# Patient Record
Sex: MALE | Race: WHITE | Employment: OTHER | URBAN - METROPOLITAN AREA
[De-identification: names, ages, dates, MRNs, and addresses within clinical notes are randomized per-mention and may not be internally consistent; named-entity substitution may affect disease eponyms.]

---

## 2017-01-02 ENCOUNTER — HOSPITAL ENCOUNTER (OUTPATIENT)
Dept: PHYSICAL THERAPY | Facility: CLINIC | Age: 62
Setting detail: THERAPIES SERIES
End: 2017-01-02
Attending: INTERNAL MEDICINE
Payer: COMMERCIAL

## 2017-01-02 ENCOUNTER — ANTICOAGULATION THERAPY VISIT (OUTPATIENT)
Dept: NURSING | Facility: CLINIC | Age: 62
End: 2017-01-02
Payer: COMMERCIAL

## 2017-01-02 DIAGNOSIS — Z79.01 LONG-TERM (CURRENT) USE OF ANTICOAGULANTS: ICD-10-CM

## 2017-01-02 DIAGNOSIS — I48.20 CHRONIC ATRIAL FIBRILLATION (H): Primary | ICD-10-CM

## 2017-01-02 LAB — INR POINT OF CARE: 2.6 (ref 0.86–1.14)

## 2017-01-02 PROCEDURE — 36416 COLLJ CAPILLARY BLOOD SPEC: CPT

## 2017-01-02 PROCEDURE — 85610 PROTHROMBIN TIME: CPT | Mod: QW

## 2017-01-02 PROCEDURE — 40000719 ZZHC STATISTIC PT DEPARTMENT NEURO VISIT: Performed by: PHYSICAL THERAPIST

## 2017-01-02 PROCEDURE — 99207 ZZC NO CHARGE NURSE ONLY: CPT

## 2017-01-02 PROCEDURE — 97110 THERAPEUTIC EXERCISES: CPT | Mod: GP | Performed by: PHYSICAL THERAPIST

## 2017-01-02 PROCEDURE — 97112 NEUROMUSCULAR REEDUCATION: CPT | Mod: GP | Performed by: PHYSICAL THERAPIST

## 2017-01-02 PROCEDURE — 97116 GAIT TRAINING THERAPY: CPT | Mod: GP | Performed by: PHYSICAL THERAPIST

## 2017-01-02 NOTE — PROGRESS NOTES
ANTICOAGULATION FOLLOW-UP CLINIC VISIT    Patient Name:  Marcus Hodges  Date:  1/2/2017  Contact Type:  Face to Face  Patient is accompanied in the office by his wife.    SUBJECTIVE:     Patient Findings     Positives Antibiotic use or infection    Comments Just finished 7 days of Keflex for ingrown toenail.           OBJECTIVE    INR PROTIME   Date Value Ref Range Status   01/02/2017 2.6* 0.86 - 1.14 Final       ASSESSMENT / PLAN  INR assessment THER    Recheck INR In: 6 WEEKS    INR Location Clinic      Anticoagulation Summary as of 1/2/2017     INR goal 2.0-3.0   Selected INR 2.6 (1/2/2017)   Maintenance plan 7.5 mg (5 mg x 1.5) on Mon, Wed, Fri; 5 mg (5 mg x 1) all other days   Full instructions 7.5 mg on Mon, Wed, Fri; 5 mg all other days   Weekly total 42.5 mg   No change documented Svetlana Vazquez RN   Plan last modified Svetlana Vazquez RN (10/21/2016)   Next INR check 2/10/2017   Target end date Indefinite    Indications   Long-term (current) use of anticoagulants [Z79.01]  Chronic atrial fibrillation (H) [I48.2]         Anticoagulation Episode Summary     INR check location [facility]    Preferred lab     Send INR reminders to [facility]    Comments 5mg tabs // WIFE USES OWN CALENDAR // CVA - left hemiplegia - uses a cane      Anticoagulation Care Providers     Provider Role Specialty Phone number    Don Aviles MD Referring Internal Medicine [phone]            See the Encounter Report to view Anticoagulation Flowsheet and Dosing Calendar (Go to Encounters tab in chart review, and find the Anticoagulation Therapy Visit)        Svetlana Vazquez RN

## 2017-01-05 NOTE — ADDENDUM NOTE
Encounter addended by: Ada Scott, PT on: 1/5/2017 12:52 PM<BR>     Documentation filed: Inpatient Document Flowsheet, Clinical Notes

## 2017-01-09 ENCOUNTER — OFFICE VISIT (OUTPATIENT)
Dept: PODIATRY | Facility: CLINIC | Age: 62
End: 2017-01-09
Payer: COMMERCIAL

## 2017-01-09 VITALS — WEIGHT: 179 LBS | RESPIRATION RATE: 16 BRPM | BODY MASS INDEX: 22.97 KG/M2 | HEIGHT: 74 IN

## 2017-01-09 DIAGNOSIS — L60.0 INGROWING NAIL: Primary | ICD-10-CM

## 2017-01-09 PROCEDURE — 11750 EXCISION NAIL&NAIL MATRIX: CPT | Mod: TA | Performed by: PODIATRIST

## 2017-01-09 NOTE — MR AVS SNAPSHOT
After Visit Summary   1/9/2017    Marcus Hodges    MRN: [id number]           Patient Information     Date Of Birth          1955        Visit Information        Provider Department      1/9/2017 10:30 AM Saulo Mariee DPM [facility] Instructions    Follow Up - 2 weeks or as needed    Dr. Mariee's Clinic Locations:         Monday Tuesday   [facility]   [address] [address], [address]   [place] [address] [place] [address]   [phone]       Wednesday:  Surgery Day    Surgery Scheduling Line - [phone]       Thursday Morning Thursday Afternoon   [facility]   [address], [address]   [place] [address] [place], [place] [address] [phone]       Friday Morning To Schedule an Appointment    [facility] Call: [phone] [address] [place] [address]  [phone] PLEASE FAX ALL FORMS TO: [phone]     Ingrown toenail Post-procedural instructions    - After the procedure, go home and elevate the involved foot for the remainder of the day/evening as able.  This is to minimize swelling, control pain, and limit post-procedural complications.  The pre-procedural injection may cause your toe to be numb anywhere from1-2 hours.    - You can take Tylenol, Ibuprofen, Advil, etc as needed for pain if tolerated.  Follow label instructions      - If you have been given a prescription for antibiotics, take them as instructed and complete the prescription.    - Keep dressing intact until the following morning.  At that point, you may remove the bandage (you may need to soak it in warm soapy water as the bandage will likely adhere to your skin).  Soaking in warm soapy water for 5-10 minutes will also facilitate healing.  Wash the toe thoroughly, dry the toe thoroughly.  Apply  antibiotic wound ointment to base of wound and cover with gauze and Coban dressing (not too tightly) until it stops draining.  This may take a few days to weeks, but at that point, you may continue with antibiotic ointment and a band-aid, or you may stop applying a dressing all together.  Dressing changes should be done twice daily if you had the permanent/chemical procedure done.    - You may do activities to tolerance the following day.  Find a shoe that is comfortable and minimizes the amount of rubbing on your toe, as this may increase pain, swelling, etc.    - Monitor for signs of infection.  With this procedure, it is common to have mild surrounding redness and drainage.  If the redness involves the entire great toe or if you notice red streaks on top of your foot, or if you experience any nausea, vomiting, chills, fevers > 101 degrees, call clinic for a quick appointment.          Follow-ups after your visit        Your next 10 appointments already scheduled     Jan 17, 2017  9:30 AM   Treatment 60 with Ada Scott PT   [facility] ([facility])    [address] [address]   [phone]            Jan 20, 2017 11:00 AM   Treatment 60 with Ada Scott PT   [facility] ([facility])    [address] [address]   [phone]            Jan 23, 2017  1:00 PM   Treatment 60 with Ada Scott PT   [facility] ([facility])    [address] [address]   [phone]            Jan 30, 2017  1:00 PM   Treatment 60 with Ada Scott PT   [facility] ([facility])    [address] [address]   [phone]            Feb 06, 2017  1:00 PM   Treatment 60 with Ada Scott PT   [facility] ([facility])    [address]  [facility] [address]   [phone]            Feb 10, 2017 10:00 AM   Anticoagulation Visit with [facility]   [facility] [place] ([facility])    [address]  [address]  [place] [address]   [phone]            Feb 13, 2017  1:00 PM   Treatment 60 with Ada Scott, PT   [facility] ([facility])    [address] [facility] [address]   [phone]            Feb 20, 2017  1:00 PM   Treatment 60 with Ada Scott, PT   [facility] ([facility])    [address] [facility] [address]   [phone]            Mar 03, 2017 10:15 AM   Treatment 60 with [place] Scott, PT   [facility] ([facility])    [address] [facility] [address] [phone]              Who to contact     If you have questions or need follow up information about today's clinic visit or your schedule please contact [facility] directly at [phone].  Normal or non-critical lab and imaging results will be communicated to you by MyChart, letter or phone within 4 business days after the clinic has received the results. If you do not hear from us within 7 days, please contact the clinic through [employer] or phone. If you have a critical or abnormal lab result, we will notify you by phone as soon as possible.  Submit refill requests through [employer] or call your pharmacy and they will forward the refill request to us. Please allow 3 business days for your refill to be completed.          Additional Information About Your Visit        [employer] Information     [employer] gives you secure access to your electronic health record. If you see a primary care provider, you can also send messages to your care team and make appointments. If you have questions, please call your primary care clinic.  If you do not have a primary care  provider, please call [phone] and they will assist you.        Care EveryWhere ID     This is your Care EveryWhere ID. This could be used by other organizations to access your [facility] medical records  [id number]         Blood Pressure from Last 3 Encounters:   12/23/16 114/70   12/06/16 100/74   12/04/16 102/60    Weight from Last 3 Encounters:   12/23/16 178 lb (80.74 kg)   12/06/16 179 lb (81.194 kg)   12/04/16 178 lb (80.74 kg)              Today, you had the following     No orders found for display       Primary Care Provider Office Phone # Fax #    Don Aviles [phone]       [facility] [address] [place] [address]        Thank you!     Thank you for choosing [facility]  for your care. Our goal is always to provide you with excellent care. Hearing back from our patients is one way we can continue to improve our services. Please take a few minutes to complete the written survey that you may receive in the mail after your visit with us. Thank you!             Your Updated Medication List - Protect others around you: Learn how to safely use, store and throw away your medicines at www.disposemymeds.org.          This list is accurate as of: 1/9/17 10:44 AM.  Always use your most recent med list.                   Brand Name Dispense Instructions for use    acetaminophen 325 MG tablet    TYLENOL    100 tablet    Take  by mouth every 4 hours as needed for pain.       amoxicillin 500 MG capsule    AMOXIL         atorvastatin 20 MG tablet    LIPITOR    90 tablet    Take 1 tablet (20 mg) by mouth daily       baclofen 20 MG tablet    LIORESAL    270 tablet    Take 1 tablet by mouth 4 times daily.       cholecalciferol 1000 UNIT tablet    vitamin D    90 tablet    Take 2 tablets (2,000 Units) by mouth daily       citalopram 20 MG tablet    celeXA    135 tablet    Take 1.5 tablets (30 mg) by mouth daily       erythromycin ophthalmic ointment    ROMYCIN    3.5 g     Place 0.5 inches Into the left eye 3 times daily       levETIRAcetam 1000 MG Tabs    KEPPRA    180 tablet    Take 1 tablet by mouth 2 times daily       metoprolol 25 MG 24 hr tablet    TOPROL-XL    90 tablet    Take 1 tablet (25 mg) by mouth daily       mirabegron 50 MG 24 hr tablet    MYRBETRIQ    90 tablet    Take 1 tablet (50 mg) by mouth daily       Multi-vitamin Tabs tablet   Generic drug:  multivitamin, therapeutic with minerals     100 tablet    Take 1 tablet by mouth daily.       NONFORMULARY      Protandim - herbal supplement       * order for DME     1 each    Equipment being ordered: Dispense a WHFO Leg Brace       * order for DME     1 each    Equipment being ordered: Quad Cane-39 inch       * order for DME     4 each    Equipment being ordered: daytime urinary leg bag   800cc       * order for DME     12 each    Equipment being ordered: Daytime Urinary leg bag-800cc  ([facility] )       * order for DME     12 each    Equipment being ordered:    Nightime urinary leg bag 1600cc  ([facility]S Code )       * order for DME     90 each    Equipment being ordered:   Condom catheter  ([facility]S Code )       * order for DME     12 each    Equipment being ordered: Incontinence supplies - Posies  (HCPCS Code )       * order for DME     150 each    Equipment being ordered: Adhesive remover, wipes  (HCPCS Code )       * order for DME     150 each    Equipment being ordered: Skin prep (no code - requested by patient)       warfarin 5 MG tablet    COUMADIN    135 tablet    Take 5mg TTSa, 7.5mg MWFSu OR as directed by INR Clinic.       * Notice:  This list has 9 medication(s) that are the same as other medications prescribed for you. Read the directions carefully, and ask your doctor or other care provider to review them with you.

## 2017-01-09 NOTE — PATIENT INSTRUCTIONS
Follow Up - 2 weeks or as needed    Dr. Mariee's Clinic Locations:         Monday Tuesday   [facility]   [address]   [place] [address] [place] [address]   [phone]       Wednesday:  Surgery Day    Surgery Scheduling Line - [phone]       Thursday Morning Thursday Afternoon   [facility]   [address]   [place] [address] [place], [place] [address] [phone]       Friday Morning To Schedule an Appointment    [facility] Call: [phone] [address] [place] [address]  [phone] PLEASE FAX ALL FORMS TO: [phone]     Ingrown toenail Post-procedural instructions    - After the procedure, go home and elevate the involved foot for the remainder of the day/evening as able.  This is to minimize swelling, control pain, and limit post-procedural complications.  The pre-procedural injection may cause your toe to be numb anywhere from1-2 hours.    - You can take Tylenol, Ibuprofen, Advil, etc as needed for pain if tolerated.  Follow label instructions      - If you have been given a prescription for antibiotics, take them as instructed and complete the prescription.    - Keep dressing intact until the following morning.  At that point, you may remove the bandage (you may need to soak it in warm soapy water as the bandage will likely adhere to your skin).  Soaking in warm soapy water for 5-10 minutes will also facilitate healing.  Wash the toe thoroughly, dry the toe thoroughly.  Apply antibiotic wound ointment to base of wound and cover with gauze and Coban dressing (not too tightly) until it stops draining.  This may take a few days to weeks, but at that point, you may continue with antibiotic ointment and a band-aid, or you may stop applying a dressing all together.  Dressing changes  should be done twice daily if you had the permanent/chemical procedure done.    - You may do activities to tolerance the following day.  Find a shoe that is comfortable and minimizes the amount of rubbing on your toe, as this may increase pain, swelling, etc.    - Monitor for signs of infection.  With this procedure, it is common to have mild surrounding redness and drainage.  If the redness involves the entire great toe or if you notice red streaks on top of your foot, or if you experience any nausea, vomiting, chills, fevers > 101 degrees, call clinic for a quick appointment.

## 2017-01-09 NOTE — NURSING NOTE
"Chief Complaint   Patient presents with     Ingrown Toenail     Left hallux, phenol application       Initial Resp 16  Ht 6' 2\" (1.88 m)  Wt 179 lb (81.194 kg)  BMI 22.97 kg/m2 Estimated body mass index is 22.97 kg/(m^2) as calculated from the following:    Height as of this encounter: 6' 2\" (1.88 m).    Weight as of this encounter: 179 lb (81.194 kg).    Christine Batres CMA ([facility])  Podiatry/Foot & Ankle Surgery      "

## 2017-01-09 NOTE — PROGRESS NOTES
"Foot & Ankle Surgery   January 9, 2017    S:  Pt is seen today for evaluation of medial L hallux.  Plan for staged P&A, doing well after avulsion last visit and PO abx.  Had some discomfort after avulsion but toe is nearly healed today.  They do have the silvadene cream.    Filed Vitals:    01/09/17 1045   Resp: 16   Height: 6' 2\" (1.88 m)   Weight: 179 lb (81.194 kg)   '      ROS - Pos for CC.  Patient denies current nausea, vomiting, chills, fevers, belly pain, calf pain, chest pain or SOB.  Complete remainder of ROS it otherwise neg.      PE:  Gen:   No apparent distress  Neuro:   A&Ox3, no deficits  Psych:    Answering questions appropriately for age and situation with normal affect  Head:    NCAT  Eye:    Visual scanning without deficit  Ear:    Response to auditory stimuli wnl  Lung:    Non-labored breathing on RA noted  Abd:    NTND per patient report  Lymph:    Neg for pitting/non-pitting edema BLE  Vasc:    Pulses palpable, CFT minimally delayed  Neuro:    Light touch sensation diminished left lower extremity   Derm:  Medial L hallux procedure site - dry, stable, no drainage, no infection/inflammation  MSK:    ROM, strength wnl without limitation, no pain on palpation noted.  Calf:    Neg for redness, swelling or tenderness      Assessment:  61 year old male with recurrent ingrown medial L hallux, 2 weeks sp partial temp avulsion.  In today for phenol application for staged P&A      Plan:  Discussed etiologies/options  1.  Medial L hallux  -After discussing the procedure, as well as risks, complications and post-procedure instructions, informed consent was obtained.    Anesthesia:  3 cc's of  1% lidocaine plain    Procedure:  After adequate prep, and with anesthesia achieved, a tourni-cot was applied to the involved toe(and removed after bandage application) and  attention was directed to the medial border of the L hallux.  The base of the wound was explored and showed no necrotic tissue, purulence or " debris.  The base was debrided/curetted.  Phenol was then applied to the base of the wound for 30s x 3, and sufficient isopropyl alcohol was used to irrigate the wound.  A clean dressing was applied loosely to prevent vascular insult.  The patient tolerated the procedure well without complications.    Post-procedural instructions were dispensed and discussed with the patient.  All questions were answered.    Follow up:  2 weeks      Body mass index is 22.97 kg/(m^2).           Saulo Mariee DPM   Podiatric Foot & Ankle Surgeon  [facility]  [phone]

## 2017-01-16 ENCOUNTER — TRANSFERRED RECORDS (OUTPATIENT)
Dept: HEALTH INFORMATION MANAGEMENT | Facility: CLINIC | Age: 62
End: 2017-01-16

## 2017-01-17 ENCOUNTER — HOSPITAL ENCOUNTER (OUTPATIENT)
Dept: PHYSICAL THERAPY | Facility: CLINIC | Age: 62
Setting detail: THERAPIES SERIES
End: 2017-01-17
Attending: INTERNAL MEDICINE
Payer: COMMERCIAL

## 2017-01-17 PROCEDURE — 40000719 ZZHC STATISTIC PT DEPARTMENT NEURO VISIT: Performed by: PHYSICAL THERAPIST

## 2017-01-17 PROCEDURE — 97110 THERAPEUTIC EXERCISES: CPT | Mod: GP | Performed by: PHYSICAL THERAPIST

## 2017-01-17 PROCEDURE — 97116 GAIT TRAINING THERAPY: CPT | Mod: GP | Performed by: PHYSICAL THERAPIST

## 2017-01-17 PROCEDURE — 97112 NEUROMUSCULAR REEDUCATION: CPT | Mod: GP | Performed by: PHYSICAL THERAPIST

## 2017-01-20 ENCOUNTER — HOSPITAL ENCOUNTER (OUTPATIENT)
Dept: PHYSICAL THERAPY | Facility: CLINIC | Age: 62
Setting detail: THERAPIES SERIES
End: 2017-01-20
Attending: INTERNAL MEDICINE
Payer: COMMERCIAL

## 2017-01-20 PROCEDURE — 97112 NEUROMUSCULAR REEDUCATION: CPT | Mod: GP | Performed by: PHYSICAL THERAPIST

## 2017-01-20 PROCEDURE — 40000719 ZZHC STATISTIC PT DEPARTMENT NEURO VISIT: Performed by: PHYSICAL THERAPIST

## 2017-01-20 PROCEDURE — 97116 GAIT TRAINING THERAPY: CPT | Mod: GP | Performed by: PHYSICAL THERAPIST

## 2017-01-20 PROCEDURE — 97110 THERAPEUTIC EXERCISES: CPT | Mod: GP | Performed by: PHYSICAL THERAPIST

## 2017-01-23 ENCOUNTER — OFFICE VISIT (OUTPATIENT)
Dept: PODIATRY | Facility: CLINIC | Age: 62
End: 2017-01-23
Payer: COMMERCIAL

## 2017-01-23 ENCOUNTER — HOSPITAL ENCOUNTER (OUTPATIENT)
Dept: PHYSICAL THERAPY | Facility: CLINIC | Age: 62
Setting detail: THERAPIES SERIES
End: 2017-01-23
Attending: INTERNAL MEDICINE
Payer: COMMERCIAL

## 2017-01-23 VITALS — BODY MASS INDEX: 22.97 KG/M2 | HEIGHT: 74 IN | RESPIRATION RATE: 16 BRPM | WEIGHT: 179 LBS

## 2017-01-23 DIAGNOSIS — L60.0 INGROWING NAIL: Primary | ICD-10-CM

## 2017-01-23 PROCEDURE — 97116 GAIT TRAINING THERAPY: CPT | Mod: GP | Performed by: PHYSICAL THERAPIST

## 2017-01-23 PROCEDURE — 97112 NEUROMUSCULAR REEDUCATION: CPT | Mod: GP | Performed by: PHYSICAL THERAPIST

## 2017-01-23 PROCEDURE — 99212 OFFICE O/P EST SF 10 MIN: CPT | Performed by: PODIATRIST

## 2017-01-23 PROCEDURE — 40000719 ZZHC STATISTIC PT DEPARTMENT NEURO VISIT: Performed by: PHYSICAL THERAPIST

## 2017-01-23 NOTE — NURSING NOTE
"Chief Complaint   Patient presents with     RECHECK     L hallux medial edge 2 wk f/u after phenol application       Initial Resp 16  Ht 6' 2\" (1.88 m)  Wt 179 lb (81.194 kg)  BMI 22.97 kg/m2 Estimated body mass index is 22.97 kg/(m^2) as calculated from the following:    Height as of this encounter: 6' 2\" (1.88 m).    Weight as of this encounter: 179 lb (81.194 kg).    Christine Batres CMA ([facility])  Podiatry/Foot & Ankle Surgery  [facility]      "

## 2017-01-23 NOTE — PROGRESS NOTES
"Foot & Ankle Surgery   January 23, 2017    S:  Pt is seen today for evaluation of medial L hallux 4 weeks after avulsion and 2 weeks after phenol application.  Some continued GI discomfort from last course of PO abx.  Using a bandaid on the toe, minimal drainage at this point.    Filed Vitals:    01/23/17 1050   Resp: 16   Height: 6' 2\" (1.88 m)   Weight: 179 lb (81.194 kg)   '      ROS - Pos for CC.  Patient denies current nausea, vomiting, chills, fevers, belly pain, calf pain, chest pain or SOB.  Complete remainder of ROS it otherwise neg.      PE:  Gen:   No apparent distress  Neuro:   A&Ox3, no deficits  Psych:    Answering questions appropriately for age and situation with normal affect  Head:    NCAT  Eye:    Visual scanning without deficit  Ear:    Response to auditory stimuli wnl  Lung:    Non-labored breathing on RA noted  Abd:    NTND per patient report  Lymph:    Neg for pitting/non-pitting edema BLE  Vasc:    Pulses palpable, CFT minimally delayed  Neuro:    Diminished sensation left lower extremity   Derm:  Minimal inflammation, no drainage, no SOI medial L hallux procedure site  MSK:    Left lower extremity baseline weakness 2/2 to previous stroke  Calf:    Neg for redness, swelling or tenderness    Assessment:  61 year old male 2 weeks sp P&A medial L hallux; GI discomfort from previous PO abx course      Plan:  Discussed etiologies/options  1.  Medial L hallux  -continue P&A post-procedure instructions until fully healed; reviewed    2.  GI discomfort  -discussed yogurt with live cultures and probiotics    Follow up:  Prn       Body mass index is 22.97 kg/(m^2).           Saulo Mariee DPM   Podiatric Foot & Ankle Surgeon  [facility]  [phone]    "

## 2017-02-06 ENCOUNTER — HOSPITAL ENCOUNTER (OUTPATIENT)
Dept: PHYSICAL THERAPY | Facility: CLINIC | Age: 62
Setting detail: THERAPIES SERIES
End: 2017-02-06
Attending: INTERNAL MEDICINE
Payer: COMMERCIAL

## 2017-02-06 PROCEDURE — 97116 GAIT TRAINING THERAPY: CPT | Mod: GP | Performed by: PHYSICAL THERAPIST

## 2017-02-06 PROCEDURE — 97112 NEUROMUSCULAR REEDUCATION: CPT | Mod: GP | Performed by: PHYSICAL THERAPIST

## 2017-02-06 PROCEDURE — 97110 THERAPEUTIC EXERCISES: CPT | Mod: GP | Performed by: PHYSICAL THERAPIST

## 2017-02-06 PROCEDURE — 40000719 ZZHC STATISTIC PT DEPARTMENT NEURO VISIT: Performed by: PHYSICAL THERAPIST

## 2017-02-07 NOTE — PROGRESS NOTES
Outpatient Physical Therapy Progress Note     Patient: Marcus Hodges  : 1955    Beginning/End Dates of Reporting Period:  10/25/16  to 2017    Referring Provider: Dr. Don Aviles    Therapy Diagnosis: R knee pain, impaired functional mobility and gait with impaired posture and coordination of movement with history of CVA           Client Self Report: Missed last week due to a side affect from his medications.  GI issues.   States he was in [employer] last week and he was without pain with standing.  Has had some c/o R knee pain after walking , per wife, Nidia.    States last Friday he walked 5 laps (11 equals a mile)   Wife has video taped Marcus ambulating at the gym and video tape from begining of  to beg  demonstrates improved gait pattern.    Objective Measurements:  Objective Measure: 25 foot walk   Details: 24.84 seconds  29 steps  ,    25.22 seconds 28 steps.   Walking pole.   17    26 seconds , 26 steps walking stick/pole    Objective Measure: Pain  Details: reports no pain with standing in [employer] last week,  has had some R knee pain with walking at gym.     Objective Measure: 4 square  Details: 32.75 seconds with walking pole and 53 seconds without AD.  CGA to close SBA with both.  Small steps with each especially going backward and also to the left      Body in space/body control - much improved ability to wt shift and maintain wt shift to the left in sitting, standing and this is apparent with functional activities such as gait, transfers, stairs. Less apprehensive and resistant to going to the left.      Gait - ambulating with trekking pole or cane (similar to hurricane).  Improved alignment of pelvis in transverse plane, improved disassociation pelvis and femoral acetabular joint.  Improved loading onto the L LE , improved knee release end stance phase.  Continues to have overactivation/ impaired grading of muscle activation/dampening L UE, LE and trunk.  Tolerating longer time on  TM allowing improved cardio vascular function as well as improved gait.           Goals:  Goal Identifier  1- Knee pain    Goal Description  Pt will tolerate prolonged standing during [employer] service with 0/10 pain.   Target Date  3/31/17   Date Met   met x 1 occurence   Progress:  Met at most recent time at [employer].  Will keep this goal active for consistency.      Goal Identifier  2 HEP   Goal Description  Marcus will be independent with continued LE strengthening, stretching and /or balance training HEP for longterm  Management of knee pain and activity limitations.    Target Date  3/31/17   Date Met   in progress   Progress: HEP continues to be advanced and modified as pt progresses.      Goal Identifier 3 - 25 foot walk   Goal Description Marcus will complete the 25 foot walk with /without an AD in 19 seconds or less to demonstrate improved gait efficiency and quality of gait.    Target Date 03/31/16   Date Met   in progress   Progress: times improved as above.      Goal Identifier 4 - Gait/personal goal   Goal Description Marcus to ambulate without an  feet or greater including uneven terrain with step through gait pattern and not LOB to progress to his goal of not needing an AD with gait.    Target Date 03/31/16   Date Met   in progress   Progress:Marcus has been ambulating on track at [facility].  He is increasing the amount he is able to do.  He currently is ambulating 5 laps  (11 equals a mile).  He used the railing the majority of the time.      Goal Identifier 5- curbs   Goal Description Marcus to ascend and descend curb with min assist or less and AD , without LOB or push to the right to allow greater ease and safety with curbs and less caregiver burden/risk of injury.    Target Date 03/31/16   Date Met   in progress   Progress: Have not completed this recently outside due to the weather.  Working on stepping up onto 4 inch stair in clinic.  Pt using railing.  With stairs improved ability  to flex/release knee extension on the left and step up onto the stair either with L leg trailing or leading with less hip circumduction.             Progress Toward Goals:   Progress this reporting period: Marcus continues to demonstrate steady progress with skilled PT intervention and faithfulness with his HEP.  He has been limited this PN session due to surgery on ingrown toe nail on the L.     He continues to be appropriate for skilled PT intervention, wishes to continue with skilled PT and is without questions.    Goals remain appropriate and will continue to address all goals.    New goal to be made for 4 square as below.     Plan:  Continue therapy per current plan of care -  1 x a week for neuro re-ed, stretches, manual, strengthening, gait, balance, functional activities and development of HEP.     NEW GOAL     4 square    Marcus to complete 4 square in 25 seconds with and AD and 40 seconds or less without an AD to demonstrate improved interlimb coordination, balance and body control to assist in meeting all goals and overall function.     To be met date 4/30/17        Discharge:  No

## 2017-02-08 ENCOUNTER — TELEPHONE (OUTPATIENT)
Dept: PEDIATRICS | Facility: CLINIC | Age: 62
End: 2017-02-08

## 2017-02-08 NOTE — TELEPHONE ENCOUNTER
Patient calling for recommendations for chronic constipation.  Has bowel movement qod.  However, gets the urge to have a bowel movement and then is unable to.   States he notes abdominal discomfort.  Also feels uncomfortable leaving the home as he is unsure if he will have a bowel movement.  When he has a bowel movement, stools are hard.  Takes Metamucil qod.  Eats prunes nightly.  Patient had a stroke five years ago.  Asking for other recommendations.  Discussed use of stool softener, and could try taking Metamucil daily.  Any other recommendations?  Call patient back at home number.  OK to MARILU Donohue RN

## 2017-02-08 NOTE — TELEPHONE ENCOUNTER
Please call pt  rec   1)Senna 1-2 tabs each evening  2) d/c metamucil.  Start daily dose of Benefiber  (both are over the counter)

## 2017-02-10 ENCOUNTER — ANTICOAGULATION THERAPY VISIT (OUTPATIENT)
Dept: NURSING | Facility: CLINIC | Age: 62
End: 2017-02-10
Payer: COMMERCIAL

## 2017-02-10 DIAGNOSIS — I48.20 CHRONIC ATRIAL FIBRILLATION (H): Primary | ICD-10-CM

## 2017-02-10 DIAGNOSIS — Z79.01 LONG-TERM (CURRENT) USE OF ANTICOAGULANTS: ICD-10-CM

## 2017-02-10 LAB — INR POINT OF CARE: 2 (ref 0.86–1.14)

## 2017-02-10 PROCEDURE — 85610 PROTHROMBIN TIME: CPT | Mod: QW

## 2017-02-10 PROCEDURE — 36416 COLLJ CAPILLARY BLOOD SPEC: CPT

## 2017-02-10 PROCEDURE — 99207 ZZC NO CHARGE NURSE ONLY: CPT

## 2017-02-10 NOTE — PROGRESS NOTES
ANTICOAGULATION FOLLOW-UP CLINIC VISIT    Patient Name:  Marcus Hodges  Date:  2/10/2017  Contact Type:  Face to Face  Patient is accompanied in the office by his wife.    SUBJECTIVE:     Patient Findings     Positives No Problem Findings           OBJECTIVE    INR PROTIME   Date Value Ref Range Status   02/10/2017 2.0* 0.86 - 1.14 Final       ASSESSMENT / PLAN  INR assessment THER    Recheck INR In: 6 WEEKS    INR Location Clinic      Anticoagulation Summary as of 2/10/2017     INR goal 2.0-3.0   Selected INR 2.0 (2/10/2017)   Maintenance plan 7.5 mg (5 mg x 1.5) on Mon, Wed, Fri; 5 mg (5 mg x 1) all other days   Full instructions 7.5 mg on Mon, Wed, Fri; 5 mg all other days   Weekly total 42.5 mg   No change documented Svetlana Vazquez RN   Plan last modified Svetlana Vazquez RN (10/21/2016)   Next INR check 3/24/2017   Target end date Indefinite    Indications   Long-term (current) use of anticoagulants [Z79.01]  Chronic atrial fibrillation (H) [I48.2]         Anticoagulation Episode Summary     [facility]    Preferred lab     Send [facility]R reminders to [facility]    Comments 5mg tabs // WIFE USES OWN CALENDAR // CVA - left hemiplegia - uses a cane      Anticoagulation Care Providers     Provider Role Specialty Phone number    Don Aviles MD Referring Internal Medicine [phone]            See the Encounter Report to view Anticoagulation Flowsheet and Dosing Calendar (Go to Encounters tab in chart review, and find the Anticoagulation Therapy Visit)        Svetlana Vazquez RN

## 2017-02-13 ENCOUNTER — HOSPITAL ENCOUNTER (OUTPATIENT)
Dept: PHYSICAL THERAPY | Facility: CLINIC | Age: 62
Setting detail: THERAPIES SERIES
End: 2017-02-13
Attending: INTERNAL MEDICINE
Payer: COMMERCIAL

## 2017-02-13 PROCEDURE — 97116 GAIT TRAINING THERAPY: CPT | Mod: GP | Performed by: PHYSICAL THERAPIST

## 2017-02-13 PROCEDURE — 40000719 ZZHC STATISTIC PT DEPARTMENT NEURO VISIT: Performed by: PHYSICAL THERAPIST

## 2017-02-13 PROCEDURE — 97110 THERAPEUTIC EXERCISES: CPT | Mod: GP | Performed by: PHYSICAL THERAPIST

## 2017-02-20 ENCOUNTER — HOSPITAL ENCOUNTER (OUTPATIENT)
Dept: PHYSICAL THERAPY | Facility: CLINIC | Age: 62
Setting detail: THERAPIES SERIES
End: 2017-02-20
Attending: INTERNAL MEDICINE
Payer: COMMERCIAL

## 2017-02-20 PROCEDURE — 40000719 ZZHC STATISTIC PT DEPARTMENT NEURO VISIT: Performed by: PHYSICAL THERAPIST

## 2017-02-20 PROCEDURE — 97110 THERAPEUTIC EXERCISES: CPT | Mod: GP | Performed by: PHYSICAL THERAPIST

## 2017-02-20 PROCEDURE — 97116 GAIT TRAINING THERAPY: CPT | Mod: GP | Performed by: PHYSICAL THERAPIST

## 2017-03-03 ENCOUNTER — HOSPITAL ENCOUNTER (OUTPATIENT)
Dept: PHYSICAL THERAPY | Facility: CLINIC | Age: 62
Setting detail: THERAPIES SERIES
End: 2017-03-03
Attending: INTERNAL MEDICINE
Payer: COMMERCIAL

## 2017-03-03 PROCEDURE — 97112 NEUROMUSCULAR REEDUCATION: CPT | Mod: GP | Performed by: PHYSICAL THERAPIST

## 2017-03-03 PROCEDURE — 40000719 ZZHC STATISTIC PT DEPARTMENT NEURO VISIT: Performed by: PHYSICAL THERAPIST

## 2017-03-03 PROCEDURE — 97110 THERAPEUTIC EXERCISES: CPT | Mod: GP | Performed by: PHYSICAL THERAPIST

## 2017-03-03 PROCEDURE — 97116 GAIT TRAINING THERAPY: CPT | Mod: GP | Performed by: PHYSICAL THERAPIST

## 2017-03-06 ENCOUNTER — OFFICE VISIT (OUTPATIENT)
Dept: PEDIATRICS | Facility: CLINIC | Age: 62
End: 2017-03-06
Payer: COMMERCIAL

## 2017-03-06 ENCOUNTER — HOSPITAL ENCOUNTER (OUTPATIENT)
Dept: PHYSICAL THERAPY | Facility: CLINIC | Age: 62
Setting detail: THERAPIES SERIES
End: 2017-03-06
Attending: INTERNAL MEDICINE
Payer: COMMERCIAL

## 2017-03-06 VITALS
DIASTOLIC BLOOD PRESSURE: 70 MMHG | SYSTOLIC BLOOD PRESSURE: 108 MMHG | WEIGHT: 175 LBS | BODY MASS INDEX: 22.46 KG/M2 | HEIGHT: 74 IN | OXYGEN SATURATION: 98 % | TEMPERATURE: 97.5 F | HEART RATE: 76 BPM

## 2017-03-06 DIAGNOSIS — N31.9 NEUROGENIC BLADDER: ICD-10-CM

## 2017-03-06 DIAGNOSIS — F33.1 MAJOR DEPRESSIVE DISORDER, RECURRENT EPISODE, MODERATE (H): ICD-10-CM

## 2017-03-06 DIAGNOSIS — Z00.01 ENCOUNTER FOR ROUTINE ADULT HEALTH EXAMINATION WITH ABNORMAL FINDINGS: Primary | ICD-10-CM

## 2017-03-06 DIAGNOSIS — B07.8 COMMON WART: ICD-10-CM

## 2017-03-06 DIAGNOSIS — S06.9X9S TRAUMATIC BRAIN INJURY, WITH LOSS OF CONSCIOUSNESS OF UNSPECIFIED DURATION, SEQUELA: ICD-10-CM

## 2017-03-06 DIAGNOSIS — E78.5 HYPERLIPIDEMIA LDL GOAL <100: ICD-10-CM

## 2017-03-06 DIAGNOSIS — I48.20 CHRONIC ATRIAL FIBRILLATION (H): ICD-10-CM

## 2017-03-06 DIAGNOSIS — G81.94 LEFT HEMIPLEGIA (H): ICD-10-CM

## 2017-03-06 DIAGNOSIS — R32 URINARY INCONTINENCE, UNSPECIFIED TYPE: ICD-10-CM

## 2017-03-06 DIAGNOSIS — Z29.89 SEIZURE PROPHYLAXIS: ICD-10-CM

## 2017-03-06 LAB
ALBUMIN SERPL-MCNC: 4.3 G/DL (ref 3.4–5)
ALP SERPL-CCNC: 80 U/L (ref 40–150)
ALT SERPL W P-5'-P-CCNC: 39 U/L (ref 0–70)
ANION GAP SERPL CALCULATED.3IONS-SCNC: 6 MMOL/L (ref 3–14)
AST SERPL W P-5'-P-CCNC: 21 U/L (ref 0–45)
BILIRUB SERPL-MCNC: 1.1 MG/DL (ref 0.2–1.3)
BUN SERPL-MCNC: 12 MG/DL (ref 7–30)
CALCIUM SERPL-MCNC: 8.9 MG/DL (ref 8.5–10.1)
CHLORIDE SERPL-SCNC: 105 MMOL/L (ref 94–109)
CHOLEST SERPL-MCNC: 120 MG/DL
CO2 SERPL-SCNC: 29 MMOL/L (ref 20–32)
CREAT SERPL-MCNC: 0.68 MG/DL (ref 0.66–1.25)
GFR SERPL CREATININE-BSD FRML MDRD: NORMAL ML/MIN/1.7M2
GLUCOSE SERPL-MCNC: 78 MG/DL (ref 70–99)
HDLC SERPL-MCNC: 59 MG/DL
LDLC SERPL CALC-MCNC: 51 MG/DL
NONHDLC SERPL-MCNC: 61 MG/DL
POTASSIUM SERPL-SCNC: 4.2 MMOL/L (ref 3.4–5.3)
PROT SERPL-MCNC: 7.5 G/DL (ref 6.8–8.8)
SODIUM SERPL-SCNC: 140 MMOL/L (ref 133–144)
TRIGL SERPL-MCNC: 49 MG/DL

## 2017-03-06 PROCEDURE — 40000719 ZZHC STATISTIC PT DEPARTMENT NEURO VISIT: Performed by: PHYSICAL THERAPIST

## 2017-03-06 PROCEDURE — 97116 GAIT TRAINING THERAPY: CPT | Mod: GP | Performed by: PHYSICAL THERAPIST

## 2017-03-06 PROCEDURE — 99396 PREV VISIT EST AGE 40-64: CPT | Mod: 25 | Performed by: INTERNAL MEDICINE

## 2017-03-06 PROCEDURE — 97112 NEUROMUSCULAR REEDUCATION: CPT | Mod: GP | Performed by: PHYSICAL THERAPIST

## 2017-03-06 PROCEDURE — 80061 LIPID PANEL: CPT | Performed by: INTERNAL MEDICINE

## 2017-03-06 PROCEDURE — 17110 DESTRUCTION B9 LES UP TO 14: CPT | Performed by: INTERNAL MEDICINE

## 2017-03-06 PROCEDURE — 36415 COLL VENOUS BLD VENIPUNCTURE: CPT | Performed by: INTERNAL MEDICINE

## 2017-03-06 PROCEDURE — 80053 COMPREHEN METABOLIC PANEL: CPT | Performed by: INTERNAL MEDICINE

## 2017-03-06 PROCEDURE — 97110 THERAPEUTIC EXERCISES: CPT | Mod: GP | Performed by: PHYSICAL THERAPIST

## 2017-03-06 RX ORDER — METOPROLOL SUCCINATE 25 MG/1
25 TABLET, EXTENDED RELEASE ORAL DAILY
Qty: 90 TABLET | Refills: 3 | Status: SHIPPED | OUTPATIENT
Start: 2017-03-06 | End: 2018-02-15

## 2017-03-06 RX ORDER — ATORVASTATIN CALCIUM 20 MG/1
20 TABLET, FILM COATED ORAL DAILY
Qty: 90 TABLET | Refills: 3 | Status: SHIPPED | OUTPATIENT
Start: 2017-03-06 | End: 2018-02-15

## 2017-03-06 RX ORDER — BACLOFEN 20 MG/1
20 TABLET ORAL 4 TIMES DAILY
Qty: 360 TABLET | Refills: 3 | Status: SHIPPED | OUTPATIENT
Start: 2017-03-06 | End: 2018-02-19

## 2017-03-06 RX ORDER — LEVETIRACETAM 1000 MG/1
1 TABLET ORAL
Qty: 180 TABLET | Refills: 3 | Status: SHIPPED | OUTPATIENT
Start: 2017-03-06 | End: 2018-02-15

## 2017-03-06 RX ORDER — CITALOPRAM HYDROBROMIDE 20 MG/1
30 TABLET ORAL DAILY
Qty: 135 TABLET | Refills: 3 | Status: SHIPPED | OUTPATIENT
Start: 2017-03-06 | End: 2018-02-15

## 2017-03-06 NOTE — PROGRESS NOTES
SUBJECTIVE:     CC: Marcus Hodges is an 61 year old male who presents for preventative health visit.     Healthy Habits:    Do you get at least three servings of calcium containing foods daily (dairy, green leafy vegetables, etc.)? yes    Amount of exercise or daily activities, outside of work: PT once a week, walk at the gym 3 times weekly    Problems taking medications regularly No    Medication side effects: No    Have you had an eye exam in the past two years? yes    Do you see a dentist twice per year? yes  Do you have sleep apnea, excessive snoring or daytime drowsiness?no    Today's PHQ-2 Score:   PHQ-2 ( 1999 [employer]) 2/29/2016 7/7/2015   Q1: Little interest or pleasure in doing things 0 0   Q2: Feeling down, depressed or hopeless 0 0   PHQ-2 Score 0 0       Abuse: Current or Past(Physical, Sexual or Emotional)- no  Do you feel safe in your environment - Yes    Social History   Substance Use Topics     Smoking status: Never Smoker     Smokeless tobacco: Never Used     Alcohol use No     The patient does not drink >3 drinks per day nor >7 drinks per week.    Last PSA:   PSA   Date Value Ref Range Status   01/09/2012 1.40 0 - 4 ug/L Final       Recent Labs   Lab Test  02/29/16   0919  02/10/15   0749  01/31/14   0912   CHOL  116  105  105   HDL  53  52  47   LDL  52  43  44   TRIG  54  49  67   CHOLHDLRATIO   --   2.0  2.2   NHDL  63   --    --        Reviewed orders with patient. Reviewed health maintenance and updated orders accordingly - Yes    Reviewed and updated as needed this visit by clinical staff  Tobacco  Allergies  Meds         Reviewed and updated as needed this visit by Provider        Past Medical History   Diagnosis Date     * * * SBE PROPHYLAXIS * * *      Atrial enlargement, bilateral      Atrial flutter (H) 2004     radiofrequency ablation 2004, resolved     ATRIAL SEPTAL DEFECT      repaired 1977     CVA (cerebral vascular accident) (H) 4/2012     R MCA     Dysphagia 04/22/12       [facility], following CVA     Mitral regurgitation      mitral valve damage related to ASD repair     Respiratory failure (H) 04/22/12     [facility], following CVA, prolonged requiring tracheostomy, [facility]      Tricuspid regurgitation      Unspecified glaucoma      right     Urinary incontinence        Past Surgical History   Procedure Laterality Date     Repair atrial septal defect  1978     ASD Repair     C nonspecific procedure       tonsillectomy     C nonspecific procedure       Ing. Hernia Repair     Surgical history of -   2009     right eye enucleation     Tracheostomy  April 2012     [facility], following CVA     Gastrostomy tube  April 2012     [facility], following CVA     Right hemicraniectomy  April 2012     [facility], following CVA, mgmt malignant cerebral edema     Craniotomy reconstruction  2012     [facility], repair of hemicraniectomy defect     Catheter, ablation  2004       Family History   Problem Relation Age of Onset     Hypertension Mother      CANCER Paternal Grandmother      DIABETES Maternal Grandmother      Congenital Anomalies Brother      several brothers and sisters born with congential heart defects, but all have been repaired     Congenital Anomalies Sister      CEREBROVASCULAR DISEASE Father        Social History   Substance Use Topics     Smoking status: Never Smoker     Smokeless tobacco: Never Used     Alcohol use No       ALLERGIES     Allergies   Allergen Reactions     No Known Drug Allergies        ROS:  C: NEGATIVE for fever, chills  I: recurrent wart left temple, requests rpt treatment  E: NEGATIVE for vision changes   ENT: NEGATIVE for ear, mouth and throat problems  R: NEGATIVE for significant cough or SOB  CV: NEGATIVE for chest pain, palpitations or peripheral edema  GI: constipation, no other changes   male: neurogenic bladder and urinary incontinence since CVA.  Continues condom catheter  M: NEGATIVE for significant arthralgias or myalgia  N:  hemiparesis, TBI following CVA, continues PT and now [facility]  P: NEGATIVE for changes in mood or affect    Current Outpatient Prescriptions   Medication Sig Dispense Refill     metoprolol (TOPROL-XL) 25 MG 24 hr tablet Take 1 tablet (25 mg) by mouth daily 90 tablet 3     atorvastatin (LIPITOR) 20 MG tablet Take 1 tablet (20 mg) by mouth daily 90 tablet 3     citalopram (CELEXA) 20 MG tablet Take 1.5 tablets (30 mg) by mouth daily 135 tablet 3     levETIRAcetam (KEPPRA) 1000 MG TABS Take 1 tablet by mouth 2 times daily 180 tablet 3     order for DME Equipment being ordered: Daytime Urinary leg bag-800cc  (HCPCS Code ) 12 each 3     order for DME Equipment being ordered:    Nightime urinary leg bag 1600cc  (HCPCS Code ) 12 each 3     order for DME Equipment being ordered:   Condom catheter  (HCPCS Code ) 90 each 3     order for DME Equipment being ordered: Incontinence supplies - Posies  (HCPCS Code ) 12 each 3     order for DME Equipment being ordered: Adhesive remover, wipes  (HCPCS Code ) 150 each 3     order for DME Equipment being ordered: Skin prep (no code - requested by patient) 150 each 3     baclofen (LIORESAL) 20 MG tablet Take 1 tablet (20 mg) by mouth 4 times daily 360 tablet 3     mirabegron (MYRBETRIQ) 50 MG 24 hr tablet Take 1 tablet (50 mg) by mouth daily 90 tablet 3     warfarin (COUMADIN) 5 MG tablet Take 5mg TTSa, 7.5mg MWFSu OR as directed by INR Clinic. 135 tablet 1     NONFORMULARY Protandim - herbal supplement       cholecalciferol (VITAMIN D) 1000 UNIT tablet Take 2 tablets (2,000 Units) by mouth daily 90 tablet 3     ORDER FOR DME Equipment being ordered: daytime urinary leg bag   800cc 4 each 11     ORDER FOR DME Equipment being ordered: Dispense a WHFO Leg Brace 1 each 0     ORDER FOR DME Equipment being ordered: Quad Cane-39 inch 1 each 0     Multiple Vitamin (MULTI-VITAMIN) per tablet Take 1 tablet by mouth daily. 100 tablet 3      "acetaminophen (TYLENOL) 325 MG tablet Take  by mouth every 4 hours as needed for pain. 100 tablet 0     amoxicillin (AMOXIL) 500 MG capsule        OBJECTIVE:     /70 (Cuff Size: Adult Regular)  Pulse 76  Temp 97.5  F (36.4  C) (Oral)  Ht 6' 2\" (1.88 m)  Wt 175 lb (79.4 kg)  SpO2 98%  BMI 22.47 kg/m2  EXAM:  GENERAL: alert and no distress  EYES: conjunctivae and sclerae normal  HENT: ear canals and TM's normal, nose and mouth without ulcers or lesions  NECK: no adenopathy, thyroid normal to palpation  RESP: lungs clear to auscultation - no rales, rhonchi or wheezes  CV: regular rate and rhythm, normal S1 S2, no S3 or S4  ABDOMEN: soft, nontender, no hepatosplenomegaly, no masses and bowel sounds normal  MS: no edema.  LLE AFO  SKIN: 3mm wart left temple  NEURO: left hemiparesis, exam at baseline  PSYCH: mentation appears normal, affect normal/bright    ASSESSMENT/PLAN:         ICD-10-CM    1. Encounter for routine adult health examination with abnormal findings Z00.01        2. Traumatic brain injury, with loss of consciousness of unspecified duration, sequela (H) S06.9X9S Continues PT, regular walking has helped with mobility       3. Major depressive disorder, recurrent episode, moderate (H) F33.1 citalopram (CELEXA) 20 MG tablet     Stable on current rx     4. Chronic atrial fibrillation (H) I48.2 metoprolol (TOPROL-XL) 25 MG 24 hr tablet     Rate controlled, anticoagulated     5. Neurogenic bladder N31.9 order for DME     order for DME     order for DME     order for DME     order for DME     order for DME   6. Urinary incontinence, unspecified type R32 Continue myrbetriq     7. Seizure prophylaxis Z41.8 levETIRAcetam (KEPPRA) 1000 MG TABS      Continue current rx     8. Hyperlipidemia LDL goal <100 E78.5 Lipid Profile with reflex to direct LDL     Comprehensive metabolic panel (BMP + Alb, Alk Phos, ALT, AST, Total. Bili, TP)     atorvastatin (LIPITOR) 20 MG tablet     9. Left hemiplegia (H) G81.94 baclofen " "(LIORESAL) 20 MG tablet     PROCEDURE:  Wart treatment  Treatment discussed with patient in detail.  Agreeable to treatment with liquid nitrogen.    Liquid nitrogen is then applied in 2 freeze/thaw cycles.  Patient tolerated this procedure well.   Wound care instructions discussed.  Follow-up in 2-3 weeks if persistent      COUNSELING:  Reviewed preventive health counseling, as reflected in patient instructions       Regular exercise       Healthy diet/nutrition       Colon cancer screening       Prostate cancer screening         reports that he has never smoked. He has never used smokeless tobacco.    Estimated body mass index is 22.47 kg/(m^2) as calculated from the following:    Height as of this encounter: 6' 2\" (1.88 m).    Weight as of this encounter: 175 lb (79.4 kg).       Counseling Resources:  ATP IV Guidelines  Pooled Cohorts Equation Calculator  FRAX Risk Assessment  ICSI Preventive Guidelines  Dietary Guidelines for Americans, 2010  USDA's MyPlate  ASA Prophylaxis  Lung CA Screening    Don Aviles MD, MD  [facility]  "

## 2017-03-06 NOTE — NURSING NOTE
"Chief Complaint   Patient presents with     Physical       Initial /70 (Cuff Size: Adult Regular)  Pulse 76  Temp 97.5  F (36.4  C) (Oral)  Ht 6' 2\" (1.88 m)  Wt 175 lb (79.4 kg)  SpO2 98%  BMI 22.47 kg/m2 Estimated body mass index is 22.47 kg/(m^2) as calculated from the following:    Height as of this encounter: 6' 2\" (1.88 m).    Weight as of this encounter: 175 lb (79.4 kg).  Medication Reconciliation: complete  "

## 2017-03-14 ENCOUNTER — HOSPITAL ENCOUNTER (OUTPATIENT)
Dept: PHYSICAL THERAPY | Facility: CLINIC | Age: 62
Setting detail: THERAPIES SERIES
End: 2017-03-14
Attending: INTERNAL MEDICINE
Payer: COMMERCIAL

## 2017-03-14 PROCEDURE — 97112 NEUROMUSCULAR REEDUCATION: CPT | Mod: GP | Performed by: PHYSICAL THERAPIST

## 2017-03-14 PROCEDURE — 97110 THERAPEUTIC EXERCISES: CPT | Mod: GP | Performed by: PHYSICAL THERAPIST

## 2017-03-14 PROCEDURE — 40000719 ZZHC STATISTIC PT DEPARTMENT NEURO VISIT: Performed by: PHYSICAL THERAPIST

## 2017-03-14 PROCEDURE — 97116 GAIT TRAINING THERAPY: CPT | Mod: GP | Performed by: PHYSICAL THERAPIST

## 2017-03-20 ENCOUNTER — HOSPITAL ENCOUNTER (OUTPATIENT)
Dept: PHYSICAL THERAPY | Facility: CLINIC | Age: 62
Setting detail: THERAPIES SERIES
End: 2017-03-20
Attending: INTERNAL MEDICINE
Payer: COMMERCIAL

## 2017-03-20 PROCEDURE — 40000719 ZZHC STATISTIC PT DEPARTMENT NEURO VISIT: Performed by: PHYSICAL THERAPIST

## 2017-03-20 PROCEDURE — 97116 GAIT TRAINING THERAPY: CPT | Mod: GP | Performed by: PHYSICAL THERAPIST

## 2017-03-20 PROCEDURE — 97110 THERAPEUTIC EXERCISES: CPT | Mod: GP | Performed by: PHYSICAL THERAPIST

## 2017-03-20 PROCEDURE — 97112 NEUROMUSCULAR REEDUCATION: CPT | Mod: GP | Performed by: PHYSICAL THERAPIST

## 2017-03-24 ENCOUNTER — ANTICOAGULATION THERAPY VISIT (OUTPATIENT)
Dept: NURSING | Facility: CLINIC | Age: 62
End: 2017-03-24
Payer: COMMERCIAL

## 2017-03-24 DIAGNOSIS — I63.9 CVA (CEREBRAL VASCULAR ACCIDENT) (H): ICD-10-CM

## 2017-03-24 DIAGNOSIS — Z79.01 LONG-TERM (CURRENT) USE OF ANTICOAGULANTS: ICD-10-CM

## 2017-03-24 DIAGNOSIS — Z79.01 LONG TERM (CURRENT) USE OF ANTICOAGULANTS: ICD-10-CM

## 2017-03-24 DIAGNOSIS — I48.20 CHRONIC ATRIAL FIBRILLATION (H): ICD-10-CM

## 2017-03-24 LAB — INR POINT OF CARE: 2.3 (ref 0.86–1.14)

## 2017-03-24 PROCEDURE — 36416 COLLJ CAPILLARY BLOOD SPEC: CPT

## 2017-03-24 PROCEDURE — 85610 PROTHROMBIN TIME: CPT | Mod: QW

## 2017-03-24 RX ORDER — WARFARIN SODIUM 5 MG/1
TABLET ORAL
Qty: 135 TABLET | Refills: 1 | Status: SHIPPED | OUTPATIENT
Start: 2017-03-24 | End: 2017-09-11

## 2017-03-24 NOTE — PROGRESS NOTES
ANTICOAGULATION FOLLOW-UP CLINIC VISIT    Patient Name:  Marcus Hodges  Date:  3/24/2017  Contact Type:  Face to Face  Patient is accompanied in the office by his wife.    SUBJECTIVE:     Patient Findings     Positives No Problem Findings           OBJECTIVE    INR Protime   Date Value Ref Range Status   03/24/2017 2.3 (A) 0.86 - 1.14 Final       ASSESSMENT / PLAN  INR assessment THER    Recheck INR In: 6 WEEKS    INR Location Clinic      Anticoagulation Summary as of 3/24/2017     INR goal 2.0-3.0   Today's INR 2.3   Maintenance plan 7.5 mg (5 mg x 1.5) on Mon, Wed, Fri; 5 mg (5 mg x 1) all other days   Full instructions 7.5 mg on Mon, Wed, Fri; 5 mg all other days   Weekly total 42.5 mg   No change documented Svetlana Vazquez RN   Plan last modified Svetlana Vazquez RN (10/21/2016)   Next INR check 5/5/2017   Target end date Indefinite    Indications   Long-term (current) use of anticoagulants [Z79.01]  Chronic atrial fibrillation (H) [I48.2]         Anticoagulation Episode Summary     [facility]    Preferred lab     Send INR reminders to [facility]    Comments 5mg tabs // WIFE USES OWN CALENDAR // CVA - left hemiplegia - uses a cane      Anticoagulation Care Providers     Provider Role Specialty Phone number    Don Aviles MD Referring Internal Medicine [phone]            See the Encounter Report to view Anticoagulation Flowsheet and Dosing Calendar (Go to Encounters tab in chart review, and find the Anticoagulation Therapy Visit)        Svetlana Vazquez RN

## 2017-03-24 NOTE — MR AVS SNAPSHOT
Marcus Hodges   3/24/2017 10:15 AM   Anticoagulation Therapy Visit    Description:  61 year old male   Provider:  [facility]   Department:   Nurse           INR as of 3/24/2017     Today's INR 2.3      Anticoagulation Summary as of 3/24/2017     INR goal 2.0-3.0   Today's INR 2.3   Full instructions 7.5 mg on Mon, Wed, Fri; 5 mg all other days   Next INR check 5/5/2017    Indications   Long-term (current) use of anticoagulants [Z79.01]  Chronic atrial fibrillation (H) [I48.2]         Your next Anticoagulation Clinic appointment(s)     May 05, 2017 10:15 AM CDT   Anticoagulation Visit with  ANTICOAGULATION CLINIC   [facility]    [address] [place] [address]  [address]  [facility] [address] [phone]              Contact Numbers     [facility]  Please call  [phone] to cancel and/or reschedule your appointment   Please call  [phone] with any problems or questions regarding your therapy.        March 2017 Details    Sun Mon Tue Wed Thu Fri Sat        1               2               3               4                 5               6               7               8               9               10               11                 12               13               14               15               16               17               18                 19               20               21               22               23               24      7.5 mg   See details      25      5 mg           26      5 mg         27      7.5 mg         28      5 mg         29      7.5 mg         30      5 mg         31      7.5 mg           Date Details   03/24 This INR check               How to take your warfarin dose     To take:  5 mg Take 1 of the 5 mg tablets.    To take:  7.5 mg Take 1.5 of the 5 mg tablets.           April 2017 Details    Sun Mon Tue Wed Thu Fri Sat           1      5 mg           2      5 mg         3      7.5 mg         4      5 mg          5      7.5 mg         6      5 mg         7      7.5 mg         8      5 mg           9      5 mg         10      7.5 mg         11      5 mg         12      7.5 mg         13      5 mg         14      7.5 mg         15      5 mg           16      5 mg         17      7.5 mg         18      5 mg         19      7.5 mg         20      5 mg         21      7.5 mg         22      5 mg           23      5 mg         24      7.5 mg         25      5 mg         26      7.5 mg         27      5 mg         28      7.5 mg         29      5 mg           30      5 mg                Date Details   No additional details            How to take your warfarin dose     To take:  5 mg Take 1 of the 5 mg tablets.    To take:  7.5 mg Take 1.5 of the 5 mg tablets.           May 2017 Details    Sun Mon Tue Wed Thu Fri Sat      1      7.5 mg         2      5 mg         3      7.5 mg         4      5 mg         5            6                 7               8               9               10               11               12               13                 14               15               16               17               18               19               20                 21               22               23               24               25               26               27                 28               29               30               31                   Date Details   No additional details    Date of next INR:  5/5/2017         How to take your warfarin dose     To take:  5 mg Take 1 of the 5 mg tablets.    To take:  7.5 mg Take 1.5 of the 5 mg tablets.

## 2017-04-03 ENCOUNTER — HOSPITAL ENCOUNTER (OUTPATIENT)
Dept: PHYSICAL THERAPY | Facility: CLINIC | Age: 62
Setting detail: THERAPIES SERIES
End: 2017-04-03
Attending: INTERNAL MEDICINE
Payer: COMMERCIAL

## 2017-04-03 PROCEDURE — 40000719 ZZHC STATISTIC PT DEPARTMENT NEURO VISIT: Performed by: PHYSICAL THERAPIST

## 2017-04-03 PROCEDURE — 97110 THERAPEUTIC EXERCISES: CPT | Mod: GP | Performed by: PHYSICAL THERAPIST

## 2017-04-03 PROCEDURE — 97116 GAIT TRAINING THERAPY: CPT | Mod: GP | Performed by: PHYSICAL THERAPIST

## 2017-04-03 PROCEDURE — 97112 NEUROMUSCULAR REEDUCATION: CPT | Mod: GP | Performed by: PHYSICAL THERAPIST

## 2017-04-10 ENCOUNTER — HOSPITAL ENCOUNTER (OUTPATIENT)
Dept: PHYSICAL THERAPY | Facility: CLINIC | Age: 62
Setting detail: THERAPIES SERIES
End: 2017-04-10
Attending: INTERNAL MEDICINE
Payer: COMMERCIAL

## 2017-04-10 PROCEDURE — 97116 GAIT TRAINING THERAPY: CPT | Mod: GP | Performed by: PHYSICAL THERAPIST

## 2017-04-10 PROCEDURE — 40000719 ZZHC STATISTIC PT DEPARTMENT NEURO VISIT: Performed by: PHYSICAL THERAPIST

## 2017-04-10 PROCEDURE — 97112 NEUROMUSCULAR REEDUCATION: CPT | Mod: GP | Performed by: PHYSICAL THERAPIST

## 2017-04-17 ENCOUNTER — TRANSFERRED RECORDS (OUTPATIENT)
Dept: HEALTH INFORMATION MANAGEMENT | Facility: CLINIC | Age: 62
End: 2017-04-17

## 2017-04-18 ENCOUNTER — HOSPITAL ENCOUNTER (OUTPATIENT)
Dept: PHYSICAL THERAPY | Facility: CLINIC | Age: 62
Setting detail: THERAPIES SERIES
End: 2017-04-18
Attending: INTERNAL MEDICINE
Payer: COMMERCIAL

## 2017-04-18 PROCEDURE — 97116 GAIT TRAINING THERAPY: CPT | Mod: GP | Performed by: PHYSICAL THERAPIST

## 2017-04-18 PROCEDURE — 97110 THERAPEUTIC EXERCISES: CPT | Mod: GP | Performed by: PHYSICAL THERAPIST

## 2017-04-18 PROCEDURE — 40000719 ZZHC STATISTIC PT DEPARTMENT NEURO VISIT: Performed by: PHYSICAL THERAPIST

## 2017-04-18 PROCEDURE — 97112 NEUROMUSCULAR REEDUCATION: CPT | Mod: GP | Performed by: PHYSICAL THERAPIST

## 2017-04-25 ENCOUNTER — HOSPITAL ENCOUNTER (OUTPATIENT)
Dept: PHYSICAL THERAPY | Facility: CLINIC | Age: 62
Setting detail: THERAPIES SERIES
End: 2017-04-25
Attending: INTERNAL MEDICINE
Payer: COMMERCIAL

## 2017-04-25 PROCEDURE — 97112 NEUROMUSCULAR REEDUCATION: CPT | Mod: GP | Performed by: PHYSICAL THERAPIST

## 2017-04-25 PROCEDURE — 40000719 ZZHC STATISTIC PT DEPARTMENT NEURO VISIT: Performed by: PHYSICAL THERAPIST

## 2017-04-25 PROCEDURE — 97116 GAIT TRAINING THERAPY: CPT | Mod: GP | Performed by: PHYSICAL THERAPIST

## 2017-05-01 ENCOUNTER — HOSPITAL ENCOUNTER (OUTPATIENT)
Dept: PHYSICAL THERAPY | Facility: CLINIC | Age: 62
Setting detail: THERAPIES SERIES
End: 2017-05-01
Attending: INTERNAL MEDICINE
Payer: COMMERCIAL

## 2017-05-01 PROCEDURE — 97116 GAIT TRAINING THERAPY: CPT | Mod: GP | Performed by: PHYSICAL THERAPIST

## 2017-05-01 PROCEDURE — 97112 NEUROMUSCULAR REEDUCATION: CPT | Mod: GP | Performed by: PHYSICAL THERAPIST

## 2017-05-01 PROCEDURE — 40000719 ZZHC STATISTIC PT DEPARTMENT NEURO VISIT: Performed by: PHYSICAL THERAPIST

## 2017-05-01 PROCEDURE — 97110 THERAPEUTIC EXERCISES: CPT | Mod: GP | Performed by: PHYSICAL THERAPIST

## 2017-05-02 NOTE — PROGRESS NOTES
Outpatient Physical Therapy Progress Note     Patient: Marcus Hodges  : 1955    Beginning/End Dates of Reporting Period:  17 to 2017    Referring Provider: Dr. Don Aviles    Therapy Diagnosis: R knee pain, impaired functional mobility and gait with impaired posture and coordination of movement with history of CVA      Client Self Report: Soreness L upper trap area and also feels some tightness in L hand.  Feels the walking is better, quicker, greater ability to ambulate.      Objective Measurements:  Objective Measure: 25 foot walk   Details: 17    26.8  29 steps, 24.47 sec  30 steps  With trekking pole                17   24.84 seconds  29 steps  ,    25.22 seconds 28 steps.   Walking pole.                    17   26 seconds , 26 steps walking stick/pole        Objective Measure: 4 square  Details: 40.91 seconds trekking pole did not cross top area with 4 square with weighted bars placed on the floor.   Performed with tape on  floor and pt completed in 25.85 seconds completing full 4 square close SBA to CGA .      17  32.75 seconds with walking pole and 53 seconds without AD. CGA to close SBA with both. Performed with tape on the floor as not able to clear the weighted bars safely.           Goals:      Goal Identifier 3 - 25 foot walk   Goal Description Marcus will complete the 25 foot walk with /without an AD in 19 seconds or less to demonstrate improved gait efficiency and quality of gait.    Target Date 16   Date Met   in progress   Progress:  Time roughly the same but improved gait pattern.       Goal Identifier 4 - Gait/personal goal   Goal Description Marcus to ambulate without an  feet or greater including uneven terrain with step through gait pattern and not LOB to progress to his goal of not needing an AD with gait.    Target Date 16   Date Met   in progress   Progress:continue to work towards, he is gradually improving his trunk alignment, trunk neuro  motor function, spinal motion and disasociation b/w joints L LE and improved abilty to bring COM forward and to the left which is all assisting with improved gait.      Goal Identifier 5- curbs   Goal Description Marcus to ascend and descend curb with min assist or less and AD , without LOB or push to the right to allow greater ease and safety with curbs and less caregiver burden/risk of injury.    Target Date 05/05/16   Date Met   in progress   Progress: improved ability with stair performance but continues to need greater assist when not using a railing.      Goal Identifier 6 --  4 square   Goal Description Marcus to complete 4 square in 25 seconds with and AD and 40 seconds or less without an AD to demonstrate improved interlimb coordination, balance and body control to assist in meeting all goals and overall function.    Target Date 04/30/17   Date Met   in progress   Progress: improved time as above.  Not only improved time but also improved ability to step over the weighted bars with the L LE compared to when initially tested.       Goal Identifier  NEW GOAL   Goal Description  Marcus to perform transfer to and from floor with outside UE support and min assist of 1 to allow him to recover from a fall with assist from his wife or caregiver.    Target Date  8/31/17   Date Met      Progress:       Progress Toward Goals:   Progress this reporting period: Marcus continues to demonstrate continual progress with skilled PT Intervention and follow through with HEP with assist from wife and caregiver.  He has demonstrated improved trunk mobility and trunk extension allowing improved COM over TAYLOR.  He has improved with his gait quality with less rotation of pelvis in transverse plane, longer stance on the L and slightly longer step length B.  He has started to have a knee release on the L and improved disassociation pelvis, hip and knee with gait.  He has a small amount of knee extension end phase gait.  Marcus has done  ambulating without AFO on, with ankle airsport splint as well as with AFO on.  He continues to ambulate with the trekking pole or quad cane. Today we removed the posterior blocks to the hinged AFO to allow him to have greater mobility at the ankle.  He has continued to challenge his ability and comfort level with bringing his COM to the left and forward.  He is able to performed stance stability at stairs with L foot on the floor without heavy lean to the R.  He is staying more midline in sagital plane with sit to and from stand.  Improved timing and sequencing and disassociation at joints L LE with stairs, transfers and gait.  He has demonstrated the ability to transfer supine to R or L without assist,  Supine to prone into up on forearms with mod assist to max assist to clear L UE/GH joint position.   He has initiated ambulating around walking track at his fitness center. Completing 6 laps without rest.  He did have a fall a couple weeks ago while walking at the gym which has decreased his confidence .   He ambulates with R UE support on the rail.  His wife has video taped this , demonstrating improved gait quality and speed.    Barriers to progress include visual impairments,  No vision R eye, decreased proprioception and awareness of body in space, tone/muscle overactivation L side.   Marcus remains appropriate for skilled PT intervention, he has good family and PCA support allowing him to continue with functional HEP on a daily basis.  Pt is very motivated. Goals remain appropriate.   Plan:  Continue therapy per current plan of care. 1 x a week.     Discharge:  No

## 2017-05-05 ENCOUNTER — ANTICOAGULATION THERAPY VISIT (OUTPATIENT)
Dept: NURSING | Facility: CLINIC | Age: 62
End: 2017-05-05
Payer: COMMERCIAL

## 2017-05-05 DIAGNOSIS — Z79.01 LONG-TERM (CURRENT) USE OF ANTICOAGULANTS: ICD-10-CM

## 2017-05-05 DIAGNOSIS — I48.20 CHRONIC ATRIAL FIBRILLATION (H): ICD-10-CM

## 2017-05-05 LAB — INR POINT OF CARE: 2.5 (ref 0.86–1.14)

## 2017-05-05 PROCEDURE — 99207 ZZC NO CHARGE NURSE ONLY: CPT

## 2017-05-05 PROCEDURE — 36416 COLLJ CAPILLARY BLOOD SPEC: CPT

## 2017-05-05 PROCEDURE — 85610 PROTHROMBIN TIME: CPT | Mod: QW

## 2017-05-05 NOTE — MR AVS SNAPSHOT
Marcus Hodges   5/5/2017 10:15 AM   Anticoagulation Therapy Visit    Description:  61 year old male   Provider:  [facility]   Department:   Nurse           INR as of 5/5/2017     Today's INR 2.5      Anticoagulation Summary as of 5/5/2017     INR goal 2.0-3.0   Today's INR 2.5   Full instructions 7.5 mg on Mon, Wed, Fri; 5 mg all other days   Next INR check 6/16/2017    Indications   Long-term (current) use of anticoagulants [Z79.01]  Chronic atrial fibrillation (H) [I48.2]         Your next Anticoagulation Clinic appointment(s)     Jun 16, 2017 10:15 AM CDT   Anticoagulation Visit with  ANTICOAGULATION CLINIC   [facility]    [address]  [address]  [facility] [address] [phone]              Contact Numbers     [facility]  Please call  [phone] to cancel and/or reschedule your appointment   Please call  [phone] with any problems or questions regarding your therapy.        May 2017 Details    Sun Mon Tue Wed Thu Fri Sat      1               2               3               4               5      7.5 mg   See details      6      5 mg           7      5 mg         8      7.5 mg         9      5 mg         10      7.5 mg         11      5 mg         12      7.5 mg         13      5 mg           14      5 mg         15      7.5 mg         16      5 mg         17      7.5 mg         18      5 mg         19      7.5 mg         20      5 mg           21      5 mg         22      7.5 mg         23      5 mg         24      7.5 mg         25      5 mg         26      7.5 mg         27      5 mg           28      5 mg         29      7.5 mg         30      5 mg         31      7.5 mg             Date Details   05/05 This INR check               How to take your warfarin dose     To take:  5 mg Take 1 of the 5 mg tablets.    To take:  7.5 mg Take 1.5 of the 5 mg tablets.           June 2017 Details    Sun Mon Tue Wed Thu Fri Sat          1      5 mg         2      7.5 mg         3      5 mg           4      5 mg         5      7.5 mg         6      5 mg         7      7.5 mg         8      5 mg         9      7.5 mg         10      5 mg           11      5 mg         12      7.5 mg         13      5 mg         14      7.5 mg         15      5 mg         16            17                 18               19               20               21               22               23               24                 25               26               27               28               29               30                 Date Details   No additional details    Date of next INR:  6/16/2017         How to take your warfarin dose     To take:  5 mg Take 1 of the 5 mg tablets.    To take:  7.5 mg Take 1.5 of the 5 mg tablets.

## 2017-05-16 ENCOUNTER — HOSPITAL ENCOUNTER (OUTPATIENT)
Dept: PHYSICAL THERAPY | Facility: CLINIC | Age: 62
Setting detail: THERAPIES SERIES
End: 2017-05-16
Attending: INTERNAL MEDICINE
Payer: COMMERCIAL

## 2017-05-16 PROCEDURE — 97112 NEUROMUSCULAR REEDUCATION: CPT | Mod: GP | Performed by: PHYSICAL THERAPIST

## 2017-05-16 PROCEDURE — 40000719 ZZHC STATISTIC PT DEPARTMENT NEURO VISIT: Performed by: PHYSICAL THERAPIST

## 2017-05-16 PROCEDURE — 97110 THERAPEUTIC EXERCISES: CPT | Mod: GP | Performed by: PHYSICAL THERAPIST

## 2017-05-16 PROCEDURE — 97116 GAIT TRAINING THERAPY: CPT | Mod: GP | Performed by: PHYSICAL THERAPIST

## 2017-05-22 ENCOUNTER — HOSPITAL ENCOUNTER (OUTPATIENT)
Dept: PHYSICAL THERAPY | Facility: CLINIC | Age: 62
Setting detail: THERAPIES SERIES
End: 2017-05-22
Attending: INTERNAL MEDICINE
Payer: COMMERCIAL

## 2017-05-22 PROCEDURE — 40000719 ZZHC STATISTIC PT DEPARTMENT NEURO VISIT: Performed by: PHYSICAL THERAPIST

## 2017-05-22 PROCEDURE — 97116 GAIT TRAINING THERAPY: CPT | Mod: GP | Performed by: PHYSICAL THERAPIST

## 2017-05-22 PROCEDURE — 97112 NEUROMUSCULAR REEDUCATION: CPT | Mod: GP | Performed by: PHYSICAL THERAPIST

## 2017-05-22 PROCEDURE — 97110 THERAPEUTIC EXERCISES: CPT | Mod: GP | Performed by: PHYSICAL THERAPIST

## 2017-05-22 PROCEDURE — 97140 MANUAL THERAPY 1/> REGIONS: CPT | Mod: GP | Performed by: PHYSICAL THERAPIST

## 2017-06-07 ENCOUNTER — HOSPITAL ENCOUNTER (OUTPATIENT)
Dept: PHYSICAL THERAPY | Facility: CLINIC | Age: 62
Setting detail: THERAPIES SERIES
End: 2017-06-07
Attending: INTERNAL MEDICINE
Payer: COMMERCIAL

## 2017-06-07 PROCEDURE — 97112 NEUROMUSCULAR REEDUCATION: CPT | Mod: GP | Performed by: PHYSICAL THERAPIST

## 2017-06-07 PROCEDURE — 97110 THERAPEUTIC EXERCISES: CPT | Mod: GP | Performed by: PHYSICAL THERAPIST

## 2017-06-07 PROCEDURE — 97116 GAIT TRAINING THERAPY: CPT | Mod: GP | Performed by: PHYSICAL THERAPIST

## 2017-06-07 PROCEDURE — 97140 MANUAL THERAPY 1/> REGIONS: CPT | Mod: GP | Performed by: PHYSICAL THERAPIST

## 2017-06-07 PROCEDURE — 40000719 ZZHC STATISTIC PT DEPARTMENT NEURO VISIT: Performed by: PHYSICAL THERAPIST

## 2017-06-16 ENCOUNTER — ANTICOAGULATION THERAPY VISIT (OUTPATIENT)
Dept: NURSING | Facility: CLINIC | Age: 62
End: 2017-06-16
Payer: COMMERCIAL

## 2017-06-16 DIAGNOSIS — I48.20 CHRONIC ATRIAL FIBRILLATION (H): ICD-10-CM

## 2017-06-16 DIAGNOSIS — Z79.01 LONG-TERM (CURRENT) USE OF ANTICOAGULANTS: ICD-10-CM

## 2017-06-16 LAB — INR POINT OF CARE: 2.1 (ref 0.86–1.14)

## 2017-06-16 PROCEDURE — 99207 ZZC NO CHARGE NURSE ONLY: CPT

## 2017-06-16 PROCEDURE — 85610 PROTHROMBIN TIME: CPT | Mod: QW

## 2017-06-16 PROCEDURE — 36416 COLLJ CAPILLARY BLOOD SPEC: CPT

## 2017-06-16 NOTE — MR AVS SNAPSHOT
Marcus TORRES Tracie   6/16/2017 10:15 AM   Anticoagulation Therapy Visit    Description:  61 year old male   Provider:  [facility]   Department:   Nurse           INR as of 6/16/2017     Today's INR 2.1      Anticoagulation Summary as of 6/16/2017     INR goal 2.0-3.0   Today's INR 2.1   Full instructions 7.5 mg on Mon, Wed, Fri; 5 mg all other days   Next INR check 7/31/2017    Indications   Long-term (current) use of anticoagulants [Z79.01]  Chronic atrial fibrillation (H) [I48.2]         Your next Anticoagulation Clinic appointment(s)     Jul 31, 2017 10:15 AM CDT   Anticoagulation Visit with  ANTICOAGULATION CLINIC   [facility]    [address] [place] [address]  [address]  [facility] [address] [phone]              Contact Numbers     [facility]  Please call  [phone] to cancel and/or reschedule your appointment   Please call  [phone] with any problems or questions regarding your therapy.        June 2017 Details    Sun Mon Tue Wed Thu Fri Sat         1               2               3                 4               5               6               7               8               9               10                 11               12               13               14               15               16      7.5 mg   See details      17      5 mg           18      5 mg         19      7.5 mg         20      5 mg         21      7.5 mg         22      5 mg         23      7.5 mg         24      5 mg           25      5 mg         26      7.5 mg         27      5 mg         28      7.5 mg         29      5 mg         30      7.5 mg           Date Details   06/16 This INR check               How to take your warfarin dose     To take:  5 mg Take 1 of the 5 mg tablets.    To take:  7.5 mg Take 1.5 of the 5 mg tablets.           July 2017 Details    Sun Mon Tue Wed Thu Fri Sat           1      5 mg           2      5 mg         3      7.5 mg          4      5 mg         5      7.5 mg         6      5 mg         7      7.5 mg         8      5 mg           9      5 mg         10      7.5 mg         11      5 mg         12      7.5 mg         13      5 mg         14      7.5 mg         15      5 mg           16      5 mg         17      7.5 mg         18      5 mg         19      7.5 mg         20      5 mg         21      7.5 mg         22      5 mg           23      5 mg         24      7.5 mg         25      5 mg         26      7.5 mg         27      5 mg         28      7.5 mg         29      5 mg           30      5 mg         31                  Date Details   No additional details    Date of next INR:  7/31/2017         How to take your warfarin dose     To take:  5 mg Take 1 of the 5 mg tablets.    To take:  7.5 mg Take 1.5 of the 5 mg tablets.

## 2017-06-16 NOTE — PROGRESS NOTES
ANTICOAGULATION FOLLOW-UP CLINIC VISIT    Patient Name:  Marcus Hodges  Date:  6/16/2017  Contact Type:  Face to Face  Patient is accompanied in the office by his wife.    SUBJECTIVE:     Patient Findings     Positives No Problem Findings           OBJECTIVE    INR Protime   Date Value Ref Range Status   06/16/2017 2.1 (A) 0.86 - 1.14 Final       ASSESSMENT / PLAN  INR assessment THER    Recheck INR In: 6 WEEKS    INR Location Clinic      Anticoagulation Summary as of 6/16/2017     INR goal 2.0-3.0   Today's INR 2.1   Maintenance plan 7.5 mg (5 mg x 1.5) on Mon, Wed, Fri; 5 mg (5 mg x 1) all other days   Full instructions 7.5 mg on Mon, Wed, Fri; 5 mg all other days   Weekly total 42.5 mg   No change documented Svetlana Vazquez RN   Plan last modified Svetlana Vazquez RN (10/21/2016)   Next INR check 7/31/2017   Target end date Indefinite    Indications   Long-term (current) use of anticoagulants [Z79.01]  Chronic atrial fibrillation (H) [I48.2]         Anticoagulation Episode Summary     [facility]    Preferred lab     Send INR reminders to [facility]    Comments 5mg tabs // WIFE USES OWN CALENDAR // CVA - left hemiplegia - uses a cane      Anticoagulation Care Providers     Provider Role Specialty Phone number    Don Aviles MD Referring Internal Medicine [phone]            See the Encounter Report to view Anticoagulation Flowsheet and Dosing Calendar (Go to Encounters tab in chart review, and find the Anticoagulation Therapy Visit)        Svetlana Vazquez RN

## 2017-06-20 ENCOUNTER — HOSPITAL ENCOUNTER (OUTPATIENT)
Dept: PHYSICAL THERAPY | Facility: CLINIC | Age: 62
Setting detail: THERAPIES SERIES
End: 2017-06-20
Attending: INTERNAL MEDICINE
Payer: COMMERCIAL

## 2017-06-20 PROCEDURE — 40000719 ZZHC STATISTIC PT DEPARTMENT NEURO VISIT: Performed by: PHYSICAL THERAPIST

## 2017-06-20 PROCEDURE — 97116 GAIT TRAINING THERAPY: CPT | Mod: GP | Performed by: PHYSICAL THERAPIST

## 2017-06-20 PROCEDURE — 97110 THERAPEUTIC EXERCISES: CPT | Mod: GP | Performed by: PHYSICAL THERAPIST

## 2017-06-20 PROCEDURE — 97112 NEUROMUSCULAR REEDUCATION: CPT | Mod: GP | Performed by: PHYSICAL THERAPIST

## 2017-06-26 ENCOUNTER — HOSPITAL ENCOUNTER (OUTPATIENT)
Dept: PHYSICAL THERAPY | Facility: CLINIC | Age: 62
Setting detail: THERAPIES SERIES
End: 2017-06-26
Attending: INTERNAL MEDICINE
Payer: COMMERCIAL

## 2017-06-26 PROCEDURE — 97112 NEUROMUSCULAR REEDUCATION: CPT | Mod: GP | Performed by: PHYSICAL THERAPIST

## 2017-06-26 PROCEDURE — 40000719 ZZHC STATISTIC PT DEPARTMENT NEURO VISIT: Performed by: PHYSICAL THERAPIST

## 2017-06-26 PROCEDURE — 97116 GAIT TRAINING THERAPY: CPT | Mod: GP | Performed by: PHYSICAL THERAPIST

## 2017-07-17 ENCOUNTER — TRANSFERRED RECORDS (OUTPATIENT)
Dept: HEALTH INFORMATION MANAGEMENT | Facility: CLINIC | Age: 62
End: 2017-07-17

## 2017-07-18 ENCOUNTER — HOSPITAL ENCOUNTER (OUTPATIENT)
Dept: PHYSICAL THERAPY | Facility: CLINIC | Age: 62
Setting detail: THERAPIES SERIES
End: 2017-07-18
Attending: INTERNAL MEDICINE
Payer: COMMERCIAL

## 2017-07-18 PROCEDURE — 97112 NEUROMUSCULAR REEDUCATION: CPT | Mod: GP | Performed by: PHYSICAL THERAPIST

## 2017-07-18 PROCEDURE — 97116 GAIT TRAINING THERAPY: CPT | Mod: GP | Performed by: PHYSICAL THERAPIST

## 2017-07-18 PROCEDURE — 40000719 ZZHC STATISTIC PT DEPARTMENT NEURO VISIT: Performed by: PHYSICAL THERAPIST

## 2017-07-24 ENCOUNTER — HOSPITAL ENCOUNTER (OUTPATIENT)
Dept: PHYSICAL THERAPY | Facility: CLINIC | Age: 62
Setting detail: THERAPIES SERIES
End: 2017-07-24
Attending: INTERNAL MEDICINE
Payer: COMMERCIAL

## 2017-07-24 PROCEDURE — 97116 GAIT TRAINING THERAPY: CPT | Mod: GP | Performed by: PHYSICAL THERAPIST

## 2017-07-24 PROCEDURE — 97112 NEUROMUSCULAR REEDUCATION: CPT | Mod: GP | Performed by: PHYSICAL THERAPIST

## 2017-07-24 PROCEDURE — 40000719 ZZHC STATISTIC PT DEPARTMENT NEURO VISIT: Performed by: PHYSICAL THERAPIST

## 2017-07-31 ENCOUNTER — ANTICOAGULATION THERAPY VISIT (OUTPATIENT)
Dept: NURSING | Facility: CLINIC | Age: 62
End: 2017-07-31
Payer: COMMERCIAL

## 2017-07-31 DIAGNOSIS — I48.20 CHRONIC ATRIAL FIBRILLATION (H): ICD-10-CM

## 2017-07-31 DIAGNOSIS — Z79.01 LONG-TERM (CURRENT) USE OF ANTICOAGULANTS: ICD-10-CM

## 2017-07-31 LAB — INR POINT OF CARE: 2.1 (ref 0.86–1.14)

## 2017-07-31 PROCEDURE — 99207 ZZC NO CHARGE NURSE ONLY: CPT

## 2017-07-31 PROCEDURE — 36416 COLLJ CAPILLARY BLOOD SPEC: CPT

## 2017-07-31 PROCEDURE — 85610 PROTHROMBIN TIME: CPT | Mod: QW

## 2017-07-31 NOTE — MR AVS SNAPSHOT
Marcus TORRES Tracie   7/31/2017 10:15 AM   Anticoagulation Therapy Visit    Description:  61 year old male   Provider:  [facility]   Department:   Nurse           INR as of 7/31/2017     Today's INR 2.1      Anticoagulation Summary as of 7/31/2017     INR goal 2.0-3.0   Today's INR 2.1   Full instructions 7.5 mg on Mon, Wed, Fri; 5 mg all other days   Next INR check 9/11/2017    Indications   Long-term (current) use of anticoagulants [Z79.01]  Chronic atrial fibrillation (H) [I48.2]         Your next Anticoagulation Clinic appointment(s)     Sep 11, 2017 10:15 AM CDT   Anticoagulation Visit with  [facility]ULATION CLINIC   [facility]    [address] [place] [address]  [address]  [facility] [address] [phone]              Contact Numbers     [facility]  Please call  [phone] to cancel and/or reschedule your appointment   Please call  [phone] with any problems or questions regarding your therapy.        July 2017 Details    Sun Mon Tue Wed Thu Fri Sat           1                 2               3               4               5               6               7               8                 9               10               11               12               13               14               15                 16               17               18               19               20               21               22                 23               24               25               26               27               28               29                 30               31      7.5 mg   See details            Date Details   07/31 This INR check               How to take your warfarin dose     To take:  7.5 mg Take 1.5 of the 5 mg tablets.           August 2017 Details    Sun Mon Tue Wed Thu Fri Sat       1      5 mg         2      7.5 mg         3      5 mg         4      7.5 mg         5      5 mg           6      5 mg         7      7.5 mg         8       5 mg         9      7.5 mg         10      5 mg         11      7.5 mg         12      5 mg           13      5 mg         14      7.5 mg         15      5 mg         16      7.5 mg         17      5 mg         18      7.5 mg         19      5 mg           20      5 mg         21      7.5 mg         22      5 mg         23      7.5 mg         24      5 mg         25      7.5 mg         26      5 mg           27      5 mg         28      7.5 mg         29      5 mg         30      7.5 mg         31      5 mg            Date Details   No additional details            How to take your warfarin dose     To take:  5 mg Take 1 of the 5 mg tablets.    To take:  7.5 mg Take 1.5 of the 5 mg tablets.           September 2017 Details    Sun Mon Tue Wed Thu Fri Sat          1      7.5 mg         2      5 mg           3      5 mg         4      7.5 mg         5      5 mg         6      7.5 mg         7      5 mg         8      7.5 mg         9      5 mg           10      5 mg         11            12               13               14               15               16                 17               18               19               20               21               22               23                 24               25               26               27               28               29               30                Date Details   No additional details    Date of next INR:  9/11/2017         How to take your warfarin dose     To take:  5 mg Take 1 of the 5 mg tablets.    To take:  7.5 mg Take 1.5 of the 5 mg tablets.

## 2017-07-31 NOTE — PROGRESS NOTES
ANTICOAGULATION FOLLOW-UP CLINIC VISIT    Patient Name:  Marcus Hodges  Date:  7/31/2017  Contact Type:  Face to Face  Patient is accompanied in the office by his wife.    SUBJECTIVE:     Patient Findings     Positives No Problem Findings           OBJECTIVE    INR Protime   Date Value Ref Range Status   07/31/2017 2.1 (A) 0.86 - 1.14 Final       ASSESSMENT / PLAN  INR assessment THER    Recheck INR In: 6 WEEKS    INR Location Clinic      Anticoagulation Summary as of 7/31/2017     INR goal 2.0-3.0   Today's INR 2.1   Maintenance plan 7.5 mg (5 mg x 1.5) on Mon, Wed, Fri; 5 mg (5 mg x 1) all other days   Full instructions 7.5 mg on Mon, Wed, Fri; 5 mg all other days   Weekly total 42.5 mg   No change documented Svetlana Vazquez RN   Plan last modified Svetlana Vazquez RN (10/21/2016)   Next INR check 9/11/2017   Target end date Indefinite    Indications   Long-term (current) use of anticoagulants [Z79.01]  Chronic atrial fibrillation (H) [I48.2]         Anticoagulation Episode Summary     [facility]    Preferred lab     Send INR reminders to [facility]    Comments 5mg tabs // WIFE USES OWN CALENDAR // CVA - left hemiplegia - uses a cane      Anticoagulation Care Providers     Provider Role Specialty Phone number    Don Aviles MD Referring Internal Medicine [phone]            See the Encounter Report to view Anticoagulation Flowsheet and Dosing Calendar (Go to Encounters tab in chart review, and find the Anticoagulation Therapy Visit)        Svetlana Vazquez RN

## 2017-08-09 ENCOUNTER — HOSPITAL ENCOUNTER (OUTPATIENT)
Dept: PHYSICAL THERAPY | Facility: CLINIC | Age: 62
Setting detail: THERAPIES SERIES
End: 2017-08-09
Attending: INTERNAL MEDICINE
Payer: COMMERCIAL

## 2017-08-09 PROCEDURE — 97116 GAIT TRAINING THERAPY: CPT | Mod: GP | Performed by: PHYSICAL THERAPIST

## 2017-08-09 PROCEDURE — 97112 NEUROMUSCULAR REEDUCATION: CPT | Mod: GP | Performed by: PHYSICAL THERAPIST

## 2017-08-09 PROCEDURE — 97110 THERAPEUTIC EXERCISES: CPT | Mod: GP | Performed by: PHYSICAL THERAPIST

## 2017-08-09 PROCEDURE — 40000719 ZZHC STATISTIC PT DEPARTMENT NEURO VISIT: Performed by: PHYSICAL THERAPIST

## 2017-08-10 NOTE — PROGRESS NOTES
Outpatient Physical Therapy Progress Note     Patient: Marcus Hodges  : 1955    Beginning/End Dates of Reporting Period:  17 to 2017    Referring Provider: Dr. Don Aviles    Therapy Diagnosis: R knee pain, impaired functional mobility and gait with impaired posture and coordination of movement with history of CVA      Client Self Report: Marcus states he was at a family reunion and his cousin whom is a PT thought that Marcus was improved, standing longer, walking better.  No falls .  States his pain in the R knee continues to vary.  He states he has had soreness B LE, primarily in the calves.      Objective Measurements:  Objective Measure: 25 foot walk  Details: 17  21.89 sec  26 steps, trekking pole.   17   22.93 seconds with trekking pole, 28 steps. second time 24.62 seconds , 28 seconds. trekking pole at the start of the session. At the end 22 seconds.   17    26.8  29 steps, 24.47 sec  30 steps  With trekking pole  17   24.84 seconds  29 steps  ,    25.22 seconds 28 steps.   Walking pole.      17   26 seconds , 26 steps walking stick/pole      Objective Measure: 4 square  Details:17   4 square over tape markings 19.56 first and second 24 seconds with supervision, stepping over raised object for 4 square with trekking pole 1 min 13 seconds CGA, blocking weighted bar from rolling full 4 square (in the past did not complete top section).  improved stepping ability over object in all directions.       17  40.91 seconds trekking pole did not cross top area with 4 square with weighted bars placed on the floor.   Performed with tape on  floor and pt completed in 25.85 seconds completing full 4 square close SBA to CGA .       17  32.75 seconds with walking pole and 53 seconds without AD. CGA to close SBA with both. Performed with tape on the floor as not able to clear the weighted bars safely.          Pain - intermittent R knee pain. Recently discomfort and  soreness  B LE , calves primarily as noted above - most likely secondary to improved functional joint motion .              Goals:      Goal Identifier 3 - 25 foot walk   Goal Description Marcus will complete the 25 foot walk with /without an AD in 19 seconds or less to demonstrate improved gait efficiency and quality of gait.    Target Date 08/01/16   Date Met   in progress   Progress: times as above. Improving with step through gait pattern.  Continues to use 3 point gait pattern with trekking pole.      Goal Identifier 4 - Gait/personal goal   Goal Description Marcus to ambulate without an  feet or greater including uneven terrain with step through gait pattern and not LOB to progress to his goal of not needing an AD with gait.    Target Date 08/01/16   Date Met   in progress   Progress:continues to ambulate around track at the gym.  He continues to progress with this .  His wife continues to video his walking and these show improved alignment and gait pattern. He is progressing with step through gait pattern and Improved alignment pelvis in transverse plane.      Goal Identifier 5- curbs   Goal Description Marcus to ascend and descend curb with min assist or less and AD , without LOB or push to the right to allow greater ease and safety with curbs and less caregiver burden/risk of injury.    Target Date 08/01/16   Date Met   in progress   Progress: improved ability to step up with the L LE onto curb with greater interlimb coordination/movement hip and knee joint on the L.      Goal Identifier 6 --  4 square   Goal Description Marcus to complete 4 square in 25 seconds with and AD and 40 seconds or less without an AD to demonstrate improved interlimb coordination, balance and body control to assist in meeting all goals and overall function.    Target Date 08/01/17   Date Met   in progress    Progress: Has gone from not being able to step over object with performing this test and therefore only tested with  stepping over tape , to ability to step over the object (weighted bar) with CGA to SBA  To being able to step over with close SBA, supervision.   Marcus demonstrates improved stance stability and open chain stepping to allow stepping over an object.  Times as above.      Goal Identifier  NEW GOAL   Goal Description  Marcus to perform transfer to and from floor with outside UE support and min assist of 1 to allow him to recover from a fall with assist from his wife or caregiver.    Target Date  8/31/17   Date Met    in progress   Progress:  Not completed to date.  Working on standing B UE support and performing lunge with R or L knee posterior and increasing distance towards kneel on the floor.           Progress Toward Goals:   Progress this reporting period: Marcus has demonstrated improvements in his interlimb coordination and function, decreased over activation of the left side with all activities, improved ability to bring COM forward past TAYLOR as well as to the left with sitting and standing.  These changes are assisting with functional improvements in his gait pattern, ability to perform stairs and curb.  He is demonstrating improved bed mobility as well.  Has progressed to being able to transition supine to prone and improved trunk motion and shoulder motion L to allow him to achieve prone on elbows with assist for placement of the L LE.       He continues to progress with skilled PT intervention, works hard outside of therapy with assist from wife and PCA's,  He demonstrates good learning potential but needs repeated input and education due to some memory impairments and motor processing impairments.  Barriers include the severity of his initial impairments, visual impairments, decreased proprioception R side.       I anticipate Marcus to continue to improve with skilled PT intervention, however, progress will be slow.  Plan to continue 1 x a week .        Plan:  Continue therapy per current plan of  care.    Discharge:  No

## 2017-08-14 NOTE — ADDENDUM NOTE
Encounter addended by: Ada Scott, PT on: 8/14/2017  3:46 PM<BR>     Actions taken: Sign clinical note

## 2017-08-18 ENCOUNTER — TRANSFERRED RECORDS (OUTPATIENT)
Dept: HEALTH INFORMATION MANAGEMENT | Facility: CLINIC | Age: 62
End: 2017-08-18

## 2017-08-21 ENCOUNTER — HOSPITAL ENCOUNTER (OUTPATIENT)
Dept: PHYSICAL THERAPY | Facility: CLINIC | Age: 62
Setting detail: THERAPIES SERIES
End: 2017-08-21
Attending: INTERNAL MEDICINE
Payer: COMMERCIAL

## 2017-08-21 PROCEDURE — 40000719 ZZHC STATISTIC PT DEPARTMENT NEURO VISIT: Performed by: PHYSICAL THERAPIST

## 2017-08-21 PROCEDURE — 97116 GAIT TRAINING THERAPY: CPT | Mod: GP | Performed by: PHYSICAL THERAPIST

## 2017-08-21 PROCEDURE — 97140 MANUAL THERAPY 1/> REGIONS: CPT | Mod: GP | Performed by: PHYSICAL THERAPIST

## 2017-08-21 PROCEDURE — 97112 NEUROMUSCULAR REEDUCATION: CPT | Mod: GP | Performed by: PHYSICAL THERAPIST

## 2017-08-21 PROCEDURE — 97110 THERAPEUTIC EXERCISES: CPT | Mod: GP | Performed by: PHYSICAL THERAPIST

## 2017-08-28 ENCOUNTER — HOSPITAL ENCOUNTER (OUTPATIENT)
Dept: PHYSICAL THERAPY | Facility: CLINIC | Age: 62
Setting detail: THERAPIES SERIES
End: 2017-08-28
Attending: INTERNAL MEDICINE
Payer: COMMERCIAL

## 2017-08-28 PROCEDURE — 97112 NEUROMUSCULAR REEDUCATION: CPT | Mod: GP | Performed by: PHYSICAL THERAPIST

## 2017-08-28 PROCEDURE — 97116 GAIT TRAINING THERAPY: CPT | Mod: GP | Performed by: PHYSICAL THERAPIST

## 2017-08-28 PROCEDURE — 40000719 ZZHC STATISTIC PT DEPARTMENT NEURO VISIT: Performed by: PHYSICAL THERAPIST

## 2017-08-28 PROCEDURE — 97110 THERAPEUTIC EXERCISES: CPT | Mod: GP | Performed by: PHYSICAL THERAPIST

## 2017-09-06 ENCOUNTER — HOSPITAL ENCOUNTER (OUTPATIENT)
Dept: PHYSICAL THERAPY | Facility: CLINIC | Age: 62
Setting detail: THERAPIES SERIES
End: 2017-09-06
Attending: INTERNAL MEDICINE
Payer: COMMERCIAL

## 2017-09-06 PROCEDURE — 40000719 ZZHC STATISTIC PT DEPARTMENT NEURO VISIT: Performed by: PHYSICAL THERAPIST

## 2017-09-06 PROCEDURE — 97110 THERAPEUTIC EXERCISES: CPT | Mod: GP | Performed by: PHYSICAL THERAPIST

## 2017-09-06 PROCEDURE — 97112 NEUROMUSCULAR REEDUCATION: CPT | Mod: GP | Performed by: PHYSICAL THERAPIST

## 2017-09-06 PROCEDURE — 97116 GAIT TRAINING THERAPY: CPT | Mod: GP | Performed by: PHYSICAL THERAPIST

## 2017-09-11 ENCOUNTER — HOSPITAL ENCOUNTER (OUTPATIENT)
Dept: PHYSICAL THERAPY | Facility: CLINIC | Age: 62
Setting detail: THERAPIES SERIES
End: 2017-09-11
Attending: INTERNAL MEDICINE
Payer: COMMERCIAL

## 2017-09-11 ENCOUNTER — ANTICOAGULATION THERAPY VISIT (OUTPATIENT)
Dept: NURSING | Facility: CLINIC | Age: 62
End: 2017-09-11
Payer: COMMERCIAL

## 2017-09-11 DIAGNOSIS — Z79.01 LONG TERM (CURRENT) USE OF ANTICOAGULANTS: ICD-10-CM

## 2017-09-11 DIAGNOSIS — I63.9 CVA (CEREBRAL VASCULAR ACCIDENT) (H): ICD-10-CM

## 2017-09-11 DIAGNOSIS — I48.20 CHRONIC ATRIAL FIBRILLATION (H): ICD-10-CM

## 2017-09-11 DIAGNOSIS — Z79.01 LONG-TERM (CURRENT) USE OF ANTICOAGULANTS: ICD-10-CM

## 2017-09-11 LAB — INR POINT OF CARE: 2.3 (ref 0.86–1.14)

## 2017-09-11 PROCEDURE — 97112 NEUROMUSCULAR REEDUCATION: CPT | Mod: GP | Performed by: PHYSICAL THERAPIST

## 2017-09-11 PROCEDURE — 85610 PROTHROMBIN TIME: CPT | Mod: QW

## 2017-09-11 PROCEDURE — 40000719 ZZHC STATISTIC PT DEPARTMENT NEURO VISIT: Performed by: PHYSICAL THERAPIST

## 2017-09-11 PROCEDURE — 97110 THERAPEUTIC EXERCISES: CPT | Mod: GP | Performed by: PHYSICAL THERAPIST

## 2017-09-11 PROCEDURE — 36416 COLLJ CAPILLARY BLOOD SPEC: CPT

## 2017-09-11 PROCEDURE — 97116 GAIT TRAINING THERAPY: CPT | Mod: GP | Performed by: PHYSICAL THERAPIST

## 2017-09-11 PROCEDURE — 99207 ZZC NO CHARGE NURSE ONLY: CPT

## 2017-09-11 RX ORDER — WARFARIN SODIUM 5 MG/1
TABLET ORAL
Qty: 135 TABLET | Refills: 1 | Status: SHIPPED | OUTPATIENT
Start: 2017-09-11 | End: 2018-03-19

## 2017-09-11 NOTE — MR AVS SNAPSHOT
Marcus Hodges   9/11/2017 10:15 AM   Anticoagulation Therapy Visit    Description:  61 year old male   Provider:  [facility]   Department:   Nurse           INR as of 9/11/2017     Today's INR 2.3      Anticoagulation Summary as of 9/11/2017     INR goal 2.0-3.0   Today's INR 2.3   Full instructions 7.5 mg on Mon, Wed, Fri; 5 mg all other days   Next INR check 10/23/2017    Indications   Long-term (current) use of anticoagulants [Z79.01]  Chronic atrial fibrillation (H) [I48.2]         Your next Anticoagulation Clinic appointment(s)     Oct 23, 2017 10:15 AM CDT   Anticoagulation Visit with  [facility] CLINIC   [facility]    [address] [place] [address]  [address]  [facility] [address] [phone]              Contact Numbers     [facility]  Please call  [phone] to cancel and/or reschedule your appointment   Please call  [phone] with any problems or questions regarding your therapy.        September 2017 Details    Sun Mon Tue Wed Thu Fri Sat          1               2                 3               4               5               6               7               8               9                 10               11      7.5 mg   See details      12      5 mg         13      7.5 mg         14      5 mg         15      7.5 mg         16      5 mg           17      5 mg         18      7.5 mg         19      5 mg         20      7.5 mg         21      5 mg         22      7.5 mg         23      5 mg           24      5 mg         25      7.5 mg         26      5 mg         27      7.5 mg         28      5 mg         29      7.5 mg         30      5 mg          Date Details   09/11 This INR check               How to take your warfarin dose     To take:  5 mg Take 1 of the 5 mg tablets.    To take:  7.5 mg Take 1.5 of the 5 mg tablets.           October 2017 Details    Sun Mon Tue Wed Thu Fri Sat     1      5 mg         2      7.5 mg          3      5 mg         4      7.5 mg         5      5 mg         6      7.5 mg         7      5 mg           8      5 mg         9      7.5 mg         10      5 mg         11      7.5 mg         12      5 mg         13      7.5 mg         14      5 mg           15      5 mg         16      7.5 mg         17      5 mg         18      7.5 mg         19      5 mg         20      7.5 mg         21      5 mg           22      5 mg         23            24               25               26               27               28                 29               30               31                    Date Details   No additional details    Date of next INR:  10/23/2017         How to take your warfarin dose     To take:  5 mg Take 1 of the 5 mg tablets.    To take:  7.5 mg Take 1.5 of the 5 mg tablets.

## 2017-09-11 NOTE — PROGRESS NOTES
ANTICOAGULATION FOLLOW-UP CLINIC VISIT    Patient Name:  Marcus Hodges  Date:  9/11/2017  Contact Type:  Face to Face    SUBJECTIVE:     Patient Findings     Positives No Problem Findings    Comments Didn't miss any dose, denies any concerns of bleeding/bruising. Continue same dose, recheck in 6 weeks.             OBJECTIVE    INR Protime   Date Value Ref Range Status   09/11/2017 2.3 (A) 0.86 - 1.14 Final       ASSESSMENT / PLAN  INR assessment THER    Recheck INR In: 6 WEEKS    INR Location Clinic      Anticoagulation Summary as of 9/11/2017     INR goal 2.0-3.0   Today's INR 2.3   Maintenance plan 7.5 mg (5 mg x 1.5) on Mon, Wed, Fri; 5 mg (5 mg x 1) all other days   Full instructions 7.5 mg on Mon, Wed, Fri; 5 mg all other days   Weekly total 42.5 mg   No change documented Anjana Bean RN   Plan last modified Svetlana Vaqzuez RN (10/21/2016)   Next INR check 10/23/2017   Target end date Indefinite    Indications   Long-term (current) use of anticoagulants [Z79.01]  Chronic atrial fibrillation (H) [I48.2]         Anticoagulation Episode Summary     [facility]    Preferred lab     Send INR reminders to [facility]    Comments 5mg tabs // WIFE USES OWN CALENDAR // CVA - left hemiplegia - uses a cane      Anticoagulation Care Providers     Provider Role Specialty Phone number    Don Aviles MD Referring Internal Medicine [phone]            See the Encounter Report to view Anticoagulation Flowsheet and Dosing Calendar (Go to Encounters tab in chart review, and find the Anticoagulation Therapy Visit)    Anjana Bean RN

## 2017-09-21 ENCOUNTER — HOSPITAL ENCOUNTER (OUTPATIENT)
Dept: PHYSICAL THERAPY | Facility: CLINIC | Age: 62
Setting detail: THERAPIES SERIES
End: 2017-09-21
Attending: INTERNAL MEDICINE
Payer: COMMERCIAL

## 2017-09-21 PROCEDURE — 97116 GAIT TRAINING THERAPY: CPT | Mod: GP | Performed by: PHYSICAL THERAPIST

## 2017-09-21 PROCEDURE — 97110 THERAPEUTIC EXERCISES: CPT | Mod: GP | Performed by: PHYSICAL THERAPIST

## 2017-09-21 PROCEDURE — 40000719 ZZHC STATISTIC PT DEPARTMENT NEURO VISIT: Performed by: PHYSICAL THERAPIST

## 2017-09-21 PROCEDURE — 97112 NEUROMUSCULAR REEDUCATION: CPT | Mod: GP | Performed by: PHYSICAL THERAPIST

## 2017-09-27 ENCOUNTER — HOSPITAL ENCOUNTER (OUTPATIENT)
Dept: PHYSICAL THERAPY | Facility: CLINIC | Age: 62
Setting detail: THERAPIES SERIES
End: 2017-09-27
Attending: INTERNAL MEDICINE
Payer: COMMERCIAL

## 2017-09-27 PROCEDURE — 40000719 ZZHC STATISTIC PT DEPARTMENT NEURO VISIT: Performed by: PHYSICAL THERAPIST

## 2017-09-27 PROCEDURE — 97112 NEUROMUSCULAR REEDUCATION: CPT | Mod: GP | Performed by: PHYSICAL THERAPIST

## 2017-09-27 PROCEDURE — 97116 GAIT TRAINING THERAPY: CPT | Mod: GP | Performed by: PHYSICAL THERAPIST

## 2017-10-12 ENCOUNTER — HOSPITAL ENCOUNTER (OUTPATIENT)
Dept: PHYSICAL THERAPY | Facility: CLINIC | Age: 62
Setting detail: THERAPIES SERIES
End: 2017-10-12
Attending: INTERNAL MEDICINE
Payer: COMMERCIAL

## 2017-10-12 PROCEDURE — 97112 NEUROMUSCULAR REEDUCATION: CPT | Mod: GP | Performed by: PHYSICAL THERAPIST

## 2017-10-12 PROCEDURE — 40000719 ZZHC STATISTIC PT DEPARTMENT NEURO VISIT: Performed by: PHYSICAL THERAPIST

## 2017-10-12 PROCEDURE — 97116 GAIT TRAINING THERAPY: CPT | Mod: GP | Performed by: PHYSICAL THERAPIST

## 2017-10-23 ENCOUNTER — TRANSFERRED RECORDS (OUTPATIENT)
Dept: HEALTH INFORMATION MANAGEMENT | Facility: CLINIC | Age: 62
End: 2017-10-23

## 2017-10-23 ENCOUNTER — ANTICOAGULATION THERAPY VISIT (OUTPATIENT)
Dept: NURSING | Facility: CLINIC | Age: 62
End: 2017-10-23
Payer: COMMERCIAL

## 2017-10-23 DIAGNOSIS — I48.20 CHRONIC ATRIAL FIBRILLATION (H): ICD-10-CM

## 2017-10-23 DIAGNOSIS — Z79.01 LONG-TERM (CURRENT) USE OF ANTICOAGULANTS: ICD-10-CM

## 2017-10-23 LAB — INR POINT OF CARE: 2.5 (ref 0.86–1.14)

## 2017-10-23 PROCEDURE — 99207 ZZC NO CHARGE NURSE ONLY: CPT

## 2017-10-23 PROCEDURE — 85610 PROTHROMBIN TIME: CPT | Mod: QW

## 2017-10-23 PROCEDURE — 36416 COLLJ CAPILLARY BLOOD SPEC: CPT

## 2017-10-23 NOTE — MR AVS SNAPSHOT
Marcus BRIAN Tracie   10/23/2017 10:15 AM   Anticoagulation Therapy Visit    Description:  62 year old male   Provider:   [facility]   Department:   Nurse           INR as of 10/23/2017     Today's INR 2.5      Anticoagulation Summary as of 10/23/2017     INR goal 2.0-3.0   Today's INR 2.5   Full instructions 7.5 mg on Mon, Wed, Fri; 5 mg all other days   Next INR check 12/4/2017    Indications   Long-term (current) use of anticoagulants [Z79.01]  Chronic atrial fibrillation (H) [I48.2]         Your next Anticoagulation Clinic appointment(s)     Dec 04, 2017 10:15 AM CST   Anticoagulation Visit with  ANTICOAGULATION CLINIC   [facility] ([facility]    [address]  [address]  [facility] [address] [phone]              Contact Numbers     [facility]  Please call  [phone] to cancel and/or reschedule your appointment   Please call  [phone] with any problems or questions regarding your therapy.        October 2017 Details    Sun Mon Tue Wed Thu Fri Sat     1               2               3               4               5               6               7                 8               9               10               11               12               13               14                 15               16               17               18               19               20               21                 22               23      7.5 mg   See details      24      5 mg         25      7.5 mg         26      5 mg         27      7.5 mg         28      5 mg           29      5 mg         30      7.5 mg         31      5 mg              Date Details   10/23 This INR check               How to take your warfarin dose     To take:  5 mg Take 1 of the 5 mg tablets.    To take:  7.5 mg Take 1.5 of the 5 mg tablets.           November 2017 Details    Sun Mon Tue Wed Thu Fri Sat        1      7.5 mg         2      5 mg         3      7.5 mg         4       5 mg           5      5 mg         6      7.5 mg         7      5 mg         8      7.5 mg         9      5 mg         10      7.5 mg         11      5 mg           12      5 mg         13      7.5 mg         14      5 mg         15      7.5 mg         16      5 mg         17      7.5 mg         18      5 mg           19      5 mg         20      7.5 mg         21      5 mg         22      7.5 mg         23      5 mg         24      7.5 mg         25      5 mg           26      5 mg         27      7.5 mg         28      5 mg         29      7.5 mg         30      5 mg            Date Details   No additional details            How to take your warfarin dose     To take:  5 mg Take 1 of the 5 mg tablets.    To take:  7.5 mg Take 1.5 of the 5 mg tablets.           December 2017 Details    Sun Mon Tue Wed Thu Fri Sat          1      7.5 mg         2      5 mg           3      5 mg         4            5               6               7               8               9                 10               11               12               13               14               15               16                 17               18               19               20               21               22               23                 24               25               26               27               28               29               30                 31                      Date Details   No additional details    Date of next INR:  12/4/2017         How to take your warfarin dose     To take:  5 mg Take 1 of the 5 mg tablets.    To take:  7.5 mg Take 1.5 of the 5 mg tablets.

## 2017-10-23 NOTE — PROGRESS NOTES
ANTICOAGULATION FOLLOW-UP CLINIC VISIT    Patient Name:  Marcus Hodges  Date:  10/23/2017  Contact Type:  Face to Face  Patient is accompanied in the office by his wife.    SUBJECTIVE:     Patient Findings     Positives No Problem Findings           OBJECTIVE    INR Protime   Date Value Ref Range Status   10/23/2017 2.5 (A) 0.86 - 1.14 Final       ASSESSMENT / PLAN  INR assessment THER    Recheck INR In: 6 WEEKS    INR Location Clinic      Anticoagulation Summary as of 10/23/2017     INR goal 2.0-3.0   Today's INR 2.5   Maintenance plan 7.5 mg (5 mg x 1.5) on Mon, Wed, Fri; 5 mg (5 mg x 1) all other days   Full instructions 7.5 mg on Mon, Wed, Fri; 5 mg all other days   Weekly total 42.5 mg   Plan last modified Svetlana Vazquez RN (10/21/2016)   Next INR check 12/4/2017   Target end date Indefinite    Indications   Long-term (current) use of anticoagulants [Z79.01]  Chronic atrial fibrillation (H) [I48.2]         Anticoagulation Episode Summary     [facility]    Preferred lab     Send INR reminders to [facility]    Comments 5mg tabs // WIFE USES OWN CALENDAR // CVA - left hemiplegia - uses a cane      Anticoagulation Care Providers     Provider Role Specialty Phone number    Don Aviles MD Referring Internal Medicine [phone]            See the Encounter Report to view Anticoagulation Flowsheet and Dosing Calendar (Go to Encounters tab in chart review, and find the Anticoagulation Therapy Visit)        Svetlana Vazquez RN

## 2017-10-24 ENCOUNTER — HOSPITAL ENCOUNTER (OUTPATIENT)
Dept: PHYSICAL THERAPY | Facility: CLINIC | Age: 62
Setting detail: THERAPIES SERIES
End: 2017-10-24
Attending: INTERNAL MEDICINE
Payer: COMMERCIAL

## 2017-10-24 DIAGNOSIS — G81.14 LEFT SPASTIC HEMIPLEGIA (H): Primary | ICD-10-CM

## 2017-10-24 PROCEDURE — 97110 THERAPEUTIC EXERCISES: CPT | Mod: GP | Performed by: PHYSICAL THERAPIST

## 2017-10-24 PROCEDURE — 97112 NEUROMUSCULAR REEDUCATION: CPT | Mod: GP | Performed by: PHYSICAL THERAPIST

## 2017-10-24 PROCEDURE — 40000719 ZZHC STATISTIC PT DEPARTMENT NEURO VISIT: Performed by: PHYSICAL THERAPIST

## 2017-11-02 ENCOUNTER — HOSPITAL ENCOUNTER (OUTPATIENT)
Dept: PHYSICAL THERAPY | Facility: CLINIC | Age: 62
Setting detail: THERAPIES SERIES
End: 2017-11-02
Attending: INTERNAL MEDICINE
Payer: COMMERCIAL

## 2017-11-02 PROCEDURE — 97116 GAIT TRAINING THERAPY: CPT | Mod: GP | Performed by: PHYSICAL THERAPIST

## 2017-11-02 PROCEDURE — 40000719 ZZHC STATISTIC PT DEPARTMENT NEURO VISIT: Performed by: PHYSICAL THERAPIST

## 2017-11-02 PROCEDURE — 97110 THERAPEUTIC EXERCISES: CPT | Mod: GP | Performed by: PHYSICAL THERAPIST

## 2017-11-02 PROCEDURE — 97112 NEUROMUSCULAR REEDUCATION: CPT | Mod: GP | Performed by: PHYSICAL THERAPIST

## 2017-11-03 NOTE — PROGRESS NOTES
Outpatient Physical Therapy Progress Note     Patient: Marcus Hodges  : 1955    Beginning/End Dates of Reporting Period:  8/10/17 to 11/3/2017    Referring Provider: Dr. Don Aviles    Therapy Diagnosis:  R knee pain, impaired functional mobility and gait with impaired posture and coordination of movement with history of CVA      Client Self Report: States he has been having some tiredness and soreness L leg at night.        States he also has accupuncture lower leg on the left and this causes stimulation of the L llower leg/movement for 20-30 minutes.   Has limited his times around the track to 3-4 because he gets tired in the evening.    DIscussing goals Marcus would like to be able to walk without a device to free up his R hand to allow him to use.   could assist with doing more of the laundry, pushing laundry basket in front of him, kitchen tasks, meal tasks, carrying plate/cup.    Wife also wondering if a goal with pt doing stairs Independently would be a good one.  Getting shirt on and off on his own.     Objective Measurements:  Objective Measure: 25 ft walk  Details:  17 23.68  28 steps and 23.18  23 steps.    17  21.89 sec  26 steps, trekking pole.   17   22.93 seconds with trekking pole, 28 steps. second time 24.62 seconds , 28 seconds. trekking pole at the start of the session. At the end 22 seconds.   17    26.8  29 steps, 24.47 sec  30 steps  With trekking pole  17   24.84 seconds  29 steps  ,    25.22 seconds 28 steps.   Walking pole.      17   26 seconds , 26 steps walking stick/pole         4 square not retested since 17                 Goals:      Goal Identifier 3 - 25 foot walk   Goal Description Marcus will complete the 25 foot walk with /without an AD in 19 seconds or less to demonstrate improved gait efficiency and quality of gait.    Target Date 17   Date Met   in progress   Progress:     Goal Identifier 4 - Gait/personal goal   Goal Description  Marcus to ambulate without an  feet or greater including uneven terrain with step through gait pattern and not LOB to progress to his goal of not needing an AD with gait.    Target Date 12/31/17   Date Met   in progress   Progress:continues to ambulate at [facility] around the track.  Wife takes videos - demonstrating improved step length, quality of gait.      Goal Identifier 5- curbs   Goal Description Marcus to ascend and descend curb with min assist or less and AD , without LOB or push to the right to allow greater ease and safety with curbs and less caregiver burden/risk of injury.    Target Date 12/31/17   Date Met   in process   Progress:becoming more comfortable but till hesitant.      Goal Identifier 6 --  4 square   Goal Description Marcus to complete 4 square in 25 seconds with and AD and 40 seconds or less without an AD to demonstrate improved interlimb coordination, balance and body control to assist in meeting all goals and overall function.    Target Date 12/31/17   Date Met   in progress   Progress:     Goal Identifier  2   Goal Description  Pt will be independent with continued LE strengthening, stretching, and/or balance training HEP for longterm management of knee pain and activity limitations   Target Date  12/30/17   Date Met      Progress:in progress, HEP continues to be developed      Goal Identifier  NEW GOAL   Goal Description  Marcus to perform transfer to and from floor with outside UE support and min assist of 1 to allow him to recover from a fall with assist from his wife or caregiver.    Target Date  12/30/17   Date Met    in progress   Progress:  Not completed to date.  Working on standing B UE support and performing lunge with R or L knee posterior and increasing distance towards kneel on the floor.           Progress Toward Goals:   Progress this reporting period: Marcus continues to demonstrate improvements.  Greatest improvements noted with less co contraction LE allowing  greater knee motion /flexion toe off on the L, improved alignment pelvis in transverse plane with gait.  Improved movement ROM and improved muscle tension throughout L trunk allowing pt to reach more easily to the floor, and also assisting with  improved gait, and balance.  Stairs with alternating stepping and min assist, continues to lean to the right.    Marcus has demonstrated improved ability to challenge self to the left side more comfortably   Marcus continues to have intermittent R knee pain.  He has had pain L knee and calf at times which I feel is secondary to pt using the lower leg /more ROM and muscle use.    With discussion of new goals he would like to work on Marcus reports donning and doffing shirt and continued improvement with gait.   WIth home activities with PCA pt has progressed to being able to tolerate prone on elbows, he used to perform prone but with L UE adducted to his side.  He continues to be limited by visual deficits, impaired proprioception and body awareness, which lead to impaired neuro motor function and control leading to overactivation/tone L side.  When he feel secure he is able to load through the L UE in sitting and have improved disassociation proximal joints UE and LE L.   I continue to anticipate further improvements with skilled PT Intervention to further address Marcus's impairments to improve his activity tolerance, function and decrease secondary impairments.     Plan:  Continue therapy per current plan of care. Currently seeing pt 1 x a week.  May trial treatment model of seeing consecutively 3-4 appointments every 3-4 weeks to assess neuroplastic change with consistent skilled input.          New goals     Marcus to perform reaching to the floor to the left to pick object off of the floor while maintaining L hand contact/closed chain on the mat to demonstrate improved neuromotor planning, ability to bring COM to the left while keeping proper muscle activaiton on the L side  for greater safety with reaching activities to the floor, but also for carryover of improved upright body control for transfers and gait. To be met by 2/27/18    Marcus to don/doff shirt independently to very min assist to allow him to reach personal goal of improved independence with dressing as well as demonstration of improved neuro motor function and body control to assist in meeting all other goals.   To be met by 2/27/18      Discharge:  No

## 2017-11-06 ENCOUNTER — THERAPY VISIT (OUTPATIENT)
Dept: OCCUPATIONAL THERAPY | Facility: CLINIC | Age: 62
End: 2017-11-06
Payer: COMMERCIAL

## 2017-11-06 DIAGNOSIS — G81.14 SPASTIC HEMIPLEGIA AFFECTING LEFT NONDOMINANT SIDE (H): ICD-10-CM

## 2017-11-06 DIAGNOSIS — M79.642 PAIN OF LEFT HAND: Primary | ICD-10-CM

## 2017-11-06 PROCEDURE — 29125 APPL SHORT ARM SPLINT STATIC: CPT | Mod: GO | Performed by: OCCUPATIONAL THERAPIST

## 2017-11-06 PROCEDURE — 97165 OT EVAL LOW COMPLEX 30 MIN: CPT | Mod: GO | Performed by: OCCUPATIONAL THERAPIST

## 2017-11-06 NOTE — MR AVS SNAPSHOT
After Visit Summary   11/6/2017    Marcus Hodges    MRN: [id number]           Patient Information     Date Of Birth          1955        Visit Information        Provider Department      11/6/2017 9:30 AM Delphine Aj OT [facility] HAND        Today's Diagnoses     Pain of left hand    -  1    Spastic hemiplegia affecting left nondominant side (H)           Follow-ups after your visit        Your next 10 appointments already scheduled     Nov 09, 2017 10:15 AM CST   Treatment 60 with Ada Scott, PT   [facility] ([facility])    [address] [id number] [phone]            Nov 15, 2017  9:00 AM CST   Treatment 60 with Ada Scott PT   [facility] ([facility])    [address] [id number] [phone]            Nov 17, 2017  9:10 AM CST   Return Visit with Rashard Arevalo MD   [facility] [place] ([facility])    [address]  [address]  [facility] [address] [phone]            Nov 21, 2017 10:45 AM CST   Treatment 60 with Ada Scott PT   [facility] ([facility])    [address] [address] [phone]            Nov 27, 2017  1:00 PM CST   Treatment 60 with Ada Scott PT   [facility] ([facility])    [address] [address] [phone]            Dec 04, 2017 10:15 AM CST   Anticoagulation Visit with EA ANTICOAGULATION CLINIC   [facility] ([facility])    [address]  [address]  [facility] [id number] [phone]            Dec 04, 2017  1:00 PM CST   Treatment 60 with Ada Scott PT   [facility] ([facility])    [address] [address]    [phone]            Dec 08, 2017 10:30 AM CST   Ech Complete with SHCVECHR4   [facility] CV Echocardiography ([facility])    [address]  [address]  [place] [address] [phone]           1. Please bring or wear a comfortable two-piece outfit. 2. You may eat, drink and take your normal medicines. 3. For any questions that cannot be answered, please contact the ordering physician            Dec 12, 2017  9:45 AM CST   Return Visit with Hayes Yip MD   [facility] ([facility])    [address]  [place] [address] [phone]            Dec 14, 2017 11:00 AM CST   Treatment 60 with Ada Scott PT   [facility] ([facility])    [address] [address] [phone]              Who to contact     If you have questions or need follow up information about today's clinic visit or your schedule please contact [facility] directly at [phone].  Normal or non-critical lab and imaging results will be communicated to you by MyChart, letter or phone within 4 business days after the clinic has received the results. If you do not hear from us within 7 days, please contact the clinic through [email or web address] or phone. If you have a critical or abnormal lab result, we will notify you by phone as soon as possible.  Submit refill requests through [employer] or call your pharmacy and they will forward the refill request to us. Please allow 3 business days for your refill to be completed.          Additional Information About Your Visit        AVA.[email or web address] Information     [employer] gives you secure access to your electronic health record. If you see a primary care provider, you can also send messages to your care team and make appointments. If you have questions, please call your primary care clinic.  If you do not have a primary  care provider, please call [phone] and they will assist you.        Care EveryWhere ID     This is your Care EveryWhere ID. This could be used by other organizations to access your [place] medical records  [id number]         Blood Pressure from Last 3 Encounters:   03/06/17 108/70   12/23/16 114/70   12/06/16 100/74    Weight from Last 3 Encounters:   03/06/17 79.4 kg (175 lb)   01/23/17 81.2 kg (179 lb)   01/09/17 81.2 kg (179 lb)              We Performed the Following     APPLY SHORT ARM SPLINT STATIC     HC OT EVAL, LOW COMPLEXITY        Primary Care Provider Office Phone # Fax #    Don Aviles [phone] [address] [place] [address]        Equal Access to Services     [facility]: Hadii aad ku hadasho Soomaali, waaxda luqadaha, qaybta kaalmada adeegyada, waxay lesviain hayaan brenda fried . So [facility] [phone].    ATENCIÓN: Si habla español, tiene a pitts disposición servicios gratuitos de asistencia lingüística. [facility] [phone].    We comply with applicable federal civil rights laws and [place] laws. We do not discriminate on the basis of race, color, national origin, age, disability, sex, sexual orientation, or gender identity.            Thank you!     Thank you for choosing [facility]  for your care. Our goal is always to provide you with excellent care. Hearing back from our patients is one way we can continue to improve our services. Please take a few minutes to complete the written survey that you may receive in the mail after your visit with us. Thank you!             Your Updated Medication List - Protect others around you: Learn how to safely use, store and throw away your medicines at www.disposemymeds.org.          This list is accurate as of: 11/6/17 10:56 AM.  Always use your most recent med list.                   Brand Name Dispense Instructions for use Diagnosis    acetaminophen 325 MG tablet    TYLENOL    100  tablet    Take  by mouth every 4 hours as needed for pain.        atorvastatin 20 MG tablet    LIPITOR    90 tablet    Take 1 tablet (20 mg) by mouth daily    Hyperlipidemia LDL goal <100       baclofen 20 MG tablet    LIORESAL    360 tablet    Take 1 tablet (20 mg) by mouth 4 times daily    Left hemiplegia (H)       cholecalciferol 1000 UNIT tablet    vitamin D3    90 tablet    Take 2 tablets (2,000 Units) by mouth daily    CVA (cerebral vascular accident) (H)       citalopram 20 MG tablet    celeXA    135 tablet    Take 1.5 tablets (30 mg) by mouth daily    Major depressive disorder, recurrent episode, moderate (H)       levETIRAcetam 1000 MG Tabs    KEPPRA    180 tablet    Take 1 tablet by mouth 2 times daily    Seizure prophylaxis       metoprolol 25 MG 24 hr tablet    TOPROL-XL    90 tablet    Take 1 tablet (25 mg) by mouth daily    Chronic atrial fibrillation (H)       mirabegron 50 MG 24 hr tablet    MYRBETRIQ    90 tablet    Take 1 tablet (50 mg) by mouth daily    Neurogenic bladder       Multi-vitamin Tabs tablet   Generic drug:  multivitamin, therapeutic with minerals     100 tablet    Take 1 tablet by mouth daily.        NONFORMULARY      Protandim - herbal supplement        * order for DME     1 each    Equipment being ordered: Dispense a WHFO Leg Brace    Left hemiplegia (H), CVA (cerebral vascular accident) (H)       * order for DME     1 each    Equipment being ordered: Quad Cane-39 inch    Left hemiplegia (H), CVA (cerebral vascular accident) (H)       * order for DME     4 each    Equipment being ordered: daytime urinary leg bag   800cc    Urine incontinence       * order for DME     12 each    Equipment being ordered: Daytime Urinary leg bag-800cc  ([facility] Code )    Neurogenic bladder       * order for DME     12 each    Equipment being ordered:    Nightime urinary leg bag 1600cc  [facility]    Neurogenic bladder       * order for DME     90 each    Equipment being ordered:   Condom  catheter  [facility]    Neurogenic bladder       * order for DME     12 each    Equipment being ordered: Incontinence supplies - Posies  ([facility]S Code )    Neurogenic bladder       * order for DME     150 each    Equipment being ordered: Adhesive remover, wipes  ([facility] )    Neurogenic bladder       * order for DME     150 each    Equipment being ordered: Skin prep (no code - requested by patient)    Neurogenic bladder       warfarin 5 MG tablet    COUMADIN    135 tablet    Take 5mg TTSaSu, 7.5mg MWF OR as directed by INR Clinic.    Long term (current) use of anticoagulants, CVA (cerebral vascular accident) (H)       * Notice:  This list has 9 medication(s) that are the same as other medications prescribed for you. Read the directions carefully, and ask your doctor or other care provider to review them with you.

## 2017-11-06 NOTE — PROGRESS NOTES
Hand Therapy Initial Evaluation  Current Date:  11/6/2017    Subjective:  Marcus Hodges is a 62 year old right hand dominant male.    Diagnosis:Left spastic hemiplegia  DOI:  10/24/17 MD order date      Patient reports symptoms of pain, stiffness/loss of motion, weakness/loss of strength and tingling  of the left hand, wrist, elbow, shoulder which occurred due to a CVA in 2012 . Since onset symptoms are Unchanged Special tests:  None.  Previous treatment: occupational therapy for upper extremity and pre-elma resting hand splint for night time use. General health as reported by patient is good.  Pertinent medical history includes: seizures, heart problems, stroke, depression .  Medical allergies: none.  Surgical history: heart: ASD in 1977 and 1978, other: hernia.  Medication history: anti-seizure, heparin/coumadin, muscle relaxants, anti-depressants.    Occupational Profile Information:  Current occupation is Disabled  Prior functional level:  supervised setting  Barriers include:requires assistance with dressing, personal hygiene, transfers, mobility  Mobility: Ambulates with aid of standard edge cane and gate belt for safety  Transportation: Does not drive. Wife does transportation    Upper Extremity Functional Index Score:  SCORE:   Column Totals: /80: 4   (A lower score indicates greater disability.)        Objective:  Pain Level Report  VAS(0-10) 11/6/2017   At Rest: 1   With Use: 1     Report of Pain:  Location:  elbow, wrist and hand  Pain Quality:  Tingling  Frequency: intermittent    Pain is worst:  nighttime  Exacerbated by:  Certain positions  Relieved by:  rest and stretch  Progression:  Unchanged      ROM:  No AROM of fingers, wrist, FA, and elbow. PROM limited in all areas due to spasticity and contractures.     Strength:  Unable due to left spastic hemiplegia    Assessment:  Patient presents with symptoms consistent with diagnosis of left spastic,  with conservative intervention.     Patient's  limitations or Problem List includes:  Pain, Decreased ROM/motion and Weakness of the left elbow, wrist and hand which interferes with the patient's ability to perform Self Care Tasks (dressing, eating, bathing, hygiene/toileting), Work Tasks, Sleep Patterns, Recreational Activities, Household Chores and Driving  as compared to previous level of function.    Rehab Potential:  Good - Return to full activity, some limitations    Patient will benefit from skilled Occupational Therapy to increase flexibility and decrease pain to return to previous activity level and resume normal daily tasks and to reach their rehab potential.    Barriers to Learning:  No barrier    Communication Issues:  Patient appears to be able to clearly communicate and understand verbal and written communication and follow directions correctly.    Chart Review: Chart Review and Brief history including review of medical and/or therapy records relating to the presenting problem    Identified Performance Deficits: bathing/showering, toileting, dressing, feeding, functional mobility, driving and community mobility, health management and maintenance, home establishment and management, meal preparation and cleanup, sleep, school, work, volunteer activities, leisure activities and social participation    Assessment of Occupational Performance:  5 or more Performance Deficits    Clinical Decision Making (Complexity): Low complexity    Treatment Explanation:  The following has been discussed with the patient:  RX ordered/plan of care  Anticipated outcomes  Possible risks and side effects    Plan:  Frequency:  1 X visit, returning as needed for splint adjustment.     Treatment Plan:   Orthotic Fabrication:  Static orthosis and Forearm based orthosis  Discharge Plan:  Achieve all LTG.  Independent in home treatment program.  Reach maximal therapeutic benefit.    Home Exercise Program:  Resting splint for at night use.

## 2017-11-08 ENCOUNTER — TELEPHONE (OUTPATIENT)
Dept: PEDIATRICS | Facility: CLINIC | Age: 62
End: 2017-11-08

## 2017-11-08 DIAGNOSIS — N31.9 NEUROGENIC BLADDER: ICD-10-CM

## 2017-11-08 NOTE — TELEPHONE ENCOUNTER
[facility] is calling for orders for incontinence supplies.  Orders pended.  Please fax to [phone] when complete.  Discussed with Dr. Aviles-orders reviewed.  Printed and faxed.  SOLOMON Donohue RN

## 2017-11-09 ENCOUNTER — HOSPITAL ENCOUNTER (OUTPATIENT)
Dept: PHYSICAL THERAPY | Facility: CLINIC | Age: 62
Setting detail: THERAPIES SERIES
End: 2017-11-09
Attending: INTERNAL MEDICINE
Payer: COMMERCIAL

## 2017-11-09 PROCEDURE — 97110 THERAPEUTIC EXERCISES: CPT | Mod: GP | Performed by: PHYSICAL THERAPIST

## 2017-11-09 PROCEDURE — 97116 GAIT TRAINING THERAPY: CPT | Mod: GP | Performed by: PHYSICAL THERAPIST

## 2017-11-09 PROCEDURE — 97112 NEUROMUSCULAR REEDUCATION: CPT | Mod: GP | Performed by: PHYSICAL THERAPIST

## 2017-11-09 PROCEDURE — 40000719 ZZHC STATISTIC PT DEPARTMENT NEURO VISIT: Performed by: PHYSICAL THERAPIST

## 2017-11-15 ENCOUNTER — HOSPITAL ENCOUNTER (OUTPATIENT)
Dept: PHYSICAL THERAPY | Facility: CLINIC | Age: 62
Setting detail: THERAPIES SERIES
End: 2017-11-15
Attending: INTERNAL MEDICINE
Payer: COMMERCIAL

## 2017-11-15 PROCEDURE — 97116 GAIT TRAINING THERAPY: CPT | Mod: GP | Performed by: PHYSICAL THERAPIST

## 2017-11-15 PROCEDURE — 40000719 ZZHC STATISTIC PT DEPARTMENT NEURO VISIT: Performed by: PHYSICAL THERAPIST

## 2017-11-15 PROCEDURE — 97110 THERAPEUTIC EXERCISES: CPT | Mod: GP | Performed by: PHYSICAL THERAPIST

## 2017-11-15 PROCEDURE — 97112 NEUROMUSCULAR REEDUCATION: CPT | Mod: GP | Performed by: PHYSICAL THERAPIST

## 2017-11-17 ENCOUNTER — OFFICE VISIT (OUTPATIENT)
Dept: UROLOGY | Facility: CLINIC | Age: 62
End: 2017-11-17
Payer: COMMERCIAL

## 2017-11-17 VITALS — HEART RATE: 76 BPM | HEIGHT: 74 IN | BODY MASS INDEX: 23.36 KG/M2 | OXYGEN SATURATION: 98 % | WEIGHT: 182 LBS

## 2017-11-17 DIAGNOSIS — N31.9 NEUROGENIC BLADDER: Primary | ICD-10-CM

## 2017-11-17 PROCEDURE — 99212 OFFICE O/P EST SF 10 MIN: CPT | Performed by: UROLOGY

## 2017-11-17 ASSESSMENT — PAIN SCALES - GENERAL: PAINLEVEL: NO PAIN (0)

## 2017-11-17 NOTE — MR AVS SNAPSHOT
After Visit Summary   11/17/2017    Marcus Hodges    MRN: [id number]           Patient Information     Date Of Birth          1955        Visit Information        Provider Department      11/17/2017 9:10 AM Rashard Arevalo MD [facility]'s Diagnoses     Neurogenic bladder    -  1       Follow-ups after your visit        Follow-up notes from your care team     Return in about 1 year (around 11/17/2018) for Physical Exam.      Your next 10 appointments already scheduled     Nov 21, 2017 10:45 AM CST   Treatment 60 with Ada Scott, PT   [facility] ([facility])    [address] [address]   [phone]            Nov 27, 2017  1:00 PM CST   Treatment 60 with Ada Scott PT   [facility] ([facility])    [address] [address]   [phone]            Dec 04, 2017 10:15 AM CST   Anticoagulation Visit with EA ANTICOAGULATION CLINIC   [facility] ([facility])    [address] [place] [address]  [address]  [facility] [address]   [phone]            Dec 04, 2017  1:00 PM CST   Treatment 60 with Ada Scott, PT   [facility] ([facility])    [address] [address]   [phone]            Dec 08, 2017 10:30 AM CST   Ech Complete with [id number]   [facility] CV Echocardiography ([facility])    [address] [place] [address] [phone]           1. Please bring or wear a comfortable two-piece outfit. 2. You may eat, drink and take your normal medicines. 3. For any questions that cannot be answered, please contact the ordering physician            Dec 12, 2017  9:45 AM CST   Return Visit with Hayes Yip MD   [facility]    [place] ([facility])    [address]  [place] [address]   [phone]            Dec 14, 2017 11:00 AM CST   Treatment 60 with Ada Scott, PT   [facility] ([facility])    [address] [facility] [address] [facility] [address] [facility] [address] [phone]            Dec 18, 2017  1:00 PM CST   Treatment 60 with Ada Scott, PT   [facility] ([facility])    [address] [facility] [address] [phone]            Dec 29, 2017  8:30 AM CST   Treatment 60 with Ada Scott, PT   [facility] ([facility])    [address] [id number] [phone]              Who to contact     If you have questions or need follow up information about today's clinic visit or your schedule please contact [facility] [place] directly at [phone].  Normal or non-critical lab and imaging results will be communicated to you by Connectivityhart, letter or phone within 4 business days after the clinic has received the results. If you do not hear from us within 7 days, please contact the clinic through [employer] or phone. If you have a critical or abnormal lab result, we will notify you by phone as soon as possible.  Submit refill requests through [employer] or call your pharmacy and they will forward the refill request to us. Please allow 3 business days for your refill to be completed.          Additional Information About Your Visit        [employer] Information     [employer] gives you secure access to your electronic health record. If you see a primary care provider, you can also send messages to your care team and make appointments. If you have questions, please call your primary care clinic.  If you do not have a primary care provider, please call [phone] and they will assist you.        Care EveryWhere ID     This is your Care EveryWhere ID.  "This could be used by other organizations to access your [place] medical records  [id number]        Your Vitals Were     Pulse Height Pulse Oximetry BMI (Body Mass Index)          76 1.88 m (6' 2\") 98% 23.37 kg/m2         Blood Pressure from Last 3 Encounters:   03/06/17 108/70   12/23/16 114/70   12/06/16 100/74    Weight from Last 3 Encounters:   11/17/17 82.6 kg (182 lb)   03/06/17 79.4 kg (175 lb)   01/23/17 81.2 kg (179 lb)              Today, you had the following     No orders found for display       Primary Care Provider Office Phone # Fax #    Don Aviles [phone] [address] [place] [address]        Equal Access to Services     [facility]: Hadii aad louie hadasho Soomaali, waaxda luqadaha, qaybta kaalmada adeegyada, waxay jasper haybunny fried . So [facility] [phone].    ATENCIÓN: Si habla español, tiene a pitts disposición servicios gratuitos de asistencia lingüística. Veena al [phone].    We comply with applicable federal civil rights laws and [place] laws. We do not discriminate on the basis of race, color, national origin, age, disability, sex, sexual orientation, or gender identity.            Thank you!     Thank you for choosing [facility] [place]  for your care. Our goal is always to provide you with excellent care. Hearing back from our patients is one way we can continue to improve our services. Please take a few minutes to complete the written survey that you may receive in the mail after your visit with us. Thank you!             Your Updated Medication List - Protect others around you: Learn how to safely use, store and throw away your medicines at www.disposemymeds.org.          This list is accurate as of: 11/17/17  9:23 AM.  Always use your most recent med list.                   Brand Name Dispense Instructions for use Diagnosis    acetaminophen 325 MG tablet    TYLENOL    100 tablet "    Take  by mouth every 4 hours as needed for pain.        atorvastatin 20 MG tablet    LIPITOR    90 tablet    Take 1 tablet (20 mg) by mouth daily    Hyperlipidemia LDL goal <100       baclofen 20 MG tablet    LIORESAL    360 tablet    Take 1 tablet (20 mg) by mouth 4 times daily    Left hemiplegia (H)       cholecalciferol 1000 UNIT tablet    vitamin D3    90 tablet    Take 2 tablets (2,000 Units) by mouth daily    CVA (cerebral vascular accident) (H)       citalopram 20 MG tablet    celeXA    135 tablet    Take 1.5 tablets (30 mg) by mouth daily    Major depressive disorder, recurrent episode, moderate (H)       levETIRAcetam 1000 MG Tabs    KEPPRA    180 tablet    Take 1 tablet by mouth 2 times daily    Seizure prophylaxis       metoprolol 25 MG 24 hr tablet    TOPROL-XL    90 tablet    Take 1 tablet (25 mg) by mouth daily    Chronic atrial fibrillation (H)       mirabegron 50 MG 24 hr tablet    MYRBETRIQ    90 tablet    Take 1 tablet (50 mg) by mouth daily    Neurogenic bladder       Multi-vitamin Tabs tablet   Generic drug:  multivitamin, therapeutic with minerals     100 tablet    Take 1 tablet by mouth daily.        NONFORMULARY      Protandim - herbal supplement        * order for DME     1 each    Equipment being ordered: Dispense a WHFO Leg Brace    Left hemiplegia (H), CVA (cerebral vascular accident) (H)       * order for DME     1 each    Equipment being ordered: Quad Cane-39 inch    Left hemiplegia (H), CVA (cerebral vascular accident) (H)       * order for DME     4 each    Equipment being ordered: daytime urinary leg bag   800cc    Urine incontinence       * order for DME     100 each    Equipment being ordered: Adhesive remover, wipes  ([facility] Code )    Neurogenic bladder       * order for DME     90 each    Equipment being ordered:   Freedom Clear Condom catheter  [facility] )    Neurogenic bladder       * order for DME     12 each    Equipment being ordered:    Nightime urinary leg bag  1600cc  ([facility]S Code )    Neurogenic bladder       * order for DME     12 each    Equipment being ordered: Daytime Urinary leg bag-800cc  (HCPCS Code )    Neurogenic bladder       * order for DME     12 each    Equipment being ordered: Incontinence supplies - Posies  (HCPCS Code )    Neurogenic bladder       * order for DME     100 each    Equipment being ordered: Skin prep (no code - requested by patient)    Neurogenic bladder       warfarin 5 MG tablet    COUMADIN    135 tablet    Take 5mg TTSaSu, 7.5mg MWF OR as directed by INR Clinic.    Long term (current) use of anticoagulants, CVA (cerebral vascular accident) (H)       * Notice:  This list has 9 medication(s) that are the same as other medications prescribed for you. Read the directions carefully, and ask your doctor or other care provider to review them with you.

## 2017-11-17 NOTE — PROGRESS NOTES
Marcus Hodges is a pleasant 62-year-old male with traumatic brain injury and a neurogenic bladder. He is improved with myrbetriq and wears a condom catheter often.  There is no family history of prostate cancer  Urine looks clear  Other past medical history: Atrial enlargement, atrial flutter, atrial septal defect, CVA, dysphagia, hernia, thrombophlebitis, mitral regurgitation, respiratory failure, tricuspid regurgitation, glaucoma, neurogenic bladder with incontinence, nonsmoker  Family history: Cerebrovascular disease, hypertension, heart defects  Medications: Tylenol, Lipitor, baclofen, vitamin D, Celexa, Keppra, Toprol-XL, Myrbetriq, multivitamin, Coumadin  Allergies: None  Exam: Left-sided paralysis, alert and oriented, normal respirations, normal vital signs. Normal sphincter tone, no rectal mass or impaction, benign feeling prostate and seminal vesicles  Assessment: Neurogenic bladder-discussed medications, tibial nerve stimulation, Botox, InterStim. He seems happy using myrbetriq.   Plan: See me yearly for urinalysis, digital rectal exam, postvoid residual. Given InterStim pamphlet

## 2017-11-17 NOTE — NURSING NOTE
Pt states no trouble.  Same as last year.  Pt is using the condom cath at nt and when he goes out.  Pt states no trouble with that.  Pt may need a refill on myrabetriq.  No ua or pvr today....  Pt denies dysuria.  Pt sleeps well with the condom cath.   SOLOMON Elkins CMA

## 2017-11-17 NOTE — LETTER
11/17/2017       RE: Marcus Hodges  [address] [place] [address]     Dear Colleague,    Thank you for referring your patient, Marcus Hodges, to the [facility]OLOG[facility] [place] at [facility]. Please see a copy of my visit note below.    Marcus Hodges is a pleasant 62-year-old male with traumatic brain injury and a neurogenic bladder. He is improved with myrbetriq and wears a condom catheter often.  There is no family history of prostate cancer  Urine looks clear  Other past medical history: Atrial enlargement, atrial flutter, atrial septal defect, CVA, dysphagia, hernia, thrombophlebitis, mitral regurgitation, respiratory failure, tricuspid regurgitation, glaucoma, neurogenic bladder with incontinence, nonsmoker  Family history: Cerebrovascular disease, hypertension, heart defects  Medications: Tylenol, Lipitor, baclofen, vitamin D, Celexa, Keppra, Toprol-XL, Myrbetriq, multivitamin, Coumadin  Allergies: None  Exam: Left-sided paralysis, alert and oriented, normal respirations, normal vital signs. Normal sphincter tone, no rectal mass or impaction, benign feeling prostate and seminal vesicles  Assessment: Neurogenic bladder-discussed medications, tibial nerve stimulation, Botox, InterStim. He seems happy using myrbetriq.   Plan: See me yearly for urinalysis, digital rectal exam, postvoid residual. Given InterStim pamphlet      Again, thank you for allowing me to participate in the care of your patient.      Sincerely,    Rashard Arevalo MD

## 2017-11-21 ENCOUNTER — HOSPITAL ENCOUNTER (OUTPATIENT)
Dept: PHYSICAL THERAPY | Facility: CLINIC | Age: 62
Setting detail: THERAPIES SERIES
End: 2017-11-21
Attending: INTERNAL MEDICINE
Payer: COMMERCIAL

## 2017-11-21 PROCEDURE — 40000719 ZZHC STATISTIC PT DEPARTMENT NEURO VISIT: Performed by: PHYSICAL THERAPIST

## 2017-11-21 PROCEDURE — 97110 THERAPEUTIC EXERCISES: CPT | Mod: GP | Performed by: PHYSICAL THERAPIST

## 2017-11-21 PROCEDURE — 97116 GAIT TRAINING THERAPY: CPT | Mod: GP | Performed by: PHYSICAL THERAPIST

## 2017-11-21 PROCEDURE — 97112 NEUROMUSCULAR REEDUCATION: CPT | Mod: GP | Performed by: PHYSICAL THERAPIST

## 2017-11-24 PROBLEM — M79.642 PAIN OF LEFT HAND: Status: RESOLVED | Noted: 2017-11-06 | Resolved: 2017-11-24

## 2017-11-24 PROBLEM — G81.14 SPASTIC HEMIPLEGIA AFFECTING LEFT NONDOMINANT SIDE (H): Status: RESOLVED | Noted: 2017-11-06 | Resolved: 2017-11-24

## 2017-11-28 DIAGNOSIS — N31.9 NEUROGENIC BLADDER: ICD-10-CM

## 2017-11-28 RX ORDER — MIRABEGRON 50 MG/1
50 TABLET, EXTENDED RELEASE ORAL DAILY
Qty: 90 TABLET | Refills: 3 | Status: SHIPPED | OUTPATIENT
Start: 2017-11-28 | End: 2018-03-19

## 2017-11-30 NOTE — ADDENDUM NOTE
Encounter addended by: Ada Scott, PT on: 11/30/2017  5:33 PM<BR>     Actions taken: Sign clinical note

## 2017-12-04 ENCOUNTER — HOSPITAL ENCOUNTER (OUTPATIENT)
Dept: PHYSICAL THERAPY | Facility: CLINIC | Age: 62
Setting detail: THERAPIES SERIES
End: 2017-12-04
Attending: INTERNAL MEDICINE
Payer: COMMERCIAL

## 2017-12-04 ENCOUNTER — ANTICOAGULATION THERAPY VISIT (OUTPATIENT)
Dept: NURSING | Facility: CLINIC | Age: 62
End: 2017-12-04
Payer: COMMERCIAL

## 2017-12-04 DIAGNOSIS — Z79.01 LONG-TERM (CURRENT) USE OF ANTICOAGULANTS: ICD-10-CM

## 2017-12-04 DIAGNOSIS — I48.20 CHRONIC ATRIAL FIBRILLATION (H): ICD-10-CM

## 2017-12-04 LAB — INR POINT OF CARE: 2.4 (ref 0.86–1.14)

## 2017-12-04 PROCEDURE — 99207 ZZC NO CHARGE NURSE ONLY: CPT

## 2017-12-04 PROCEDURE — 40000719 ZZHC STATISTIC PT DEPARTMENT NEURO VISIT: Performed by: PHYSICAL THERAPIST

## 2017-12-04 PROCEDURE — 36416 COLLJ CAPILLARY BLOOD SPEC: CPT

## 2017-12-04 PROCEDURE — 97116 GAIT TRAINING THERAPY: CPT | Mod: GP | Performed by: PHYSICAL THERAPIST

## 2017-12-04 PROCEDURE — 97112 NEUROMUSCULAR REEDUCATION: CPT | Mod: GP | Performed by: PHYSICAL THERAPIST

## 2017-12-04 PROCEDURE — 85610 PROTHROMBIN TIME: CPT | Mod: QW

## 2017-12-04 NOTE — MR AVS SNAPSHOT
Marcus Hodges   12/4/2017 10:15 AM   Anticoagulation Therapy Visit    Description:  62 year old male   Provider:   ANTICOAGULATION CLINIC   Department:   Nurse           INR as of 12/4/2017     Today's INR 2.4      Anticoagulation Summary as of 12/4/2017     INR goal 2.0-3.0   Today's INR 2.4   Full instructions 7.5 mg on Mon, Wed, Fri; 5 mg all other days   Next INR check 1/15/2018    Indications   Long-term (current) use of anticoagulants [Z79.01]  Chronic atrial fibrillation (H) [I48.2]         Your next Anticoagulation Clinic appointment(s)     Allan 15, 2018 10:15 AM CST   Anticoagulation Visit with  ANTICOAGULATION CLINIC   [facility]    [address]  [address]  [facility] [place] [address] [phone]              Contact Numbers     [facility]  Please call  [phone] to cancel and/or reschedule your appointment   Please call  [phone] with any problems or questions regarding your therapy.        December 2017 Details    Sun Mon Tue Wed Thu Fri Sat          1               2                 3               4      7.5 mg   See details      5      5 mg         6      7.5 mg         7      5 mg         8      7.5 mg         9      5 mg           10      5 mg         11      7.5 mg         12      5 mg         13      7.5 mg         14      5 mg         15      7.5 mg         16      5 mg           17      5 mg         18      7.5 mg         19      5 mg         20      7.5 mg         21      5 mg         22      7.5 mg         23      5 mg           24      5 mg         25      7.5 mg         26      5 mg         27      7.5 mg         28      5 mg         29      7.5 mg         30      5 mg           31      5 mg                Date Details   12/04 This INR check               How to take your warfarin dose     To take:  5 mg Take 1 of the 5 mg tablets.    To take:  7.5 mg Take 1.5 of the 5 mg tablets.           January 2018 Details    [place]  Mon Tue Wed Thu Fri Sat      1      7.5 mg         2      5 mg         3      7.5 mg         4      5 mg         5      7.5 mg         6      5 mg           7      5 mg         8      7.5 mg         9      5 mg         10      7.5 mg         11      5 mg         12      7.5 mg         13      5 mg           14      5 mg         15            16               17               18               19               20                 21               22               23               24               25               26               27                 28               29               30               31                   Date Details   No additional details    Date of next INR:  1/15/2018         How to take your warfarin dose     To take:  5 mg Take 1 of the 5 mg tablets.    To take:  7.5 mg Take 1.5 of the 5 mg tablets.

## 2017-12-04 NOTE — PROGRESS NOTES
ANTICOAGULATION FOLLOW-UP CLINIC VISIT    Patient Name:  Marcus Hodges  Date:  12/4/2017  Contact Type:  Face to Face  Patient is accompanied in the office by his wife.    SUBJECTIVE:     Patient Findings     Positives No Problem Findings           OBJECTIVE    INR Protime   Date Value Ref Range Status   12/04/2017 2.4 (A) 0.86 - 1.14 Final       ASSESSMENT / PLAN  INR assessment THER    Recheck INR In: 6 WEEKS    INR Location Clinic      Anticoagulation Summary as of 12/4/2017     INR goal 2.0-3.0   Today's INR 2.4   Maintenance plan 7.5 mg (5 mg x 1.5) on Mon, Wed, Fri; 5 mg (5 mg x 1) all other days   Full instructions 7.5 mg on Mon, Wed, Fri; 5 mg all other days   Weekly total 42.5 mg   No change documented Svetlana Vazquez RN   Plan last modified Svetlana Vazquez RN (10/21/2016)   Next INR check 1/15/2018   Target end date Indefinite    Indications   Long-term (current) use of anticoagulants [Z79.01]  Chronic atrial fibrillation (H) [I48.2]         Anticoagulation Episode Summary     INR check location [facility]    Preferred lab     Send INR reminders to [facility]    Comments 5mg tabs // WIFE USES OWN CALENDAR // CVA - left hemiplegia - uses a cane      Anticoagulation Care Providers     Provider Role Specialty Phone number    Don Aviles MD Referring Internal Medicine [phone]            See the Encounter Report to view Anticoagulation Flowsheet and Dosing Calendar (Go to Encounters tab in chart review, and find the Anticoagulation Therapy Visit)        Svetlana Vazquez RN

## 2017-12-08 ENCOUNTER — HOSPITAL ENCOUNTER (OUTPATIENT)
Dept: CARDIOLOGY | Facility: CLINIC | Age: 62
Discharge: HOME | End: 2017-12-08
Attending: INTERNAL MEDICINE | Admitting: INTERNAL MEDICINE
Payer: COMMERCIAL

## 2017-12-08 DIAGNOSIS — I05.9 MITRAL VALVE DISEASE: ICD-10-CM

## 2017-12-08 PROCEDURE — 25500064 ZZH RX 255 OP 636: Performed by: INTERNAL MEDICINE

## 2017-12-08 PROCEDURE — 40000264 ECHO COMPLETE WITH LUMASON

## 2017-12-08 PROCEDURE — 93306 TTE W/DOPPLER COMPLETE: CPT | Mod: 26 | Performed by: INTERNAL MEDICINE

## 2017-12-08 RX ADMIN — SULFUR HEXAFLUORIDE 5 ML: KIT at 11:21

## 2017-12-12 ENCOUNTER — OFFICE VISIT (OUTPATIENT)
Dept: CARDIOLOGY | Facility: CLINIC | Age: 62
End: 2017-12-12
Attending: INTERNAL MEDICINE
Payer: COMMERCIAL

## 2017-12-12 VITALS
SYSTOLIC BLOOD PRESSURE: 120 MMHG | HEART RATE: 64 BPM | BODY MASS INDEX: 22.82 KG/M2 | WEIGHT: 177.8 LBS | DIASTOLIC BLOOD PRESSURE: 75 MMHG | HEIGHT: 74 IN

## 2017-12-12 DIAGNOSIS — I51.7 ATRIAL ENLARGEMENT, BILATERAL: ICD-10-CM

## 2017-12-12 DIAGNOSIS — I48.20 CHRONIC ATRIAL FIBRILLATION (H): Primary | ICD-10-CM

## 2017-12-12 DIAGNOSIS — E78.5 HYPERLIPIDEMIA LDL GOAL <100: ICD-10-CM

## 2017-12-12 PROCEDURE — 99213 OFFICE O/P EST LOW 20 MIN: CPT | Performed by: INTERNAL MEDICINE

## 2017-12-12 NOTE — LETTER
12/12/2017    Don Aviles MD  [address] [place] [address]    RE: Marcus Hodges       Dear Colleague,    I had the pleasure of seeing Marcus Hodges in the [facility].    HISTORY OF PRESENT ILLNESS:  I had the opportunity to see Mr. Marcus Hodges in Cardiology Clinic today at [facility] in [place] for reevaluation of chronic atrial fibrillation and history of atrial septal defect repair and mitral valve repair.  Mr. Hodges had an atrial septal defect originally repaired back in 1978 and went back for a second surgery 9 months later for mitral valve repair and additional repair of his atrial septal defect.  In 2004, he had atrial flutter and that was treated with ablation.  He went off of anticoagulation and had no problems until 04/2012 when he suffered a large right-sided stroke due to atrial fibrillation with thromboembolus.  He continues to have fairly severe left-sided hemiparesis.      He has made a lot of progress in the last couple of years with improvement in his activity level.  He is able to walk with a cane and does not develop any significant shortness of breath.  He exercises at the fitness club 3 times a week and continues to function quite well.      We have had some concerns about his mitral and tricuspid valves, but echocardiograms have shown that the valvular regurgitation seems to be quite minimal.  He had an echocardiogram again this year showing just a trace of mitral regurgitation and a mild degree of mitral regurgitation with normal right-sided pressures.  His left ventricular function remains normal.  His cholesterol numbers are excellent.  Basic metabolic panel is normal, and he has been managing his INR quite well.      PHYSICAL EXAMINATION:  On examination his blood pressure is 120/75, heart rate 64 and weight 177 pounds.  His lungs are clear.  His heart rhythm is irregularly irregular without significant cardiac  murmur.  He has no carotid bruits.     Outpatient Encounter Prescriptions as of 12/12/2017   Medication Sig Dispense Refill     apixaban ANTICOAGULANT (ELIQUIS) 5 MG tablet Take 1 tablet (5 mg) by mouth 2 times daily 180 tablet 3     mirabegron (MYRBETRIQ) 50 MG 24 hr tablet Take 1 tablet (50 mg) by mouth daily 90 tablet 3     order for DME Equipment being ordered: Adhesive remover, wipes  [facility] 100 each 99     order for DME Equipment being ordered:   Freedom Clear Condom catheter  ([facility] ) 90 each 99     order for DME Equipment being ordered:    Nightime urinary leg bag 1600cc  ([facility] ) 12 each 99     order for DME Equipment being ordered: Daytime Urinary leg bag-800cc  ([facility] ) 12 each 99     order for DME Equipment being ordered: Skin prep (no code - requested by patient) 100 each 99     warfarin (COUMADIN) 5 MG tablet Take 5mg TTSaSu, 7.5mg MWF OR as directed by INR Clinic. 135 tablet 1     metoprolol (TOPROL-XL) 25 MG 24 hr tablet Take 1 tablet (25 mg) by mouth daily 90 tablet 3     atorvastatin (LIPITOR) 20 MG tablet Take 1 tablet (20 mg) by mouth daily 90 tablet 3     citalopram (CELEXA) 20 MG tablet Take 1.5 tablets (30 mg) by mouth daily 135 tablet 3     levETIRAcetam (KEPPRA) 1000 MG TABS Take 1 tablet by mouth 2 times daily 180 tablet 3     baclofen (LIORESAL) 20 MG tablet Take 1 tablet (20 mg) by mouth 4 times daily 360 tablet 3     NONFORMULARY Protandim - herbal supplement       cholecalciferol (VITAMIN D) 1000 UNIT tablet Take 2 tablets (2,000 Units) by mouth daily 90 tablet 3     ORDER FOR DME Equipment being ordered: daytime urinary leg bag   800cc 4 each 11     ORDER FOR DME Equipment being ordered: Dispense a WHFO Leg Brace 1 each 0     ORDER FOR DME Equipment being ordered: Quad Cane-39 inch 1 each 0     Multiple Vitamin (MULTI-VITAMIN) per tablet Take 0.5 tablets by mouth daily  100 tablet 3     acetaminophen (TYLENOL) 325 MG tablet Take  by mouth every  4 hours as needed for pain. 100 tablet 0     order for DME Equipment being ordered: Incontinence supplies - Posies  ([facility] Code ) 12 each 99     No facility-administered encounter medications on file as of 12/12/2017.       IMPRESSIONS:  Mr. Marcus Hodges is a 62-year-old gentleman with chronic atrial fibrillation and history of atrial septal defect repair and mitral valve repair.  His cardiac structure and function looks quite good at this time.  He has had a previous embolic stroke due to atrial fibrillation before he was started on anticoagulation.      I recommended that he consider switching from warfarin to Eliquis, based on data showing improvement in stroke risk and major bleeding.  I have sent in a prescription for him.  He would like to switch if his insurance company would make it financially possible for him.  He will look at the cost and make that decision.      I will plan to see him back again in 1 year and have him do some laboratory testing.  I do not think we need to repeat another echocardiogram at that time.      Again, thank you for allowing me to participate in the care of your patient.      Sincerely,    AZEB VILLARREAL MD     [facility]

## 2017-12-12 NOTE — PROGRESS NOTES
HPI and Plan:   See dictation    Orders Placed This Encounter   Procedures     Basic metabolic panel     Lipid Profile     ALT     Follow-Up with Cardiologist       Orders Placed This Encounter   Medications     apixaban ANTICOAGULANT (ELIQUIS) 5 MG tablet     Sig: Take 1 tablet (5 mg) by mouth 2 times daily     Dispense:  180 tablet     Refill:  3       There are no discontinued medications.      Encounter Diagnoses   Name Primary?     Chronic atrial fibrillation (H) Yes     Hyperlipidemia LDL goal <100      Atrial enlargement, bilateral        CURRENT MEDICATIONS:  Current Outpatient Prescriptions   Medication Sig Dispense Refill     apixaban ANTICOAGULANT (ELIQUIS) 5 MG tablet Take 1 tablet (5 mg) by mouth 2 times daily 180 tablet 3     mirabegron (MYRBETRIQ) 50 MG 24 hr tablet Take 1 tablet (50 mg) by mouth daily 90 tablet 3     order for DME Equipment being ordered: Adhesive remover, wipes  [facility] ) 100 each 99     order for DME Equipment being ordered:   Freedom Clear Condom catheter  ([facility] ) 90 each 99     order for DME Equipment being ordered:    Nightime urinary leg bag 1600cc  ([facility] ) 12 each 99     order for DME Equipment being ordered: Daytime Urinary leg bag-800cc  ([facility] ) 12 each 99     order for DME Equipment being ordered: Skin prep (no code - requested by patient) 100 each 99     warfarin (COUMADIN) 5 MG tablet Take 5mg TTSaSu, 7.5mg MWF OR as directed by INR Clinic. 135 tablet 1     metoprolol (TOPROL-XL) 25 MG 24 hr tablet Take 1 tablet (25 mg) by mouth daily 90 tablet 3     atorvastatin (LIPITOR) 20 MG tablet Take 1 tablet (20 mg) by mouth daily 90 tablet 3     citalopram (CELEXA) 20 MG tablet Take 1.5 tablets (30 mg) by mouth daily 135 tablet 3     levETIRAcetam (KEPPRA) 1000 MG TABS Take 1 tablet by mouth 2 times daily 180 tablet 3     baclofen (LIORESAL) 20 MG tablet Take 1 tablet (20 mg) by mouth 4 times daily 360 tablet 3     NONFORMULARY Protandim  - herbal supplement       cholecalciferol (VITAMIN D) 1000 UNIT tablet Take 2 tablets (2,000 Units) by mouth daily 90 tablet 3     ORDER FOR DME Equipment being ordered: daytime urinary leg bag   800cc 4 each 11     ORDER FOR DME Equipment being ordered: Dispense a WHFO Leg Brace 1 each 0     ORDER FOR DME Equipment being ordered: Quad Cane-39 inch 1 each 0     Multiple Vitamin (MULTI-VITAMIN) per tablet Take 0.5 tablets by mouth daily  100 tablet 3     acetaminophen (TYLENOL) 325 MG tablet Take  by mouth every 4 hours as needed for pain. 100 tablet 0     order for DME Equipment being ordered: Incontinence supplies - Posies  ([facility] Code ) 12 each 99       ALLERGIES     Allergies   Allergen Reactions     No Known Drug Allergies        PAST MEDICAL HISTORY:  Past Medical History:   Diagnosis Date     * * * SBE PROPHYLAXIS * * *      Atrial enlargement, bilateral      Atrial flutter (H) 2004    radiofrequency ablation 2004, resolved     ATRIAL SEPTAL DEFECT     repaired 1977     CVA (cerebral vascular accident) (H) 4/2012    R MCA     Dysphagia 04/22/12    [facility], following CVA     Hernia, abdominal      History of thrombophlebitis      Mitral regurgitation     mitral valve damage related to ASD repair     Mumps      Palpitations      Respiratory failure (H) 04/22/12    [facility], following CVA, prolonged requiring tracheostomy, [place] [facility]      Tricuspid regurgitation      Unspecified glaucoma(365.9)     right     Urinary incontinence        PAST SURGICAL HISTORY:  Past Surgical History:   Procedure Laterality Date     C NONSPECIFIC PROCEDURE      tonsillectomy     C NONSPECIFIC PROCEDURE      Ing. Hernia Repair     CATHETER, ABLATION  2004     Craniotomy reconstruction  2012    [facility], repair of hemicraniectomy defect     CYSTOSCOPY       GASTROSTOMY TUBE  April 2012    [facility], following CVA     HERNIA REPAIR       REPAIR ATRIAL SEPTAL DEFECT  1978    ASD Repair     Right hemicraniectomy   "April 2012    [facility], following CVA, mgmt malignant cerebral edema     SURGICAL HISTORY OF -   2009    right eye enucleation     TRACHEOSTOMY  April 2012    [facility], following CVA       FAMILY HISTORY:  Family History   Problem Relation Age of Onset     Hypertension Mother      CEREBROVASCULAR DISEASE Father      CANCER Paternal Grandmother      DIABETES Maternal Grandmother      Congenital Anomalies Brother      several brothers and sisters born with congential heart defects, but all have been repaired     Congenital Anomalies Sister        SOCIAL HISTORY:  Social History     Social History     Marital status:      Spouse name: haim     Number of children: 0     Years of education: N/A     Occupational History      [employer]     Social History Main Topics     Smoking status: Never Smoker     Smokeless tobacco: Never Used     Alcohol use No     Drug use: No     Sexual activity: Yes     Partners: Female     Other Topics Concern     Caffeine Concern Yes     couple cups of tea a day     Special Diet Yes     vegan; occ eggs/fish      Exercise Yes     2 x weekly/gym , pysical therapy     Seat Belt Yes     Social History Narrative       Review of Systems:  Skin:  Negative       Eyes:  Positive for glasses    ENT:  Negative      Respiratory:  Negative       Cardiovascular:  Negative;palpitations;chest pain;lightheadedness;dizziness;syncope or near-syncope;cyanosis;edema Positive for energy level consistent  Gastroenterology: Positive for constipation    Genitourinary:  Positive for   has catheter  Musculoskeletal:  Positive for muscular weakness    Neurologic:  Positive for seizures    Psychiatric:  Negative      Heme/Lymph/Imm:  Positive for weight loss    Endocrine:  Negative        Physical Exam:  Vitals: /75 (BP Location: Right arm, Cuff Size: Adult Large)  Pulse 64  Ht 1.88 m (6' 2\")  Wt 80.6 kg (177 lb 12.8 oz)  BMI 22.83 kg/m2    Constitutional:    thin      Skin:  warm and " dry to the touch          Head:  normocephalic        Eyes:  sclera white        Lymph:No Cervical lymphadenopathy present     ENT:  no pallor or cyanosis        Neck:  carotid pulses are full and equal bilaterally;JVP normal;no carotid bruit        Respiratory:  normal breath sounds, clear to auscultation, normal A-P diameter, normal symmetry, normal respiratory excursion, no use of accessory muscles         Cardiac: no S3 or S4 irregularly irregular rhythm              pulses full and equal                                        GI:  abdomen soft;BS normoactive        Extremities and Muscular Skeletal:  no deformities, clubbing, cyanosis, erythema observed;no edema              Neurological:  affect appropriate limb paralysis      Psych:  affect appropriate, oriented to time, person and place        CC  Hayes Yip MD  [address]  [place] [address]

## 2017-12-12 NOTE — PROGRESS NOTES
HISTORY OF PRESENT ILLNESS:  I had the opportunity to see Mr. Marcus Hodges in Cardiology Clinic today at [facility] in [place] for reevaluation of chronic atrial fibrillation and history of atrial septal defect repair and mitral valve repair.  Mr. Hodges had an atrial septal defect originally repaired back in 1978 and went back for a second surgery 9 months later for mitral valve repair and additional repair of his atrial septal defect.  In 2004, he had atrial flutter and that was treated with ablation.  He went off of anticoagulation and had no problems until 04/2012 when he suffered a large right-sided stroke due to atrial fibrillation with thromboembolus.  He continues to have fairly severe left-sided hemiparesis.      He has made a lot of progress in the last couple of years with improvement in his activity level.  He is able to walk with a cane and does not develop any significant shortness of breath.  He exercises at the fitness club 3 times a week and continues to function quite well.      We have had some concerns about his mitral and tricuspid valves, but echocardiograms have shown that the valvular regurgitation seems to be quite minimal.  He had an echocardiogram again this year showing just a trace of mitral regurgitation and a mild degree of mitral regurgitation with normal right-sided pressures.  His left ventricular function remains normal.  His cholesterol numbers are excellent.  Basic metabolic panel is normal, and he has been managing his INR quite well.      PHYSICAL EXAMINATION:  On examination his blood pressure is 120/75, heart rate 64 and weight 177 pounds.  His lungs are clear.  His heart rhythm is irregularly irregular without significant cardiac murmur.  He has no carotid bruits.      IMPRESSIONS:  Mr. Marcus Hodges is a 62-year-old gentleman with chronic atrial fibrillation and history of atrial septal defect repair and mitral valve repair.  His cardiac structure  and function looks quite good at this time.  He has had a previous embolic stroke due to atrial fibrillation before he was started on anticoagulation.      I recommended that he consider switching from warfarin to Eliquis, based on data showing improvement in stroke risk and major bleeding.  I have sent in a prescription for him.  He would like to switch if his insurance company would make it financially possible for him.  He will look at the cost and make that decision.      I will plan to see him back again in 1 year and have him do some laboratory testing.  I do not think we need to repeat another echocardiogram at that time.      cc:   [place] [address]         AZEB VILLARREAL MD, [facility]             D: 2017 10:48   T: 2017 11:28   MT: CHRISTOPHER      Name:     STEPHANIE HARDEN   MRN:      [id number]        Account:      [id number]   :      1955           Service Date: 2017      Document: [id number]

## 2017-12-12 NOTE — MR AVS SNAPSHOT
After Visit Summary   12/12/2017    Marcus Hodges    MRN: [id number]           Patient Information     Date Of Birth          1955        Visit Information        Provider Department      12/12/2017 9:45 AM Hayes Yip MD [facility]        Today's Diagnoses     Chronic atrial fibrillation (H)    -  1    Hyperlipidemia LDL goal <100        Atrial enlargement, bilateral           Follow-ups after your visit        Additional Services     Follow-Up with Cardiologist                 Your next 10 appointments already scheduled     Dec 14, 2017 11:00 AM CST   Treatment 62 with Ada Scott, PT   [facility] ([facility])    [address] [address]   [phone]            Dec 18, 2017  1:00 PM CST   Treatment 62 with Ada Scott PT   [facility] ([facility])    [address] [address]   [phone]            Dec 29, 2017  8:30 AM CST   Treatment 62 with Ada Scott PT   [facility] ([facility])    [address] [address]   [phone]            Allan 15, 2018 10:15 AM CST   Anticoagulation Visit with [facility]   [facility] ([facility])    [address] [place] [address] [address]  [facility] [id number] [phone]            Jan 22, 2018  9:15 AM CST   Treatment 62 with Ada Scott PT   [facility] ([facility])    [address] [address]   [phone]            Jan 23, 2018  8:45 AM CST   Treatment 62 with Ada Scott PT   [facility] ([facility])    [address] [id number] [phone]            Jan 24, 2018  9:15 AM CST   Treatment 62 with Ada Scott PT   [facility] ([facility])    [address] [facility] [address]   [phone]            Jan 25, 2018  8:30 AM CST   Treatment 62 with Ada Scott, PT   [facility] ([facility])    [address] [facility] [address]   [phone]            Feb 19, 2018  1:00 PM CST   Treatment 62 with Ada Scott, PT   [facility] ([facility])    [address] [address]   [phone]            Feb 20, 2018  9:30 AM CST   Treatment 62 with Ada Scott, PT   [facility] ([facility])    [address] [address]   [phone]              Future tests that were ordered for you today     Open Future Orders        Priority Expected Expires Ordered    Basic metabolic panel Routine 12/12/2018 12/13/2018 12/12/2017    Lipid Profile Routine 12/12/2018 12/12/2018 12/12/2017    ALT Routine 12/12/2018 12/12/2018 12/12/2017    Follow-Up with Cardiologist Routine 12/12/2018 12/13/2018 12/12/2017            Who to contact     If you have questions or need follow up information about today's clinic visit or your schedule please contact [facility] directly at [phone].  Normal or non-critical lab and imaging results will be communicated to you by MyChart, letter or phone within 4 business days after the clinic has received the results. If you do not hear from us within 7 days, please contact the clinic through [employer] or phone. If you have a critical or abnormal lab result, we will notify you by phone as soon as possible.  Submit refill requests through [employer] or call your pharmacy and they will forward the refill request to us. Please allow 3 business days for your refill to be completed.          Additional Information About Your Visit        [employer] Information     [employer] gives you secure access to  "your electronic health record. If you see a primary care provider, you can also send messages to your care team and make appointments. If you have questions, please call your primary care clinic.  If you do not have a primary care provider, please call [phone] and they will assist you.        Care EveryWhere ID     This is your Care EveryWhere ID. This could be used by other organizations to access your [place] medical records  [id number]        Your Vitals Were     Pulse Height BMI (Body Mass Index)             64 1.88 m (6' 2\") 22.83 kg/m2          Blood Pressure from Last 3 Encounters:   12/12/17 120/75   03/06/17 108/70   12/23/16 114/70    Weight from Last 3 Encounters:   12/12/17 80.6 kg (177 lb 12.8 oz)   11/17/17 82.6 kg (182 lb)   03/06/17 79.4 kg (175 lb)              We Performed the Following     Follow-Up with Cardiologist          Today's Medication Changes          These changes are accurate as of: 12/12/17 10:34 AM.  If you have any questions, ask your nurse or doctor.               Start taking these medicines.        Dose/Directions    apixaban ANTICOAGULANT 5 MG tablet   Commonly known as:  ELIQUIS   Used for:  Chronic atrial fibrillation (H)   Started by:  Hayes Yip MD        Dose:  5 mg   Take 1 tablet (5 mg) by mouth 2 times daily   Quantity:  180 tablet   Refills:  3            Where to get your medicines      These medications were sent to [facility] - [place], [place] - [address] [facility] [address] [facility] [address]  [address] [facility] [address] [facility] [address], [place] [address]     Phone:  [phone]     apixaban ANTICOAGULANT 5 MG tablet                Primary Care Provider Office Phone # Fax #    Don Aviles [phone] [address] [place] [address]        Equal Access to Services     [facility] JERED AH: Codi ingramo Sobhakti, waaxda luqadaha, qaybta kaalmada adeegyada, waxay jasper bowser. So wa " [phone].    ATENCIÓN: Si cassius meléndez, tiene a iptts disposición servicios gratuitos de asistencia lingüística. Veena mena [phone].    We comply with applicable federal civil rights laws and [place] laws. We do not discriminate on the basis of race, color, national origin, age, disability, sex, sexual orientation, or gender identity.            Thank you!     Thank you for choosing [facility]  for your care. Our goal is always to provide you with excellent care. Hearing back from our patients is one way we can continue to improve our services. Please take a few minutes to complete the written survey that you may receive in the mail after your visit with us. Thank you!             Your Updated Medication List - Protect others around you: Learn how to safely use, store and throw away your medicines at www.disposemymeds.org.          This list is accurate as of: 12/12/17 10:34 AM.  Always use your most recent med list.                   Brand Name Dispense Instructions for use Diagnosis    acetaminophen 325 MG tablet    TYLENOL    100 tablet    Take  by mouth every 4 hours as needed for pain.        apixaban ANTICOAGULANT 5 MG tablet    ELIQUIS    180 tablet    Take 1 tablet (5 mg) by mouth 2 times daily    Chronic atrial fibrillation (H)       atorvastatin 20 MG tablet    LIPITOR    90 tablet    Take 1 tablet (20 mg) by mouth daily    Hyperlipidemia LDL goal <100       baclofen 20 MG tablet    LIORESAL    360 tablet    Take 1 tablet (20 mg) by mouth 4 times daily    Left hemiplegia (H)       cholecalciferol 1000 UNIT tablet    vitamin D3    90 tablet    Take 2 tablets (2,000 Units) by mouth daily    CVA (cerebral vascular accident) (H)       citalopram 20 MG tablet    celeXA    135 tablet    Take 1.5 tablets (30 mg) by mouth daily    Major depressive disorder, recurrent episode, moderate (H)       levETIRAcetam 1000 MG Tabs    KEPPRA    180 tablet    Take 1 tablet by  mouth 2 times daily    Seizure prophylaxis       metoprolol 25 MG 24 hr tablet    TOPROL-XL    90 tablet    Take 1 tablet (25 mg) by mouth daily    Chronic atrial fibrillation (H)       mirabegron 50 MG 24 hr tablet    MYRBETRIQ    90 tablet    Take 1 tablet (50 mg) by mouth daily    Neurogenic bladder       Multi-vitamin Tabs tablet   Generic drug:  multivitamin, therapeutic with minerals     100 tablet    Take 0.5 tablets by mouth daily        NONFORMULARY      Protandim - herbal supplement        * order for DME     1 each    Equipment being ordered: Dispense a WHFO Leg Brace    Left hemiplegia (H), CVA (cerebral vascular accident) (H)       * order for DME     1 each    Equipment being ordered: Quad Cane-39 inch    Left hemiplegia (H), CVA (cerebral vascular accident) (H)       * order for DME     4 each    Equipment being ordered: daytime urinary leg bag   800cc    Urine incontinence       * order for DME     100 each    Equipment being ordered: Adhesive remover, wipes  (HCPCS Code )    Neurogenic bladder       * order for DME     90 each    Equipment being ordered:   Freedom Clear Condom catheter  (HCPCS Code )    Neurogenic bladder       * order for DME     12 each    Equipment being ordered:    Nightime urinary leg bag 1600cc  (HCPCS Code )    Neurogenic bladder       * order for DME     12 each    Equipment being ordered: Daytime Urinary leg bag-800cc  (HCPCS Code )    Neurogenic bladder       * order for DME     12 each    Equipment being ordered: Incontinence supplies - Posies  (HCPCS Code )    Neurogenic bladder       * order for DME     100 each    Equipment being ordered: Skin prep (no code - requested by patient)    Neurogenic bladder       warfarin 5 MG tablet    COUMADIN    135 tablet    Take 5mg TTSaSu, 7.5mg MWF OR as directed by INR Clinic.    Long term (current) use of anticoagulants, CVA (cerebral vascular accident) (H)       * Notice:  This list has 9 medication(s)  that are the same as other medications prescribed for you. Read the directions carefully, and ask your doctor or other care provider to review them with you.

## 2017-12-14 ENCOUNTER — HOSPITAL ENCOUNTER (OUTPATIENT)
Dept: PHYSICAL THERAPY | Facility: CLINIC | Age: 62
Setting detail: THERAPIES SERIES
End: 2017-12-14
Attending: INTERNAL MEDICINE
Payer: COMMERCIAL

## 2017-12-14 PROCEDURE — 97116 GAIT TRAINING THERAPY: CPT | Mod: GP | Performed by: PHYSICAL THERAPIST

## 2017-12-14 PROCEDURE — 97112 NEUROMUSCULAR REEDUCATION: CPT | Mod: GP | Performed by: PHYSICAL THERAPIST

## 2017-12-14 PROCEDURE — 97110 THERAPEUTIC EXERCISES: CPT | Mod: GP | Performed by: PHYSICAL THERAPIST

## 2017-12-14 PROCEDURE — 40000719 ZZHC STATISTIC PT DEPARTMENT NEURO VISIT: Performed by: PHYSICAL THERAPIST

## 2017-12-15 ENCOUNTER — CARE COORDINATION (OUTPATIENT)
Dept: CARDIOLOGY | Facility: CLINIC | Age: 62
End: 2017-12-15

## 2017-12-15 NOTE — PROGRESS NOTES
"Received message from triage nurse stating that pt plans to switch from warfain to eliquis.     Pt seen by Dr. Yip 12/12 who said, \"I recommended that he consider switching from warfarin to Eliquis, based on data showing improvement in stroke risk and major bleeding.  I have sent in a prescription for him.  He would like to switch if his insurance company would make it financially possible for him.  He will look at the cost and make that decision.\"    Called pt to review. LVM requesting call back.    Called pt's INR clinic ([facility]). LVM requesting call back to coordinate change in anticoag plan.     Uyen [facility] RN 1:22 PM 12/15/17  [phone]          "

## 2017-12-18 ENCOUNTER — TELEPHONE (OUTPATIENT)
Dept: PEDIATRICS | Facility: CLINIC | Age: 62
End: 2017-12-18

## 2017-12-18 ENCOUNTER — ANTICOAGULATION THERAPY VISIT (OUTPATIENT)
Dept: PEDIATRICS | Facility: CLINIC | Age: 62
End: 2017-12-18
Payer: COMMERCIAL

## 2017-12-18 ENCOUNTER — HOSPITAL ENCOUNTER (OUTPATIENT)
Dept: PHYSICAL THERAPY | Facility: CLINIC | Age: 62
Setting detail: THERAPIES SERIES
End: 2017-12-18
Attending: INTERNAL MEDICINE
Payer: COMMERCIAL

## 2017-12-18 DIAGNOSIS — I48.20 CHRONIC ATRIAL FIBRILLATION (H): ICD-10-CM

## 2017-12-18 DIAGNOSIS — Z79.01 LONG-TERM (CURRENT) USE OF ANTICOAGULANTS: ICD-10-CM

## 2017-12-18 PROCEDURE — 97112 NEUROMUSCULAR REEDUCATION: CPT | Mod: GP | Performed by: PHYSICAL THERAPIST

## 2017-12-18 PROCEDURE — 99207 ZZC NO CHARGE NURSE ONLY: CPT | Performed by: INTERNAL MEDICINE

## 2017-12-18 PROCEDURE — 97116 GAIT TRAINING THERAPY: CPT | Mod: GP | Performed by: PHYSICAL THERAPIST

## 2017-12-18 PROCEDURE — 40000719 ZZHC STATISTIC PT DEPARTMENT NEURO VISIT: Performed by: PHYSICAL THERAPIST

## 2017-12-18 NOTE — MR AVS SNAPSHOT
Marcus Hodges   12/18/2017   Anticoagulation Therapy Visit    Description:  62 year old male   Provider:  Don Aviles MD   Department:  [facility]           INR as of 12/18/2017     Today's INR       Anticoagulation Summary as of 12/18/2017     INR goal 2.0-3.0   Today's INR    Full instructions 7.5 mg on Mon, Wed, Fri; 5 mg all other days   Next INR check     Indications   Long-term (current) use of anticoagulants [Z79.01]  Chronic atrial fibrillation (H) [I48.2]         Anticoagulation Episode Summary     Resolved date 12/18/2017    Resolved reason Changed Anticoagulant       Description     He has changed from warfarin to Eliquis per cardiology.

## 2017-12-18 NOTE — PROGRESS NOTES
Received return call from pt and pt's wife, Tatum.  Reviewed that Dr. Yip recommends he hold warfarin for two day and then start Eliquis 5mg BID.  Pt reports he is still waiting for Eliquis from mail order pharmacy, but verbalized understanding of instructions and stated back correctly.  Tatum inquired if pt needs to adjust his diet the two days he is holding warfarin before starting Eliquis.  Reviewed with Tatum and pt that change to diet is not necessary.  Advised that they call  [facility] to cancel appt on 1/15/18 if he does switch from warfarin to Eliquis before then.  He verbalized understanding and agrees with this plan.  Warfarin not removed from med list yet, since pt is still taking currently.    CHARISSE Abad 4:36 PM 12/18/2017

## 2017-12-18 NOTE — TELEPHONE ENCOUNTER
Miranda from [facility] LM to ask if there is a protocol to switch pt from warfarin to eliquis?  I did call back to say we know of no protocol for doing this.  ([phone])  Cardiology to handle this with pt.  Pt should call us if needing sooner INR appointment.  Petty Garcia RN

## 2017-12-18 NOTE — PROGRESS NOTES
Received call from Petty, nurse at [facility] INR clinic, who reports they do not have a specific protocol for pt's switching from warfarin to Eliquis.  She confirms pt is scheduled for next INR check on 1/15/18.  Petty states pt should call their scheduling dept to request sooner INR if needed (ph. [phone]).  Discussed with Petty that will call pt with this information.     Reviewed with Dr. Yip when pt should plan to stop warfarin and start Eliquis.  Since pt's most recent INR on 12/4/17 was 2.4, he recommends pt hold warfarin for 2 days then start Eliquis.      Called pt on home and cell, no answer, LVMs requesting return call to review Dr. Yip's recommendations for switching from warfarin to Eliquis.  Awaiting return call. CHARISSE Abad 11:01 AM 12/18/2017

## 2017-12-18 NOTE — PROGRESS NOTES
Received call from pt stating he had a message from Uyen to call back. Reviewed notes, discussed with pt looks like Uyen was trying to coordinate with INR clinic at [facility]. Told pt we would await call back from his INR clinic and then call him and let him know plan for switching over to Eliquis. Pt stated understanding, he states he ordered Eliquis through pharmacy and home delivery, so does not have Eliquis yet. Told pt I would call him back once I hear from INR clinic. CHARISSE Estevez 8:40 AM 12/18/17

## 2017-12-29 ENCOUNTER — HOSPITAL ENCOUNTER (OUTPATIENT)
Dept: PHYSICAL THERAPY | Facility: CLINIC | Age: 62
Setting detail: THERAPIES SERIES
End: 2017-12-29
Attending: INTERNAL MEDICINE
Payer: COMMERCIAL

## 2017-12-29 PROCEDURE — 97112 NEUROMUSCULAR REEDUCATION: CPT | Mod: GP | Performed by: PHYSICAL THERAPIST

## 2017-12-29 PROCEDURE — 97110 THERAPEUTIC EXERCISES: CPT | Mod: GP | Performed by: PHYSICAL THERAPIST

## 2017-12-29 PROCEDURE — 40000719 ZZHC STATISTIC PT DEPARTMENT NEURO VISIT: Performed by: PHYSICAL THERAPIST

## 2017-12-29 PROCEDURE — 97116 GAIT TRAINING THERAPY: CPT | Mod: GP | Performed by: PHYSICAL THERAPIST

## 2017-12-29 NOTE — PROGRESS NOTES
Outpatient Physical Therapy Discharge Note     Patient: Marcus Hodges  : 1955    Beginning/End Dates of Reporting Period:  16 to 2017    Referring Provider: Dr. Don Aviles    Therapy Diagnosis: R knee pain, impaired functional mobility and gait with impaired posture and coordination of movement with history of CVA          Client Self Report:  No questions regarding d/c from skilled PT intervention    Objective Measurements:  Objective Measure: 25 ft walk  Details:  17   Trekking pole, SBA/CGA with faster speed times of 21.41 seconds, 22 seocnds, 20.5 seconds and 19.22 seconds and 24 steps.     16  NBQC 32.81 seconds and 39 steps    Objective Measure: 4 square  Details:   17   stepping over objects (wieghted bars) therapist blocking the bar   pt using trekking pole 1 min 12 seconds cues on return back around square,   stepping over tape trekking pole 29 seconds cues to return opposite direction. But no physical assist.      17  With CGA, SBA and cues stepping over tape as not able to step over object and clear the L foot.  Using  Trekking pole 32.75 seconds and without a device 53 seconds.     PAIN  Intermittent R knee pain,  L sided soreness/pain especially after a lot of walking or at the end of the day.   Felt to be due to greater use of the L LE as well as impaired sensory system L side    Please refer to prior PN       Goals:   Goal 1 ortho note below.                     Goal Identifier 2 - HEP   Goal Description Marcus to be independent with continued LE strengthening, stretching and /or balance training HEP for longterm management of knee pain and activity limitations.    Target Date 17   Date Met   17   Progress: Independent in HEP     Goal Identifier 3 - 25 foot walk   Goal Description Marcus will complete the 25 foot walk with /without an AD in 19 seconds or less to demonstrate improved gait efficiency and quality of gait.    Target Date  12/31/17   Date Met   12/29/17   Progress:  As above     Goal Identifier 4 - Gait/personal goal   Goal Description Marcus to ambulate without an  feet or greater including uneven terrain with step through gait pattern and not LOB to progress to his goal of not needing an AD with gait.    Target Date 12/31/17   Date Met   not met fully   Progress:improved gait pattern and greater freedom of movement all joints L LE, greater upright posture.  Distance met and improved gait pattern but not step through gait.      Goal Identifier 5- curbs   Goal Description Marcus to ascend and descend curb with min assist or less and AD , without LOB or push to the right to allow greater ease and safety with curbs and less caregiver burden/risk of injury.    Target Date 12/31/17   Date Met      Progress:in progress but not fully met consistently .     Goal Identifier 6 --  4 square   Goal Description Marcus to complete 4 square in 25 seconds with and AD and 40 seconds or less without an AD to demonstrate improved interlimb coordination, balance and body control to assist in meeting all goals and overall function.    Target Date 12/31/17   Date Met   not met   Progress:  Close to meeting at 29 seconds stepping over tape on floor. Progress from initial testing in that he was not able to step over and object and clear it with his L foot initial testing and also needed assist/CGA close SBA with stepping over tape and more smaller steps b/w.      Goal Identifier 7- Floor transfers   Goal Description Marcus to perform transfer to and from floor with outside UE support and min assist of 1 to allow him to recover from a fall with assist from wife or caregiver.    Target Date 12/31/17   Date Met   not met   Progress:not completed.      Goal Identifier 8 functional reach/neuro motor control   Goal Description Marcus to perform reaching to the floor to the left to pick object up off the floor while maintaining L hand contact/closed chain on the  mat to demonstrate impropved neuromotor planning, ability to bring COM to the left while keeping proper muscle activation/deactivation L and R side of body for greater safety with reaching activiites to the floor, unstrapping shoe, AFO.  Also for carryover of proper motor function for improved gait and transfers.    Target Date 02/27/18   Date Met   not met   Progress: inprogress, improved ability to go to the left     Progress Toward Goals:     Goal Identifier  1   Goal Description   Pt will tolerate prolonged standing during [employer] service with 0/10 pain.    Target Date  met   Date Met      Progress: as above     Goal Identifier 2   Goal Description Pt will be independent with continued LE strengthening, stretching, and/or balance training HEP for longterm management of knee pain and activity limitations.   Target Date 12/31/17   Date Met   12/29/17   Progress:         Progress Toward Goals:   As above progress made with all function especially neuro motor coordination with carried over into all goals .  See rest of progress notes in EPIC.       Pt wanting to take a break from therapy.  He is continuing to make progress with his neuromotor planning and movement, posture and ability to bring COM past TAYLOR to the left.     Barriers - visual impairments, overactivation /tone, neuro motor impairments.     Plan:  Discharge from therapy.    Discharge: YES    Reason for Discharge: Pt able to continue with assist from wife and PCA's.  Pt wanting to take a break from therapy.  He is continuing to make progress with his neuromotor planning and movement, posture and ability to bring COM past TAYLOR to the left.     Equipment Issued: HEP    Discharge Plan: Patient to continue home program.

## 2018-01-08 ENCOUNTER — TELEPHONE (OUTPATIENT)
Dept: PEDIATRICS | Facility: CLINIC | Age: 63
End: 2018-01-08

## 2018-01-08 NOTE — TELEPHONE ENCOUNTER
Reason for Call:  Form, our goal is to have forms completed with 72 hours, however, some forms may require a visit or additional information.    Type of letter, form or note:  [employer]    Who is the form from?: Insurance comp    Where did the form come from: Patient or family brought in       What clinic location was the form placed at?: [facility]    Where the form was placed: 's Box    What number is listed as a contact on the form?: [phone]       Additional comments: fax # on form    Call taken on 1/8/2018 at 10:44 AM by Sophie Barajas

## 2018-01-16 NOTE — PROGRESS NOTES
ANTICOAGULATION FOLLOW-UP     Patient Name:  Marcus Hodges  Date:  1/16/2018    SUBJECTIVE:     Patient Findings     Comments He has changed from warfarin to Eliquis per cardiology.          Svetlana Vazquez RN

## 2018-02-15 DIAGNOSIS — G81.94 LEFT HEMIPLEGIA (H): Primary | ICD-10-CM

## 2018-02-15 DIAGNOSIS — I48.20 CHRONIC ATRIAL FIBRILLATION (H): ICD-10-CM

## 2018-02-15 DIAGNOSIS — E78.5 HYPERLIPIDEMIA LDL GOAL <100: ICD-10-CM

## 2018-02-15 DIAGNOSIS — Z29.89 SEIZURE PROPHYLAXIS: ICD-10-CM

## 2018-02-15 DIAGNOSIS — F33.1 MAJOR DEPRESSIVE DISORDER, RECURRENT EPISODE, MODERATE (H): ICD-10-CM

## 2018-02-17 NOTE — TELEPHONE ENCOUNTER
"DOSE STRENGTH DISCONTINUED 1/17/13.  baclofen (LIORESAL) 10 MG tablet (Discontinued)      Last Written Prescription Date:  10/9/12  Last Fill Quantity: 405 TABLET,   # refills: 3  Last Office Visit: 12/4/16  Future Office visit:    Next 5 appointments (look out 90 days)     Mar 19, 2018  8:20 AM CDT   PHYSICAL with Don Aviles MD   [facility] ([facility])    [address] [place] [address]  [address]  [facility] [address]   [phone]                   Routing refill request to provider for review/approval because:  Drug not on the [facility], [facility] or [facility] refill protocol or controlled substance  Drug not active on patient's medication list      Requested Prescriptions   Pending Prescriptions Disp Refills                       atorvastatin (LIPITOR) 20 MG tablet [Pharmacy Med Name: ATORVASTATIN 20MG TABS]  Last Written Prescription Date:  3/6/17  Last Fill Quantity: 90 TABLET,  # refills: 3   Last office visit: 3/6/2017 with prescribing provider:  12/4/16   Future Office Visit:   Next 5 appointments (look out 90 days)     Mar 19, 2018  8:20 AM CDT   PHYSICAL with Don Aviles MD   [facility] ([facility])    [address] [place] [address]  [address]  [facility] [address]   [phone]                  90 tablet 3     Sig: TAKE ONE TABLET BY MOUTH EVERY DAY    Statins Protocol Passed    2/15/2018 11:28 AM       Passed - LDL on file in past 12 months    Recent Labs   Lab Test  03/06/17   0852   LDL  51            Passed - No abnormal creatine kinase in past 12 months    No lab results found.         Passed - Recent or future visit with authorizing provider    Patient had office visit in the last year or has a visit in the next 30 days with authorizing provider.  See \"Patient Info\" tab in inbasket, or \"Choose Columns\" in Meds & Orders section of the refill encounter.            Passed - Patient is age 18 or older        citalopram (CELEXA) 20 MG tablet " "[Pharmacy Med Name: CITALOPRAM HYDROBROMIDE 20MG TABS]  Last Written Prescription Date:  3/6/17  Last Fill Quantity: 135 TABLET,  # refills: 3   Last office visit: 3/6/2017 with prescribing provider:  12/4/16   Future Office Visit:   Next 5 appointments (look out 90 days)     Mar 19, 2018  8:20 AM CDT   PHYSICAL with Don Aviles MD   [facility]    [address] [place] [address]  [address]  [place] [id number] [phone]                  135 tablet 3     Sig: TAKE ONE AND ONE-HALF TABLETS BY MOUTH DAILY    SSRIs Protocol Failed    2/15/2018 11:28 AM       Failed - PHQ-9 score less than 5 in past 6 months    The PHQ-9 criteria is meant to fail. It requires a PHQ-9 score review  PHQ-9 SCORE 1/31/2011 1/17/2013   Total Score 8 4     No flowsheet data found.               Failed - Recent (6 mo) or future visit with authorizing provider's specialty    Patient had office visit in the last 6 months or has a visit in the next 30 days with authorizing provider.  See \"Patient Info\" tab in inbasket, or \"Choose Columns\" in Meds & Orders section of the refill encounter.           Passed - Patient is age 18 or older        metoprolol succinate (TOPROL-XL) 25 MG 24 hr tablet [Pharmacy Med Name: METOPROLOL SUCCINATE ER 25MG TB24]  Last Written Prescription Date:  3/6/17  Last Fill Quantity: 90 TABLET,  # refills: 3   Last office visit: 3/6/2017 with prescribing provider:  12/4/16   Future Office Visit:   Next 5 appointments (look out 90 days)     Mar 19, 2018  8:20 AM CDT   PHYSICAL with Don Aviles MD   [facility] [place] ([facility])    [address] [facility] [facility] [address] [facility] [address]  [address]  [place] [address] [phone]                  90 tablet 3     Sig: TAKE ONE TABLET BY MOUTH EVERY DAY    Beta-Blockers Protocol Passed    2/15/2018 11:28 AM       Passed - Blood pressure under 140/90 in past 12 months    BP Readings from Last 3 Encounters:   12/12/17 120/75 " "  03/06/17 108/70   12/23/16 114/70                Passed - Patient is age 6 or older       Passed - Recent or future visit with authorizing provider's specialty    Patient had office visit in the last year or has a visit in the next 30 days with authorizing provider.  See \"Patient Info\" tab in inbasket, or \"Choose Columns\" in Meds & Orders section of the refill encounter.             levETIRAcetam 1000 MG TABS [Pharmacy Med Name: LEVETIRACETAM 1000MG TABS]  Last Written Prescription Date:  3/6/17  Last Fill Quantity: 180 TABLET,  # refills: 3   Last office visit: 3/6/2017 with prescribing provider:  12/4/16   Future Office Visit:   Next 5 appointments (look out 90 days)     Mar 19, 2018  8:20 AM CDT   PHYSICAL with Don Aviles MD   [facility] ([facility])    [address] [place] [address]   [phone]                  180 tablet 3     Sig: TAKE ONE TABLET BY MOUTH TWICE A DAY    Anticonvulsants Protocol  Failed    2/15/2018 11:28 AM       Failed - Review Authorizing provider's last note.     Refer to last progress notes: confirm request is for original authorizing provider (cannot be through other providers).         Failed - Recent or future visit with authorizing provider    Patient had office visit in the last 6 months or has a visit in the next 30 days with authorizing provider.  See \"Patient Info\" tab in inbasket, or \"Choose Columns\" in Meds & Orders section of the refill encounter.              "

## 2018-02-19 RX ORDER — BACLOFEN 10 MG/1
TABLET ORAL
Qty: 720 TABLET | Refills: 0 | OUTPATIENT
Start: 2018-02-19

## 2018-02-19 RX ORDER — METOPROLOL SUCCINATE 25 MG/1
TABLET, EXTENDED RELEASE ORAL
Qty: 90 TABLET | Refills: 0 | Status: SHIPPED | OUTPATIENT
Start: 2018-02-19 | End: 2018-03-19

## 2018-02-19 RX ORDER — CITALOPRAM HYDROBROMIDE 20 MG/1
TABLET ORAL
Qty: 135 TABLET | Refills: 0 | Status: SHIPPED | OUTPATIENT
Start: 2018-02-19 | End: 2018-03-19

## 2018-02-19 RX ORDER — ATORVASTATIN CALCIUM 20 MG/1
TABLET, FILM COATED ORAL
Qty: 90 TABLET | Refills: 0 | Status: SHIPPED | OUTPATIENT
Start: 2018-02-19 | End: 2018-03-19

## 2018-02-19 RX ORDER — BACLOFEN 20 MG/1
20 TABLET ORAL 4 TIMES DAILY
Qty: 360 TABLET | Refills: 0 | Status: SHIPPED | OUTPATIENT
Start: 2018-02-19 | End: 2018-03-19

## 2018-02-19 RX ORDER — LEVETIRACETAM 1000 MG/1
TABLET ORAL
Qty: 180 TABLET | Refills: 0 | Status: SHIPPED | OUTPATIENT
Start: 2018-02-19 | End: 2018-03-19

## 2018-03-19 ENCOUNTER — OFFICE VISIT (OUTPATIENT)
Dept: PEDIATRICS | Facility: CLINIC | Age: 63
End: 2018-03-19
Payer: COMMERCIAL

## 2018-03-19 VITALS
TEMPERATURE: 98.3 F | HEIGHT: 74 IN | HEART RATE: 64 BPM | OXYGEN SATURATION: 99 % | BODY MASS INDEX: 22.97 KG/M2 | DIASTOLIC BLOOD PRESSURE: 60 MMHG | SYSTOLIC BLOOD PRESSURE: 108 MMHG | WEIGHT: 179 LBS

## 2018-03-19 DIAGNOSIS — S06.9X9S TRAUMATIC BRAIN INJURY WITH LOSS OF CONSCIOUSNESS, SEQUELA (H): ICD-10-CM

## 2018-03-19 DIAGNOSIS — F33.1 MAJOR DEPRESSIVE DISORDER, RECURRENT EPISODE, MODERATE (H): ICD-10-CM

## 2018-03-19 DIAGNOSIS — N31.9 NEUROGENIC BLADDER: ICD-10-CM

## 2018-03-19 DIAGNOSIS — E78.5 HYPERLIPIDEMIA LDL GOAL <100: ICD-10-CM

## 2018-03-19 DIAGNOSIS — I48.20 CHRONIC ATRIAL FIBRILLATION (H): ICD-10-CM

## 2018-03-19 DIAGNOSIS — Z00.01 ENCOUNTER FOR ROUTINE ADULT HEALTH EXAMINATION WITH ABNORMAL FINDINGS: Primary | ICD-10-CM

## 2018-03-19 DIAGNOSIS — Z23 NEED FOR PNEUMOCOCCAL VACCINATION: ICD-10-CM

## 2018-03-19 DIAGNOSIS — G81.94 LEFT HEMIPLEGIA (H): ICD-10-CM

## 2018-03-19 DIAGNOSIS — Z29.89 SEIZURE PROPHYLAXIS: ICD-10-CM

## 2018-03-19 LAB
ALBUMIN SERPL-MCNC: 4.3 G/DL (ref 3.4–5)
ALP SERPL-CCNC: 91 U/L (ref 40–150)
ALT SERPL W P-5'-P-CCNC: 28 U/L (ref 0–70)
ANION GAP SERPL CALCULATED.3IONS-SCNC: 2 MMOL/L (ref 3–14)
AST SERPL W P-5'-P-CCNC: 24 U/L (ref 0–45)
BILIRUB SERPL-MCNC: 1.2 MG/DL (ref 0.2–1.3)
BUN SERPL-MCNC: 11 MG/DL (ref 7–30)
CALCIUM SERPL-MCNC: 8.9 MG/DL (ref 8.5–10.1)
CHLORIDE SERPL-SCNC: 107 MMOL/L (ref 94–109)
CHOLEST SERPL-MCNC: 109 MG/DL
CO2 SERPL-SCNC: 33 MMOL/L (ref 20–32)
CREAT SERPL-MCNC: 0.78 MG/DL (ref 0.66–1.25)
GFR SERPL CREATININE-BSD FRML MDRD: >90 ML/MIN/1.7M2
GLUCOSE SERPL-MCNC: 85 MG/DL (ref 70–99)
HDLC SERPL-MCNC: 51 MG/DL
LDLC SERPL CALC-MCNC: 49 MG/DL
NONHDLC SERPL-MCNC: 58 MG/DL
POTASSIUM SERPL-SCNC: 4.4 MMOL/L (ref 3.4–5.3)
PROT SERPL-MCNC: 7.4 G/DL (ref 6.8–8.8)
SODIUM SERPL-SCNC: 142 MMOL/L (ref 133–144)
TRIGL SERPL-MCNC: 44 MG/DL

## 2018-03-19 PROCEDURE — 90471 IMMUNIZATION ADMIN: CPT | Performed by: INTERNAL MEDICINE

## 2018-03-19 PROCEDURE — 36415 COLL VENOUS BLD VENIPUNCTURE: CPT | Performed by: INTERNAL MEDICINE

## 2018-03-19 PROCEDURE — 80053 COMPREHEN METABOLIC PANEL: CPT | Performed by: INTERNAL MEDICINE

## 2018-03-19 PROCEDURE — 90732 PPSV23 VACC 2 YRS+ SUBQ/IM: CPT | Performed by: INTERNAL MEDICINE

## 2018-03-19 PROCEDURE — 99396 PREV VISIT EST AGE 40-64: CPT | Mod: 25 | Performed by: INTERNAL MEDICINE

## 2018-03-19 PROCEDURE — 80061 LIPID PANEL: CPT | Performed by: INTERNAL MEDICINE

## 2018-03-19 RX ORDER — CITALOPRAM HYDROBROMIDE 20 MG/1
30 TABLET ORAL DAILY
Qty: 135 TABLET | Refills: 3 | Status: SHIPPED | OUTPATIENT
Start: 2018-03-19 | End: 2019-04-01

## 2018-03-19 RX ORDER — BACLOFEN 20 MG/1
20 TABLET ORAL 4 TIMES DAILY
Qty: 360 TABLET | Refills: 3 | Status: SHIPPED | OUTPATIENT
Start: 2018-03-19 | End: 2019-04-01

## 2018-03-19 RX ORDER — MIRABEGRON 50 MG/1
50 TABLET, EXTENDED RELEASE ORAL DAILY
Qty: 90 TABLET | Refills: 3 | Status: SHIPPED | OUTPATIENT
Start: 2018-03-19 | End: 2019-04-01

## 2018-03-19 RX ORDER — ATORVASTATIN CALCIUM 20 MG/1
20 TABLET, FILM COATED ORAL DAILY
Qty: 90 TABLET | Refills: 3 | Status: SHIPPED | OUTPATIENT
Start: 2018-03-19 | End: 2019-04-01

## 2018-03-19 RX ORDER — METOPROLOL SUCCINATE 25 MG/1
25 TABLET, EXTENDED RELEASE ORAL DAILY
Qty: 90 TABLET | Refills: 3 | Status: SHIPPED | OUTPATIENT
Start: 2018-03-19 | End: 2019-04-01

## 2018-03-19 RX ORDER — LEVETIRACETAM 1000 MG/1
1 TABLET ORAL 2 TIMES DAILY
Qty: 180 TABLET | Refills: 3 | Status: SHIPPED | OUTPATIENT
Start: 2018-03-19 | End: 2019-04-01

## 2018-03-19 ASSESSMENT — ENCOUNTER SYMPTOMS
DIARRHEA: 0
DIZZINESS: 0
HEMATOCHEZIA: 0
CONSTIPATION: 1
ALLERGIC/IMMUNOLOGIC NEGATIVE: 1
HEMATURIA: 0
NERVOUS/ANXIOUS: 0
FEVER: 0
CHILLS: 0
COUGH: 0
EYE PAIN: 0
FREQUENCY: 0
ABDOMINAL PAIN: 0
HEMATOLOGIC/LYMPHATIC NEGATIVE: 1

## 2018-03-19 NOTE — MR AVS SNAPSHOT
After Visit Summary   3/19/2018    Marcus Hodges    MRN: [id number]           Patient Information     Date Of Birth          1955        Visit Information        Provider Department      3/19/2018 8:20 AM Don Aviles MD [facility]        Today's Diagnoses     Encounter for routine adult health examination with abnormal findings    -  1    Left hemiplegia (H)        Traumatic brain injury with loss of consciousness, sequela (H)        Hyperlipidemia LDL goal <100        Major depressive disorder, recurrent episode, moderate (H)        Chronic atrial fibrillation (H)        Seizure prophylaxis        Neurogenic bladder          Care Instructions    INSTRUCTIONS FOR TODAY:     options for colon screening: Cologuard or repeat colonoscopy     Dr Aviles    Preventive Health Recommendations  Male Ages 50 - 64    Yearly exam:             See your health care provider every year in order to  o   Review health changes.   o   Discuss preventive care.    o   Review your medicines if your doctor has prescribed any.     Have a cholesterol test every 5 years, or more frequently if you are at risk for high cholesterol/heart disease.     Have a diabetes test (fasting glucose) every three years. If you are at risk for diabetes, you should have this test more often.     Have a colonoscopy at age 50, or have a yearly FIT test (stool test). These exams will check for colon cancer.      Talk with your health care provider about whether or not a prostate cancer screening test (PSA) is right for you.    You should be tested each year for STDs (sexually transmitted diseases), if you re at risk.     Shots: Get a flu shot each year. Get a tetanus shot every 10 years.     Nutrition:    Eat at least 5 servings of fruits and vegetables daily.     Eat whole-grain bread, whole-wheat pasta and brown rice instead of white grains and rice.     Talk to your provider about Calcium and Vitamin D.  "    Lifestyle    Exercise for at least 150 minutes a week (30 minutes a day, 5 days a week). This will help you control your weight and prevent disease.     Limit alcohol to one drink per day.     No smoking.     Wear sunscreen to prevent skin cancer.     See your dentist every six months for an exam and cleaning.     See your eye doctor every 1 to 2 years.            Follow-ups after your visit        Additional Services     PHYSICAL THERAPY REFERRAL       *This therapy referral will be filtered to a centralized scheduling office at [facility] and the patient will receive a call to schedule an appointment at a [facility] location most convenient for them. *     [facility] provides Physical Therapy evaluation and treatment and many specialty services across the [facility] system.  If requesting a specialty program, please choose from the list below.    If you have not heard from the scheduling office within 2 business days, please call [phone] for all locations, with the exception of [place], please call [phone] and [facility], please call [phone]  Treatment: Evaluation & Treatment  Special Instructions/Modalities:   Special Programs: stroke related PT:   Ada Scott    Please be aware that coverage of these services is subject to the terms and limitations of your health insurance plan.  Call member services at your health plan with any benefit or coverage questions.      **Note to Provider:  If you are referring outside of [facility] for the therapy appointment, please list the name of the location in the \"special instructions\" above, print the referral and give to the patient to schedule the appointment.                  Who to contact     If you have questions or need follow up information about today's clinic visit or your schedule please contact [facility] [place] directly at [phone].  Normal or non-critical lab and imaging results will be " "communicated to you by [employer], letter or phone within 4 business days after the clinic has received the results. If you do not hear from us within 7 days, please contact the clinic through [employer] or phone. If you have a critical or abnormal lab result, we will notify you by phone as soon as possible.  Submit refill requests through [employer] or call your pharmacy and they will forward the refill request to us. Please allow 3 business days for your refill to be completed.          Additional Information About Your Visit        [employer] Information     [employer] gives you secure access to your electronic health record. If you see a primary care provider, you can also send messages to your care team and make appointments. If you have questions, please call your primary care clinic.  If you do not have a primary care provider, please call [phone] and they will assist you.        Care EveryWhere ID     This is your Care EveryWhere ID. This could be used by other organizations to access your [place] medical records  [id number]        Your Vitals Were     Pulse Temperature Height Pulse Oximetry BMI (Body Mass Index)       64 98.3  F (36.8  C) (Oral) 6' 2\" (1.88 m) 99% 22.98 kg/m2        Blood Pressure from Last 3 Encounters:   03/19/18 108/60   12/12/17 120/75   03/06/17 108/70    Weight from Last 3 Encounters:   03/19/18 179 lb (81.2 kg)   12/12/17 177 lb 12.8 oz (80.6 kg)   11/17/17 182 lb (82.6 kg)              We Performed the Following     Comprehensive metabolic panel (BMP + Alb, Alk Phos, ALT, AST, Total. Bili, TP)     Lipid panel reflex to direct LDL Fasting     PHYSICAL THERAPY REFERRAL          Today's Medication Changes          These changes are accurate as of 3/19/18  9:07 AM.  If you have any questions, ask your nurse or doctor.               These medicines have changed or have updated prescriptions.        Dose/Directions    atorvastatin 20 MG tablet   Commonly known as:  LIPITOR   This may have " changed:  See the new instructions.   Used for:  Hyperlipidemia LDL goal <100   Changed by:  Don Aviles MD        Dose:  20 mg   Take 1 tablet (20 mg) by mouth daily   Quantity:  90 tablet   Refills:  3       citalopram 20 MG tablet   Commonly known as:  celeXA   This may have changed:  See the new instructions.   Used for:  Major depressive disorder, recurrent episode, moderate (H)   Changed by:  Don Aviles MD        Dose:  30 mg   Take 1.5 tablets (30 mg) by mouth daily   Quantity:  135 tablet   Refills:  3       levETIRAcetam 1000 MG Tabs   This may have changed:  See the new instructions.   Used for:  Seizure prophylaxis   Changed by:  Don Aviles MD        Dose:  1 tablet   Take 1 tablet by mouth 2 times daily   Quantity:  180 tablet   Refills:  3       metoprolol succinate 25 MG 24 hr tablet   Commonly known as:  TOPROL-XL   This may have changed:  See the new instructions.   Used for:  Chronic atrial fibrillation (H)   Changed by:  Don Aviles MD        Dose:  25 mg   Take 1 tablet (25 mg) by mouth daily   Quantity:  90 tablet   Refills:  3         Stop taking these medicines if you haven't already. Please contact your care team if you have questions.     warfarin 5 MG tablet   Commonly known as:  COUMADIN   Stopped by:  Dno Aviles MD                Where to get your medicines      These medications were sent to [facility] - [place], [place] - [address] [place] [address]  [address] [facility] [address], [place] [address]     Phone:  [phone]     atorvastatin 20 MG tablet    baclofen 20 MG tablet    citalopram 20 MG tablet    levETIRAcetam 1000 MG Tabs    metoprolol succinate 25 MG 24 hr tablet    mirabegron 50 MG 24 hr tablet                Primary Care Provider Office Phone # Fax #    Don Aviles [phone] [address] [place] [address]        Equal Access to Services     CHONG LAWTON AH: Codi garcia  louie Crespo, wazionda luqadaha, qaybta kaalexa montenegro, maxwell lesviain hayaacristy hoytverena noékitty lajncristy mague. So [facility] [phone].    ATENCIÓN: Si marila rika, tiene a pitts disposición servicios gratuitos de asistencia lingüística. Veena al [phone].    We comply with applicable federal civil rights laws and [place] laws. We do not discriminate on the basis of race, color, national origin, age, disability, sex, sexual orientation, or gender identity.            Thank you!     Thank you for choosing [facility] [place]  for your care. Our goal is always to provide you with excellent care. Hearing back from our patients is one way we can continue to improve our services. Please take a few minutes to complete the written survey that you may receive in the mail after your visit with us. Thank you!             Your Updated Medication List - Protect others around you: Learn how to safely use, store and throw away your medicines at www.disposemymeds.org.          This list is accurate as of 3/19/18  9:07 AM.  Always use your most recent med list.                   Brand Name Dispense Instructions for use Diagnosis    acetaminophen 325 MG tablet    TYLENOL    100 tablet    Take  by mouth every 4 hours as needed for pain.        apixaban ANTICOAGULANT 5 MG tablet    ELIQUIS    180 tablet    Take 1 tablet (5 mg) by mouth 2 times daily    Chronic atrial fibrillation (H)       atorvastatin 20 MG tablet    LIPITOR    90 tablet    Take 1 tablet (20 mg) by mouth daily    Hyperlipidemia LDL goal <100       baclofen 20 MG tablet    LIORESAL    360 tablet    Take 1 tablet (20 mg) by mouth 4 times daily    Left hemiplegia (H)       cholecalciferol 1000 UNIT tablet    vitamin D3    90 tablet    Take 2 tablets (2,000 Units) by mouth daily    CVA (cerebral vascular accident) (H)       citalopram 20 MG tablet    celeXA    135 tablet    Take 1.5 tablets (30 mg) by mouth daily    Major depressive disorder, recurrent episode,  moderate (H)       levETIRAcetam 1000 MG Tabs     180 tablet    Take 1 tablet by mouth 2 times daily    Seizure prophylaxis       metoprolol succinate 25 MG 24 hr tablet    TOPROL-XL    90 tablet    Take 1 tablet (25 mg) by mouth daily    Chronic atrial fibrillation (H)       mirabegron 50 MG 24 hr tablet    MYRBETRIQ    90 tablet    Take 1 tablet (50 mg) by mouth daily    Neurogenic bladder       Multi-vitamin Tabs tablet   Generic drug:  multivitamin, therapeutic with minerals     100 tablet    Take 0.5 tablets by mouth daily        NONFORMULARY      Protandim - herbal supplement        * order for DME     1 each    Equipment being ordered: Dispense a WHFO Leg Brace    Left hemiplegia (H), CVA (cerebral vascular accident) (H)       * order for DME     1 each    Equipment being ordered: Quad Cane-39 inch    Left hemiplegia (H), CVA (cerebral vascular accident) (H)       * order for DME     4 each    Equipment being ordered: daytime urinary leg bag   800cc    Urine incontinence       * order for DME     100 each    Equipment being ordered: Adhesive remover, wipes  (HCPCS Code )    Neurogenic bladder       * order for DME     90 each    Equipment being ordered:   Freedom Clear Condom catheter  (HCPCS Code )    Neurogenic bladder       * order for DME     12 each    Equipment being ordered:    Nightime urinary leg bag 1600cc  (HCPCS Code )    Neurogenic bladder       * order for DME     12 each    Equipment being ordered: Daytime Urinary leg bag-800cc  (HCPCS Code )    Neurogenic bladder       * order for DME     12 each    Equipment being ordered: Incontinence supplies - Posies  (HCPCS Code )    Neurogenic bladder       * order for DME     100 each    Equipment being ordered: Skin prep (no code - requested by patient)    Neurogenic bladder       * Notice:  This list has 9 medication(s) that are the same as other medications prescribed for you. Read the directions carefully, and ask your  doctor or other care provider to review them with you.

## 2018-03-19 NOTE — PATIENT INSTRUCTIONS
INSTRUCTIONS FOR TODAY:     options for colon screening: Cologuard or repeat colonoscopy     Dr Aviles    Preventive Health Recommendations  Male Ages 50 - 64    Yearly exam:             See your health care provider every year in order to  o   Review health changes.   o   Discuss preventive care.    o   Review your medicines if your doctor has prescribed any.     Have a cholesterol test every 5 years, or more frequently if you are at risk for high cholesterol/heart disease.     Have a diabetes test (fasting glucose) every three years. If you are at risk for diabetes, you should have this test more often.     Have a colonoscopy at age 50, or have a yearly FIT test (stool test). These exams will check for colon cancer.      Talk with your health care provider about whether or not a prostate cancer screening test (PSA) is right for you.    You should be tested each year for STDs (sexually transmitted diseases), if you re at risk.     Shots: Get a flu shot each year. Get a tetanus shot every 10 years.     Nutrition:    Eat at least 5 servings of fruits and vegetables daily.     Eat whole-grain bread, whole-wheat pasta and brown rice instead of white grains and rice.     Talk to your provider about Calcium and Vitamin D.     Lifestyle    Exercise for at least 150 minutes a week (30 minutes a day, 5 days a week). This will help you control your weight and prevent disease.     Limit alcohol to one drink per day.     No smoking.     Wear sunscreen to prevent skin cancer.     See your dentist every six months for an exam and cleaning.     See your eye doctor every 1 to 2 years.

## 2018-03-19 NOTE — PROGRESS NOTES
SUBJECTIVE:   CC: Marcus Hodges is an 62 year old male who presents for preventative health visit.     Physical   Annual:     Getting at least 3 servings of Calcium per day::  Yes    Bi-annual eye exam::  Yes    Dental care twice a year::  Yes    Sleep apnea or symptoms of sleep apnea::  None    Diet::  Vegetarian/vegan    Taking medications regularly::  Yes    Additional concerns today::  No                    Today's PHQ-2 Score:   PHQ-2 ( 1999 [employer]) 3/19/2018   Q1: Little interest or pleasure in doing things 0   Q2: Feeling down, depressed or hopeless 0   PHQ-2 Score 0   Q1: Little interest or pleasure in doing things Not at all   Q2: Feeling down, depressed or hopeless Not at all   PHQ-2 Score 0       Abuse: Current or Past(Physical, Sexual or Emotional)- No  Do you feel safe in your environment - Yes    Social History   Substance Use Topics     Smoking status: Never Smoker     Smokeless tobacco: Never Used     Alcohol use No     No flowsheet data found.    Last PSA:   PSA   Date Value Ref Range Status   01/09/2012 1.40 0 - 4 ug/L Final       Reviewed orders with patient. Reviewed health maintenance and updated orders accordingly - Yes    Reviewed and updated as needed this visit by clinical staff  Tobacco  Allergies  Meds         Reviewed and updated as needed this visit by Provider        Patient Active Problem List   Diagnosis     * * * SBE PROPHYLAXIS * * *     Health Care Home     Vitamin D deficiency     Hyperlipidemia LDL goal <100     Long-term (current) use of anticoagulants     Left hemiplegia (H)     Seizure prophylaxis     Advanced directives, counseling/discussion     Urinary incontinence     Neurogenic bladder     Chronic atrial fibrillation (H)     Tricuspid regurgitation     Mitral regurgitation     Atrial enlargement, bilateral     Major depressive disorder, recurrent episode, moderate (H)     Traumatic brain injury with loss of consciousness, sequela (H)     Past Medical History:    Diagnosis Date     * * * SBE PROPHYLAXIS * * *      Atrial enlargement, bilateral      Atrial flutter (H) 2004    radiofrequency ablation 2004, resolved     ATRIAL SEPTAL DEFECT     repaired 1977     CVA (cerebral vascular accident) (H) 4/2012    R MCA     Dysphagia 04/22/12    [facility], following CVA     Hernia, abdominal      History of thrombophlebitis      Mitral regurgitation     mitral valve damage related to ASD repair     Mumps      Palpitations      Respiratory failure (H) 04/22/12    [facility], following CVA, prolonged requiring tracheostomy, [place] [facility]      Tricuspid regurgitation      Unspecified glaucoma(365.9)     right     Urinary incontinence        Past Surgical History:   Procedure Laterality Date     C NONSPECIFIC PROCEDURE      tonsillectomy     C NONSPECIFIC PROCEDURE      Ing. Hernia Repair     CATHETER, ABLATION  2004     Craniotomy reconstruction  2012    [facility], repair of hemicraniectomy defect     CYSTOSCOPY       GASTROSTOMY TUBE  April 2012    [facility], following CVA     HERNIA REPAIR       REPAIR ATRIAL SEPTAL DEFECT  1978    ASD Repair     Right hemicraniectomy  April 2012    [facility], following CVA, mgmt malignant cerebral edema     SURGICAL HISTORY OF -   2009    right eye enucleation     TRACHEOSTOMY  April 2012    [facility], following CVA       Family History   Problem Relation Age of Onset     Hypertension Mother      CEREBROVASCULAR DISEASE Father      CANCER Paternal Grandmother      DIABETES Maternal Grandmother      Congenital Anomalies Brother      several brothers and sisters born with congential heart defects, but all have been repaired     Congenital Anomalies Sister        ALLERGIES     Allergies   Allergen Reactions     No Known Drug Allergies          Review of Systems   Constitutional: Negative for chills and fever.   HENT: Negative for congestion and ear pain.    Eyes: Negative for pain.   Respiratory: Negative for cough.    Cardiovascular:  "Negative for chest pain.   Gastrointestinal: Positive for constipation. Negative for abdominal pain, diarrhea and hematochezia.   Genitourinary: Negative for frequency, genital sores and hematuria.   Allergic/Immunologic: Negative.    Neurological: Negative for dizziness.   Hematological: Negative.    Psychiatric/Behavioral: The patient is not nervous/anxious.          OBJECTIVE:   /60 (Cuff Size: Adult Regular)  Pulse 64  Temp 98.3  F (36.8  C) (Oral)  Ht 6' 2\" (1.88 m)  Wt 179 lb (81.2 kg)  SpO2 99%  BMI 22.98 kg/m2    Physical Exam  GENERAL: alert and no distress  EYES: right eye prosthetic  HENT: ear canals and TM's normal, nose and mouth without ulcers or lesions  NECK: no adenopathy, no asymmetry, masses, or scars and thyroid normal to palpation  RESP: lungs clear to auscultation - no rales, rhonchi or wheezes  CV: regular rate and rhythm, normal S1 S2, no S3 or S4, no murmur, click or rub, no peripheral edema and peripheral pulses strong  ABDOMEN: soft, nontender, no hepatosplenomegaly, no masses and bowel sounds normal  MS: no gross musculoskeletal defects noted, no edema  SKIN: no suspicious lesions or rashes  NEURO: left hemiplegia with LLE AFO, exam at baseline  PSYCH: mentation appears normal, affect normal/bright    ASSESSMENT/PLAN:       ICD-10-CM    1. Encounter for routine adult health examination with abnormal findings Z00.01 Due for 10yr follow-up colonoscopy.  Pt and wife have questions about other options/discussed Cologuard vs colonoscopy--they will discuss and call regarding their preference     2. Left hemiplegia (H) G81.94 baclofen (LIORESAL) 20 MG tablet     PHYSICAL THERAPY REFERRAL      Chronic hemiplegia post CVA.  Has benefited from post-stroke rehab.   rec repeat course of PT   3. Traumatic brain injury with loss of consciousness, sequela (H) S06.9X9S        4. Chronic atrial fibrillation (H) I48.2 metoprolol succinate (TOPROL-XL) 25 MG 24 hr tablet       Rate controlled, " "continue eliquis     5. Hyperlipidemia LDL goal <100 E78.5 atorvastatin (LIPITOR) 20 MG tablet     Lipid panel reflex to direct LDL Fasting     Comprehensive metabolic panel (BMP + Alb, Alk Phos, ALT, AST, Total. Bili, TP)          6. Major depressive disorder, recurrent episode, moderate (H) F33.1 citalopram (CELEXA) 20 MG tablet      Stable on current rx     7. Seizure prophylaxis Z29.8 levETIRAcetam 1000 MG TABS        Continue current rx     8. Neurogenic bladder N31.9 mirabegron (MYRBETRIQ) 50 MG 24 hr tablet        Improved, continues condom cath use intermittently-better overall on Myrbetriq     9. Need for pneumococcal vaccination Z23 Pneumococcal vaccine 23 valent PPSV23  (Pneumovax) [77767]     ADMIN: Vaccine, Initial (40177)       COUNSELING:   Reviewed preventive health counseling, as reflected in patient instructions       Healthy diet/nutrition       Colon cancer screening         reports that he has never smoked. He has never used smokeless tobacco.    Estimated body mass index is 22.98 kg/(m^2) as calculated from the following:    Height as of this encounter: 6' 2\" (1.88 m).    Weight as of this encounter: 179 lb (81.2 kg).       Counseling Resources:  ATP IV Guidelines  Pooled Cohorts Equation Calculator  FRAX Risk Assessment  ICSI Preventive Guidelines  Dietary Guidelines for Americans, 2010  USDA's MyPlate  ASA Prophylaxis  Lung CA Screening    Don Aviles MD, MD  [facility]  "

## 2018-03-19 NOTE — NURSING NOTE
"Chief Complaint   Patient presents with     Physical       Initial /60 (Cuff Size: Adult Regular)  Pulse 64  Temp 98.3  F (36.8  C) (Oral)  Ht 6' 2\" (1.88 m)  Wt 179 lb (81.2 kg)  SpO2 99%  BMI 22.98 kg/m2 Estimated body mass index is 22.98 kg/(m^2) as calculated from the following:    Height as of this encounter: 6' 2\" (1.88 m).    Weight as of this encounter: 179 lb (81.2 kg).  Medication Reconciliation: complete     Screening Questionnaire for Adult Immunization    Are you sick today?   No   Do you have allergies to medications, food, a vaccine component or latex?   No   Have you ever had a serious reaction after receiving a vaccination?   No   Do you have a long-term health problem with heart disease, lung disease, asthma, kidney disease, metabolic disease (e.g. diabetes), anemia, or other blood disorder?   No   Do you have cancer, leukemia, HIV/AIDS, or any other immune system problem?   No   In the past 3 months, have you taken medications that affect  your immune system, such as prednisone, other steroids, or anticancer drugs; drugs for the treatment of rheumatoid arthritis, Crohn s disease, or psoriasis; or have you had radiation treatments?   No   Have you had a seizure, or a brain or other nervous system problem?   No   During the past year, have you received a transfusion of blood or blood     products, or been given immune (gamma) globulin or antiviral drug?   No   For women: Are you pregnant or is there a chance you could become        pregnant during the next month?   No   Have you received any vaccinations in the past 4 weeks?   No     Immunization questionnaire answers were all negative.           Screening performed by Miranda Cortes on 3/19/2018 at 9:31 AM.  "

## 2018-03-21 ENCOUNTER — MYC MEDICAL ADVICE (OUTPATIENT)
Dept: PEDIATRICS | Facility: CLINIC | Age: 63
End: 2018-03-21

## 2018-03-21 NOTE — TELEPHONE ENCOUNTER
Josefina order form begun and placed at Dr. Aviles's desk for signature.  Will need to get insurance information from patient, and [facility].  SOLOMON Donohue RN

## 2018-04-01 ENCOUNTER — TELEPHONE (OUTPATIENT)
Dept: PEDIATRICS | Facility: CLINIC | Age: 63
End: 2018-04-01

## 2018-04-01 DIAGNOSIS — Z12.11 SCREENING FOR COLON CANCER: Primary | ICD-10-CM

## 2018-04-01 NOTE — TELEPHONE ENCOUNTER
Pt calls.  Requests Cologuard colon cancer screening.  Please print order form from [employer] website --I will complete it this week

## 2018-04-22 ENCOUNTER — OFFICE VISIT (OUTPATIENT)
Dept: URGENT CARE | Facility: URGENT CARE | Age: 63
End: 2018-04-22
Payer: COMMERCIAL

## 2018-04-22 ENCOUNTER — NURSE TRIAGE (OUTPATIENT)
Dept: NURSING | Facility: CLINIC | Age: 63
End: 2018-04-22

## 2018-04-22 VITALS
TEMPERATURE: 97.3 F | SYSTOLIC BLOOD PRESSURE: 110 MMHG | OXYGEN SATURATION: 98 % | RESPIRATION RATE: 16 BRPM | DIASTOLIC BLOOD PRESSURE: 62 MMHG | HEART RATE: 61 BPM

## 2018-04-22 DIAGNOSIS — R31.9 HEMATURIA, UNSPECIFIED TYPE: Primary | ICD-10-CM

## 2018-04-22 LAB
ALBUMIN UR-MCNC: 100 MG/DL
APPEARANCE UR: ABNORMAL
BACTERIA #/AREA URNS HPF: ABNORMAL /HPF
BILIRUB UR QL STRIP: NEGATIVE
COLOR UR AUTO: ABNORMAL
ERYTHROCYTE [DISTWIDTH] IN BLOOD BY AUTOMATED COUNT: 13.1 % (ref 10–15)
GLUCOSE UR STRIP-MCNC: NEGATIVE MG/DL
HCT VFR BLD AUTO: 41 % (ref 40–53)
HGB BLD-MCNC: 13.5 G/DL (ref 13.3–17.7)
HGB UR QL STRIP: ABNORMAL
KETONES UR STRIP-MCNC: NEGATIVE MG/DL
LEUKOCYTE ESTERASE UR QL STRIP: ABNORMAL
MCH RBC QN AUTO: 30.8 PG (ref 26.5–33)
MCHC RBC AUTO-ENTMCNC: 32.9 G/DL (ref 31.5–36.5)
MCV RBC AUTO: 93 FL (ref 78–100)
NITRATE UR QL: NEGATIVE
NON-SQ EPI CELLS #/AREA URNS LPF: ABNORMAL /LPF
PH UR STRIP: 6.5 PH (ref 5–7)
PLATELET # BLD AUTO: 152 10E9/L (ref 150–450)
RBC # BLD AUTO: 4.39 10E12/L (ref 4.4–5.9)
RBC #/AREA URNS AUTO: ABNORMAL /HPF
SOURCE: ABNORMAL
SP GR UR STRIP: 1.01 (ref 1–1.03)
UROBILINOGEN UR STRIP-ACNC: 0.2 EU/DL (ref 0.2–1)
WBC # BLD AUTO: 5.4 10E9/L (ref 4–11)
WBC #/AREA URNS AUTO: ABNORMAL /HPF

## 2018-04-22 PROCEDURE — 87186 SC STD MICRODIL/AGAR DIL: CPT | Performed by: FAMILY MEDICINE

## 2018-04-22 PROCEDURE — 99214 OFFICE O/P EST MOD 30 MIN: CPT | Performed by: FAMILY MEDICINE

## 2018-04-22 PROCEDURE — 87086 URINE CULTURE/COLONY COUNT: CPT | Performed by: FAMILY MEDICINE

## 2018-04-22 PROCEDURE — 87088 URINE BACTERIA CULTURE: CPT | Performed by: FAMILY MEDICINE

## 2018-04-22 PROCEDURE — 36415 COLL VENOUS BLD VENIPUNCTURE: CPT | Performed by: FAMILY MEDICINE

## 2018-04-22 PROCEDURE — 85027 COMPLETE CBC AUTOMATED: CPT | Performed by: FAMILY MEDICINE

## 2018-04-22 PROCEDURE — 81001 URINALYSIS AUTO W/SCOPE: CPT | Performed by: PHYSICIAN ASSISTANT

## 2018-04-22 NOTE — PATIENT INSTRUCTIONS
Increase fluids and monitor urine.  We will contact you if the urine culture is positive.  Follow up with primary in 1-2 days.      Hematuria: Possible Causes     Many things can lead to blood in the urine (hematuria). The blood may be seen with the eye (macroscopic or gross hematuria). Or it may only be seen when the urine is looked at under a microscope (microscopic hematuria). Some of the most common causes of blood in the urine are listed below. Often, no cause for the blood can be found. This is called idiopathic hematuria.    Kidney or bladder stones are collections of crystals. They form in the urine. Stones may be found anywhere in the urinary tract. But they form most often in the kidneys or bladder. In addition to blood in the urine, they can cause severe pain.    BPH stands for benign prostatic hyperplasia. It is enlargement of the prostate gland. It happens as men age. BPH often causes problems with urination. It sometimes causes blood in the urine.    A urinary tract infection (UTI) is due to bacteria growing in the urinary tract. It can cause blood in the urine. Other symptoms include burning or pain with urination. You may need to urinate often or urgently. You may also have a fever.    Damage to the urinary tract may cause blood in the urine. This damage may be due to a blow or accident. It may also result from the use of a urinary catheter. Very hard exercise may sometimes irritate the urinary tract and cause bleeding.    Cancer may occur anywhere in the urinary tract. A tumor may sometimes cause no symptoms other than bleeding.     Other possible causes of bleeding include:    Prostatitis (infection of the prostate gland)    Taking anticoagulants    Blockage in the urinary tract    Disease or inflammation of the kidney    Cystic diseases of the kidneys    Sickle cell anemia    Vigorous exercise    Endometriosis  Date Last Reviewed: 12/1/2016 2000-2017 [employer] [address], [place] [address]. All rights reserved. This information is not intended as a substitute for professional medical care. Always follow your healthcare professional's instructions.

## 2018-04-22 NOTE — TELEPHONE ENCOUNTER
"Having intermittent \"dark\" urine for the past \"few days.\"   Wears a condom catheter with leg bag.  Notes a \"reddish hue\" to the urine, thinks there is blood in it, no clots.   Denies any dysuria, odor to the urine, urgency.  Denies fever, back or abdominal pain.   Feels like he is hydrated.   Is on Eliquis.   Denies dizziness.     Protocol and care advice reviewed.   Caller states understanding of the recommended disposition. Advised to be seen in ER with his history. Caller states he doesn't think this is an emergency, asking for clinic appointment tomorrow. Advised to be seen within 4 hours. Is agreeable to go to the [facility] now.   Advised to call back if further questions or concerns.     Reason for Disposition    Taking Coumadin (warfarin) or other strong blood thinner, or known bleeding disorder (e.g., thrombocytopenia)    Additional Information    Negative: Shock suspected (e.g., cold/pale/clammy skin, too weak to stand, low BP, rapid pulse)    Negative: Sounds like a life-threatening emergency to the triager    Negative: Recent back or abdominal injury    Negative: Recent genital injury    Negative: [1] Unable to urinate (or only a few drops) > 4 hours AND [2] bladder feels very full (e.g., palpable bladder or strong urge to urinate)    Negative: Passing pure blood or large blood clots (i.e., size > a dime) (Exception: darnell or small strands)    Negative: Fever > 100.5 F (38.1 C)    Negative: Patient sounds very sick or weak to the triager    Protocols used: URINE - BLOOD IN-ADULT-    "

## 2018-04-22 NOTE — PROGRESS NOTES
SUBJECTIVE:   Marcus Hodges is a 62 year old male who  presents today for a possible UTI.     Patient here with his wife, concerned of blood in urine.  Symptoms was intermittent for the past 1 week but over the past day had been persistent.  Patient with CVA about 6 years ago, has had trouble with urination since that time but has improved with miragegron.  Patient is currently on Eliquis now, was previously on coumadin but was switched as cardiology felt that Eliquis was better for patient.  Patient happy that did not have to have INR check anymore, denies any problems with Eliquis causing bleeding before.  Patient denies any fever.  Denies any recent URI symptoms.  Denies any trauma, no back pain.  Denies history of kidney stones.    Patient uses condom catheter for overnight and also when he goes out but states that otherwise is able to urinate on his own.  Patient had episode of UTI last year but noted that had incontinence with the infection.  Patient was seen by Urology, had tests done and told that everything was okay.     Past Medical History:   Diagnosis Date     * * * SBE PROPHYLAXIS * * *      Atrial enlargement, bilateral      Atrial flutter (H) 2004    radiofrequency ablation 2004, resolved     ATRIAL SEPTAL DEFECT     repaired 1977     CVA (cerebral vascular accident) (H) 4/2012    R MCA     Dysphagia 04/22/12    [facility], following CVA     Hernia, abdominal      History of thrombophlebitis      Mitral regurgitation     mitral valve damage related to ASD repair     Mumps      Palpitations      Respiratory failure (H) 04/22/12    [facility], following CVA, prolonged requiring tracheostomy, [place] [facility]      Tricuspid regurgitation      Unspecified glaucoma(365.9)     right     Urinary incontinence      Current Outpatient Prescriptions   Medication Sig Dispense Refill     acetaminophen (TYLENOL) 325 MG tablet Take  by mouth every 4 hours as needed for pain. 100 tablet 0     apixaban ANTICOAGULANT  (ELIQUIS) 5 MG tablet Take 1 tablet (5 mg) by mouth 2 times daily 180 tablet 3     atorvastatin (LIPITOR) 20 MG tablet Take 1 tablet (20 mg) by mouth daily 90 tablet 3     baclofen (LIORESAL) 20 MG tablet Take 1 tablet (20 mg) by mouth 4 times daily 360 tablet 3     cholecalciferol (VITAMIN D) 1000 UNIT tablet Take 2 tablets (2,000 Units) by mouth daily 90 tablet 3     citalopram (CELEXA) 20 MG tablet Take 1.5 tablets (30 mg) by mouth daily 135 tablet 3     levETIRAcetam 1000 MG TABS Take 1 tablet by mouth 2 times daily 180 tablet 3     metoprolol succinate (TOPROL-XL) 25 MG 24 hr tablet Take 1 tablet (25 mg) by mouth daily 90 tablet 3     mirabegron (MYRBETRIQ) 50 MG 24 hr tablet Take 1 tablet (50 mg) by mouth daily 90 tablet 3     Multiple Vitamin (MULTI-VITAMIN) per tablet Take 0.5 tablets by mouth daily  100 tablet 3     NONFORMULARY Protandim - herbal supplement       ORDER FOR DME Equipment being ordered: Dispense a WHFO Leg Brace 1 each 0     ORDER FOR DME Equipment being ordered: Quad Cane-39 inch 1 each 0     ORDER FOR DME Equipment being ordered: daytime urinary leg bag   800cc 4 each 11     order for DME Equipment being ordered: Adhesive remover, wipes  [facility] 100 each 99     order for DME Equipment being ordered:   [place] Clear Condom catheter  ([facility]S Code ) 90 each 99     order for DME Equipment being ordered:    Nightime urinary leg bag 1600cc  (HCPCS Code ) 12 each 99     order for DME Equipment being ordered: Daytime Urinary leg bag-800cc  (HCPCS Code ) 12 each 99     order for DME Equipment being ordered: Incontinence supplies - Posies  (HCPCS Code ) 12 each 99     order for DME Equipment being ordered: Skin prep (no code - requested by patient) 100 each 99     Social History   Substance Use Topics     Smoking status: Never Smoker     Smokeless tobacco: Never Used     Alcohol use No       ROS:   Review of systems negative except as stated above.    OBJECTIVE:  BP  110/62 (BP Location: Right arm, Patient Position: Chair, Cuff Size: Adult Regular)  Pulse 61  Temp 97.3  F (36.3  C) (Tympanic)  Resp 16  SpO2 98%  GENERAL APPEARANCE: healthy, alert and no distress  PSYCH: mentation appears normal and affect normal/bright    Results for orders placed or performed in visit on 04/22/18   *UA reflex to Microscopic and Culture ([place] and [facility] (except [place] and [place])   Result Value Ref Range    Color Urine Red     Appearance Urine Turbid     Glucose Urine Negative NEG^Negative mg/dL    Bilirubin Urine Negative NEG^Negative    Ketones Urine Negative NEG^Negative mg/dL    Specific Gravity Urine 1.010 1.003 - 1.035    Blood Urine Large (A) NEG^Negative    pH Urine 6.5 5.0 - 7.0 pH    Protein Albumin Urine 100 (A) NEG^Negative mg/dL    Urobilinogen Urine 0.2 0.2 - 1.0 EU/dL    Nitrite Urine Negative NEG^Negative    Leukocyte Esterase Urine Trace (A) NEG^Negative    Source Catheterized Urine    Urine Microscopic   Result Value Ref Range    WBC Urine 0 - 5 OTO5^0 - 5 /HPF    RBC Urine 10-25 (A) OTO2^O - 2 /HPF    Squamous Epithelial /LPF Urine Few FEW^Few /LPF    Bacteria Urine Few (A) NEG^Negative /HPF   CBC with platelets   Result Value Ref Range    WBC 5.4 4.0 - 11.0 10e9/L    RBC Count 4.39 (L) 4.4 - 5.9 10e12/L    Hemoglobin 13.5 13.3 - 17.7 g/dL    Hematocrit 41.0 40.0 - 53.0 %    MCV 93 78 - 100 fl    MCH 30.8 26.5 - 33.0 pg    MCHC 32.9 31.5 - 36.5 g/dL    RDW 13.1 10.0 - 15.0 %    Platelet Count 152 150 - 450 10e9/L       ASSESSMENT/PLAN:   (R31.9) Hematuria, unspecified type  (primary encounter diagnosis)  Plan: *UA reflex to Microscopic and Culture ([place]         and [facility] (except [place] and         [place]), Urine Microscopic, Urine Culture         Aerobic Bacterial, CBC with platelets            Reviewed that with Eliquis that require reversal will be thru ER evaluation and not UC.  Discussed that most likely is the cause for blood in  urine and not a true UTI.  Patient is also hesitant about taking antibiotic unnecessarily, will follow up on urine culture and treat if true positive.  Encourage to dink plenty of fluids.    Follow up with primary in 1-2 days.    Rocky Groves MD  April 22, 2018 6:08 PM

## 2018-04-22 NOTE — MR AVS SNAPSHOT
After Visit Summary   4/22/2018    Marcus Hodges    MRN: [id number]           Patient Information     Date Of Birth          1955        Visit Information        Provider Department      4/22/2018 11:25 AM Rocky Groves MD [facility]        Today's Diagnoses     Hematuria, unspecified type    -  1      Care Instructions    Increase fluids and monitor urine.  We will contact you if the urine culture is positive.  Follow up with primary in 1-2 days.      Hematuria: Possible Causes     Many things can lead to blood in the urine (hematuria). The blood may be seen with the eye (macroscopic or gross hematuria). Or it may only be seen when the urine is looked at under a microscope (microscopic hematuria). Some of the most common causes of blood in the urine are listed below. Often, no cause for the blood can be found. This is called idiopathic hematuria.    Kidney or bladder stones are collections of crystals. They form in the urine. Stones may be found anywhere in the urinary tract. But they form most often in the kidneys or bladder. In addition to blood in the urine, they can cause severe pain.    BPH stands for benign prostatic hyperplasia. It is enlargement of the prostate gland. It happens as men age. BPH often causes problems with urination. It sometimes causes blood in the urine.    A urinary tract infection (UTI) is due to bacteria growing in the urinary tract. It can cause blood in the urine. Other symptoms include burning or pain with urination. You may need to urinate often or urgently. You may also have a fever.    Damage to the urinary tract may cause blood in the urine. This damage may be due to a blow or accident. It may also result from the use of a urinary catheter. Very hard exercise may sometimes irritate the urinary tract and cause bleeding.    Cancer may occur anywhere in the urinary tract. A tumor may sometimes cause no symptoms other than bleeding.     Other  possible causes of bleeding include:    Prostatitis (infection of the prostate gland)    Taking anticoagulants    Blockage in the urinary tract    Disease or inflammation of the kidney    Cystic diseases of the kidneys    Sickle cell anemia    Vigorous exercise    Endometriosis  Date Last Reviewed: 12/1/2016 2000-2017 [employer]. [address] [place] [address]. All rights reserved. This information is not intended as a substitute for professional medical care. Always follow your healthcare professional's instructions.                Follow-ups after your visit        Your next 10 appointments already scheduled     Apr 26, 2018 10:15 AM CDT   Neuro Eval with Ada Scott, PT   [facility] ([facility])    [address] [address] [phone]              Who to contact     If you have questions or need follow up information about today's clinic visit or your schedule please contact [facility] directly at [phone].  Normal or non-critical lab and imaging results will be communicated to you by [employer], letter or phone within 4 business days after the clinic has received the results. If you do not hear from us within 7 days, please contact the clinic through [employer] or phone. If you have a critical or abnormal lab result, we will notify you by phone as soon as possible.  Submit refill requests through [employer] or call your pharmacy and they will forward the refill request to us. Please allow 3 business days for your refill to be completed.          Additional Information About Your Visit        [employer] Information     [employer] gives you secure access to your electronic health record. If you see a primary care provider, you can also send messages to your care team and make appointments. If you have questions, please call your primary care clinic.  If you do not have a primary care provider, please call  [phone] and they will assist you.        Care EveryWhere ID     This is your Care EveryWhere ID. This could be used by other organizations to access your [facility] medical records  [id number]        Your Vitals Were     Pulse Temperature Respirations Pulse Oximetry          61 97.3  F (36.3  C) (Tympanic) 16 98%         Blood Pressure from Last 3 Encounters:   04/22/18 110/62   03/19/18 108/60   12/12/17 120/75    Weight from Last 3 Encounters:   03/19/18 179 lb (81.2 kg)   12/12/17 177 lb 12.8 oz (80.6 kg)   11/17/17 182 lb (82.6 kg)              We Performed the Following     *UA reflex to Microscopic and Culture ([place] [facility] (except [place] and [place])     CBC with platelets     Urine Culture Aerobic Bacterial     Urine Microscopic        Primary Care Provider Office Phone # Fax #    Don Aviles [phone] [address] [place] [address]        Equal Access to Services     [facility] AH: Hadii aad ku hadasho Soomaali, waaxda luqadaha, qaybta kaalmada adeegyada, waxay idiin haymaiten brenda fried . So [facility] [phone].    ATENCIÓN: Si habla español, tiene a pitts disposición servicios gratuitos de asistencia lingüística. [place] [phone].    We comply with applicable federal civil rights laws and [place] laws. We do not discriminate on the basis of race, color, national origin, age, disability, sex, sexual orientation, or gender identity.            Thank you!     Thank you for choosing [facility]  for your care. Our goal is always to provide you with excellent care. Hearing back from our patients is one way we can continue to improve our services. Please take a few minutes to complete the written survey that you may receive in the mail after your visit with us. Thank you!             Your Updated Medication List - Protect others around you: Learn how to safely use, store and throw away your medicines at  www.disposemymeds.org.          This list is accurate as of 4/22/18  1:07 PM.  Always use your most recent med list.                   Brand Name Dispense Instructions for use Diagnosis    acetaminophen 325 MG tablet    TYLENOL    100 tablet    Take  by mouth every 4 hours as needed for pain.        apixaban ANTICOAGULANT 5 MG tablet    ELIQUIS    180 tablet    Take 1 tablet (5 mg) by mouth 2 times daily    Chronic atrial fibrillation (H)       atorvastatin 20 MG tablet    LIPITOR    90 tablet    Take 1 tablet (20 mg) by mouth daily    Hyperlipidemia LDL goal <100       baclofen 20 MG tablet    LIORESAL    360 tablet    Take 1 tablet (20 mg) by mouth 4 times daily    Left hemiplegia (H)       cholecalciferol 1000 UNIT tablet    vitamin D3    90 tablet    Take 2 tablets (2,000 Units) by mouth daily    CVA (cerebral vascular accident) (H)       citalopram 20 MG tablet    celeXA    135 tablet    Take 1.5 tablets (30 mg) by mouth daily    Major depressive disorder, recurrent episode, moderate (H)       levETIRAcetam 1000 MG Tabs     180 tablet    Take 1 tablet by mouth 2 times daily    Seizure prophylaxis       metoprolol succinate 25 MG 24 hr tablet    TOPROL-XL    90 tablet    Take 1 tablet (25 mg) by mouth daily    Chronic atrial fibrillation (H)       mirabegron 50 MG 24 hr tablet    MYRBETRIQ    90 tablet    Take 1 tablet (50 mg) by mouth daily    Neurogenic bladder       Multi-vitamin Tabs tablet   Generic drug:  multivitamin, therapeutic with minerals     100 tablet    Take 0.5 tablets by mouth daily        NONFORMULARY      Protandim - herbal supplement        * order for DME     1 each    Equipment being ordered: Dispense a WHFO Leg Brace    Left hemiplegia (H), CVA (cerebral vascular accident) (H)       * order for DME     1 each    Equipment being ordered: Quad Cane-39 inch    Left hemiplegia (H), CVA (cerebral vascular accident) (H)       * order for DME     4 each    Equipment being ordered: daytime urinary  leg bag   800cc    Urine incontinence       * order for DME     100 each    Equipment being ordered: Adhesive remover, wipes  [facility] )    Neurogenic bladder       * order for DME     90 each    Equipment being ordered:   Freedom Clear Condom catheter  (HCPCS Code )    Neurogenic bladder       * order for DME     12 each    Equipment being ordered:    Nightime urinary leg bag 1600cc  (HCPCS Code )    Neurogenic bladder       * order for DME     12 each    Equipment being ordered: Daytime Urinary leg bag-800cc  (HCPCS Code )    Neurogenic bladder       * order for DME     12 each    Equipment being ordered: Incontinence supplies - Posies  ([facility] )    Neurogenic bladder       * order for DME     100 each    Equipment being ordered: Skin prep (no code - requested by patient)    Neurogenic bladder       * Notice:  This list has 9 medication(s) that are the same as other medications prescribed for you. Read the directions carefully, and ask your doctor or other care provider to review them with you.

## 2018-04-23 ENCOUNTER — TELEPHONE (OUTPATIENT)
Dept: PEDIATRICS | Facility: CLINIC | Age: 63
End: 2018-04-23

## 2018-04-23 ENCOUNTER — MYC MEDICAL ADVICE (OUTPATIENT)
Dept: PEDIATRICS | Facility: CLINIC | Age: 63
End: 2018-04-23

## 2018-04-23 NOTE — TELEPHONE ENCOUNTER
Pt was seen in UC yesterday for blood in urine, its still the same(not better/worse). Had UA & CBC done as well. He would like to see  today for a f/u. Pt has cath in place & is taking eliquis. No appointment available today, so scheduled an appointment for tomorrow. Offered appointment with other providers today, pt would like to see only .    Please advise whether he can wait till tomorrow to be seen or double book him today. Need to notify pt. Thanks. Pt can be reached at [phone](OK to LM).    Notes from 4/22:  Reviewed that with Eliquis that require reversal will be thru ER evaluation and not UC.  Discussed that most likely is the cause for blood in urine and not a true UTI.  Patient is also hesitant about taking antibiotic unnecessarily, will follow up on urine culture and treat if true positive.  Encourage to dink plenty of fluids.     Follow up with primary in 1-2 days.    Niall RN  Triage Nurse

## 2018-04-23 NOTE — TELEPHONE ENCOUNTER
Dr. Aviles does patient need to be seen today?  Patient is scheduled tomorrow with you.  Dr. Groves recommended follow-up in 1-2 days.  Was seen yesterday.  Patient concerned as there is hematuria  SOLOMON Donohue RN

## 2018-04-24 ENCOUNTER — TELEPHONE (OUTPATIENT)
Dept: PEDIATRICS | Facility: CLINIC | Age: 63
End: 2018-04-24

## 2018-04-24 ENCOUNTER — TELEPHONE (OUTPATIENT)
Dept: URGENT CARE | Facility: URGENT CARE | Age: 63
End: 2018-04-24

## 2018-04-24 ENCOUNTER — OFFICE VISIT (OUTPATIENT)
Dept: UROLOGY | Facility: CLINIC | Age: 63
End: 2018-04-24
Payer: COMMERCIAL

## 2018-04-24 VITALS — HEART RATE: 60 BPM | OXYGEN SATURATION: 97 % | WEIGHT: 179 LBS | HEIGHT: 74 IN | BODY MASS INDEX: 22.97 KG/M2

## 2018-04-24 DIAGNOSIS — N39.0 RECURRENT UTI: Primary | ICD-10-CM

## 2018-04-24 DIAGNOSIS — R31.9 HEMATURIA, UNSPECIFIED TYPE: ICD-10-CM

## 2018-04-24 DIAGNOSIS — N31.9 NEUROGENIC BLADDER: Primary | ICD-10-CM

## 2018-04-24 DIAGNOSIS — N30.01 ACUTE CYSTITIS WITH HEMATURIA: Primary | ICD-10-CM

## 2018-04-24 LAB
BACTERIA SPEC CULT: ABNORMAL
SPECIMEN SOURCE: ABNORMAL

## 2018-04-24 PROCEDURE — 99213 OFFICE O/P EST LOW 20 MIN: CPT | Performed by: UROLOGY

## 2018-04-24 RX ORDER — CIPROFLOXACIN 500 MG/1
500 TABLET, FILM COATED ORAL 2 TIMES DAILY
Qty: 14 TABLET | Refills: 0
Start: 2018-04-24 | End: 2018-05-01

## 2018-04-24 ASSESSMENT — PAIN SCALES - GENERAL: PAINLEVEL: NO PAIN (0)

## 2018-04-24 NOTE — TELEPHONE ENCOUNTER
Please let patient know that his urine culture shows that there is a true urinary tract infection present; the culture is showing an organism called Pseudomonas.  This may be contributing to the blood he's seen in his urine.    I recommend starting ciprofloxacin 500 mg twice daily for the next 7 days.  Please call this Rx to the pharmacy of the patient's choice this morning.    Thanks!

## 2018-04-24 NOTE — MR AVS SNAPSHOT
After Visit Summary   4/24/2018    Marcus Hodges    MRN: [id number]           Patient Information     Date Of Birth          1955        Visit Information        Provider Department      4/24/2018 1:00 PM Rashard Arevalo MD [facility] [place]        Today's Diagnoses     Recurrent UTI    -  1       Follow-ups after your visit        Your next 10 appointments already scheduled     Apr 26, 2018 10:15 AM CDT   Neuro Eval with Ada Scott, PT   [facility] ([facility])    [address]  [facility] [address] [phone]            May 03, 2018  9:30 AM CDT   US RENAL COMPLETE with [email or web address]   [facility] ([facility])    [address]  [facility] [address] [phone]           Please bring a list of your medicines (including vitamins, minerals and over-the-counter drugs). Also, tell your doctor about any allergies you may have. Wear comfortable clothes and leave your valuables at home.  You do not need to do anything special to prepare for your exam.  Please call the Imaging Department at your exam site with any questions.            May 03, 2018 11:20 AM CDT   Cystoscopy with Rashard Arevalo MD, UB CYF   [facility] [place] ([facility])    [address]  [address]  [facility] [address] [phone]              Future tests that were ordered for you today     Open Future Orders        Priority Expected Expires Ordered    US Renal Complete [[id number]] Routine  4/24/2019 4/24/2018            Who to contact     If you have questions or need follow up information about today's clinic visit or your schedule please contact [facility] directly at [phone].  Normal or non-critical lab and imaging results will be communicated to  "you by MyChart, letter or phone within 4 business days after the clinic has received the results. If you do not hear from us within 7 days, please contact the clinic through [employer] or phone. If you have a critical or abnormal lab result, we will notify you by phone as soon as possible.  Submit refill requests through [employer] or call your pharmacy and they will forward the refill request to us. Please allow 3 business days for your refill to be completed.          Additional Information About Your Visit        [employer] Information     [employer] gives you secure access to your electronic health record. If you see a primary care provider, you can also send messages to your care team and make appointments. If you have questions, please call your primary care clinic.  If you do not have a primary care provider, please call [phone] and they will assist you.        Care EveryWhere ID     This is your Care EveryWhere ID. This could be used by other organizations to access your [facility] medical records  [id number]        Your Vitals Were     Pulse Height Pulse Oximetry BMI (Body Mass Index)          60 1.88 m (6' 2\") 97% 22.98 kg/m2         Blood Pressure from Last 3 Encounters:   04/22/18 110/62   03/19/18 108/60   12/12/17 120/75    Weight from Last 3 Encounters:   04/24/18 81.2 kg (179 lb)   03/19/18 81.2 kg (179 lb)   12/12/17 80.6 kg (177 lb 12.8 oz)                 Today's Medication Changes          These changes are accurate as of 4/24/18  1:17 PM.  If you have any questions, ask your nurse or doctor.               Start taking these medicines.        Dose/Directions    ciprofloxacin 500 MG tablet   Commonly known as:  CIPRO   Used for:  Acute cystitis with hematuria   Started by:  Paulette Lilly MD        Dose:  500 mg   Take 1 tablet (500 mg) by mouth 2 times daily for 7 days   Quantity:  14 tablet   Refills:  0            Where to get your medicines      Some of these will need a paper prescription and " others can be bought over the counter.  Ask your nurse if you have questions.     You don't need a prescription for these medications     ciprofloxacin 500 MG tablet                Primary Care Provider Office Phone # Fax #    Don Aviles [phone] [address] [place] [address]        Equal Access to Services     [facility]: Hadii aad ku hadasho Soomaali, waaxda luqadaha, qaybta kaalmada adeegyada, waxay idiin hayaan adeeg khautumnsh la'aan . So [facility] [phone].    ATENCIÓN: Si habla español, tiene a pitts disposición servicios gratuitos de asistencia lingüística. Llame al [phone].    We comply with applicable federal civil rights laws and [place] laws. We do not discriminate on the basis of race, color, national origin, age, disability, sex, sexual orientation, or gender identity.            Thank you!     Thank you for choosing [facility] [place]  for your care. Our goal is always to provide you with excellent care. Hearing back from our patients is one way we can continue to improve our services. Please take a few minutes to complete the written survey that you may receive in the mail after your visit with us. Thank you!             Your Updated Medication List - Protect others around you: Learn how to safely use, store and throw away your medicines at www.disposemymeds.org.          This list is accurate as of 4/24/18  1:17 PM.  Always use your most recent med list.                   Brand Name Dispense Instructions for use Diagnosis    acetaminophen 325 MG tablet    TYLENOL    100 tablet    Take  by mouth every 4 hours as needed for pain.        apixaban ANTICOAGULANT 5 MG tablet    ELIQUIS    180 tablet    Take 1 tablet (5 mg) by mouth 2 times daily    Chronic atrial fibrillation (H)       atorvastatin 20 MG tablet    LIPITOR    90 tablet    Take 1 tablet (20 mg) by mouth daily    Hyperlipidemia LDL goal <100        baclofen 20 MG tablet    LIORESAL    360 tablet    Take 1 tablet (20 mg) by mouth 4 times daily    Left hemiplegia (H)       cholecalciferol 1000 UNIT tablet    vitamin D3    90 tablet    Take 2 tablets (2,000 Units) by mouth daily    CVA (cerebral vascular accident) (H)       ciprofloxacin 500 MG tablet    CIPRO    14 tablet    Take 1 tablet (500 mg) by mouth 2 times daily for 7 days    Acute cystitis with hematuria       citalopram 20 MG tablet    celeXA    135 tablet    Take 1.5 tablets (30 mg) by mouth daily    Major depressive disorder, recurrent episode, moderate (H)       levETIRAcetam 1000 MG Tabs     180 tablet    Take 1 tablet by mouth 2 times daily    Seizure prophylaxis       metoprolol succinate 25 MG 24 hr tablet    TOPROL-XL    90 tablet    Take 1 tablet (25 mg) by mouth daily    Chronic atrial fibrillation (H)       mirabegron 50 MG 24 hr tablet    MYRBETRIQ    90 tablet    Take 1 tablet (50 mg) by mouth daily    Neurogenic bladder       Multi-vitamin Tabs tablet   Generic drug:  multivitamin, therapeutic with minerals     100 tablet    Take 0.5 tablets by mouth daily        NONFORMULARY      Protandim - herbal supplement        * order for DME     1 each    Equipment being ordered: Dispense a WHFO Leg Brace    Left hemiplegia (H), CVA (cerebral vascular accident) (H)       * order for DME     1 each    Equipment being ordered: Quad Cane-39 inch    Left hemiplegia (H), CVA (cerebral vascular accident) (H)       * order for DME     4 each    Equipment being ordered: daytime urinary leg bag   800cc    Urine incontinence       * order for DME     100 each    Equipment being ordered: Adhesive remover, wipes  [facility] )    Neurogenic bladder       * order for DME     90 each    Equipment being ordered:   Freedom Clear Condom catheter  (HCPCS Code )    Neurogenic bladder       * order for DME     12 each    Equipment being ordered:    Nightime urinary leg bag 1600cc  (HCPCS Code )     Neurogenic bladder       * order for DME     12 each    Equipment being ordered: Daytime Urinary leg bag-800cc  ([facility]ACS Code )    Neurogenic bladder       * order for DME     12 each    Equipment being ordered: Incontinence supplies - Posies  ([facility] UCLACS Code )    Neurogenic bladder       * order for DME     100 each    Equipment being ordered: Skin prep (no code - requested by patient)    Neurogenic bladder       * Notice:  This list has 9 medication(s) that are the same as other medications prescribed for you. Read the directions carefully, and ask your doctor or other care provider to review them with you.

## 2018-04-24 NOTE — TELEPHONE ENCOUNTER
Pt planned for follow-up visit today regarding hematuria.  Unfortunately I will be out of office today  Pt needs to see Urology regarding hematuria, may need cystoscopy.    Please call pt with referral info:    [facility]: [facility] [place] [phone]   [email or web address]  [place] [phone]   [email or web address]

## 2018-04-24 NOTE — PROGRESS NOTES
Marcus Hodges is a 62-year-old male with heart valve issues, status post CVA who has a neurogenic bladder and recent gross hematuria. He was in urgent care 2 days ago and given Cipro for a presumed urinary tract infection. His cultures return today growing Pseudomonas. In February 2016 he was treated for an Escherichia coli cystitis.  His gross hematuria has resolved  Other past medical history: Atrial enlargement, atrial flutter, atrial septal defect, CVA, dysphagia, abdominal hernia, history of DVT, mitral regurgitation, tricuspid regurgitation, glaucoma, incontinence, respiratory failure, palpitations, mumps, nonsmoker. Atrial septal defect repair, tonsillectomy, inguinal herniorrhaphy, right high enucleation, hemicraniectomy, craniotomy reconstruction, cystoscopy  Medications: Tylenol, eliquis, baclofen, Lipitor, vitamin D, Cipro, Celexa, levetiracetam, metoprolol, myrbetriq, multivitamin  Allergies: None  Exam: Ryan-parapsoas, alert and oriented, normal vital signs. With spouse. Normocephalic, normal respirations  Assessment: Gross hematuria secondary to UTI and anticoagulants, recurrent bacterial cystitis, neurogenic bladder  Plan: Renal ultrasound, office cystoscopy after completing Cipro, KIMI

## 2018-04-24 NOTE — TELEPHONE ENCOUNTER
Patient notified of information below.  He has seen Dr. Arevalo and will call his office for an appointment.  Advised patient to call me if he has difficulty scheduling an appointment.  Patient will call Urology office now.  SOLOMON Donohue RN    Patient is scheduled today at 1300 with Dr. Arevalo.  No further questions.  Will call back if any other questions or concerns.  SOLOMON Donohue RN

## 2018-04-24 NOTE — LETTER
4/24/2018       RE: Marcus Hodges  [address] [place] [address]     Dear Colleague,    Thank you for referring your patient, Marcus Hodges, to the [facility] [place] at [facility]. Please see a copy of my visit note below.    Marcus Hodges is a 62-year-old male with heart valve issues, status post CVA who has a neurogenic bladder and recent gross hematuria. He was in urgent care 2 days ago and given Cipro for a presumed urinary tract infection. His cultures return today growing Pseudomonas. In February 2016 he was treated for an Escherichia coli cystitis.  His gross hematuria has resolved  Other past medical history: Atrial enlargement, atrial flutter, atrial septal defect, CVA, dysphagia, abdominal hernia, history of DVT, mitral regurgitation, tricuspid regurgitation, glaucoma, incontinence, respiratory failure, palpitations, mumps, nonsmoker. Atrial septal defect repair, tonsillectomy, inguinal herniorrhaphy, right high enucleation, hemicraniectomy, craniotomy reconstruction, cystoscopy  Medications: Tylenol, eliquis, baclofen, Lipitor, vitamin D, Cipro, Celexa, levetiracetam, metoprolol, myrbetriq, multivitamin  Allergies: None  Exam: Ryan-parapsoas, alert and oriented, normal vital signs. With spouse. Normocephalic, normal respirations  Assessment: Gross hematuria secondary to UTI and anticoagulants, recurrent bacterial cystitis, neurogenic bladder  Plan: Renal ultrasound, office cystoscopy after completing Cipro, KIMI    Again, thank you for allowing me to participate in the care of your patient.      Sincerely,    Rashard Arevalo MD

## 2018-04-24 NOTE — TELEPHONE ENCOUNTER
Notified patient of urine culture results and called in rx to [facility] pharmacy on [address].   Dagmar Byrd CMA (AAMA) 4/24/2018 9:29 AM

## 2018-04-24 NOTE — NURSING NOTE
Pt had gross hem and went to urgent care. (notes in epic)  They did a UC and that is in epic too.  Pt has a condom cath.  Pt bag is clear now.  Urgent care sent abx to pharmacy but pt did not start them yet..... Wants to talk to you first.  Pt denies back pain.  No ua at this time.  SOLOMON Elkins, CMA

## 2018-04-25 NOTE — TELEPHONE ENCOUNTER
Please call patient to let him know that there's another bacterial strain growing in his urine that requires an additional antibiotic since the ciprofloxacin may not cover it.    I'd recommend starting nitrofurantoin 100 mg BID x 7 days in addition to the ciprofloxacin.  Please call this to the pharmacy of the patient's choice with no refills.

## 2018-04-25 NOTE — TELEPHONE ENCOUNTER
Called patient, left message to call back. Please advise Dr. Lilly message.    Venancio Gutierrez, Medical Assistant

## 2018-04-26 ENCOUNTER — HOSPITAL ENCOUNTER (OUTPATIENT)
Dept: PHYSICAL THERAPY | Facility: CLINIC | Age: 63
Setting detail: THERAPIES SERIES
End: 2018-04-26
Attending: INTERNAL MEDICINE
Payer: COMMERCIAL

## 2018-04-26 PROCEDURE — 40000719 ZZHC STATISTIC PT DEPARTMENT NEURO VISIT: Performed by: PHYSICAL THERAPIST

## 2018-04-26 PROCEDURE — 97112 NEUROMUSCULAR REEDUCATION: CPT | Mod: GP | Performed by: PHYSICAL THERAPIST

## 2018-04-26 PROCEDURE — 97162 PT EVAL MOD COMPLEX 30 MIN: CPT | Mod: GP | Performed by: PHYSICAL THERAPIST

## 2018-04-26 NOTE — TELEPHONE ENCOUNTER
Patient called back and notified of message below.  RX sent to the pharmacy.  No further questions.  Will call back if any other questions or concerns.  SOLOMON Donohue RN

## 2018-04-27 ENCOUNTER — TELEPHONE (OUTPATIENT)
Dept: UROLOGY | Facility: CLINIC | Age: 63
End: 2018-04-27

## 2018-04-27 NOTE — TELEPHONE ENCOUNTER
Marcus has appointment next Thursday for cystoscopy. He was placed on Macrobid and Cipro for positive culture yesterday by another provider. He is wondering id he should come in for appointment next week since he will still be on the antibiotic. Informed him yesto keep the appointment. He states urine was collected from bag as he wears a condom catheter. Elizabeth Zelaya LPN

## 2018-04-27 NOTE — PROGRESS NOTES
04/26/18 1000   Quick Adds   Type of Visit Initial OP PT Evaluation   General Information   Start of Care Date 04/26/18   Referring Physician Dr. Don Aviles   Orders Evaluate and Treat as Indicated   Order Date 03/19/18   Medical Diagnosis Left hemiplegia (G81.94),  Tramatic brain injury with loss of consciousness, sequela (S06.9X9S)   Onset of illness/injury or Date of Surgery 04/01/12   Precautions/Limitations fall precautions  (visual deficits)   Surgical/Medical history reviewed Yes   Pertinent history of current problem (include personal factors and/or comorbidities that impact the POC) Marcus is familiar with this writer from previous PT.  He presents with orders as above and goals for decreasing pain in the right knee and he would like to get back to walking without a cane if possible.  Marcus has a history of  an atrial septal defect originally repaired back in 1978 and went back for a second surgery 9 months later for mitral valve repair and additional repair of his atrial septal defect.  In 2004, he had atrial flutter and that was treated with ablation.  He went off of anticoagulation and had no problems until 04/2012 when he suffered a large right-sided stroke due to atrial fibrillation with thromboembolus.  He continues to have fairly severe left-sided hemiparesis.   He has had R knee pain presumably from greater loading and use on this side.  He states he is also starting to have some R shoulder Pain. 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 He has a  history of recurrent UTI's and currently is on an antibiotic for UTI,  He also has a history of depression.   Pertinent Visual History  No vision R eye, L eye field cut approximately 45 degrees peripheral    Prior level of function comment prior to  CVA he was independent and active, enjoyed hiking, outdoor activities.  He has gradually increased his functional level.  He showed good progress with  skilled PT intervention in the past with improvments in his motor planning , ability to bringn COM to the L side, improved gait speed.  He walks with NBQC or walking stick/trekking pole.     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     Current Assistive Devices Quad Cane  (trekking pole)   ADL Devices Commode;Shower/Tub Grab Bar;Shower/Tub Chair  (bedrail)   Assistive Devices Comments hinged AFO on the left.    Patient/Family Goals Statement 2-3 days a week, 8 hours a day has PCA. They Help with exercises, showering, estim. application.  He    General Information Comments trying to make it to the gym twice a week. Wife Nidia is present with patient at [facility] pt assists with getting dressed he is able to get the AFO on, R shoe, pants on sitting , gets his socks on lying down.  Hobbies include playing table tennis, reading.       Fall Risk Screen   Fall screen completed by PT   Have you fallen 2 or more times in the past year? No   Have you fallen and had an injury in the past year? No   Timed Up and Go score (seconds) 33.38  (trekking pole)   Is patient a fall risk? Yes;Department fall risk interventions implemented   Fall screen comments 1 fall within the last year when walking at the gym    Pain   Patient currently in pain Yes   Pain comments Pain in the right knee - more when I use it more, walk,   Sometimes I don't know if I should walk through the pain or what I should do something else.  By the end of the day both legs feel tired and L leg feels kind of painful , tingly and tired.   He thinks he would walk more laps in the gym if it didn't hurt.    Cognitive Status Examination   Orientation orientation to person, place and time   Level of  Consciousness alert   Follows Commands and Answers Questions 75% of the time;able to follow multistep instructions   Personal Safety and Judgment intact   Cognitive Comment some mild memory impairments noted.    Integumentary   Integumentary Comments no issues reported   Posture   Posture Comments rounded thoracic spine and lateral trunk shift to the L, flexed R lateral trunk,  L UE held flexed position, flexion at hips, Rotation pelvis posterior to the L in transverse plane, R knee in flexion, L hyperextension   Range of Motion (ROM)   ROM Comment lacks 25 degrees sitting knee extension R, dorsiflexion lacks 3 degrees to neutral on the R.   Sitting lacks 48 knee extension L,  did not assess ankle motion as has AFO - in the past decreased forefoot and toe mobility, ankle into inversion mildly and dorsiflexion lacking approximately 5 degrees to neutral.  Limited by tone/muscle overactivity.  Trunk ROM limited due to tone and muscle shortening/fascial shortening.  Thoracic spine especially in all planes.  R UE lacks 75 degrees approximately into extension.    Strength   Strength Comments 0 to trace ankle dorsiflexion on the left, able to initiate hip flexion in sitting, standing trace hamstring activaiton for knee fleixon L.  Impaired by impaired neuro motor function/timing and sequencing of muscle activation.  R LE WNL   Bed Mobility   Bed Mobility Comments independent with rail at home   Transfer Skills   Transfer Comments sit to stand with use of R UE for assist, posterior COM with initial stand, decreased loading onto the L LE.    Gait   Gait Comments ambulation with trekking pole, decreased step length B and does not achieve mid to end stance B, rotation of pelvis posterior to the L, decreased disassociation all joints L side, mild knee motion L with end stance to swing through. Step length L heel to MTP of the Right foot with L step,  R step just past the L .    Gait Special Tests 25 Foot Timed Walk   Seconds  23.09   Steps 27 Steps   Comments trekking pole   Balance   Balance Comments resists going to the L side, mild pushing present.   Sitting on 21 inch height mat he is able to reach down to the floor on outside of the L foot. (good improvement achieved during last PT episode of care as pt used to be very resistant to this and sympathetic system would increase in activation)  FOrward reach standing 4 inches  Standing on foam cushion close SBA EO 8 seconds, EC 3 seconds.    Balance Special Tests Timed Up and Go   Seconds 33.38 Seconds   Comments with trekking pole   Balance Special Tests Romberg   Comments not able to place feet together and keep balance.  Able to achieve feet 12 inches apart.    Sensory Examination   Sensory Perception Comments decreased sensation and awareness L side.    Coordination   Coordination Comments impaired timing and sequencing of movment, muscle control on the L side.  Overactive muscle reaction , compensatory movements  4 square 28.36 seconds with trekking pole and stepping over tape on floor.     Muscle Tone   Muscle Tone Comments Increased tone/muscle activity on the L side,  Increases more with challenges to balance and movements forward and to the L side.    Modality Interventions   Planned Modality Interventions (NMES)   Planned Modality Interventions Comments per therapist discretion   Planned Therapy Interventions   Planned Therapy Interventions balance training;bed mobility training;gait training;joint mobilization;motor coordination training;neuromuscular re-education;strengthening;stretching;transfer training;prosthetic fitting/training;orthotic fitting/training;manual therapy   Clinical Impression   Criteria for Skilled Therapeutic Interventions Met yes, treatment indicated   PT Diagnosis Decreased independence and functional activity tolerance due to impairments from CVA and secondary impairments following CVA.(R knee pain, shoulderpain)   Influenced by the following  impairments R knee pain, R shoulder pain, impaired muscle /fascial relationships , impaired motor coordinaiton , impaired balance, decreased ROM , postural impairments.    Functional limitations due to impairments gait with AD, impaired gait pattern, difficulty with curbs and stairs, assist needed with cares and dressing.    Clinical Presentation Stable/Uncomplicated   Clinical Presentation Rationale complicated but stable   Clinical Decision Making (Complexity) Low complexity  (due to being stable)   Therapy Frequency 2 times/Week   Predicted Duration of Therapy Intervention (days/wks) 2 x a week x 4 weeks and then trial doing 3 days in a row 2-3 weeks apart x 3    Risk & Benefits of therapy have been explained Yes   Patient, Family & other staff in agreement with plan of care Yes   Education Assessment   Preferred Learning Style Listening;Reading;Demonstration;Pictures/video   Barriers to Learning Visual;Cognitive   Goal 1   Goal Identifier 1 - stance width   Goal Description Marcus to demonstrate the ability to stand with a more narrow TAYLOR going from 12 inches feet apart at eval to 6 inches apart or less to demonstrate improved balance , loading on the L side to assist with improvement in gait pattern therefore efficiency and decreased secondary impairments such as his R knee pain.    Target Date 07/26/18   Goal 2   Goal Identifier 2- Forward reach   Goal Description Marcus to increase his forward reach from 4 inches at evaulation to 8 inches or greater to demonstrate improved balance, improved ability to bring COM forward over TAYLOR to assist in meeting all goals, improved sit to stand and improved gait.    Target Date 07/26/18   Goal 3   Goal Identifier 3-  Curb/stairs   Goal Description Marcus to have increased disassociation of movement/improved timing and sequencing of muscle activation for improved hip and knee flexion in standing demonstrated by the ability to step over upright shabana (4-6 inch object) with the  L LE to allow him to step up onto a curb without catching foot.   Target Date 07/26/18   Goal 4   Goal Identifier 4- Gait   Goal Description Marcus to increase gait speed to 19 seconds or less with AD and 24 steps or less to demonstrate improved gait speed and efficiency for community gait and also decreased overuse of the R side.    Target Date 07/26/18   Goal 5   Goal Identifier 5- Floor transfers.    Goal Description Marcus to perform transfer to and from floor with outside UE support and min assist of 1 to allow him to recover from a fall with assist from wife or caregiver.    Target Date 07/26/18   Goal 6   Goal Identifier 6 --  4 square   Goal Description Marcus to complete 4 square in 25 seconds with and AD and 40 seconds or less without an AD to demonstrate improved interlimb coordination, balance and body control to assist in meeting all goals and overall function.    Target Date 07/26/18   Goal 7   Goal Identifier HEP   Goal Description Marcus to be independent in appropriate HEP to continue to address his impairments following d/c from skilled PT intervention.    Target Date 07/26/18   Goal 8   Goal Identifier 8 functional reach/neuro motor control   Goal Description Marcus to perform reaching to the floor to the left to pick object up off the floor while maintaining L hand contact/closed chain on the mat to demonstrate impropved neuromotor planning, ability to bring COM to the left while keeping proper muscle activation/deactivation L and R side of body for greater safety with reaching activiites to the floor, unstrapping shoe, AFO.  Also for carryover of proper motor function for improved gait and transfers.    Target Date 02/27/18   Total Evaluation Time   Total Evaluation Time (Minutes) 45

## 2018-05-01 ENCOUNTER — TRANSFERRED RECORDS (OUTPATIENT)
Dept: HEALTH INFORMATION MANAGEMENT | Facility: CLINIC | Age: 63
End: 2018-05-01

## 2018-05-01 LAB — COLOGUARD-ABSTRACT: NEGATIVE

## 2018-05-03 ENCOUNTER — OFFICE VISIT (OUTPATIENT)
Dept: UROLOGY | Facility: CLINIC | Age: 63
End: 2018-05-03
Payer: COMMERCIAL

## 2018-05-03 ENCOUNTER — HOSPITAL ENCOUNTER (OUTPATIENT)
Dept: ULTRASOUND IMAGING | Facility: CLINIC | Age: 63
Discharge: HOME | End: 2018-05-03
Attending: UROLOGY | Admitting: UROLOGY
Payer: COMMERCIAL

## 2018-05-03 VITALS
HEART RATE: 56 BPM | OXYGEN SATURATION: 97 % | SYSTOLIC BLOOD PRESSURE: 106 MMHG | WEIGHT: 179 LBS | DIASTOLIC BLOOD PRESSURE: 70 MMHG | HEIGHT: 74 IN | BODY MASS INDEX: 22.97 KG/M2

## 2018-05-03 DIAGNOSIS — Z87.440 PERSONAL HISTORY OF URINARY TRACT INFECTION: Primary | ICD-10-CM

## 2018-05-03 DIAGNOSIS — N39.0 RECURRENT UTI: ICD-10-CM

## 2018-05-03 PROCEDURE — 52000 CYSTOURETHROSCOPY: CPT | Performed by: UROLOGY

## 2018-05-03 PROCEDURE — 76770 US EXAM ABDO BACK WALL COMP: CPT

## 2018-05-03 PROCEDURE — 99212 OFFICE O/P EST SF 10 MIN: CPT | Mod: 25 | Performed by: UROLOGY

## 2018-05-03 RX ORDER — CIPROFLOXACIN 500 MG/1
500 TABLET, FILM COATED ORAL ONCE
Qty: 1 TABLET | Refills: 0 | Status: SHIPPED | OUTPATIENT
Start: 2018-05-03 | End: 2018-05-03

## 2018-05-03 ASSESSMENT — PAIN SCALES - GENERAL: PAINLEVEL: NO PAIN (0)

## 2018-05-03 NOTE — NURSING NOTE
Prior to the start of the procedure and with procedural staff participation, I verbally confirmed the patient s identity using two indicators, relevant allergies, that the procedure was appropriate and matched the consent or emergent situation, and that the correct equipment/implants were available. Immediately prior to starting the procedure I conducted the Time Out with the procedural staff and re-confirmed the patient s name, procedure, and site/side. (The Joint Commission universal protocol was followed.)  Yes    Sedation (Moderate or Deep): None  Pt has signed the consent form stating that we will be doing a CYSTOSCOPY (with or without stent removal) today, and that it is the correct procedure. I verbally confirmed the patient s identity using two indicators, relevant allergies, and that the correct equipment was available. Post-op information given to the pt as needed at check-out. I have sent an appropriate antibiotic to the pharmacy in our building as recommended by the MD. SOLOMON Elkins CMA

## 2018-05-03 NOTE — MR AVS SNAPSHOT
"              After Visit Summary   5/3/2018    Marcus Hodges    MRN: [id number]           Patient Information     Date Of Birth          1955        Visit Information        Provider Department      5/3/2018 11:20 AM Rashard Arevalo MD; UB CYF [facility] [place]        Today's Diagnoses     Personal history of urinary tract infection    -  1      Care Instructions         AFTER YOUR CYSTOSCOPY         You have just completed a cystoscopy, or \"cysto\", which allowed your physician to learn more about your bladder (or to remove a stent placed after surgery). We suggest that you continue to avoid caffeine, fruit juice, and alcohol for the next 24 hours, however, you are encouraged to return to your normal activities.       A few things that are considered normal after your cystoscopy:    * small amount of bleeding (or spotting) that clears within the next 24 hours    * slight burning sensation with urination    * sensation to of needing to avoid more frequently    * the feeling of \"air\" in your urine    * mild discomfort that is relieved with Tylonol        Please contact our office promptly if you:    * develop a fever above 101 degrees    * are unable to urinate    * develop bright red blood that does not stop    * severe pain or swelling        And of course, please contact our office with any concerns or questions [phone]                Follow-ups after your visit        Your next 10 appointments already scheduled     May 10, 2018  2:45 PM CDT   CT ABDOMEN PELVIS W/O CONTRAST with [id number] [facility] [place] ([facility])    [address] [address] [address] [phone]           Please bring any scans or X-rays taken at other hospitals, if similar tests were done. Also bring a list of your medicines, including vitamins, minerals and over-the-counter drugs. It is safest to leave personal items at " home.  Be sure to tell your doctor:   If you have any allergies.   If there s any chance you are pregnant.   If you are breastfeeding.  How to prepare:   Do not eat or drink for 2 hours before your exam. If you need to take medicine, you may take it with small sips of water. (We may ask you to take liquid medicine as well.)   Please wear loose clothing, such as a sweat suit or jogging clothes. Avoid snaps, zippers and other metal. We may ask you to undress and put on a hospital gown.  Please arrive 30 minutes early for your CT. Once in the department you might be asked to drink water 15-20 minutes prior to your exam.  If indicated you may be asked to drink an oral contrast in advance of your CT.  If this is the case, the imaging team will let you know or be in contact with you prior to your appointment  Patients over 70 or patients with diabetes or kidney problems:   If you haven t had a blood test (creatinine test) within the last 30 days, the Cardiologist/Radiologist may require you to get this test prior to your exam.  If you have diabetes:   Continue to take your metformin medication on the day of your exam  If you have any questions, please call the Imaging Department where you will have your exam.            May 10, 2018  3:30 PM CDT   XR KUB with [id number]   [facility] [place] ([facility])    [address] [address]  [facility] [address] [phone]           Please bring a list of your current medicines to your exam. (Include vitamins, minerals and over-thecounter medicines.) Leave your valuables at home.  Tell your doctor if there is a chance you may be pregnant.  You do not need to do anything special for this exam.            Jun 11, 2018  1:00 PM CDT   Neuro Treatment with Ada Scott, PT   [facility] ([facility])    [address]  [place] [address] [phone]            Jun 14, 2018  1:00 PM CDT    Neuro Treatment with Ada Scott, PT   [facility] ([facility])    [address] [facility] [address]   [phone]            Jun 18, 2018  1:00 PM CDT   Neuro Treatment with Ada Scott, PT   [facility] ([facility])    [address] [facility] [address]   [phone]            Jun 21, 2018  9:00 AM CDT   Neuro Treatment with Ada Scott, PT   [facility] ([facility])    [address] [facility] [address]   [phone]            Jul 03, 2018  8:00 AM CDT   Neuro Treatment with Ada Scott, PT   [facility] ([facility])    [address] [facility] [address]   [phone]            Jul 06, 2018 10:30 AM CDT   Neuro Treatment with Ada Scott PT   [facility] ([facility])    [address] [facility] [address]   [phone]            Jul 10, 2018  9:30 AM CDT   Neuro Treatment with Ada Scott, PT   [facility] ([facility])    [address] [facility] [address]   [phone]            Jul 12, 2018  9:00 AM CDT   Neuro Treatment with Ada Scott, PT   [facility] ([facility])    [address] [facility] [address]   [phone]              Future tests that were ordered for you today     Open Future Orders        Priority Expected Expires Ordered    XR KUB [[id number]] Routine  5/3/2019 5/3/2018    CT Abdomen Pelvis w/o Contrast [[id number]] Routine  5/3/2019 5/3/2018            Who to contact     If you have questions or need follow up information about today's clinic visit or your schedule please contact [facility] [place] directly at [phone].  Normal or non-critical lab and imaging results will be  "communicated to you by MyChart, letter or phone within 4 business days after the clinic has received the results. If you do not hear from us within 7 days, please contact the clinic through [employer] [facility] or phone. If you have a critical or abnormal lab result, we will notify you by phone as soon as possible.  Submit refill requests through [employer] or call your pharmacy and they will forward the refill request to us. Please allow 3 business days for your refill to be completed.          Additional Information About Your Visit        [employer] [facility] Information     [employer] [facility] gives you secure access to your electronic health record. If you see a primary care provider, you can also send messages to your care team and make appointments. If you have questions, please call your primary care clinic.  If you do not have a primary care provider, please call [phone] and they will assist you.        Care EveryWhere ID     This is your Care EveryWhere ID. This could be used by other organizations to access your [place] medical records  [id number]        Your Vitals Were     Pulse Height Pulse Oximetry BMI (Body Mass Index)          56 1.88 m (6' 2\") 97% 22.98 kg/m2         Blood Pressure from Last 3 Encounters:   05/03/18 106/70   04/22/18 110/62   03/19/18 108/60    Weight from Last 3 Encounters:   05/03/18 81.2 kg (179 lb)   04/24/18 81.2 kg (179 lb)   03/19/18 81.2 kg (179 lb)              We Performed the Following     CYSTOURETHROSCOPY ([id number])        Primary Care Provider Office Phone # Fax #    Don Aviles [phone] [address] [place] [address]        Equal Access to Services     [facility] AH: Hadii radha Crespo, waaxda luqadaha, qaybta kaalmaxwell russell. So [facility] [phone].    ATENCIÓN: Si habla español, tiene a pitts disposición servicios gratuitos de asistencia lingüística. Llame al [phone].    We comply with applicable " federal civil rights laws and [place] laws. We do not discriminate on the basis of race, color, national origin, age, disability, sex, sexual orientation, or gender identity.            Thank you!     Thank you for choosing [facility] [place]  for your care. Our goal is always to provide you with excellent care. Hearing back from our patients is one way we can continue to improve our services. Please take a few minutes to complete the written survey that you may receive in the mail after your visit with us. Thank you!             Your Updated Medication List - Protect others around you: Learn how to safely use, store and throw away your medicines at www.disposemymeds.org.          This list is accurate as of 5/3/18 12:10 PM.  Always use your most recent med list.                   Brand Name Dispense Instructions for use Diagnosis    acetaminophen 325 MG tablet    TYLENOL    100 tablet    Take  by mouth every 4 hours as needed for pain.        apixaban ANTICOAGULANT 5 MG tablet    ELIQUIS    180 tablet    Take 1 tablet (5 mg) by mouth 2 times daily    Chronic atrial fibrillation (H)       atorvastatin 20 MG tablet    LIPITOR    90 tablet    Take 1 tablet (20 mg) by mouth daily    Hyperlipidemia LDL goal <100       baclofen 20 MG tablet    LIORESAL    360 tablet    Take 1 tablet (20 mg) by mouth 4 times daily    Left hemiplegia (H)       cholecalciferol 1000 UNIT tablet    vitamin D3    90 tablet    Take 2 tablets (2,000 Units) by mouth daily    CVA (cerebral vascular accident) (H)       citalopram 20 MG tablet    celeXA    135 tablet    Take 1.5 tablets (30 mg) by mouth daily    Major depressive disorder, recurrent episode, moderate (H)       levETIRAcetam 1000 MG Tabs     180 tablet    Take 1 tablet by mouth 2 times daily    Seizure prophylaxis       metoprolol succinate 25 MG 24 hr tablet    TOPROL-XL    90 tablet    Take 1 tablet (25 mg) by mouth daily    Chronic atrial  fibrillation (H)       mirabegron 50 MG 24 hr tablet    MYRBETRIQ    90 tablet    Take 1 tablet (50 mg) by mouth daily    Neurogenic bladder       Multi-vitamin Tabs tablet   Generic drug:  multivitamin, therapeutic with minerals     100 tablet    Take 0.5 tablets by mouth daily        nitroFURantoin (macrocrystal-monohydrate) 100 MG capsule    MACROBID    14 capsule    Take 1 capsule (100 mg) by mouth 2 times daily    Urinary tract infection       NONFORMULARY      Protandim - herbal supplement        * order for DME     1 each    Equipment being ordered: Dispense a WHFO Leg Brace    Left hemiplegia (H), CVA (cerebral vascular accident) (H)       * order for DME     1 each    Equipment being ordered: Quad Cane-39 inch    Left hemiplegia (H), CVA (cerebral vascular accident) (H)       * order for DME     4 each    Equipment being ordered: daytime urinary leg bag   800cc    Urine incontinence       * order for DME     100 each    Equipment being ordered: Adhesive remover, wipes  (HCPCS Code )    Neurogenic bladder       * order for DME     90 each    Equipment being ordered:   Freedom Clear Condom catheter  (HCPCS Code )    Neurogenic bladder       * order for DME     12 each    Equipment being ordered:    Nightime urinary leg bag 1600cc  (HCPCS Code )    Neurogenic bladder       * order for DME     12 each    Equipment being ordered: Daytime Urinary leg bag-800cc  (HCPCS Code )    Neurogenic bladder       * order for DME     12 each    Equipment being ordered: Incontinence supplies - Posies  (HCPCS Code )    Neurogenic bladder       * order for DME     100 each    Equipment being ordered: Skin prep (no code - requested by patient)    Neurogenic bladder       * Notice:  This list has 9 medication(s) that are the same as other medications prescribed for you. Read the directions carefully, and ask your doctor or other care provider to review them with you.

## 2018-05-03 NOTE — LETTER
5/3/2018       RE: Marcus Hodges  [address] [place] [address]     Dear Colleague,    Thank you for referring your patient, Marcus Hodges, to the [facility] [place] at [facility]. Please see a copy of my visit note below.    Marcus Hodges is a pleasant 62-year-old male with recurrent bacterial cystitis and left hemiplegia and neurogenic bladder from a stroke. He has had Escherichia coli cystitis and Pseudomonas cystitis recently.  Renal ultrasound shows a normal right kidney and poor visualization of the left kidney  He returns for cystoscopy and has completed his last dose of Cipro this morning  Other past medical history previously reviewed  Medications: No change  Allergies: None  Flexible cystoscopy-normal urethra, obstructing lateral lobes of the prostate, no middle lobe of prostate. Short prostatic length. 3+ trabeculation of bladder wall, normal ureteral orifices. Small stone fragments base of bladder and at left ureteral orifice. Calcifications are very light brown in color, not yellow  Normal sphincter tone, no rectal mass or impaction, benign feeling prostate, normal seminal vesicles  Assessment: Recurrent bacterial cystitis, BPH, neurogenic bladder, stones and bladder and at left ureteral orifice  Plan: CT scan abdomen and pelvis without contrast, KUB  Rule out ureteral calculi and left renal calculi    Again, thank you for allowing me to participate in the care of your patient.      Sincerely,    Rashard Arevalo MD

## 2018-05-03 NOTE — PROGRESS NOTES
Marcus Hodges is a pleasant 62-year-old male with recurrent bacterial cystitis and left hemiplegia and neurogenic bladder from a stroke. He has had Escherichia coli cystitis and Pseudomonas cystitis recently.  Renal ultrasound shows a normal right kidney and poor visualization of the left kidney  He returns for cystoscopy and has completed his last dose of Cipro this morning  Other past medical history previously reviewed  Medications: No change  Allergies: None  Flexible cystoscopy-normal urethra, obstructing lateral lobes of the prostate, no middle lobe of prostate. Short prostatic length. 3+ trabeculation of bladder wall, normal ureteral orifices. Small stone fragments base of bladder and at left ureteral orifice. Calcifications are very light brown in color, not yellow  Normal sphincter tone, no rectal mass or impaction, benign feeling prostate, normal seminal vesicles  Assessment: Recurrent bacterial cystitis, BPH, neurogenic bladder, stones and bladder and at left ureteral orifice  Plan: CT scan abdomen and pelvis without contrast, KUB  Rule out ureteral calculi and left renal calculi

## 2018-05-03 NOTE — PATIENT INSTRUCTIONS
"     AFTER YOUR CYSTOSCOPY         You have just completed a cystoscopy, or \"cysto\", which allowed your physician to learn more about your bladder (or to remove a stent placed after surgery). We suggest that you continue to avoid caffeine, fruit juice, and alcohol for the next 24 hours, however, you are encouraged to return to your normal activities.       A few things that are considered normal after your cystoscopy:    * small amount of bleeding (or spotting) that clears within the next 24 hours    * slight burning sensation with urination    * sensation to of needing to avoid more frequently    * the feeling of \"air\" in your urine    * mild discomfort that is relieved with Tylonol        Please contact our office promptly if you:    * develop a fever above 101 degrees    * are unable to urinate    * develop bright red blood that does not stop    * severe pain or swelling        And of course, please contact our office with any concerns or questions [phone]        "

## 2018-05-06 ENCOUNTER — MYC MEDICAL ADVICE (OUTPATIENT)
Dept: PEDIATRICS | Facility: CLINIC | Age: 63
End: 2018-05-06

## 2018-05-08 ENCOUNTER — TELEPHONE (OUTPATIENT)
Dept: PEDIATRICS | Facility: CLINIC | Age: 63
End: 2018-05-08

## 2018-05-08 DIAGNOSIS — Z12.11 SCREENING FOR COLON CANCER: Primary | ICD-10-CM

## 2018-05-08 NOTE — TELEPHONE ENCOUNTER
Called and spoke with patient. Reviewed provider note below. Mailed letter with lab results to patient. Jessi Keen MA

## 2018-05-08 NOTE — TELEPHONE ENCOUNTER
Please call pt.   Recent Cologuard colon cancer screening result received: Negative.  Please mal pt a copy of result (in outbasket)

## 2018-05-08 NOTE — LETTER
[facility]  [address]  [place] [address]                  [phone]   May 8, 2018    Marcus Hodges  [address] [place] [address]      Rick Burnett,    Here is a summary of your recent test results:    Your recent Cologuard colon cancer screening result received: Negative    Your test results are enclosed.      Please contact me if you have any questions.           Thank you very much for choosing [facility]    Best regards,    Don Aviles MD        Results for orders placed or performed during the hospital encounter of 05/03/18   US Renal Complete [[id number]]    Narrative    ULTRASOUND RENAL COMPLETE 5/3/2018 9:54 AM     HISTORY: Pseudomonas UTI. Recurrent UTI.    COMPARISON: Renal ultrasound 11/17/2016.    FINDINGS: The right kidney is normal in echogenicity without  hydronephrosis. Right kidney measures 11.1 x 5.2 x 6.0 cm and the left  kidney is not well visualized due to overlying bowel gas. No definite  right renal calculi are appreciated. Bladder is partly decompressed,  but otherwise unremarkable.      Impression    IMPRESSION: Right kidney is unremarkable without evidence of  hydronephrosis or renal calculi. Left kidney not completely visualized  due to overlying bowel gas. This is similar to report from prior  ultrasound.    SHREYA RIVERA MD

## 2018-05-09 NOTE — TELEPHONE ENCOUNTER
Called patient/spouse and LM asking if they would like form mailed or if they would like to pick it up.  Johanna Dao LPN

## 2018-05-10 ENCOUNTER — HOSPITAL ENCOUNTER (OUTPATIENT)
Dept: GENERAL RADIOLOGY | Facility: CLINIC | Age: 63
Discharge: HOME | End: 2018-05-10
Attending: UROLOGY | Admitting: UROLOGY
Payer: COMMERCIAL

## 2018-05-10 ENCOUNTER — HOSPITAL ENCOUNTER (OUTPATIENT)
Dept: CT IMAGING | Facility: CLINIC | Age: 63
Discharge: HOME | End: 2018-05-10
Attending: UROLOGY | Admitting: UROLOGY
Payer: COMMERCIAL

## 2018-05-10 DIAGNOSIS — Z87.440 PERSONAL HISTORY OF URINARY TRACT INFECTION: ICD-10-CM

## 2018-05-10 PROCEDURE — 74019 RADEX ABDOMEN 2 VIEWS: CPT

## 2018-05-10 PROCEDURE — 74176 CT ABD & PELVIS W/O CONTRAST: CPT

## 2018-05-11 ENCOUNTER — TRANSFERRED RECORDS (OUTPATIENT)
Dept: HEALTH INFORMATION MANAGEMENT | Facility: CLINIC | Age: 63
End: 2018-05-11

## 2018-05-15 DIAGNOSIS — N20.0 CALCULUS OF KIDNEY: Primary | ICD-10-CM

## 2018-06-11 ENCOUNTER — HOSPITAL ENCOUNTER (OUTPATIENT)
Dept: PHYSICAL THERAPY | Facility: CLINIC | Age: 63
Setting detail: THERAPIES SERIES
End: 2018-06-11
Attending: INTERNAL MEDICINE
Payer: COMMERCIAL

## 2018-06-11 PROCEDURE — 97116 GAIT TRAINING THERAPY: CPT | Mod: GP | Performed by: PHYSICAL THERAPIST

## 2018-06-11 PROCEDURE — 40000719 ZZHC STATISTIC PT DEPARTMENT NEURO VISIT: Performed by: PHYSICAL THERAPIST

## 2018-06-11 PROCEDURE — 97110 THERAPEUTIC EXERCISES: CPT | Mod: GP | Performed by: PHYSICAL THERAPIST

## 2018-06-11 PROCEDURE — 97112 NEUROMUSCULAR REEDUCATION: CPT | Mod: GP | Performed by: PHYSICAL THERAPIST

## 2018-06-14 ENCOUNTER — HOSPITAL ENCOUNTER (OUTPATIENT)
Dept: PHYSICAL THERAPY | Facility: CLINIC | Age: 63
Setting detail: THERAPIES SERIES
End: 2018-06-14
Attending: INTERNAL MEDICINE
Payer: COMMERCIAL

## 2018-06-14 PROCEDURE — 97116 GAIT TRAINING THERAPY: CPT | Mod: GP | Performed by: PHYSICAL THERAPIST

## 2018-06-14 PROCEDURE — 97110 THERAPEUTIC EXERCISES: CPT | Mod: GP | Performed by: PHYSICAL THERAPIST

## 2018-06-14 PROCEDURE — 97112 NEUROMUSCULAR REEDUCATION: CPT | Mod: GP | Performed by: PHYSICAL THERAPIST

## 2018-06-14 PROCEDURE — 40000719 ZZHC STATISTIC PT DEPARTMENT NEURO VISIT: Performed by: PHYSICAL THERAPIST

## 2018-06-18 ENCOUNTER — HOSPITAL ENCOUNTER (OUTPATIENT)
Dept: PHYSICAL THERAPY | Facility: CLINIC | Age: 63
Setting detail: THERAPIES SERIES
End: 2018-06-18
Attending: INTERNAL MEDICINE
Payer: COMMERCIAL

## 2018-06-18 PROCEDURE — 97112 NEUROMUSCULAR REEDUCATION: CPT | Mod: GP | Performed by: PHYSICAL THERAPIST

## 2018-06-18 PROCEDURE — 40000719 ZZHC STATISTIC PT DEPARTMENT NEURO VISIT: Performed by: PHYSICAL THERAPIST

## 2018-06-18 PROCEDURE — 97116 GAIT TRAINING THERAPY: CPT | Mod: GP | Performed by: PHYSICAL THERAPIST

## 2018-06-21 ENCOUNTER — HOSPITAL ENCOUNTER (OUTPATIENT)
Dept: PHYSICAL THERAPY | Facility: CLINIC | Age: 63
Setting detail: THERAPIES SERIES
End: 2018-06-21
Attending: INTERNAL MEDICINE
Payer: COMMERCIAL

## 2018-06-21 PROCEDURE — 97116 GAIT TRAINING THERAPY: CPT | Mod: GP | Performed by: PHYSICAL THERAPIST

## 2018-06-21 PROCEDURE — 97110 THERAPEUTIC EXERCISES: CPT | Mod: GP | Performed by: PHYSICAL THERAPIST

## 2018-06-21 PROCEDURE — 40000719 ZZHC STATISTIC PT DEPARTMENT NEURO VISIT: Performed by: PHYSICAL THERAPIST

## 2018-06-21 PROCEDURE — 97112 NEUROMUSCULAR REEDUCATION: CPT | Mod: GP | Performed by: PHYSICAL THERAPIST

## 2018-07-03 ENCOUNTER — HOSPITAL ENCOUNTER (OUTPATIENT)
Dept: PHYSICAL THERAPY | Facility: CLINIC | Age: 63
Setting detail: THERAPIES SERIES
End: 2018-07-03
Attending: INTERNAL MEDICINE
Payer: COMMERCIAL

## 2018-07-03 PROCEDURE — 97112 NEUROMUSCULAR REEDUCATION: CPT | Mod: GP | Performed by: PHYSICAL THERAPIST

## 2018-07-03 PROCEDURE — 97140 MANUAL THERAPY 1/> REGIONS: CPT | Mod: GP | Performed by: PHYSICAL THERAPIST

## 2018-07-03 PROCEDURE — 40000719 ZZHC STATISTIC PT DEPARTMENT NEURO VISIT: Performed by: PHYSICAL THERAPIST

## 2018-07-03 PROCEDURE — 97110 THERAPEUTIC EXERCISES: CPT | Mod: GP | Performed by: PHYSICAL THERAPIST

## 2018-07-03 PROCEDURE — 97116 GAIT TRAINING THERAPY: CPT | Mod: GP | Performed by: PHYSICAL THERAPIST

## 2018-07-06 ENCOUNTER — HOSPITAL ENCOUNTER (OUTPATIENT)
Dept: PHYSICAL THERAPY | Facility: CLINIC | Age: 63
Setting detail: THERAPIES SERIES
End: 2018-07-06
Attending: INTERNAL MEDICINE
Payer: COMMERCIAL

## 2018-07-06 PROCEDURE — 97110 THERAPEUTIC EXERCISES: CPT | Mod: GP | Performed by: PHYSICAL THERAPIST

## 2018-07-06 PROCEDURE — 40000719 ZZHC STATISTIC PT DEPARTMENT NEURO VISIT: Performed by: PHYSICAL THERAPIST

## 2018-07-06 PROCEDURE — 97112 NEUROMUSCULAR REEDUCATION: CPT | Mod: GP | Performed by: PHYSICAL THERAPIST

## 2018-07-06 PROCEDURE — 97116 GAIT TRAINING THERAPY: CPT | Mod: GP | Performed by: PHYSICAL THERAPIST

## 2018-07-10 ENCOUNTER — HOSPITAL ENCOUNTER (OUTPATIENT)
Dept: PHYSICAL THERAPY | Facility: CLINIC | Age: 63
Setting detail: THERAPIES SERIES
End: 2018-07-10
Attending: INTERNAL MEDICINE
Payer: COMMERCIAL

## 2018-07-10 PROCEDURE — 97112 NEUROMUSCULAR REEDUCATION: CPT | Mod: GP | Performed by: PHYSICAL THERAPIST

## 2018-07-10 PROCEDURE — 97116 GAIT TRAINING THERAPY: CPT | Mod: GP | Performed by: PHYSICAL THERAPIST

## 2018-07-10 PROCEDURE — 40000719 ZZHC STATISTIC PT DEPARTMENT NEURO VISIT: Performed by: PHYSICAL THERAPIST

## 2018-07-30 ENCOUNTER — HOSPITAL ENCOUNTER (OUTPATIENT)
Dept: PHYSICAL THERAPY | Facility: CLINIC | Age: 63
Setting detail: THERAPIES SERIES
End: 2018-07-30
Attending: INTERNAL MEDICINE
Payer: COMMERCIAL

## 2018-07-30 PROCEDURE — 97116 GAIT TRAINING THERAPY: CPT | Mod: GP | Performed by: PHYSICAL THERAPIST

## 2018-07-30 PROCEDURE — 40000719 ZZHC STATISTIC PT DEPARTMENT NEURO VISIT: Performed by: PHYSICAL THERAPIST

## 2018-07-30 PROCEDURE — 97112 NEUROMUSCULAR REEDUCATION: CPT | Mod: GP | Performed by: PHYSICAL THERAPIST

## 2018-07-30 PROCEDURE — 97110 THERAPEUTIC EXERCISES: CPT | Mod: GP | Performed by: PHYSICAL THERAPIST

## 2018-07-31 ENCOUNTER — HOSPITAL ENCOUNTER (OUTPATIENT)
Dept: PHYSICAL THERAPY | Facility: CLINIC | Age: 63
Setting detail: THERAPIES SERIES
End: 2018-07-31
Attending: INTERNAL MEDICINE
Payer: COMMERCIAL

## 2018-07-31 PROCEDURE — 40000719 ZZHC STATISTIC PT DEPARTMENT NEURO VISIT: Performed by: PHYSICAL THERAPIST

## 2018-07-31 PROCEDURE — 97110 THERAPEUTIC EXERCISES: CPT | Mod: GP | Performed by: PHYSICAL THERAPIST

## 2018-07-31 PROCEDURE — 97116 GAIT TRAINING THERAPY: CPT | Mod: GP | Performed by: PHYSICAL THERAPIST

## 2018-07-31 PROCEDURE — 97112 NEUROMUSCULAR REEDUCATION: CPT | Mod: GP | Performed by: PHYSICAL THERAPIST

## 2018-08-01 ENCOUNTER — HOSPITAL ENCOUNTER (OUTPATIENT)
Dept: PHYSICAL THERAPY | Facility: CLINIC | Age: 63
Setting detail: THERAPIES SERIES
End: 2018-08-01
Attending: INTERNAL MEDICINE
Payer: COMMERCIAL

## 2018-08-01 PROCEDURE — 97112 NEUROMUSCULAR REEDUCATION: CPT | Mod: GP | Performed by: PHYSICAL THERAPIST

## 2018-08-01 PROCEDURE — 40000719 ZZHC STATISTIC PT DEPARTMENT NEURO VISIT: Performed by: PHYSICAL THERAPIST

## 2018-08-01 PROCEDURE — 97116 GAIT TRAINING THERAPY: CPT | Mod: GP | Performed by: PHYSICAL THERAPIST

## 2018-08-20 ENCOUNTER — HOSPITAL ENCOUNTER (OUTPATIENT)
Dept: PHYSICAL THERAPY | Facility: CLINIC | Age: 63
Setting detail: THERAPIES SERIES
End: 2018-08-20
Attending: INTERNAL MEDICINE
Payer: COMMERCIAL

## 2018-08-20 PROCEDURE — 97116 GAIT TRAINING THERAPY: CPT | Mod: GP | Performed by: PHYSICAL THERAPIST

## 2018-08-20 PROCEDURE — 97140 MANUAL THERAPY 1/> REGIONS: CPT | Mod: GP | Performed by: PHYSICAL THERAPIST

## 2018-08-20 PROCEDURE — 97110 THERAPEUTIC EXERCISES: CPT | Mod: GP | Performed by: PHYSICAL THERAPIST

## 2018-08-20 PROCEDURE — 40000719 ZZHC STATISTIC PT DEPARTMENT NEURO VISIT: Performed by: PHYSICAL THERAPIST

## 2018-08-20 PROCEDURE — 97112 NEUROMUSCULAR REEDUCATION: CPT | Mod: GP | Performed by: PHYSICAL THERAPIST

## 2018-08-21 ENCOUNTER — HOSPITAL ENCOUNTER (OUTPATIENT)
Dept: PHYSICAL THERAPY | Facility: CLINIC | Age: 63
Setting detail: THERAPIES SERIES
End: 2018-08-21
Attending: INTERNAL MEDICINE
Payer: COMMERCIAL

## 2018-08-21 PROCEDURE — 40000719 ZZHC STATISTIC PT DEPARTMENT NEURO VISIT: Performed by: PHYSICAL THERAPIST

## 2018-08-21 PROCEDURE — 97112 NEUROMUSCULAR REEDUCATION: CPT | Mod: GP | Performed by: PHYSICAL THERAPIST

## 2018-08-21 PROCEDURE — 97116 GAIT TRAINING THERAPY: CPT | Mod: GP | Performed by: PHYSICAL THERAPIST

## 2018-08-23 ENCOUNTER — HOSPITAL ENCOUNTER (OUTPATIENT)
Dept: PHYSICAL THERAPY | Facility: CLINIC | Age: 63
Setting detail: THERAPIES SERIES
End: 2018-08-23
Attending: INTERNAL MEDICINE
Payer: COMMERCIAL

## 2018-08-23 PROCEDURE — 97112 NEUROMUSCULAR REEDUCATION: CPT | Mod: GP | Performed by: PHYSICAL THERAPIST

## 2018-08-23 PROCEDURE — 97116 GAIT TRAINING THERAPY: CPT | Mod: GP | Performed by: PHYSICAL THERAPIST

## 2018-08-23 PROCEDURE — 40000719 ZZHC STATISTIC PT DEPARTMENT NEURO VISIT: Performed by: PHYSICAL THERAPIST

## 2018-09-10 ENCOUNTER — HOSPITAL ENCOUNTER (OUTPATIENT)
Dept: PHYSICAL THERAPY | Facility: CLINIC | Age: 63
Setting detail: THERAPIES SERIES
End: 2018-09-10
Attending: INTERNAL MEDICINE
Payer: COMMERCIAL

## 2018-09-10 PROCEDURE — 97112 NEUROMUSCULAR REEDUCATION: CPT | Mod: GP | Performed by: PHYSICAL THERAPIST

## 2018-09-10 PROCEDURE — 40000719 ZZHC STATISTIC PT DEPARTMENT NEURO VISIT: Performed by: PHYSICAL THERAPIST

## 2018-09-10 PROCEDURE — 97110 THERAPEUTIC EXERCISES: CPT | Mod: GP | Performed by: PHYSICAL THERAPIST

## 2018-09-11 ENCOUNTER — HOSPITAL ENCOUNTER (OUTPATIENT)
Dept: PHYSICAL THERAPY | Facility: CLINIC | Age: 63
Setting detail: THERAPIES SERIES
End: 2018-09-11
Attending: INTERNAL MEDICINE
Payer: COMMERCIAL

## 2018-09-11 PROCEDURE — 97116 GAIT TRAINING THERAPY: CPT | Mod: GP | Performed by: PHYSICAL THERAPIST

## 2018-09-11 PROCEDURE — 40000719 ZZHC STATISTIC PT DEPARTMENT NEURO VISIT: Performed by: PHYSICAL THERAPIST

## 2018-09-11 PROCEDURE — 97112 NEUROMUSCULAR REEDUCATION: CPT | Mod: GP | Performed by: PHYSICAL THERAPIST

## 2018-09-18 NOTE — ADDENDUM NOTE
Encounter addended by: Ada Scott, PT on: 9/18/2018 11:06 AM<BR>     Actions taken: Flowsheet accepted

## 2018-09-27 ENCOUNTER — HOSPITAL ENCOUNTER (OUTPATIENT)
Dept: PHYSICAL THERAPY | Facility: CLINIC | Age: 63
Setting detail: THERAPIES SERIES
End: 2018-09-27
Attending: INTERNAL MEDICINE
Payer: COMMERCIAL

## 2018-09-27 PROCEDURE — 40000719 ZZHC STATISTIC PT DEPARTMENT NEURO VISIT: Performed by: PHYSICAL THERAPIST

## 2018-09-27 PROCEDURE — 97112 NEUROMUSCULAR REEDUCATION: CPT | Mod: GP | Performed by: PHYSICAL THERAPIST

## 2018-09-27 PROCEDURE — 97116 GAIT TRAINING THERAPY: CPT | Mod: GP | Performed by: PHYSICAL THERAPIST

## 2018-10-02 ENCOUNTER — HOSPITAL ENCOUNTER (OUTPATIENT)
Dept: PHYSICAL THERAPY | Facility: CLINIC | Age: 63
Setting detail: THERAPIES SERIES
End: 2018-10-02
Attending: INTERNAL MEDICINE
Payer: COMMERCIAL

## 2018-10-02 PROCEDURE — 97112 NEUROMUSCULAR REEDUCATION: CPT | Mod: GP | Performed by: PHYSICAL THERAPIST

## 2018-10-02 PROCEDURE — 40000719 ZZHC STATISTIC PT DEPARTMENT NEURO VISIT: Performed by: PHYSICAL THERAPIST

## 2018-10-02 PROCEDURE — 97116 GAIT TRAINING THERAPY: CPT | Mod: GP | Performed by: PHYSICAL THERAPIST

## 2018-10-09 ENCOUNTER — HOSPITAL ENCOUNTER (OUTPATIENT)
Dept: PHYSICAL THERAPY | Facility: CLINIC | Age: 63
Setting detail: THERAPIES SERIES
End: 2018-10-09
Attending: INTERNAL MEDICINE
Payer: COMMERCIAL

## 2018-10-09 PROCEDURE — 97112 NEUROMUSCULAR REEDUCATION: CPT | Mod: GP | Performed by: PHYSICAL THERAPIST

## 2018-10-09 PROCEDURE — 97116 GAIT TRAINING THERAPY: CPT | Mod: GP | Performed by: PHYSICAL THERAPIST

## 2018-10-09 PROCEDURE — 40000719 ZZHC STATISTIC PT DEPARTMENT NEURO VISIT: Performed by: PHYSICAL THERAPIST

## 2018-10-15 ENCOUNTER — HOSPITAL ENCOUNTER (OUTPATIENT)
Dept: PHYSICAL THERAPY | Facility: CLINIC | Age: 63
Setting detail: THERAPIES SERIES
End: 2018-10-15
Attending: INTERNAL MEDICINE
Payer: COMMERCIAL

## 2018-10-15 PROCEDURE — 97116 GAIT TRAINING THERAPY: CPT | Mod: GP | Performed by: PHYSICAL THERAPIST

## 2018-10-15 PROCEDURE — 40000719 ZZHC STATISTIC PT DEPARTMENT NEURO VISIT: Performed by: PHYSICAL THERAPIST

## 2018-10-15 PROCEDURE — 97112 NEUROMUSCULAR REEDUCATION: CPT | Mod: GP | Performed by: PHYSICAL THERAPIST

## 2018-10-18 ENCOUNTER — TELEPHONE (OUTPATIENT)
Dept: PEDIATRICS | Facility: CLINIC | Age: 63
End: 2018-10-18
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
## 2018-10-19 ENCOUNTER — OFFICE VISIT (OUTPATIENT)
Dept: URGENT CARE | Facility: URGENT CARE | Age: 63
End: 2018-10-19
Payer: COMMERCIAL

## 2018-10-19 VITALS
TEMPERATURE: 98.4 F | DIASTOLIC BLOOD PRESSURE: 64 MMHG | BODY MASS INDEX: 22.06 KG/M2 | OXYGEN SATURATION: 97 % | WEIGHT: 171.8 LBS | HEART RATE: 58 BPM | SYSTOLIC BLOOD PRESSURE: 112 MMHG

## 2018-10-19 DIAGNOSIS — R30.0 DYSURIA: Primary | ICD-10-CM

## 2018-10-19 DIAGNOSIS — T24.202A SECOND DEGREE BURN OF LEG, LEFT, INITIAL ENCOUNTER: ICD-10-CM

## 2018-10-19 LAB
ALBUMIN UR-MCNC: 30 MG/DL
APPEARANCE UR: CLEAR
BACTERIA #/AREA URNS HPF: ABNORMAL /HPF
BILIRUB UR QL STRIP: NEGATIVE
COLOR UR AUTO: YELLOW
GLUCOSE UR STRIP-MCNC: NEGATIVE MG/DL
HGB UR QL STRIP: ABNORMAL
KETONES UR STRIP-MCNC: NEGATIVE MG/DL
LEUKOCYTE ESTERASE UR QL STRIP: NEGATIVE
NITRATE UR QL: NEGATIVE
NON-SQ EPI CELLS #/AREA URNS LPF: ABNORMAL /LPF
PH UR STRIP: 7 PH (ref 5–7)
RBC #/AREA URNS AUTO: ABNORMAL /HPF
SOURCE: ABNORMAL
SP GR UR STRIP: 1.01 (ref 1–1.03)
UROBILINOGEN UR STRIP-ACNC: 0.2 EU/DL (ref 0.2–1)
WBC #/AREA URNS AUTO: ABNORMAL /HPF

## 2018-10-19 PROCEDURE — 81001 URINALYSIS AUTO W/SCOPE: CPT | Performed by: PHYSICIAN ASSISTANT

## 2018-10-19 PROCEDURE — 99213 OFFICE O/P EST LOW 20 MIN: CPT | Performed by: FAMILY MEDICINE

## 2018-10-19 PROCEDURE — 87086 URINE CULTURE/COLONY COUNT: CPT | Performed by: FAMILY MEDICINE

## 2018-10-19 NOTE — PATIENT INSTRUCTIONS
follow up with a urologist if there is more and more blood appearing.      Call in 2-3 days for the urine culture results.      Keep the wound covered with nonadherent gauze and Coban until a scab forms.  follow up if the redness keeps getting bigger or if red streaks appear or if fevers appear.  follow up with your primary care provider if the lesion fails to improve in 10 days.     Place warmth onto the wound for 10-15 minutes at a time, every 2-3 hours while awake to improve wound healing.
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
## 2018-10-20 LAB
BACTERIA SPEC CULT: NORMAL
BACTERIA SPEC CULT: NORMAL
SPECIMEN SOURCE: NORMAL

## 2018-10-23 ENCOUNTER — NURSE TRIAGE (OUTPATIENT)
Dept: NURSING | Facility: CLINIC | Age: 63
End: 2018-10-23

## 2018-10-23 NOTE — TELEPHONE ENCOUNTER
I reviewed the existing lab results from the urgent care visit on the 19th. He said he is having no symptoms.  Zena Ashley RN-[facility]

## 2018-10-25 ENCOUNTER — HOSPITAL ENCOUNTER (OUTPATIENT)
Dept: PHYSICAL THERAPY | Facility: CLINIC | Age: 63
Setting detail: THERAPIES SERIES
End: 2018-10-25
Attending: INTERNAL MEDICINE
Payer: COMMERCIAL

## 2018-10-25 PROCEDURE — 40000719 ZZHC STATISTIC PT DEPARTMENT NEURO VISIT: Performed by: PHYSICAL THERAPIST

## 2018-10-25 PROCEDURE — 97112 NEUROMUSCULAR REEDUCATION: CPT | Mod: GP | Performed by: PHYSICAL THERAPIST

## 2018-10-25 PROCEDURE — 97116 GAIT TRAINING THERAPY: CPT | Mod: GP | Performed by: PHYSICAL THERAPIST

## 2018-10-29 ENCOUNTER — HOSPITAL ENCOUNTER (OUTPATIENT)
Dept: PHYSICAL THERAPY | Facility: CLINIC | Age: 63
Setting detail: THERAPIES SERIES
End: 2018-10-29
Attending: INTERNAL MEDICINE
Payer: COMMERCIAL

## 2018-10-29 PROCEDURE — 40000719 ZZHC STATISTIC PT DEPARTMENT NEURO VISIT: Performed by: PHYSICAL THERAPIST

## 2018-10-29 PROCEDURE — 97110 THERAPEUTIC EXERCISES: CPT | Mod: GP | Performed by: PHYSICAL THERAPIST

## 2018-10-29 PROCEDURE — 97112 NEUROMUSCULAR REEDUCATION: CPT | Mod: GP | Performed by: PHYSICAL THERAPIST

## 2018-10-29 PROCEDURE — 97116 GAIT TRAINING THERAPY: CPT | Mod: GP | Performed by: PHYSICAL THERAPIST

## 2018-10-29 NOTE — PROGRESS NOTES
Outpatient Physical Therapy Progress Note     Patient: Marcus Hodges  : 1955    Beginning/End Dates of Reporting Period:  18 to 10/29/2018. Marcus has been seen for 22 skilled PT visits since his evaluation.  He did 3 times of 3 days a week consecutive sessions and then returned to 1 x a week sessions as this was what he preferred.     Referring Provider: Dr. Don Aviles    Therapy Diagnosis: Decreased independence and functional activity tolerance due to impairments from CVA and secondary impairments following CVA.(R knee pain, shoulderpain)     Client Self Report: Wife, Nidia , wondering about the straps for the arm brace - if we had any that she could replace with.     Objective Measurements:      4 square 18    29.62 over tape with trekking pole and CGA, vc on direction to step. Stepping over wieghted bar therapist stabilizing bar, timer stopped mid way so did notget an accurate time. Pt clearing object better.            18  TAYLOR initially 7 inches apart and then able to do 4 1/2 inches feet apart with shoes and AFO on         Forward reach 18   5 inches            Goals:  Goal Identifier 1 - stance width   Goal Description Marcus to demonstrate the ability to stand with a more narrow TAYLOR going from 12 inches feet apart at eval to 6 inches apart or less to demonstrate improved balance , loading on the L side to assist with improvement in gait pattern therefore efficiency and decreased secondary impairments such as his R knee pain.    Target Date 18   Date Met   meeting   Progress:as above      Goal Identifier 2- Forward reach   Goal Description Marcus to increase his forward reach from 4 inches at evaulation to 8 inches or greater to demonstrate improved balance, improved ability to bring COM forward over TAYLOR to assist in meeting all goals, improved sit to stand and improved gait.    Target Date 18   Date Met   meeting   Progress:as above     Goal Identifier 3-  Curb/stairs    Goal Description Marcus to have increased disassociation of movement/improved timing and sequencing of muscle activation for improved hip and knee flexion in standing demonstrated by the ability to step over upright shabana (4-6 inch object) with the L LE to allow him to step up onto a curb without catching foot.   Target Date 11/30/18   Date Met   in progress   Progress:     Goal Identifier 4- Gait (6/14/18   18.66 sec)   Goal Description Marcus to increase gait speed to 19 seconds or less with AD and 24 steps or less to demonstrate improved gait speed and efficiency for community gait and also decreased overuse of the R side.    Target Date 11/30/18   Date Met   6/14/18   Progress:Met with time as above, however , times more commonly around 19.5 to 21 seconds.  Will continue to assess this goal.      Goal Identifier 5- Floor transfers.    Goal Description Marcus to perform transfer to and from floor with outside UE support and min assist of 1 to allow him to recover from a fall with assist from wife or caregiver.    Target Date 11/30/18   Date Met   in progress   Progress: have not fully completed to date.  WOrking on improving controlled knee, hip and ankle movements and trunk control to allow progression to this safelty     Goal Identifier 6 --  4 square   Goal Description Marcus to complete 4 square in 25 seconds with AD and 40 seconds or less without an AD to demonstrate improved interlimb coordination, balance and body control to assist in meeting all goals and overall function.    Target Date 11/30/18   Date Met      Progress: as above     Goal Identifier HEP   Goal Description Marcus to be independent in appropriate HEP to continue to address his impairments following d/c from skilled PT intervention.    Target Date 11/30/18   Date Met   in progress   Progress:HEP continues to be advanced and developed     Goal Identifier 8 functional reach/neuro motor control   Goal Description Marcus to perform reaching to the  floor to the left to pick object up off the floor while maintaining L hand contact/closed chain on the mat to demonstrate impropved neuromotor planning, ability to bring COM to the left while keeping proper muscle activation/deactivation L and R side of body for greater safety with reaching activiites to the floor, unstrapping shoe, AFO.  Also for carryover of proper motor function for improved gait and transfers.    Target Date 11/30/18   Date Met   in progress   Progress: greater abiltiy to achieve position of hand on the mat (L) , continuing to work on decreasing trunk overactivation leading to decreased motor control overactivation L LE.      Progress Toward Goals:   Progress this reporting period: Marcus is making gradual improvements in his motor coordination and mobilty  He has greater ankle mobility and muscle function L foot and ankle.  He has tolerated standing activities with shoe and AFO off.  He has been doing activities withotu AFO or shoes in clinic or loosening the AFO at home to allow greater movement at ankle for better alignment and control.   He is much more comfortable challenging his load to the L and his forward COM   We continue to work on improved trunk alignment, dampening overactivation of trunk .     Marcus is appropriate for continuatino of skilled PT intervention and I anticipate he will continue to improve although is will be gradual due to significance of his stroke , prior cardiac surgeries affecting his trunk alignment and also history of R eye blindness. He is without questions and wishes to continue with PT.     Plan:  Continue therapy per current plan of care.    Discharge:  No

## 2018-11-05 ENCOUNTER — HOSPITAL ENCOUNTER (OUTPATIENT)
Dept: PHYSICAL THERAPY | Facility: CLINIC | Age: 63
Setting detail: THERAPIES SERIES
End: 2018-11-05
Attending: INTERNAL MEDICINE
Payer: COMMERCIAL

## 2018-11-05 PROCEDURE — 97116 GAIT TRAINING THERAPY: CPT | Mod: GP | Performed by: PHYSICAL THERAPIST

## 2018-11-05 PROCEDURE — 97112 NEUROMUSCULAR REEDUCATION: CPT | Mod: GP | Performed by: PHYSICAL THERAPIST

## 2018-11-05 PROCEDURE — 40000719 ZZHC STATISTIC PT DEPARTMENT NEURO VISIT: Performed by: PHYSICAL THERAPIST

## 2018-11-06 ENCOUNTER — HOSPITAL ENCOUNTER (OUTPATIENT)
Dept: PHYSICAL THERAPY | Facility: CLINIC | Age: 63
Setting detail: THERAPIES SERIES
End: 2018-11-06
Attending: INTERNAL MEDICINE
Payer: COMMERCIAL

## 2018-11-06 PROCEDURE — 40000719 ZZHC STATISTIC PT DEPARTMENT NEURO VISIT: Performed by: PHYSICAL THERAPIST

## 2018-11-06 PROCEDURE — 97110 THERAPEUTIC EXERCISES: CPT | Mod: GP | Performed by: PHYSICAL THERAPIST

## 2018-11-06 PROCEDURE — 97112 NEUROMUSCULAR REEDUCATION: CPT | Mod: GP | Performed by: PHYSICAL THERAPIST

## 2018-11-08 NOTE — ADDENDUM NOTE
Encounter addended by: Ada Scott, PT on: 11/8/2018  4:19 PM<BR>     Actions taken: Sign clinical note

## 2018-11-15 ENCOUNTER — OFFICE VISIT (OUTPATIENT)
Dept: UROLOGY | Facility: CLINIC | Age: 63
End: 2018-11-15
Payer: COMMERCIAL

## 2018-11-15 ENCOUNTER — HOSPITAL ENCOUNTER (OUTPATIENT)
Dept: GENERAL RADIOLOGY | Facility: CLINIC | Age: 63
Discharge: HOME | End: 2018-11-15
Attending: UROLOGY | Admitting: UROLOGY
Payer: COMMERCIAL

## 2018-11-15 VITALS — HEIGHT: 74 IN | HEART RATE: 80 BPM | OXYGEN SATURATION: 98 % | BODY MASS INDEX: 21.94 KG/M2 | WEIGHT: 171 LBS

## 2018-11-15 DIAGNOSIS — N31.9 NEUROGENIC BLADDER: Primary | ICD-10-CM

## 2018-11-15 DIAGNOSIS — N20.9 CALCIUM UROLITHIASIS: ICD-10-CM

## 2018-11-15 DIAGNOSIS — I48.20 CHRONIC ATRIAL FIBRILLATION (H): ICD-10-CM

## 2018-11-15 DIAGNOSIS — N20.0 CALCULUS OF KIDNEY: ICD-10-CM

## 2018-11-15 LAB
ALBUMIN UR-MCNC: NEGATIVE MG/DL
APPEARANCE UR: CLEAR
BILIRUB UR QL STRIP: NEGATIVE
COLOR UR AUTO: YELLOW
GLUCOSE UR STRIP-MCNC: NEGATIVE MG/DL
HGB UR QL STRIP: ABNORMAL
KETONES UR STRIP-MCNC: NEGATIVE MG/DL
LEUKOCYTE ESTERASE UR QL STRIP: NEGATIVE
NITRATE UR QL: NEGATIVE
PH UR STRIP: 7.5 PH (ref 5–7)
RESIDUAL VOLUME (RV) (EXTERNAL): 158
SOURCE: ABNORMAL
SP GR UR STRIP: 1.01 (ref 1–1.03)
UROBILINOGEN UR STRIP-ACNC: 0.2 EU/DL (ref 0.2–1)

## 2018-11-15 PROCEDURE — 99213 OFFICE O/P EST LOW 20 MIN: CPT | Performed by: UROLOGY

## 2018-11-15 PROCEDURE — 74019 RADEX ABDOMEN 2 VIEWS: CPT

## 2018-11-15 PROCEDURE — 81003 URINALYSIS AUTO W/O SCOPE: CPT | Mod: QW | Performed by: UROLOGY

## 2018-11-15 ASSESSMENT — PAIN SCALES - GENERAL: PAINLEVEL: NO PAIN (0)

## 2018-11-15 NOTE — NURSING NOTE
Pt need myrbetriq refilled.... [facility] mail order.  Pt brought in UA from 7:45am.  Pt uses condom cath and sometimes leaks so unsure the last time he voided.  PVR by [id number].  Pt denies dysuria or gross hem.  SOLOMON Elkins, CMA

## 2018-11-15 NOTE — PROGRESS NOTES
Marcus Hodges is a pleasant 63-year-old male who suffered a major stroke. He has a neurogenic bladder with spasticity that is managed with myrbetriq. He was seen 6 months ago and cystoscopy revealed lateral lobe enlargement of the prostate, tiny bladder stones, no bladder tumors are raised erythema. His CT scan showed bilateral renal stones and a 5 mm stone in the left renal pelvis. KUB today shows no visible stones. His urinalysis shows no infection and a pH of 7.5. There is moderate blood and he saw some blood in the urine last month and the culture was negative. He's had no flank pain or back pain.  Other past medical history: Atrial enlargement, atrial flutter, atrial septal defect, dysphagia, abdominal hernia, DVT, mitral regurgitation, respiratory failure, tricuspid regurgitation, glaucoma, urinary incontinence, repair of atrial septal defect, tonsillectomy, inguinal herniorrhaphy, right thigh enucleation, right hemicraniectomy, craniotomy reconstruction, tracheostomy, gastrostomy tube, nonsmoker  Family history: Congenital heart defect, cerebrovascular disease, diabetes. No prostate cancer  Medications: Tylenol, Eliquis, Lipitor, baclofen, vitamin D, Celexa, metoprolol, myrbetriq, levetiracetam, multivitamin  Allergies: None  Exam: Alert and oriented, normal vital signs, normal appearance, with spouse  Postvoid residual 158 cc  Assessment: Microhematuria and gross hematuria month ago. Probably related to renal stones and anticoagulants. Spastic bladder due to stroke, incomplete emptying-discussed possible need for alpha blocker in the future versus ablative prostatic procedure. Also discussed follow-up of stones, possible need for ureteroscopy and stone extraction if symptomatic.  If further gross hematuria-obtain urine culture, CT scan with contrast, urine fish test  Otherwise see in 6 months with KUB, for urinalysis and digital rectal exam

## 2018-11-15 NOTE — MR AVS SNAPSHOT
After Visit Summary   11/15/2018    Marcus Hodges    MRN: [id number]           Patient Information     Date Of Birth          1955        Visit Information        Provider Department      11/15/2018 9:50 AM Rashard Arevalo MD [facility] [place]        Today's Diagnoses     Neurogenic bladder    -  1    Calcium urolithiasis           Follow-ups after your visit        Follow-up notes from your care team     Return in about 6 months (around 5/15/2019) for KUB.      Your next 10 appointments already scheduled     Nov 19, 2018  1:00 PM CST   Neuro Treatment with Ada Scott, PT   [facility] ([facility])    [address] [facility] [address]   [phone]            Nov 26, 2018  1:00 PM CST   Neuro Treatment with Ada Scott, PT   [facility] ([facility])    [address] [facility] [address]   [phone]            Dec 06, 2018 10:00 AM CST   Neuro Treatment with Ada Scott, PT   [facility] ([facility])    [address] [facility] [address]   [phone]            Dec 10, 2018  1:00 PM CST   Neuro Treatment with Ada Scott, PT   [facility] ([facility])    [address] [address]   [phone]            Dec 17, 2018  1:00 PM CST   Neuro Treatment with Ada Scott, PT   [facility] ([facility])    [address] [facility] [address]   [phone]            Dec 31, 2018  1:00 PM CST   Neuro Treatment with Ada Scott, PT   [facility] ([facility])    [address] [facility] [address]   [phone]            May 16, 2019 10:30 AM CDT   Return Visit with Rashard Arevalo MD   [facility]  "[facility] ([facility])    [address]  [address]  [facility] [id number] [phone]              Future tests that were ordered for you today     Open Future Orders        Priority Expected Expires Ordered    XR KUB [[id number]] Routine 5/15/2019 11/15/2019 11/15/2018            Who to contact     If you have questions or need follow up information about today's clinic visit or your schedule please contact [facility] [place] directly at [phone].  Normal or non-critical lab and imaging results will be communicated to you by DragonWavehart, letter or phone within 4 business days after the clinic has received the results. If you do not hear from us within 7 days, please contact the clinic through [employer] or phone. If you have a critical or abnormal lab result, we will notify you by phone as soon as possible.  Submit refill requests through [employer] or call your pharmacy and they will forward the refill request to us. Please allow 3 business days for your refill to be completed.          Additional Information About Your Visit        DragonWavehart Information     [employer] gives you secure access to your electronic health record. If you see a primary care provider, you can also send messages to your care team and make appointments. If you have questions, please call your primary care clinic.  If you do not have a primary care provider, please call [phone] and they will assist you.        Care EveryWhere ID     This is your Care EveryWhere ID. This could be used by other organizations to access your [facility] medical records  [id number]        Your Vitals Were     Pulse Height Pulse Oximetry BMI (Body Mass Index)          80 1.88 m (6' 2\") 98% 21.96 kg/m2         Blood Pressure from Last 3 Encounters:   10/19/18 112/64   05/03/18 106/70   04/22/18 110/62    Weight from Last 3 Encounters:   11/15/18 77.6 kg (171 lb)   10/19/18 77.9 kg (171 lb 12.8 oz) "   05/03/18 81.2 kg (179 lb)              We Performed the Following     Bladder scan     UA without Microscopic        Primary Care Provider Office Phone # Fax #    Don Aviles [phone] [address] [place] [address]        Equal Access to Services     [facility]: Hadii aad ku hadasho Soomaali, waaxda luqadaha, qaybta kaalmada adeegyada, waxay idiin hayaan adeeg kumar lajnn . So [facility] [phone].    ATENCIÓN: Si habla español, tiene a pitts disposición servicios gratuitos de asistencia lingüística. Llame al [phone].    We comply with applicable federal civil rights laws and [place] laws. We do not discriminate on the basis of race, color, national origin, age, disability, sex, sexual orientation, or gender identity.            Thank you!     Thank you for choosing [facility] [place]  for your care. Our goal is always to provide you with excellent care. Hearing back from our patients is one way we can continue to improve our services. Please take a few minutes to complete the written survey that you may receive in the mail after your visit with us. Thank you!             Your Updated Medication List - Protect others around you: Learn how to safely use, store and throw away your medicines at www.disposemymeds.org.          This list is accurate as of 11/15/18 10:28 AM.  Always use your most recent med list.                   Brand Name Dispense Instructions for use Diagnosis    acetaminophen 325 MG tablet    TYLENOL    100 tablet    Take  by mouth every 4 hours as needed for pain.        apixaban ANTICOAGULANT 5 MG tablet    ELIQUIS    180 tablet    Take 1 tablet (5 mg) by mouth 2 times daily    Chronic atrial fibrillation (H)       atorvastatin 20 MG tablet    LIPITOR    90 tablet    Take 1 tablet (20 mg) by mouth daily    Hyperlipidemia LDL goal <100       baclofen 20 MG tablet    LIORESAL    360 tablet    Take 1 tablet  (20 mg) by mouth 4 times daily    Left hemiplegia (H)       cholecalciferol 1000 UNIT tablet    vitamin D3    90 tablet    Take 2 tablets (2,000 Units) by mouth daily    CVA (cerebral vascular accident) (H)       citalopram 20 MG tablet    celeXA    135 tablet    Take 1.5 tablets (30 mg) by mouth daily    Major depressive disorder, recurrent episode, moderate (H)       levETIRAcetam 1000 MG Tabs     180 tablet    Take 1 tablet by mouth 2 times daily    Seizure prophylaxis       metoprolol succinate 25 MG 24 hr tablet    TOPROL-XL    90 tablet    Take 1 tablet (25 mg) by mouth daily    Chronic atrial fibrillation (H)       mirabegron 50 MG 24 hr tablet    MYRBETRIQ    90 tablet    Take 1 tablet (50 mg) by mouth daily    Neurogenic bladder       Multi-vitamin Tabs tablet   Generic drug:  multivitamin, therapeutic with minerals     100 tablet    Take 0.5 tablets by mouth daily        NONFORMULARY      Protandim - herbal supplement        * order for DME     1 each    Equipment being ordered: Dispense a WHFO Leg Brace    Left hemiplegia (H), CVA (cerebral vascular accident) (H)       * order for DME     1 each    Equipment being ordered: Quad Cane-39 inch    Left hemiplegia (H), CVA (cerebral vascular accident) (H)       * order for DME     4 each    Equipment being ordered: daytime urinary leg bag   800cc    Urine incontinence       * order for DME     100 each    Equipment being ordered: Adhesive remover, wipes  (HCPCS Code )    Neurogenic bladder       * order for DME     90 each    Equipment being ordered:   Freedom Clear Condom catheter  (HCPCS Code )    Neurogenic bladder       * order for DME     12 each    Equipment being ordered:    Nightime urinary leg bag 1600cc  (HCPCS Code )    Neurogenic bladder       * order for DME     12 each    Equipment being ordered: Daytime Urinary leg bag-800cc  (HCPCS Code )    Neurogenic bladder       * order for DME     12 each    Equipment being ordered:  Incontinence supplies - Posies  ([facility]    Neurogenic bladder       * order for DME     100 each    Equipment being ordered: Skin prep (no code - requested by patient)    Neurogenic bladder       * Notice:  This list has 9 medication(s) that are the same as other medications prescribed for you. Read the directions carefully, and ask your doctor or other care provider to review them with you.

## 2018-11-15 NOTE — LETTER
November 15, 2018       TO: Marcus Hodges  [address] [place] [address]       Dear Marcus Hodges,    You have requested a medication refill and our records indicate that you have not been seen by one of our providers for your annual office visit.  We will refill your medication for 3 months, which will allow you enough time to be seen by one of our providers.    Please call our clinic at [phone] to schedule your appointment as soon as possible.    Thank you,    [facility]

## 2018-11-15 NOTE — LETTER
11/15/2018       RE: Marcus Hodges  [address] [place] [address]     Dear Colleague,    Thank you for referring your patient, Marcus Hodges, to the [facility] [place] [facility]. Please see a copy of my visit note below.    Marcus Hodges is a pleasant 63-year-old male who suffered a major stroke. He has a neurogenic bladder with spasticity that is managed with myrbetriq. He was seen 6 months ago and cystoscopy revealed lateral lobe enlargement of the prostate, tiny bladder stones, no bladder tumors are raised erythema. His CT scan showed bilateral renal stones and a 5 mm stone in the left renal pelvis. KUB today shows no visible stones. His urinalysis shows no infection and a pH of 7.5. There is moderate blood and he saw some blood in the urine last month and the culture was negative. He's had no flank pain or back pain.  Other past medical history: Atrial enlargement, atrial flutter, atrial septal defect, dysphagia, abdominal hernia, DVT, mitral regurgitation, respiratory failure, tricuspid regurgitation, glaucoma, urinary incontinence, repair of atrial septal defect, tonsillectomy, inguinal herniorrhaphy, right thigh enucleation, right hemicraniectomy, craniotomy reconstruction, tracheostomy, gastrostomy tube, nonsmoker  Family history: Congenital heart defect, cerebrovascular disease, diabetes. No prostate cancer  Medications: Tylenol, Eliquis, Lipitor, baclofen, vitamin D, Celexa, metoprolol, myrbetriq, levetiracetam, multivitamin  Allergies: None  Exam: Alert and oriented, normal vital signs, normal appearance, with spouse  Postvoid residual 158 cc  Assessment: Microhematuria and gross hematuria month ago. Probably related to renal stones and anticoagulants. Spastic bladder due to stroke, incomplete emptying-discussed possible need for alpha blocker in the future versus ablative prostatic procedure. Also discussed  follow-up of stones, possible need for ureteroscopy and stone extraction if symptomatic.  If further gross hematuria-obtain urine culture, CT scan with contrast, urine fish test  Otherwise see in 6 months with KUB, for urinalysis and digital rectal exam  Rashard Arevalo MD

## 2018-11-19 ENCOUNTER — HOSPITAL ENCOUNTER (OUTPATIENT)
Dept: PHYSICAL THERAPY | Facility: CLINIC | Age: 63
Setting detail: THERAPIES SERIES
End: 2018-11-19
Attending: INTERNAL MEDICINE
Payer: COMMERCIAL

## 2018-11-19 PROCEDURE — 97116 GAIT TRAINING THERAPY: CPT | Mod: GP | Performed by: PHYSICAL THERAPIST

## 2018-11-19 PROCEDURE — 40000719 ZZHC STATISTIC PT DEPARTMENT NEURO VISIT: Performed by: PHYSICAL THERAPIST

## 2018-11-19 PROCEDURE — 97112 NEUROMUSCULAR REEDUCATION: CPT | Mod: GP | Performed by: PHYSICAL THERAPIST

## 2018-11-26 ENCOUNTER — HOSPITAL ENCOUNTER (OUTPATIENT)
Dept: PHYSICAL THERAPY | Facility: CLINIC | Age: 63
Setting detail: THERAPIES SERIES
End: 2018-11-26
Attending: INTERNAL MEDICINE
Payer: COMMERCIAL

## 2018-11-26 PROCEDURE — 97116 GAIT TRAINING THERAPY: CPT | Mod: GP | Performed by: PHYSICAL THERAPIST

## 2018-11-26 PROCEDURE — 40000719 ZZHC STATISTIC PT DEPARTMENT NEURO VISIT: Performed by: PHYSICAL THERAPIST

## 2018-11-26 PROCEDURE — 97110 THERAPEUTIC EXERCISES: CPT | Mod: GP | Performed by: PHYSICAL THERAPIST

## 2018-11-26 PROCEDURE — 97112 NEUROMUSCULAR REEDUCATION: CPT | Mod: GP | Performed by: PHYSICAL THERAPIST

## 2018-12-06 ENCOUNTER — HOSPITAL ENCOUNTER (OUTPATIENT)
Dept: PHYSICAL THERAPY | Facility: CLINIC | Age: 63
Setting detail: THERAPIES SERIES
End: 2018-12-06
Attending: INTERNAL MEDICINE
Payer: COMMERCIAL

## 2018-12-06 PROCEDURE — 97116 GAIT TRAINING THERAPY: CPT | Mod: GP | Performed by: PHYSICAL THERAPIST

## 2018-12-06 PROCEDURE — 40000719 ZZHC STATISTIC PT DEPARTMENT NEURO VISIT: Performed by: PHYSICAL THERAPIST

## 2018-12-06 PROCEDURE — 97110 THERAPEUTIC EXERCISES: CPT | Mod: GP | Performed by: PHYSICAL THERAPIST

## 2018-12-06 PROCEDURE — 97112 NEUROMUSCULAR REEDUCATION: CPT | Mod: GP | Performed by: PHYSICAL THERAPIST

## 2018-12-10 ENCOUNTER — HOSPITAL ENCOUNTER (OUTPATIENT)
Dept: PHYSICAL THERAPY | Facility: CLINIC | Age: 63
Setting detail: THERAPIES SERIES
End: 2018-12-10
Attending: INTERNAL MEDICINE
Payer: COMMERCIAL

## 2018-12-10 DIAGNOSIS — G81.94 LEFT HEMIPLEGIA (H): Primary | ICD-10-CM

## 2018-12-10 DIAGNOSIS — S06.9X9S TRAUMATIC BRAIN INJURY WITH LOSS OF CONSCIOUSNESS, SEQUELA (H): ICD-10-CM

## 2018-12-10 PROCEDURE — 97110 THERAPEUTIC EXERCISES: CPT | Mod: GP | Performed by: PHYSICAL THERAPIST

## 2018-12-10 PROCEDURE — 97112 NEUROMUSCULAR REEDUCATION: CPT | Mod: GP | Performed by: PHYSICAL THERAPIST

## 2018-12-10 PROCEDURE — 97116 GAIT TRAINING THERAPY: CPT | Mod: GP | Performed by: PHYSICAL THERAPIST

## 2018-12-17 ENCOUNTER — HOSPITAL ENCOUNTER (OUTPATIENT)
Dept: PHYSICAL THERAPY | Facility: CLINIC | Age: 63
Setting detail: THERAPIES SERIES
End: 2018-12-17
Attending: INTERNAL MEDICINE
Payer: COMMERCIAL

## 2018-12-17 PROCEDURE — 97116 GAIT TRAINING THERAPY: CPT | Mod: GP | Performed by: PHYSICAL THERAPIST

## 2018-12-17 PROCEDURE — 97112 NEUROMUSCULAR REEDUCATION: CPT | Mod: GP | Performed by: PHYSICAL THERAPIST

## 2018-12-31 ENCOUNTER — HOSPITAL ENCOUNTER (OUTPATIENT)
Dept: OCCUPATIONAL THERAPY | Facility: CLINIC | Age: 63
Setting detail: THERAPIES SERIES
End: 2018-12-31
Attending: PHYSICAL THERAPIST
Payer: COMMERCIAL

## 2018-12-31 ENCOUNTER — HOSPITAL ENCOUNTER (OUTPATIENT)
Dept: PHYSICAL THERAPY | Facility: CLINIC | Age: 63
Setting detail: THERAPIES SERIES
End: 2018-12-31
Attending: INTERNAL MEDICINE
Payer: COMMERCIAL

## 2018-12-31 DIAGNOSIS — G81.94 LEFT HEMIPLEGIA (H): ICD-10-CM

## 2018-12-31 DIAGNOSIS — S06.9X9S TRAUMATIC BRAIN INJURY WITH LOSS OF CONSCIOUSNESS, SEQUELA (H): ICD-10-CM

## 2018-12-31 PROCEDURE — 97535 SELF CARE MNGMENT TRAINING: CPT | Mod: GO | Performed by: OCCUPATIONAL THERAPIST

## 2018-12-31 PROCEDURE — 97112 NEUROMUSCULAR REEDUCATION: CPT | Mod: GP | Performed by: PHYSICAL THERAPIST

## 2018-12-31 PROCEDURE — 97167 OT EVAL HIGH COMPLEX 60 MIN: CPT | Mod: GO | Performed by: OCCUPATIONAL THERAPIST

## 2018-12-31 PROCEDURE — 97116 GAIT TRAINING THERAPY: CPT | Mod: GP | Performed by: PHYSICAL THERAPIST

## 2018-12-31 PROCEDURE — 97110 THERAPEUTIC EXERCISES: CPT | Mod: GO | Performed by: OCCUPATIONAL THERAPIST

## 2018-12-31 NOTE — PROGRESS NOTES
12/31/18 1400   Quick Adds   Type of Visit Initial Outpatient Occupational Therapy Evaluation   General Information   Start Of Care Date 12/31/18   Referring Physician Don Aviles MD   Orders Evaluate and treat as indicated   Other Orders diagnosis history of CVA with L hemiparesis   Orders Date 12/13/18   Medical Diagnosis Left hemiplegia (H) G81.94;  Traumatic brain injury with loss of consciousness, sequela (H) S06.9X9S   Onset of Illness/Injury or Date of Surgery 12/13/18  (order date)   Precautions/Limitations Fall precautions   Surgical/Medical History Reviewed Yes   Additional Occupational Profile Info/Pertinent History of Current Problem Marcus is familiar to/with this writer from previous OT in 2016.  He presents with orders as above and goals for increasing use of L UE as able and further improvement in managing his visual field deficit.  Marcus has a history of  an atrial septal defect originally repaired back in 1978 and went back for a second surgery 9 months later for mitral valve repair and additional repair of his atrial septal defect.  In 2004, he had atrial flutter and that was treated with ablation.  He went off of anticoagulation and had no problems until 4/22/2012 when he suffered a large right-sided stroke due to atrial fibrillation with thromboembolus.  He continues to have fairly severe left-sided hemiparesis.   He has had R knee pain presumably from greater loading and use on this side and is seeing physical therapy for this.  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 He has a  history of recurrent UTI's and has a history of depression.   Comments/Observations Nidia, supportive spouse, present for evaluation   Role/Living Environment   Current Community Support Personal care attendant;Family/friend caregiver  (spouse Nidia, brother in area, PCA 3x/week)   Patient role/Employment history  Disabled  (,  at [employer])   Community/Avocational Activities Enjoys doing research on computer, monitoring stock activity, read, ping pong with spouse (has a table in lower level). Very active lifestyle pre stroke. Continues to work out at a gym doing strength training, arm bike (has strap for L UE), leg bike, chest press, walk on the track, etc.   Current Living Environment House  (split level, 2 levels, with spouse)   Primary Bathroom Location/Comments upper level   Primary Bathroom Set Up/Equipment Shower/tub chair;Extended tub bench;Commode over toilet   Home/Community Accessibility Comments has bedrail; railings on stairs   Prior Level Comments Independent with all prior to CVA   Current Assistive Devices - Mobility (trek pole)   Role/Living Environment Comments Spouse works 3 days a week at office otherwise 2 days from home. When spouse is at work, pt has PCA service (6am -1pm) to assist with ADL's (bath), ROM HEP and participation in work outs at the gym. Spouse completes most IADLs including transportation (in 2016, reported patient did dishes, microwave meals, folding light laundry - didn't address this date if he is still completing these tasks).    Patient/family Goals Statement do more with L hand, improve ability to manage L visual field deficit   Pain   Patient currently in pain No   Fall Risk Screen   Fall screen completed by PT   Have you fallen 2 or more times in the past year? No   Have you fallen and had an injury in the past year? No   Is patient a fall risk? Yes   Fall screen comments 1 fall within the last year when walking at the gym   Cognitive Status Examination   Level of Consciousness Alert   Follows Commands and Answers Questions 100% of the time;Able to follow single-step instructions   Cognitive Comment Did not address this date - will monitor and address prn; patient appropriate during session though did have a difficult time with measuring visual field    Visual Perception   Visual Perception Wears glasses  (bifocal)   Visual Acuity just saw eye MD in December - acuity has remained stable per MD report   Visual Field Has L field cut, he reports primarily in LLQ; plan to further assess.  Used periometer to screen visual field in left eye: Nasal visual field ~55 degrees and temporal/peripheral visual field appears to be about 30 degrees -BNLs (numerous trials completed for temporal field with patient inconsistent in responses and patient having difficulty keeping his gaze on target).  Ideally recommend a peripheral visual field test with optometrist, however, patient/spouse report his eye doctor has not had the capability to do that so he has only had the initial one after his stroke.  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   Visual Attention to be assessed   Visual Perception Comments prosthetic eye R eye since 2009, childhood glaucoma   Sensation   Sensation Comments diminished sensation in L UE - will assess further prn   Posture   Posture Forward head position;Protracted shoulders;Kyphosis   Range of Motion (ROM)   ROM Comments R UE WFLs AROM; L UE: SROM/PROM: -10-15 degrees elbow extension, possible contracture at forearm (only able to supinate to neutral PROM), shoulder PROM flexion 100 degrees with tabletop stretch; L elbow, wrist and digit extension PROM WFLs (didn't assess digit flexion nor all parts of shoulder).  PCA stretches L UE 3 days/week, spouse not stretching as much and would like a refresher;    Strength   Strength Comments R UE appears WFLS (able to raise antigravity, etc. - not formally assessed); L UE: In standing: Active range of motion left shoulder: 60 degrees abduction, 20 degrees flexion with upper extremity in abduction (compensating and unable to avoid), 10-15 degrees shoulder extension with shoulder in abduction; no other notable active strength in left upper extremity aside from trace scapular  retraction and elevation; reports uses resistive arm bike at gym - has strap for L UE and not sure he is using L UE, weights - bench press and rowing - uses minimal weight and not sure how much L UE assisting   Hand Strength   Hand Dominance Right   Right Hand  (pounds) 67 pounds   Right Lateral Pinch (pounds) 14 pounds   Right Three Point Pinch (pounds) 13 pounds   Hand Strength Comments R , lateral pinch is > 1 S.D. below the mean, palmar pinch is > 2 S.D. below the mean.  L UE: 0/5 strength in hand; L hand at rest: MCP flexion with PIP and DIP in extension.  Patient reports occasionally will be able to maintain grasp on objects with L hand due to tone (puts L hand on grocery cart, and on arm pedal for arm bike) and occasionally it falls off and often has a hard time getting hand off object (strong ).   Muscle Tone   Muscle Tone Comments hypertonicity, moderate to severe; got custom resting hand splint for L UE in 2017 - needs new strap for thumb; Gets botox L UE and LE every 3 months (Dr. Restrepo) - just had on Dec. 7th.; uses estim a few times a week for digit extension   Coordination   Coordination Comments R UE WFLs, L UE unable/absent   Functional Mobility   Functional Mobility Comments uses trek pole - SBA to modified independence   Bathing   Bathing Comments needs assist (moderate)   Upper Body Dressing   Upper Body Dressing Comments needs assist (moderate)   Lower Body Dressing   Lower Body Dressing Comments mod to max assist per patient - especially has difficulty with AFO and shoe L LE   Toileting   Toileting Comments reports he can toilet himself after BM; wears condom cath at night and sometimes during the day - needs assist to empty bag   Grooming   Level of [place]: Grooming independent   Grooming Comments R UE: brush teeth   Eating/Self-Feeding   Level of [place]: Eating stand-by assist  (setup)   Eating/Self Feeding Comments R UE   Activity Tolerance   Activity Tolerance  naps most days (30 minutes), sleeps well; no significant concerns   Adult OT Eval Goals   OT Eval Goals (Adult) 1;2;3;4;5   OT Goal 1   Goal Identifier L    Goal Description Patient to demonstrate improved L   strength to 5# for increased ADL/IADL independence (hanging onto objects for hygiene, dressing, bathing, toileting and IADL tasks).   Target Date 03/25/19   OT Goal 2   Goal Identifier R hand strength   Goal Description Patient to demonstrate WNLS R hand strength (, lateral and palmar pinch) for optimal use of this UE as primary extremity for completing ADL/IADL tasks.   Target Date 03/25/19   OT Goal 3   Goal Identifier L UE home program   Goal Description Patient to demonstrate L UE HEP with SBA and min cues for good follow through at home, prevention of contractures and increase functional strength and ROM for maximum independence with performance of ADLs as gross assist and gross stabilizer.    Target Date 03/25/19   OT Goal 4   Goal Identifier use of L UE during functional tasks   Goal Description Patient to demonstrate functional tasks (wiping the counter/table, washing dishes, etc.) with at least 15% use of L UE as gross stabilizer/gross assist for increased ADL/IADL independence and improved functional use of L UE.   Target Date 03/25/19   OT Goal 5   Goal Identifier visual skills   Goal Description Patient to verbalize and successfully demonstrate at least 2 strategies (lighting, scanning strategies, magnification, etc.) during functional tasks (reading, computer tasks, navigation/functional mobility) to increase visual skills for increased independence and safety due to visual deficits.   Target Date 03/25/19   Clinical Impression   Criteria for Skilled Therapeutic Interventions Met Yes, treatment indicated   OT Diagnosis decreased ADL/IADL independence   Influenced by the following impairments Left upper extremity hypertonicity and hemiparesis, decreased functional mobility and balance,  decreased vision, questionable cognitive skills   Assessment of Occupational Performance 5 or more Performance Deficits   Identified Performance Deficits Decreased ability to: dress himself, feed himself, bathe, toilet, do household tasks, community mobility, vocational skills, etc.   Clinical Decision Making (Complexity) High complexity   Therapy Frequency 2x/week, Decreasing frequency as indicated   Predicted Duration of Therapy Intervention (days/wks) 12 weeks   Risks and Benefits of Treatment have been explained. Yes   Patient, Family & other staff in agreement with plan of care Yes   Education Assessment   Barriers To Learning Visual;Physical;Cognitive   Preferred Learning Style Listening;Demonstration;Pictures/video   Total Evaluation Time   OT Eval, High Complexity Minutes ([id number]) 47

## 2019-01-08 ENCOUNTER — HOSPITAL ENCOUNTER (OUTPATIENT)
Dept: OCCUPATIONAL THERAPY | Facility: CLINIC | Age: 64
Setting detail: THERAPIES SERIES
End: 2019-01-08
Attending: PHYSICAL THERAPIST
Payer: COMMERCIAL

## 2019-01-08 ENCOUNTER — HOSPITAL ENCOUNTER (OUTPATIENT)
Dept: PHYSICAL THERAPY | Facility: CLINIC | Age: 64
Setting detail: THERAPIES SERIES
End: 2019-01-08
Attending: INTERNAL MEDICINE
Payer: COMMERCIAL

## 2019-01-08 PROCEDURE — 97110 THERAPEUTIC EXERCISES: CPT | Mod: GP | Performed by: PHYSICAL THERAPIST

## 2019-01-08 PROCEDURE — 97116 GAIT TRAINING THERAPY: CPT | Mod: GP | Performed by: PHYSICAL THERAPIST

## 2019-01-08 PROCEDURE — 97110 THERAPEUTIC EXERCISES: CPT | Mod: GO | Performed by: OCCUPATIONAL THERAPIST

## 2019-01-08 PROCEDURE — 97112 NEUROMUSCULAR REEDUCATION: CPT | Mod: GP | Performed by: PHYSICAL THERAPIST

## 2019-01-16 ENCOUNTER — HOSPITAL ENCOUNTER (OUTPATIENT)
Dept: OCCUPATIONAL THERAPY | Facility: CLINIC | Age: 64
Setting detail: THERAPIES SERIES
End: 2019-01-16
Attending: PHYSICAL THERAPIST
Payer: COMMERCIAL

## 2019-01-16 PROCEDURE — 97112 NEUROMUSCULAR REEDUCATION: CPT | Mod: GO | Performed by: OCCUPATIONAL THERAPIST

## 2019-01-16 PROCEDURE — 97110 THERAPEUTIC EXERCISES: CPT | Mod: GO | Performed by: OCCUPATIONAL THERAPIST

## 2019-01-22 ENCOUNTER — HOSPITAL ENCOUNTER (OUTPATIENT)
Dept: OCCUPATIONAL THERAPY | Facility: CLINIC | Age: 64
Setting detail: THERAPIES SERIES
End: 2019-01-22
Attending: PHYSICAL THERAPIST
Payer: COMMERCIAL

## 2019-01-22 PROCEDURE — 97110 THERAPEUTIC EXERCISES: CPT | Mod: GO | Performed by: OCCUPATIONAL THERAPIST

## 2019-01-22 PROCEDURE — 97112 NEUROMUSCULAR REEDUCATION: CPT | Mod: GO | Performed by: OCCUPATIONAL THERAPIST

## 2019-01-23 ENCOUNTER — TELEPHONE (OUTPATIENT)
Dept: UROLOGY | Facility: CLINIC | Age: 64
End: 2019-01-23

## 2019-01-23 DIAGNOSIS — R31.9 HEMATURIA: Primary | ICD-10-CM

## 2019-01-23 DIAGNOSIS — R30.0 DYSURIA: ICD-10-CM

## 2019-01-23 NOTE — TELEPHONE ENCOUNTER
Lex left a VM on nurse line that he is having some urinary symptoms of dysuria ,urgency and some blood. He wants to drop of a urine specimen. . Called him back and left a message for him to call the nurse line back so we can make an appointment for ua/uc. Spoke to Marcus he wants to wait until Friday  He will make an appt @ [facility] lab in [address] [place] [address] .Elizabeth Zelaya LPN

## 2019-01-25 ENCOUNTER — HOSPITAL ENCOUNTER (OUTPATIENT)
Dept: OCCUPATIONAL THERAPY | Facility: CLINIC | Age: 64
Setting detail: THERAPIES SERIES
End: 2019-01-25
Attending: PHYSICAL THERAPIST
Payer: COMMERCIAL

## 2019-01-25 PROCEDURE — 97535 SELF CARE MNGMENT TRAINING: CPT | Mod: GO | Performed by: OCCUPATIONAL THERAPIST

## 2019-02-01 ENCOUNTER — HOSPITAL ENCOUNTER (OUTPATIENT)
Dept: OCCUPATIONAL THERAPY | Facility: CLINIC | Age: 64
Setting detail: THERAPIES SERIES
End: 2019-02-01
Attending: PHYSICAL THERAPIST
Payer: COMMERCIAL

## 2019-02-01 PROCEDURE — 97535 SELF CARE MNGMENT TRAINING: CPT | Mod: GO | Performed by: OCCUPATIONAL THERAPIST

## 2019-02-05 ENCOUNTER — HOSPITAL ENCOUNTER (OUTPATIENT)
Dept: OCCUPATIONAL THERAPY | Facility: CLINIC | Age: 64
Setting detail: THERAPIES SERIES
End: 2019-02-05
Attending: PHYSICAL THERAPIST
Payer: COMMERCIAL

## 2019-02-05 PROCEDURE — 97112 NEUROMUSCULAR REEDUCATION: CPT | Mod: GO | Performed by: OCCUPATIONAL THERAPIST

## 2019-02-05 PROCEDURE — 97110 THERAPEUTIC EXERCISES: CPT | Mod: GO | Performed by: OCCUPATIONAL THERAPIST

## 2019-02-08 ENCOUNTER — HOSPITAL ENCOUNTER (OUTPATIENT)
Dept: OCCUPATIONAL THERAPY | Facility: CLINIC | Age: 64
Setting detail: THERAPIES SERIES
End: 2019-02-08
Attending: PHYSICAL THERAPIST
Payer: COMMERCIAL

## 2019-02-08 PROCEDURE — 97110 THERAPEUTIC EXERCISES: CPT | Mod: GO | Performed by: OCCUPATIONAL THERAPIST

## 2019-02-11 ENCOUNTER — HOSPITAL ENCOUNTER (OUTPATIENT)
Dept: PHYSICAL THERAPY | Facility: CLINIC | Age: 64
Setting detail: THERAPIES SERIES
End: 2019-02-11
Attending: INTERNAL MEDICINE
Payer: COMMERCIAL

## 2019-02-11 ENCOUNTER — HOSPITAL ENCOUNTER (OUTPATIENT)
Dept: OCCUPATIONAL THERAPY | Facility: CLINIC | Age: 64
Setting detail: THERAPIES SERIES
End: 2019-02-11
Attending: PHYSICAL THERAPIST
Payer: COMMERCIAL

## 2019-02-11 ENCOUNTER — TELEPHONE (OUTPATIENT)
Dept: PEDIATRICS | Facility: CLINIC | Age: 64
End: 2019-02-11

## 2019-02-11 PROCEDURE — 97116 GAIT TRAINING THERAPY: CPT | Mod: GP | Performed by: PHYSICAL THERAPIST

## 2019-02-11 PROCEDURE — 97112 NEUROMUSCULAR REEDUCATION: CPT | Mod: GP | Performed by: PHYSICAL THERAPIST

## 2019-02-11 PROCEDURE — 97110 THERAPEUTIC EXERCISES: CPT | Mod: GP | Performed by: PHYSICAL THERAPIST

## 2019-02-11 PROCEDURE — 97110 THERAPEUTIC EXERCISES: CPT | Mod: GO | Performed by: OCCUPATIONAL THERAPIST

## 2019-02-11 NOTE — TELEPHONE ENCOUNTER
Reason for Call:  Form, our goal is to have forms completed with 72 hours, however, some forms may require a visit or additional information.    Type of letter, form or note:  Insurance Form    Who is the form from?: Insurance comp    Where did the form come from: Patient or family brought in       What clinic location was the form placed at?: [place]    Where the form was placed: 's Box    What number is listed as a contact on the form?: [phone]       Additional comments: Please fill out form and fax to [phone]    Call taken on 2/11/2019 at 4:18 PM by Ada Gutierrez

## 2019-02-13 DIAGNOSIS — I48.20 CHRONIC ATRIAL FIBRILLATION (H): ICD-10-CM

## 2019-02-15 ENCOUNTER — HOSPITAL ENCOUNTER (OUTPATIENT)
Dept: OCCUPATIONAL THERAPY | Facility: CLINIC | Age: 64
Setting detail: THERAPIES SERIES
End: 2019-02-15
Attending: PHYSICAL THERAPIST
Payer: COMMERCIAL

## 2019-02-15 PROCEDURE — 97112 NEUROMUSCULAR REEDUCATION: CPT | Mod: GO,XU | Performed by: OCCUPATIONAL THERAPIST

## 2019-02-15 PROCEDURE — 97763 ORTHC/PROSTC MGMT SBSQ ENC: CPT | Mod: GO | Performed by: OCCUPATIONAL THERAPIST

## 2019-02-18 ENCOUNTER — HOSPITAL ENCOUNTER (OUTPATIENT)
Dept: OCCUPATIONAL THERAPY | Facility: CLINIC | Age: 64
Setting detail: THERAPIES SERIES
End: 2019-02-18
Attending: PHYSICAL THERAPIST
Payer: COMMERCIAL

## 2019-02-18 PROCEDURE — 97112 NEUROMUSCULAR REEDUCATION: CPT | Mod: GO | Performed by: OCCUPATIONAL THERAPIST

## 2019-02-20 NOTE — TELEPHONE ENCOUNTER
Lex called to advise that 2nd page of fax received by [employer] was illegible. Please resend ASAP. Thanks!    KEMAL 2/20/19 5pm

## 2019-02-25 ENCOUNTER — HOSPITAL ENCOUNTER (OUTPATIENT)
Dept: PHYSICAL THERAPY | Facility: CLINIC | Age: 64
Setting detail: THERAPIES SERIES
End: 2019-02-25
Attending: INTERNAL MEDICINE
Payer: COMMERCIAL

## 2019-02-25 PROCEDURE — 97110 THERAPEUTIC EXERCISES: CPT | Mod: GP | Performed by: PHYSICAL THERAPIST

## 2019-02-25 PROCEDURE — 97112 NEUROMUSCULAR REEDUCATION: CPT | Mod: GP | Performed by: PHYSICAL THERAPIST

## 2019-02-25 PROCEDURE — 97116 GAIT TRAINING THERAPY: CPT | Mod: GP | Performed by: PHYSICAL THERAPIST

## 2019-02-25 NOTE — PROGRESS NOTES
Outpatient Physical Therapy Progress Note     Patient: Marcus Hodges  : 1955    Beginning/End Dates of Reporting Period:  10/29/19 to 2019    Referring Provider: Dr. Don Aviles    Therapy Diagnosis: decreased independence and functional activity tolerance due to impairments from CVA and secondary impairments following CVA. (R knee pain and shoulder pain).      Client Self Report:  Continues to notice gradual improvements in his walking. Continues to go to the gym, stretches by PCA's.     Objective Measurements:  Objective Measure: 25 foot walk  Details: 19   19.5 sec and 19.87 sec, 23 steps, trekking pole, AFO with straps loosened.     Objective Measure: 4 square  Details: 19  28.22 sec with cues, SBA                  18   29.62  over tape with trekking pole and CGA, vc on direction to step.   Stepping over wieghted bar therapist stabilizing bar, timer stopped mid way so did notget an accurate time.   Pt clearing object better.     Objective Measure: curb/step over shabana  Details: not able to step over upright shabana, apprehension and impaired motor coordination .   Able to step up to 4 inch stair with a rail with CGA.   2 inch platform with trekking pole on one side and CGA. Apprehension limiting step up.     Objective Measure: TAYLOR  Details: standing with assist to position able to place feet 3 inches apart and maintain with SBA, cues needed to extend the R knee and bring pelvis forward /ankle dorsiflexion L LE.  Able to stand safely without UE support 4.5 inches .     Objective Measure: Forward reach standing  Details: 6.5 to 7 inches  18 5 inches            Goals:  Goal Identifier 1 - stance width   Goal Description Marcus to demonstrate the ability to stand with a more narrow TAYLOR going from 12 inches feet apart at eval to 6 inches apart or less to demonstrate improved balance , loading on the L side to assist with improvement in gait pattern therefore efficiency and decreased  secondary impairments such as his R knee pain.    Target Date 02/28/19   Date Met  2/25/19   Progress:     Goal Identifier 2- Forward reach   Goal Description Marcus to increase his forward reach from 4 inches at evaulation to 8 inches or greater to demonstrate improved balance, improved ability to bring COM forward over TAYLOR to assist in meeting all goals, improved sit to stand and improved gait.    Target Date 02/28/19   Date Met   very close to meeting as noted above   Progress:as above.  Improving ability to bring COM forward past TAYLOR which will help with transfers, gait, stairs.      Goal Identifier 3-  Curb/stairs   Goal Description Marcus to have increased disassociation of movement/improved timing and sequencing of muscle activation for improved hip and knee flexion in standing demonstrated by the ability to step over upright shabana (4-6 inch object) with the L LE to allow him to step up onto a curb without catching foot.   Target Date 02/28/19   Date Met   in progress   Progress: as above     Goal Identifier 4- Gait(6/14/18   18.66 sec)   Goal Description Marcus to increase gait speed to 19 seconds or less with AD and 24 steps or less to demonstrate improved gait speed and efficiency for community gait and also decreased overuse of the R side.    Target Date 11/30/18   Date Met   met x 1.  Continue to monitor due to fluctuations in time.    Progress:  Marcus is beginning to improve his gait pattern with step past gait pattern R past L about half length of foot.      Goal Identifier 5- Floor transfers.    Goal Description Marcus to perform transfer to and from floor with outside UE support and min assist of 1 to allow him to recover from a fall with assist from wife or caregiver.    Target Date 02/28/19   Date Met   in progress   Progress:working on components of transfer stand to kneel with one UE support on the mat , staggered stance L leg posterior and working towards single kneel. Needing cues and facilitation  for proper mechanics of movement - LE hip and knee flexion rather than trunk flexion.      Goal Identifier 6 --  4 square   Goal Description Marcus to complete 4 square in 25 seconds with and AD and 40 seconds or less without an AD to demonstrate improved interlimb coordination, balance and body control to assist in meeting all goals and overall function.    Target Date 02/28/19   Date Met   in progress   Progress:slight decrease in time as above.      Goal Identifier HEP   Goal Description Marcus to be independent in appropriate HEP to continue to address his impairments following d/c from skilled PT intervention.    Target Date 02/28/19   Date Met   in progress   Progress:HEP continues to be developed and modified.  Marcus is very faithful with his HEP and gets assist from his wife or his PCA's.      Goal Identifier 8 functional reach/neuro motor control   Goal Description Marcus to perform reaching to the floor to the left to pick object up off the floor while maintaining L hand contact/closed chain on the mat to demonstrate impropved neuromotor planning, ability to bring COM to the left while keeping proper muscle activation/deactivation L and R side of body for greater safety with reaching activiites to the floor, unstrapping shoe, AFO.  Also for carryover of proper motor function for improved gait and transfers.    Target Date 02/28/19   Date Met   in progress   Progress: able to keep L hand on the mat for a longer period of time as reaching to the floor.  Limited by restricted ROM scapulo humeral, ribcage and also over activation L side with apprehension of bringing COM forward past TAYLOR.      Progress Toward Goals:   Progress this reporting period: Goal 1 MET, progressing to all other goals. Gradual and slow changes as expected.  Lex continues to be motivated and demonstrates continued ability to improve.  He is tolerating activities without his AFO on  - he is able to keep L foot contact when performing  standing activities with shoe and AFO off . (Used to Willi foot and flex knee/hip).     He is demonstrating improvement in his upper quadrant/rib cage mobility and alignment gradually which will assist with overall movement and function. He is demonstrating improved disassociation femoral acetabular L and greater knee motion swing through on the L and ankle mobility as well.  Improvement in ability to bring COM to the L and sustain without resisting going to the L .   Lex remains appropriate for skilled PT intervention.   Barriers continue to be profoundness of CVA, prior R eye blindness.     Plan:  Continue therapy per current plan of care.  Currently being seen 1 x a week.     Discharge: NO

## 2019-03-04 ENCOUNTER — HOSPITAL ENCOUNTER (OUTPATIENT)
Dept: PHYSICAL THERAPY | Facility: CLINIC | Age: 64
Setting detail: THERAPIES SERIES
End: 2019-03-04
Attending: INTERNAL MEDICINE
Payer: COMMERCIAL

## 2019-03-04 PROCEDURE — 97110 THERAPEUTIC EXERCISES: CPT | Mod: GP | Performed by: PHYSICAL THERAPIST

## 2019-03-04 PROCEDURE — 97112 NEUROMUSCULAR REEDUCATION: CPT | Mod: GP | Performed by: PHYSICAL THERAPIST

## 2019-03-04 PROCEDURE — 97116 GAIT TRAINING THERAPY: CPT | Mod: GP | Performed by: PHYSICAL THERAPIST

## 2019-03-06 ENCOUNTER — HOSPITAL ENCOUNTER (OUTPATIENT)
Dept: OCCUPATIONAL THERAPY | Facility: CLINIC | Age: 64
Setting detail: THERAPIES SERIES
End: 2019-03-06
Attending: PHYSICAL THERAPIST
Payer: COMMERCIAL

## 2019-03-06 PROCEDURE — 97110 THERAPEUTIC EXERCISES: CPT | Mod: GO | Performed by: OCCUPATIONAL THERAPIST

## 2019-03-06 PROCEDURE — 97112 NEUROMUSCULAR REEDUCATION: CPT | Mod: GO | Performed by: OCCUPATIONAL THERAPIST

## 2019-03-11 ENCOUNTER — HOSPITAL ENCOUNTER (OUTPATIENT)
Dept: OCCUPATIONAL THERAPY | Facility: CLINIC | Age: 64
Setting detail: THERAPIES SERIES
End: 2019-03-11
Attending: PHYSICAL THERAPIST
Payer: COMMERCIAL

## 2019-03-11 ENCOUNTER — HOSPITAL ENCOUNTER (OUTPATIENT)
Dept: PHYSICAL THERAPY | Facility: CLINIC | Age: 64
Setting detail: THERAPIES SERIES
End: 2019-03-11
Attending: INTERNAL MEDICINE
Payer: COMMERCIAL

## 2019-03-11 PROCEDURE — 97110 THERAPEUTIC EXERCISES: CPT | Mod: GO | Performed by: OCCUPATIONAL THERAPIST

## 2019-03-11 PROCEDURE — 97112 NEUROMUSCULAR REEDUCATION: CPT | Mod: GP | Performed by: PHYSICAL THERAPIST

## 2019-03-11 PROCEDURE — 97110 THERAPEUTIC EXERCISES: CPT | Mod: GP | Performed by: PHYSICAL THERAPIST

## 2019-03-11 PROCEDURE — 97116 GAIT TRAINING THERAPY: CPT | Mod: GP | Performed by: PHYSICAL THERAPIST

## 2019-03-11 PROCEDURE — 97112 NEUROMUSCULAR REEDUCATION: CPT | Mod: GO | Performed by: OCCUPATIONAL THERAPIST

## 2019-03-15 ENCOUNTER — HOSPITAL ENCOUNTER (OUTPATIENT)
Dept: OCCUPATIONAL THERAPY | Facility: CLINIC | Age: 64
Setting detail: THERAPIES SERIES
End: 2019-03-15
Attending: PHYSICAL THERAPIST
Payer: COMMERCIAL

## 2019-03-15 PROCEDURE — 97140 MANUAL THERAPY 1/> REGIONS: CPT | Mod: GO | Performed by: OCCUPATIONAL THERAPIST

## 2019-03-15 PROCEDURE — 97112 NEUROMUSCULAR REEDUCATION: CPT | Mod: GO | Performed by: OCCUPATIONAL THERAPIST

## 2019-03-15 PROCEDURE — 97110 THERAPEUTIC EXERCISES: CPT | Mod: GO | Performed by: OCCUPATIONAL THERAPIST

## 2019-03-18 ENCOUNTER — HOSPITAL ENCOUNTER (OUTPATIENT)
Dept: PHYSICAL THERAPY | Facility: CLINIC | Age: 64
Setting detail: THERAPIES SERIES
End: 2019-03-18
Attending: INTERNAL MEDICINE
Payer: COMMERCIAL

## 2019-03-18 PROCEDURE — 97116 GAIT TRAINING THERAPY: CPT | Mod: GP | Performed by: PHYSICAL THERAPIST

## 2019-03-18 PROCEDURE — 97110 THERAPEUTIC EXERCISES: CPT | Mod: GP | Performed by: PHYSICAL THERAPIST

## 2019-03-18 PROCEDURE — 97112 NEUROMUSCULAR REEDUCATION: CPT | Mod: GP | Performed by: PHYSICAL THERAPIST

## 2019-03-22 ENCOUNTER — HOSPITAL ENCOUNTER (OUTPATIENT)
Dept: OCCUPATIONAL THERAPY | Facility: CLINIC | Age: 64
Setting detail: THERAPIES SERIES
End: 2019-03-22
Attending: PHYSICAL THERAPIST
Payer: COMMERCIAL

## 2019-03-22 PROCEDURE — 97140 MANUAL THERAPY 1/> REGIONS: CPT | Mod: GO | Performed by: OCCUPATIONAL THERAPIST

## 2019-03-27 ENCOUNTER — HOSPITAL ENCOUNTER (OUTPATIENT)
Dept: OCCUPATIONAL THERAPY | Facility: CLINIC | Age: 64
Setting detail: THERAPIES SERIES
End: 2019-03-27
Attending: PHYSICAL THERAPIST
Payer: COMMERCIAL

## 2019-03-27 PROCEDURE — 97140 MANUAL THERAPY 1/> REGIONS: CPT | Mod: GO | Performed by: OCCUPATIONAL THERAPIST

## 2019-04-01 ENCOUNTER — OFFICE VISIT (OUTPATIENT)
Dept: PEDIATRICS | Facility: CLINIC | Age: 64
End: 2019-04-01
Payer: COMMERCIAL

## 2019-04-01 VITALS
DIASTOLIC BLOOD PRESSURE: 64 MMHG | TEMPERATURE: 98.4 F | OXYGEN SATURATION: 97 % | HEART RATE: 67 BPM | BODY MASS INDEX: 23.5 KG/M2 | SYSTOLIC BLOOD PRESSURE: 102 MMHG | WEIGHT: 183 LBS

## 2019-04-01 DIAGNOSIS — S06.9X9S TRAUMATIC BRAIN INJURY WITH LOSS OF CONSCIOUSNESS, SEQUELA (H): ICD-10-CM

## 2019-04-01 DIAGNOSIS — N31.9 NEUROGENIC BLADDER: ICD-10-CM

## 2019-04-01 DIAGNOSIS — Z00.01 ENCOUNTER FOR ROUTINE ADULT MEDICAL EXAM WITH ABNORMAL FINDINGS: Primary | ICD-10-CM

## 2019-04-01 DIAGNOSIS — R32 URINARY INCONTINENCE, UNSPECIFIED TYPE: ICD-10-CM

## 2019-04-01 DIAGNOSIS — B07.9 VIRAL WARTS, UNSPECIFIED TYPE: ICD-10-CM

## 2019-04-01 DIAGNOSIS — Z29.89 SEIZURE PROPHYLAXIS: ICD-10-CM

## 2019-04-01 DIAGNOSIS — F33.1 MAJOR DEPRESSIVE DISORDER, RECURRENT EPISODE, MODERATE (H): ICD-10-CM

## 2019-04-01 DIAGNOSIS — K59.2 NEUROGENIC BOWEL: ICD-10-CM

## 2019-04-01 DIAGNOSIS — I34.0 NON-RHEUMATIC MITRAL REGURGITATION: ICD-10-CM

## 2019-04-01 DIAGNOSIS — G81.94 LEFT HEMIPLEGIA (H): ICD-10-CM

## 2019-04-01 DIAGNOSIS — I48.20 CHRONIC ATRIAL FIBRILLATION (H): ICD-10-CM

## 2019-04-01 DIAGNOSIS — E78.5 HYPERLIPIDEMIA LDL GOAL <100: ICD-10-CM

## 2019-04-01 LAB
ALBUMIN SERPL-MCNC: 4.2 G/DL (ref 3.4–5)
ALP SERPL-CCNC: 90 U/L (ref 40–150)
ALT SERPL W P-5'-P-CCNC: 27 U/L (ref 0–70)
ANION GAP SERPL CALCULATED.3IONS-SCNC: 2 MMOL/L (ref 3–14)
AST SERPL W P-5'-P-CCNC: 25 U/L (ref 0–45)
BILIRUB SERPL-MCNC: 1 MG/DL (ref 0.2–1.3)
BUN SERPL-MCNC: 17 MG/DL (ref 7–30)
CALCIUM SERPL-MCNC: 9.2 MG/DL (ref 8.5–10.1)
CHLORIDE SERPL-SCNC: 109 MMOL/L (ref 94–109)
CHOLEST SERPL-MCNC: 123 MG/DL
CO2 SERPL-SCNC: 31 MMOL/L (ref 20–32)
CREAT SERPL-MCNC: 0.88 MG/DL (ref 0.66–1.25)
GFR SERPL CREATININE-BSD FRML MDRD: >90 ML/MIN/{1.73_M2}
GLUCOSE SERPL-MCNC: 83 MG/DL (ref 70–99)
HDLC SERPL-MCNC: 55 MG/DL
LDLC SERPL CALC-MCNC: 59 MG/DL
NONHDLC SERPL-MCNC: 68 MG/DL
POTASSIUM SERPL-SCNC: 4.6 MMOL/L (ref 3.4–5.3)
PROT SERPL-MCNC: 7.7 G/DL (ref 6.8–8.8)
SODIUM SERPL-SCNC: 142 MMOL/L (ref 133–144)
TRIGL SERPL-MCNC: 43 MG/DL

## 2019-04-01 PROCEDURE — 80061 LIPID PANEL: CPT | Performed by: INTERNAL MEDICINE

## 2019-04-01 PROCEDURE — 99396 PREV VISIT EST AGE 40-64: CPT | Mod: 25 | Performed by: INTERNAL MEDICINE

## 2019-04-01 PROCEDURE — 80053 COMPREHEN METABOLIC PANEL: CPT | Performed by: INTERNAL MEDICINE

## 2019-04-01 PROCEDURE — 17110 DESTRUCTION B9 LES UP TO 14: CPT | Performed by: INTERNAL MEDICINE

## 2019-04-01 PROCEDURE — 36415 COLL VENOUS BLD VENIPUNCTURE: CPT | Performed by: INTERNAL MEDICINE

## 2019-04-01 RX ORDER — ATORVASTATIN CALCIUM 20 MG/1
20 TABLET, FILM COATED ORAL DAILY
Qty: 90 TABLET | Refills: 3 | Status: SHIPPED | OUTPATIENT
Start: 2019-04-01 | End: 2020-04-17

## 2019-04-01 RX ORDER — LEVETIRACETAM 1000 MG/1
1000 TABLET ORAL 2 TIMES DAILY
Qty: 180 TABLET | Refills: 3 | Status: SHIPPED | OUTPATIENT
Start: 2019-04-01 | End: 2020-04-20

## 2019-04-01 RX ORDER — CITALOPRAM HYDROBROMIDE 20 MG/1
30 TABLET ORAL DAILY
Qty: 135 TABLET | Refills: 3 | Status: SHIPPED | OUTPATIENT
Start: 2019-04-01 | End: 2020-04-17

## 2019-04-01 RX ORDER — MIRABEGRON 50 MG/1
50 TABLET, EXTENDED RELEASE ORAL DAILY
Qty: 90 TABLET | Refills: 3 | Status: SHIPPED | OUTPATIENT
Start: 2019-04-01 | End: 2020-04-17

## 2019-04-01 RX ORDER — METOPROLOL SUCCINATE 25 MG/1
25 TABLET, EXTENDED RELEASE ORAL DAILY
Qty: 90 TABLET | Refills: 3 | Status: SHIPPED | OUTPATIENT
Start: 2019-04-01 | End: 2020-04-17

## 2019-04-01 RX ORDER — BACLOFEN 20 MG/1
20 TABLET ORAL 4 TIMES DAILY
Qty: 360 TABLET | Refills: 3 | Status: SHIPPED | OUTPATIENT
Start: 2019-04-01 | End: 2020-04-20

## 2019-04-01 ASSESSMENT — ENCOUNTER SYMPTOMS
HEADACHES: 0
ABDOMINAL PAIN: 0
FEVER: 0
CONSTIPATION: 1
PARESTHESIAS: 0
COUGH: 0
PALPITATIONS: 0
DIARRHEA: 0
DYSURIA: 0
JOINT SWELLING: 0
WEAKNESS: 0
EYE PAIN: 0
HEARTBURN: 0
CHILLS: 0
ARTHRALGIAS: 0
HEMATOCHEZIA: 0
NERVOUS/ANXIOUS: 0
SORE THROAT: 0
SHORTNESS OF BREATH: 0
NAUSEA: 0
HEMATURIA: 0
DIZZINESS: 0
FREQUENCY: 1
MYALGIAS: 0

## 2019-04-01 ASSESSMENT — PATIENT HEALTH QUESTIONNAIRE - PHQ9: SUM OF ALL RESPONSES TO PHQ QUESTIONS 1-9: 0

## 2019-04-01 NOTE — LETTER
My Depression Action Plan  Name: Marcus Hodges   Date of Birth 1955  Date: 4/1/2019    My doctor: Don Aviles   My clinic: [address]  [address]  [facility] [address] [phone]          GREEN    ZONE   Good Control    What it looks like:     Things are going generally well. You have normal up s and down s. You may even feel depressed from time to time, but bad moods usually last less than a day.   What you need to do:  1. Continue to care for yourself (see self care plan)  2. Check your depression survival kit and update it as needed  3. Follow your physician s recommendations including any medication.  4. Do not stop taking medication unless you consult with your physician first.           YELLOW         ZONE Getting Worse    What it looks like:     Depression is starting to interfere with your life.     It may be hard to get out of bed; you may be starting to isolate yourself from others.    Symptoms of depression are starting to last most all day and this has happened for several days.     You may have suicidal thoughts but they are not constant.   What you need to do:     1. Call your care team, your response to treatment will improve if you keep your care team informed of your progress. Yellow periods are signs an adjustment may need to be made.     2. Continue your self-care, even if you have to fake it!    3. Talk to someone in your support network    4. Open up your depression survival kit           RED    ZONE Medical Alert - Get Help    What it looks like:     Depression is seriously interfering with your life.     You may experience these or other symptoms: You can t get out of bed most days, can t work or engage in other necessary activities, you have trouble taking care of basic hygiene, or basic responsibilities, thoughts of suicide or death that will not go away, self-injurious behavior.     What you need to do:  1. Call your  care team and request a same-day appointment. If they are not available (weekends or after hours) call your local crisis line, emergency room or 911.            Depression Self Care Plan / Survival Kit    Self-Care for Depression  Here s the deal. Your body and mind are really not as separate as most people think.  What you do and think affects how you feel and how you feel influences what you do and think. This means if you do things that people who feel good do, it will help you feel better.  Sometimes this is all it takes.  There is also a place for medication and therapy depending on how severe your depression is, so be sure to consult with your medical provider and/ or Behavioral Health Consultant if your symptoms are worsening or not improving.     In order to better manage my stress, I will:    Exercise  Get some form of exercise, every day. This will help reduce pain and release endorphins, the  feel good  chemicals in your brain. This is almost as good as taking antidepressants!  This is not the same as joining a gym and then never going! (they count on that by the way ) It can be as simple as just going for a walk or doing some gardening, anything that will get you moving.      Hygiene   Maintain good hygiene (Get out of bed in the morning, Make your bed, Brush your teeth, Take a shower, and Get dressed like you were going to work, even if you are unemployed).  If your clothes don't fit try to get ones that do.    Diet  I will strive to eat foods that are good for me, drink plenty of water, and avoid excessive sugar, caffeine, alcohol, and other mood-altering substances.  Some foods that are helpful in depression are: complex carbohydrates, B vitamins, flaxseed, fish or fish oil, fresh fruits and vegetables.    Psychotherapy  I agree to participate in Individual Therapy (if recommended).    Medication  If prescribed medications, I agree to take them.  Missing doses can result in serious side effects.  I  understand that drinking alcohol, or other illicit drug use, may cause potential side effects.  I will not stop my medication abruptly without first discussing it with my provider.    Staying Connected With Others  I will stay in touch with my friends, family members, and my primary care provider/team.    Use your imagination  Be creative.  We all have a creative side; it doesn t matter if it s oil painting, sand castles, or mud pies! This will also kick up the endorphins.    Witness Beauty  (AKA stop and smell the roses) Take a look outside, even in mid-winter. Notice colors, textures. Watch the squirrels and birds.     Service to others  Be of service to others.  There is always someone else in need.  By helping others we can  get out of ourselves  and remember the really important things.  This also provides opportunities for practicing all the other parts of the program.    Humor  Laugh and be silly!  Adjust your TV habits for less news and crime-drama and more comedy.    Control your stress  Try breathing deep, massage therapy, biofeedback, and meditation. Find time to relax each day.     My support system    Clinic Contact:  Phone number:    Contact 1:  Phone number:    Contact 2:  Phone number:    Amish/:  Phone number:    Therapist:  Phone number:    Local crisis center:    Phone number:    Other community support:  Phone number:

## 2019-04-01 NOTE — PROGRESS NOTES
SUBJECTIVE:   CC: Marcus Hodges is an 63 year old male who presents for preventative health visit.     Physical   Annual:     Getting at least 3 servings of Calcium per day:  Yes    Bi-annual eye exam:  Yes    Dental care twice a year:  Yes    Sleep apnea or symptoms of sleep apnea:  Daytime drowsiness    Diet:  Low salt and Vegetarian/vegan    Frequency of exercise:  2-3 days/week    Duration of exercise:  Other    Additional concerns today:  YES (requests wart removal, left cheek)    PHQ-2 Total Score: 0        Today's PHQ-2 Score:   PHQ-2 ( 1999 [employer]) 4/1/2019   Q1: Little interest or pleasure in doing things 0   Q2: Feeling down, depressed or hopeless 0   PHQ-2 Score 0   Q1: Little interest or pleasure in doing things Not at all   Q2: Feeling down, depressed or hopeless Not at all   PHQ-2 Score 0       Abuse: Current or Past(Physical, Sexual or Emotional)- No  Do you feel safe in your environment? Yes    Social History     Tobacco Use     Smoking status: Never Smoker     Smokeless tobacco: Never Used   Substance Use Topics     Alcohol use: No     Alcohol Use 4/1/2019   If you drink alcohol do you typically have greater than 3 drinks per day OR greater than 7 drinks per week? Not Applicable       Last PSA:   PSA   Date Value Ref Range Status   01/09/2012 1.40 0 - 4 ug/L Final       Reviewed orders with patient. Reviewed health maintenance and updated orders accordingly - Yes      Reviewed and updated as needed this visit by clinical staff  Tobacco  Meds         Patient Active Problem List   Diagnosis     * * * SBE PROPHYLAXIS * * *     Health Care Home     Vitamin D deficiency     Hyperlipidemia LDL goal <100     Long-term (current) use of anticoagulants     Left hemiplegia (H)     Seizure prophylaxis     Advanced directives, counseling/discussion     Urinary incontinence     Neurogenic bladder     Chronic atrial fibrillation (H)     Tricuspid regurgitation     Mitral regurgitation     Atrial enlargement,  bilateral     Major depressive disorder, recurrent episode, moderate (H)     Traumatic brain injury with loss of consciousness, sequela (H)     Neurogenic bowel     Past Medical History:   Diagnosis Date     * * * SBE PROPHYLAXIS * * *      Atrial enlargement, bilateral      Atrial flutter (H) 2004    radiofrequency ablation 2004, resolved     ATRIAL SEPTAL DEFECT     repaired 1977     CVA (cerebral vascular accident) (H) 4/2012    R MCA     Dysphagia 04/22/12    [facility], following CVA     Hernia, abdominal      History of thrombophlebitis      Mitral regurgitation     mitral valve damage related to ASD repair     Mumps      Palpitations      Respiratory failure (H) 04/22/12    [facility], following CVA, prolonged requiring tracheostomy, [place] rehab      Tricuspid regurgitation      Unspecified glaucoma(365.9)     right     Urinary incontinence        Past Surgical History:   Procedure Laterality Date     C NONSPECIFIC PROCEDURE      tonsillectomy     C NONSPECIFIC PROCEDURE      Ing. Hernia Repair     CATHETER, ABLATION  2004     Craniotomy reconstruction  2012    [facility], repair of hemicraniectomy defect     CYSTOSCOPY       GASTROSTOMY TUBE  April 2012    [facility], following CVA     HERNIA REPAIR       REPAIR ATRIAL SEPTAL DEFECT  1978    ASD Repair     Right hemicraniectomy  April 2012    [facility], following CVA, mgmt malignant cerebral edema     SURGICAL HISTORY OF -   2009    right eye enucleation     TRACHEOSTOMY  April 2012    [facility], following CVA       Family History   Problem Relation Age of Onset     Hypertension Mother      Cerebrovascular Disease Father      Cancer Paternal Grandmother      Diabetes Maternal Grandmother      Congenital Anomalies Brother         several brothers and sisters born with congential heart defects, but all have been repaired     Congenital Anomalies Sister        ALLERGIES     Allergies   Allergen Reactions     No Known Drug Allergies      Current  Outpatient Medications   Medication Sig Dispense Refill     acetaminophen (TYLENOL) 325 MG tablet Take  by mouth every 4 hours as needed for pain. 100 tablet 0     apixaban ANTICOAGULANT (ELIQUIS) 5 MG tablet Take 1 tablet (5 mg) by mouth 2 times daily 180 tablet 0     atorvastatin (LIPITOR) 20 MG tablet Take 1 tablet (20 mg) by mouth daily 90 tablet 3     baclofen (LIORESAL) 20 MG tablet Take 1 tablet (20 mg) by mouth 4 times daily 360 tablet 3     cholecalciferol (VITAMIN D) 1000 UNIT tablet Take 2 tablets (2,000 Units) by mouth daily 90 tablet 3     citalopram (CELEXA) 20 MG tablet Take 1.5 tablets (30 mg) by mouth daily 135 tablet 3     levETIRAcetam (KEPPRA) 1000 MG tablet Take 1 tablet (1,000 mg) by mouth 2 times daily 180 tablet 3     metoprolol succinate ER (TOPROL-XL) 25 MG 24 hr tablet Take 1 tablet (25 mg) by mouth daily 90 tablet 3     mirabegron (MYRBETRIQ) 50 MG 24 hr tablet Take 1 tablet (50 mg) by mouth daily 90 tablet 3     Multiple Vitamin (MULTI-VITAMIN) per tablet Take 0.5 tablets by mouth daily  100 tablet 3     NONFORMULARY Protandim - herbal supplement       order for DME Equipment being ordered: Adhesive remover, wipes  ([facility] ) 100 each 99     order for DME Equipment being ordered:    Nightime urinary leg bag 1600cc  ([facility] Code ) 12 each 99     order for DME Equipment being ordered: Daytime Urinary leg bag-800cc  ([facility]S Code ) 12 each 99     order for DME Equipment being ordered: Incontinence supplies - Posies  ([facility]S Code ) 12 each 99     order for DME Equipment being ordered: Skin prep (no code - requested by patient) 100 each 99     order for DME Equipment being ordered:   Freedom Clear Condom catheter  ([facility]S Code ) 105 each 99        Reviewed and updated as needed this visit by Provider      Review of Systems   Constitutional: Negative for chills and fever.   HENT: Negative for congestion, ear pain, hearing loss and sore throat.    Eyes: Negative for pain  and visual disturbance.   Respiratory: Negative for cough and shortness of breath.    Cardiovascular: Negative for chest pain, palpitations and peripheral edema.   Gastrointestinal: Positive for constipation. Negative for abdominal pain, diarrhea, heartburn, hematochezia and nausea.   Genitourinary: Positive for frequency and impotence. Negative for discharge, dysuria, genital sores, hematuria and urgency.   Musculoskeletal: Negative for arthralgias, joint swelling and myalgias.   Skin: Negative for rash.   Neurological: Negative for dizziness, weakness, headaches and paresthesias.   Psychiatric/Behavioral: Negative for mood changes. The patient is not nervous/anxious.          OBJECTIVE:   /64 (Cuff Size: Adult Regular)   Pulse 67   Temp 98.4  F (36.9  C) (Oral)   Wt 83 kg (183 lb)   SpO2 97%   BMI 23.50 kg/m      Physical Exam  GENERAL:  alert and no distress  EYES: conjunctivae and sclerae normal  HENT: ear canals and TM's normal, nose and mouth without ulcers or lesions  NECK: no adenopathy, no asymmetry, masses, or scars and thyroid normal to palpation  RESP: lungs clear to auscultation - no rales, rhonchi or wheezes  CV: regular rate and rhythm, normal S1 S2, no S3 or S4  ABDOMEN: soft, nontender, no hepatosplenomegaly, no masses and bowel sounds normal  MS: no gross musculoskeletal defects noted, no edema  SKIN: 5 mm verrucous lesion left side of jaw  NEURO: Motor exam right upper extremity: 5/5, right lower extremity 5/5.  Left upper extremity 0/5, left lower extremity 3-4/5, wears AFO brace     Gait is unstable, using a 4 pronged walking cane, transfers are slow and requires assistance.  PSYCH: mentation appears normal, affect normal/bright    ASSESSMENT/PLAN:       ICD-10-CM    1. Encounter for routine adult medical exam with abnormal findings Z00.01  63-year-old male with a history of valvular heart disease and prior large MCA CVA 2012 attributed to atrial fibrillation with chronic persistent  neurologic deficits and associated disability presents today for annual exam     2. Traumatic brain injury with loss of consciousness, sequela (H) S06.9X9S  prior CVA with subsequent left hemiparesis, neurogenic bladder, likely neurogenic bowel with marked ongoing gait impairment     3. Left hemiplegia (H) G81.94 baclofen (LIORESAL) 20 MG tablet      Marcus continues to try to maintain activity-walking when possible, some pedaling.  Some limited improvement with physical therapy     4. Chronic atrial fibrillation (H) I48.2 metoprolol succinate ER (TOPROL-XL) 25 MG 24 hr tablet     Rate controlled.  AC-continue Eliquis   5. Non-rheumatic mitral regurgitation I34.0  history of valvular heart disease     6. Major depressive disorder, recurrent episode, moderate (H) F33.1 citalopram (CELEXA) 20 MG tablet     DEPRESSION ACTION PLAN (DAP)     Stable, continue current regimen.     7. Neurogenic bladder N31.9 order for DME     order for DME     order for DME     order for DME     order for DME     mirabegron (MYRBETRIQ) 50 MG 24 hr tablet     order for DME     Continue Myrbetriq,    annual supplies for management of catheter and ongoing urinary incontinence   8. Urinary incontinence, unspecified type R32      9. Neurogenic bowel K59.2   constipation management: Senna, docusate, may add MiraLAX as needed.     10. Hyperlipidemia LDL goal <100 E78.5 Lipid panel reflex to direct LDL Fasting     Comprehensive metabolic panel (BMP + Alb, Alk Phos, ALT, AST, Total. Bili, TP)     atorvastatin (LIPITOR) 20 MG tablet     Fasting for lab work today.     11. Seizure prophylaxis Z29.8 levETIRAcetam (KEPPRA) 1000 MG tablet     Continue Keppra.     12. Viral warts, unspecified type B07.9 DESTRUCT BENIGN LESION, UP TO 14  PROCEDURE:   WART TREATMENT-LIQUID NITROGEN  Treatment discussed with patient in detail.    Liquid nitrogen is then applied in 2 freeze/thaw cycles.  Patient tolerated this procedure well.  Wound care instructions  "discussed.  Follow-up in 2-3 weeks if persistent.          COUNSELING:   Reviewed preventive health counseling, as reflected in patient instructions       Regular exercise       Healthy diet/nutrition       Colon cancer screening    BP Readings from Last 1 Encounters:   04/01/19 102/64     Estimated body mass index is 23.5 kg/m  as calculated from the following:    Height as of 11/15/18: 1.88 m (6' 2\").    Weight as of this encounter: 83 kg (183 lb).           reports that  has never smoked. he has never used smokeless tobacco.      Counseling Resources:  ATP IV Guidelines  Pooled Cohorts Equation Calculator  FRAX Risk Assessment  ICSI Preventive Guidelines  Dietary Guidelines for Americans, 2010  USDA's MyPlate  ASA Prophylaxis  Lung CA Screening    Don Aviles MD, MD  [facility]  "

## 2019-04-05 ENCOUNTER — HOSPITAL ENCOUNTER (OUTPATIENT)
Dept: OCCUPATIONAL THERAPY | Facility: CLINIC | Age: 64
Setting detail: THERAPIES SERIES
End: 2019-04-05
Attending: PHYSICAL THERAPIST
Payer: COMMERCIAL

## 2019-04-05 ENCOUNTER — HOSPITAL ENCOUNTER (OUTPATIENT)
Dept: PHYSICAL THERAPY | Facility: CLINIC | Age: 64
Setting detail: THERAPIES SERIES
End: 2019-04-05
Attending: INTERNAL MEDICINE
Payer: COMMERCIAL

## 2019-04-05 PROCEDURE — 97110 THERAPEUTIC EXERCISES: CPT | Mod: GO | Performed by: OCCUPATIONAL THERAPIST

## 2019-04-05 PROCEDURE — 97112 NEUROMUSCULAR REEDUCATION: CPT | Mod: GP | Performed by: PHYSICAL THERAPIST

## 2019-04-05 PROCEDURE — 97110 THERAPEUTIC EXERCISES: CPT | Mod: GP | Performed by: PHYSICAL THERAPIST

## 2019-04-05 PROCEDURE — 97116 GAIT TRAINING THERAPY: CPT | Mod: GP | Performed by: PHYSICAL THERAPIST

## 2019-04-08 ENCOUNTER — HOSPITAL ENCOUNTER (OUTPATIENT)
Dept: OCCUPATIONAL THERAPY | Facility: CLINIC | Age: 64
Setting detail: THERAPIES SERIES
End: 2019-04-08
Attending: PHYSICAL THERAPIST
Payer: COMMERCIAL

## 2019-04-08 ENCOUNTER — HOSPITAL ENCOUNTER (OUTPATIENT)
Dept: PHYSICAL THERAPY | Facility: CLINIC | Age: 64
Setting detail: THERAPIES SERIES
End: 2019-04-08
Attending: INTERNAL MEDICINE
Payer: COMMERCIAL

## 2019-04-08 PROCEDURE — 97140 MANUAL THERAPY 1/> REGIONS: CPT | Mod: GO | Performed by: OCCUPATIONAL THERAPIST

## 2019-04-08 PROCEDURE — 97112 NEUROMUSCULAR REEDUCATION: CPT | Mod: GP | Performed by: PHYSICAL THERAPIST

## 2019-04-08 PROCEDURE — 97116 GAIT TRAINING THERAPY: CPT | Mod: GP | Performed by: PHYSICAL THERAPIST

## 2019-04-08 NOTE — PROGRESS NOTES
OUTPATIENT OCCUPATIONAL THERAPY PROGRESS NOTE  / 90 Days       04/05/19 0900   Signing Clinician's Name / Credentials   Signing clinician's name / credentials LOUIS Perkins/marty   Session Number   Session Number 17- [facility] (NO COMMUNITY/WORK REINTEGRATION AND IADLS ([id number]), AUTH PRIOR TO COGNITIVE THERAPY    Progress/Recertification   Progress Note Due 03/25/19    OT Goal 1   Goal Identifier L    Goal Description Patient to demonstrate improved L   strength to 5# for increased ADL/IADL independence (hanging onto objects for hygiene, dressing, bathing, toileting and IADL tasks).   Target Date 03/25/19    OT Goal 2   Goal Identifier R hand strength   Goal Description Patient to demonstrate WNLS R hand strength (, lateral and palmar pinch) for optimal use of this UE as primary extremity for completing ADL/IADL tasks.   Target Date 03/25/19    OT Goal 3   Goal Identifier L UE home program   Goal Description Patient to demonstrate L UE HEP with SBA and min cues for good follow through at home, prevention of contractures and increase functional strength and ROM for maximum independence with performance of ADLs as gross assist and gross stabilizer.    Target Date 03/25/19   OT Goal 4   Goal Identifier use of L UE during functional tasks   Goal Description Patient to demonstrate functional tasks (wiping the counter/table, washing dishes, etc.) with at least 15% use of L UE as gross stabilizer/gross assist for increased ADL/IADL independence and improved functional use of L UE.   Target Date 03/25/19   OT Goal 5   Goal Identifier visual skills   Goal Description Patient to verbalize and successfully demonstrate at least 2 strategies (lighting, scanning strategies, magnification, etc.) during functional tasks (reading, computer tasks, navigation/functional mobility) to increase visual skills for increased independence and safety due to visual deficits.   Target Date 03/25/19   Subjective Report    Subjective Report Pt reports he has been doing the NMES with scap row ex at home, working at coordinating the charge with the reach [place] ( to EX ELB better)   Objective Measure 4   Objective Measure , pinch   Details R  is 67# (down from 72# at eval), key pinch is 14# and 13# for 3 pt.  Pt able to squeeze 3# L and 1# key pinch, unable to sustain 3 pt position.     Objective Measure 5   Objective Measure L UE AROM   Details Scaption to 92 dgrees of 160 , ABD of SH to 88 AROM ( AA ROM 92 dgreems.  PROM to 110).  Gets to 44 dgrees of 60 sh EX.  20 dgrees of  PROM SH ER  of 45  ( very firm  springy end feel, getting this after IASTM) .  Supinates to 14 of 90 degrees PROM ( very firm  springy end feel, getting this after IASTM, prolonged stretching)   Therapeutic Interventions   Therapeutic Interventions Manual Therapy   Therapeutic Procedure/Exercise   Therapeutic Procedure: strength, endurance, ROM, flexibillity minutes (02353) 42   Skilled Intervention L UE P/AAROM for contracture prevention and increased ADL/IADL use   Patient Response pt pleased his making progress.   Treatment Detail Review of upgraded HEP w  wife : combining NMES pad placements at L triceps during 2 handed push/pull/scap row using dowel, red/therapy band.  At rest,  Max A with final 80 degrees of EL EX, pulls into  75% of scap retraction/ SH EX using more SH ABD to assist. WIth NMES ( 4 fingers above olecranon process (red) and (black) 3 fingers above and 1 pad space lateral to distal pad ( intensity 34) Able to demonstrate 12 min of alternating motions of 85% extension of EL with NMES (during charge) and 80% scap/retraction with charge off, with  dowel/red band/scap row, OTR w moderate verbal cues for scap retraction and help with coordinating EL EX motion during NMES charge. REivew of udated progress towards fgaols per above.  Plan-- COntonue 1x/week x 8 additioonal weeks to focus on more functional use of L UE for carrying water  bottle with loop, opening grooming supplies/water bottole ( unable  to hold 3 in diameter jar , too large ( intrinisic positon in  MCP without fingers curling aorund round jar)    Plan   Homework SH  ER/ABD/HAbd, SH FL, EL EX/supination, supination/ finger open/TH ABD stretches.  Cane SROM for SH ER and  SH EX planes.   Home program Carry water bottle/coffe cup small tote bag between rooms w L Hand.    Plan for next session Jesus at stabilzing grooming tasks, Issue tool info/Cont/ teach wife some IASTM at pronators,flexor mm bulk, NMES triceps ext with  B dowel/red band scap row, try also w **mod closed chain/forward reach on quad cane.  Is he carrying a small tote bag at home?, Trial of IASTM/MFR for pec minor, forearm and TH Issue and/or find resources for new strap for thumb on his splint; review and modify his entire home program for left upper extremity (self range of motion, assisted range of motion from spouse and PCA, tabletop stretches, **Cont NMES w 2 channel finger and wrist alternating ext/ flexors for grasp, modified closed chain, gravity eliminated for shoulder, WB, etc.), L UE as gross stabilizer/assist; ed. in R hand putty HEP due to BNLS and ed. in hand gripper; SUSAN VISION: campimeter vs. red dot to compare with peripheral; Pepper Test prn; ed. in strategies (circular, lighthouse, hazards, etc.); Scancourse prn   Total Session Time   Timed Code Treatment Minutes 44   Total Treatment Time (sum of timed and untimed services) 44       Thank you for this thoughtful referral! If you have any questions, please call me [phone].  Nice to share in this patient's care with you.    Sincerely,   Ada Enrique, OTR/l

## 2019-04-08 NOTE — ADDENDUM NOTE
Encounter addended by: Ada Enrique, OTR on: 4/8/2019 1:05 PM   Actions taken: Flowsheet data copied forward, Sign clinical note, Flowsheet accepted

## 2019-04-12 ENCOUNTER — OFFICE VISIT (OUTPATIENT)
Dept: CARDIOLOGY | Facility: CLINIC | Age: 64
End: 2019-04-12
Payer: COMMERCIAL

## 2019-04-12 VITALS
HEART RATE: 76 BPM | WEIGHT: 183 LBS | DIASTOLIC BLOOD PRESSURE: 69 MMHG | SYSTOLIC BLOOD PRESSURE: 103 MMHG | HEIGHT: 74 IN | BODY MASS INDEX: 23.49 KG/M2

## 2019-04-12 DIAGNOSIS — I51.7 ATRIAL ENLARGEMENT, BILATERAL: ICD-10-CM

## 2019-04-12 DIAGNOSIS — E78.5 HYPERLIPIDEMIA LDL GOAL <100: ICD-10-CM

## 2019-04-12 DIAGNOSIS — I48.20 CHRONIC ATRIAL FIBRILLATION (H): Primary | ICD-10-CM

## 2019-04-12 DIAGNOSIS — I05.9 MITRAL VALVE DISEASE: ICD-10-CM

## 2019-04-12 PROCEDURE — 99214 OFFICE O/P EST MOD 30 MIN: CPT | Performed by: INTERNAL MEDICINE

## 2019-04-12 ASSESSMENT — MIFFLIN-ST. JEOR: SCORE: 1704.83

## 2019-04-12 NOTE — LETTER
4/12/2019      Don Aviles MD, MD  [address] [place] [address]      RE: Marcus Hodges       Dear Colleague,    I had the pleasure of seeing Marcus Hodges in the [facility].    Service Date: 04/12/2019      HISTORY OF PRESENT ILLNESS:  I had the opportunity to see Mr. Marcus Hodges in Cardiology Clinic today at the [facility] in [place] for reevaluation of chronic atrial fibrillation and a history of atrial septal defect repair and mitral valve repair.        Mr. Hodges had an atrial septal defect originally repaired back in 1978 and went back for a second surgery 9 months later for mitral valve repair and additional repair of his atrial septal defect.  In 2004, he had atrial flutter, which was treated with ablation.  He had no further problems until 2012 when he suffered a large right-sided stroke which was thought to be due to newly discovered atrial fibrillation at that time.  He continues to have fairly severe left-sided hemiparesis.      He has continued to exercise regularly 3 times a [facility] club and is still doing physical therapy.  He continues to make progress in building his mobility, strength and stamina.  He is not experiencing any concerning cardiac symptoms during this time.  He has no palpitations, lightheadedness, shortness of breath, chest discomfort or syncope.      Last year he switched over from warfarin to Eliquis and certainly likes that option better.  He has had no bleeding problems.      His echocardiogram was done last in 2017 and looked great.  He had no evidence of residual atrial septal defect and his mitral and tricuspid valves are functioning normally.      PHYSICAL EXAMINATION:  His blood pressure is 103/69, heart rate 76 and weight 183 pounds.  His lungs are clear.  His heart rhythm is irregularly irregular without significant cardiac murmur.  He has no carotid bruits or edema.      IMPRESSIONS:    Stephanie Hodges is a 63-year-old gentleman with chronic atrial fibrillation and history of stroke in .  He has had ASD repair twice and mitral valve repair also.  He is still working on improving his strength and stamina and mobility due to his left-sided hemiparesis, but continues to make progress.  He has no concerning cardiac symptoms at this point.  His cholesterol numbers and basic metabolic panel looked great and his blood pressure and heart rate are well controlled.  He will continue Eliquis.  He is not having any bleeding problems.      I will plan to have him follow up with me again in 1 year for reevaluation.      cc:   [place] [address]         AZEB VILLARREAL MD, [facility]             D: 2019   T: 2019   MT: MIK      Name:     STEPHANIE HODGES   MRN:      [id number]        Account:      [id number]   :      1955           Service Date: 2019      Document: [id number]         Outpatient Encounter Medications as of 2019   Medication Sig Dispense Refill     acetaminophen (TYLENOL) 325 MG tablet Take  by mouth every 4 hours as needed for pain. 100 tablet 0     apixaban ANTICOAGULANT (ELIQUIS) 5 MG tablet Take 1 tablet (5 mg) by mouth 2 times daily 180 tablet 3     atorvastatin (LIPITOR) 20 MG tablet Take 1 tablet (20 mg) by mouth daily 90 tablet 3     baclofen (LIORESAL) 20 MG tablet Take 1 tablet (20 mg) by mouth 4 times daily 360 tablet 3     cholecalciferol (VITAMIN D) 1000 UNIT tablet Take 2 tablets (2,000 Units) by mouth daily 90 tablet 3     citalopram (CELEXA) 20 MG tablet Take 1.5 tablets (30 mg) by mouth daily 135 tablet 3     levETIRAcetam (KEPPRA) 1000 MG tablet Take 1 tablet (1,000 mg) by mouth 2 times daily 180 tablet 3     metoprolol succinate ER (TOPROL-XL) 25 MG 24 hr tablet Take 1 tablet (25 mg) by mouth daily 90 tablet 3     mirabegron (MYRBETRIQ) 50 MG 24 hr tablet Take 1 tablet (50 mg) by  mouth daily 90 tablet 3     Multiple Vitamin (MULTI-VITAMIN) per tablet Take 0.5 tablets by mouth daily  100 tablet 3     NONFORMULARY Protandim - herbal supplement       order for DME Equipment being ordered: Adhesive remover, wipes  ([facility] ) 100 each 99     order for DME Equipment being ordered:    Nightime urinary leg bag 1600cc  ([facility]S Code ) 12 each 99     order for DME Equipment being ordered: Daytime Urinary leg bag-800cc  ([facility]PCS Code ) 12 each 99     order for DME Equipment being ordered: Incontinence supplies - Posies  ([facility]PCS Code ) 12 each 99     order for DME Equipment being ordered: Skin prep (no code - requested by patient) 100 each 99     order for DME Equipment being ordered:   Freedom Clear Condom catheter  ([facility] Code ) 105 each 99     [DISCONTINUED] apixaban ANTICOAGULANT (ELIQUIS) 5 MG tablet Take 1 tablet (5 mg) by mouth 2 times daily 180 tablet 0     No facility-administered encounter medications on file as of 4/12/2019.        Again, thank you for allowing me to participate in the care of your patient.      Sincerely,    AZEB VILLARREAL MD     [facility]

## 2019-04-12 NOTE — PROGRESS NOTES
HPI and Plan:   See dictation    Orders Placed This Encounter   Procedures     Lipid Profile     ALT     Basic metabolic panel     Follow-Up with Cardiologist     Echocardiogram Complete       Orders Placed This Encounter   Medications     apixaban ANTICOAGULANT (ELIQUIS) 5 MG tablet     Sig: Take 1 tablet (5 mg) by mouth 2 times daily     Dispense:  180 tablet     Refill:  3       Medications Discontinued During This Encounter   Medication Reason     apixaban ANTICOAGULANT (ELIQUIS) 5 MG tablet Reorder         Encounter Diagnoses   Name Primary?     Chronic atrial fibrillation (H) Yes     Hyperlipidemia LDL goal <100      Mitral valve disease      Atrial enlargement, bilateral        CURRENT MEDICATIONS:  Current Outpatient Medications   Medication Sig Dispense Refill     acetaminophen (TYLENOL) 325 MG tablet Take  by mouth every 4 hours as needed for pain. 100 tablet 0     apixaban ANTICOAGULANT (ELIQUIS) 5 MG tablet Take 1 tablet (5 mg) by mouth 2 times daily 180 tablet 3     atorvastatin (LIPITOR) 20 MG tablet Take 1 tablet (20 mg) by mouth daily 90 tablet 3     baclofen (LIORESAL) 20 MG tablet Take 1 tablet (20 mg) by mouth 4 times daily 360 tablet 3     cholecalciferol (VITAMIN D) 1000 UNIT tablet Take 2 tablets (2,000 Units) by mouth daily 90 tablet 3     citalopram (CELEXA) 20 MG tablet Take 1.5 tablets (30 mg) by mouth daily 135 tablet 3     levETIRAcetam (KEPPRA) 1000 MG tablet Take 1 tablet (1,000 mg) by mouth 2 times daily 180 tablet 3     metoprolol succinate ER (TOPROL-XL) 25 MG 24 hr tablet Take 1 tablet (25 mg) by mouth daily 90 tablet 3     mirabegron (MYRBETRIQ) 50 MG 24 hr tablet Take 1 tablet (50 mg) by mouth daily 90 tablet 3     Multiple Vitamin (MULTI-VITAMIN) per tablet Take 0.5 tablets by mouth daily  100 tablet 3     NONFORMULARY Protandim - herbal supplement       order for DME Equipment being ordered: Adhesive remover, wipes  ([facility] Code ) 100 each 99     order for DME Equipment  being ordered:    Nightime urinary leg bag 1600cc  ([facility]PCS Code ) 12 each 99     order for DME Equipment being ordered: Daytime Urinary leg bag-800cc  ([facility]PCS Code ) 12 each 99     order for DME Equipment being ordered: Incontinence supplies - Posies  (HCPCS Code ) 12 each 99     order for DME Equipment being ordered: Skin prep (no code - requested by patient) 100 each 99     order for DME Equipment being ordered:   Freedom Clear Condom catheter  [facility] ) 105 each 99       ALLERGIES     Allergies   Allergen Reactions     No Known Drug Allergies        PAST MEDICAL HISTORY:  Past Medical History:   Diagnosis Date     * * * SBE PROPHYLAXIS * * *      Atrial enlargement, bilateral      Atrial flutter (H) 2004    radiofrequency ablation 2004, resolved     ATRIAL SEPTAL DEFECT     repaired 1977     CVA (cerebral vascular accident) (H) 4/2012    R MCA     Dysphagia 04/22/12    [facility], following CVA     Hernia, abdominal      History of thrombophlebitis      Mitral regurgitation     mitral valve damage related to ASD repair     Mumps      Palpitations      Respiratory failure (H) 04/22/12    [facility], following CVA, prolonged requiring tracheostomy, [facility]      Tricuspid regurgitation      Unspecified glaucoma(365.9)     right     Urinary incontinence        PAST SURGICAL HISTORY:  Past Surgical History:   Procedure Laterality Date     C NONSPECIFIC PROCEDURE      tonsillectomy     C NONSPECIFIC PROCEDURE      Ing. Hernia Repair     CATHETER, ABLATION  2004     Craniotomy reconstruction  2012    [facility], repair of hemicraniectomy defect     CYSTOSCOPY       GASTROSTOMY TUBE  April 2012    [facility], following CVA     HERNIA REPAIR       REPAIR ATRIAL SEPTAL DEFECT  1978    ASD Repair     Right hemicraniectomy  April 2012    [facility], following CVA, mgmt malignant cerebral edema     SURGICAL HISTORY OF -   2009    right eye enucleation     TRACHEOSTOMY  April 2012      [facility], following CVA       FAMILY HISTORY:  Family History   Problem Relation Age of Onset     Hypertension Mother      Cerebrovascular Disease Father      Cancer Paternal Grandmother      Diabetes Maternal Grandmother      Congenital Anomalies Brother         several brothers and sisters born with congential heart defects, but all have been repaired     Congenital Anomalies Sister        SOCIAL HISTORY:  Social History     Socioeconomic History     Marital status:      Spouse name: haim     Number of children: 0     Years of education: None     Highest education level: None   Occupational History     Employer: [employer]   Social Needs     Financial resource strain: None     Food insecurity:     Worry: None     Inability: None     Transportation needs:     Medical: None     Non-medical: None   Tobacco Use     Smoking status: Never Smoker     Smokeless tobacco: Never Used   Substance and Sexual Activity     Alcohol use: No     Drug use: No     Sexual activity: Yes     Partners: Female   Lifestyle     Physical activity:     Days per week: None     Minutes per session: None     Stress: None   Relationships     Social connections:     Talks on phone: None     Gets together: None     Attends [employer] service: None     Active member of club or organization: None     Attends meetings of clubs or organizations: None     Relationship status: None     Intimate partner violence:     Fear of current or ex partner: None     Emotionally abused: None     Physically abused: None     Forced sexual activity: None   Other Topics Concern      Service Not Asked     Blood Transfusions Not Asked     Caffeine Concern Yes     Comment: couple cups of tea a day     Occupational Exposure Not Asked     Hobby Hazards Not Asked     Sleep Concern Not Asked     Stress Concern Not Asked     Weight Concern Not Asked     Special Diet Yes     Comment: vegan; occ eggs/fish      Back Care Not Asked     Exercise Yes      "Comment: 2 x weekly/gym , pysical therapy     Bike Helmet Not Asked     Seat Belt Yes     Self-Exams Not Asked     Parent/sibling w/ CABG, MI or angioplasty before 65F 55M? Not Asked   Social History Narrative     None       Review of Systems:  Skin:  Negative       Eyes:  Positive for glasses    ENT:  Positive for   sore throat  Respiratory:  Positive for dyspnea on exertion     Cardiovascular:  Negative      Gastroenterology: Positive for constipation    Genitourinary:  Negative      Musculoskeletal:  Negative      Neurologic:  Positive for numbness or tingling of hands;numbness or tingling of feet left side of body due to stroke  Psychiatric:  Positive for depression treated  Heme/Lymph/Imm:  Positive for allergies seasonal  Endocrine:  Negative        Physical Exam:  Vitals: /69   Pulse 76   Ht 1.88 m (6' 2\")   Wt 83 kg (183 lb)   BMI 23.50 kg/m      Constitutional:    thin      Skin:  warm and dry to the touch          Head:  normocephalic        Eyes:  sclera white        Lymph:No Cervical lymphadenopathy present     ENT:  no pallor or cyanosis        Neck:  carotid pulses are full and equal bilaterally;JVP normal;no carotid bruit        Respiratory:  normal breath sounds, clear to auscultation, normal A-P diameter, normal symmetry, normal respiratory excursion, no use of accessory muscles         Cardiac: no S3 or S4 irregularly irregular rhythm     holosystolic murmur;grade 1        pulses full and equal                                        GI:  abdomen soft;BS normoactive        Extremities and Muscular Skeletal:  no deformities, clubbing, cyanosis, erythema observed;no edema              Neurological:  affect appropriate limb paralysis      Psych:  affect appropriate, oriented to time, person and place        CC  Don Aviles MD  [address] [place] [address]              "

## 2019-04-12 NOTE — PROGRESS NOTES
Service Date: 04/12/2019      HISTORY OF PRESENT ILLNESS:  I had the opportunity to see Mr. Marcus Hodges in Cardiology Clinic today at the [facility] in [place] for reevaluation of chronic atrial fibrillation and a history of atrial septal defect repair and mitral valve repair.        Mr. Hodges had an atrial septal defect originally repaired back in 1978 and went back for a second surgery 9 months later for mitral valve repair and additional repair of his atrial septal defect.  In 2004, he had atrial flutter, which was treated with ablation.  He had no further problems until 2012 when he suffered a large right-sided stroke which was thought to be due to newly discovered atrial fibrillation at that time.  He continues to have fairly severe left-sided hemiparesis.      He has continued to exercise regularly 3 times a day [facility] the health club and is still doing physical therapy.  He continues to make progress in building his mobility, strength and stamina.  He is not experiencing any concerning cardiac symptoms during this time.  He has no palpitations, lightheadedness, shortness of breath, chest discomfort or syncope.      Last year he switched over from warfarin to Eliquis and certainly likes that option better.  He has had no bleeding problems.      His echocardiogram was done last in 2017 and looked great.  He had no evidence of residual atrial septal defect and his mitral and tricuspid valves are functioning normally.      PHYSICAL EXAMINATION:  His blood pressure is 103/69, heart rate 76 and weight 183 pounds.  His lungs are clear.  His heart rhythm is irregularly irregular without significant cardiac murmur.  He has no carotid bruits or edema.      IMPRESSIONS:  Mr. Marcus Hodges is a 63-year-old gentleman with chronic atrial fibrillation and history of stroke in 2012.  He has had ASD repair twice and mitral valve repair also.  He is still working on improving his strength and stamina and  mobility due to his left-sided hemiparesis, but continues to make progress.  He has no concerning cardiac symptoms at this point.  His cholesterol numbers and basic metabolic panel looked great and his blood pressure and heart rate are well controlled.  He will continue Eliquis.  He is not having any bleeding problems.      I will plan to have him follow up with me again in 1 year for reevaluation.      cc:   [place] [address]         AZEB VILLARREAL MD, FACC             D: 2019   T: 2019   MT: MIK      Name:     STEPHANIE HARDEN   MRN:      [id number]        Account:      [id number]   :      1955           Service Date: 2019      Document: [id number]

## 2019-04-12 NOTE — LETTER
4/12/2019    Don Aviles MD, MD  [address] [place] [address]    RE: Marcus Hodges       Dear Colleague,    I had the pleasure of seeing Marcus Hodges in the [facility].    HPI and Plan:   See dictation    Orders Placed This Encounter   Procedures     Lipid Profile     ALT     Basic metabolic panel     Follow-Up with Cardiologist     Echocardiogram Complete       Orders Placed This Encounter   Medications     apixaban ANTICOAGULANT (ELIQUIS) 5 MG tablet     Sig: Take 1 tablet (5 mg) by mouth 2 times daily     Dispense:  180 tablet     Refill:  3       Medications Discontinued During This Encounter   Medication Reason     apixaban ANTICOAGULANT (ELIQUIS) 5 MG tablet Reorder         Encounter Diagnoses   Name Primary?     Chronic atrial fibrillation (H) Yes     Hyperlipidemia LDL goal <100      Mitral valve disease      Atrial enlargement, bilateral        CURRENT MEDICATIONS:  Current Outpatient Medications   Medication Sig Dispense Refill     acetaminophen (TYLENOL) 325 MG tablet Take  by mouth every 4 hours as needed for pain. 100 tablet 0     apixaban ANTICOAGULANT (ELIQUIS) 5 MG tablet Take 1 tablet (5 mg) by mouth 2 times daily 180 tablet 3     atorvastatin (LIPITOR) 20 MG tablet Take 1 tablet (20 mg) by mouth daily 90 tablet 3     baclofen (LIORESAL) 20 MG tablet Take 1 tablet (20 mg) by mouth 4 times daily 360 tablet 3     cholecalciferol (VITAMIN D) 1000 UNIT tablet Take 2 tablets (2,000 Units) by mouth daily 90 tablet 3     citalopram (CELEXA) 20 MG tablet Take 1.5 tablets (30 mg) by mouth daily 135 tablet 3     levETIRAcetam (KEPPRA) 1000 MG tablet Take 1 tablet (1,000 mg) by mouth 2 times daily 180 tablet 3     metoprolol succinate ER (TOPROL-XL) 25 MG 24 hr tablet Take 1 tablet (25 mg) by mouth daily 90 tablet 3     mirabegron (MYRBETRIQ) 50 MG 24 hr tablet Take 1 tablet (50 mg) by mouth daily 90 tablet 3     Multiple Vitamin (MULTI-VITAMIN) per tablet  Take 0.5 tablets by mouth daily  100 tablet 3     NONFORMULARY Protandim - herbal supplement       order for DME Equipment being ordered: Adhesive remover, wipes  [facility] 100 each 99     order for DME Equipment being ordered:    Nightime urinary leg bag 1600cc  ([facility] ) 12 each 99     order for DME Equipment being ordered: Daytime Urinary leg bag-800cc  ([facility] ) 12 each 99     order for DME Equipment being ordered: Incontinence supplies - Posies  ([facility] ) 12 each 99     order for DME Equipment being ordered: Skin prep (no code - requested by patient) 100 each 99     order for DME Equipment being ordered:   Freedom Clear Condom catheter  ([facility] ) 105 each 99       ALLERGIES     Allergies   Allergen Reactions     No Known Drug Allergies        PAST MEDICAL HISTORY:  Past Medical History:   Diagnosis Date     * * * SBE PROPHYLAXIS * * *      Atrial enlargement, bilateral      Atrial flutter (H) 2004    radiofrequency ablation 2004, resolved     ATRIAL SEPTAL DEFECT     repaired 1977     CVA (cerebral vascular accident) (H) 4/2012    R MCA     Dysphagia 04/22/12    [facility], following CVA     Hernia, abdominal      History of thrombophlebitis      Mitral regurgitation     mitral valve damage related to ASD repair     Mumps      Palpitations      Respiratory failure (H) 04/22/12    [facility], following CVA, prolonged requiring tracheostomy, [facility]      Tricuspid regurgitation      Unspecified glaucoma(365.9)     right     Urinary incontinence        PAST SURGICAL HISTORY:  Past Surgical History:   Procedure Laterality Date     C NONSPECIFIC PROCEDURE      tonsillectomy     C NONSPECIFIC PROCEDURE      Ing. Hernia Repair     CATHETER, ABLATION  2004     Craniotomy reconstruction  2012    [facility], repair of hemicraniectomy defect     CYSTOSCOPY       GASTROSTOMY TUBE  April 2012    [facility], following CVA     HERNIA REPAIR       REPAIR ATRIAL SEPTAL  DEFECT  1978    ASD Repair     Right hemicraniectomy  April 2012    [facility], following CVA, mgmt malignant cerebral edema     SURGICAL HISTORY OF -   2009    right eye enucleation     TRACHEOSTOMY  April 2012    [facility], following CVA       FAMILY HISTORY:  Family History   Problem Relation Age of Onset     Hypertension Mother      Cerebrovascular Disease Father      Cancer Paternal Grandmother      Diabetes Maternal Grandmother      Congenital Anomalies Brother         several brothers and sisters born with congential heart defects, but all have been repaired     Congenital Anomalies Sister        SOCIAL HISTORY:  Social History     Socioeconomic History     Marital status:      Spouse name: haim     Number of children: 0     Years of education: None     Highest education level: None   Occupational History     Employer: [employer]   Social Needs     Financial resource strain: None     Food insecurity:     Worry: None     Inability: None     Transportation needs:     Medical: None     Non-medical: None   Tobacco Use     Smoking status: Never Smoker     Smokeless tobacco: Never Used   Substance and Sexual Activity     Alcohol use: No     Drug use: No     Sexual activity: Yes     Partners: Female   Lifestyle     Physical activity:     Days per week: None     Minutes per session: None     Stress: None   Relationships     Social connections:     Talks on phone: None     Gets together: None     Attends Religion service: None     Active member of club or organization: None     Attends meetings of clubs or organizations: None     Relationship status: None     Intimate partner violence:     Fear of current or ex partner: None     Emotionally abused: None     Physically abused: None     Forced sexual activity: None   Other Topics Concern      Service Not Asked     Blood Transfusions Not Asked     Caffeine Concern Yes     Comment: couple cups of tea a day     Occupational Exposure Not  "Asked     Hobby Hazards Not Asked     Sleep Concern Not Asked     Stress Concern Not Asked     Weight Concern Not Asked     Special Diet Yes     Comment: vegan; occ eggs/fish      Back Care Not Asked     Exercise Yes     Comment: 2 x weekly/gym , pysical therapy     Bike Helmet Not Asked     Seat Belt Yes     Self-Exams Not Asked     Parent/sibling w/ CABG, MI or angioplasty before 65F 55M? Not Asked   Social History Narrative     None       Review of Systems:  Skin:  Negative       Eyes:  Positive for glasses    ENT:  Positive for   sore throat  Respiratory:  Positive for dyspnea on exertion     Cardiovascular:  Negative      Gastroenterology: Positive for constipation    Genitourinary:  Negative      Musculoskeletal:  Negative      Neurologic:  Positive for numbness or tingling of hands;numbness or tingling of feet left side of body due to stroke  Psychiatric:  Positive for depression treated  Heme/Lymph/Imm:  Positive for allergies seasonal  Endocrine:  Negative        Physical Exam:  Vitals: /69   Pulse 76   Ht 1.88 m (6' 2\")   Wt 83 kg (183 lb)   BMI 23.50 kg/m       Constitutional:    thin      Skin:  warm and dry to the touch          Head:  normocephalic        Eyes:  sclera white        Lymph:No Cervical lymphadenopathy present     ENT:  no pallor or cyanosis        Neck:  carotid pulses are full and equal bilaterally;JVP normal;no carotid bruit        Respiratory:  normal breath sounds, clear to auscultation, normal A-P diameter, normal symmetry, normal respiratory excursion, no use of accessory muscles         Cardiac: no S3 or S4 irregularly irregular rhythm     holosystolic murmur;grade 1        pulses full and equal                                        GI:  abdomen soft;BS normoactive        Extremities and Muscular Skeletal:  no deformities, clubbing, cyanosis, erythema observed;no edema              Neurological:  affect appropriate limb paralysis      Psych:  affect appropriate, oriented " to time, person and place        CC  Don Aviles MD  [address] [place] [address] [place] [address]                Thank you for allowing me to participate in the care of your patient.      Sincerely,     AZEB VILLARREAL MD     [facility]    cc:   Don Aviles MD  [address] [place] [address] [place] [address]

## 2019-04-15 ENCOUNTER — HOSPITAL ENCOUNTER (OUTPATIENT)
Dept: PHYSICAL THERAPY | Facility: CLINIC | Age: 64
Setting detail: THERAPIES SERIES
End: 2019-04-15
Attending: INTERNAL MEDICINE
Payer: COMMERCIAL

## 2019-04-15 PROCEDURE — 97112 NEUROMUSCULAR REEDUCATION: CPT | Mod: GP | Performed by: PHYSICAL THERAPIST

## 2019-04-15 PROCEDURE — 97110 THERAPEUTIC EXERCISES: CPT | Mod: GP | Performed by: PHYSICAL THERAPIST

## 2019-04-15 PROCEDURE — 97116 GAIT TRAINING THERAPY: CPT | Mod: GP | Performed by: PHYSICAL THERAPIST

## 2019-04-17 ENCOUNTER — HOSPITAL ENCOUNTER (OUTPATIENT)
Dept: OCCUPATIONAL THERAPY | Facility: CLINIC | Age: 64
Setting detail: THERAPIES SERIES
End: 2019-04-17
Attending: PHYSICAL THERAPIST
Payer: COMMERCIAL

## 2019-04-17 PROCEDURE — 97140 MANUAL THERAPY 1/> REGIONS: CPT | Mod: GO | Performed by: OCCUPATIONAL THERAPIST

## 2019-04-22 ENCOUNTER — HOSPITAL ENCOUNTER (OUTPATIENT)
Dept: PHYSICAL THERAPY | Facility: CLINIC | Age: 64
Setting detail: THERAPIES SERIES
End: 2019-04-22
Attending: INTERNAL MEDICINE
Payer: COMMERCIAL

## 2019-04-22 PROCEDURE — 97112 NEUROMUSCULAR REEDUCATION: CPT | Mod: GP | Performed by: PHYSICAL THERAPIST

## 2019-04-22 PROCEDURE — 97110 THERAPEUTIC EXERCISES: CPT | Mod: GP | Performed by: PHYSICAL THERAPIST

## 2019-04-22 PROCEDURE — 97116 GAIT TRAINING THERAPY: CPT | Mod: GP | Performed by: PHYSICAL THERAPIST

## 2019-04-29 ENCOUNTER — HOSPITAL ENCOUNTER (OUTPATIENT)
Dept: PHYSICAL THERAPY | Facility: CLINIC | Age: 64
Setting detail: THERAPIES SERIES
End: 2019-04-29
Attending: INTERNAL MEDICINE
Payer: COMMERCIAL

## 2019-04-29 PROCEDURE — 97116 GAIT TRAINING THERAPY: CPT | Mod: GP | Performed by: PHYSICAL THERAPIST

## 2019-04-29 PROCEDURE — 97110 THERAPEUTIC EXERCISES: CPT | Mod: GP | Performed by: PHYSICAL THERAPIST

## 2019-04-29 NOTE — ADDENDUM NOTE
Encounter addended by: Ada Enrique, OTR on: 4/29/2019 8:32 AM   Actions taken: Charge Capture section accepted

## 2019-04-30 ENCOUNTER — HOSPITAL ENCOUNTER (OUTPATIENT)
Dept: OCCUPATIONAL THERAPY | Facility: CLINIC | Age: 64
Setting detail: THERAPIES SERIES
End: 2019-04-30
Attending: PHYSICAL THERAPIST
Payer: COMMERCIAL

## 2019-04-30 PROCEDURE — 97140 MANUAL THERAPY 1/> REGIONS: CPT | Mod: GO | Performed by: OCCUPATIONAL THERAPIST

## 2019-04-30 PROCEDURE — 97535 SELF CARE MNGMENT TRAINING: CPT | Mod: GO | Performed by: OCCUPATIONAL THERAPIST

## 2019-05-06 ENCOUNTER — HOSPITAL ENCOUNTER (OUTPATIENT)
Dept: PHYSICAL THERAPY | Facility: CLINIC | Age: 64
Setting detail: THERAPIES SERIES
End: 2019-05-06
Attending: PHYSICAL THERAPIST
Payer: COMMERCIAL

## 2019-05-06 PROCEDURE — 97116 GAIT TRAINING THERAPY: CPT | Mod: GP | Performed by: PHYSICAL THERAPIST

## 2019-05-06 PROCEDURE — 97110 THERAPEUTIC EXERCISES: CPT | Mod: GP | Performed by: PHYSICAL THERAPIST

## 2019-05-06 PROCEDURE — 97112 NEUROMUSCULAR REEDUCATION: CPT | Mod: GP | Performed by: PHYSICAL THERAPIST

## 2019-05-07 ENCOUNTER — HOSPITAL ENCOUNTER (OUTPATIENT)
Dept: OCCUPATIONAL THERAPY | Facility: CLINIC | Age: 64
Setting detail: THERAPIES SERIES
End: 2019-05-07
Attending: PHYSICAL THERAPIST
Payer: COMMERCIAL

## 2019-05-07 PROCEDURE — 97140 MANUAL THERAPY 1/> REGIONS: CPT | Mod: GO | Performed by: OCCUPATIONAL THERAPIST

## 2019-05-15 DIAGNOSIS — Z87.442 PERSONAL HISTORY OF URINARY CALCULI: Primary | ICD-10-CM

## 2019-05-16 ENCOUNTER — HOSPITAL ENCOUNTER (OUTPATIENT)
Dept: GENERAL RADIOLOGY | Facility: CLINIC | Age: 64
Discharge: HOME | End: 2019-05-16
Attending: UROLOGY | Admitting: UROLOGY
Payer: COMMERCIAL

## 2019-05-16 ENCOUNTER — OFFICE VISIT (OUTPATIENT)
Dept: UROLOGY | Facility: CLINIC | Age: 64
End: 2019-05-16
Payer: COMMERCIAL

## 2019-05-16 VITALS
DIASTOLIC BLOOD PRESSURE: 72 MMHG | BODY MASS INDEX: 23.49 KG/M2 | HEIGHT: 74 IN | OXYGEN SATURATION: 97 % | WEIGHT: 183 LBS | HEART RATE: 74 BPM | SYSTOLIC BLOOD PRESSURE: 110 MMHG

## 2019-05-16 DIAGNOSIS — N20.9 CALCIUM UROLITHIASIS: ICD-10-CM

## 2019-05-16 DIAGNOSIS — N31.9 NEUROGENIC BLADDER: ICD-10-CM

## 2019-05-16 DIAGNOSIS — N20.0 KIDNEY STONE: Primary | ICD-10-CM

## 2019-05-16 PROCEDURE — 74019 RADEX ABDOMEN 2 VIEWS: CPT

## 2019-05-16 PROCEDURE — 99213 OFFICE O/P EST LOW 20 MIN: CPT | Performed by: UROLOGY

## 2019-05-16 ASSESSMENT — PAIN SCALES - GENERAL: PAINLEVEL: NO PAIN (0)

## 2019-05-16 ASSESSMENT — MIFFLIN-ST. JEOR: SCORE: 1704.83

## 2019-05-16 NOTE — NURSING NOTE
Pt had KUB today.   No UA at this time... Pt has cath.  Pt denies flank pain.  Pt denies gross hem in bag.  Pt states urine in bag is clear.  SOLOMON Turcios CMA

## 2019-05-16 NOTE — LETTER
5/16/2019     RE: Marcus Hodges  [address] [place] [address]     Dear Colleague,    Thank you for referring your patient, Marcus Hodges, to the [facility] [place] [facility]. Please see a copy of my visit note below.    Marcus Hodges is a 63-year-old male who suffered a major stroke years ago and has a neurogenic bladder.  His bladder spasticity is been managed well with Myrbetriq.  He was seen last year and found to have left renal calculi.  Today's KUB shows a lower pole left renal calculus was somewhat fuzzy borders.  His urine has been clear and he has had no flank pain or hematuria.  Other past medical history: Atrial enlargement, atrial flutter, atrial septal defect, dysphasia, abdominal hernia, DVT, mitral regurgitation, tricuspid regurgitation, history of respiratory failure, glaucoma, urinary incontinence, tonsillectomy, inguinal herniorrhaphy, right eye enucleation, right hemicraniectomy, craniotomy reconstruction, tracheostomy, gastrostomy, non-smoker  Medications: Tylenol, Eliquis, Lipitor, baclofen, vitamin D, Celexa, Keppra, metoprolol, Myrbetriq, multivitamin  Allergies: None  Review of systems: As above.  Has indwelling Reyna  Exam: Alert and oriented, with spouse, normal vital signs, normal respirations.  Hemiaplegia-ambulates with assistance.  Normal sphincter tone, no rectal mass or impaction, enlarged and benign feeling prostate, cannot reach seminal vesicles  Assessment: Neurogenic bladder, BPH, bladder spasticity, left renal stone  Plan: Increase water intake, continue Myrbetriq daily, see me for digital rectal exam and KUB and urinalysis in 12 months    Again, thank you for allowing me to participate in the care of your patient.      Sincerely,    Rashard Arevalo MD

## 2019-05-16 NOTE — PROGRESS NOTES
Marcus Hodges is a 63-year-old male who suffered a major stroke years ago and has a neurogenic bladder.  His bladder spasticity is been managed well with Myrbetriq.  He was seen last year and found to have left renal calculi.  Today's KUB shows a lower pole left renal calculus was somewhat fuzzy borders.  His urine has been clear and he has had no flank pain or hematuria.  Other past medical history: Atrial enlargement, atrial flutter, atrial septal defect, dysphasia, abdominal hernia, DVT, mitral regurgitation, tricuspid regurgitation, history of respiratory failure, glaucoma, urinary incontinence, tonsillectomy, inguinal herniorrhaphy, right eye enucleation, right hemicraniectomy, craniotomy reconstruction, tracheostomy, gastrostomy, non-smoker  Medications: Tylenol, Eliquis, Lipitor, baclofen, vitamin D, Celexa, Keppra, metoprolol, Myrbetriq, multivitamin  Allergies: None  Review of systems: As above.  Has indwelling Reyna  Exam: Alert and oriented, with spouse, normal vital signs, normal respirations.  Hemiaplegia-ambulates with assistance.  Normal sphincter tone, no rectal mass or impaction, enlarged and benign feeling prostate, cannot reach seminal vesicles  Assessment: Neurogenic bladder, BPH, bladder spasticity, left renal stone  Plan: Increase water intake, continue Myrbetriq daily, see me for digital rectal exam and KUB and urinalysis in 12 months

## 2019-05-21 ENCOUNTER — HOSPITAL ENCOUNTER (OUTPATIENT)
Dept: OCCUPATIONAL THERAPY | Facility: CLINIC | Age: 64
Setting detail: THERAPIES SERIES
End: 2019-05-21
Attending: PHYSICAL THERAPIST
Payer: COMMERCIAL

## 2019-05-21 ENCOUNTER — HOSPITAL ENCOUNTER (OUTPATIENT)
Dept: PHYSICAL THERAPY | Facility: CLINIC | Age: 64
Setting detail: THERAPIES SERIES
End: 2019-05-21
Attending: PHYSICAL THERAPIST
Payer: COMMERCIAL

## 2019-05-21 PROCEDURE — 97112 NEUROMUSCULAR REEDUCATION: CPT | Mod: GP | Performed by: PHYSICAL THERAPIST

## 2019-05-21 PROCEDURE — 97116 GAIT TRAINING THERAPY: CPT | Mod: GP | Performed by: PHYSICAL THERAPIST

## 2019-05-21 PROCEDURE — 97140 MANUAL THERAPY 1/> REGIONS: CPT | Mod: GO | Performed by: OCCUPATIONAL THERAPIST

## 2019-05-21 NOTE — PROGRESS NOTES
PROGRESS NOTE PT       05/21/19 0900   Signing Clinician's Name / Credentials   Signing clinician's name / credentials Ada Tyler, PT   Session Number   Session Number 43   Progress Note/Recertification   Progress Note Due Date 05/24/19   Goal 1   Goal Identifier 1 - stance width   Goal Description Marcus to demonstrate the ability to stand with a more narrow TAYLOR going from 12 inches feet apart at eval to 6 inches apart or less to demonstrate improved balance , loading on the L side to assist with improvement in gait pattern therefore efficiency and decreased secondary impairments such as his R knee pain.    Target Date 05/24/19   Date Met 05/21/19   Goal 2   Goal Description Marcus to increase his forward reach from 4 inches at evaulation to 8 inches or greater to demonstrate improved balance, improved ability to bring COM forward over TAYLOR to assist in meeting all goals, improved sit to stand and improved gait.    Target Date 08/24/19   Goal Identifier 2- Forward reach   Goal 3   Goal Description Marcus to have increased disassociation of movement/improved timing and sequencing of muscle activation for improved hip and knee flexion in standing demonstrated by the ability to step over upright shabana (4-6 inch object) with the L LE to allow him to step up onto a curb without catching foot.   Target Date 08/24/19   Goal Identifier 3-  Curb/stairs   Goal 4   Goal Description Marcus to increase gait speed to 19 seconds or less with AD and 24 steps or less to demonstrate improved gait speed and efficiency for community gait and also decreased overuse of the R side.    Target Date 11/30/18   Goal Identifier 4- Gait  (6/14/18   18.66 sec)   Goal 5   Goal Description Marcus to perform transfer to and from floor with outside UE support and min assist of 1 to allow him to recover from a fall with assist from wife or caregiver.    Target Date 08/24/19   Goal Identifier 5- Floor transfers.    Goal 6   Goal Description Marcus to  complete 4 square in 25 seconds with and AD and 40 seconds or less without an AD to demonstrate improved interlimb coordination, balance and body control to assist in meeting all goals and overall function.    Target Date 08/24/19   Goal Identifier 6 --  4 square   Goal 7   Goal Description Marcus to be independent in appropriate HEP to continue to address his impairments following d/c from skilled PT intervention.    Target Date 08/24/19   Goal Identifier HEP   Goal 8   Goal Description Marcus to perform reaching to the floor to the left to pick object up off the floor while maintaining L hand contact/closed chain on the mat to demonstrate impropved neuromotor planning, ability to bring COM to the left while keeping proper muscle activation/deactivation L and R side of body for greater safety with reaching activiites to the floor, unstrapping shoe, AFO.  Also for carryover of proper motor function for improved gait and transfers.    Target Date 08/24/19   Goal Identifier 8 functional reach/neuro motor control   Subjective Report   Subjective Report no c/o.     Objective Measure 1   Objective Measure 25 foot walk   Details trekking poles. AFO on the L  19.78 sec, 26 steps. cont to pause R stance    HHA B and facilitation of faster pace 17.9 sec.    Objective Measure 2   Objective Measure 4 square   Details 5/21/19 44 sec stepping over the weighted bars ,  28 sec over tape both with trekking pole.   8/25/18  28.22 sec with cues, SBA   8/23/19   29.62  over tape with trekking pole and CGA, vc on direction to step.   Stepping over wieghted bar therapist stabilizing bar, timer stopped mid way so did notget an accurate time.   Pt clearing object better.    Objective Measure 4   Objective Measure TAYLOR   Details with assist able to stand with feet 2 inches apart,  adjust to 3 inches on his own and hold for 1 minute with supervision and cues to relax the L side/decrease activation.    Objective Measure 5   Objective Measure  Forward reach standing   Details 7.5 inches   Objective Measure 6   Objective Measure stand to kneel transfer   Details hands on mat and went down to kneeling min to mod assist, assist needed for the L leg alignment when in double kneel.    Neuromuscular Re-education   Neuromuscular re-ed of mvmt, balance, coord, kinesthetic sense, posture, proprioception minutes (29008) 40   Skilled Intervention assist, facilitaiton, assessment.    Patient Response able to decrease over activation response and verbalized understanding of better balance and control without the over activation.    Treatment Detail stand to kneel B hands on mat and min assist,  kneel to stand leading with the R LE min assist.    sustaining double kneel facilitation of increased weight onto the L side.    STanding step over flat hurdles, step over upright shabana - able to step over flat and initiate with upright shabana stwepping with the L and trekking pole on the R  .    4 square stepping over wieghted bars/therapsit stabilizing the bar and also done with stepping over tape times as above.  Standing forward reach with the R hand 4 reps.     Progress first time able to complete stand to kneel. Did very well.   Improved forward reach distance but still limits forward pelvis/body over feet/ankle dorsiflexion   Gait Training   Gait Training Minutes, includes stair climbing (85495) 16   Skilled Intervention faciltiation, cues , assessment.    Patient Response improved fluidity with gait.    Treatment Detail 25 ft walk completed.    Gait with HHA on the R, 120 ft, facilitation alignment of pelvis in transverse plane,as well as trunk in transverse plane,  cues to relax upper body and UE R and L.    Up and down 4  6 inch stairs.   Gait with trekking pole and cues to not reach as far forward with trekking pole.   Gait with    Progress improving time with 25 foot walk, improving gait pattern as well   Education   Learner Patient;Caregiver   Readiness Acceptance    Method Explanation   Response Verbalizes Understanding;Demonstrates Understanding;Needs Reinforcement   Education Comments gait pattern, progress made with function and objective measures.    Plan   Plan for next session stairs, gait with step past gait pattern on the R, gait without AFO   Comments   Comments PN covers dates 2/26/19-5/21/19 please see daily notes in this time span for specifics.  Lex demonstrates improved motion at knee, ankle and hip joint on the L, improved trunk mobility and alignmetn trunk and pelvis with activities,  greater challenge to L side and improved abiltiy to decrease over activaiton response on the L and therefore better balance.  Improved ability to perform the stairs keeping pelvis in proper alignment in transverse plane stepping down.   Marcus continues to be approrpiate for skilled PT intervention and continues to make gradual progress.  Today was the first time he was able to complete stand <> kneel, L sided improved joint mobility and motor coordination allowing multi joint movements .   Goal 1 met.  Other goals to be met date advanced to 8/24/19   Total Session Time   Timed Code Treatment Minutes 56   Total Treatment Time (sum of timed and untimed services) 56

## 2019-05-29 ENCOUNTER — HOSPITAL ENCOUNTER (OUTPATIENT)
Dept: PHYSICAL THERAPY | Facility: CLINIC | Age: 64
Setting detail: THERAPIES SERIES
End: 2019-05-29
Attending: PHYSICAL THERAPIST
Payer: COMMERCIAL

## 2019-05-29 ENCOUNTER — HOSPITAL ENCOUNTER (OUTPATIENT)
Dept: OCCUPATIONAL THERAPY | Facility: CLINIC | Age: 64
Setting detail: THERAPIES SERIES
End: 2019-05-29
Attending: PHYSICAL THERAPIST
Payer: COMMERCIAL

## 2019-05-29 PROCEDURE — 97110 THERAPEUTIC EXERCISES: CPT | Mod: GP | Performed by: PHYSICAL THERAPIST

## 2019-05-29 PROCEDURE — 97112 NEUROMUSCULAR REEDUCATION: CPT | Mod: GP | Performed by: PHYSICAL THERAPIST

## 2019-05-29 PROCEDURE — 97140 MANUAL THERAPY 1/> REGIONS: CPT | Mod: GO | Performed by: OCCUPATIONAL THERAPIST

## 2019-06-03 ENCOUNTER — HOSPITAL ENCOUNTER (OUTPATIENT)
Dept: OCCUPATIONAL THERAPY | Facility: CLINIC | Age: 64
Setting detail: THERAPIES SERIES
End: 2019-06-03
Attending: PHYSICAL THERAPIST
Payer: COMMERCIAL

## 2019-06-03 PROCEDURE — 97140 MANUAL THERAPY 1/> REGIONS: CPT | Mod: GO | Performed by: OCCUPATIONAL THERAPIST

## 2019-06-05 ENCOUNTER — HOSPITAL ENCOUNTER (OUTPATIENT)
Dept: PHYSICAL THERAPY | Facility: CLINIC | Age: 64
Setting detail: THERAPIES SERIES
End: 2019-06-05
Attending: PHYSICAL THERAPIST
Payer: COMMERCIAL

## 2019-06-05 PROCEDURE — 97110 THERAPEUTIC EXERCISES: CPT | Mod: GP | Performed by: PHYSICAL THERAPIST

## 2019-06-05 PROCEDURE — 97112 NEUROMUSCULAR REEDUCATION: CPT | Mod: GP | Performed by: PHYSICAL THERAPIST

## 2019-06-05 PROCEDURE — 97116 GAIT TRAINING THERAPY: CPT | Mod: GP | Performed by: PHYSICAL THERAPIST

## 2019-06-12 ENCOUNTER — HOSPITAL ENCOUNTER (OUTPATIENT)
Dept: PHYSICAL THERAPY | Facility: CLINIC | Age: 64
Setting detail: THERAPIES SERIES
End: 2019-06-12
Attending: PHYSICAL THERAPIST
Payer: COMMERCIAL

## 2019-06-12 ENCOUNTER — HOSPITAL ENCOUNTER (OUTPATIENT)
Dept: OCCUPATIONAL THERAPY | Facility: CLINIC | Age: 64
Setting detail: THERAPIES SERIES
End: 2019-06-12
Attending: PHYSICAL THERAPIST
Payer: COMMERCIAL

## 2019-06-12 PROCEDURE — 97112 NEUROMUSCULAR REEDUCATION: CPT | Mod: GP | Performed by: PHYSICAL THERAPIST

## 2019-06-12 PROCEDURE — 97140 MANUAL THERAPY 1/> REGIONS: CPT | Mod: GO | Performed by: OCCUPATIONAL THERAPIST

## 2019-06-12 PROCEDURE — 97116 GAIT TRAINING THERAPY: CPT | Mod: GP | Performed by: PHYSICAL THERAPIST

## 2019-06-17 ENCOUNTER — HOSPITAL ENCOUNTER (OUTPATIENT)
Dept: PHYSICAL THERAPY | Facility: CLINIC | Age: 64
Setting detail: THERAPIES SERIES
End: 2019-06-17
Attending: PHYSICAL THERAPIST
Payer: COMMERCIAL

## 2019-06-17 PROCEDURE — 97116 GAIT TRAINING THERAPY: CPT | Mod: GP | Performed by: PHYSICAL THERAPIST

## 2019-06-17 PROCEDURE — 97112 NEUROMUSCULAR REEDUCATION: CPT | Mod: GP | Performed by: PHYSICAL THERAPIST

## 2019-06-18 ENCOUNTER — HOSPITAL ENCOUNTER (OUTPATIENT)
Dept: OCCUPATIONAL THERAPY | Facility: CLINIC | Age: 64
Setting detail: THERAPIES SERIES
End: 2019-06-18
Attending: PHYSICAL THERAPIST
Payer: COMMERCIAL

## 2019-06-18 PROCEDURE — 97140 MANUAL THERAPY 1/> REGIONS: CPT | Mod: GO | Performed by: OCCUPATIONAL THERAPIST

## 2019-06-18 NOTE — PROGRESS NOTES
[facility]      OUTPATIENT OCCUPATIONAL THERAPY  PLAN OF TREATMENT FOR OUTPATIENT REHABILITATION    Patient's Last Name, First Name, M.I.                YOB: 1955  Marcus Hodges                        Provider's Name  [facility] Medical Record No.  [id number]                               Onset Date: 4/22/12   Start of Care Date: 12/31/19   Type:     ___PT   _X_OT   ___SLP Medical Diagnosis:Left hemiplegia (H) G81.94;  Traumatic brain injury with loss of consciousness, sequela (H) S06.9X9S                          OT Diagnosis: Decreased ADL/IADL, contractures  Visit # 26   _________________________________________________________________________________  Plan of Treatment:    Frequency/Duration: 1x/month x 6months     Goals:    Goal Identifier L    Goal Description Patient to demonstrate improved L   strength to 5# for increased ADL/IADL independence (hanging onto objects for hygiene, dressing, bathing, toileting and IADL tasks).   Target Date 09/16/19   Date Met      Progress:     Goal Identifier R hand strength   Goal Description Patient to demonstrate WNLS R hand strength (, lateral and palmar pinch) for optimal use of this UE as primary extremity for completing ADL/IADL tasks.   Target Date 09/16/19   Date Met      Progress:     Goal Identifier L UE home program   Goal Description Patient to demonstrate L UE HEP with SBA and min cues for good follow through at home, prevention of contractures and increase functional strength and ROM for maximum independence with performance of ADLs as gross assist and gross stabilizer.    Target Date 09/16/19   Date Met      Progress:     Goal Identifier use of L UE during functional tasks   Goal Description Patient to demonstrate functional tasks (wiping the counter/table, washing dishes, etc.) with at least 15% use of L UE  as gross stabilizer/gross assist for increased ADL/IADL independence and improved functional use of L UE.   Target Date 09/16/19   Date Met      Progress:     Goal Identifier visual skills   Goal Description Patient to verbalize and successfully demonstrate at least 2 strategies (lighting, scanning strategies, magnification, etc.) during functional tasks (reading, computer tasks, navigation/functional mobility) to increase visual skills for increased independence and safety due to visual deficits.   Target Date 09/16/19   Date Met      Progress:     Progress Toward Goals:   Progress this reporting period: See attached progress note    Certification date from 6/18/19  to 9/16/19.    Ada Enrique, TERRYR          I CERTIFY THE NEED FOR THESE SERVICES FURNISHED UNDER        THIS PLAN OF TREATMENT AND WHILE UNDER MY CARE     (Physician co-signature of this document indicates review and certification of the therapy plan).                Referring Provider: Dr. Aviles

## 2019-06-18 NOTE — PROGRESS NOTES
"Outpatient Occupational Therapy Progress Note     Patient: Marcus Hodges  : 1955    Beginning/End Dates of Reporting Period:  19 to 2019    Referring Provider: Don Aviles MD    Therapy Diagnosis: Decreased ADL/IADL and contractures after Left hemiplegia (H) G81.94;  Traumatic brain injury with loss of consciousness, sequela (H) S06.9X9S (initial onset 12)    Client Self Report: \"I am so right hand dominant that  it seems like my right hand only wants to use my L hand as a tool  Passive assist to turn light switch or  kitchen faucet.) Has tried opening refrigerator, not working with the  cloth and foam handle issued to him.       Objective Measurements:       Objective Measure: L UE AROM   Details:  [place] to 125 degrees PROM.  SH ER  at ribside PROM to 35 degrees at start, 40 degrees by the end of instrument assisted soft tissue mobilization/IASTM (inside of one session).  Improved supination from 22 to 32  degrees by the end.  Cervical Extension improves from 25 to 35 degrees of 80 degrees. Pt is hyper mobile at cervical flexion to 75 of 60 degrees.  L lateral flexion improves from  20 to 34 of 45 degrees.  L is 40 degrees.        Objective Measure: L  and Pinch    R  is 67#, key pinch is 14# and 13# for 3 pt.   Left:  is 3#, 1# key pinch, and  unable to sustain 3 pt position.           Details:   Goals:     Goal Identifier L    Goal Description Patient to demonstrate improved L   strength to 5# for increased ADL/IADL independence (hanging onto objects for hygiene, dressing, bathing, toileting and IADL tasks).   Target Date 2019   Date Met      Progress:Continues to improve slowly, per above.      Goal Identifier R hand strength   Goal Description Patient to demonstrate WNLS R hand strength (, lateral and palmar pinch) for optimal use of this UE as primary extremity for completing ADL/IADL tasks.   Target Date 2019   Date Met      Progress: " Stable     Goal Identifier L UE home program   Goal Description Patient to demonstrate L UE HEP with SBA and min cues for good follow through at home, prevention of contractures and increase functional strength and ROM for maximum independence with performance of ADLs as gross assist and gross stabilizer.    Target Date 9/16/2019   Date Met      Progress:Extensive effort has been placed in therapy for updating home exercise programs to increase PROM by combining several motions, while decreasing the demand on caregiver.  Extra emphasis was placed on areas of contracture (supination, shoulder external rotation, cervical flexion).  OTR has also instructed PCA and wife in use of instrument assisted soft tissue mobilization/Graston Technique (IASTM), using tools that they have purchased off of the Internet [employer].  Between therapy sessions, they have been able to demonstrate keeping the ROM progress made in therapy with this modality.     Goal Identifier use of L UE during functional tasks   Goal Description Patient to demonstrate functional tasks (wiping the counter/table, washing dishes, etc.) with at least 15% use of L UE as gross stabilizer/gross assist for increased ADL/IADL independence and improved functional use of L UE.   Target Date 9/16/2019   Date Met      Progress:Patient has been working on using his left hand more for managing light switches, lever handled  faucet levers and door/fridge handles.  He dropped a water bottle once and is nervous to resume this activity.  He verbalizes having difficulty using his left hand solo after using his right hand moving it around for all transitional and stabilizing movements.     Goal Identifier visual skills   Goal Description Patient to verbalize and successfully demonstrate at least 2 strategies (lighting, scanning strategies, magnification, etc.) during functional tasks (reading, computer tasks, navigation/functional mobility) to increase visual skills for  increased independence and safety due to visual deficits.   Target Date 9/16/2019   Date Met   6/18/19   Progress: Patient has been seen for functional vision occupational therapy and has been using tabletop lighting sources, magnification and enlarged print materials more appropriately.he may be seen 1 more visit for further visual adaptations.       Progress Toward Goals:   Progress this reporting period: Patient has been seen for 26 occupational therapy visits since starting again on December 31, 2018, to address above goals which remain appropriate.  Strength are his physical and social support system, his perseverance in wanting to keep improving while in session.  Barriers to progress include high levels of spasticity tone with contractures and compensatory movement patterns that make it harder for him to move his left upper extremity freely.  He also enjoys soft tissue work and exercise over practicing use of his left upper extremity and functional patterns and needs extensive encouragement to continue doing so.    Plan:  Changes to therapy plan of care: Goals remain the same.  Reduced to 1 time per month for assistance with contracture management, update of home program and encouragement of functional use of LUE.    Discharge:  Therapy will continue 1 time per month through the end of December.    Thank you for this thoughtful referral! If you have any questions, please call me [phone].  Nice to share in this patient's care with you.    Sincerely,   LOUIS Perkins/marty

## 2019-07-02 ENCOUNTER — HOSPITAL ENCOUNTER (OUTPATIENT)
Dept: PHYSICAL THERAPY | Facility: CLINIC | Age: 64
Setting detail: THERAPIES SERIES
End: 2019-07-02
Attending: INTERNAL MEDICINE
Payer: MEDICARE

## 2019-07-02 PROCEDURE — 97112 NEUROMUSCULAR REEDUCATION: CPT | Mod: GP | Performed by: PHYSICAL THERAPIST

## 2019-07-02 PROCEDURE — 97110 THERAPEUTIC EXERCISES: CPT | Mod: GP | Performed by: PHYSICAL THERAPIST

## 2019-07-02 PROCEDURE — 97116 GAIT TRAINING THERAPY: CPT | Mod: GP | Performed by: PHYSICAL THERAPIST

## 2019-07-03 ENCOUNTER — MYC MEDICAL ADVICE (OUTPATIENT)
Dept: PEDIATRICS | Facility: CLINIC | Age: 64
End: 2019-07-03

## 2019-07-09 ENCOUNTER — HOSPITAL ENCOUNTER (OUTPATIENT)
Dept: PHYSICAL THERAPY | Facility: CLINIC | Age: 64
Setting detail: THERAPIES SERIES
End: 2019-07-09
Attending: INTERNAL MEDICINE
Payer: MEDICARE

## 2019-07-09 DIAGNOSIS — S06.9X9S TRAUMATIC BRAIN INJURY WITH LOSS OF CONSCIOUSNESS, SEQUELA (H): ICD-10-CM

## 2019-07-09 DIAGNOSIS — G81.94 LEFT HEMIPLEGIA (H): Primary | ICD-10-CM

## 2019-07-09 PROCEDURE — 97116 GAIT TRAINING THERAPY: CPT | Mod: GP | Performed by: PHYSICAL THERAPIST

## 2019-07-09 PROCEDURE — 97110 THERAPEUTIC EXERCISES: CPT | Mod: GP | Performed by: PHYSICAL THERAPIST

## 2019-07-09 PROCEDURE — 97112 NEUROMUSCULAR REEDUCATION: CPT | Mod: GP | Performed by: PHYSICAL THERAPIST

## 2019-07-10 NOTE — PROGRESS NOTES
[facility]        OUTPATIENT PHYSICAL THERAPY FUNCTIONAL EVALUATION  PLAN OF TREATMENT FOR OUTPATIENT REHABILITATION  (COMPLETE FOR INITIAL CLAIMS ONLY)  Patient's Last Name, First Name, M.I.  YOB: 1955  TracieMarcus TORRES     Provider's Name   [facility]   Medical Record No.  [id number]     Start of Care Date:   This episode of care 4/26/18.  Medicare plan 7/1/19   Onset Date:   4/1/12   Type:     _X__PT   ____OT  ____SLP Medical Diagnosis:   Left hemiplegia (G81.94-  ),  Tramatic brain  injury with loss of  consciousness, sequela  (S06.9X9S)     PT Diagnosis:      Decreased independence  and functional activity  tolerance due to impair-  ments from CVA and  secondary impairments  following CVA.(R knee  pain, shoulderpain)      Visits from SOC:  1      Pt with insurance change to Medicare starting July 1, 2019.                                  __________________________________________________________________________________  Plan of Treatment/Functional Goals:              GOALS     Marcus to demonstrate the ability to stand with a more narrow TAYLOR going from 12 inches feet apart at eval to 6 inches apart or less to demonstrate improved balance , loading on the L side to assist with improvement in gait pattern therefore efficiency and decreased secondary impairments such as his R knee pain.   05/24/19    2- Forward reach  Marcus to increase his forward reach from 4 inches at evaulation to 8 inches or greater to demonstrate improved balance, improved ability to bring COM forward over TAYLOR to assist in meeting all goals, improved sit to stand and improved gait.   08/24/19    3-  Curb/stairs  Marcus to have increased disassociation of movement/improved timing and sequencing of muscle activation for improved hip and knee flexion in standing demonstrated by the ability to  step over upright shabana (4-6 inch object) with the L LE to allow him to step up onto a curb without catching foot.  08/24/19    4- Gait(6/14/18   18.66 sec)  Marcus to increase gait speed to 19 seconds or less with AD and 24 steps or less to demonstrate improved gait speed and efficiency for community gait and also decreased overuse of the R side.   11/30/18    5- Floor transfers.   Macrus to perform transfer to and from floor with outside UE support and min assist of 1 to allow him to recover from a fall with assist from wife or caregiver.   08/24/19    6 --  4 square  Marcus to complete 4 square in 25 seconds with and AD and 40 seconds or less without an AD to demonstrate improved interlimb coordination, balance and body control to assist in meeting all goals and overall function.   08/24/19    HEP  Marcus to be independent in appropriate HEP to continue to address his impairments following d/c from skilled PT intervention.   08/24/19    8 functional reach/neuro motor control  Marcus to perform reaching to the floor to the left to pick object up off the floor while maintaining L hand contact/closed chain on the mat to demonstrate impropved neuromotor planning, ability to bring COM to the left while keeping proper muscle activation/deactivation L and R side of body for greater safety with reaching activiites to the floor, unstrapping shoe, AFO.  Also for carryover of proper motor function for improved gait and transfers.   08/24/19    Therapy Frequency:    1 x a week  Predicted Duration of Therapy Intervention:   90 days    Ada Scott, PT                                    I CERTIFY THE NEED FOR THESE SERVICES FURNISHED UNDER        THIS PLAN OF TREATMENT AND WHILE UNDER MY CARE     (Physician co-signature of this document indicates review and certification of the therapy plan).                Certification Date From:    7/1/19  Certification Date To:   9/28/19    Referring Provider:   Dr. Don Wilson  Assessment  See Epic Evaluation-

## 2019-07-15 ENCOUNTER — HOSPITAL ENCOUNTER (OUTPATIENT)
Dept: OCCUPATIONAL THERAPY | Facility: CLINIC | Age: 64
Setting detail: THERAPIES SERIES
End: 2019-07-15
Attending: PHYSICAL THERAPIST
Payer: MEDICARE

## 2019-07-15 PROCEDURE — 97763 ORTHC/PROSTC MGMT SBSQ ENC: CPT | Mod: GO | Performed by: OCCUPATIONAL THERAPIST

## 2019-07-15 PROCEDURE — 97140 MANUAL THERAPY 1/> REGIONS: CPT | Mod: GO,XU | Performed by: OCCUPATIONAL THERAPIST

## 2019-07-18 ENCOUNTER — HOSPITAL ENCOUNTER (OUTPATIENT)
Dept: PHYSICAL THERAPY | Facility: CLINIC | Age: 64
Setting detail: THERAPIES SERIES
End: 2019-07-18
Attending: INTERNAL MEDICINE
Payer: MEDICARE

## 2019-07-18 PROCEDURE — 97110 THERAPEUTIC EXERCISES: CPT | Mod: GP | Performed by: PHYSICAL THERAPIST

## 2019-07-18 PROCEDURE — 97112 NEUROMUSCULAR REEDUCATION: CPT | Mod: GP | Performed by: PHYSICAL THERAPIST

## 2019-07-18 PROCEDURE — 97116 GAIT TRAINING THERAPY: CPT | Mod: GP | Performed by: PHYSICAL THERAPIST

## 2019-07-24 ENCOUNTER — HOSPITAL ENCOUNTER (OUTPATIENT)
Dept: PHYSICAL THERAPY | Facility: CLINIC | Age: 64
Setting detail: THERAPIES SERIES
End: 2019-07-24
Attending: INTERNAL MEDICINE
Payer: MEDICARE

## 2019-07-24 PROCEDURE — 97116 GAIT TRAINING THERAPY: CPT | Mod: GP | Performed by: PHYSICAL THERAPIST

## 2019-07-24 PROCEDURE — 97110 THERAPEUTIC EXERCISES: CPT | Mod: GP | Performed by: PHYSICAL THERAPIST

## 2019-07-24 PROCEDURE — 97112 NEUROMUSCULAR REEDUCATION: CPT | Mod: GP | Performed by: PHYSICAL THERAPIST

## 2019-07-24 NOTE — ADDENDUM NOTE
Encounter addended by: Ada Enrique OTR on: 7/24/2019 6:00 PM   Actions taken: Document created, Document edited

## 2019-07-30 ENCOUNTER — HOSPITAL ENCOUNTER (OUTPATIENT)
Dept: PHYSICAL THERAPY | Facility: CLINIC | Age: 64
Setting detail: THERAPIES SERIES
End: 2019-07-30
Attending: INTERNAL MEDICINE
Payer: MEDICARE

## 2019-07-30 PROCEDURE — 97112 NEUROMUSCULAR REEDUCATION: CPT | Mod: GP | Performed by: PHYSICAL THERAPIST

## 2019-07-30 PROCEDURE — 97116 GAIT TRAINING THERAPY: CPT | Mod: GP | Performed by: PHYSICAL THERAPIST

## 2019-07-30 PROCEDURE — 97110 THERAPEUTIC EXERCISES: CPT | Mod: GP | Performed by: PHYSICAL THERAPIST

## 2019-07-30 PROCEDURE — 97140 MANUAL THERAPY 1/> REGIONS: CPT | Mod: GP | Performed by: PHYSICAL THERAPIST

## 2019-08-05 ENCOUNTER — HOSPITAL ENCOUNTER (OUTPATIENT)
Dept: PHYSICAL THERAPY | Facility: CLINIC | Age: 64
Setting detail: THERAPIES SERIES
End: 2019-08-05
Attending: INTERNAL MEDICINE
Payer: MEDICARE

## 2019-08-05 PROCEDURE — 97116 GAIT TRAINING THERAPY: CPT | Mod: GP | Performed by: PHYSICAL THERAPIST

## 2019-08-05 PROCEDURE — 97112 NEUROMUSCULAR REEDUCATION: CPT | Mod: GP | Performed by: PHYSICAL THERAPIST

## 2019-08-12 ENCOUNTER — HOSPITAL ENCOUNTER (OUTPATIENT)
Dept: PHYSICAL THERAPY | Facility: CLINIC | Age: 64
Setting detail: THERAPIES SERIES
End: 2019-08-12
Attending: INTERNAL MEDICINE
Payer: MEDICARE

## 2019-08-12 ENCOUNTER — HOSPITAL ENCOUNTER (OUTPATIENT)
Dept: OCCUPATIONAL THERAPY | Facility: CLINIC | Age: 64
Setting detail: THERAPIES SERIES
End: 2019-08-12
Attending: PHYSICAL THERAPIST
Payer: MEDICARE

## 2019-08-12 PROCEDURE — 97535 SELF CARE MNGMENT TRAINING: CPT | Mod: GO | Performed by: OCCUPATIONAL THERAPIST

## 2019-08-12 PROCEDURE — 97112 NEUROMUSCULAR REEDUCATION: CPT | Mod: GP | Performed by: PHYSICAL THERAPIST

## 2019-08-12 PROCEDURE — 97110 THERAPEUTIC EXERCISES: CPT | Mod: GP | Performed by: PHYSICAL THERAPIST

## 2019-08-12 PROCEDURE — 97116 GAIT TRAINING THERAPY: CPT | Mod: GP | Performed by: PHYSICAL THERAPIST

## 2019-08-12 PROCEDURE — 97140 MANUAL THERAPY 1/> REGIONS: CPT | Mod: GO | Performed by: OCCUPATIONAL THERAPIST

## 2019-08-27 ENCOUNTER — HOSPITAL ENCOUNTER (OUTPATIENT)
Dept: PHYSICAL THERAPY | Facility: CLINIC | Age: 64
Setting detail: THERAPIES SERIES
End: 2019-08-27
Attending: INTERNAL MEDICINE
Payer: MEDICARE

## 2019-08-27 PROCEDURE — 97110 THERAPEUTIC EXERCISES: CPT | Mod: GP | Performed by: PHYSICAL THERAPIST

## 2019-08-27 PROCEDURE — 97112 NEUROMUSCULAR REEDUCATION: CPT | Mod: GP | Performed by: PHYSICAL THERAPIST

## 2019-08-27 PROCEDURE — 97116 GAIT TRAINING THERAPY: CPT | Mod: GP | Performed by: PHYSICAL THERAPIST

## 2019-08-29 ENCOUNTER — THERAPY VISIT (OUTPATIENT)
Dept: OCCUPATIONAL THERAPY | Facility: CLINIC | Age: 64
End: 2019-08-29
Payer: MEDICARE

## 2019-08-29 DIAGNOSIS — G81.94 LEFT HEMIPLEGIA (H): Primary | ICD-10-CM

## 2019-08-29 PROCEDURE — 97760 ORTHOTIC MGMT&TRAING 1ST ENC: CPT | Mod: GO | Performed by: OCCUPATIONAL THERAPIST

## 2019-08-29 NOTE — LETTER
DEPARTMENT OF HEALTH AND HUMAN SERVICES  [facility]    PLAN/UPDATED PLAN OF PROGRESS FOR OUTPATIENT REHABILITATION    PATIENTS NAME:  Marcus Hodges   : 1955  PR[facility] NUMBER:  [id number]  [id number]  PROVIDER NAME: [facility]  MEDICAL RECORD NUMBER: [id number]   START OF CARE DATE:    SOC Date: 19   TYPE:  OT  PRIMARY/TREATMENT DIAGNOSIS: (Pertinent Medical Diagnosis)  Left hemiplegia (H)  VISITS FROM START OF CARE:  Rxs Used: 1     Hand Therapy Initial Evaluation  Current Date:  2019    Subjective:  Marcus Hodges is a 63 year old right hand dominant male.    Diagnosis: TBI with L hemiplegia  DOI:  12 (MD order date 19)    Patient reports symptoms of stiffness/loss of motion and weakness/loss of strength of the left hand and wrist which occurred due to CVA. Since onset symptoms are unchanged. Special tests:  none.  Previous treatment: custom orthotic, current receiving outpatient OT. General health as reported by patient is good.  Pertinent medical history includes: Depression, Heart Problems, Incontinence, Numbness/Tingling, Seizures, Stroke.  Medical allergies: none.  Surgical history: heart: ASD repair in .  Medication history: Anti-depressants, Anti-seizure, Cardiac, Muscle Relaxants.    Occupational Profile Information:  Current occupation is disabled  Prior functional level:  independent-have help in some areas  Barriers include:requires assistance with dressing, personal hygiene, transfers, mobility  Mobility: Uses walker  Transportation: unable to use normal mode of transportation, due to current injury  Leisure activities/hobbies: none    Upper Extremity Functional Index Score:  SCORE:   Column Totals: /80: 0   (A lower score indicates greater disability.)    Objective:  Pain Level Report: no pain per pt report  Edema:  None  Sensation: WNL per pt report      PATIENTS NAME:  Marcus oHdges   : 1955    ROM:  Pt  positions hand in lumbrical position of fingers, wrist flexion, and thumb adduction.  Passively able to get fingers full extended, thumb into moderate PABD, and wrist extension to neutral.    Strength:  contraindicated  Assessment:  Patient presents with symptoms consistent with diagnosis of L hemiparesis, with conservative intervention.     Patient's limitations or Problem List includes:  Decreased ROM/motion and Weakness of the left wrist, hand, thumb, index finger, long finger, ring finger and small finger which interferes with the patient's ability to perform Self Care Tasks (dressing, eating, bathing, hygiene/toileting), Sleep Patterns, Recreational Activities and Household Chores as compared to previous level of function.    Rehab Potential: fair    Patient will benefit from skilled Occupational Therapy to increase flexibility to return to previous activity level and resume normal daily tasks and to reach their rehab potential.    Barriers to Learning:  No barrier    Communication Issues:  Patient appears to be able to clearly communicate and understand verbal and written communication and follow directions correctly.  Family member present for session to facilitate follow through of home program.    Assessment of Occupational Performance:  5 or more Performance Deficits  Identified Performance Deficits: bathing/showering, toileting, dressing, feeding, functional mobility, personal device care, hygiene and grooming, health management and maintenance, home establishment and management, meal preparation and cleanup, sleep and leisure activities      Clinical Decision Making (Complexity): Low complexity  Treatment Explanation:  The following has been discussed with the patient:  RX ordered/plan of care  Anticipated outcomes  Possible risks and side effects    P: Frequency:  1x visit, once daily.  Pt to return for splint adjustments, otherwise cont HEP independently. D/C [facility]    Treatment Plan:    Orthotic  "Fabrication:     Static orthosis and Forearm based orthosis     Home Program:    Orthotic Fabrication:   Static orthosis and Forearm based orthosis     Discharge Plan:  Achieve all LTG.  Independent in home treatment program.  Reach maximal therapeutic benefit.        PATIENTS NAME:  Marcus Hodges   : 1955      Caregiver Signature/Credentials ______________________________ Date ________       Treating Provider: ROME Humphrey, OTR/L, CHT  I have reviewed and certified the need for these services and plan of treatment while under my care.        PHYSICIAN'S SIGNATURE:   _________________________________________  Date___________    Don Aviles MD    Certification period: Beginning of Cert date period: 19 End of Cert period date: 19     Functional Level Progress Report: Please see attached \"Goal Flow sheet for Functional level.\"    ________ Continue Services or       _____X___ DC Services                Service dates: SOC Date: 19  to present                                                                     "

## 2019-09-03 NOTE — PROGRESS NOTES
Hand Therapy Initial Evaluation  Current Date:  8/29/2019    Subjective:  Marcus Hodges is a 63 year old right hand dominant male.    Diagnosis: TBI with L hemiplegia  DOI:  4/22/12 (MD order date 8/21/19)    Patient reports symptoms of stiffness/loss of motion and weakness/loss of strength of the left hand and wrist which occurred due to CVA. Since onset symptoms are unchanged. Special tests:  none.  Previous treatment: custom orthotic, current receiving outpatient OT. General health as reported by patient is good.  Pertinent medical history includes: Depression, Heart Problems, Incontinence, Numbness/Tingling, Seizures, Stroke.  Medical allergies: none.  Surgical history: heart: ASD repair in 1978.  Medication history: Anti-depressants, Anti-seizure, Cardiac, Muscle Relaxants.    Occupational Profile Information:  Current occupation is disabled  Prior functional level:  independent-have help in some areas  Barriers include:requires assistance with dressing, personal hygiene, transfers, mobility  Mobility: Uses walker  Transportation: unable to use normal mode of transportation, due to current injury  Leisure activities/hobbies: none    Upper Extremity Functional Index Score:  SCORE:   Column Totals: /80: 0   (A lower score indicates greater disability.)    Objective:  Pain Level Report: no pain per pt report  Edema:  none    Sensation: WNL per pt report    ROM:  Pt positions hand in lumbrical position of fingers, wrist flexion, and thumb adduction.  Passively able to get fingers full extended, thumb into moderate PABD, and wrist extension to neutral.  Strength:  contraindicated  Assessment:  Patient presents with symptoms consistent with diagnosis of L hemiparesis, with conservative intervention.     Patient's limitations or Problem List includes:  Decreased ROM/motion and Weakness of the left wrist, hand, thumb, index finger, long finger, ring finger and small finger which interferes with the patient's ability  to perform Self Care Tasks (dressing, eating, bathing, hygiene/toileting), Sleep Patterns, Recreational Activities and Household Chores as compared to previous level of function.    Rehab Potential: fair    Patient will benefit from skilled Occupational Therapy to increase flexibility to return to previous activity level and resume normal daily tasks and to reach their rehab potential.    Barriers to Learning:  No barrier    Communication Issues:  Patient appears to be able to clearly communicate and understand verbal and written communication and follow directions correctly.  Family member present for session to facilitate follow through of home program.    Assessment of Occupational Performance:  5 or more Performance Deficits  Identified Performance Deficits: bathing/showering, toileting, dressing, feeding, functional mobility, personal device care, hygiene and grooming, health management and maintenance, home establishment and management, meal preparation and cleanup, sleep and leisure activities      Clinical Decision Making (Complexity): Low complexity  Treatment Explanation:  The following has been discussed with the patient:  RX ordered/plan of care  Anticipated outcomes  Possible risks and side effects    P: Frequency:  1x visit, once daily.  Pt to return for splint adjustments, otherwise cont HEP independently. D/C [facility]    Treatment Plan:    Orthotic Fabrication:     Static orthosis and Forearm based orthosis     Home Program:    Orthotic Fabrication:   Static orthosis and Forearm based orthosis     Discharge Plan:  Achieve all LTG.  Independent in home treatment program.  Reach maximal therapeutic benefit.

## 2019-09-06 ENCOUNTER — TRANSFERRED RECORDS (OUTPATIENT)
Dept: HEALTH INFORMATION MANAGEMENT | Facility: CLINIC | Age: 64
End: 2019-09-06

## 2019-09-09 ENCOUNTER — HOSPITAL ENCOUNTER (OUTPATIENT)
Dept: PHYSICAL THERAPY | Facility: CLINIC | Age: 64
Setting detail: THERAPIES SERIES
End: 2019-09-09
Attending: INTERNAL MEDICINE
Payer: MEDICARE

## 2019-09-09 PROCEDURE — 97112 NEUROMUSCULAR REEDUCATION: CPT | Mod: GP | Performed by: PHYSICAL THERAPIST

## 2019-09-09 PROCEDURE — 97110 THERAPEUTIC EXERCISES: CPT | Mod: GP | Performed by: PHYSICAL THERAPIST

## 2019-09-09 PROCEDURE — 97116 GAIT TRAINING THERAPY: CPT | Mod: GP | Performed by: PHYSICAL THERAPIST

## 2019-09-10 ENCOUNTER — HOSPITAL ENCOUNTER (OUTPATIENT)
Dept: OCCUPATIONAL THERAPY | Facility: CLINIC | Age: 64
Setting detail: THERAPIES SERIES
End: 2019-09-10
Attending: PHYSICAL THERAPIST
Payer: MEDICARE

## 2019-09-10 PROCEDURE — 97140 MANUAL THERAPY 1/> REGIONS: CPT | Mod: GO | Performed by: OCCUPATIONAL THERAPIST

## 2019-09-10 NOTE — PROGRESS NOTES
"Outpatient Occupational Therapy Progress Note     Patient: Marcus Hodges  : 1955    Beginning/End Dates of Reporting Period:  19 to 9/10/2019    Referring Provider: Don Tucker Diagnosis: Decreased ADL/IADL and contractures after Left hemiplegia (H) G81.94;  Traumatic brain injury with loss of consciousness, sequela (H) S06.9X9S (initial onset 12)    Client Self Report: (P) \"I have been using my left hand on the light switch 80% of the time now  Still much lower than using my right but it is getting faster to do it each time to try.\"  Patient has not needed IASTM as much at home, stretches are going well.  Likes his new custom orthosis/night splint in functional hand position ( TH at [facility]), reinforced.      Objective Measurements       Objective Measure: L UE AROM   Details: SH ER  at ribside PROM to 38 degrees at start, 48 degrees by the end of IASTM.  Improved supination from 12 to 28  degrees by the end (has slipped back this month, was 33 at last session).  L SH FL to 150 degrees PROM, able to  have UE suspended after stretch without quick stretch release of tone reaction.        Goals:     Goal Identifier L    Goal Description Patient to demonstrate improved L   strength to 5# for increased ADL/IADL independence (hanging onto objects for hygiene, dressing, bathing, toileting and IADL tasks).   Target Date 19   Date Met      Progress: LUE--Pt newly able to squeeze 3#, 1# key pinch, fatiguing quickly.  Unable to sustain 3 pt position.       Goal Identifier R hand strength   Goal Description Patient to demonstrate WNLS R hand strength (, lateral and palmar pinch) for optimal use of this UE as primary extremity for completing ADL/IADL tasks.   Target Date 19   Date Met      Progress: R  is 67# (was 72# at eval), key pinch is 14# and 13# for 3 pt.  LUE--Pt newly able to squeeze 3#, 1# key pinch, fatiguing quickly.  Unable to sustain 3 pt position.   "     Goal Identifier L UE home program   Goal Description Patient to demonstrate L UE HEP with SBA and min cues for good follow through at home, prevention of contractures and increase functional strength and ROM for maximum independence with performance of ADLs as gross assist and gross stabilizer.    Target Date 12/09/19   Date Met      Progress:  PROM program has been updated to to increase PROM by combining several motions, while decreasing the demand on caregiver.  Extra emphasis was placed on areas of contracture (supination, shoulder external rotation, cervical flexion).  OTR continues to instruct wife in use of instrument assisted soft tissue mobilization/Graston Technique (IASTM), using tools that they have purchased off of the Internet/ [employer].  Nidia is getting more comfortable with modality/technique, benefits with monthly re-instruction on  monitoring tissue texture and tonal changes at higher contracture areas, ( biceps, pronators, wrist flexors)     Goal Identifier use of L UE during functional tasks   Goal Description Patient to demonstrate functional tasks (wiping the counter/table, washing dishes, etc.) with at least 15% use of L UE as gross stabilizer/gross assist for increased ADL/IADL independence and improved functional use of L UE.   Target Date 12/09/19   Date Met      Progress:  Per above     Goal Identifier visual skills   Goal Description Patient to verbalize and successfully demonstrate at least 2 strategies (lighting, scanning strategies, magnification, etc.) during functional tasks (reading, computer tasks, navigation/functional mobility) to increase visual skills for increased independence and safety due to visual deficits.   Target Date 12/09/19   Date Met      Progress: Patient has been seen for functional vision occupational therapy and has been using tabletop lighting sources, magnification and enlarged print materials more appropriately.       Progress Toward Goals:   Patient has  been seen for 29 occupational therapy visits since starting again on December 31, 2018, to address above goals which remain appropriate.  Strength are his physical and social support system, his perseverance in wanting to keep improving while in session.  Barriers to progress include high levels of spasticity tone with contractures and compensatory movement patterns that make it harder for him to move his left upper extremity freely.  He also enjoys soft tissue work and exercise over practicing use of his left upper extremity and functional patterns and needs extensive encouragement to continue doing so.     Plan:  Changes to therapy plan of care: Goals remain the same.  Reduced to 1 time per month for assistance with contracture management, update of home program and encouragement of functional use of LUE.     Discharge:  Therapy will continue 1 time per month through the end of December.    Thank you for this thoughtful referral! If you have any questions, please call me [phone].  Nice to share in this patient's care with you.    Sincerely,   Ada Enrique, OTR/marty

## 2019-09-10 NOTE — PROGRESS NOTES
[facility]      OUTPATIENT OCCUPATIONAL THERAPY  PLAN OF TREATMENT FOR OUTPATIENT REHABILITATION    Patient's Last Name, First Name, M.I.                YOB: 1955  Marcus Hodges                        Provider's Name  [facility] Medical Record No.  [id number]                                 Onset Date: 4/22/12    Start of Care Date: 12/31/18   Type:     ___PT   _X_OT   ___SLP Medical Diagnosis:Left hemiplegia (H) G81.94;  Traumatic brain injury with loss of consciousness, sequela (H) S06.9X9S                           OT Diagnosis: Decreased ADL/IADL, contractures  Visit # 26   _________________________________________________________________________________  Plan of Treatment: Neuromuscular re-education, neuromuscular elecctrical stimulation, manual therapy, self care skills, therapeutic exercises and caregiver training.      Frequency/Duration: 1x/month x 6months     Goals:    Goal Identifier L    Goal Description Patient to demonstrate improved L   strength to 5# for increased ADL/IADL independence (hanging onto objects for hygiene, dressing, bathing, toileting and IADL tasks).   Target Date 12/09/19   Date Met      Progress:     Goal Identifier R hand strength   Goal Description Patient to demonstrate WNLS R hand strength (, lateral and palmar pinch) for optimal use of this UE as primary extremity for completing ADL/IADL tasks.   Target Date 12/09/19   Date Met      Progress:     Goal Identifier L UE home program   Goal Description Patient to demonstrate L UE HEP with SBA and min cues for good follow through at home, prevention of contractures and increase functional strength and ROM for maximum independence with performance of ADLs as gross assist and gross stabilizer.    Target Date 12/09/19   Date Met      Progress:     Goal Identifier use of L UE during  functional tasks   Goal Description Patient to demonstrate functional tasks (wiping the counter/table, washing dishes, etc.) with at least 15% use of L UE as gross stabilizer/gross assist for increased ADL/IADL independence and improved functional use of L UE.   Target Date 12/09/19   Date Met      Progress:     Goal Identifier visual skills   Goal Description Patient to verbalize and successfully demonstrate at least 2 strategies (lighting, scanning strategies, magnification, etc.) during functional tasks (reading, computer tasks, navigation/functional mobility) to increase visual skills for increased independence and safety due to visual deficits.   Target Date 12/09/19   Date Met      Progress:       Progress Toward Goals:   Progress this reporting period: See attached progress note    Certification date from 9/10/19  to 12/9/2019    LOUIS Perkins          I CERTIFY THE NEED FOR THESE SERVICES FURNISHED UNDER        THIS PLAN OF TREATMENT AND WHILE UNDER MY CARE     (Physician co-signature of this document indicates review and certification of the therapy plan).                Referring Provider: Dr. Don Aviles

## 2019-09-13 ENCOUNTER — TRANSFERRED RECORDS (OUTPATIENT)
Dept: HEALTH INFORMATION MANAGEMENT | Facility: CLINIC | Age: 64
End: 2019-09-13

## 2019-09-17 ENCOUNTER — HOSPITAL ENCOUNTER (OUTPATIENT)
Dept: PHYSICAL THERAPY | Facility: CLINIC | Age: 64
Setting detail: THERAPIES SERIES
End: 2019-09-17
Attending: INTERNAL MEDICINE
Payer: MEDICARE

## 2019-09-17 PROCEDURE — 97116 GAIT TRAINING THERAPY: CPT | Mod: GP | Performed by: PHYSICAL THERAPIST

## 2019-09-17 PROCEDURE — 97112 NEUROMUSCULAR REEDUCATION: CPT | Mod: GP | Performed by: PHYSICAL THERAPIST

## 2019-09-17 PROCEDURE — 97110 THERAPEUTIC EXERCISES: CPT | Mod: GP | Performed by: PHYSICAL THERAPIST

## 2019-09-24 ENCOUNTER — HOSPITAL ENCOUNTER (OUTPATIENT)
Dept: PHYSICAL THERAPY | Facility: CLINIC | Age: 64
Setting detail: THERAPIES SERIES
End: 2019-09-24
Attending: INTERNAL MEDICINE
Payer: MEDICARE

## 2019-09-24 PROCEDURE — 97116 GAIT TRAINING THERAPY: CPT | Mod: GP | Performed by: PHYSICAL THERAPIST

## 2019-09-24 PROCEDURE — 97110 THERAPEUTIC EXERCISES: CPT | Mod: GP | Performed by: PHYSICAL THERAPIST

## 2019-09-24 PROCEDURE — 97112 NEUROMUSCULAR REEDUCATION: CPT | Mod: GP | Performed by: PHYSICAL THERAPIST

## 2019-09-25 NOTE — PROGRESS NOTES
Outpatient Physical Therapy Progress Note     Patient: Marcus Hodges  : 1955    Beginning/End Dates of Reporting Period:  19 to 2019    Referring Provider: Dr. Don Aviles    Therapy Diagnosis: decreased independence and functional activity tolerance due to impairments from CVA and secondary impairments following CVA (R knee pain, shoulder pain)     Client Self Report:      Objective Measurements:  Objective Measure: 25 foot walk,   Details: 19.28 sec, 17.77 sec, 17.25 sec all with trekking pole on the R, AFO on the L 20-22 steps.    Objective Measure: 4 square  Details: 29.16 sec simialr timing over tape but bigger steps.  Challenge posterior steps . Over object on the floor under a minute  but not sure exact time due to wrong button hit on timer.     Objective Measure: curb/step over shabana  Details: CGA , trekking pole on the R, AFO L LE able to step over the shabana iwth the R LE,  L attempted but not able to clear the shabana due to decreased strength and coordination of hip and knee flexion movement.   Curbs are still a challenge but are better.   IN the past pt was not able to lift the L LE to attempt to step over the shabana.         Objective Measure: Forward reach standing  Details:  18 with 7.5 inches     19  6.5 to 7 inches          Goals:  Goal Identifier  Balance   Goal Description Marcus to demonstrate the ability to stand with a more narrow TAYLOR going from 12 inches feet apart at eval to 6 inches apart or less to demonstrate improved balance , loading on the L side to assist with improvement in gait pattern therefore efficiency and decreased secondary impairments such as his R knee pain.    Target Date 19   Date Met  19   Progress:     Goal Identifier 2- Forward reach   Goal Description Marcus to increase his forward reach from 4 inches at evaulation to 8 inches or greater to demonstrate improved balance, improved ability to bring COM forward over TAYLOR to assist in  meeting all goals, improved sit to stand and improved gait.    Target Date 12/31/19   Date Met   in progress   Progress: furthest has reached is 7.5 inches     Goal Identifier 3-  Curb/stairs   Goal Description Marcus to have increased disassociation of movement/improved timing and sequencing of muscle activation for improved hip and knee flexion in standing demonstrated by the ability to step over upright shabana (4-6 inch object) with the L LE to allow him to step up onto a curb without catching foot.   Target Date 12/31/19   Date Met   in progress   Progress:Marcus has improved as above.      Goal Identifier 4- Gait(6/14/18   18.66 sec)   Goal Description Marcus to increase gait speed with 25 foot walk to 19 seconds or less with AD and 24 steps or less to demonstrate improved gait speed and efficiency for community gait and also decreased overuse of the R side.    Target Date 11/30/18   Date Met   6/14/18    Progress:18.66 sec 6/14/18.   Has fluctuated with speeds after this , most current times as above.      Goal Identifier 5- Floor transfers.    Goal Description Marcus to perform transfer to and from floor with outside UE support and min assist of 1 to allow him to recover from a fall with assist from wife or caregiver.    Target Date 12/31/19   Date Met   in progress   Progress: We continue to work to this goal working on improving disassociation of movement at joints of L LE,improving hip extension , decreasing over activation of hip flexors, quads.       Goal Identifier 6 --  4 square   Goal Description Marcus to complete 4 square in 25 seconds with and AD and 40 seconds or less without an AD to demonstrate improved interlimb coordination, balance and body control to assist in meeting all goals and overall function.    Target Date 08/24/19   Date Met   in progress   Progress: Much improved abiltiy to clear objects on the floor stepping over, time not met     Goal Identifier HEP   Goal Description Marcus to be  independent in appropriate HEP to continue to address his impairments following d/c from skilled PT intervention.    Target Date 12/31/19   Date Met   in progress   Progress:HEP continues to be progressed.       Goal Identifier 8 functional reach/neuro motor control   Goal Description Marcus to perform reaching to the floor to the left to pick object up off the floor while maintaining L hand contact/closed chain on the mat to demonstrate impropved neuromotor planning, ability to bring COM to the left while keeping proper muscle activation/deactivation L and R side of body for greater safety with reaching activiites to the floor, unstrapping shoe, AFO.  Also for carryover of proper motor function for improved gait and transfers.    Target Date 12/31/19   Date Met   in progress   Progress:Marcus is improving his trunk mobility, ability to dampen over response of trunk and UE allowing L hand to keep contact with the mat whne reaching to the ground.  He continues to overactivate the trunk and L side with challenges reaching forward and to the R due to fear of falling.  The overactivation prevents forward COM/acts as protective mechanism.      Progress Toward Goals:   Progress this reporting period: Marcus continues to be seen on a weekly basis and is making slow and steady progress .  We have been focusing on dampening overactivation of L Side with all activities but especially forward movement of COM past TAYLOR and also to the L.  As he becomes more comfortable with this he will have improved disassociation of movement, motor control allowing better functional mobiltiy, independence and improved gait pattern.   He continues to walk at community center/gym and demosntrates improvements in speed and step length.  His wife intermittently video tapes his gait pattern at the club.  Showing improvement at gym as well as in clinic.   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  He also has R knee pain which he has seen an orthopedic MD for and imaging was good.  I have discussed with pt that pain is likely due to force to the knee due to compensations from not wanting to bring weight to the L side.     Plan:  Continue therapy per current plan of care.    Discharge:  No

## 2019-09-28 ENCOUNTER — HEALTH MAINTENANCE LETTER (OUTPATIENT)
Age: 64
End: 2019-09-28

## 2019-09-30 ENCOUNTER — HOSPITAL ENCOUNTER (OUTPATIENT)
Dept: PHYSICAL THERAPY | Facility: CLINIC | Age: 64
Setting detail: THERAPIES SERIES
End: 2019-09-30
Attending: INTERNAL MEDICINE
Payer: MEDICARE

## 2019-09-30 PROCEDURE — 97110 THERAPEUTIC EXERCISES: CPT | Mod: GP | Performed by: PHYSICAL THERAPIST

## 2019-09-30 PROCEDURE — 97116 GAIT TRAINING THERAPY: CPT | Mod: GP | Performed by: PHYSICAL THERAPIST

## 2019-09-30 PROCEDURE — 97112 NEUROMUSCULAR REEDUCATION: CPT | Mod: GP | Performed by: PHYSICAL THERAPIST

## 2019-10-01 NOTE — PROGRESS NOTES
[facility]      OUTPATIENT PHYSICAL THERAPY  PLAN OF TREATMENT FOR OUTPATIENT REHABILITATION    Patient's Last Name, First Name, M.I.                YOB: 1955  Marcus Hodges                        Provider's Name  [facility] Medical Record No.  [id number]                               Onset Date: 4/1/19   Start of Care Date: 7/1/19 with medicare as insurance   Type:     _X_PT   ___OT   ___SLP Medical Diagnosis:  Left hemiplegia (G81.94-  ),  Tramatic brain  injury with loss of  consciousness, sequela  (S06.9X9S)                       PT Diagnosis:    Decreased independence  and functional activity  tolerance due to impair-  ments from CVA and  secondary impairments  following CVA.(R knee  pain, shoulderpain)            _________________________________________________________________________________  Plan of Treatment:    Frequency/Duration: 1 x a week x 90 days     Goals:  See most recent PN    Certification date from 9/29/19 to 12/29/19.    Ada Scott, PT          I CERTIFY THE NEED FOR THESE SERVICES FURNISHED UNDER        THIS PLAN OF TREATMENT AND WHILE UNDER MY CARE     (Physician co-signature of this document indicates review and certification of the therapy plan).                Referring Provider: Dr. Don Aviles

## 2019-10-08 ENCOUNTER — HOSPITAL ENCOUNTER (OUTPATIENT)
Dept: OCCUPATIONAL THERAPY | Facility: CLINIC | Age: 64
Setting detail: THERAPIES SERIES
End: 2019-10-08
Attending: PHYSICAL THERAPIST
Payer: MEDICARE

## 2019-10-08 PROCEDURE — 97140 MANUAL THERAPY 1/> REGIONS: CPT | Mod: GO | Performed by: OCCUPATIONAL THERAPIST

## 2019-10-08 PROCEDURE — 97535 SELF CARE MNGMENT TRAINING: CPT | Mod: GO | Performed by: OCCUPATIONAL THERAPIST

## 2019-10-22 ENCOUNTER — HOSPITAL ENCOUNTER (OUTPATIENT)
Dept: PHYSICAL THERAPY | Facility: CLINIC | Age: 64
Setting detail: THERAPIES SERIES
End: 2019-10-22
Attending: INTERNAL MEDICINE
Payer: MEDICARE

## 2019-10-22 PROCEDURE — 97116 GAIT TRAINING THERAPY: CPT | Mod: GP | Performed by: PHYSICAL THERAPIST

## 2019-10-22 PROCEDURE — 97112 NEUROMUSCULAR REEDUCATION: CPT | Mod: GP | Performed by: PHYSICAL THERAPIST

## 2019-10-22 PROCEDURE — 97110 THERAPEUTIC EXERCISES: CPT | Mod: GP | Performed by: PHYSICAL THERAPIST

## 2019-10-31 ENCOUNTER — HOSPITAL ENCOUNTER (OUTPATIENT)
Dept: PHYSICAL THERAPY | Facility: CLINIC | Age: 64
Setting detail: THERAPIES SERIES
End: 2019-10-31
Attending: INTERNAL MEDICINE
Payer: MEDICARE

## 2019-10-31 PROCEDURE — 97140 MANUAL THERAPY 1/> REGIONS: CPT | Mod: GP | Performed by: PHYSICAL THERAPIST

## 2019-10-31 PROCEDURE — 97110 THERAPEUTIC EXERCISES: CPT | Mod: GP | Performed by: PHYSICAL THERAPIST

## 2019-10-31 PROCEDURE — 97116 GAIT TRAINING THERAPY: CPT | Mod: GP | Performed by: PHYSICAL THERAPIST

## 2019-10-31 PROCEDURE — 97112 NEUROMUSCULAR REEDUCATION: CPT | Mod: GP | Performed by: PHYSICAL THERAPIST

## 2019-11-04 ENCOUNTER — HOSPITAL ENCOUNTER (OUTPATIENT)
Dept: OCCUPATIONAL THERAPY | Facility: CLINIC | Age: 64
Setting detail: THERAPIES SERIES
End: 2019-11-04
Attending: PHYSICAL THERAPIST
Payer: MEDICARE

## 2019-11-04 PROCEDURE — 97140 MANUAL THERAPY 1/> REGIONS: CPT | Mod: GO | Performed by: OCCUPATIONAL THERAPIST

## 2019-11-21 ENCOUNTER — HOSPITAL ENCOUNTER (OUTPATIENT)
Dept: PHYSICAL THERAPY | Facility: CLINIC | Age: 64
Setting detail: THERAPIES SERIES
End: 2019-11-21
Attending: INTERNAL MEDICINE
Payer: MEDICARE

## 2019-11-21 PROCEDURE — 97116 GAIT TRAINING THERAPY: CPT | Mod: GP | Performed by: PHYSICAL THERAPIST

## 2019-11-21 PROCEDURE — 97112 NEUROMUSCULAR REEDUCATION: CPT | Mod: GP | Performed by: PHYSICAL THERAPIST

## 2019-11-21 PROCEDURE — 97110 THERAPEUTIC EXERCISES: CPT | Mod: GP | Performed by: PHYSICAL THERAPIST

## 2019-11-26 ENCOUNTER — HOSPITAL ENCOUNTER (OUTPATIENT)
Dept: PHYSICAL THERAPY | Facility: CLINIC | Age: 64
Setting detail: THERAPIES SERIES
End: 2019-11-26
Attending: INTERNAL MEDICINE
Payer: MEDICARE

## 2019-11-26 PROCEDURE — 97116 GAIT TRAINING THERAPY: CPT | Mod: GP | Performed by: PHYSICAL THERAPIST

## 2019-11-26 PROCEDURE — 97112 NEUROMUSCULAR REEDUCATION: CPT | Mod: GP | Performed by: PHYSICAL THERAPIST

## 2019-11-26 PROCEDURE — 97110 THERAPEUTIC EXERCISES: CPT | Mod: GP | Performed by: PHYSICAL THERAPIST

## 2019-12-02 ENCOUNTER — HOSPITAL ENCOUNTER (OUTPATIENT)
Dept: OCCUPATIONAL THERAPY | Facility: CLINIC | Age: 64
Setting detail: THERAPIES SERIES
End: 2019-12-02
Attending: PHYSICAL THERAPIST
Payer: MEDICARE

## 2019-12-02 PROCEDURE — 97140 MANUAL THERAPY 1/> REGIONS: CPT | Mod: GO | Performed by: OCCUPATIONAL THERAPIST

## 2019-12-02 NOTE — PROGRESS NOTES
Outpatient Occupational Therapy Discharge Note     Patient: Marcus Hodges  : 1955    Beginning/End Dates of Reporting Period:  9/10/19 to 2019    Therapy Diagnosis: Decreased ADL/IADL and contractures after Left hemiplegia (H) G81.94;  Traumatic brain injury with loss of consciousness, sequela (H) S06.9X9S (initial onset 12)    Client Self Report: Pt continues to use R hand on light switches. Rubbing spot on his new night WHFO splint is better with moleskin in place.  Has botox again next week and has been able to keep ROM #'s up between monthly OT sessions, HEP working better.  (Mostly stretching, but wife has been instructed in soft tissue mobilization technqiues using Porfirio Sha tools ( wife trained, has tools, but uses stretches more).      Objective Measurements:           Objective Measure: L UE AROM   Details: (Last botox nearly 12 wks ago, has good daily stretches, no IASTM at home lately)  SH ER  at ribside PROM to 48 degrees at start, 50 degrees by the end of IASTM.  Improved supination from 33 at the start to 42 degrees by the end (has been as high as 33 in the past 6 months).  L SH FL to 120 degrees PROM in sitting and 150 degrees in supine.  Cervical extension  improves to 75 of 80 degrees in sitting after IASTM.              Goals:     Goal Identifier L    Goal Description Patient to demonstrate improved L   strength to 5# for increased ADL/IADL independence (hanging onto objects for hygiene, dressing, bathing, toileting and IADL tasks).   Target Date 19   Date Met      Progress: Pt newly able to squeeze 3#, 1# key pinch, fatiguing quickly.  Unable to sustain 3 pt position     Goal Identifier R hand strength   Goal Description Patient to demonstrate WNLS R hand strength (, lateral and palmar pinch) for optimal use of this UE as primary extremity for completing ADL/IADL tasks.   Target Date 19   Date Met      Progress:  R  is 67# (was 72# at eval), key pinch  is 14# and 13# for 3 pt.  LUE--Pt newly able to squeeze 3#, 1# key pinch, fatiguing quickly.  Unable to sustain 3 pt position.       Goal Identifier L UE home program   Goal Description Patient to demonstrate L UE HEP with SBA and min cues for good follow through at home, prevention of contractures and increase functional strength and ROM for maximum independence with performance of ADLs as gross assist and gross stabilizer.    Target Date 12/09/19   Date Met   12/2/19   Progress: GOAL MET.   Continues to need moderate encouragement to expand his use of L hand as functional stabilizer ( gripping and pinching) during daily task routines.       Goal Identifier use of L UE during functional tasks   Goal Description Patient to demonstrate functional tasks (wiping the counter/table, washing dishes, etc.) with at least 15% use of L UE as gross stabilizer/gross assist for increased ADL/IADL independence and improved functional use of L UE.   Target Date 12/09/19   Date Met      Progress:Continues to need moderate encouragement to expand his use of L hand as functional stabilizer ( gripping and pinching) during daily task routines. Has been trained to do  Several tasks to increase L hand functional use such as holding with L for  toothbrush, yogurt cup,  faucet, fridge handle, Carry water bottle/coffee cup, small tote bag between rooms w L Hand.        Goal Identifier visual skills   Goal Description Patient to verbalize and successfully demonstrate at least 2 strategies (lighting, scanning strategies, magnification, etc.) during functional tasks (reading, computer tasks, navigation/functional mobility) to increase visual skills for increased independence and safety due to visual deficits.   Target Date 12/09/19   Date Met      Progress: GOAL MET. Completed 2 visits of visual adaptations with low vision specialist/Maritza Mar. He has been using tabletop lighting sources, magnification and enlarged print materials more  appropriately.     Progress Toward Goals:   Progress this reporting period: Patient has been seen for 31 occupational therapy visits since starting again on December 31, 2018, to address above goals which remain appropriate.  Strength are his physical and social support system, his perseverance in wanting to keep improving while in session.  Barriers to progress include high levels of spasticity tone with contractures and compensatory movement patterns that make it harder for him to move his left upper extremity freely.  He also enjoys soft tissue work and exercise over practicing use of his left upper extremity and functional patterns and needs moderate encouragement to continue doing so.    Plan:  Discharge from therapy.    Reason for Discharge: Patient has met all goals, not regressing between monthly OT sessions x 3 months.    Equipment Issued: Has Porfirio Alba tools for soft tissue mobilization, NMES unit for WR/finger extension and nightly custom WHFO splint for managing L hand spasticity.      Discharge Plan: Patient to continue home program, including SH  ER/ABD/HAbd combined stretches, SH FL, EL EX/supination, supination/ finger open/TH ABD combined stretches. Continue NMES at WR/F EX daily.   Task log:holding with L for  toothbrush, yogurt cup,  faucet, fridge handle, Carry water bottle/coffee cup, small tote bag between rooms w L Hand.       Thank you for this thoughtful referral! If you have any questions, please call me at [phone].  Nice to share in this patient's care with you.    Sincerely,   Ada Enrique, OTR/marty

## 2019-12-05 ENCOUNTER — HOSPITAL ENCOUNTER (OUTPATIENT)
Dept: PHYSICAL THERAPY | Facility: CLINIC | Age: 64
Setting detail: THERAPIES SERIES
End: 2019-12-05
Attending: INTERNAL MEDICINE
Payer: MEDICARE

## 2019-12-05 PROCEDURE — 97112 NEUROMUSCULAR REEDUCATION: CPT | Mod: GP,KX | Performed by: PHYSICAL THERAPIST

## 2019-12-05 PROCEDURE — 97110 THERAPEUTIC EXERCISES: CPT | Mod: GP | Performed by: PHYSICAL THERAPIST

## 2019-12-05 PROCEDURE — 97116 GAIT TRAINING THERAPY: CPT | Mod: GP | Performed by: PHYSICAL THERAPIST

## 2019-12-06 ENCOUNTER — TRANSFERRED RECORDS (OUTPATIENT)
Dept: HEALTH INFORMATION MANAGEMENT | Facility: CLINIC | Age: 64
End: 2019-12-06

## 2019-12-12 ENCOUNTER — HOSPITAL ENCOUNTER (OUTPATIENT)
Dept: PHYSICAL THERAPY | Facility: CLINIC | Age: 64
Setting detail: THERAPIES SERIES
End: 2019-12-12
Attending: INTERNAL MEDICINE
Payer: MEDICARE

## 2019-12-12 PROCEDURE — 97112 NEUROMUSCULAR REEDUCATION: CPT | Mod: GP,KX | Performed by: PHYSICAL THERAPIST

## 2019-12-12 PROCEDURE — 97110 THERAPEUTIC EXERCISES: CPT | Mod: GP,KX | Performed by: PHYSICAL THERAPIST

## 2019-12-12 PROCEDURE — 97116 GAIT TRAINING THERAPY: CPT | Mod: GP,KX | Performed by: PHYSICAL THERAPIST

## 2019-12-19 ENCOUNTER — HOSPITAL ENCOUNTER (OUTPATIENT)
Dept: PHYSICAL THERAPY | Facility: CLINIC | Age: 64
Setting detail: THERAPIES SERIES
End: 2019-12-19
Attending: INTERNAL MEDICINE
Payer: MEDICARE

## 2019-12-19 PROCEDURE — 97116 GAIT TRAINING THERAPY: CPT | Mod: GP,KX | Performed by: PHYSICAL THERAPIST

## 2019-12-19 PROCEDURE — 97112 NEUROMUSCULAR REEDUCATION: CPT | Mod: GP,KX | Performed by: PHYSICAL THERAPIST

## 2019-12-19 PROCEDURE — 97110 THERAPEUTIC EXERCISES: CPT | Mod: GP,KX | Performed by: PHYSICAL THERAPIST

## 2019-12-27 ENCOUNTER — HOSPITAL ENCOUNTER (OUTPATIENT)
Dept: PHYSICAL THERAPY | Facility: CLINIC | Age: 64
Setting detail: THERAPIES SERIES
End: 2019-12-27
Attending: INTERNAL MEDICINE
Payer: MEDICARE

## 2019-12-27 PROCEDURE — 97110 THERAPEUTIC EXERCISES: CPT | Mod: GP | Performed by: PHYSICAL THERAPIST

## 2019-12-27 PROCEDURE — 97112 NEUROMUSCULAR REEDUCATION: CPT | Mod: GP,KX | Performed by: PHYSICAL THERAPIST

## 2019-12-27 PROCEDURE — 97116 GAIT TRAINING THERAPY: CPT | Mod: GP,KX | Performed by: PHYSICAL THERAPIST

## 2019-12-31 ENCOUNTER — HOSPITAL ENCOUNTER (OUTPATIENT)
Dept: PHYSICAL THERAPY | Facility: CLINIC | Age: 64
Setting detail: THERAPIES SERIES
End: 2019-12-31
Attending: INTERNAL MEDICINE
Payer: MEDICARE

## 2019-12-31 PROCEDURE — 97112 NEUROMUSCULAR REEDUCATION: CPT | Mod: GP,KX | Performed by: PHYSICAL THERAPIST

## 2019-12-31 PROCEDURE — 97116 GAIT TRAINING THERAPY: CPT | Mod: GP,KX | Performed by: PHYSICAL THERAPIST

## 2019-12-31 PROCEDURE — 97110 THERAPEUTIC EXERCISES: CPT | Mod: GP,KX | Performed by: PHYSICAL THERAPIST

## 2020-01-02 ENCOUNTER — TELEPHONE (OUTPATIENT)
Dept: PEDIATRICS | Facility: CLINIC | Age: 65
End: 2020-01-02

## 2020-01-02 NOTE — TELEPHONE ENCOUNTER
Routing to Dr. Aviles.     Called patient.     He is having an atypical mole on his back removed. It's not cancer, but Derm wants to remove it.     Procedure is 1/7/20 at [facility].     The paperwork states that the patient should call his Doctor to see if he should take pre-antibiotics for it.

## 2020-01-02 NOTE — TELEPHONE ENCOUNTER
Reason for Call:  Other prescription    Detailed comments: pt is calling and is having a mole removed and wants to know if he should be taking some Abx. The surgeon said to call the primary Dr.    Phone Number Patient can be reached at: Home number on file [phone] (home)    Best Time: any    Can we leave a detailed message on this number? YES    Call taken on 1/2/2020 at 3:39 PM by Sophie Barajas

## 2020-01-08 ENCOUNTER — HOSPITAL ENCOUNTER (OUTPATIENT)
Dept: PHYSICAL THERAPY | Facility: CLINIC | Age: 65
Setting detail: THERAPIES SERIES
End: 2020-01-08
Attending: INTERNAL MEDICINE
Payer: MEDICARE

## 2020-01-08 PROCEDURE — 97112 NEUROMUSCULAR REEDUCATION: CPT | Mod: GP | Performed by: PHYSICAL THERAPIST

## 2020-01-08 PROCEDURE — 97110 THERAPEUTIC EXERCISES: CPT | Mod: GP | Performed by: PHYSICAL THERAPIST

## 2020-01-14 NOTE — PROGRESS NOTES
[facility]      OUTPATIENT PHYSICAL THERAPY  PLAN OF TREATMENT FOR OUTPATIENT REHABILITATION    Patient's Last Name, First Name, M.I.                YOB: 1955  Marcus Hodges                        Provider's Name  [facility] Medical Record No.  [id number]                               Onset Date: 4/1/19   Start of Care Date: 7/1/19 with medicare as insurance   Type:     _X_PT   ___OT   ___SLP Medical Diagnosis:   Left hemiplegia (G81.94-  ),  Tramatic brain  injury with loss of  consciousness, sequela  (S06.9X9S)                       PT Diagnosis:    Decreased independence  and functional activity  tolerance due to impair-  ments from CVA and  secondary impairments  following CVA.(R knee  pain, shoulderpain)          _________________________________________________________________________________  Plan of Treatment:    Frequency/Duration: 1 x a week x 90 days     Goals:  See PN goals remain appropriate.  To be met date set to end of cert. 3/28/20    Certification date from 12/30/19 to 3/28/20.    Ada Scott, PT          I CERTIFY THE NEED FOR THESE SERVICES FURNISHED UNDER        THIS PLAN OF TREATMENT AND WHILE UNDER MY CARE     (Physician co-signature of this document indicates review and certification of the therapy plan).                Referring Provider: MANDI JEFFERS MD

## 2020-01-14 NOTE — PROGRESS NOTES
10th visit PT Progress note       12/27/19 1000   Signing Clinician's Name / Credentials   Signing clinician's name / credentials Ada Scott, PT   Session Number   Session Number 10/10   Authorization status YSABEL Velazquez is appropriate for skilled PT Intervention to address his impairments to allow better movement, decreased fall risk and greater independence , less stress /burden on caregiver and greater abiltiy to stay in his own home.    Progress Note/Recertification   Recertification Due Date 12/29/19   Goal 1   Goal Description Marcus to demonstrate the ability to stand with a more narrow TAYLOR going from 12 inches feet apart at eval to 6 inches apart or less to demonstrate improved balance , loading on the L side to assist with improvement in gait pattern therefore efficiency and decreased secondary impairments such as his R knee pain.    Target Date 05/24/19   Date Met 05/21/19   Goal 2   Goal Identifier 2- Forward reach   Goal Description Marcus to increase his forward reach from 4 inches at evaulation to 8 inches or greater to demonstrate improved balance, improved ability to bring COM forward over TAYLOR to assist in meeting all goals, improved sit to stand and improved gait.    Target Date 12/31/19   Goal 3   Goal Identifier 3-  Curb/stairs   Goal Description Marcus to have increased disassociation of movement/improved timing and sequencing of muscle activation for improved hip and knee flexion in standing demonstrated by the ability to step over upright shabana (4-6 inch object) with the L LE to allow him to step up onto a curb without catching foot.   Target Date 12/31/19   Goal 4   Goal Identifier 4- Gait  (6/14/18   18.66 sec)   Goal Description Marcus to increase gait speed to 19 seconds or less with AD and 24 steps or less to demonstrate improved gait speed and efficiency for community gait and also decreased overuse of the R side.    Target Date 11/30/18   Goal 5   Goal Identifier 5- Floor transfers.    Goal  Description Marcus to perform transfer to and from floor with outside UE support and min assist of 1 to allow him to recover from a fall with assist from wife or caregiver.    Target Date 12/31/19   Goal 6   Goal Identifier 6 --  4 square   Goal Description Marcus to complete 4 square in 25 seconds with and AD and 40 seconds or less without an AD to demonstrate improved interlimb coordination, balance and body control to assist in meeting all goals and overall function.    Target Date 08/24/19   Goal 7   Goal Identifier HEP   Goal Description Marcus to be independent in appropriate HEP to continue to address his impairments following d/c from skilled PT intervention.    Target Date 12/31/19   Goal 8   Goal Identifier 8 functional reach/neuro motor control   Goal Description Marcus to perform reaching to the floor to the left to pick object up off the floor while maintaining L hand contact/closed chain on the mat to demonstrate impropved neuromotor planning, ability to bring COM to the left while keeping proper muscle activation/deactivation L and R side of body for greater safety with reaching activiites to the floor, unstrapping shoe, AFO.  Also for carryover of proper motor function for improved gait and transfers.    Target Date 12/31/19   Subjective Report   Subjective Report states he notices when he is walking that he gets tight up in the l side and indicates to the upper trap.    Objective Measure 1   Objective Measure 25 foot walk   Details 19.8 sec with trekking pole, AFO on.  with facilitation for faster paced gait 16.66 sec with trekking pole and AFO.   Objective Measure 2   Objective Measure 4 square   Details 37.25 sec with trekking poles, CGA and stepping over poles,  stepping over tape 16.57 sec with trekking pole and SBA  AFO on with both   Objective Measure 3   Objective Measure curb/step over shabana   Details able to step over with the R not L due to decreased motor coordination hip and knee flexion L    Objective Measure 5   Objective Measure Forward reach standing   Details 7 3/4 inches to 8 onches   Therapeutic Procedure/exercise   Therapeutic Procedures: strength, endurance, ROM, flexibillity minutes (35633) 17   Skilled Intervention manual stretches   Patient Response activartion of tone/overactive trunk feeling of discomfort with hamstring stretch   Treatment Detail SUpine stretches L LE hamstrings, hip IR/ER, SKTc, dorsifelxion, mid foot and forefoot into greater extension/dorsal motino.   L UE shoulder Extension and ER, forearm ER, wrist Extension, fringer extension all 2-3 reps 30-45 sec hold.    Neuromuscular Re-education   Neuromuscular re-ed of mvmt, balance, coord, kinesthetic sense, posture, proprioception minutes (45831) 12   Skilled Intervention assessment, facilitation, CGA   Patient Response as noted   Treatment Detail simulating stepping up to curb with trekking pole with step over upright shabana, up and down 4 inch stairs x 1 and 6 inch stairs x 1 wiht B railing, pt with lean onto the rail with descending stairs.  Lacks open chain coordination of movemen tL LE.  4 square as above.   forward reach assessed as above.    Progress Improved ability to step over the upright shabana and load onto the L LE to allow R to step over, L still limited by decreased hip, knee flexion motor sequencing.  He still reports challenge with curbs. Part of this is due to pt not feeling secure and overactivation L side leading to improper movement and sequencing.    Gait Training   Gait Training Minutes, includes stair climbing (04500) 11   Skilled Intervention assessment, facilation and vc.    Patient Response more comfortable with faster paced gait, needs facilitaiton for fastrer speeds.    Treatment Detail gait with trekking pole and AFO, timing 25 foot walk, 25 foot walk with faciliation faster paced gait.    Progress Fastest speed pt has completed to date with the 25 foot walk.    Education   Education Comments  overactivation L upper trap and upper quadrant likely due to not feeling comfortable on the L LE/balance wise    Plan   Plan for next session faster paced activities for motor planning and coordination, stretches, COM past TAYLOR, eccentric ankle and hamstring control L    Comments   Comments PN covers dates of 9/24/19-12/27/19.  Lex is continuing to make slow and steady progress to all goals and in his movement and function. Goals remain appropriate.   He is demonstrating greater awareness of his alignment and corrections to his movment.  Barriers continue to be the apprehension with balance challenges and greater freedom of movement/less rigidity for control which is understandable due to his multiple co morbidities and severity of CVA.   He continues to be appropriate for skilled PT intervention and wishes to continue .   Anticipate gradual and continued improvement for long term health benifits , decreased risk of secondary impairments and increased ability to stay in his home environment with wife as caregiver.  Pt and wife are without questions and wish to continue with skilled PT intervention. Please see notes b/w date range of PN for specifics    Total Session Time   Timed Code Treatment Minutes 40   Total Treatment Time (sum of timed and untimed services) 40

## 2020-01-16 ENCOUNTER — HOSPITAL ENCOUNTER (OUTPATIENT)
Dept: PHYSICAL THERAPY | Facility: CLINIC | Age: 65
Setting detail: THERAPIES SERIES
End: 2020-01-16
Attending: INTERNAL MEDICINE
Payer: MEDICARE

## 2020-01-16 PROCEDURE — 97116 GAIT TRAINING THERAPY: CPT | Mod: GP | Performed by: PHYSICAL THERAPIST

## 2020-01-16 PROCEDURE — 97110 THERAPEUTIC EXERCISES: CPT | Mod: GP | Performed by: PHYSICAL THERAPIST

## 2020-01-20 ENCOUNTER — HOSPITAL ENCOUNTER (OUTPATIENT)
Dept: PHYSICAL THERAPY | Facility: CLINIC | Age: 65
Setting detail: THERAPIES SERIES
End: 2020-01-20
Attending: INTERNAL MEDICINE
Payer: MEDICARE

## 2020-01-20 PROCEDURE — 97112 NEUROMUSCULAR REEDUCATION: CPT | Mod: GP | Performed by: PHYSICAL THERAPIST

## 2020-01-20 PROCEDURE — 97110 THERAPEUTIC EXERCISES: CPT | Mod: GP | Performed by: PHYSICAL THERAPIST

## 2020-01-20 PROCEDURE — 97140 MANUAL THERAPY 1/> REGIONS: CPT | Mod: GP | Performed by: PHYSICAL THERAPIST

## 2020-01-30 ENCOUNTER — HOSPITAL ENCOUNTER (OUTPATIENT)
Dept: PHYSICAL THERAPY | Facility: CLINIC | Age: 65
Setting detail: THERAPIES SERIES
End: 2020-01-30
Attending: INTERNAL MEDICINE
Payer: MEDICARE

## 2020-01-30 PROCEDURE — 97110 THERAPEUTIC EXERCISES: CPT | Mod: GP | Performed by: PHYSICAL THERAPIST

## 2020-01-30 PROCEDURE — 97112 NEUROMUSCULAR REEDUCATION: CPT | Mod: GP | Performed by: PHYSICAL THERAPIST

## 2020-02-04 ENCOUNTER — HOSPITAL ENCOUNTER (OUTPATIENT)
Dept: PHYSICAL THERAPY | Facility: CLINIC | Age: 65
Setting detail: THERAPIES SERIES
End: 2020-02-04
Attending: INTERNAL MEDICINE
Payer: MEDICARE

## 2020-02-04 PROCEDURE — 97110 THERAPEUTIC EXERCISES: CPT | Mod: GP | Performed by: PHYSICAL THERAPIST

## 2020-02-04 PROCEDURE — 97112 NEUROMUSCULAR REEDUCATION: CPT | Mod: GP | Performed by: PHYSICAL THERAPIST

## 2020-02-17 DIAGNOSIS — I05.9 MITRAL VALVE DISEASE: Primary | ICD-10-CM

## 2020-02-18 ENCOUNTER — HOSPITAL ENCOUNTER (OUTPATIENT)
Dept: PHYSICAL THERAPY | Facility: CLINIC | Age: 65
Setting detail: THERAPIES SERIES
End: 2020-02-18
Attending: INTERNAL MEDICINE
Payer: MEDICARE

## 2020-02-18 PROCEDURE — 97110 THERAPEUTIC EXERCISES: CPT | Mod: GP | Performed by: PHYSICAL THERAPIST

## 2020-02-18 PROCEDURE — 97112 NEUROMUSCULAR REEDUCATION: CPT | Mod: GP | Performed by: PHYSICAL THERAPIST

## 2020-02-18 PROCEDURE — 97116 GAIT TRAINING THERAPY: CPT | Mod: GP | Performed by: PHYSICAL THERAPIST

## 2020-02-24 ENCOUNTER — HOSPITAL ENCOUNTER (OUTPATIENT)
Dept: PHYSICAL THERAPY | Facility: CLINIC | Age: 65
Setting detail: THERAPIES SERIES
End: 2020-02-24
Attending: INTERNAL MEDICINE
Payer: MEDICARE

## 2020-02-24 PROCEDURE — 97110 THERAPEUTIC EXERCISES: CPT | Mod: GP | Performed by: PHYSICAL THERAPIST

## 2020-02-24 PROCEDURE — 97116 GAIT TRAINING THERAPY: CPT | Mod: GP | Performed by: PHYSICAL THERAPIST

## 2020-02-24 PROCEDURE — 97112 NEUROMUSCULAR REEDUCATION: CPT | Mod: GP | Performed by: PHYSICAL THERAPIST

## 2020-03-03 ENCOUNTER — HOSPITAL ENCOUNTER (OUTPATIENT)
Dept: PHYSICAL THERAPY | Facility: CLINIC | Age: 65
Setting detail: THERAPIES SERIES
End: 2020-03-03
Attending: INTERNAL MEDICINE
Payer: MEDICARE

## 2020-03-03 PROCEDURE — 97112 NEUROMUSCULAR REEDUCATION: CPT | Mod: GP | Performed by: PHYSICAL THERAPIST

## 2020-03-03 PROCEDURE — 97116 GAIT TRAINING THERAPY: CPT | Mod: GP | Performed by: PHYSICAL THERAPIST

## 2020-03-03 PROCEDURE — 97110 THERAPEUTIC EXERCISES: CPT | Mod: GP | Performed by: PHYSICAL THERAPIST

## 2020-03-09 ENCOUNTER — TELEPHONE (OUTPATIENT)
Dept: PEDIATRICS | Facility: CLINIC | Age: 65
End: 2020-03-09

## 2020-03-09 NOTE — TELEPHONE ENCOUNTER
Forms/Letter Request  Name of form/letter: Insurance   Have you been seen for this request: N/A  Do we have the form/letter: Yes:   When is form/letter needed by: asap  How would you like the form/letter returned: Fax   Fax [phone]  Patient Notified form requests are processed in 3-5 business days:Yes  Okay to leave a detailed message? Yes Home number on file [phone] (home)

## 2020-03-10 ENCOUNTER — HOSPITAL ENCOUNTER (OUTPATIENT)
Dept: PHYSICAL THERAPY | Facility: CLINIC | Age: 65
Setting detail: THERAPIES SERIES
End: 2020-03-10
Attending: INTERNAL MEDICINE
Payer: MEDICARE

## 2020-03-10 PROCEDURE — 97110 THERAPEUTIC EXERCISES: CPT | Mod: GP | Performed by: PHYSICAL THERAPIST

## 2020-03-10 PROCEDURE — 97140 MANUAL THERAPY 1/> REGIONS: CPT | Mod: GP | Performed by: PHYSICAL THERAPIST

## 2020-03-10 PROCEDURE — 97112 NEUROMUSCULAR REEDUCATION: CPT | Mod: GP | Performed by: PHYSICAL THERAPIST

## 2020-03-10 PROCEDURE — 97116 GAIT TRAINING THERAPY: CPT | Mod: GP | Performed by: PHYSICAL THERAPIST

## 2020-03-11 NOTE — TELEPHONE ENCOUNTER
Form completed, faxed to [phone]. Copy sent to abstracting and copy kept at station LORI Nuñez, CMA

## 2020-03-13 ENCOUNTER — TRANSFERRED RECORDS (OUTPATIENT)
Dept: HEALTH INFORMATION MANAGEMENT | Facility: CLINIC | Age: 65
End: 2020-03-13

## 2020-03-17 ENCOUNTER — HOSPITAL ENCOUNTER (OUTPATIENT)
Dept: PHYSICAL THERAPY | Facility: CLINIC | Age: 65
Setting detail: THERAPIES SERIES
End: 2020-03-17
Attending: INTERNAL MEDICINE
Payer: MEDICARE

## 2020-03-17 PROCEDURE — 97116 GAIT TRAINING THERAPY: CPT | Mod: GP | Performed by: PHYSICAL THERAPIST

## 2020-03-17 PROCEDURE — 97110 THERAPEUTIC EXERCISES: CPT | Mod: GP | Performed by: PHYSICAL THERAPIST

## 2020-04-17 DIAGNOSIS — N31.9 NEUROGENIC BLADDER: ICD-10-CM

## 2020-04-17 DIAGNOSIS — F33.1 MAJOR DEPRESSIVE DISORDER, RECURRENT EPISODE, MODERATE (H): ICD-10-CM

## 2020-04-17 DIAGNOSIS — I48.20 CHRONIC ATRIAL FIBRILLATION (H): ICD-10-CM

## 2020-04-17 DIAGNOSIS — E78.5 HYPERLIPIDEMIA LDL GOAL <100: ICD-10-CM

## 2020-04-17 RX ORDER — ATORVASTATIN CALCIUM 20 MG/1
TABLET, FILM COATED ORAL
Qty: 90 TABLET | Refills: 3 | Status: SHIPPED | OUTPATIENT
Start: 2020-04-17 | End: 2021-04-22

## 2020-04-17 RX ORDER — MIRABEGRON 50 MG/1
TABLET, FILM COATED, EXTENDED RELEASE ORAL
Qty: 90 TABLET | Refills: 3 | Status: ON HOLD | OUTPATIENT
Start: 2020-04-17 | End: 2020-10-03

## 2020-04-17 RX ORDER — METOPROLOL SUCCINATE 25 MG/1
TABLET, EXTENDED RELEASE ORAL
Qty: 90 TABLET | Refills: 3 | Status: ON HOLD | OUTPATIENT
Start: 2020-04-17 | End: 2020-10-03

## 2020-04-17 RX ORDER — CITALOPRAM HYDROBROMIDE 20 MG/1
TABLET ORAL
Qty: 135 TABLET | Refills: 3 | Status: ON HOLD | OUTPATIENT
Start: 2020-04-17 | End: 2020-10-03

## 2020-04-17 NOTE — TELEPHONE ENCOUNTER
Routing to MA/TC pool to schedule visit with provider.     Routing refill request to provider for review/approval because:  Labs not current:  GFR    Blaire Srinivasan, RN   [facility] -- Triage Nurse

## 2020-04-18 DIAGNOSIS — G81.94 LEFT HEMIPLEGIA (H): ICD-10-CM

## 2020-04-18 DIAGNOSIS — Z29.89 SEIZURE PROPHYLAXIS: ICD-10-CM

## 2020-04-20 RX ORDER — BACLOFEN 20 MG/1
20 TABLET ORAL 4 TIMES DAILY
Qty: 360 TABLET | Refills: 3 | Status: ON HOLD | OUTPATIENT
Start: 2020-04-20 | End: 2020-10-03

## 2020-04-20 RX ORDER — LEVETIRACETAM 1000 MG/1
1000 TABLET ORAL 2 TIMES DAILY
Qty: 180 TABLET | Refills: 3 | Status: SHIPPED | OUTPATIENT
Start: 2020-04-20 | End: 2021-04-20

## 2020-04-20 NOTE — TELEPHONE ENCOUNTER
Dr. Aviles, upon chart review, patient has a future order for BMP, Lipid Profile, and ALT. Would you be okay to have this patient just do a lab only for the time being, or do you need an office visit completed?     Doyle Thomas CMA ([facility])

## 2020-04-20 NOTE — TELEPHONE ENCOUNTER
Dr. Aviles, upon chart review, patient has a future order for BMP, Lipid Profile, and ALT. Would you be okay to have this patient just do a lab only for the time being, or do you need an office visit completed?     Doyle Thomas CMA ([facility])

## 2020-04-28 DIAGNOSIS — E78.5 HYPERLIPIDEMIA LDL GOAL <100: ICD-10-CM

## 2020-04-28 DIAGNOSIS — I05.9 MITRAL VALVE DISEASE: Primary | ICD-10-CM

## 2020-05-18 DIAGNOSIS — N20.0 CALCULUS OF KIDNEY: Primary | ICD-10-CM

## 2020-06-03 ENCOUNTER — TELEPHONE (OUTPATIENT)
Dept: PEDIATRICS | Facility: CLINIC | Age: 65
End: 2020-06-03

## 2020-06-03 NOTE — TELEPHONE ENCOUNTER
Per [facility] instruction Lab appt has been changed for later the same day. Called pt to inform changes and to have pt authorization.     Pt asking if order are on file for BMP and LIPID PANEL / ALT. This writer couldn't find orders at the time of the call. Please review and submit if needed it.     For further questions please contact  Tracie at [phone]. Ok to leave detailed message.

## 2020-06-05 ENCOUNTER — TRANSFERRED RECORDS (OUTPATIENT)
Dept: HEALTH INFORMATION MANAGEMENT | Facility: CLINIC | Age: 65
End: 2020-06-05

## 2020-06-10 ENCOUNTER — TELEPHONE (OUTPATIENT)
Dept: CARDIOLOGY | Facility: CLINIC | Age: 65
End: 2020-06-10

## 2020-06-10 NOTE — TELEPHONE ENCOUNTER
Pt called and LVM stating he has residual effects from his prior stroke. I reviewed with our Nursing Manager - policy is that pt will be screened at [facility] and Echo Dept staff will be called down to assist him. Pt's wife will need to remain in her car. She would like to participate in the visit - I noted the appt note for staff to call her during pt's exam. Completed screening below as pt states no one has called him yet. He had no further questions today. Monae Marti RN on 6/10/2020 at 1:32 PM      Wellness Screening Tool    Symptom Screening:    Do you have one of the following NEW symptoms:      Fever (subjective or >100.0)? No    New cough? No    Shortness of breath? No    Chills? No    New loss of taste or smell? No    Generalized body aches? No    New persistent headache? No    New sore throat? No    Nausea, vomiting or diarrhea? No    Within the past 3 weeks, have you been exposed to someone with a known positive illness below?      COVID - 19 (known or suspected) No    Chicken pox? No    Measles? No    Pertussis? No      Patient notified of visitor restriction: Yes  Patient informed to wear a mask: Yes    Patient's appointment status: Patient will be seen in clinic as scheduled tomorrow.    Monae Marti RN on 6/10/2020 at 1:30 PM

## 2020-06-11 ENCOUNTER — HOSPITAL ENCOUNTER (OUTPATIENT)
Dept: CARDIOLOGY | Facility: CLINIC | Age: 65
Discharge: HOME | End: 2020-06-11
Attending: INTERNAL MEDICINE | Admitting: INTERNAL MEDICINE
Payer: MEDICARE

## 2020-06-11 DIAGNOSIS — I05.9 MITRAL VALVE DISEASE: ICD-10-CM

## 2020-06-11 PROCEDURE — 93306 TTE W/DOPPLER COMPLETE: CPT | Mod: 26 | Performed by: INTERNAL MEDICINE

## 2020-06-11 PROCEDURE — 25500064 ZZH RX 255 OP 636: Performed by: INTERNAL MEDICINE

## 2020-06-11 PROCEDURE — 40000264 ECHOCARDIOGRAM COMPLETE

## 2020-06-11 RX ADMIN — HUMAN ALBUMIN MICROSPHERES AND PERFLUTREN 3 ML: 10; .22 INJECTION, SOLUTION INTRAVENOUS at 12:06

## 2020-06-15 DIAGNOSIS — I05.9 MITRAL VALVE DISEASE: ICD-10-CM

## 2020-06-15 DIAGNOSIS — E78.5 HYPERLIPIDEMIA LDL GOAL <100: ICD-10-CM

## 2020-06-15 LAB
ALT SERPL W P-5'-P-CCNC: 28 U/L (ref 0–70)
ANION GAP SERPL CALCULATED.3IONS-SCNC: 3 MMOL/L (ref 3–14)
BUN SERPL-MCNC: 17 MG/DL (ref 7–30)
CALCIUM SERPL-MCNC: 8.9 MG/DL (ref 8.5–10.1)
CHLORIDE SERPL-SCNC: 108 MMOL/L (ref 94–109)
CHOLEST SERPL-MCNC: 118 MG/DL
CO2 SERPL-SCNC: 31 MMOL/L (ref 20–32)
CREAT SERPL-MCNC: 0.85 MG/DL (ref 0.66–1.25)
GFR SERPL CREATININE-BSD FRML MDRD: >90 ML/MIN/{1.73_M2}
GLUCOSE SERPL-MCNC: 84 MG/DL (ref 70–99)
HDLC SERPL-MCNC: 66 MG/DL
LDLC SERPL CALC-MCNC: 42 MG/DL
NONHDLC SERPL-MCNC: 52 MG/DL
POTASSIUM SERPL-SCNC: 4.8 MMOL/L (ref 3.4–5.3)
SODIUM SERPL-SCNC: 142 MMOL/L (ref 133–144)
TRIGL SERPL-MCNC: 48 MG/DL

## 2020-06-15 PROCEDURE — 84460 ALANINE AMINO (ALT) (SGPT): CPT | Performed by: INTERNAL MEDICINE

## 2020-06-15 PROCEDURE — 80061 LIPID PANEL: CPT | Performed by: INTERNAL MEDICINE

## 2020-06-15 PROCEDURE — 80048 BASIC METABOLIC PNL TOTAL CA: CPT | Performed by: INTERNAL MEDICINE

## 2020-06-15 PROCEDURE — 36415 COLL VENOUS BLD VENIPUNCTURE: CPT | Performed by: INTERNAL MEDICINE

## 2020-06-17 ENCOUNTER — VIRTUAL VISIT (OUTPATIENT)
Dept: CARDIOLOGY | Facility: CLINIC | Age: 65
End: 2020-06-17
Payer: MEDICARE

## 2020-06-17 VITALS
BODY MASS INDEX: 22.46 KG/M2 | DIASTOLIC BLOOD PRESSURE: 77 MMHG | WEIGHT: 175 LBS | HEIGHT: 74 IN | SYSTOLIC BLOOD PRESSURE: 112 MMHG | HEART RATE: 56 BPM

## 2020-06-17 DIAGNOSIS — E78.5 HYPERLIPIDEMIA LDL GOAL <100: ICD-10-CM

## 2020-06-17 DIAGNOSIS — I05.9 MITRAL VALVE DISEASE: Primary | ICD-10-CM

## 2020-06-17 DIAGNOSIS — I48.20 CHRONIC ATRIAL FIBRILLATION (H): ICD-10-CM

## 2020-06-17 PROCEDURE — 99214 OFFICE O/P EST MOD 30 MIN: CPT | Mod: 95 | Performed by: INTERNAL MEDICINE

## 2020-06-17 ASSESSMENT — MIFFLIN-ST. JEOR: SCORE: 1663.54

## 2020-06-17 NOTE — PROGRESS NOTES
Service Date: 06/17/2020      HISTORY OF PRESENT ILLNESS:  I had the opportunity to see Mr. Lex Hodges in Cardiology Clinic today at [facility] in [place] for a video visit for reevaluation of chronic atrial fibrillation and history of atrial septal defect repair and mitral valve repair.      Mr. Hodges had an atrial septal defect repaired originally in 1978 and went back for a second surgery 9 months later for mitral valve repair in addition to additional repair of his atrial septal defect.  In 2004 he had atrial flutter which was treated successfully with ablation.  In 2012 he suffered a large right-sided stroke which was thought to be due to newly discovered atrial fibrillation.  He continues to have left-sided hemiparesis.  He continues to exercise regularly, although he is not able to go to the gym and do his usual physical therapy and rehab exercises.  He is maintaining good mobility and stamina and is not having any symptoms of shortness of breath, chest discomfort or palpitations at this time.      He is now on Eliquis for stroke prevention and doing well without bleeding problems.  He has an occasional brief nosebleed which seems to be terminated fairly quickly.      He had a repeat echocardiogram done this year, which demonstrates normal left ventricular size and function.  His ejection fraction is 55%-60%.  He has mild mitral and tricuspid regurgitation, indicating that his mitral valve repair continues to work well.  His atrial septal defect repair looks good as well without any residual shunting identified.      PHYSICAL EXAMINATION:  His blood pressure was 112/77, heart rate 56 and weight 175 pounds.      IMPRESSIONS:     Mr. Marcus Hodges is a 64-year-old gentleman with a history of atrial septal defect repair in 1978 and again 9 months later with mitral valve repair at the same time.  He has had an ablation for atrial flutter in 2011 and developed atrial fibrillation in 2014  with presentation with an acute stroke.  He continues to have some left-sided hemiparesis.      Fortunately, his cardiac structure and function looks good by echocardiogram now.  His laboratory testing looks good, including cholesterol panel and basic metabolic panel.  He is doing well without any concerning cardiac symptoms.  He continues to be active as much as possible with his left-sided weakness issues.      I am pleased that he is doing well, and I will plan to see him back again in 1 year for reevaluation of these cardiac issues.      Azeb Yip MD      cc:   [place] [address]         AZEB YIP MD, [facility]             D: 2020   T: 2020   MT: CHRISTOPHER      Name:     STEPHANIE HARDEN   MRN:      [id number]        Account:      [id number]   :      1955           Service Date: 2020      Document: [id number]

## 2020-06-17 NOTE — PROGRESS NOTES
"Marcus Hodges is a 64 year old male who is being evaluated via a billable video visit.      The patient has been notified of following:     \"This video visit will be conducted via a call between you and your physician/provider. We have found that certain health care needs can be provided without the need for an in-person physical exam.  This service lets us provide the care you need with a video conversation.  If a prescription is necessary we can send it directly to your pharmacy.  If lab work is needed we can place an order for that and you can then stop by our lab to have the test done at a later time.    Video visits are billed at different rates depending on your insurance coverage.  Please reach out to your insurance provider with any questions.    If during the course of the call the physician/provider feels a video visit is not appropriate, you will not be charged for this service.\"    Patient has given verbal consent for Video visit? Yes    Will anyone else be joining your video visit? No      Bp:112/77  Pulse:56  Wt:175.0lb    Review Of Systems  Skin: NEGATIVE  Eyes:Ears/Nose/Throat: Glasses  Respiratory: NEGATIVE  Cardiovascular:NEGATIVE  Gastrointestinal: NEGATIVE  Genitourinary:NEGATIVE   Musculoskeletal: NEGATIVE  Neurologic: NEGATIVE  Psychiatric: NEGATIVE  Hematologic/Lymphatic/Immunologic: NEGATIVE  Endocrine:  NEGATIVE    Nicol RAMIREZ    Video-Visit Details    Type of service:  Video Visit    Video Start Time: 3:41 PM  Video End Time: 3:52 PM    Originating Location (pt. Location): [place]    Distant Location (provider location):  [facility]     Platform used for Video Visit: AZEB Koo MD    HPI and Plan:   See dictation    No orders of the defined types were placed in this encounter.    No orders of the defined types were placed in this encounter.    There are no discontinued medications.      No diagnosis found.    CURRENT " MEDICATIONS:  Current Outpatient Medications   Medication Sig Dispense Refill     acetaminophen (TYLENOL) 325 MG tablet Take  by mouth every 4 hours as needed for pain. 100 tablet 0     apixaban ANTICOAGULANT (ELIQUIS ANTICOAGULANT) 5 MG tablet Take 1 tablet (5 mg) by mouth 2 times daily 180 tablet 0     atorvastatin (LIPITOR) 20 MG tablet TAKE 1 TABLET BY MOUTH EVERY DAY 90 tablet 3     baclofen (LIORESAL) 20 MG tablet Take 1 tablet (20 mg) by mouth 4 times daily 360 tablet 3     cholecalciferol (VITAMIN D) 1000 UNIT tablet Take 2 tablets (2,000 Units) by mouth daily 90 tablet 3     citalopram (CELEXA) 20 MG tablet TAKE 1 AND 1/2 TABLETS BY MOUTH EVERY  tablet 3     levETIRAcetam (KEPPRA) 1000 MG tablet Take 1 tablet (1,000 mg) by mouth 2 times daily 180 tablet 3     metoprolol succinate ER (TOPROL-XL) 25 MG 24 hr tablet TAKE 1 TABLET BY MOUTH EVERY DAY 90 tablet 3     Multiple Vitamin (MULTI-VITAMIN) per tablet Take 0.5 tablets by mouth daily  100 tablet 3     MYRBETRIQ 50 MG 24 hr tablet TAKE 1 TABLET BY MOUTH EVERY DAY 90 tablet 3     NONFORMULARY Protandim - herbal supplement       order for DME Equipment being ordered: Adhesive remover, wipes  [facility] 100 each 99     order for DME Equipment being ordered:    Nightime urinary leg bag 1600cc  ([facility]S Code ) 12 each 99     order for DME Equipment being ordered: Daytime Urinary leg bag-800cc  ([facility]PCS Code ) 12 each 99     order for DME Equipment being ordered: Incontinence supplies - Posies  ([facility]PCS Code ) 12 each 99     order for DME Equipment being ordered: Skin prep (no code - requested by patient) 100 each 99     order for DME Equipment being ordered:   Freedom Clear Condom catheter  (HCPCS Code ) 105 each 99       ALLERGIES     Allergies   Allergen Reactions     No Known Drug Allergies        PAST MEDICAL HISTORY:  Past Medical History:   Diagnosis Date     * * * SBE PROPHYLAXIS * * *      Atrial enlargement, bilateral       Atrial flutter (H) 2004    radiofrequency ablation 2004, resolved     ATRIAL SEPTAL DEFECT     repaired 1977     CVA (cerebral vascular accident) (H) 4/2012    R MCA     Dysphagia 04/22/12    [facility], following CVA     Hernia, abdominal      History of thrombophlebitis      Mitral regurgitation     mitral valve damage related to ASD repair     Mumps      Palpitations      Respiratory failure (H) 04/22/12    [facility], following CVA, prolonged requiring tracheostomy, [facility]      Tricuspid regurgitation      Unspecified glaucoma(365.9)     right     Urinary incontinence        PAST SURGICAL HISTORY:  Past Surgical History:   Procedure Laterality Date     C NONSPECIFIC PROCEDURE      tonsillectomy     C NONSPECIFIC PROCEDURE      Ing. Hernia Repair     CATHETER, ABLATION  2004     Craniotomy reconstruction  2012    [facility], repair of hemicraniectomy defect     CYSTOSCOPY       GASTROSTOMY TUBE  April 2012    [facility], following CVA     HERNIA REPAIR       REPAIR ATRIAL SEPTAL DEFECT  1978    ASD Repair     Right hemicraniectomy  April 2012    [facility], following CVA, mgmt malignant cerebral edema     SURGICAL HISTORY OF -   2009    right eye enucleation     TRACHEOSTOMY  April 2012    [facility], following CVA       FAMILY HISTORY:  Family History   Problem Relation Age of Onset     Hypertension Mother      Cerebrovascular Disease Father      Cancer Paternal Grandmother      Diabetes Maternal Grandmother      Congenital Anomalies Brother         several brothers and sisters born with congential heart defects, but all have been repaired     Congenital Anomalies Sister        SOCIAL HISTORY:  Social History     Socioeconomic History     Marital status:      Spouse name: haim     Number of children: 0     Years of education: None     Highest education level: None   Occupational History     Employer: [employer]   Social Needs     Financial resource strain: None     Food  "insecurity     Worry: None     Inability: None     Transportation needs     Medical: None     Non-medical: None   Tobacco Use     Smoking status: Never Smoker     Smokeless tobacco: Never Used   Substance and Sexual Activity     Alcohol use: No     Drug use: No     Sexual activity: Yes     Partners: Female   Lifestyle     Physical activity     Days per week: None     Minutes per session: None     Stress: None   Relationships     Social connections     Talks on phone: None     Gets together: None     Attends Druze service: None     Active member of club or organization: None     Attends meetings of clubs or organizations: None     Relationship status: None     Intimate partner violence     Fear of current or ex partner: None     Emotionally abused: None     Physically abused: None     Forced sexual activity: None   Other Topics Concern      Service Not Asked     Blood Transfusions Not Asked     Caffeine Concern Yes     Comment: couple cups of tea a day     Occupational Exposure Not Asked     Hobby Hazards Not Asked     Sleep Concern Not Asked     Stress Concern Not Asked     Weight Concern Not Asked     Special Diet Yes     Comment: vegan; occ eggs/fish      Back Care Not Asked     Exercise Yes     Comment: 2 x weekly/gym , pysical therapy     Bike Helmet Not Asked     Seat Belt Yes     Self-Exams Not Asked     Parent/sibling w/ CABG, MI or angioplasty before 65F 55M? Not Asked   Social History Narrative     None       Physical Exam:  Vitals: /77   Pulse 56   Ht 1.88 m (6' 2\")   Wt 79.4 kg (175 lb)   BMI 22.47 kg/m      General:  no apparent distress, normal body habitus, sitting upright.  ENT/Mouth:  membranes moist, no nasal discharge.  Normal head shape, no apparent injury or laceration.  Eyes:  no scleral icterus, normal conjunctivae.  No observed jaundice.  Neck:  no apparent neck swelling.   Chest/Lungs:  No breathing difficulty while speaking.  No audible wheezing.  No cough during " conversation.  Cardiovascular:  No obviously elevated jugular venous pressure.  No apparent edema bilaterally in LE.   Abdomen:  no obvious abdominal distention.   Extremities:  no apparent cyanosis.  Skin:  no xanthelasma.  No facial lacerations.  Neurologic:  Normal arm motion bilateral, no tremors.    Psychiatric:  Alert and oriented x3, calm demeanor    The rest of the comprehensive physical examination is deferred due to public health emergency video visit restrictions.        Recent Lab Results:  LIPID RESULTS:  Lab Results   Component Value Date    CHOL 118 06/15/2020    HDL 66 06/15/2020    LDL 42 06/15/2020    TRIG 48 06/15/2020    CHOLHDLRATIO 2.0 02/10/2015       LIVER ENZYME RESULTS:  Lab Results   Component Value Date    AST 25 04/01/2019    ALT 28 06/15/2020       CBC RESULTS:  Lab Results   Component Value Date    WBC 5.4 04/22/2018    RBC 4.39 (L) 04/22/2018    HGB 13.5 04/22/2018    HCT 41.0 04/22/2018    MCV 93 04/22/2018    MCH 30.8 04/22/2018    MCHC 32.9 04/22/2018    RDW 13.1 04/22/2018     04/22/2018       BMP RESULTS:  Lab Results   Component Value Date     06/15/2020    POTASSIUM 4.8 06/15/2020    CHLORIDE 108 06/15/2020    CO2 31 06/15/2020    ANIONGAP 3 06/15/2020    GLC 84 06/15/2020    BUN 17 06/15/2020    CR 0.85 06/15/2020    GFRESTIMATED >90 06/15/2020    GFRESTBLACK >90 06/15/2020    STEPHANI 8.9 06/15/2020        A1C RESULTS:  No results found for: A1C    INR RESULTS:  Lab Results   Component Value Date    INR 2.4 (A) 12/04/2017    INR 2.5 (A) 10/23/2017    INR 1.68 (H) 11/06/2015    INR 2.24 09/19/2012           CC  Don Aviles MD  [address] [place] [address]

## 2020-06-17 NOTE — LETTER
6/17/2020    Don Aviles MD, MD  [address] [place] [address]    RE: Marcus Hodges       Dear Colleague,    I had the pleasure of seeing Marcus Hodges in the [facility].    Marcus Hodges is a 64 year old male who is being evaluated via a billable video visit.          No orders of the defined types were placed in this encounter.    No orders of the defined types were placed in this encounter.    There are no discontinued medications.      No diagnosis found.    CURRENT MEDICATIONS:  Current Outpatient Medications   Medication Sig Dispense Refill     acetaminophen (TYLENOL) 325 MG tablet Take  by mouth every 4 hours as needed for pain. 100 tablet 0     apixaban ANTICOAGULANT (ELIQUIS ANTICOAGULANT) 5 MG tablet Take 1 tablet (5 mg) by mouth 2 times daily 180 tablet 0     atorvastatin (LIPITOR) 20 MG tablet TAKE 1 TABLET BY MOUTH EVERY DAY 90 tablet 3     baclofen (LIORESAL) 20 MG tablet Take 1 tablet (20 mg) by mouth 4 times daily 360 tablet 3     cholecalciferol (VITAMIN D) 1000 UNIT tablet Take 2 tablets (2,000 Units) by mouth daily 90 tablet 3     citalopram (CELEXA) 20 MG tablet TAKE 1 AND 1/2 TABLETS BY MOUTH EVERY  tablet 3     levETIRAcetam (KEPPRA) 1000 MG tablet Take 1 tablet (1,000 mg) by mouth 2 times daily 180 tablet 3     metoprolol succinate ER (TOPROL-XL) 25 MG 24 hr tablet TAKE 1 TABLET BY MOUTH EVERY DAY 90 tablet 3     Multiple Vitamin (MULTI-VITAMIN) per tablet Take 0.5 tablets by mouth daily  100 tablet 3     MYRBETRIQ 50 MG 24 hr tablet TAKE 1 TABLET BY MOUTH EVERY DAY 90 tablet 3     NONFORMULARY Protandim - herbal supplement       order for DME Equipment being ordered: Adhesive remover, wipes  (HCPCS Code ) 100 each 99     order for DME Equipment being ordered:    Nightime urinary leg bag 1600cc  (HCPCS Code ) 12 each 99     order for DME Equipment being ordered: Daytime Urinary leg bag-800cc  (HCPCS Code ) 12 each 99      order for DME Equipment being ordered: Incontinence supplies - Posies  [facility] 12 each 99     order for DME Equipment being ordered: Skin prep (no code - requested by patient) 100 each 99     order for DME Equipment being ordered:   Freedom Clear Condom catheter  ([facility] ) 105 each 99       ALLERGIES     Allergies   Allergen Reactions     No Known Drug Allergies        PAST MEDICAL HISTORY:  Past Medical History:   Diagnosis Date     * * * SBE PROPHYLAXIS * * *      Atrial enlargement, bilateral      Atrial flutter (H) 2004    radiofrequency ablation 2004, resolved     ATRIAL SEPTAL DEFECT     repaired 1977     CVA (cerebral vascular accident) (H) 4/2012    R MCA     Dysphagia 04/22/12    [facility], following CVA     Hernia, abdominal      History of thrombophlebitis      Mitral regurgitation     mitral valve damage related to ASD repair     Mumps      Palpitations      Respiratory failure (H) 04/22/12    [facility], following CVA, prolonged requiring tracheostomy, [facility] [place] [facility]      Tricuspid regurgitation      Unspecified glaucoma(365.9)     right     Urinary incontinence        PAST SURGICAL HISTORY:  Past Surgical History:   Procedure Laterality Date     C NONSPECIFIC PROCEDURE      tonsillectomy     C NONSPECIFIC PROCEDURE      Ing. Hernia Repair     CATHETER, ABLATION  2004     Craniotomy reconstruction  2012    [facility], repair of hemicraniectomy defect     CYSTOSCOPY       GASTROSTOMY TUBE  April 2012    [facility], following CVA     HERNIA REPAIR       REPAIR ATRIAL SEPTAL DEFECT  1978    ASD Repair     Right hemicraniectomy  April 2012    [facility], following CVA, mgmt malignant cerebral edema     SURGICAL HISTORY OF -   2009    right eye enucleation     TRACHEOSTOMY  April 2012    [facility], following CVA       FAMILY HISTORY:  Family History   Problem Relation Age of Onset     Hypertension Mother      Cerebrovascular Disease Father      Cancer Paternal Grandmother       Diabetes Maternal Grandmother      Congenital Anomalies Brother         several brothers and sisters born with congential heart defects, but all have been repaired     Congenital Anomalies Sister        SOCIAL HISTORY:  Social History     Socioeconomic History     Marital status:      Spouse name: haim     Number of children: 0     Years of education: None     Highest education level: None   Occupational History     Employer: [employer]   Social Needs     Financial resource strain: None     Food insecurity     Worry: None     Inability: None     Transportation needs     Medical: None     Non-medical: None   Tobacco Use     Smoking status: Never Smoker     Smokeless tobacco: Never Used   Substance and Sexual Activity     Alcohol use: No     Drug use: No     Sexual activity: Yes     Partners: Female   Lifestyle     Physical activity     Days per week: None     Minutes per session: None     Stress: None   Relationships     Social connections     Talks on phone: None     Gets together: None     Attends Hindu service: None     Active member of club or organization: None     Attends meetings of clubs or organizations: None     Relationship status: None     Intimate partner violence     Fear of current or ex partner: None     Emotionally abused: None     Physically abused: None     Forced sexual activity: None   Other Topics Concern      Service Not Asked     Blood Transfusions Not Asked     Caffeine Concern Yes     Comment: couple cups of tea a day     Occupational Exposure Not Asked     Hobby Hazards Not Asked     Sleep Concern Not Asked     Stress Concern Not Asked     Weight Concern Not Asked     Special Diet Yes     Comment: vegan; occ eggs/fish      Back Care Not Asked     Exercise Yes     Comment: 2 x weekly/gym , pysical therapy     Bike Helmet Not Asked     Seat Belt Yes     Self-Exams Not Asked     Parent/sibling w/ CABG, MI or angioplasty before 65F 55M? Not Asked   Social  "History Narrative     None       Physical Exam:  Vitals: /77   Pulse 56   Ht 1.88 m (6' 2\")   Wt 79.4 kg (175 lb)   BMI 22.47 kg/m      General:  no apparent distress, normal body habitus, sitting upright.  ENT/Mouth:  membranes moist, no nasal discharge.  Normal head shape, no apparent injury or laceration.  Eyes:  no scleral icterus, normal conjunctivae.  No observed jaundice.  Neck:  no apparent neck swelling.   Chest/Lungs:  No breathing difficulty while speaking.  No audible wheezing.  No cough during conversation.  Cardiovascular:  No obviously elevated jugular venous pressure.  No apparent edema bilaterally in LE.   Abdomen:  no obvious abdominal distention.   Extremities:  no apparent cyanosis.  Skin:  no xanthelasma.  No facial lacerations.  Neurologic:  Normal arm motion bilateral, no tremors.    Psychiatric:  Alert and oriented x3, calm demeanor    The rest of the comprehensive physical examination is deferred due to public health emergency video visit restrictions.        Recent Lab Results:  LIPID RESULTS:  Lab Results   Component Value Date    CHOL 118 06/15/2020    HDL 66 06/15/2020    LDL 42 06/15/2020    TRIG 48 06/15/2020    CHOLHDLRATIO 2.0 02/10/2015       LIVER ENZYME RESULTS:  Lab Results   Component Value Date    AST 25 04/01/2019    ALT 28 06/15/2020       CBC RESULTS:  Lab Results   Component Value Date    WBC 5.4 04/22/2018    RBC 4.39 (L) 04/22/2018    HGB 13.5 04/22/2018    HCT 41.0 04/22/2018    MCV 93 04/22/2018    MCH 30.8 04/22/2018    MCHC 32.9 04/22/2018    RDW 13.1 04/22/2018     04/22/2018       BMP RESULTS:  Lab Results   Component Value Date     06/15/2020    POTASSIUM 4.8 06/15/2020    CHLORIDE 108 06/15/2020    CO2 31 06/15/2020    ANIONGAP 3 06/15/2020    GLC 84 06/15/2020    BUN 17 06/15/2020    CR 0.85 06/15/2020    GFRESTIMATED >90 06/15/2020    GFRESTBLACK >90 06/15/2020    STEPHANI 8.9 06/15/2020        A1C RESULTS:  No results found for: A1C    INR " RESULTS:  Lab Results   Component Value Date    INR 2.4 (A) 12/04/2017    INR 2.5 (A) 10/23/2017    INR 1.68 (H) 11/06/2015    INR 2.24 09/19/2012       Service Date: 06/17/2020      HISTORY OF PRESENT ILLNESS:  I had the opportunity to see Mr. Lex Hodges in Cardiology Clinic today at [facility] in [place] for a video visit for reevaluation of chronic atrial fibrillation and history of atrial septal defect repair and mitral valve repair.      Mr. Hodges had an atrial septal defect repaired originally in 1978 and went back for a second surgery 9 months later for mitral valve repair in addition to additional repair of his atrial septal defect.  In 2004 he had atrial flutter which was treated successfully with ablation.  In 2012 he suffered a large right-sided stroke which was thought to be due to newly discovered atrial fibrillation.  He continues to have left-sided hemiparesis.  He continues to exercise regularly, although he is not able to go to the gym and do his usual physical therapy and rehab exercises.  He is maintaining good mobility and stamina and is not having any symptoms of shortness of breath, chest discomfort or palpitations at this time.      He is now on Eliquis for stroke prevention and doing well without bleeding problems.  He has an occasional brief nosebleed which seems to be terminated fairly quickly.      He had a repeat echocardiogram done this year, which demonstrates normal left ventricular size and function.  His ejection fraction is 55%-60%.  He has mild mitral and tricuspid regurgitation, indicating that his mitral valve repair continues to work well.  His atrial septal defect repair looks good as well without any residual shunting identified.      PHYSICAL EXAMINATION:  His blood pressure was 112/77, heart rate 56 and weight 175 pounds.      IMPRESSIONS:     Mr. Marcus Hodges is a 64-year-old gentleman with a history of atrial septal defect repair in 1978 and  again 9 months later with mitral valve repair at the same time.  He has had an ablation for atrial flutter in 2011 and developed atrial fibrillation in 2014 with presentation with an acute stroke.  He continues to have some left-sided hemiparesis.      Fortunately, his cardiac structure and function looks good by echocardiogram now.  His laboratory testing looks good, including cholesterol panel and basic metabolic panel.  He is doing well without any concerning cardiac symptoms.  He continues to be active as much as possible with his left-sided weakness issues.      I am pleased that he is doing well, and I will plan to see him back again in 1 year for reevaluation of these cardiac issues.       Thank you for allowing me to participate in the care of your patient.    Sincerely,     AZEB VILLARREAL MD     [facility]

## 2020-06-30 ENCOUNTER — HOSPITAL ENCOUNTER (OUTPATIENT)
Dept: PHYSICAL THERAPY | Facility: CLINIC | Age: 65
Setting detail: THERAPIES SERIES
End: 2020-06-30
Attending: INTERNAL MEDICINE
Payer: MEDICARE

## 2020-06-30 PROCEDURE — 97112 NEUROMUSCULAR REEDUCATION: CPT | Mod: GP | Performed by: PHYSICAL THERAPIST

## 2020-06-30 PROCEDURE — 97110 THERAPEUTIC EXERCISES: CPT | Mod: GP | Performed by: PHYSICAL THERAPIST

## 2020-06-30 PROCEDURE — 97140 MANUAL THERAPY 1/> REGIONS: CPT | Mod: GP | Performed by: PHYSICAL THERAPIST

## 2020-06-30 PROCEDURE — 97116 GAIT TRAINING THERAPY: CPT | Mod: GP | Performed by: PHYSICAL THERAPIST

## 2020-06-30 NOTE — PROGRESS NOTES
PROGRESS NOTE       06/30/20 0900   Signing Clinician's Name / Credentials   Signing clinician's name / credentials Ada Scott, PT   Session Number   Session Number 2/10  Re-assessment following not being seen since March due to COVID - 19 restrictions and stay in place orders.    Authorization status Medicare   Progress Note/Recertification   Progress Note Completed Date 06/30/20   Goal 1   Goal Identifier   (continues to be able to have 6 inches TAYLOR, less comfortable )   Goal Description Marcus to demonstrate the ability to stand with a more narrow TAYLOR going from 12 inches feet apart at eval to 6 inches apart or less to demonstrate improved balance , loading on the L side to assist with improvement in gait pattern therefore efficiency and decreased secondary impairments such as his R knee pain.    Target Date 05/24/19   Date Met 05/21/19   Goal 2   Goal Identifier 2- Forward reach  (decrease in forward reach from 8 to 6 inches)   Goal Description Marcus to increase his forward reach from 4 inches at evaulation to 8 inches or greater to demonstrate improved balance, improved ability to bring COM forward over TAYLOR to assist in meeting all goals, improved sit to stand and improved gait.    Target Date 03/29/20   Goal 3   Goal Identifier 3-  Curb/stairs  (step over shabana /flat as below. Still appropriate)   Goal Description Marcus to have increased disassociation of movement/improved timing and sequencing of muscle activation for improved hip and knee flexion in standing demonstrated by the ability to step over upright shabana (4-6 inch object) with the L LE to allow him to step up onto a curb without catching foot.   Target Date 03/29/19   Goal 4   Goal Identifier 4- Gait  (6/14/18   18.66 sec)   Goal Description Marcus to increase gait speed to 19 seconds or less with AD and 24 steps or less to demonstrate improved gait speed and efficiency for community gait and also decreased overuse of the R side.    Target Date  11/30/18   Goal 5   Goal Identifier 5- Floor transfers.   (not assessed this date)   Goal Description Marcus to perform transfer to and from floor with outside UE support and min assist of 1 to allow him to recover from a fall with assist from wife or caregiver.    Target Date 03/29/20   Goal 6   Goal Identifier 6 --  4 square  (not assessed this date, dec coord and balance noted)   Goal Description Marcus to complete 4 square in 25 seconds with and AD and 40 seconds or less without an AD to demonstrate improved interlimb coordination, balance and body control to assist in meeting all goals and overall function.    Target Date 03/29/20   Goal 7   Goal Identifier HEP   Goal Description Marcus to be independent in appropriate HEP to continue to address his impairments following d/c from skilled PT intervention.    Target Date 03/29/20  (doing walking , playing ping pong not much else)   Goal 8   Goal Identifier 8 functional reach/neuro motor control   Goal Description Marcus to perform reaching to the floor to the left to pick object up off the floor while maintaining L hand contact/closed chain on the mat to demonstrate impropved neuromotor planning, ability to bring COM to the left while keeping proper muscle activation/deactivation L and R side of body for greater safety with reaching activiites to the floor, unstrapping shoe, AFO.  Also for carryover of proper motor function for improved gait and transfers.    Target Date 03/29/20  (goal cont to be appropriate.  Not met)   Subjective Report   Subjective Report States he has stayed pretty isolated with the COVID-19.  Hasn't been as active.  Walking on the deck. Still playing ping pong. Emilio doing stretching.   wife states pt is definitely stiffer. had botox 2 weeks ago.     Objective Measure 1   Objective Measure 25 foot walk   Details 6/30/2020  25.72 x 2 reps, 26 -27 steps, end of session 23.4 sec 22 steps,  trekking pole and AFO on the L, step to gait pattern  (decline in gait pattern from previous)  3/10/20 - 20.22 sec,  17.6 with vc and light facilitation for faster gait speed, both with AFO and trekking pole.   19.8 sec with trekking pole, AFO on.  with facilitation for faster paced gait 16.66 sec with trekking pole and AFO.   Objective Measure 3   Objective Measure curb/step over shabana   Details flat shabana L lead step over, stepped on shabana with the R foot.  R foot lead step over R and back not L with CGA to min assist for balance, pt holding trekking pole and AFO in place   Objective Measure 5   Objective Measure forward reach   Details 6 inches R hand , SBA,  TAYLOR about 16 inches   Therapeutic Procedure/exercise   Therapeutic Procedures: strength, endurance, ROM, flexibillity minutes (19533) 20   Skilled Intervention manual stretching, cues for relaxation   Patient Response some pain with elbow extension stretch resolved with combinging forearm sup with the stretch and cues for relaxation.    Treatment Detail stretches in supine R LE hamstrings,  L LE gastroc, forefoot, Hamstring, hip flexors, hip adductors, hip ir/er,  Pectorals L, elbow extension and forearm sup/pronation, wrist extension 3-4 reps each joint and hold of up to 60 seconds.    Progress working to all goals, pt with greater tightness after not being seen x approximately 3 months.    Neuromuscular Re-education   Neuromuscular re-ed of mvmt, balance, coord, kinesthetic sense, posture, proprioception minutes (24309) 8   Skilled Intervention assessmnet   Patient Response decreased feeling of security with NBOS , forward COM past TAYLOR than in the past.    Treatment Detail Standing forward reach as above.  Step over shabana as above.  NBOS able to achieve feet 6 inches apart.    Progress decline in forward reach and ability to decrease TAYLOR   Gait Training   Gait Training Minutes, includes stair climbing (07788) 18   Skilled Intervention facilitation proper alignment of pelvis in transverse plane, cues for  longer step length, assessmnet.    Patient Response slower gait   Treatment Detail gait with AFO, trekking pole, cues for step past gait with the R. 175 feet   Progress decline in gait pattern , alignment and quality with greater rotation pelvis L posterior in transverse plane, ER femur L and shorter step length B.    Manual Therapy   Manual Therapy: Mobilization, MFR, MLD, friction massage minutes (41158) 10   Treatment Detail posterior subtalar glide, posterior tibial glide, mid foot mobs all L, soft tissue ITB, quads.   long arm fascial stretch L, w manual to the wrist flexors superiorly   Education   Learner Patient;Significant Other   Readiness Acceptance   Method Explanation   Response Verbalizes Understanding   Education Comments decline in function after not being seen. Anticipate quicker return to premorbid function prior to COVID -19 and ability to continue past this as well.    Plan   Updates to plan of care continue 1 x a week through [facility]   Plan for next session NBOS, staggered stance, improve ability to bring COM forward past TAYLOR   Comments   Comments ipt returning to clinic first time since March.  Goals remain appropriate. Pt has demonstrated a decline in his function, balance and movement as noted above without PT intervention. He remains appropriate for skilled PT , goals remain appropriate and wife and pt without questions in regards to continuing with PT.    Total Session Time   Timed Code Treatment Minutes 56   Total Treatment Time (sum of timed and untimed services) 56

## 2020-06-30 NOTE — PROGRESS NOTES
[facility]      OUTPATIENT PHYSICAL THERAPY  PLAN OF TREATMENT FOR OUTPATIENT REHABILITATION    Patient's Last Name, First Name, M.I.                YOB: 1955  Marcus Hodges                        Provider's Name  [facility] Medical Record No.  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      Goals:  See PN    Certification date from 3/29/2020 to 6-.    Ada Scott, PT          I CERTIFY THE NEED FOR THESE SERVICES FURNISHED UNDER        THIS PLAN OF TREATMENT AND WHILE UNDER MY CARE     (Physician co-signature of this document indicates review and certification of the therapy plan).                Referring Provider: Dr. Don Aviles

## 2020-07-07 ENCOUNTER — HOSPITAL ENCOUNTER (OUTPATIENT)
Dept: PHYSICAL THERAPY | Facility: CLINIC | Age: 65
Setting detail: THERAPIES SERIES
End: 2020-07-07
Attending: INTERNAL MEDICINE
Payer: MEDICARE

## 2020-07-07 PROCEDURE — 97116 GAIT TRAINING THERAPY: CPT | Mod: GP | Performed by: PHYSICAL THERAPIST

## 2020-07-07 PROCEDURE — 97110 THERAPEUTIC EXERCISES: CPT | Mod: GP | Performed by: PHYSICAL THERAPIST

## 2020-07-07 PROCEDURE — 97112 NEUROMUSCULAR REEDUCATION: CPT | Mod: GP | Performed by: PHYSICAL THERAPIST

## 2020-07-13 ENCOUNTER — TELEPHONE (OUTPATIENT)
Dept: PEDIATRICS | Facility: CLINIC | Age: 65
End: 2020-07-13

## 2020-07-13 NOTE — TELEPHONE ENCOUNTER
General Call:   Who is calling:  Pt  Reason for Call:  Needs Appt for Neurological Botox   What are your questions or concerns:  Pt has gone to [facility], [address] [place] [address] Rd, [address], [phone].  He is coming to [place] in Aug  And the PT needs his next tx in Sept as his last trmt was 6/5/20. Pt wants to know if his PAL needs to set this up and if when he gets to [place] if this  is working any place close to [place], as they were told [facility] only in June  Date of last appointment with provider: 4/1/19  Okay to leave a detailed message:Yes at Home number on file [phone] (home)

## 2020-07-13 NOTE — TELEPHONE ENCOUNTER
Call[facility] ([phone]). They informed that yes, their Neurologists do perform botox injections for the patients.   Patient will set up a consult visit first.     Informed patient and wife of above.   Advised they call N to get updated records faxed to us.     Also advised when scheduling to update that he is needed botox injections in his upper extremities and they want to see a Provider who can perform that.   They agree with plan.

## 2020-07-13 NOTE — TELEPHONE ENCOUNTER
Spoke with Wife & Patient.     Their Neurologist at [facility] is moving to [facility] [place]. This is too far for them.     They are looking for a Neurologist closer to home.     Will call [facility] at the  to see if they do botox injections.     I also advised the Patient to call [facility] to see what other Providers give botox injections.

## 2020-07-14 ENCOUNTER — HOSPITAL ENCOUNTER (OUTPATIENT)
Dept: PHYSICAL THERAPY | Facility: CLINIC | Age: 65
Setting detail: THERAPIES SERIES
End: 2020-07-14
Attending: INTERNAL MEDICINE
Payer: MEDICARE

## 2020-07-14 PROCEDURE — 97140 MANUAL THERAPY 1/> REGIONS: CPT | Mod: GP | Performed by: PHYSICAL THERAPIST

## 2020-07-14 PROCEDURE — 97110 THERAPEUTIC EXERCISES: CPT | Mod: GP | Performed by: PHYSICAL THERAPIST

## 2020-07-14 PROCEDURE — 97116 GAIT TRAINING THERAPY: CPT | Mod: GP | Performed by: PHYSICAL THERAPIST

## 2020-07-14 PROCEDURE — 97112 NEUROMUSCULAR REEDUCATION: CPT | Mod: GP | Performed by: PHYSICAL THERAPIST

## 2020-07-17 DIAGNOSIS — I48.20 CHRONIC ATRIAL FIBRILLATION (H): ICD-10-CM

## 2020-07-21 ENCOUNTER — HOSPITAL ENCOUNTER (OUTPATIENT)
Dept: PHYSICAL THERAPY | Facility: CLINIC | Age: 65
Setting detail: THERAPIES SERIES
End: 2020-07-21
Attending: INTERNAL MEDICINE
Payer: MEDICARE

## 2020-07-21 PROCEDURE — 97110 THERAPEUTIC EXERCISES: CPT | Mod: GP | Performed by: PHYSICAL THERAPIST

## 2020-07-21 PROCEDURE — 97116 GAIT TRAINING THERAPY: CPT | Mod: GP | Performed by: PHYSICAL THERAPIST

## 2020-07-21 PROCEDURE — 97112 NEUROMUSCULAR REEDUCATION: CPT | Mod: GP | Performed by: PHYSICAL THERAPIST

## 2020-07-28 ENCOUNTER — TELEPHONE (OUTPATIENT)
Dept: PEDIATRICS | Facility: CLINIC | Age: 65
End: 2020-07-28

## 2020-07-28 ENCOUNTER — HOSPITAL ENCOUNTER (OUTPATIENT)
Dept: PHYSICAL THERAPY | Facility: CLINIC | Age: 65
Setting detail: THERAPIES SERIES
End: 2020-07-28
Attending: INTERNAL MEDICINE
Payer: MEDICARE

## 2020-07-28 PROCEDURE — 97110 THERAPEUTIC EXERCISES: CPT | Mod: GP,KX | Performed by: PHYSICAL THERAPIST

## 2020-07-28 PROCEDURE — 97116 GAIT TRAINING THERAPY: CPT | Mod: GP,KX | Performed by: PHYSICAL THERAPIST

## 2020-07-28 PROCEDURE — 97112 NEUROMUSCULAR REEDUCATION: CPT | Mod: GP,KX | Performed by: PHYSICAL THERAPIST

## 2020-07-28 NOTE — TELEPHONE ENCOUNTER
Pt has a physical scheduled for 8/24.  He had his Lipid, ALT, and BMP labs run on 6/15 for another provider.  Are there other labs he should have done prior to his appointment on 8/24?  Please route back to MA/TC for scheduling the patient if necessary.    Anabelle Choudhury     3:10 PM July 28, 2020

## 2020-08-04 ENCOUNTER — HOSPITAL ENCOUNTER (OUTPATIENT)
Dept: PHYSICAL THERAPY | Facility: CLINIC | Age: 65
Setting detail: THERAPIES SERIES
End: 2020-08-04
Attending: INTERNAL MEDICINE
Payer: MEDICARE

## 2020-08-04 PROCEDURE — 97112 NEUROMUSCULAR REEDUCATION: CPT | Mod: GP,KX | Performed by: PHYSICAL THERAPIST

## 2020-08-04 PROCEDURE — 97116 GAIT TRAINING THERAPY: CPT | Mod: GP,KX | Performed by: PHYSICAL THERAPIST

## 2020-08-04 PROCEDURE — 97110 THERAPEUTIC EXERCISES: CPT | Mod: GP,KX | Performed by: PHYSICAL THERAPIST

## 2020-08-06 ENCOUNTER — OFFICE VISIT (OUTPATIENT)
Dept: UROLOGY | Facility: CLINIC | Age: 65
End: 2020-08-06
Payer: MEDICARE

## 2020-08-06 ENCOUNTER — HOSPITAL ENCOUNTER (OUTPATIENT)
Dept: CT IMAGING | Facility: CLINIC | Age: 65
Discharge: HOME | End: 2020-08-06
Attending: UROLOGY | Admitting: UROLOGY
Payer: MEDICARE

## 2020-08-06 VITALS
HEIGHT: 74 IN | SYSTOLIC BLOOD PRESSURE: 110 MMHG | WEIGHT: 180 LBS | BODY MASS INDEX: 23.1 KG/M2 | DIASTOLIC BLOOD PRESSURE: 70 MMHG

## 2020-08-06 DIAGNOSIS — N20.9 CALCIUM UROLITHIASIS: Primary | ICD-10-CM

## 2020-08-06 DIAGNOSIS — N20.0 CALCULUS OF KIDNEY: ICD-10-CM

## 2020-08-06 PROCEDURE — 99213 OFFICE O/P EST LOW 20 MIN: CPT | Performed by: UROLOGY

## 2020-08-06 PROCEDURE — 74176 CT ABD & PELVIS W/O CONTRAST: CPT

## 2020-08-06 ASSESSMENT — MIFFLIN-ST. JEOR: SCORE: 1686.22

## 2020-08-06 ASSESSMENT — PAIN SCALES - GENERAL: PAINLEVEL: NO PAIN (0)

## 2020-08-06 NOTE — LETTER
8/6/2020       RE: Marcus Hodges  [address] [place] [address]     Dear Colleague,    Thank you for referring your patient, Marcus Hodges, to the [facility] [place] [facility]. Please see a copy of my visit note below.    Marcus Hodges is a 64-year-old male with a neurogenic bladder that is controlled with Myrbetriq.  He had a 4 mm stone in the lower pole of the left kidney last year and some haziness suggesting a possible stone higher in the left kidney.  Today CT definitely shows a 2 cm stone in the renal pelvis that is easily seen on the  film.  He is having no flank pain or gross hematuria.  He has had no urinary tract infections.  He uses a condom catheter but has had no incontinence with Myrbetriq.  Other past medical history: CVA in 2012, atrial septal defect, atrial flutter, atrial enlargement, mitral regurgitation, history of DVT,, respiratory failure after his stroke as well as dysphagia.  Tricuspid valve regurgitation, glaucoma, non-smoker  No family history of prostate cancer  Medications: Tylenol, Eliquis, Lipitor, baclofen, vitamin D, Celexa, Keppra, metoprolol, multivitamin, Myrbetriq.  Patient uses SBE prophylaxis  Allergies: None  Review of systems: As above  Exam: Alert and oriented, normal appearance.  Abnormal gait due to stroke.  Normal respirations.  Normal sphincter tone, no rectal mass or impaction benign and symmetric prostate, normal seminal vesicles  Assessment: Neurogenic bladder due to stroke-well controlled with Myrbetriq.  2 cm left renal stone will need elective percutaneous ultrasonic pyelolithotomy.  Films reviewed with Dr. Bloom this morning.  Plan: Meet with Dr. Bloom in the next week or 2 to discuss surgery at [facility].  Patient will need to be off Eliquis and will need SBE prophylaxis.  May need anesthesia consult- will ask Dr. Aviles to decide.  All discussed with patient and his  wife today    Again, thank you for allowing me to participate in the care of your patient.      Sincerely,    Rashard Arevalo MD

## 2020-08-06 NOTE — NURSING NOTE
Chief Complaint   Patient presents with     Kidney Stone Related     Review CT     Christine Crawford, EMT

## 2020-08-06 NOTE — PROGRESS NOTES
Marcus Hodges is a 64-year-old male with a neurogenic bladder that is controlled with Myrbetriq.  He had a 4 mm stone in the lower pole of the left kidney last year and some haziness suggesting a possible stone higher in the left kidney.  Today CT definitely shows a 2 cm stone in the renal pelvis that is easily seen on the  film.  He is having no flank pain or gross hematuria.  He has had no urinary tract infections.  He uses a condom catheter but has had no incontinence with Myrbetriq.  Other past medical history: CVA in 2012, atrial septal defect, atrial flutter, atrial enlargement, mitral regurgitation, history of DVT,, respiratory failure after his stroke as well as dysphagia.  Tricuspid valve regurgitation, glaucoma, non-smoker  No family history of prostate cancer  Medications: Tylenol, Eliquis, Lipitor, baclofen, vitamin D, Celexa, Keppra, metoprolol, multivitamin, Myrbetriq.  Patient uses SBE prophylaxis  Allergies: None  Review of systems: As above  Exam: Alert and oriented, normal appearance.  Abnormal gait due to stroke.  Normal respirations.  Normal sphincter tone, no rectal mass or impaction benign and symmetric prostate, normal seminal vesicles  Assessment: Neurogenic bladder due to stroke-well controlled with Myrbetriq.  2 cm left renal stone will need elective percutaneous ultrasonic pyelolithotomy.  Films reviewed with Dr. Bloom this morning.  Plan: Meet with Dr. Bloom in the next week or 2 to discuss surgery at [facility].  Patient will need to be off Eliquis and will need SBE prophylaxis.  May need anesthesia consult- will ask Dr. Aviles to decide.  All discussed with patient and his wife today

## 2020-08-06 NOTE — LETTER
8/6/2020        RE: Marcus Hodges  [address] [place] [address]        Marcus Hodges is a 64-year-old male with a neurogenic bladder that is controlled with Myrbetriq.  He had a 4 mm stone in the lower pole of the left kidney last year and some haziness suggesting a possible stone higher in the left kidney.  Today CT definitely shows a 2 cm stone in the renal pelvis that is easily seen on the  film.  He is having no flank pain or gross hematuria.  He has had no urinary tract infections.  He uses a condom catheter but has had no incontinence with Myrbetriq.  Other past medical history: CVA in 2012, atrial septal defect, atrial flutter, atrial enlargement, mitral regurgitation, history of DVT,, respiratory failure after his stroke as well as dysphagia.  Tricuspid valve regurgitation, glaucoma, non-smoker  No family history of prostate cancer  Medications: Tylenol, Eliquis, Lipitor, baclofen, vitamin D, Celexa, Keppra, metoprolol, multivitamin, Myrbetriq.  Patient uses SBE prophylaxis  Allergies: None  Review of systems: As above  Exam: Alert and oriented, normal appearance.  Abnormal gait due to stroke.  Normal respirations.  Normal sphincter tone, no rectal mass or impaction benign and symmetric prostate, normal seminal vesicles  Assessment: Neurogenic bladder due to stroke-well controlled with Myrbetriq.  2 cm left renal stone will need elective percutaneous ultrasonic pyelolithotomy.  Films reviewed with Dr. Bloom this morning.  Plan: Meet with Dr. Bloom in the next week or 2 to discuss surgery at [facility].  Patient will need to be off Eliquis and will need SBE prophylaxis.  May need anesthesia consult- will ask Dr. Aviles to decide.  All discussed with patient and his wife today      Sincerely,        Rashard Arevalo MD

## 2020-08-11 ENCOUNTER — HOSPITAL ENCOUNTER (OUTPATIENT)
Dept: PHYSICAL THERAPY | Facility: CLINIC | Age: 65
Setting detail: THERAPIES SERIES
End: 2020-08-11
Attending: INTERNAL MEDICINE
Payer: MEDICARE

## 2020-08-11 PROCEDURE — 97112 NEUROMUSCULAR REEDUCATION: CPT | Mod: GP,KX | Performed by: PHYSICAL THERAPIST

## 2020-08-11 PROCEDURE — 97116 GAIT TRAINING THERAPY: CPT | Mod: GP | Performed by: PHYSICAL THERAPIST

## 2020-08-11 PROCEDURE — 97110 THERAPEUTIC EXERCISES: CPT | Mod: GP,KX | Performed by: PHYSICAL THERAPIST

## 2020-08-12 ENCOUNTER — TELEPHONE (OUTPATIENT)
Dept: PEDIATRICS | Facility: CLINIC | Age: 65
End: 2020-08-12

## 2020-08-12 NOTE — TELEPHONE ENCOUNTER
Patient calling to inquire if we have an  to polish his prosthetic eye.   He has been to [facility], but cannot get a hold of them.     Inquired with [facility] who both said they do not have a Specialist in [place].     Doing an internet search found [facility], [phone].     Gave patient information. He will call to schedule. He will call back if there are any issues.

## 2020-08-13 ENCOUNTER — TELEPHONE (OUTPATIENT)
Dept: UROLOGY | Facility: CLINIC | Age: 65
End: 2020-08-13

## 2020-08-13 NOTE — TELEPHONE ENCOUNTER
Called to screen for CODVID-19 symptoms prior to tomorrow's visit.  Patient does not have any symptoms at this time.    Christine Crawford, EMT

## 2020-08-14 ENCOUNTER — OFFICE VISIT (OUTPATIENT)
Dept: UROLOGY | Facility: CLINIC | Age: 65
End: 2020-08-14
Payer: MEDICARE

## 2020-08-14 VITALS
DIASTOLIC BLOOD PRESSURE: 68 MMHG | BODY MASS INDEX: 23.1 KG/M2 | HEIGHT: 74 IN | SYSTOLIC BLOOD PRESSURE: 110 MMHG | WEIGHT: 180 LBS

## 2020-08-14 DIAGNOSIS — N31.9 NEUROGENIC BLADDER: ICD-10-CM

## 2020-08-14 DIAGNOSIS — N20.0 KIDNEY STONE ON LEFT SIDE: Primary | ICD-10-CM

## 2020-08-14 PROCEDURE — 99214 OFFICE O/P EST MOD 30 MIN: CPT | Performed by: STUDENT IN AN ORGANIZED HEALTH CARE EDUCATION/TRAINING PROGRAM

## 2020-08-14 RX ORDER — ASPIRIN 81 MG
200 TABLET, DELAYED RELEASE (ENTERIC COATED) ORAL
COMMUNITY

## 2020-08-14 RX ORDER — POLYETHYLENE GLYCOL 3350 17 G/17G
17 POWDER, FOR SOLUTION ORAL EVERY OTHER DAY
COMMUNITY

## 2020-08-14 ASSESSMENT — MIFFLIN-ST. JEOR: SCORE: 1686.22

## 2020-08-14 ASSESSMENT — PAIN SCALES - GENERAL: PAINLEVEL: NO PAIN (0)

## 2020-08-14 NOTE — NURSING NOTE
Chief Complaint   Patient presents with     Kidney Stone Related     Discuss Surgery     Christine Crawford, EMT

## 2020-08-14 NOTE — LETTER
"8/14/2020       RE: Marcus Hodges  [address] [place] [address]     Dear Colleague,    Thank you for referring your patient, Marcus Hodges, to the [facility] [place] at [facility]. Please see a copy of my visit note below.    CHIEF COMPLAINT   Marcus Hodges who is a 64 year old male returns today for follow-up of left kidney stone.      HPI   Marcus Hodges is a 64 year old male who presents with a h/o neurogenic bladder due to stroke 2012, ASD s/p remote repair, h/o a. 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    Has been followed for neurogenic bladder and nephrolithiasis by Dr. Arevalo for many years. Is on Myrbetriq and used condom cath for NGB. Had been followed for multiple nonobstructing left stones, but now within the past few years the stone burden has increased significantly. Now has a 1.9 cm left renal pelvis stone.  Denies any left flank pain; however he does not have good sensation on the left after the stroke. No gross hematuria. No fever or chills.     PHYSICAL EXAM  Patient is a 64 year old  male   Vitals: Blood pressure 110/68, height 1.88 m (6' 2\"), weight 81.6 kg (180 lb).  Body mass index is 23.11 kg/m .  General Appearance Adult:   Alert, no acute distress, oriented  HENT: throat/mouth:normal, good dentition; s/p right eye enucleation  Lungs: no respiratory distress, or pursed lip breathing  Heart: No obvious jugular venous distension present  Abdomen: soft, nontender, no organomegaly or masses  Back: not examined  Musculoskeltal: extremities normal, no peripheral edema  Skin: no suspicious lesions or rashes  Neuro: Alert, oriented, speech and mentation normal. Left sided spastic hemiplegia  Gait:  Altered d/t left spastic hemiplegia  Psych: affect and mood normal  : not examined    All pertinent imaging " reviewed:    CT 8/6/2020 and 5/10/2018, multiple KUB's  There is a now a large 1.9 cm stone in the left renal pelvis with a small 3 mm calcification in the lower pole; as well as a punctate stone in the right kidney.    The large stone has a STSD about 7.5 cm. The HU are between 900-1100. The malrotated kidney has the pelvis oriented kristian-lateral. The colon is partially retrorenal. There does appear to be a safe window to the upper pole of the kidney but the path is very medial.    ASSESSMENT and PLAN   Marcus Hodges is a 64 year old male who presents with a h/o neurogenic bladder due to stroke 2012, ASD s/p remote repair, h/o a. 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 I discussed multiple treatment options with the patient. I do not recommend observation of the stone, given its rapid growth and potential to cause issues such as urinary tract infection, obstruction, chronic kidney disease. The stone is too large for extracorporeal shockwave therapy to be effective. Ureteroscopy may be an option but likely would require multiple staged procedures. This would have the advantage of being able to be done while on anticoagulation. I discussed with the patient that percutaneous nephrolithotomy would be my procedure of choice due to the decreased operative time, increased stone free rate, however he would need to hold anticoagulation in the perioperative period. Risks include bleeding, infection, possible damage to surrounding structures, specifically the colon due to the malrotated kidney. I discussed with the patient that I would speak with my interventional radiology colleagues and see if they would be able to obtain a suitable access for PCNL safely, likely medial and upper pole. After conclusion of the visit, I discussed the case with some urologic colleagues and the suggestion was made that a robotic assisted  laparoscopic pyelolithotomy may actually be the optimal approach, given the single large stone and the potential for a suboptimal PCNL tract placement (and the concomitant risks). I will need to discuss further with the patient to see if he would be amenable to this approach instead, otherwise will proceed with left PCNL, pending discussion with IR    - Tentatively plan for left PCNL. However, this procedure will be very complex due to the malrotated left kidney and location of the colon  - possible that this patient is a candidate for robotic assisted laparoscopic pyelolithotomy, will discuss with patient further after discussing PCNL access with IR colleagues    I spent over 25 minutes with the patient.  Over half this time was spent on counseling regarding renal stone treatments.    Nehemiah Bloom MD   [facility] Urology  [facility] Phone: [phone]

## 2020-08-14 NOTE — PROGRESS NOTES
"CHIEF COMPLAINT   Marcus Hodges who is a 64 year old male returns today for follow-up of left kidney stone.      HPI   Marcus Hodges is a 64 year old male who presents with a h/o neurogenic bladder due to stroke 2012, ASD s/p remote repair, h/o a. 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    Has been followed for neurogenic bladder and nephrolithiasis by Dr. Arevalo for many years. Is on Myrbetriq and used condom cath for NGB. Had been followed for multiple nonobstructing left stones, but now within the past few years the stone burden has increased significantly. Now has a 1.9 cm left renal pelvis stone.  Denies any left flank pain; however he does not have good sensation on the left after the stroke. No gross hematuria. No fever or chills.     PHYSICAL EXAM  Patient is a 64 year old  male   Vitals: Blood pressure 110/68, height 1.88 m (6' 2\"), weight 81.6 kg (180 lb).  Body mass index is 23.11 kg/m .  General Appearance Adult:   Alert, no acute distress, oriented  HENT: throat/mouth:normal, good dentition; s/p right eye enucleation  Lungs: no respiratory distress, or pursed lip breathing  Heart: No obvious jugular venous distension present  Abdomen: soft, nontender, no organomegaly or masses  Back: not examined  Musculoskeltal: extremities normal, no peripheral edema  Skin: no suspicious lesions or rashes  Neuro: Alert, oriented, speech and mentation normal. Left sided spastic hemiplegia  Gait:  Altered d/t left spastic hemiplegia  Psych: affect and mood normal  : not examined    All pertinent imaging reviewed:    CT 8/6/2020 and 5/10/2018, multiple KUB's  There is a now a large 1.9 cm stone in the left renal pelvis with a small 3 mm calcification in the lower pole; as well as a punctate stone in the right kidney.    The large stone has a STSD about 7.5 cm. The HU are between 900-1100. The malrotated kidney has " the pelvis oriented kristian-lateral. The colon is partially retrorenal. There does appear to be a safe window to the upper pole of the kidney but the path is very medial.    ASSESSMENT and PLAN   Marcus Hodges is a 64 year old male who presents with a h/o neurogenic bladder due to stroke 2012, ASD s/p remote repair, h/o a. 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 I discussed multiple treatment options with the patient. I do not recommend observation of the stone, given its rapid growth and potential to cause issues such as urinary tract infection, obstruction, chronic kidney disease. The stone is too large for extracorporeal shockwave therapy to be effective. Ureteroscopy may be an option but likely would require multiple staged procedures. This would have the advantage of being able to be done while on anticoagulation. I discussed with the patient that percutaneous nephrolithotomy would be my procedure of choice due to the decreased operative time, increased stone free rate, however he would need to hold anticoagulation in the perioperative period. Risks include bleeding, infection, possible damage to surrounding structures, specifically the colon due to the malrotated kidney. I discussed with the patient that I would speak with my interventional radiology colleagues and see if they would be able to obtain a suitable access for PCNL safely, likely medial and upper pole. After conclusion of the visit, I discussed the case with some urologic colleagues and the suggestion was made that a robotic assisted laparoscopic pyelolithotomy may actually be the optimal approach, given the single large stone and the potential for a suboptimal PCNL tract placement (and the concomitant risks). I will need to discuss further with the patient to see if he would be amenable to this approach instead, otherwise will proceed with left  PCNL, pending discussion with IR    - Tentatively plan for left PCNL. However, this procedure will be very complex due to the malrotated left kidney and location of the colon  - possible that this patient is a candidate for robotic assisted laparoscopic pyelolithotomy, will discuss with patient further after discussing PCNL access with IR colleagues    I spent over 25 minutes with the patient.  Over half this time was spent on counseling regarding renal stone treatments.    Nehemiah Bloom MD   [facility] Urology  [facility] Phone: [phone]

## 2020-08-18 ENCOUNTER — HOSPITAL ENCOUNTER (OUTPATIENT)
Dept: PHYSICAL THERAPY | Facility: CLINIC | Age: 65
Setting detail: THERAPIES SERIES
End: 2020-08-18
Attending: INTERNAL MEDICINE
Payer: MEDICARE

## 2020-08-18 PROCEDURE — 97116 GAIT TRAINING THERAPY: CPT | Mod: GP,KX | Performed by: PHYSICAL THERAPIST

## 2020-08-18 PROCEDURE — 97110 THERAPEUTIC EXERCISES: CPT | Mod: GP,KX | Performed by: PHYSICAL THERAPIST

## 2020-08-18 PROCEDURE — 97140 MANUAL THERAPY 1/> REGIONS: CPT | Mod: GP,KX | Performed by: PHYSICAL THERAPIST

## 2020-08-18 PROCEDURE — 97112 NEUROMUSCULAR REEDUCATION: CPT | Mod: GP,KX | Performed by: PHYSICAL THERAPIST

## 2020-08-20 ENCOUNTER — OFFICE VISIT (OUTPATIENT)
Dept: UROLOGY | Facility: CLINIC | Age: 65
End: 2020-08-20
Payer: MEDICARE

## 2020-08-20 VITALS
HEART RATE: 76 BPM | BODY MASS INDEX: 23.1 KG/M2 | WEIGHT: 180 LBS | DIASTOLIC BLOOD PRESSURE: 66 MMHG | HEIGHT: 74 IN | SYSTOLIC BLOOD PRESSURE: 102 MMHG

## 2020-08-20 DIAGNOSIS — N20.0 KIDNEY STONE ON LEFT SIDE: Primary | ICD-10-CM

## 2020-08-20 PROCEDURE — 99214 OFFICE O/P EST MOD 30 MIN: CPT | Performed by: STUDENT IN AN ORGANIZED HEALTH CARE EDUCATION/TRAINING PROGRAM

## 2020-08-20 ASSESSMENT — PAIN SCALES - GENERAL: PAINLEVEL: NO PAIN (0)

## 2020-08-20 ASSESSMENT — MIFFLIN-ST. JEOR: SCORE: 1686.22

## 2020-08-20 NOTE — LETTER
"8/20/2020       RE: Marcus Hodges  [address] [place] [address]     Dear Colleague,    Thank you for referring your patient, Marcus Hodges, to the [facility]OLOGY [facility] [place] at [facility]. Please see a copy of my visit note below.    CHIEF COMPLAINT   Marcus Hodges who is a 64 year old male returns today for follow-up of left kidney stone.      HPI   Marcus Hodges is a 64 year old male who presents with a h/o neurogenic bladder due to stroke 2012, ASD s/p remote repair, h/o a. 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    Was seen in clinic 8/14/2020 for consideration of treatment of the stone. After the patient visit, discussed the case with a number of urologic colleagues who advised a different surgical approach (robotic assisted laparoscopic pyelolithotomy) as opposed to the planned PCNL. Patient returns for counseling regarding the different options. I also wanted to examine the patient to make sure that his prior stroke with left upper extremity spasticity, as well as prior pacer wires, would not preclude the surgery.    Remains asymptomatic from the left renal stone.    PHYSICAL EXAM  Patient is a 64 year old  male   Vitals: Blood pressure 102/66, pulse 76, height 1.88 m (6' 2\"), weight 81.6 kg (180 lb).  Body mass index is 23.11 kg/m .  General Appearance Adult:   Alert, no acute distress, oriented  HENT: throat/mouth:normal, good dentition  Lungs: no respiratory distress, or pursed lip breathing  Heart: No obvious jugular venous distension present  Abdomen: soft, nontender, no organomegaly or masses. Palpable former pacer wires running over the left upper quadrant.  Musculoskeltal: left upper extremity spasticity but is able to bring his arm down by his side  Skin: no suspicious lesions or rashes  Neuro: Alert, oriented, " speech and mentation normal  Psych: affect and mood normal  Gait: Normal  : deferred    All pertinent imaging reviewed:    All imaging studies reviewed by me.    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 An extensive discussion was had with the patient regarding the risks and benefits of the different surgical approaches. Given the altered anatomy and the potential difficulties of obtaining suitable access for PCNL, I proposed left robotic assisted laparoscopic pyelolithotomy with stent placement, with cystoscopy and left ureteral catheter placement immediately prior to assist in dilating the renal pelvis. On exam his left upper extremity does have spasticity but is able to be brought down to the side of his body. He has pacer wires in the left upper quadrant of the abdomen but I believe these can be avoided with the robotic port sites. Discussed with patient the surgical risks of bleeding, infection, possible damage to surrounding structures including bowel. Another advantage of this procedure as opposed to PCNL would be decreased risk of bleeding given that he is on long term anticoagulation.      He agrees to proceed. I discussed that I would have one of my colleagues assist me with this complex case.    - plan for left robotic assisted laparoscopic pyelolithotomy, cystoscopy and left ureteral catheter placement, left ureteral stent placement    I spent over 25 minutes with the patient.  Over half this time was spent on counseling regarding surgical options for kidney stones.    Nehemiah Bloom MD   [facility] Urology  [facility] Phone: [phone]

## 2020-08-20 NOTE — NURSING NOTE
Chief Complaint   Patient presents with     Nephrolithiasis-Discuss Surgery options     Carly Saxena LPN

## 2020-08-20 NOTE — PATIENT INSTRUCTIONS
Plan for cystoscopy, left ureteral catheter placement, robotic assisted laparoscopic left pyelolithotomy, left ureteral catheter placement    Patient Education     Pyeloplasty    The ureters are is the 2 tubes that carry urine from the kidneys to the bladder. If urine can t flow through a ureter, it builds up in the kidney. This is called ureteropelvic junction (UPJ) obstruction. This may cause symptoms such as pain, fever, and vomiting. It can also cause serious health problems such as infection or kidney damage. People with UPJ obstruction are often born with it, but it may not show up until later in life. Urine flow through the ureter can be blocked by a narrowing of the ureter walls (stricture). A blood vessel that presses on the ureter can also cause it. Pyeloplasty is surgery to unblock the ureter and allow urine to flow again.   Preparing for surgery  Prepare for the surgery as you have been told. In addition:    Tell your doctor about all medicines you take. This includes prescription, over-the-counter medicine, herbs, and supplements. You may need to stop taking some or all of them before surgery, as instructed by your doctor.    Don't eat or drink during the 8 hours before your surgery. This includes water, gum, and mints.    You may be given a special liquid or medicine to take the day before the surgery. This is to make sure your colon is empty for the surgery.  Two types of surgery  The surgery may be done through several small incisions (laparoscopy). Or, it may be done through one larger incision (open surgery). Laparoscopy cannot be used in all cases. In some cases, your doctor may start the procedure with laparoscopy but for safety reasons must change to open surgery. You and your doctor will discuss your options.    For laparoscopy, the doctor makes several small incisions in the abdomen. The scope is put through one of the small incisions. The scope sends pictures from inside the abdomen to a  video screen. Surgical tools are placed through the other incisions. The surgeon may use a technique called robotic laparoscopy. The robotic system gives a 3D view inside the body. It also assists the surgeon s hand movements.    For open surgery, one larger incision is made in the side over the ribs. The doctor sees and works through this incision. Part of a rib may need to be removed so the doctor can reach the kidney.  The day of surgery  The surgery takes about 3 to 5 hours. Afterward, you will stay in the hospital for 1 to 3 nights.  Before the surgery begins:    An intravenous (IV) line is put into a vein in your arm or hand. This line delivers fluids and medicine (such as antibiotics).    You may get medicine to prevent blood clots.    To keep you free of pain during the surgery, you re given general anesthesia. This medicine puts you into a state like deep sleep through the surgery. A tube may be inserted into your throat to help you breathe.    A thin tube (catheter) is placed into your bladder through the urethra. This drains urine during the surgery and for a time afterward.  During the surgery:    If a portion of the ureter is narrowed, that portion is cut out. The lower cut end is then sewn to the kidney. Or, the ureter and kidney are both cut. Part of the kidney is then used to make the ureter wider. If a blood vessel is pressing on the ureter, it is moved away.    A long, flexible tube called a stent is put into the ureter. It reaches from the kidney into the bladder. It is kept in place for 4 to 6 weeks after surgery to help hold the ureter open while it heals.    When the surgery is done, all tools are removed. The incision or incisions are closed with sutures, staples, surgical glue, or strips of surgical tape. One or more tubes may be placed near the incision or incisions. These drain fluid from the incision for a short time after surgery.  Recovering in the hospital  After the surgery, you will  be taken to a recovery room and monitored as you wake up from the anesthesia. You may feel sleepy and nauseated. If a breathing tube was used, your throat may be sore at first. When you are ready, you will be taken to your hospital room. While in the hospital:    You will be given medicine to manage pain. Let your healthcare providers know if your pain is not controlled.    You ll first receive IV fluids. In a day or so, you will start on a liquid diet. You will then slowly return to a normal diet.    As soon as you re able, you will get up and walk.    You ll be taught coughing and breathing techniques to help keep your lungs clear and prevent pneumonia.    The catheter in your urethra and any drains will likely be removed before you leave the hospital. If not, you will be shown how to care for them at home.  Recovering at home  After your hospital stay, you will be released to an adult family member or friend. Have someone stay with you for the next few days, to help care for you. Recovery time varies for each person. Your doctor will tell you when you can return to your normal routine. Until then, follow the instructions you have been given. Make sure to:    Take all medicines as directed.    Follow your doctor s guidelines for showering. Avoid swimming, bathing, using a hot tub, and other activities that cause the incision to be covered with water until your doctor says it s OK.    Don't lift anything heavy or do strenuous activities.    Don't drive until you are no longer taking prescription pain medicine and your doctor says it s OK.    Don't strain during a bowel movement. If needed, take stool softeners as directed by your doctor.  The stent in your ureter will cause the urge to pass urine more often. You may also have some burning and blood in your urine. This is normal and will go away once the stent is removed during a follow-up visit.  When to call your healthcare provider  Call your healthcare provider  right away if you have any of the following:    Chest pain or trouble breathing (call 911)    Fever of 100.4 F (38 ) or higher, or as directed by your healthcare provider    Symptoms of infection at an incision site such as increased redness or swelling, warmth, worsening pain, or foul-smelling drainage    Bleeding or a large amount of drainage from an incision    Blood clots in your urine    Pain or swelling in the legs    Inability to urinate    Vomiting that doesn t go away   Follow-up care  You will have follow-up visits with your doctor. If sutures or staples need to be removed, this is done 1 to 2 weeks after surgery. The stent in the ureter will be removed in 4 to 6 weeks. About 3 months after surgery, you may have an imaging test. This checks that the ureter is open and the kidney is working normally.  Risks and possible complications    Infection    Bleeding (may require a blood transfusion)    Leakage of urine at the surgical site    Scarring of the ureter    Damage to nearby organs    Need for further surgery    Kidney damage    Risks of anesthesia (the anesthesiologist will discuss these with you)   Date Last Reviewed: 9/1/2017 2000-2019 [employer]. [address] [place] [id number]. All rights reserved. This information is not intended as a substitute for professional medical care. Always follow your healthcare professional's instructions.

## 2020-08-21 RX ORDER — CEFAZOLIN SODIUM 1 G/50ML
1 INJECTION, SOLUTION INTRAVENOUS SEE ADMIN INSTRUCTIONS
Status: CANCELLED | OUTPATIENT
Start: 2020-08-21

## 2020-08-21 RX ORDER — CEFAZOLIN SODIUM 2 G/50ML
2 SOLUTION INTRAVENOUS
Status: CANCELLED | OUTPATIENT
Start: 2020-08-21

## 2020-08-21 NOTE — PROGRESS NOTES
"CHIEF COMPLAINT   Marcus Hodges who is a 64 year old male returns today for follow-up of left kidney stone.      HPI   Marcus Hodges is a 64 year old male who presents with a h/o neurogenic bladder due to stroke 2012, ASD s/p remote repair, h/o a. 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    Was seen in clinic 8/14/2020 for consideration of treatment of the stone. After the patient visit, discussed the case with a number of urologic colleagues who advised a different surgical approach (robotic assisted laparoscopic pyelolithotomy) as opposed to the planned PCNL. Patient returns for counseling regarding the different options. I also wanted to examine the patient to make sure that his prior stroke with left upper extremity spasticity, as well as prior pacer wires, would not preclude the surgery.    Remains asymptomatic from the left renal stone.    PHYSICAL EXAM  Patient is a 64 year old  male   Vitals: Blood pressure 102/66, pulse 76, height 1.88 m (6' 2\"), weight 81.6 kg (180 lb).  Body mass index is 23.11 kg/m .  General Appearance Adult:   Alert, no acute distress, oriented  HENT: throat/mouth:normal, good dentition  Lungs: no respiratory distress, or pursed lip breathing  Heart: No obvious jugular venous distension present  Abdomen: soft, nontender, no organomegaly or masses. Palpable former pacer wires running over the left upper quadrant.  Musculoskeltal: left upper extremity spasticity but is able to bring his arm down by his side  Skin: no suspicious lesions or rashes  Neuro: Alert, oriented, speech and mentation normal  Psych: affect and mood normal  Gait: Normal  : deferred    All pertinent imaging reviewed:    All imaging studies reviewed by me.    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 An extensive discussion was had with the patient regarding the risks and benefits of the different surgical approaches. Given the altered anatomy and the potential difficulties of obtaining suitable access for PCNL, I proposed left robotic assisted laparoscopic pyelolithotomy with stent placement, with cystoscopy and left ureteral catheter placement immediately prior to assist in dilating the renal pelvis. On exam his left upper extremity does have spasticity but is able to be brought down to the side of his body. He has pacer wires in the left upper quadrant of the abdomen but I believe these can be avoided with the robotic port sites. Discussed with patient the surgical risks of bleeding, infection, possible damage to surrounding structures including bowel. Another advantage of this procedure as opposed to PCNL would be decreased risk of bleeding given that he is on long term anticoagulation.      He agrees to proceed. I discussed that I would have one of my colleagues assist me with this complex case.    - plan for left robotic assisted laparoscopic pyelolithotomy, cystoscopy and left ureteral catheter placement, left ureteral stent placement    I spent over 25 minutes with the patient.  Over half this time was spent on counseling regarding surgical options for kidney stones.    Nehemiah Bloom MD   [facility]  [facility] Phone: [phone]

## 2020-08-24 ENCOUNTER — OFFICE VISIT (OUTPATIENT)
Dept: PEDIATRICS | Facility: CLINIC | Age: 65
End: 2020-08-24
Payer: MEDICARE

## 2020-08-24 VITALS
HEART RATE: 58 BPM | BODY MASS INDEX: 23.49 KG/M2 | OXYGEN SATURATION: 97 % | TEMPERATURE: 98.3 F | DIASTOLIC BLOOD PRESSURE: 70 MMHG | WEIGHT: 183 LBS | SYSTOLIC BLOOD PRESSURE: 107 MMHG | RESPIRATION RATE: 16 BRPM | HEIGHT: 74 IN

## 2020-08-24 DIAGNOSIS — F33.1 MAJOR DEPRESSIVE DISORDER, RECURRENT EPISODE, MODERATE (H): ICD-10-CM

## 2020-08-24 DIAGNOSIS — Z00.00 ENCOUNTER FOR MEDICARE ANNUAL WELLNESS EXAM: Primary | ICD-10-CM

## 2020-08-24 DIAGNOSIS — N20.0 NEPHROLITHIASIS: ICD-10-CM

## 2020-08-24 DIAGNOSIS — Z29.89 SEIZURE PROPHYLAXIS: ICD-10-CM

## 2020-08-24 DIAGNOSIS — K59.2 NEUROGENIC BOWEL: ICD-10-CM

## 2020-08-24 DIAGNOSIS — E78.5 HYPERLIPIDEMIA LDL GOAL <100: ICD-10-CM

## 2020-08-24 DIAGNOSIS — Z29.8 * * * SBE PROPHYLAXIS * * *: ICD-10-CM

## 2020-08-24 DIAGNOSIS — R32 URINARY INCONTINENCE, UNSPECIFIED TYPE: ICD-10-CM

## 2020-08-24 DIAGNOSIS — Z23 NEED FOR DIPHTHERIA-TETANUS-PERTUSSIS (TDAP) VACCINE: ICD-10-CM

## 2020-08-24 DIAGNOSIS — I48.20 CHRONIC ATRIAL FIBRILLATION (H): ICD-10-CM

## 2020-08-24 DIAGNOSIS — S06.9X9S TRAUMATIC BRAIN INJURY WITH LOSS OF CONSCIOUSNESS, SEQUELA (H): ICD-10-CM

## 2020-08-24 PROCEDURE — 93000 ELECTROCARDIOGRAM COMPLETE: CPT | Performed by: INTERNAL MEDICINE

## 2020-08-24 PROCEDURE — 90471 IMMUNIZATION ADMIN: CPT | Performed by: INTERNAL MEDICINE

## 2020-08-24 PROCEDURE — G0438 PPPS, INITIAL VISIT: HCPCS | Performed by: INTERNAL MEDICINE

## 2020-08-24 PROCEDURE — 90715 TDAP VACCINE 7 YRS/> IM: CPT | Performed by: INTERNAL MEDICINE

## 2020-08-24 ASSESSMENT — MIFFLIN-ST. JEOR: SCORE: 1699.83

## 2020-08-24 NOTE — PROGRESS NOTES
"SUBJECTIVE:   Marcus Hodges is a 64 year old male who presents for Preventive Visit.    Are you in the first 12 months of your Medicare coverage?  No    Healthy Habits:    In general, how would you rate your overall health?  Good    Frequency of exercise:  None    Do you usually eat at least 4 servings of fruit and vegetables a day, include whole grains    & fiber and avoid regularly eating high fat or \"junk\" foods?  Yes    Taking medications regularly:  Yes    Barriers to taking medications:  None    Medication side effects:  None    Home Safety:  No safety concerns identified    Hearing Impairment:  No hearing concerns    In the past 6 months, have you been bothered by leaking of urine?  No    In general, how would you rate your overall mental or emotional health?  Good      PHQ-2 Total Score:    Additional concerns today:  No    Do you feel safe in your environment? Yes    Have you ever done Advance Care Planning? (For example, a Health Directive, POLST, or a discussion with a medical provider or your loved ones about your wishes): Yes, advance care planning is on file.      Fall risk  Fallen 2 or more times in the past year?: No  Any fall with injury in the past year?: No    Cognitive Screening   1) Repeat 3 items (Leader, Season, Table)    2) Clock draw: NORMAL  3) 3 item recall: Recalls 3 objects  Results: NORMAL clock, 1-2 items recalled: COGNITIVE IMPAIRMENT LESS LIKELY    Mini-CogTM Copyright [address] [address]. Licensed by the author for use in [facility]; reprinted with permission (shantell@.[email or web address]). All rights reserved.      Do you have sleep apnea, excessive snoring or daytime drowsiness?: no    Reviewed and updated as needed this visit by clinical staff  Tobacco  Allergies  Meds  Med Hx  Surg Hx  Fam Hx  Soc Hx        Reviewed and updated as needed this visit by Provider        Social History     Tobacco Use     Smoking status: Never Smoker     Smokeless tobacco: Never Used   Substance Use " Topics     Alcohol use: No     If you drink alcohol do you typically have >3 drinks per day or >7 drinks per week? No    Alcohol Use 8/24/2020   Prescreen: >3 drinks/day or >7 drinks/week? -   Prescreen: >3 drinks/day or >7 drinks/week? No     Patient Active Problem List   Diagnosis     * * * SBE PROPHYLAXIS * * *     Health Care Home     Vitamin D deficiency     Hyperlipidemia LDL goal <100     Long-term (current) use of anticoagulants     Seizure prophylaxis     Advanced directives, counseling/discussion     Urinary incontinence     Neurogenic bladder     Chronic atrial fibrillation (H)     Tricuspid regurgitation     Mitral regurgitation     Atrial enlargement, bilateral     Major depressive disorder, recurrent episode, moderate (H)     Traumatic brain injury with loss of consciousness, sequela (H)     Neurogenic bowel     Past Medical History:   Diagnosis Date     * * * SBE PROPHYLAXIS * * *      Atrial enlargement, bilateral      Atrial flutter (H) 2004    radiofrequency ablation 2004, resolved     ATRIAL SEPTAL DEFECT     repaired 1977     CVA (cerebral vascular accident) (H) 4/2012    R MCA     Dysphagia 04/22/12    [facility], following CVA     Hernia, abdominal      History of thrombophlebitis      Mitral regurgitation     mitral valve damage related to ASD repair     Mumps      Palpitations      Respiratory failure (H) 04/22/12    [facility], following CVA, prolonged requiring tracheostomy, [place] [facility]      Tricuspid regurgitation      Unspecified glaucoma(365.9)     right     Urinary incontinence        Past Surgical History:   Procedure Laterality Date     C NONSPECIFIC PROCEDURE      tonsillectomy     C NONSPECIFIC PROCEDURE      Ing. Hernia Repair     CATHETER, ABLATION  2004     Craniotomy reconstruction  2012    [facility], repair of hemicraniectomy defect     CYSTOSCOPY       GASTROSTOMY TUBE  April 2012    [facility], following CVA     HERNIA REPAIR       REPAIR ATRIAL SEPTAL DEFECT  1978     ASD Repair     Right hemicraniectomy  April 2012    [facility], following CVA, mgmt malignant cerebral edema     SURGICAL HISTORY OF -   2009    right eye enucleation     TRACHEOSTOMY  April 2012    [facility], following CVA       Family History   Problem Relation Age of Onset     Hypertension Mother      Cerebrovascular Disease Father      Cancer Paternal Grandmother      Diabetes Maternal Grandmother      Congenital Anomalies Brother         several brothers and sisters born with congential heart defects, but all have been repaired     Congenital Anomalies Sister        ALLERGIES     Allergies   Allergen Reactions     Lisinopril      No Known Drug Allergies      Current Outpatient Medications   Medication Sig Dispense Refill     acetaminophen (TYLENOL) 325 MG tablet Take  by mouth every 4 hours as needed for pain. 100 tablet 0     apixaban ANTICOAGULANT (ELIQUIS ANTICOAGULANT) 5 MG tablet Take 1 tablet (5 mg) by mouth 2 times daily 180 tablet 3     atorvastatin (LIPITOR) 20 MG tablet TAKE 1 TABLET BY MOUTH EVERY DAY 90 tablet 3     baclofen (LIORESAL) 20 MG tablet Take 1 tablet (20 mg) by mouth 4 times daily 360 tablet 3     cholecalciferol (VITAMIN D) 1000 UNIT tablet Take 2 tablets (2,000 Units) by mouth daily 90 tablet 3     citalopram (CELEXA) 20 MG tablet TAKE 1 AND 1/2 TABLETS BY MOUTH EVERY  tablet 3     DOCUSATE SODIUM PO Take 50 mg by mouth       DOCUSATE SODIUM PO Take 100 mg by mouth       levETIRAcetam (KEPPRA) 1000 MG tablet Take 1 tablet (1,000 mg) by mouth 2 times daily 180 tablet 3     metoprolol succinate ER (TOPROL-XL) 25 MG 24 hr tablet TAKE 1 TABLET BY MOUTH EVERY DAY 90 tablet 3     Multiple Vitamin (MULTI-VITAMIN) per tablet Take 0.5 tablets by mouth daily  100 tablet 3     MYRBETRIQ 50 MG 24 hr tablet TAKE 1 TABLET BY MOUTH EVERY DAY 90 tablet 3     NONFORMULARY Protandim - herbal supplement       order for DME Equipment being ordered: Adhesive remover, wipes  ([facility] Code ) 100  "each 99     order for DME Equipment being ordered:    Nightime urinary leg bag 1600cc  [facility] 12 each 99     order for DME Equipment being ordered: Daytime Urinary leg bag-800cc  ([facility] ) 12 each 99     order for DME Equipment being ordered: Incontinence supplies - Posies  ([facility] ) 12 each 99     order for DME Equipment being ordered: Skin prep (no code - requested by patient) 100 each 99     order for DME Equipment being ordered:   Freedom Clear Condom catheter  ([facility] ) 105 each 99     Polyethylene Glycol 3350 (MIRALAX PO)           Current providers sharing in care for this patient include:   Patient Care Team:  Don Aviles MD as PCP - General  Don Aviles MD as Assigned PCP  Mariana Magaña RN as Personal Advocate & Liaison (PAL)    The following health maintenance items are reviewed in Epic and correct as of today:  Health Maintenance   Topic Date Due     ANNUAL REVIEW OF HM ORDERS  1955     ADVANCE CARE PLANNING  01/17/2018     PHQ-9  10/01/2019     DTAP/TDAP/TD IMMUNIZATION (3 - Td) 03/18/2020     MEDICARE ANNUAL WELLNESS VISIT  04/01/2020     INFLUENZA VACCINE (1) 09/01/2020     LIPID  06/15/2021     COLORECTAL CANCER SCREENING  03/01/2029     HEPATITIS C SCREENING  Completed     HIV SCREENING  Completed     DEPRESSION ACTION PLAN  Completed     ZOSTER IMMUNIZATION  Completed     IPV IMMUNIZATION  Aged Out     MENINGITIS IMMUNIZATION  Aged Out     HEPATITIS B IMMUNIZATION  Aged Out     Review of Systems  Constitutional, HEENT, cardiovascular, pulmonary, GI, , musculoskeletal, neuro, skin, endocrine and psych systems are negative, except as otherwise noted.    OBJECTIVE:   /70 (Cuff Size: Adult Regular)   Pulse 58   Temp 98.3  F (36.8  C) (Tympanic)   Resp 16   Ht 1.88 m (6' 2\")   Wt 83 kg (183 lb)   SpO2 97%   BMI 23.50 kg/m   Estimated body mass index is 23.5 kg/m  as calculated from the following:    Height as of this " "encounter: 1.88 m (6' 2\").    Weight as of this encounter: 83 kg (183 lb).  Physical Exam  GENERAL: healthy, alert and no distress  EYES: Eyes grossly normal to inspection, PERRL and conjunctivae and sclerae normal  HENT: ear canals and TM's normal, nose and mouth without ulcers or lesions  NECK: no adenopathy, no asymmetry, masses, or scars and thyroid normal to palpation  RESP: lungs clear to auscultation - no rales, rhonchi or wheezes  CV: regular rate and rhythm, normal S1 S2, no S3 or S4, no murmur, click or rub, no peripheral edema and peripheral pulses strong  ABDOMEN: soft, nontender, no hepatosplenomegaly, no masses and bowel sounds normal  MS: no gross musculoskeletal defects noted, no edema  SKIN: no suspicious lesions or rashes  NEURO: Left hemiparesis with marked gait instability requires a cane and assistance.  PSYCH: mentation appears normal, affect normal/bright    ASSESSMENT / PLAN:       ICD-10-CM    1. Encounter for Medicare annual wellness exam  Z00.00        2. Chronic atrial fibrillation (H)  I48.20 EKG 12-lead complete w/read - Clinics     EKG reviewed today: A. fib/flutter continue Eliquis, metoprolol.         3. Traumatic brain injury with loss of consciousness, sequela (H)  S06.9X9S  history of CVA and associated TBI with left hemiparesis following a prior CVA.  Continues to meet with physical therapy once per week and continues home therapy exercises.  Long-term disability secondary to hemiparesis, TBI     4. Nephrolithiasis  N20.0  recent notes from urology reviewed- large 2 cm left-sided stone.  Planning for percutaneous nephrolithotomy.      Patient requires preoperative evaluation prior to surgery: see below     5. Seizure prophylaxis  Z29.8  continue seizure prophylaxis-Keppra     6. Urinary incontinence, unspecified type  R32  long-term urinary incontinence, fewer episodes of incontinence during the daytime, continues to utilize condom catheter daily as needed     7. Major depressive " "disorder, recurrent episode, moderate (H)  F33.1  stable on current medication.       8. Hyperlipidemia LDL goal <100  E78.5  recent lab work reviewed-well controlled.     9. * * * SBE PROPHYLAXIS * * *  Z29.8  requires SBE prophylaxis secondary to valvular heart disease.     10. Neurogenic bowel  K59.2  continues OTC fiber supplements and MiraLAX as needed.     11. Need for diphtheria-tetanus-pertussis (Tdap) vaccine  Z23 TDAP, IM (10 - 64 YRS) - Adacel     Preoperative evaluation:    Reason for surgery/procedure: renal stone  Diagnosis/reason for consult: preop evaluation    The proposed surgical procedure is considered INTERMEDIATE risk.    REVISED CARDIAC RISK INDEX  The patient has the following serious cardiovascular risks for perioperative complications such as (MI, PE, VFib and 3  AV Block):  No serious cardiac risks  INTERPRETATION: 0 risks: Class I (very low risk - 0.4% complication rate)    The patient has the following additional risks for perioperative complications:  No identified additional risks      RECOMMENDATIONS:     --Consult hospital rounder / IM to assist post-op medical management    --Patient is to take all scheduled medications on the day of surgery EXCEPT for modifications listed below.      Hold Eliquis 2 days prior to procedure    APPROVAL GIVEN to proceed with proposed procedure, without further diagnostic evaluation         COUNSELING:  Reviewed preventive health counseling, as reflected in patient instructions       Healthy diet/nutrition       Colon cancer screening    Estimated body mass index is 23.5 kg/m  as calculated from the following:    Height as of this encounter: 1.88 m (6' 2\").    Weight as of this encounter: 83 kg (183 lb).         reports that he has never smoked. He has never used smokeless tobacco.      Appropriate preventive services were discussed with this patient, including applicable screening as appropriate for cardiovascular disease, diabetes, " osteopenia/osteoporosis, and glaucoma.  As appropriate for age/gender, discussed screening for colorectal cancer, prostate cancer, breast cancer, and cervical cancer. Checklist reviewing preventive services available has been given to the patient.    Reviewed patients plan of care and provided an AVS. The Basic Care Plan (routine screening as documented in Health Maintenance) for Marcus meets the Care Plan requirement. This Care Plan has been established and reviewed with the Patient.    Counseling Resources:  ATP IV Guidelines  Pooled Cohorts Equation Calculator  Breast Cancer Risk Calculator  FRAX Risk Assessment  ICSI Preventive Guidelines  Dietary Guidelines for Americans, 2010  USDA's MyPlate  ASA Prophylaxis  Lung CA Screening    Don Aviles MD, MD  [facility]    Identified Health Risks:

## 2020-08-24 NOTE — PATIENT INSTRUCTIONS
Patient Education   Personalized Prevention Plan  You are due for the preventive services outlined below.  Your care team is available to assist you in scheduling these services.  If you have already completed any of these items, please share that information with your care team to update in your medical record.  Health Maintenance Due   Topic Date Due     ANNUAL REVIEW OF HM ORDERS  1955     Discuss Advance Care Planning  01/17/2018     Depression Assessment  10/01/2019     Diptheria Tetanus Pertussis (DTAP/TDAP/TD) Vaccine (3 - Td) 03/18/2020     Annual Wellness Visit  04/01/2020     Preventive Health Recommendations  See your health care provider every year to    Review health changes.     Discuss preventive care.      Review your medicines if your doctor has prescribed any.    Talk with your health care provider about whether you should have a test to screen for prostate cancer (PSA).    Every 3 years, have a diabetes test (fasting glucose). If you are at risk for diabetes, you should have this test more often.    Every 5 years, have a cholesterol test. Have this test more often if you are at risk for high cholesterol or heart disease.     Every 10 years, have a colonoscopy. Or, have a yearly FIT test (stool test). These exams will check for colon cancer.    Talk to with your health care provider about screening for Abdominal Aortic Aneurysm if you have a family history of AAA or have a history of smoking.    Shots:     Get a flu shot each year.     Get a tetanus shot every 10 years.     Talk to your doctor about your pneumonia vaccines. There are now two you should receive - Pneumovax (PPSV 23) and Prevnar (PCV 13).    Talk to your pharmacist about a shingles vaccine.     Talk to your doctor about the hepatitis B vaccine.    Nutrition:     Eat at least 5 servings of fruits and vegetables each day.     Eat whole-grain bread, whole-wheat pasta and brown rice instead of white grains and rice.     Get  adequate Calcium and Vitamin D.     Lifestyle    Exercise for at least 150 minutes a week (30 minutes a day, 5 days a week). This will help you control your weight and prevent disease.     Limit alcohol to one drink per day.     No smoking.     Wear sunscreen to prevent skin cancer.     See your dentist every six months for an exam and cleaning.     See your eye doctor every 1 to 2 years to screen for conditions such as glaucoma, macular degeneration and cataracts.    Personalized Prevention Plan  You are due for the preventive services outlined below.  Your care team is available to assist you in scheduling these services.  If you have already completed any of these items, please share that information with your care team to update in your medical record.  Health Maintenance   Topic Date Due     ANNUAL REVIEW OF HM ORDERS  1955     ADVANCE CARE PLANNING  01/17/2018     PHQ-9  10/01/2019     DTAP/TDAP/TD IMMUNIZATION (3 - Td) 03/18/2020     MEDICARE ANNUAL WELLNESS VISIT  04/01/2020     INFLUENZA VACCINE (1) 09/01/2020     LIPID  06/15/2021     COLORECTAL CANCER SCREENING  03/01/2029     HEPATITIS C SCREENING  Completed     HIV SCREENING  Completed     DEPRESSION ACTION PLAN  Completed     ZOSTER IMMUNIZATION  Completed     IPV IMMUNIZATION  Aged Out     MENINGITIS IMMUNIZATION  Aged Out     HEPATITIS B IMMUNIZATION  Aged Out

## 2020-08-25 DIAGNOSIS — Z11.59 ENCOUNTER FOR SCREENING FOR OTHER VIRAL DISEASES: Primary | ICD-10-CM

## 2020-08-25 PROBLEM — N20.0 KIDNEY STONE ON LEFT SIDE: Status: ACTIVE | Noted: 2020-08-25

## 2020-08-29 ENCOUNTER — MYC MEDICAL ADVICE (OUTPATIENT)
Dept: PEDIATRICS | Facility: CLINIC | Age: 65
End: 2020-08-29

## 2020-08-31 NOTE — TELEPHONE ENCOUNTER
Routing to Covering Provider - During 8/24/2020 PreOp, advise was to hold Eliquis 2 days prior to procedure.   Per message below, Surgeon advised on holding 3 days prior.   Please confirm holding instructions.

## 2020-09-01 ENCOUNTER — HOSPITAL ENCOUNTER (OUTPATIENT)
Dept: PHYSICAL THERAPY | Facility: CLINIC | Age: 65
Setting detail: THERAPIES SERIES
End: 2020-09-01
Attending: INTERNAL MEDICINE
Payer: MEDICARE

## 2020-09-01 PROCEDURE — 97140 MANUAL THERAPY 1/> REGIONS: CPT | Mod: GP,KX | Performed by: PHYSICAL THERAPIST

## 2020-09-01 PROCEDURE — 97112 NEUROMUSCULAR REEDUCATION: CPT | Mod: GP,KX | Performed by: PHYSICAL THERAPIST

## 2020-09-01 PROCEDURE — 97116 GAIT TRAINING THERAPY: CPT | Mod: GP,KX | Performed by: PHYSICAL THERAPIST

## 2020-09-01 PROCEDURE — 97110 THERAPEUTIC EXERCISES: CPT | Mod: GP,KX | Performed by: PHYSICAL THERAPIST

## 2020-09-02 NOTE — PROGRESS NOTES
PROGRESS NOTE       09/01/20 0900   Signing Clinician's Name / Credentials   Signing clinician's name / credentials Ada Scott, PT   Session Number   Session Number 10/10   Authorization status LE[employer] Lexx requires skilled physical therapy intervention to address his impairments and improve his movement patterns and function to allow him to safely stay in his home with his wife's assistance as well as overall improved health.   Goal 1   Goal Identifier   (continues to be able to have 6 inches TAYLOR, less comfortable )   Goal Description Marcus to demonstrate the ability to stand with a more narrow TAYLOR going from 12 inches feet apart at eval to 6 inches apart or less to demonstrate improved balance , loading on the L side to assist with improvement in gait pattern therefore efficiency and decreased secondary impairments such as his R knee pain.    Target Date 05/24/19   Date Met 05/21/19   Goal 2   Goal Identifier 2- Forward reach   Goal Description Marcus to increase his forward reach from 4 inches at evaulation to 8 inches or greater to demonstrate improved balance, improved ability to bring COM forward over TAYLOR to assist in meeting all goals, improved sit to stand and improved gait.    Target Date 10/31/20   Date Met 09/01/20   Goal 3   Goal Identifier 3-  Curb/stairs  (9/1/20 able to do with the R and CGA not the L)   Goal Description Marcus to have increased disassociation of movement/improved timing and sequencing of muscle activation for improved hip and knee flexion in standing demonstrated by the ability to step over upright shabana (4-6 inch object) with the L LE to allow him to step up onto a curb without catching foot.   Target Date 10/31/20   Goal 4   Goal Identifier 4- Gait  (9/1/2020 in progress as noted)   Goal Description Marcus to increase gait speed to 19 seconds or less with AD and 24 steps or less to demonstrate improved gait speed and efficiency for community gait and also decreased overuse of the R  side.    Target Date 11/30/18   Goal 5   Goal Identifier 5- Floor transfers.   (9/1/2020 in progress)   Goal Description Marcus to perform transfer to and from floor with outside UE support and min assist of 1 to allow him to recover from a fall with assist from wife or caregiver.    Target Date 10/31/20   Goal 6   Goal Identifier 6 --  4 square  (9/1/20 time met with step over tape and cues)   Goal Description Marcus to complete 4 square in 25 seconds with and AD and 40 seconds or less without an AD to demonstrate improved interlimb coordination, balance and body control to assist in meeting all goals and overall function.    Target Date 10/31/20   Goal 7   Goal Identifier HEP  (continues to be developed)   Goal Description Marcus to be independent in appropriate HEP to continue to address his impairments following d/c from skilled PT intervention.    Target Date 10/31/20   Goal 8   Goal Identifier 8 functional reach/neuro motor control  (9/1/20 in progress)   Goal Description Marcus to perform reaching to the floor to the left to pick object up off the floor while maintaining L hand contact/closed chain on the mat to demonstrate impropved neuromotor planning, ability to bring COM to the left while keeping proper muscle activation/deactivation L and R side of body for greater safety with reaching activiites to the floor, unstrapping shoe, AFO.  Also for carryover of proper motor function for improved gait and transfers.    Target Date 10/31/20   Subjective Report   Subjective Report Has his surgery for kidney stone scheduled for the 28th of Sept. States he canceled the appt the 29th due to this.  He missed last week due to bowel issues.    Objective Measure 1   Objective Measure 25 foot walk   Details 9/1/2020 20.45 and 21.5 sec both with trekking pole , AFO on,  24 steps with each   Objective Measure 2   Objective Measure 4 square   Details 9/1/2020  30.43 sec first trial over tape and pt stopping  /needing cues on  direction adding to the time.  With cues as performed completed it in 19.63 sec.  Both with trekking pole, AFO on. SBA  Feet would not have cleared the pole if was stepping over that instead of tape.    Objective Measure 3   Objective Measure curb/step over shabana   Details Upright shabana, CGA, trekking pole and AFO on the L pt able to complete step over the shabana with the R LE, kicks it down with step with the L.     CURB - up and down curb with min assist/CGA down leading with the L and min assist for safety stepping back up leading with the L LE.  Trekking pole and AFO on .    Objective Measure 5   Objective Measure forward reach   Details 8.5 to 9 inches.     Therapeutic Procedure/exercise   Therapeutic Procedures: strength, endurance, ROM, flexibillity minutes (05169) 20   Skilled Intervention manual stretches. Education regardign elbow and lacking about 10 degrees into elbow extension. Elbow flexion is due more to improper muscle activaiton/overactivation biceps and shoulder/trunk muscles limiting tricep activaiton.    Patient Response Pt wondering if has elbow contracture .    Treatment Detail stretches in supine L LE hamstrings, SKTC, Hip IR knee extended, gastroc, hip flexor and quad stretch with leg off the edge of the mat.  R hamstrings, gastroc, piriformis/SKTC, L elbow into extension, forearm sup/pronation and pectoral stretch.    Therapeutic Activity   Treatment Detail standing and initiation of stand to floor in stagger stance with the L leg posterior small stagger, R hand to B hands on tiff mat. 5 reps. Assist for alignment and safety from therapist   Therapeutic Activities: dynamic activities to improve functional performance minutes (13658) 5   Neuromuscular Re-education   Neuromuscular re-ed of mvmt, balance, coord, kinesthetic sense, posture, proprioception minutes (06457) 6   Skilled Intervention assessment, SBA , cues.    Patient Response slower motor processing today   Treatment Detail 4  square, forward reach.    Progress working to goal.  Time as above.    Gait Training   Gait Training Minutes, includes stair climbing (78726) 18   Skilled Intervention cues for continuation mid to end stance on tiff R, not stopping to advance the L LE   Patient Response apprehesion with stairs and curb.    Treatment Detail 25 ft walk  up and down stairs alternating stepping with heavy lean onto the rail descending, decreased ability to perform knee flexion /hip flexion L with stepping up.  Attempted stepping up onto 5 inch platform simulating curb step the R foot forward onto the step but did not continue with transfer - difficulty likely due to apprehension and having a narrow platform rather than full landing of sidewalk.  Performed curb outside as noted above.    Progress working to all goals, greater ease/less assist with curb.    Manual Therapy   Manual Therapy: Mobilization, MFR, MLD, friction massage minutes (35957) 7   Patient Response hip flexor response with work to the quad   Treatment Detail tissue work to quad, hip flexors on the L, posterior sub talar glide L ,    Education   Learner Patient   Readiness Acceptance   Method Explanation   Response Verbalizes Understanding   Education Comments as above,    Plan   Home program no changes   Plan for next session closed chain and open chain timing and sequencing hp and knee motions, stairs, stretches, gait.    Comments   Comments PROGRESS - Lex has been making progress since resuming PT after COVID restrictions.  His 25 foot walk time has varied but I feel this is partly due to impaired gait pattern/alignment trunk and pelvis with gait with increased movement femoral acetabular/hip extension leading to greater balance challenges/forward COM.  He demonstrates improved postural alignemtn, trunk, pelvis in all planes.  He is less apprehensive with balance challenges. He has ambulated short distances 15 feet without AFO on the L.  He remains appropriate for  skilled PT and wishes to continue and is without questions.  Barriers include spasticity, decreased ROM , visual deficits , inabiltiy to go to the gym for gait due to COVID.    Total Session Time   Timed Code Treatment Minutes 56   Total Treatment Time (sum of timed and untimed services) 56

## 2020-09-15 ENCOUNTER — HOSPITAL ENCOUNTER (OUTPATIENT)
Dept: PHYSICAL THERAPY | Facility: CLINIC | Age: 65
Setting detail: THERAPIES SERIES
End: 2020-09-15
Attending: INTERNAL MEDICINE
Payer: MEDICARE

## 2020-09-15 PROCEDURE — 97110 THERAPEUTIC EXERCISES: CPT | Mod: GP,KX | Performed by: PHYSICAL THERAPIST

## 2020-09-15 PROCEDURE — 97116 GAIT TRAINING THERAPY: CPT | Mod: GP,KX | Performed by: PHYSICAL THERAPIST

## 2020-09-15 PROCEDURE — 97112 NEUROMUSCULAR REEDUCATION: CPT | Mod: GP,KX | Performed by: PHYSICAL THERAPIST

## 2020-09-22 ENCOUNTER — TELEPHONE (OUTPATIENT)
Dept: UROLOGY | Facility: CLINIC | Age: 65
End: 2020-09-22

## 2020-09-22 NOTE — TELEPHONE ENCOUNTER
Health Call Center    Phone Message    May a detailed message be left on voicemail: yes     Reason for Call: Other: Lex has surgery scheduled on 9/28/20 with Dr. sanchez, and he will be staying overnight. Lex would like a call back to ask if he should take his morning medication on 9/28/20 still. Lex also wants to know if he should bring his medication with him to the hospital, and when he should stop his elequis before the surgery. Lastly, Lex is disabled, so he would like to knwo what the visitor restrictions are for his wife, because he would like her to be with him. Please give Lex a call back to discuss at your earliest convenience.     Action Taken: Message routed to:  Other: [facility]    Travel Screening: Not Applicable

## 2020-09-24 DIAGNOSIS — Z11.59 ENCOUNTER FOR SCREENING FOR OTHER VIRAL DISEASES: ICD-10-CM

## 2020-09-24 PROCEDURE — U0003 INFECTIOUS AGENT DETECTION BY NUCLEIC ACID (DNA OR RNA); SEVERE ACUTE RESPIRATORY SYNDROME CORONAVIRUS 2 (SARS-COV-2) (CORONAVIRUS DISEASE [COVID-19]), AMPLIFIED PROBE TECHNIQUE, MAKING USE OF HIGH THROUGHPUT TECHNOLOGIES AS DESCRIBED BY CMS-2020-01-R: HCPCS | Performed by: STUDENT IN AN ORGANIZED HEALTH CARE EDUCATION/TRAINING PROGRAM

## 2020-09-25 LAB
SARS-COV-2 RNA SPEC QL NAA+PROBE: NOT DETECTED
SPECIMEN SOURCE: NORMAL

## 2020-09-28 ENCOUNTER — HOSPITAL ENCOUNTER (OUTPATIENT)
Facility: CLINIC | Age: 65
Discharge: HOME | End: 2020-09-29
Attending: STUDENT IN AN ORGANIZED HEALTH CARE EDUCATION/TRAINING PROGRAM | Admitting: STUDENT IN AN ORGANIZED HEALTH CARE EDUCATION/TRAINING PROGRAM
Payer: MEDICARE

## 2020-09-28 ENCOUNTER — ANESTHESIA (OUTPATIENT)
Dept: SURGERY | Facility: CLINIC | Age: 65
End: 2020-09-28
Payer: MEDICARE

## 2020-09-28 ENCOUNTER — ANESTHESIA EVENT (OUTPATIENT)
Dept: SURGERY | Facility: CLINIC | Age: 65
End: 2020-09-28
Payer: MEDICARE

## 2020-09-28 DIAGNOSIS — N20.0 KIDNEY STONE ON LEFT SIDE: ICD-10-CM

## 2020-09-28 LAB
ABO + RH BLD: ABNORMAL
ABO + RH BLD: ABNORMAL
ANION GAP SERPL CALCULATED.3IONS-SCNC: 1 MMOL/L (ref 3–14)
ANION GAP SERPL CALCULATED.3IONS-SCNC: 3 MMOL/L (ref 3–14)
BLD GP AB INVEST PLASRBC-IMP: ABNORMAL
BLD GP AB SCN SERPL QL: ABNORMAL
BLD PROD TYP BPU: ABNORMAL
BLOOD BANK CMNT PATIENT-IMP: ABNORMAL
BUN SERPL-MCNC: 12 MG/DL (ref 7–30)
BUN SERPL-MCNC: 13 MG/DL (ref 7–30)
CALCIUM SERPL-MCNC: 8.5 MG/DL (ref 8.5–10.1)
CALCIUM SERPL-MCNC: 8.8 MG/DL (ref 8.5–10.1)
CHLORIDE SERPL-SCNC: 110 MMOL/L (ref 94–109)
CHLORIDE SERPL-SCNC: 111 MMOL/L (ref 94–109)
CO2 SERPL-SCNC: 28 MMOL/L (ref 20–32)
CO2 SERPL-SCNC: 32 MMOL/L (ref 20–32)
CREAT SERPL-MCNC: 0.78 MG/DL (ref 0.66–1.25)
CREAT SERPL-MCNC: 0.84 MG/DL (ref 0.66–1.25)
ERYTHROCYTE [DISTWIDTH] IN BLOOD BY AUTOMATED COUNT: 13.4 % (ref 10–15)
ERYTHROCYTE [DISTWIDTH] IN BLOOD BY AUTOMATED COUNT: 13.5 % (ref 10–15)
GFR SERPL CREATININE-BSD FRML MDRD: >90 ML/MIN/{1.73_M2}
GFR SERPL CREATININE-BSD FRML MDRD: >90 ML/MIN/{1.73_M2}
GLUCOSE SERPL-MCNC: 75 MG/DL (ref 70–99)
GLUCOSE SERPL-MCNC: 96 MG/DL (ref 70–99)
HCT VFR BLD AUTO: 38.9 % (ref 40–53)
HCT VFR BLD AUTO: 39.5 % (ref 40–53)
HGB BLD-MCNC: 12.8 G/DL (ref 13.3–17.7)
HGB BLD-MCNC: 13.1 G/DL (ref 13.3–17.7)
MCH RBC QN AUTO: 29.3 PG (ref 26.5–33)
MCH RBC QN AUTO: 29.5 PG (ref 26.5–33)
MCHC RBC AUTO-ENTMCNC: 32.9 G/DL (ref 31.5–36.5)
MCHC RBC AUTO-ENTMCNC: 33.2 G/DL (ref 31.5–36.5)
MCV RBC AUTO: 88 FL (ref 78–100)
MCV RBC AUTO: 90 FL (ref 78–100)
NUM BPU REQUESTED: 2
PLATELET # BLD AUTO: 150 10E9/L (ref 150–450)
PLATELET # BLD AUTO: 182 10E9/L (ref 150–450)
POTASSIUM SERPL-SCNC: 3.9 MMOL/L (ref 3.4–5.3)
POTASSIUM SERPL-SCNC: 4.1 MMOL/L (ref 3.4–5.3)
RBC # BLD AUTO: 4.34 10E12/L (ref 4.4–5.9)
RBC # BLD AUTO: 4.47 10E12/L (ref 4.4–5.9)
SODIUM SERPL-SCNC: 142 MMOL/L (ref 133–144)
SODIUM SERPL-SCNC: 143 MMOL/L (ref 133–144)
SPECIMEN EXP DATE BLD: ABNORMAL
WBC # BLD AUTO: 4 10E9/L (ref 4–11)
WBC # BLD AUTO: 5 10E9/L (ref 4–11)

## 2020-09-28 PROCEDURE — 71000012 ZZH RECOVERY PHASE 1 LEVEL 1 FIRST HR: Performed by: STUDENT IN AN ORGANIZED HEALTH CARE EDUCATION/TRAINING PROGRAM

## 2020-09-28 PROCEDURE — 50130 PYELOTOMY W/REMOVAL CALCULUS: CPT | Mod: LT | Performed by: UROLOGY

## 2020-09-28 PROCEDURE — 36000085 ZZH SURGERY LEVEL 8 1ST 30 MIN: Performed by: STUDENT IN AN ORGANIZED HEALTH CARE EDUCATION/TRAINING PROGRAM

## 2020-09-28 PROCEDURE — 25000566 ZZH SEVOFLURANE, EA 15 MIN: Performed by: STUDENT IN AN ORGANIZED HEALTH CARE EDUCATION/TRAINING PROGRAM

## 2020-09-28 PROCEDURE — 25000125 ZZHC RX 250: Performed by: NURSE ANESTHETIST, CERTIFIED REGISTERED

## 2020-09-28 PROCEDURE — 86870 RBC ANTIBODY IDENTIFICATION: CPT | Performed by: STUDENT IN AN ORGANIZED HEALTH CARE EDUCATION/TRAINING PROGRAM

## 2020-09-28 PROCEDURE — 52332 CYSTOSCOPY AND TREATMENT: CPT | Mod: LT | Performed by: UROLOGY

## 2020-09-28 PROCEDURE — 86922 COMPATIBILITY TEST ANTIGLOB: CPT | Performed by: STUDENT IN AN ORGANIZED HEALTH CARE EDUCATION/TRAINING PROGRAM

## 2020-09-28 PROCEDURE — C2617 STENT, NON-COR, TEM W/O DEL: HCPCS | Performed by: STUDENT IN AN ORGANIZED HEALTH CARE EDUCATION/TRAINING PROGRAM

## 2020-09-28 PROCEDURE — 88300 SURGICAL PATH GROSS: CPT | Mod: 26 | Performed by: STUDENT IN AN ORGANIZED HEALTH CARE EDUCATION/TRAINING PROGRAM

## 2020-09-28 PROCEDURE — 27210794 ZZH OR GENERAL SUPPLY STERILE: Performed by: STUDENT IN AN ORGANIZED HEALTH CARE EDUCATION/TRAINING PROGRAM

## 2020-09-28 PROCEDURE — 25800030 ZZH RX IP 258 OP 636: Performed by: NURSE ANESTHETIST, CERTIFIED REGISTERED

## 2020-09-28 PROCEDURE — 86901 BLOOD TYPING SEROLOGIC RH(D): CPT | Performed by: STUDENT IN AN ORGANIZED HEALTH CARE EDUCATION/TRAINING PROGRAM

## 2020-09-28 PROCEDURE — 40000170 ZZH STATISTIC PRE-PROCEDURE ASSESSMENT II: Performed by: STUDENT IN AN ORGANIZED HEALTH CARE EDUCATION/TRAINING PROGRAM

## 2020-09-28 PROCEDURE — 25000132 ZZH RX MED GY IP 250 OP 250 PS 637: Mod: GY | Performed by: STUDENT IN AN ORGANIZED HEALTH CARE EDUCATION/TRAINING PROGRAM

## 2020-09-28 PROCEDURE — 25800030 ZZH RX IP 258 OP 636: Performed by: ANESTHESIOLOGY

## 2020-09-28 PROCEDURE — 80048 BASIC METABOLIC PNL TOTAL CA: CPT | Performed by: STUDENT IN AN ORGANIZED HEALTH CARE EDUCATION/TRAINING PROGRAM

## 2020-09-28 PROCEDURE — C1769 GUIDE WIRE: HCPCS | Performed by: STUDENT IN AN ORGANIZED HEALTH CARE EDUCATION/TRAINING PROGRAM

## 2020-09-28 PROCEDURE — 85027 COMPLETE CBC AUTOMATED: CPT | Mod: 91 | Performed by: STUDENT IN AN ORGANIZED HEALTH CARE EDUCATION/TRAINING PROGRAM

## 2020-09-28 PROCEDURE — 25000128 H RX IP 250 OP 636: Performed by: NURSE ANESTHETIST, CERTIFIED REGISTERED

## 2020-09-28 PROCEDURE — 25000128 H RX IP 250 OP 636: Performed by: STUDENT IN AN ORGANIZED HEALTH CARE EDUCATION/TRAINING PROGRAM

## 2020-09-28 PROCEDURE — 36415 COLL VENOUS BLD VENIPUNCTURE: CPT | Performed by: STUDENT IN AN ORGANIZED HEALTH CARE EDUCATION/TRAINING PROGRAM

## 2020-09-28 PROCEDURE — 37000009 ZZH ANESTHESIA TECHNICAL FEE, EACH ADDTL 15 MIN: Performed by: STUDENT IN AN ORGANIZED HEALTH CARE EDUCATION/TRAINING PROGRAM

## 2020-09-28 PROCEDURE — 86905 BLOOD TYPING RBC ANTIGENS: CPT | Performed by: STUDENT IN AN ORGANIZED HEALTH CARE EDUCATION/TRAINING PROGRAM

## 2020-09-28 PROCEDURE — 25800025 ZZH RX 258: Performed by: STUDENT IN AN ORGANIZED HEALTH CARE EDUCATION/TRAINING PROGRAM

## 2020-09-28 PROCEDURE — 25800030 ZZH RX IP 258 OP 636

## 2020-09-28 PROCEDURE — 85027 COMPLETE CBC AUTOMATED: CPT | Performed by: STUDENT IN AN ORGANIZED HEALTH CARE EDUCATION/TRAINING PROGRAM

## 2020-09-28 PROCEDURE — 52332 CYSTOSCOPY AND TREATMENT: CPT | Mod: LT | Performed by: STUDENT IN AN ORGANIZED HEALTH CARE EDUCATION/TRAINING PROGRAM

## 2020-09-28 PROCEDURE — 82365 CALCULUS SPECTROSCOPY: CPT | Performed by: STUDENT IN AN ORGANIZED HEALTH CARE EDUCATION/TRAINING PROGRAM

## 2020-09-28 PROCEDURE — 37000008 ZZH ANESTHESIA TECHNICAL FEE, 1ST 30 MIN: Performed by: STUDENT IN AN ORGANIZED HEALTH CARE EDUCATION/TRAINING PROGRAM

## 2020-09-28 PROCEDURE — 25000125 ZZHC RX 250: Performed by: STUDENT IN AN ORGANIZED HEALTH CARE EDUCATION/TRAINING PROGRAM

## 2020-09-28 PROCEDURE — 36000087 ZZH SURGERY LEVEL 8 EA 15 ADDTL MIN: Performed by: STUDENT IN AN ORGANIZED HEALTH CARE EDUCATION/TRAINING PROGRAM

## 2020-09-28 PROCEDURE — 25800030 ZZH RX IP 258 OP 636: Performed by: STUDENT IN AN ORGANIZED HEALTH CARE EDUCATION/TRAINING PROGRAM

## 2020-09-28 PROCEDURE — 86900 BLOOD TYPING SEROLOGIC ABO: CPT | Performed by: STUDENT IN AN ORGANIZED HEALTH CARE EDUCATION/TRAINING PROGRAM

## 2020-09-28 PROCEDURE — 86850 RBC ANTIBODY SCREEN: CPT | Performed by: STUDENT IN AN ORGANIZED HEALTH CARE EDUCATION/TRAINING PROGRAM

## 2020-09-28 PROCEDURE — S2900 ROBOTIC SURGICAL SYSTEM: HCPCS | Performed by: STUDENT IN AN ORGANIZED HEALTH CARE EDUCATION/TRAINING PROGRAM

## 2020-09-28 PROCEDURE — 50130 PYELOTOMY W/REMOVAL CALCULUS: CPT | Mod: LT | Performed by: STUDENT IN AN ORGANIZED HEALTH CARE EDUCATION/TRAINING PROGRAM

## 2020-09-28 PROCEDURE — 88300 SURGICAL PATH GROSS: CPT | Performed by: STUDENT IN AN ORGANIZED HEALTH CARE EDUCATION/TRAINING PROGRAM

## 2020-09-28 DEVICE — URETERAL STENT
Type: IMPLANTABLE DEVICE | Site: URETER | Status: FUNCTIONAL
Brand: CONTOUR™

## 2020-09-28 RX ORDER — OXYCODONE HYDROCHLORIDE 5 MG/1
5-10 TABLET ORAL
Status: DISCONTINUED | OUTPATIENT
Start: 2020-09-28 | End: 2020-09-29 | Stop reason: HOSPADM

## 2020-09-28 RX ORDER — MIRABEGRON 50 MG/1
50 TABLET, EXTENDED RELEASE ORAL DAILY
Status: DISCONTINUED | OUTPATIENT
Start: 2020-09-29 | End: 2020-09-29 | Stop reason: HOSPADM

## 2020-09-28 RX ORDER — LIDOCAINE 40 MG/G
CREAM TOPICAL
Status: DISCONTINUED | OUTPATIENT
Start: 2020-09-28 | End: 2020-09-29 | Stop reason: HOSPADM

## 2020-09-28 RX ORDER — LIDOCAINE HYDROCHLORIDE 20 MG/ML
INJECTION, SOLUTION INFILTRATION; PERINEURAL PRN
Status: DISCONTINUED | OUTPATIENT
Start: 2020-09-28 | End: 2020-09-28

## 2020-09-28 RX ORDER — NALOXONE HYDROCHLORIDE 0.4 MG/ML
.1-.4 INJECTION, SOLUTION INTRAMUSCULAR; INTRAVENOUS; SUBCUTANEOUS
Status: DISCONTINUED | OUTPATIENT
Start: 2020-09-28 | End: 2020-09-29 | Stop reason: HOSPADM

## 2020-09-28 RX ORDER — FENTANYL CITRATE 50 UG/ML
INJECTION, SOLUTION INTRAMUSCULAR; INTRAVENOUS PRN
Status: DISCONTINUED | OUTPATIENT
Start: 2020-09-28 | End: 2020-09-28

## 2020-09-28 RX ORDER — ATORVASTATIN CALCIUM 20 MG/1
20 TABLET, FILM COATED ORAL DAILY
Status: DISCONTINUED | OUTPATIENT
Start: 2020-09-29 | End: 2020-09-29 | Stop reason: HOSPADM

## 2020-09-28 RX ORDER — SODIUM CHLORIDE, SODIUM LACTATE, POTASSIUM CHLORIDE, CALCIUM CHLORIDE 600; 310; 30; 20 MG/100ML; MG/100ML; MG/100ML; MG/100ML
INJECTION, SOLUTION INTRAVENOUS CONTINUOUS PRN
Status: DISCONTINUED | OUTPATIENT
Start: 2020-09-28 | End: 2020-09-28

## 2020-09-28 RX ORDER — HYDROMORPHONE HYDROCHLORIDE 1 MG/ML
0.2 INJECTION, SOLUTION INTRAMUSCULAR; INTRAVENOUS; SUBCUTANEOUS
Status: DISCONTINUED | OUTPATIENT
Start: 2020-09-28 | End: 2020-09-29 | Stop reason: HOSPADM

## 2020-09-28 RX ORDER — VECURONIUM BROMIDE 1 MG/ML
INJECTION, POWDER, LYOPHILIZED, FOR SOLUTION INTRAVENOUS PRN
Status: DISCONTINUED | OUTPATIENT
Start: 2020-09-28 | End: 2020-09-28

## 2020-09-28 RX ORDER — ONDANSETRON 4 MG/1
4 TABLET, ORALLY DISINTEGRATING ORAL EVERY 6 HOURS PRN
Status: DISCONTINUED | OUTPATIENT
Start: 2020-09-28 | End: 2020-09-29 | Stop reason: HOSPADM

## 2020-09-28 RX ORDER — BACLOFEN 20 MG/1
20 TABLET ORAL 4 TIMES DAILY
Status: DISCONTINUED | OUTPATIENT
Start: 2020-09-28 | End: 2020-09-29 | Stop reason: HOSPADM

## 2020-09-28 RX ORDER — MAGNESIUM HYDROXIDE 1200 MG/15ML
LIQUID ORAL PRN
Status: DISCONTINUED | OUTPATIENT
Start: 2020-09-28 | End: 2020-09-28 | Stop reason: HOSPADM

## 2020-09-28 RX ORDER — CEFAZOLIN SODIUM 2 G/100ML
2 INJECTION, SOLUTION INTRAVENOUS
Status: COMPLETED | OUTPATIENT
Start: 2020-09-28 | End: 2020-09-28

## 2020-09-28 RX ORDER — METOPROLOL SUCCINATE 25 MG/1
25 TABLET, EXTENDED RELEASE ORAL DAILY
Status: DISCONTINUED | OUTPATIENT
Start: 2020-09-29 | End: 2020-09-29 | Stop reason: HOSPADM

## 2020-09-28 RX ORDER — DEXAMETHASONE SODIUM PHOSPHATE 4 MG/ML
INJECTION, SOLUTION INTRA-ARTICULAR; INTRALESIONAL; INTRAMUSCULAR; INTRAVENOUS; SOFT TISSUE PRN
Status: DISCONTINUED | OUTPATIENT
Start: 2020-09-28 | End: 2020-09-28

## 2020-09-28 RX ORDER — LEVETIRACETAM 500 MG/1
1000 TABLET ORAL 2 TIMES DAILY
Status: DISCONTINUED | OUTPATIENT
Start: 2020-09-28 | End: 2020-09-29 | Stop reason: HOSPADM

## 2020-09-28 RX ORDER — CEFAZOLIN SODIUM 1 G/3ML
1 INJECTION, POWDER, FOR SOLUTION INTRAMUSCULAR; INTRAVENOUS SEE ADMIN INSTRUCTIONS
Status: DISCONTINUED | OUTPATIENT
Start: 2020-09-28 | End: 2020-09-28 | Stop reason: HOSPADM

## 2020-09-28 RX ORDER — SODIUM CHLORIDE, SODIUM LACTATE, POTASSIUM CHLORIDE, CALCIUM CHLORIDE 600; 310; 30; 20 MG/100ML; MG/100ML; MG/100ML; MG/100ML
INJECTION, SOLUTION INTRAVENOUS CONTINUOUS
Status: DISCONTINUED | OUTPATIENT
Start: 2020-09-28 | End: 2020-09-28 | Stop reason: HOSPADM

## 2020-09-28 RX ORDER — ONDANSETRON 2 MG/ML
4 INJECTION INTRAMUSCULAR; INTRAVENOUS EVERY 30 MIN PRN
Status: DISCONTINUED | OUTPATIENT
Start: 2020-09-28 | End: 2020-09-28 | Stop reason: HOSPADM

## 2020-09-28 RX ORDER — ALBUTEROL SULFATE 0.83 MG/ML
2.5 SOLUTION RESPIRATORY (INHALATION) EVERY 4 HOURS PRN
Status: DISCONTINUED | OUTPATIENT
Start: 2020-09-28 | End: 2020-09-28 | Stop reason: HOSPADM

## 2020-09-28 RX ORDER — FENTANYL CITRATE 50 UG/ML
25-50 INJECTION, SOLUTION INTRAMUSCULAR; INTRAVENOUS
Status: DISCONTINUED | OUTPATIENT
Start: 2020-09-28 | End: 2020-09-28 | Stop reason: HOSPADM

## 2020-09-28 RX ORDER — NEOSTIGMINE METHYLSULFATE 1 MG/ML
VIAL (ML) INJECTION PRN
Status: DISCONTINUED | OUTPATIENT
Start: 2020-09-28 | End: 2020-09-28

## 2020-09-28 RX ORDER — MEPERIDINE HYDROCHLORIDE 25 MG/ML
12.5 INJECTION INTRAMUSCULAR; INTRAVENOUS; SUBCUTANEOUS EVERY 5 MIN PRN
Status: DISCONTINUED | OUTPATIENT
Start: 2020-09-28 | End: 2020-09-28 | Stop reason: HOSPADM

## 2020-09-28 RX ORDER — BUPIVACAINE HYDROCHLORIDE 2.5 MG/ML
INJECTION, SOLUTION INFILTRATION; PERINEURAL PRN
Status: DISCONTINUED | OUTPATIENT
Start: 2020-09-28 | End: 2020-09-28 | Stop reason: HOSPADM

## 2020-09-28 RX ORDER — LABETALOL HYDROCHLORIDE 5 MG/ML
10 INJECTION, SOLUTION INTRAVENOUS
Status: DISCONTINUED | OUTPATIENT
Start: 2020-09-28 | End: 2020-09-28 | Stop reason: HOSPADM

## 2020-09-28 RX ORDER — ONDANSETRON 4 MG/1
4 TABLET, ORALLY DISINTEGRATING ORAL EVERY 30 MIN PRN
Status: DISCONTINUED | OUTPATIENT
Start: 2020-09-28 | End: 2020-09-28 | Stop reason: HOSPADM

## 2020-09-28 RX ORDER — PROPOFOL 10 MG/ML
INJECTION, EMULSION INTRAVENOUS PRN
Status: DISCONTINUED | OUTPATIENT
Start: 2020-09-28 | End: 2020-09-28

## 2020-09-28 RX ORDER — GLYCOPYRROLATE 0.2 MG/ML
INJECTION, SOLUTION INTRAMUSCULAR; INTRAVENOUS PRN
Status: DISCONTINUED | OUTPATIENT
Start: 2020-09-28 | End: 2020-09-28

## 2020-09-28 RX ORDER — KETOROLAC TROMETHAMINE 15 MG/ML
15 INJECTION, SOLUTION INTRAMUSCULAR; INTRAVENOUS EVERY 6 HOURS
Status: DISCONTINUED | OUTPATIENT
Start: 2020-09-28 | End: 2020-09-29 | Stop reason: HOSPADM

## 2020-09-28 RX ORDER — SODIUM CHLORIDE 9 MG/ML
INJECTION, SOLUTION INTRAVENOUS CONTINUOUS
Status: DISCONTINUED | OUTPATIENT
Start: 2020-09-28 | End: 2020-09-29 | Stop reason: HOSPADM

## 2020-09-28 RX ORDER — ONDANSETRON 2 MG/ML
4 INJECTION INTRAMUSCULAR; INTRAVENOUS EVERY 6 HOURS PRN
Status: DISCONTINUED | OUTPATIENT
Start: 2020-09-28 | End: 2020-09-29 | Stop reason: HOSPADM

## 2020-09-28 RX ORDER — ACETAMINOPHEN 325 MG/1
975 TABLET ORAL EVERY 6 HOURS
Status: DISCONTINUED | OUTPATIENT
Start: 2020-09-28 | End: 2020-09-29 | Stop reason: HOSPADM

## 2020-09-28 RX ORDER — HYDRALAZINE HYDROCHLORIDE 20 MG/ML
2.5-5 INJECTION INTRAMUSCULAR; INTRAVENOUS EVERY 10 MIN PRN
Status: DISCONTINUED | OUTPATIENT
Start: 2020-09-28 | End: 2020-09-28 | Stop reason: HOSPADM

## 2020-09-28 RX ORDER — HYDROMORPHONE HYDROCHLORIDE 1 MG/ML
.3-.5 INJECTION, SOLUTION INTRAMUSCULAR; INTRAVENOUS; SUBCUTANEOUS EVERY 5 MIN PRN
Status: DISCONTINUED | OUTPATIENT
Start: 2020-09-28 | End: 2020-09-28 | Stop reason: HOSPADM

## 2020-09-28 RX ORDER — ONDANSETRON 2 MG/ML
INJECTION INTRAMUSCULAR; INTRAVENOUS PRN
Status: DISCONTINUED | OUTPATIENT
Start: 2020-09-28 | End: 2020-09-28

## 2020-09-28 RX ORDER — LIDOCAINE 40 MG/G
CREAM TOPICAL
Status: DISCONTINUED | OUTPATIENT
Start: 2020-09-28 | End: 2020-09-28 | Stop reason: HOSPADM

## 2020-09-28 RX ORDER — AMOXICILLIN 250 MG
1 CAPSULE ORAL 2 TIMES DAILY
Status: DISCONTINUED | OUTPATIENT
Start: 2020-09-28 | End: 2020-09-29 | Stop reason: HOSPADM

## 2020-09-28 RX ORDER — FAMOTIDINE 20 MG/1
20 TABLET, FILM COATED ORAL 2 TIMES DAILY
Status: DISCONTINUED | OUTPATIENT
Start: 2020-09-28 | End: 2020-09-29 | Stop reason: HOSPADM

## 2020-09-28 RX ORDER — NALOXONE HYDROCHLORIDE 0.4 MG/ML
.1-.4 INJECTION, SOLUTION INTRAMUSCULAR; INTRAVENOUS; SUBCUTANEOUS
Status: DISCONTINUED | OUTPATIENT
Start: 2020-09-28 | End: 2020-09-28

## 2020-09-28 RX ADMIN — VECURONIUM BROMIDE 2 MG: 1 INJECTION, POWDER, LYOPHILIZED, FOR SOLUTION INTRAVENOUS at 13:45

## 2020-09-28 RX ADMIN — NEOSTIGMINE METHYLSULFATE 4 MG: 1 INJECTION, SOLUTION INTRAVENOUS at 15:29

## 2020-09-28 RX ADMIN — VECURONIUM BROMIDE 2 MG: 1 INJECTION, POWDER, LYOPHILIZED, FOR SOLUTION INTRAVENOUS at 13:15

## 2020-09-28 RX ADMIN — ONDANSETRON 4 MG: 2 INJECTION INTRAMUSCULAR; INTRAVENOUS at 15:25

## 2020-09-28 RX ADMIN — PROPOFOL 40 MG: 10 INJECTION, EMULSION INTRAVENOUS at 14:39

## 2020-09-28 RX ADMIN — BACLOFEN 20 MG: 20 TABLET ORAL at 21:32

## 2020-09-28 RX ADMIN — LEVETIRACETAM 1000 MG: 500 TABLET ORAL at 21:32

## 2020-09-28 RX ADMIN — FENTANYL CITRATE 100 MCG: 50 INJECTION, SOLUTION INTRAMUSCULAR; INTRAVENOUS at 13:42

## 2020-09-28 RX ADMIN — CEFAZOLIN SODIUM 1 G: 2 INJECTION, SOLUTION INTRAVENOUS at 15:23

## 2020-09-28 RX ADMIN — SODIUM CHLORIDE, POTASSIUM CHLORIDE, SODIUM LACTATE AND CALCIUM CHLORIDE: 600; 310; 30; 20 INJECTION, SOLUTION INTRAVENOUS at 13:14

## 2020-09-28 RX ADMIN — SODIUM CHLORIDE, POTASSIUM CHLORIDE, SODIUM LACTATE AND CALCIUM CHLORIDE: 600; 310; 30; 20 INJECTION, SOLUTION INTRAVENOUS at 12:47

## 2020-09-28 RX ADMIN — BACLOFEN 20 MG: 20 TABLET ORAL at 18:34

## 2020-09-28 RX ADMIN — SODIUM CHLORIDE, POTASSIUM CHLORIDE, SODIUM LACTATE AND CALCIUM CHLORIDE: 600; 310; 30; 20 INJECTION, SOLUTION INTRAVENOUS at 11:30

## 2020-09-28 RX ADMIN — SODIUM CHLORIDE: 9 INJECTION, SOLUTION INTRAVENOUS at 17:48

## 2020-09-28 RX ADMIN — OXYCODONE HYDROCHLORIDE 5 MG: 5 TABLET ORAL at 21:37

## 2020-09-28 RX ADMIN — ACETAMINOPHEN 975 MG: 325 TABLET, FILM COATED ORAL at 17:47

## 2020-09-28 RX ADMIN — GLYCOPYRROLATE 0.2 MG: 0.2 INJECTION, SOLUTION INTRAMUSCULAR; INTRAVENOUS at 13:24

## 2020-09-28 RX ADMIN — LIDOCAINE HYDROCHLORIDE 80 MG: 20 INJECTION, SOLUTION INFILTRATION; PERINEURAL at 12:59

## 2020-09-28 RX ADMIN — FAMOTIDINE 20 MG: 20 TABLET ORAL at 21:32

## 2020-09-28 RX ADMIN — CEFAZOLIN SODIUM 2 G: 2 INJECTION, SOLUTION INTRAVENOUS at 13:15

## 2020-09-28 RX ADMIN — PROPOFOL 200 MG: 10 INJECTION, EMULSION INTRAVENOUS at 12:59

## 2020-09-28 RX ADMIN — FENTANYL CITRATE 50 MCG: 50 INJECTION, SOLUTION INTRAMUSCULAR; INTRAVENOUS at 12:59

## 2020-09-28 RX ADMIN — VECURONIUM BROMIDE 2 MG: 1 INJECTION, POWDER, LYOPHILIZED, FOR SOLUTION INTRAVENOUS at 14:40

## 2020-09-28 RX ADMIN — ROCURONIUM BROMIDE 50 MG: 10 INJECTION INTRAVENOUS at 12:59

## 2020-09-28 RX ADMIN — FENTANYL CITRATE 50 MCG: 50 INJECTION, SOLUTION INTRAMUSCULAR; INTRAVENOUS at 12:57

## 2020-09-28 RX ADMIN — KETOROLAC TROMETHAMINE 15 MG: 15 INJECTION, SOLUTION INTRAMUSCULAR; INTRAVENOUS at 17:47

## 2020-09-28 RX ADMIN — GLYCOPYRROLATE 0.6 MG: 0.2 INJECTION, SOLUTION INTRAMUSCULAR; INTRAVENOUS at 15:29

## 2020-09-28 RX ADMIN — DEXAMETHASONE SODIUM PHOSPHATE 4 MG: 4 INJECTION, SOLUTION INTRA-ARTICULAR; INTRALESIONAL; INTRAMUSCULAR; INTRAVENOUS; SOFT TISSUE at 13:09

## 2020-09-28 RX ADMIN — HYDROMORPHONE HYDROCHLORIDE 0.5 MG: 1 INJECTION, SOLUTION INTRAMUSCULAR; INTRAVENOUS; SUBCUTANEOUS at 14:28

## 2020-09-28 ASSESSMENT — ACTIVITIES OF DAILY LIVING (ADL): TOILETING: 1-->ASSISTIVE EQUIPMENT

## 2020-09-28 ASSESSMENT — MIFFLIN-ST. JEOR: SCORE: 1687.12

## 2020-09-28 ASSESSMENT — ENCOUNTER SYMPTOMS: DYSRHYTHMIAS: 1

## 2020-09-28 NOTE — PROGRESS NOTES
Medication History Completed by Medication Scribe  Admission medication history interview status for the (Not on file)  admission is complete. See EPIC admission navigator for prior to admission medications     Medication history sources: Patient, Patient's family/friend (Spouse), Surescripts and H&P  Medication history source reliability: Good  Adherence assessment: N/A Not Observed    Significant changes made to the medication list:  None      Additional medication history information:   None    Medication reconciliation completed by provider prior to medication history? No    Time spent in this activity: 30 minutes      Prior to Admission medications    Medication Sig Last Dose Taking? Auth Provider   apixaban ANTICOAGULANT (ELIQUIS ANTICOAGULANT) 5 MG tablet Take 1 tablet (5 mg) by mouth 2 times daily 9/25/2020 at AM Yes Hayes Yip MD   atorvastatin (LIPITOR) 20 MG tablet TAKE 1 TABLET BY MOUTH EVERY DAY 9/28/2020 at 0630 Yes Don Aviles MD   baclofen (LIORESAL) 20 MG tablet Take 1 tablet (20 mg) by mouth 4 times daily 9/28/2020 at 0630 Yes Nory Reed APRN CNP   citalopram (CELEXA) 20 MG tablet TAKE 1 AND 1/2 TABLETS BY MOUTH EVERY DAY  Patient taking differently: Take 30 mg by mouth daily (takes 1.5 x 20mg) 9/28/2020 at 0630 Yes Don Aviles MD   docusate sodium (COLACE) 100 MG tablet Take 100 mg by mouth daily as needed  9/26/2020 at PRN Yes Reported, Patient   levETIRAcetam (KEPPRA) 1000 MG tablet Take 1 tablet (1,000 mg) by mouth 2 times daily 9/28/2020 at 0630 Yes Nory Reed APRN CNP   metoprolol succinate ER (TOPROL-XL) 25 MG 24 hr tablet TAKE 1 TABLET BY MOUTH EVERY DAY  Patient taking differently: Take 25 mg by mouth daily  9/28/2020 at 0630 Yes Don Aviles MD   Multiple Vitamin (MULTI-VITAMIN) per tablet Take 1 tablet by mouth daily  9/25/2020 Yes Don Aviles MD   MYRBETRIQ 50 MG 24 hr tablet TAKE 1 TABLET BY MOUTH EVERY DAY  Patient  taking differently: Take 50 mg by mouth daily  9/28/2020 at 0630 Yes Don Aviles MD   NONFORMULARY Take 1 tablet by mouth daily Protandim - herbal supplement  9/27/2020 Yes Reported, Patient   Omega-3 Fatty Acids (FISH OIL PO) Take 1 Dose by mouth daily 9/25/2020 Yes Reported, Patient   Polyethylene Glycol 3350 (MIRALAX PO) Take 1 Dose by mouth daily as needed  9/26/2020 at PRN Yes Reported, Patient   vitamin D3 (CHOLECALCIFEROL) 50 mcg (2000 units) tablet Take 2,000 Units by mouth daily  9/28/2020 at 0630 Yes Don Aviles MD   order for DME Equipment being ordered: Adhesive remover, wipes  (HCPCS Code )   Don Aviles MD   order for DME Equipment being ordered:    Nightime urinary leg bag 1600cc  (HCPCS Code )   Don Aviles MD   order for DME Equipment being ordered: Daytime Urinary leg bag-800cc  (HCPCS Code )   Don Aviles MD   order for DME Equipment being ordered: Incontinence supplies - Posies  (HCPCS Code )   Don Aviles MD   order for DME Equipment being ordered: Skin prep (no code - requested by patient)   Don Aviles MD   order for DME Equipment being ordered:   Freedom Clear Condom catheter  (HCPCS Code )   Don Aviles MD

## 2020-09-28 NOTE — ANESTHESIA PREPROCEDURE EVALUATION
Anesthesia Pre-Procedure Evaluation    Patient: Marcus Hodges   MRN: [id number] : 1955          Preoperative Diagnosis: Kidney stone on left side [N20.0]    Procedure(s):  left robotic assisted laparoscopic pyelolithotomy,  cystoscopy and left ureteral catheter placement, left ureteral stent placement    Past Medical History:   Diagnosis Date     * * * SBE PROPHYLAXIS * * *      Atrial enlargement, bilateral      Atrial flutter (H)     radiofrequency ablation , resolved     ATRIAL SEPTAL DEFECT     repaired      CVA (cerebral vascular accident) (H) 2012    R MCA     Dysphagia 12     , following CVA     Hernia, abdominal      History of thrombophlebitis      Mitral regurgitation     mitral valve damage related to ASD repair     Mumps      Palpitations      Respiratory failure (H) 12     , following CVA, prolonged requiring tracheostomy, [facility]      Tricuspid regurgitation      Unspecified glaucoma(365.9)     right     Urinary incontinence      Past Surgical History:   Procedure Laterality Date     C NONSPECIFIC PROCEDURE      tonsillectomy     C NONSPECIFIC PROCEDURE      Ing. Hernia Repair     CATHETER, ABLATION       Craniotomy reconstruction      [facility], repair of hemicraniectomy defect     CYSTOSCOPY       GASTROSTOMY TUBE  , following CVA     HERNIA REPAIR       REPAIR ATRIAL SEPTAL DEFECT      ASD Repair     Right hemicraniectomy  , following CVA, mgmt malignant cerebral edema     SURGICAL HISTORY OF -       right eye enucleation     TRACHEOSTOMY  , following CVA       Anesthesia Evaluation     . Pt has had prior anesthetic.     No history of anesthetic complications          ROS/MED HX    ENT/Pulmonary:      (-) sleep apnea   Neurologic:     (+)CVA with deficits- Neurogenic bladder,     Cardiovascular:     (+) ----. : . . . :. dysrhythmias a-fib, .  "congenital heart disease ASD:,       METS/Exercise Tolerance:     Hematologic:         Musculoskeletal:         GI/Hepatic:         Renal/Genitourinary:     (+) Nephrolithiasis ,       Endo:         Psychiatric:         Infectious Disease:         Malignancy:         Other:                                 Lab Results   Component Value Date    WBC 4.0 09/28/2020    HGB 13.1 (L) 09/28/2020    HCT 39.5 (L) 09/28/2020     09/28/2020     09/28/2020    POTASSIUM 4.1 09/28/2020    CHLORIDE 111 (H) 09/28/2020    CO2 28 09/28/2020    BUN 13 09/28/2020    CR 0.78 09/28/2020    GLC 75 09/28/2020    STEPHANI 8.8 09/28/2020    ALBUMIN 4.2 04/01/2019    PROTTOTAL 7.7 04/01/2019    ALT 28 06/15/2020    AST 25 04/01/2019    ALKPHOS 90 04/01/2019    BILITOTAL 1.0 04/01/2019    PTT 31 11/06/2015    INR 2.4 (A) 12/04/2017    TSH 2.20 02/18/2013       Preop Vitals  BP Readings from Last 3 Encounters:   09/28/20 112/80   08/24/20 107/70   08/20/20 102/66    Pulse Readings from Last 3 Encounters:   08/24/20 58   08/20/20 76   06/17/20 56      Resp Readings from Last 3 Encounters:   08/24/20 16   04/22/18 16   01/23/17 16    SpO2 Readings from Last 3 Encounters:   09/28/20 96%   08/24/20 97%   05/16/19 97%      Temp Readings from Last 1 Encounters:   09/28/20 36.8  C (98.2  F) (Temporal)    Ht Readings from Last 1 Encounters:   09/28/20 1.88 m (6' 2\")      Wt Readings from Last 1 Encounters:   09/28/20 82.2 kg (181 lb 4.8 oz)    Estimated body mass index is 23.28 kg/m  as calculated from the following:    Height as of this encounter: 1.88 m (6' 2\").    Weight as of this encounter: 82.2 kg (181 lb 4.8 oz).       Anesthesia Plan      History & Physical Review  History and physical reviewed and following examination; no interval change.    ASA Status:  3 .    NPO Status:  > 8 hours    Plan for General with Intravenous induction. Maintenance will be Balanced.    PONV prophylaxis:  Ondansetron (or other 5HT-3) and Dexamethasone or " Solumedrol  Additional equipment: 2nd IV        Postoperative Care  Postoperative pain management:  IV analgesics and Oral pain medications.      Consents  Anesthetic plan, risks, benefits and alternatives discussed with:  Patient..                 Meghna Lira MD, MD

## 2020-09-28 NOTE — ANESTHESIA POSTPROCEDURE EVALUATION
Patient: Marcus Hodges    Procedure(s):  left robotic assisted laparoscopic pyelolithotomy  cystoscopy and left ureteral catheter placement, left ureteral stent placement    Diagnosis:Kidney stone on left side [N20.0]  Diagnosis Additional Information: No value filed.    Anesthesia Type:  General    Note:  Anesthesia Post Evaluation    Patient location during evaluation: PACU  Patient participation: Able to fully participate in evaluation  Level of consciousness: awake  Pain management: adequate  Airway patency: patent  Cardiovascular status: acceptable  Respiratory status: acceptable  Hydration status: acceptable  PONV: none     Anesthetic complications: None          Last vitals:  Vitals:    09/28/20 1104 09/28/20 1550   BP: 112/80 134/89   Pulse:  69   Resp:  16   Temp: 36.8  C (98.2  F) 36  C (96.8  F)   SpO2: 96% 100%         Electronically Signed By: Meghna Lira MD, MD  September 28, 2020  4:03 PM

## 2020-09-28 NOTE — OR NURSING
OK by MDA Soraya to transfer to floor. Sent with 1 belonging bag and cane. Wife Tatum updated on room number and visiting hours.

## 2020-09-28 NOTE — ANESTHESIA CARE TRANSFER NOTE
Patient: Marcus Hodges    Procedure(s):  left robotic assisted laparoscopic pyelolithotomy  cystoscopy and left ureteral catheter placement, left ureteral stent placement    Diagnosis: Kidney stone on left side [N20.0]  Diagnosis Additional Information: No value filed.    Anesthesia Type:   General     Note:  Airway :Face Mask  Patient transferred to:PACU  Comments: Neuromuscular blockade reversed after TOF 4/4, spontaneous respirations, adequate tidal volumes, followed commands to voice, extubated atraumatically, airway patent after extubation.  Oxygen via facemask at 8 liters per minute to PACU. Oxygen tubing connected to wall O2 in PACU, SpO2, NiBP, and EKG monitors and alarms on and functioning, report on patient's clinical status given to PACU RN, RN questions answered.   Handoff Report: Identifed the Patient, Identified the Reponsible Provider, Reviewed the pertinent medical history, Discussed the surgical course, Reviewed Intra-OP anesthesia mangement and issues during anesthesia, Set expectations for post-procedure period and Allowed opportunity for questions and acknowledgement of understanding      Vitals: (Last set prior to Anesthesia Care Transfer)    CRNA VITALS  9/28/2020 1509 - 9/28/2020 1546      9/28/2020             Pulse:  66    SpO2:  100 %    Resp Rate (observed):  10                Electronically Signed By: ERIC Hitchcock CRNA  September 28, 2020  3:46 PM

## 2020-09-28 NOTE — BRIEF OP NOTE
[facility]    Brief Operative Note    Pre-operative diagnosis: Kidney stone on left side [N20.0]  Post-operative diagnosis Same as pre-operative diagnosis    Procedure: Procedure(s):  left robotic assisted laparoscopic pyelolithotomy,  cystoscopy and left ureteral catheter placement, left ureteral stent placement  Surgeon: Surgeon(s) and Role:  Panel 1:     * Nehemiah Bloom MD - Primary     * Lexa Bacon MD - Assisting  Panel 2:     * Nehemiah Bloom MD - Primary  Anesthesia: General   Estimated blood loss: Less than 50 ml  Drains: 16 Fr faulkner catheter, 10 ml in the balloon, 19 Fr John drain, 6 Fr x 28 cm left double J stent  Specimens:   ID Type Source Tests Collected by Time Destination   1 : Left Renal Stone Calculus/Stone Kidney, Left STONE ANALYSIS Nehemiah Bloom MD 9/28/2020  3:12 PM      Findings:   Unremarkable left robotic assisted laparoscopy pyelolithotomy  Complications: none.  Implants:   Implant Name Type Inv. Item Serial No.  Lot No. LRB No. Used Action   STENT URETERAL CONTOUR SOFT PERCUFLEX [id number] Stent STENT URETERAL CONTOUR SOFT PERCUFLEX [id number]  [employer] [id number] Left 1 Implanted

## 2020-09-28 NOTE — OP NOTE
[facility]  Operative Note    Pre-operative diagnosis: Kidney stone on left side [N20.0]   Post-operative diagnosis Same as preop   Procedure: Procedure(s):  left robotic assisted laparoscopic pyelolithotomy  left ureteral stent placement, cystoscopy   Surgeon: Nehemiah Bloom MD   Assistants(s): MD Sincere Jefferson MD Angie Grzeskowiak   Anesthesia: General    Estimated blood loss: Less than 50 ml    Total IV fluids: (See anesthesia record)   Blood transfusion: No transfusion was given during surgery   Total urine output: (See anesthesia record)   Drains: Left ureteral stent  Reyna catheter   Specimens: ID Type Source Tests Collected by Time Destination   1 : Left Renal Stone Calculus/Stone Kidney, Left STONE ANALYSIS Nehemiah Bloom MD 9/28/2020  3:12 PM         Implants: Implant Name Type Inv. Item Serial No.  Lot No. LRB No. Used Action   STENT URETERAL CONTOUR SOFT PERCUFLEX [id number] Stent STENT URETERAL CONTOUR SOFT PERCUFLEX [id number]  [employer] [id number] Left 1 Implanted      Findings: 2 cm left renal pelvis stone, removed intact.    Trilobar obstructive hypertrophy with ~4 cm prostatic urethral length   Complications: none.   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 An extensive discussion was had with the patient regarding the risks and benefits of different surgical approaches for treatment of the stone. Surgical approaches including extracorporeal shockwave lithotripsy, ureteroscopy with laser lithotripsy, percutaneous nephrolithotomy, laparoscopic pyelolithotomy were discussed. The stone burden is too large for ESWL, and ureteroscopy would require multiple sessions to get the  patient stone free. The patient is on anticoagulation and restarting anticoagulation would be delayed after dilation of a percutaneous renal tract. Given the altered anatomy and the potential difficulties of obtaining suitable access for percutaneous nephrolithotomy, I proposed left robotic assisted laparoscopic pyelolithotomy with stent placement. Risks and benefits were discussed and informed consent was obtained.    The patient was brought to OR #42.  Weight and renal appropriate dose of antibiotics given prior to the procedure.  General anesthesia was induced, and his condom catheter was removed and an indwelling Reyna catheter was placed. He was positioned in the modified left side up flank position with all pressure points padded, with the left arm to his side.  He was then shaved, prepped and draped in standard sterile fashion.  Timeout was performed.    Initial entry to the abdomen was obtained at the approximate level of the 11th rib at the estimated lateral border of the rectus muscle.  Previously placed pacer wires were noted running in the left upper quadrant and were carefully avoided.  Incision was made sharply through the skin and a Veress needle was used to obtain CO2 pneumoperitoneum.  Three additional ports were then placed under direct vision: A left hand 8 mm robotic port about a handsbreadth cephalad to the camera port, a right hand 8 mm robotic port approximately two thirds of the way between the umbilicus and the anterior superior iliac spine, and a 12 mm air seal assistant port about a handsbreadth caudal and medial to the camera port.  The da Kitty XI robot was then docked.    The descending colon was then reflected medially starting at the white line of Toldt.  The underlying kidney was visualized and the perinephric fat was very thin. The ureter was able to be visualized peristalsing through Gerota's and the perinephric fat. Gerota's fascia was opened up over the ureter and the perinephric  fat was carefully dissected off of the dilated and malrotated left renal pelvis. A 2 cm incision was made transversely over the left renal pelvis and a single 2 cm left renal stone was seen within the pelvis. This was carefully milked out of the renal pelvis intact, and placed in an Endocatch bag. A 3-0 vicryl suture was used to close the renal pelvis in a running fashion. Prior to completion of the closure a 6 Fr x 28 cm ureteral stent was passed down the ureter with the assistance of a glidewire; the wire was removed and the proximal coil was tucked into the renal pelvis. The renal pelvis closure appeared watertight. A 19 Fr John drain was placed through the left hand robotic port and left down over the renal pelvis; the drain was secured to the skin with a drain suture. The patient was desufflated and the bagged stone was removed via the 12 mm port without having to extend the incision. The 12 mm port was closed with a 2-0 Vicryl UR-6 figure of eight. The skin was closed with 4-0 monocryl. Local anesthetic was injected around the port sites. Exofin surgical glue was used for dressing. A drain dressing was placed and a MAIKEL drain bulb was placed to suction. The patient was cleaned up, undraped and put back into supine position.    The existing Reyna was removed. The genitalia were prepped with betadine and he was redraped. An Olympus flexible cystoscope was assembled and passed through the urethra into the bladder. The anterior urethra was normal. The prostate showed trilobar obstructive hypertrophy, ~4 cm length. The bladder neck was open. The stent coil was seen emanating from the left ureteral orifice. There were no obvious bladder stones or urothelial lesions. The cystoscope was removed. A new Reyna catheter was placed. The patient was cleaned up, awoken from anesthesia and brought to PACU in stable condition.    - Plan for admission overnight  - likely Reyna and drain removal prior to discharge  - Stent  removal in 4 weeks in clinic    The assistance of a second staff surgeon was necessary due to the patient's complex anatomy with malrotation of the right kidney causing the usual surgical landmarks to become distorted    Nehemiah Bloom MD   [facility]  [phone] clinic phone

## 2020-09-29 VITALS
HEIGHT: 74 IN | OXYGEN SATURATION: 95 % | BODY MASS INDEX: 23.27 KG/M2 | DIASTOLIC BLOOD PRESSURE: 58 MMHG | TEMPERATURE: 96.8 F | RESPIRATION RATE: 18 BRPM | HEART RATE: 59 BPM | WEIGHT: 181.3 LBS | SYSTOLIC BLOOD PRESSURE: 105 MMHG

## 2020-09-29 LAB
ANION GAP SERPL CALCULATED.3IONS-SCNC: 3 MMOL/L (ref 3–14)
BUN SERPL-MCNC: 12 MG/DL (ref 7–30)
CALCIUM SERPL-MCNC: 8.2 MG/DL (ref 8.5–10.1)
CHLORIDE SERPL-SCNC: 110 MMOL/L (ref 94–109)
CO2 SERPL-SCNC: 28 MMOL/L (ref 20–32)
CREAT SERPL-MCNC: 0.85 MG/DL (ref 0.66–1.25)
ERYTHROCYTE [DISTWIDTH] IN BLOOD BY AUTOMATED COUNT: 13.5 % (ref 10–15)
GFR SERPL CREATININE-BSD FRML MDRD: >90 ML/MIN/{1.73_M2}
GLUCOSE SERPL-MCNC: 75 MG/DL (ref 70–99)
HCT VFR BLD AUTO: 37.1 % (ref 40–53)
HGB BLD-MCNC: 12.1 G/DL (ref 13.3–17.7)
MCH RBC QN AUTO: 28.9 PG (ref 26.5–33)
MCHC RBC AUTO-ENTMCNC: 32.6 G/DL (ref 31.5–36.5)
MCV RBC AUTO: 89 FL (ref 78–100)
PLATELET # BLD AUTO: 155 10E9/L (ref 150–450)
POTASSIUM SERPL-SCNC: 4.1 MMOL/L (ref 3.4–5.3)
RBC # BLD AUTO: 4.18 10E12/L (ref 4.4–5.9)
SODIUM SERPL-SCNC: 141 MMOL/L (ref 133–144)
WBC # BLD AUTO: 5.9 10E9/L (ref 4–11)

## 2020-09-29 PROCEDURE — 36415 COLL VENOUS BLD VENIPUNCTURE: CPT | Performed by: STUDENT IN AN ORGANIZED HEALTH CARE EDUCATION/TRAINING PROGRAM

## 2020-09-29 PROCEDURE — 80048 BASIC METABOLIC PNL TOTAL CA: CPT | Performed by: STUDENT IN AN ORGANIZED HEALTH CARE EDUCATION/TRAINING PROGRAM

## 2020-09-29 PROCEDURE — 85027 COMPLETE CBC AUTOMATED: CPT | Performed by: STUDENT IN AN ORGANIZED HEALTH CARE EDUCATION/TRAINING PROGRAM

## 2020-09-29 PROCEDURE — 25000128 H RX IP 250 OP 636: Performed by: STUDENT IN AN ORGANIZED HEALTH CARE EDUCATION/TRAINING PROGRAM

## 2020-09-29 PROCEDURE — 25800030 ZZH RX IP 258 OP 636: Performed by: STUDENT IN AN ORGANIZED HEALTH CARE EDUCATION/TRAINING PROGRAM

## 2020-09-29 PROCEDURE — 25000132 ZZH RX MED GY IP 250 OP 250 PS 637: Mod: GY | Performed by: STUDENT IN AN ORGANIZED HEALTH CARE EDUCATION/TRAINING PROGRAM

## 2020-09-29 RX ORDER — TAMSULOSIN HYDROCHLORIDE 0.4 MG/1
0.4 CAPSULE ORAL DAILY
Qty: 30 CAPSULE | Refills: 0 | Status: SHIPPED | OUTPATIENT
Start: 2020-09-29 | End: 2020-10-29

## 2020-09-29 RX ORDER — AMOXICILLIN 250 MG
1 CAPSULE ORAL 2 TIMES DAILY
Qty: 28 TABLET | Refills: 0 | Status: ON HOLD | OUTPATIENT
Start: 2020-09-29 | End: 2020-10-03

## 2020-09-29 RX ORDER — OXYCODONE HYDROCHLORIDE 5 MG/1
5-10 TABLET ORAL
Qty: 12 TABLET | Refills: 0 | Status: SHIPPED | OUTPATIENT
Start: 2020-09-29 | End: 2020-10-27

## 2020-09-29 RX ADMIN — LEVETIRACETAM 1000 MG: 500 TABLET ORAL at 08:05

## 2020-09-29 RX ADMIN — KETOROLAC TROMETHAMINE 15 MG: 15 INJECTION, SOLUTION INTRAMUSCULAR; INTRAVENOUS at 00:28

## 2020-09-29 RX ADMIN — BACLOFEN 20 MG: 20 TABLET ORAL at 12:25

## 2020-09-29 RX ADMIN — CITALOPRAM HYDROBROMIDE 30 MG: 20 TABLET ORAL at 08:06

## 2020-09-29 RX ADMIN — ACETAMINOPHEN 975 MG: 325 TABLET, FILM COATED ORAL at 00:28

## 2020-09-29 RX ADMIN — ATORVASTATIN CALCIUM 20 MG: 20 TABLET, FILM COATED ORAL at 08:07

## 2020-09-29 RX ADMIN — MIRABEGRON 50 MG: 50 TABLET, FILM COATED, EXTENDED RELEASE ORAL at 08:05

## 2020-09-29 RX ADMIN — ACETAMINOPHEN 975 MG: 325 TABLET, FILM COATED ORAL at 05:24

## 2020-09-29 RX ADMIN — FAMOTIDINE 20 MG: 20 TABLET ORAL at 08:06

## 2020-09-29 RX ADMIN — BACLOFEN 20 MG: 20 TABLET ORAL at 08:06

## 2020-09-29 RX ADMIN — ACETAMINOPHEN 975 MG: 325 TABLET, FILM COATED ORAL at 12:25

## 2020-09-29 RX ADMIN — SODIUM CHLORIDE: 9 INJECTION, SOLUTION INTRAVENOUS at 02:30

## 2020-09-29 RX ADMIN — KETOROLAC TROMETHAMINE 15 MG: 15 INJECTION, SOLUTION INTRAMUSCULAR; INTRAVENOUS at 05:25

## 2020-09-29 NOTE — PROVIDER NOTIFICATION
MD Notification    Notified Person: MD    Notified Person Name:  Silvestre    Notification Date/Time: 9/29/2020 8353    Notification Interaction: Telephone    Purpose of Notification: Patient voided 50cc from condom catheter, bladder scanned for 275cc. Is patient okay to discharge?    Orders Received: Okay to discharge. Monitor urine output at home, retention likely due to neurogenic bladder.     Comments:

## 2020-09-29 NOTE — PLAN OF CARE
Patient discharged at 2:55 PM to home.  IV x2 was discontinued. Pain at time of discharge was controlled with tylenol. Belongings returned to patient.  Discharge instructions and medications reviewed with patient and spouse.  Patient verbalized understanding and all questions were answered.  Prescriptions given to patient.  At time of discharge, patient condition was stable and left the unit in wheelchair escorted by KISHORE.

## 2020-09-29 NOTE — PROGRESS NOTES
"Urology  Progress Note    No acute events overnight  Pain well controlled not requiring narcotic pain medications  Denies nausea/vomiting, tolerated fulls for breakfast  Ambulated yesterday without issues    Exam  /58 (BP Location: Right arm)   Pulse 59   Temp 96.8  F (36  C) (Oral)   Resp 18   Ht 1.88 m (6' 2\")   Wt 82.2 kg (181 lb 4.8 oz)   SpO2 95%   BMI 23.28 kg/m    No acute distress  Unlabored breathing  Abdomen soft, nt/nd, incisions c/d/i with dermabond  Lower extremities non-edematous bilaterally  MAIKEL serosanguinous  Reyna clear urine in appearance    /700  MAIKEL 45/30    Labs  WBC 5.9 (5.0)  Hgb 12.1 (12.8)  Cr 0.85 (0.84)      Assessment/Plan  65 year old y/o male POD#1 s/p robotic assisted laparoscopic pyelolithotomy for 2 cm left renal pelvis stone    Neuro: APAP, toradol, prn oxycodone for pain control, home baclofen, home keppra  CV: home metoprolol, statin, no issues  Pulm: incentive spirometry while awake  FEN/GI: Regular diet, wean MIVF  Endo: stable, no issues  :  Discontinue indwelling catheter and switch to home condom catheter today, remove MAIKEL prior to discharge if outputs stable  Heme/ID:  Stable, no issues  Activity: ambulate  PPx: SCDs  Dispo: home today once tolerating diet, catheter and MAIKEL removed, follow up in 4 weeks for stent removal    Will discuss with Dr. Merle Rivers MD  Urology Resident G5  [facility] [phone]    "

## 2020-09-29 NOTE — PLAN OF CARE
A&Ox4. VSS on RA. Tolerated clear liquid diet, ADAT. Pain controlled sched Tyl & Ketorlac, PRN Oxy x1. Reyna with adequate pink output. L MAIKEL to bulb suction with minimal output. L paralysis at baseline, up with A2, GB, cane, L orthosis in room. Stood at bedside. R glass eye. Possible discharge tomorrow.

## 2020-09-29 NOTE — PLAN OF CARE
A&Ox4. VSS on RA ex soft BPs, pt states BP normally low. CAPNO WDL, discontinued. C/o abd pain and chronic back pain, controlled w/ scheduled tylenol and Toradol. 3 lap sites CDI w/ some ecchymosis, L MAIKEL w/ bloody red output, dressing w/ small amt dried drainage. Reyna in plalce with adequate pink UOP. L side paralysis at baseline d/t hx stroke. Tolerating clear liquids, advanced to fulls for breakfast. Up A1 w/ GB, cane, and L orthosis, ambulated halls x1 this AM. R glass eye in place. Possible discharge home tomorrow after trial to void. Slept between cares.

## 2020-09-30 LAB — COPATH REPORT: NORMAL

## 2020-10-02 ENCOUNTER — TELEPHONE (OUTPATIENT)
Dept: UROLOGY | Facility: CLINIC | Age: 65
End: 2020-10-02

## 2020-10-02 ENCOUNTER — TELEPHONE (OUTPATIENT)
Dept: PEDIATRICS | Facility: CLINIC | Age: 65
End: 2020-10-02

## 2020-10-02 ENCOUNTER — OFFICE VISIT (OUTPATIENT)
Dept: UROLOGY | Facility: CLINIC | Age: 65
End: 2020-10-02
Payer: MEDICARE

## 2020-10-02 ENCOUNTER — APPOINTMENT (OUTPATIENT)
Dept: CT IMAGING | Facility: CLINIC | Age: 65
DRG: 683 | End: 2020-10-02
Attending: EMERGENCY MEDICINE
Payer: MEDICARE

## 2020-10-02 ENCOUNTER — NURSE TRIAGE (OUTPATIENT)
Dept: NURSING | Facility: CLINIC | Age: 65
End: 2020-10-02

## 2020-10-02 ENCOUNTER — HOSPITAL ENCOUNTER (OUTPATIENT)
Dept: GENERAL RADIOLOGY | Facility: CLINIC | Age: 65
DRG: 683 | End: 2020-10-02
Attending: STUDENT IN AN ORGANIZED HEALTH CARE EDUCATION/TRAINING PROGRAM
Payer: MEDICARE

## 2020-10-02 ENCOUNTER — VIRTUAL VISIT (OUTPATIENT)
Dept: FAMILY MEDICINE | Facility: CLINIC | Age: 65
End: 2020-10-02
Payer: MEDICARE

## 2020-10-02 ENCOUNTER — HOSPITAL ENCOUNTER (OUTPATIENT)
Dept: LAB | Facility: CLINIC | Age: 65
DRG: 683 | End: 2020-10-02
Attending: STUDENT IN AN ORGANIZED HEALTH CARE EDUCATION/TRAINING PROGRAM
Payer: MEDICARE

## 2020-10-02 ENCOUNTER — HOSPITAL ENCOUNTER (INPATIENT)
Facility: CLINIC | Age: 65
LOS: 2 days | Discharge: HOME | DRG: 683 | End: 2020-10-04
Attending: EMERGENCY MEDICINE | Admitting: INTERNAL MEDICINE
Payer: MEDICARE

## 2020-10-02 DIAGNOSIS — K59.00 CONSTIPATION, UNSPECIFIED CONSTIPATION TYPE: ICD-10-CM

## 2020-10-02 DIAGNOSIS — G81.94 LEFT HEMIPLEGIA (H): ICD-10-CM

## 2020-10-02 DIAGNOSIS — M62.838 MUSCLE SPASTICITY: Primary | ICD-10-CM

## 2020-10-02 DIAGNOSIS — R33.9 URINARY RETENTION: ICD-10-CM

## 2020-10-02 DIAGNOSIS — R14.0 ABDOMINAL DISTENSION (GASEOUS): ICD-10-CM

## 2020-10-02 DIAGNOSIS — R14.0 ABDOMINAL DISTENSION (GASEOUS): Primary | ICD-10-CM

## 2020-10-02 DIAGNOSIS — R11.2 NAUSEA AND VOMITING, INTRACTABILITY OF VOMITING NOT SPECIFIED, UNSPECIFIED VOMITING TYPE: ICD-10-CM

## 2020-10-02 DIAGNOSIS — N20.0 KIDNEY STONE ON LEFT SIDE: ICD-10-CM

## 2020-10-02 DIAGNOSIS — N17.9 ACUTE KIDNEY INJURY (H): ICD-10-CM

## 2020-10-02 LAB
ALBUMIN SERPL-MCNC: 3.5 G/DL (ref 3.4–5)
ALBUMIN UR-MCNC: 30 MG/DL
ALP SERPL-CCNC: 67 U/L (ref 40–150)
ALT SERPL W P-5'-P-CCNC: 26 U/L (ref 0–70)
ANION GAP SERPL CALCULATED.3IONS-SCNC: 7 MMOL/L (ref 3–14)
ANION GAP SERPL CALCULATED.3IONS-SCNC: 8 MMOL/L (ref 3–14)
APPEARANCE UR: ABNORMAL
AST SERPL W P-5'-P-CCNC: 41 U/L (ref 0–45)
BACTERIA #/AREA URNS HPF: ABNORMAL /HPF
BASOPHILS # BLD AUTO: 0 10E9/L (ref 0–0.2)
BASOPHILS NFR BLD AUTO: 0.1 %
BILIRUB SERPL-MCNC: 1.4 MG/DL (ref 0.2–1.3)
BILIRUB UR QL STRIP: NEGATIVE
BLD PROD TYP BPU: NORMAL
BLD PROD TYP BPU: NORMAL
BLD UNIT ID BPU: 0
BLD UNIT ID BPU: 0
BLOOD PRODUCT CODE: NORMAL
BLOOD PRODUCT CODE: NORMAL
BPU ID: NORMAL
BPU ID: NORMAL
BUN SERPL-MCNC: 32 MG/DL (ref 7–30)
BUN SERPL-MCNC: 33 MG/DL (ref 7–30)
CALCIUM SERPL-MCNC: 9.4 MG/DL (ref 8.5–10.1)
CALCIUM SERPL-MCNC: 9.4 MG/DL (ref 8.5–10.1)
CHLORIDE SERPL-SCNC: 97 MMOL/L (ref 94–109)
CHLORIDE SERPL-SCNC: 99 MMOL/L (ref 94–109)
CO2 SERPL-SCNC: 25 MMOL/L (ref 20–32)
CO2 SERPL-SCNC: 26 MMOL/L (ref 20–32)
COLOR UR AUTO: YELLOW
CREAT SERPL-MCNC: 2.62 MG/DL (ref 0.66–1.25)
CREAT SERPL-MCNC: 2.81 MG/DL (ref 0.66–1.25)
DIFFERENTIAL METHOD BLD: ABNORMAL
EOSINOPHIL # BLD AUTO: 0 10E9/L (ref 0–0.7)
EOSINOPHIL NFR BLD AUTO: 0.2 %
ERYTHROCYTE [DISTWIDTH] IN BLOOD BY AUTOMATED COUNT: 13.8 % (ref 10–15)
ERYTHROCYTE [DISTWIDTH] IN BLOOD BY AUTOMATED COUNT: 13.8 % (ref 10–15)
GFR SERPL CREATININE-BSD FRML MDRD: 23 ML/MIN/{1.73_M2}
GFR SERPL CREATININE-BSD FRML MDRD: 25 ML/MIN/{1.73_M2}
GLUCOSE SERPL-MCNC: 114 MG/DL (ref 70–99)
GLUCOSE SERPL-MCNC: 115 MG/DL (ref 70–99)
GLUCOSE UR STRIP-MCNC: NEGATIVE MG/DL
HCT VFR BLD AUTO: 38.7 % (ref 40–53)
HCT VFR BLD AUTO: 39.1 % (ref 40–53)
HGB BLD-MCNC: 12.4 G/DL (ref 13.3–17.7)
HGB BLD-MCNC: 12.4 G/DL (ref 13.3–17.7)
HGB UR QL STRIP: ABNORMAL
IMM GRANULOCYTES # BLD: 0.1 10E9/L (ref 0–0.4)
IMM GRANULOCYTES NFR BLD: 0.5 %
INR PPP: 1.46 (ref 0.86–1.14)
KETONES UR STRIP-MCNC: ABNORMAL MG/DL
LEUKOCYTE ESTERASE UR QL STRIP: ABNORMAL
LYMPHOCYTES # BLD AUTO: 0.4 10E9/L (ref 0.8–5.3)
LYMPHOCYTES NFR BLD AUTO: 3.1 %
MCH RBC QN AUTO: 29.2 PG (ref 26.5–33)
MCH RBC QN AUTO: 29.4 PG (ref 26.5–33)
MCHC RBC AUTO-ENTMCNC: 31.7 G/DL (ref 31.5–36.5)
MCHC RBC AUTO-ENTMCNC: 32 G/DL (ref 31.5–36.5)
MCV RBC AUTO: 92 FL (ref 78–100)
MCV RBC AUTO: 92 FL (ref 78–100)
MONOCYTES # BLD AUTO: 0.9 10E9/L (ref 0–1.3)
MONOCYTES NFR BLD AUTO: 8.3 %
NEUTROPHILS # BLD AUTO: 9.9 10E9/L (ref 1.6–8.3)
NEUTROPHILS NFR BLD AUTO: 87.8 %
NITRATE UR QL: NEGATIVE
NRBC # BLD AUTO: 0 10*3/UL
NRBC BLD AUTO-RTO: 0 /100
PH UR STRIP: 5 PH (ref 5–7)
PLATELET # BLD AUTO: 181 10E9/L (ref 150–450)
PLATELET # BLD AUTO: 182 10E9/L (ref 150–450)
POTASSIUM SERPL-SCNC: 4.6 MMOL/L (ref 3.4–5.3)
POTASSIUM SERPL-SCNC: 4.8 MMOL/L (ref 3.4–5.3)
PROT SERPL-MCNC: 7.1 G/DL (ref 6.8–8.8)
RBC # BLD AUTO: 4.22 10E12/L (ref 4.4–5.9)
RBC # BLD AUTO: 4.24 10E12/L (ref 4.4–5.9)
RBC #/AREA URNS AUTO: >182 /HPF (ref 0–2)
SODIUM SERPL-SCNC: 131 MMOL/L (ref 133–144)
SODIUM SERPL-SCNC: 131 MMOL/L (ref 133–144)
SOURCE: ABNORMAL
SP GR UR STRIP: 1.02 (ref 1–1.03)
TRANSFUSION STATUS PATIENT QL: NORMAL
UROBILINOGEN UR STRIP-MCNC: NORMAL MG/DL (ref 0–2)
WBC # BLD AUTO: 11.3 10E9/L (ref 4–11)
WBC # BLD AUTO: 11.5 10E9/L (ref 4–11)
WBC #/AREA URNS AUTO: 22 /HPF (ref 0–5)

## 2020-10-02 PROCEDURE — 93005 ELECTROCARDIOGRAM TRACING: CPT

## 2020-10-02 PROCEDURE — 99223 1ST HOSP IP/OBS HIGH 75: CPT | Mod: AI | Performed by: INTERNAL MEDICINE

## 2020-10-02 PROCEDURE — 99285 EMERGENCY DEPT VISIT HI MDM: CPT | Mod: 25

## 2020-10-02 PROCEDURE — U0003 INFECTIOUS AGENT DETECTION BY NUCLEIC ACID (DNA OR RNA); SEVERE ACUTE RESPIRATORY SYNDROME CORONAVIRUS 2 (SARS-COV-2) (CORONAVIRUS DISEASE [COVID-19]), AMPLIFIED PROBE TECHNIQUE, MAKING USE OF HIGH THROUGHPUT TECHNOLOGIES AS DESCRIBED BY CMS-2020-01-R: HCPCS | Performed by: EMERGENCY MEDICINE

## 2020-10-02 PROCEDURE — 74176 CT ABD & PELVIS W/O CONTRAST: CPT

## 2020-10-02 PROCEDURE — C9803 HOPD COVID-19 SPEC COLLECT: HCPCS

## 2020-10-02 PROCEDURE — 80053 COMPREHEN METABOLIC PANEL: CPT | Performed by: EMERGENCY MEDICINE

## 2020-10-02 PROCEDURE — 96360 HYDRATION IV INFUSION INIT: CPT

## 2020-10-02 PROCEDURE — 81001 URINALYSIS AUTO W/SCOPE: CPT | Performed by: EMERGENCY MEDICINE

## 2020-10-02 PROCEDURE — 87086 URINE CULTURE/COLONY COUNT: CPT | Performed by: EMERGENCY MEDICINE

## 2020-10-02 PROCEDURE — 87088 URINE BACTERIA CULTURE: CPT | Performed by: EMERGENCY MEDICINE

## 2020-10-02 PROCEDURE — 120N000004 HC R&B MS OVERFLOW

## 2020-10-02 PROCEDURE — 74018 RADEX ABDOMEN 1 VIEW: CPT

## 2020-10-02 PROCEDURE — 80048 BASIC METABOLIC PNL TOTAL CA: CPT | Performed by: STUDENT IN AN ORGANIZED HEALTH CARE EDUCATION/TRAINING PROGRAM

## 2020-10-02 PROCEDURE — 258N000003 HC RX IP 258 OP 636: Performed by: EMERGENCY MEDICINE

## 2020-10-02 PROCEDURE — 84300 ASSAY OF URINE SODIUM: CPT | Performed by: INTERNAL MEDICINE

## 2020-10-02 PROCEDURE — 83935 ASSAY OF URINE OSMOLALITY: CPT | Performed by: INTERNAL MEDICINE

## 2020-10-02 PROCEDURE — 87186 SC STD MICRODIL/AGAR DIL: CPT | Performed by: EMERGENCY MEDICINE

## 2020-10-02 PROCEDURE — 51702 INSERT TEMP BLADDER CATH: CPT

## 2020-10-02 PROCEDURE — 99024 POSTOP FOLLOW-UP VISIT: CPT | Performed by: STUDENT IN AN ORGANIZED HEALTH CARE EDUCATION/TRAINING PROGRAM

## 2020-10-02 PROCEDURE — 82570 ASSAY OF URINE CREATININE: CPT | Performed by: INTERNAL MEDICINE

## 2020-10-02 PROCEDURE — 99443 PR PHYSICIAN TELEPHONE EVALUATION 21-30 MIN: CPT | Mod: 95 | Performed by: INTERNAL MEDICINE

## 2020-10-02 PROCEDURE — 36415 COLL VENOUS BLD VENIPUNCTURE: CPT | Performed by: STUDENT IN AN ORGANIZED HEALTH CARE EDUCATION/TRAINING PROGRAM

## 2020-10-02 PROCEDURE — 85025 COMPLETE CBC W/AUTO DIFF WBC: CPT | Performed by: EMERGENCY MEDICINE

## 2020-10-02 PROCEDURE — 85027 COMPLETE CBC AUTOMATED: CPT | Performed by: STUDENT IN AN ORGANIZED HEALTH CARE EDUCATION/TRAINING PROGRAM

## 2020-10-02 PROCEDURE — 85610 PROTHROMBIN TIME: CPT | Performed by: EMERGENCY MEDICINE

## 2020-10-02 RX ORDER — SODIUM CHLORIDE 9 MG/ML
INJECTION, SOLUTION INTRAVENOUS CONTINUOUS
Status: DISCONTINUED | OUTPATIENT
Start: 2020-10-02 | End: 2020-10-03

## 2020-10-02 RX ORDER — ONDANSETRON 2 MG/ML
4 INJECTION INTRAMUSCULAR; INTRAVENOUS EVERY 30 MIN PRN
Status: DISCONTINUED | OUTPATIENT
Start: 2020-10-02 | End: 2020-10-03

## 2020-10-02 RX ADMIN — SODIUM CHLORIDE 1000 ML: 9 INJECTION, SOLUTION INTRAVENOUS at 19:30

## 2020-10-02 ASSESSMENT — ENCOUNTER SYMPTOMS
NAUSEA: 1
VOMITING: 1
ABDOMINAL PAIN: 1
CONSTIPATION: 0

## 2020-10-02 NOTE — RESULT ENCOUNTER NOTE
I called patient with results. He has PAULETTE, presumably from evolving postoperative ileus with associated nausea and vomiting. He needs to present to the emergency department for further evaluation, and admission for IV hydration    Nehemiah Bloom MD   [facility]  [phone] clinic phone

## 2020-10-02 NOTE — PATIENT INSTRUCTIONS
Go get a KUB x-ray to check for ileus    Get blood tests to check for dehydration    Take clear liquids only    Add more stool softeners and try a suppository    If having intractable nausea and vomiting and cannot keep fluids down, need to go to ER

## 2020-10-02 NOTE — PROGRESS NOTES
CHIEF COMPLAINT   Marcus Hodges who is a 65 year old male returns today for follow-up POD#4 s/p robotic assisted left pyelolithotomy    HPI   Marcus Hodges is a 65 year old male with h/o neurogenic bladder due to stroke 2012, ASD s/p remote repair, h/o a. flutter s/p RF ablation 2004, afib, on eliquis for stroke prevention who presents with a history of left renal stone now POD#4 s/p robotic assisted left pyelolithotomy, here for followup.    Has had one BM since the surgery (yesterday, large soft BM). H/o neurogenic bowel and bladder from stroke so commonly has issues with constipation. Today noticing worsening abdominal distension, having some nausea and vomiting, small volumes. No fever or chills. Having some blood tinged urine output in condom cath    PHYSICAL EXAM  Patient is a 65 year old  male   Vitals: There were no vitals taken for this visit.  There is no height or weight on file to calculate BMI.  General Appearance Adult:   NAD  HENT: throat/mouth:normal, good dentition  Lungs: no respiratory distress, or pursed lip breathing  Heart: No obvious jugular venous distension present  Abdomen: abdomen softly distended, nontender, no peritoneal signs  Incisions c/d/i; former drain site has now healed up nicely  Back:  no  Musculoskeltal: left upper extremity contracture, left AFO in place  Skin: no suspicious lesions or rashes  Neuro: Alert, oriented, speech and mentation normal  Psych: affect and mood normal  Gait: shuffling gait, s/p stroke with residual left sided weakness  : condom catheter in place, clear pink urine in bag    ASSESSMENT and PLAN  65 year old male with h/o neurogenic bladder due to stroke 2012, ASD s/p remote repair, h/o a. flutter s/p RF ablation 2004, afib, on eliquis for stroke prevention who presents with a history of left renal stone now POD#4 s/p robotic assisted left pyelolithotomy, here for followup. Gaseous distension of abdomen w/ nausea and vomiting suspicious for  postoperative ileus. Benign exam not concerning for peritonitis    Recommended clear liquid diet, aggressive bowel regimen, patient has multiple stool softeners at home. Would also recommend suppository    Check KUB, CBC, BMP stat    If KUB shows severe ileus, he has any concerning lab abnormalities, or if having persistent nausea and vomiting, would recommend admission for treatment of ileus    Nehemiah Bloom MD   [facility]  [facility] Phone: [phone]

## 2020-10-02 NOTE — PROGRESS NOTES
"Marcus Hodges is a 65 year old male who is being evaluated via a billable telephone visit.      The patient has been notified of following:     \"This telephone visit will be conducted via a call between you and your physician/provider. We have found that certain health care needs can be provided without the need for a physical exam.  This service lets us provide the care you need with a short phone conversation.  If a prescription is necessary we can send it directly to your pharmacy.  If lab work is needed we can place an order for that and you can then stop by our lab to have the test done at a later time.    Telephone visits are billed at different rates depending on your insurance coverage. During this emergency period, for some insurers they may be billed the same as an in-person visit.  Please reach out to your insurance provider with any questions.    If during the course of the call the physician/provider feels a telephone visit is not appropriate, you will not be charged for this service.\"    Patient has given verbal consent for Telephone visit?  Yes    What phone number would you like to be contacted at? [phone]    How would you like to obtain your AVS? MyChart    Subjective     Marcus Hodges is a 65 year old male who presents via phone visit today for the following health issues:    HPI     Concern - spasticity/stiffness   Onset: 9/29/2020  Description: hand and leg - left  Intensity: moderate  Progression of Symptoms:  worsening  Accompanying Signs & Symptoms: trouble sleeping, difficulty bending L leg   Previous history of similar problem: hx stroke affecting L side 2012, hx spastic hemiparesis/spastic hemiplegia, sx began after pyelolithotomy 9/28/20  Precipitating factors:        Worsened by: none  Alleviating factors:        Improved by: none  Therapies tried and outcome:  botox, heat, baclofen - previously helpful     Urology procedure for Nephrolithiasis and has been having severe muscle " spasm on both left hand and left leg. Now after the procedure has developed rope like rigidity of both leg and arm.     Constipation is also bothering him much.       Allergies   Allergen Reactions     Blood Transfusion Related (Informational Only) Other (See Comments)     Patient has a history of a clinically significant antibody against RBC antigens.  A delay in compatible RBCs may occur.     Lisinopril      No Known Drug Allergies         Past Medical History:   Diagnosis Date     * * * SBE PROPHYLAXIS * * *      Atrial enlargement, bilateral      Atrial flutter (H) 2004    radiofrequency ablation 2004, resolved     ATRIAL SEPTAL DEFECT     repaired 1977     CVA (cerebral vascular accident) (H) 4/2012    R MCA     Dysphagia 04/22/12    [facility], following CVA     Hernia, abdominal      History of thrombophlebitis      Mitral regurgitation     mitral valve damage related to ASD repair     Mumps      Palpitations      Respiratory failure (H) 04/22/12    [facility], following CVA, prolonged requiring tracheostomy, [facility]      Tricuspid regurgitation      Unspecified glaucoma(365.9)     right     Urinary incontinence        Past Surgical History:   Procedure Laterality Date     C NONSPECIFIC PROCEDURE      tonsillectomy     C NONSPECIFIC PROCEDURE      Ing. Hernia Repair     CATHETER, ABLATION  2004     COMBINED CYSTOSCOPY, INSERT CATHETER URETER  9/28/2020    Procedure: cystoscopy and left ureteral catheter placement, left ureteral stent placement;  Surgeon: Nehemiah Bloom MD;  Location:  OR     Craniotomy [facility]  2012    [facility], repair of hemicraniectomy defect     CYSTOSCOPY       DAVINCI PYELOPLASTY Left 9/28/2020    Procedure: left robotic assisted laparoscopic pyelolithotomy;  Surgeon: Nehemiah Bloom MD;  Location:  OR     GASTROSTOMY TUBE  April 2012    [facility], following CVA     HERNIA REPAIR       REPAIR ATRIAL SEPTAL DEFECT  1978    ASD Repair     Right hemicraniectomy   April 2012    [facility], following CVA, mgmt malignant cerebral edema     SURGICAL HISTORY OF -   2009    right eye enucleation     TRACHEOSTOMY  April 2012    [facility], following CVA       Family History   Problem Relation Age of Onset     Hypertension Mother      Cerebrovascular Disease Father      Cancer Paternal Grandmother      Diabetes Maternal Grandmother      Congenital Anomalies Brother         several brothers and sisters born with congential heart defects, but all have been repaired     Congenital Anomalies Sister        Social History     Tobacco Use     Smoking status: Never Smoker     Smokeless tobacco: Never Used   Substance Use Topics     Alcohol use: No        Current Outpatient Medications   Medication     apixaban ANTICOAGULANT (ELIQUIS ANTICOAGULANT) 5 MG tablet     atorvastatin (LIPITOR) 20 MG tablet     baclofen (LIORESAL) 20 MG tablet     citalopram (CELEXA) 20 MG tablet     docusate sodium (COLACE) 100 MG tablet     levETIRAcetam (KEPPRA) 1000 MG tablet     magnesium hydroxide (MILK OF MAGNESIA) 400 MG/5ML suspension     metoprolol succinate ER (TOPROL-XL) 25 MG 24 hr tablet     Multiple Vitamin (MULTI-VITAMIN) per tablet     MYRBETRIQ 50 MG 24 hr tablet     NONFORMULARY     Omega-3 Fatty Acids (FISH OIL PO)     order for DME     order for DME     order for DME     order for DME     order for DME     order for DME     oxyCODONE (ROXICODONE) 5 MG tablet     Polyethylene Glycol 3350 (MIRALAX PO)     senna-docusate (SENOKOT-S/PERICOLACE) 8.6-50 MG tablet     tamsulosin (FLOMAX) 0.4 MG capsule     vitamin D3 (CHOLECALCIFEROL) 50 mcg (2000 units) tablet     No current facility-administered medications for this visit.         Review of Systems   Constitutional, HEENT, cardiovascular, pulmonary, GI, , musculoskeletal, neuro, skin, endocrine and psych systems are negative, except as otherwise noted.       Objective          Vitals:  No vitals were obtained today due to virtual  visit.    healthy, alert and no distress  PSYCH: Alert and oriented times 3; coherent speech, normal   rate and volume, able to articulate logical thoughts, able   to abstract reason, no tangential thoughts, no hallucinations   or delusions  His affect is normal  RESP: No cough, no audible wheezing, able to talk in full sentences  Remainder of exam unable to be completed due to telephone visits    Labs and imaging reviewed and discussed with the patient.        Assessment/Plan:      Assessment and Plan  1. Muscle spasticity  2. Left hemiplegia (H)  Uncontrolled Spasticity due to possible Unspecified side effect of anesthetic agent after the Urology procedure under general anaesthesia. Have increased the Baclofen moura of 20 mg 4x/day to 5x/day for improvement. To follow up with PCP IN clinic visit if no improvement.    3. Constipation, unspecified constipation type  - magnesium hydroxide (MILK OF MAGNESIA) 400 MG/5ML suspension; Take 15 mLs by mouth daily as needed for constipation or heartburn  Dispense: 360 mL; Refill: 1      Patient Instructions   Increased the dose of your Baclofen to 5x/day at this time given the severe spasticity.     Have added Milk of Magnesia to the Constipation regimen for improvement.     ===============================    Patient Education     Arm Care After a Stroke    After a stroke, many people are left with problems with one of their arms. Proper arm care after a stroke can help treat these problems. It can also help prevent new problems from starting. Arm care may include placing the arms in the proper position, using devices such as a sling or brace, and taking care to prevent further injury during rehabilitation.  How a stroke can cause arm problems  Stroke often causes paralysis (hemiplegia) or weakness (hemiparalysis) of one or more of the muscles in your arm or shoulder. The muscles might feel tight instead of weak (spasticity). In general, stroke might increase or decrease the  normal tension (muscle tone) in these muscles. You may also have numbness or limited feeling in your arm.  The shoulder is often a problem area after a stroke. The shoulder blade (scapula) and the upper arm bone (humerus) come together to form the shoulder joint. This joint is shaped like a ball and socket. Problems with the shoulder muscles can cause this joint to partly dislocate because of the weight of your arm. This partial separation (subluxation) makes your shoulder droop down.  Subluxation can cause pain when you move your arm. You may also feel like your shoulder is out of joint. Muscles, tendons, and ligaments can become overstretched. These muscle problems can lead to other problems with your shoulder. For example, you may not be able to move your shoulder as much as you used to. Some of the muscles may also be permanently shortened. This is called contracture.  How arm care helps  Arm care after a stroke helps prevent and treat problems. If you have had a stroke, it is very likely that you will need some sort of arm care treatment while you recover function. Most people who have a stroke need treatment for trouble with the muscles of their arm or shoulder, and shoulder pain is common. This treatment often starts right after a stroke. Even if you only have minor harm from your stroke, proper arm care can help keep future problems from occurring.  Your treatment plan  Learn everything you can about your treatment plan. Your healthcare team will work with you to design a treatment plan to fit your needs. You may work with a physiatrist. This is a doctor who specializes in rehabilitative medicine. You will likely work with a physical therapist. This is a therapist who can teach you safe exercises to improve the strength, endurance, and range of motion in your arm, shoulder, and hand. An occupational therapist can help you learn to regain skills needed for everyday living using your arm. This may include  using assistive devices, such as braces or arm rails.  Expect your treatment plan to change as you recover. Talk with the members of your medical team about how things are going. If an exercise causes pain, stop the exercise and let someone know right away.  Some people regain full use of their arm in the weeks after a stroke. Many others still have some weakness, pain, or other problems with their arm. You may continue to benefit from arm therapy. Your medical team can tailor your treatment plan to your needs.  Protecting your shoulder joint  Preventing subluxation is one of the most important goals of arm care after a stroke. To prevent this problem, you must protect your arm at the shoulder joint. You will need to control the shoulder joint when you move around. Make sure all of your caregivers know about the proper ways to help you.    Don't let anyone pull on your arm.    Don't let anyone help you stand or move by lifting under your armpits.    Don't lean your body weight on anything in your armpit while standing or walking.    Keep your affected arm supported and immobile when you stand up. Use your strong arm to help pull yourself up.    Keep your arm in a sling or harness after your stroke, if advised.    Support your affected arm while sitting. If you re in a wheelchair, rest it on one of the chair s arm. You can also rest your arm on things such as a lap tray or pillow.  Other types of proper positioning    When lying on your unaffected side, use 1 or 2 pillows for your head. Your affected shoulder should be forward, with your arm resting on a pillow.    When lying on your affected side, use 1 or 2 pillows for your head. Your affected shoulder should be positioned comfortably.    When sitting up, sit fully back into the chair. Place your arms forward onto 2 pillows on a table. Your feet should be flat on the floor.    When lying on your back, place 3 pillows supporting both your shoulders and your head.  Place your affected arm on a pillow.    When sitting in bed, sit upright, well supported by pillows. Place both arms on pillows. This is usually only recommended for limited periods.  Your physical therapist may advise other positions that are safe for you. You may also do physical therapy exercises. These are to help you regain strength and flexibility in your muscles.  Additional treatments  If you continue to have arm problems, your healthcare team might try other treatments such as:    Constraint-induced movement therapy. This involves using your affected arm a lot and not using your unaffected arm. A therapist might help you with this. Or it could be robot-assisted.    Botulinum toxin injections. This can help to reduce tightness in the arm muscles.    Electrical stimulation of muscles. The weakened muscles in your arm or shoulder may be treated with electricity. This can help strengthen your weakened arm.    Electrical stimulation of the brain. This may be done during rehabilitation exercises and may help you be able to move your arm better.    Motor imagery. This method may help you be able to use your arm more easily.    Biofeedback exercises. These may help you be able to move your arm better and reduce pain.    Pain medicine. These may be needed to ease shoulder pain if subluxation has occurred.  Depending on your situation, these treatments might be used early or late in your therapy. Ongoing physical therapy may also help you reduce chronic pain as you regain your strength and flexibility.  Date Last Reviewed: 6/1/2017 2000-2019 [employer]. [address] [place] [address]. All rights reserved. This information is not intended as a substitute for professional medical care. Always follow your healthcare professional's instructions.             Return in about 2 weeks (around 10/16/2020), or if symptoms worsen or fail to improve, for Follow up of last visit with PCPSantos Montez  MD San[facility]  [facility]    Phone call duration:  21 minutes

## 2020-10-02 NOTE — TELEPHONE ENCOUNTER
"Pt's wife (Nidia) calls together with patient.  L hand and L leg \"very spastic.\"  Pt had stroke earlier this year affecting L side.  However Spasticity is a new symptom following urology procedure by surgeon Dr Bloom.    Family already contacted Dr Bloom's office.  Family was told \"You have to contact your primary.\"    Pt has in-person follow-up today with surgeon at 3:15 pm.  Therefore hopes for telephone visit with a primary provider before then ....  Would like to discuss potential plan of action to treat current stiffness and spasticity.  Asks if okay to take \"extra baclofen\" ...  Answer -> not until speaking with a provider.  Warm transferred to a  for an appointment now.    Lakeshia MALHOTRA Health Nurse Advisor     Reason for Disposition    [1] Follow-up call from patient regarding patient's clinical status AND [2] information urgent    Protocols used: PCP CALL - NO TRIAGE-A-    __________________________    COVID 19 Nurse Triage Plan/Patient Instructions    Please be aware that novel coronavirus (COVID-19) may be circulating in the community. If you develop symptoms such as fever, cough, or SOB or if you have concerns about the presence of another infection including coronavirus (COVID-19), please contact your health care provider or visit [email or web address].     Disposition/Instructions    Additional COVID19 information to add for patients.   How can I protect others?  If you have symptoms (fever, cough, body aches or trouble breathing): Stay home and away from others (self-isolate) until:    At least 10 days have passed since your symptoms started, And     You ve had no fever--and no medicine that reduces fever--for 1 full day (24 hours), And      Your other symptoms have resolved (gotten better).     If you don t have symptoms, but a test showed that you have COVID-19 (you tested positive):    Stay home and away from others (self-isolate). Follow the tips under \"How do I self-isolate?\" below for 10 " days (20 days if you have a weak immune system).    You don't need to be retested for COVID-19 before going back to school or work. As long as you're fever-free and feeling better, you can go back to school, work and other activities after waiting the 10 or 20 days.     How do I self-isolate?    Stay in your own room, even for meals. Use your own bathroom if you can.     Stay away from others in your home. No hugging, kissing or shaking hands. No visitors.    Don t go to work, school or anywhere else.     Clean  high touch  surfaces often (doorknobs, counters, handles, etc.). Use a household cleaning spray or wipes. You ll find a full list on the EPA website:  www.epa.gov/pesticide-registration/list-n-disinfectants-use-against-sars-cov-2.    Cover your mouth and nose with a mask, tissue or washcloth to avoid spreading germs.    Wash your hands and face often. Use soap and water.    Caregivers in these groups are at risk for severe illness due to COVID-19:  o People 65 years and older  o People who live in a nursing home or long-term care facility  o People with chronic disease (lung, heart, cancer, diabetes, kidney, liver, immunologic)  o People who have a weakened immune system, including those who:  - Are in cancer treatment  - Take medicine that weakens the immune system, such as corticosteroids  - Had a bone marrow or organ transplant  - Have an immune deficiency  - Have poorly controlled HIV or AIDS  - Are obese (body mass index of 40 or higher)  - Smoke regularly    Caregivers should wear gloves while washing dishes, handling laundry and cleaning bedrooms and bathrooms.    Use caution when washing and drying laundry: Don t shake dirty laundry, and use the warmest water setting that you can.    For more tips, go to www.cdc.gov/coronavirus/2019-ncov/downloads/10Things.pdf.    How can I take care of myself?  1. Get lots of rest. Drink extra fluids (unless a doctor has told you not to).     2. Take Tylenol  (acetaminophen) for fever or pain. If you have liver or kidney problems, ask your family doctor if it s okay to take Tylenol.     Adults can take either:     650 mg (two 325 mg pills) every 4 to 6 hours, or     1,000 mg (two 500 mg pills) every 8 hours as needed.     Note: Don t take more than 3,000 mg in one day.   Acetaminophen is found in many medicines (both prescribed and over-the-counter medicines). Read all labels to be sure you don t take too much.     For children, check the Tylenol bottle for the right dose. The dose is based on the child s age or weight.    3. If you have other health problems (like cancer, heart failure, an organ transplant or severe kidney disease): Call your specialty clinic if you don t feel better in the next 2 days.    4. Know when to call 911: Emergency warning signs include:    Trouble breathing or shortness of breath    Pain or pressure in the chest that doesn t go away    Feeling confused like you haven t felt before, or not being able to wake up    Bluish-colored lips or face    What are the symptoms of COVID-19?     The most common symptoms are cough, fever and trouble breathing.     Less common symptoms include body aches, chills, diarrhea (loose, watery poops), fatigue (feeling very tired), headache, runny nose, sore throat and loss of smell.    COVID-19 can cause severe coughing (bronchitis) and lung infection (pneumonia).    How does it spread?     The virus may spread when a person coughs or sneezes into the air. The virus can travel about 6 feet this way, and it can live on surfaces.      Common  (household disinfectants) will kill the virus.    Who is at risk?  Anyone can catch COVID-19 if they re around someone who has the virus.    How can others protect themselves?     Stay away from people who have COVID-19 (or symptoms of COVID-19).    Wash hands often with soap and water. Or, use hand  with at least 60% alcohol.    Avoid touching the eyes, nose or  mouth.     Wear a face mask when you go out in public, when sick or when caring for a sick person.    Where can I get more information?    [facility]: About COVID-19: [email or web address]    CDC: What to Do If You re Sick: www.cdc.gov/coronavirus/2019-ncov/about/steps-when-sick.html    CDC: Ending Home Isolation: www.cdc.gov/coronavirus/2019-ncov/hcp/disposition-in-home-patients.html     CDC: Caring for Someone: www.cdc.gov/coronavirus/2019-ncov/if-you-are-sick/care-for-someone.html     [facility]: Interim Guidance for Hospital Discharge to Home: www.[facility]./diseases/coronavirus/hcp/hospdischarge.pdf    [facility] clinical trials (COVID-19 research studies): clinicalaffairs.[facility]-clinical-trials     Below are the COVID-19 hotlines at the [place] [facility] ([facility]). Interpreters are available.   o For health questions: Call 724-609-5160 or 1-904.430.1821 (7 a.m. to 7 p.m.)  o For questions about schools and childcare: Call 679-585-2285 or 1-297.101.8951 (7 a.m. to 7 p.m.)          Thank you for taking steps to prevent the spread of this virus.  o Limit your contact with others.  o Wear a simple mask to cover your cough.  o Wash your hands well and often.    Resources    [facility]: About COVID-19: [email or web address]    CDC: What to Do If You're Sick: www.cdc.gov/coronavirus/2019-ncov/about/steps-when-sick.html    CDC: Ending Home Isolation: www.cdc.gov/coronavirus/2019-ncov/hcp/disposition-in-home-patients.html     CDC: Caring for Someone: www.cdc.gov/coronavirus/2019-ncov/if-you-are-sick/care-for-someone.html     [facility]: Interim Guidance for Hospital Discharge to Home: www.[facility]./diseases/coronavirus/hcp/hospdischarge.pdf    [facility] clinical trials (COVID-19 research studies): clinicalaffairs.[facility]-clinical-trials     Below are the COVID-19 hotlines at the [facility] of Health ([facility]). Interpreters are  available.   o For health questions: Call 851-063-5083 or 1-795.554.9965 (7 a.m. to 7 p.m.)  o For questions about schools and childcare: Call 290-167-6122 or 1-222.977.6002 (7 a.m. to 7 p.m.)

## 2020-10-02 NOTE — TELEPHONE ENCOUNTER
General Call:     Who is calling:  Wife Tatum    Reason for Call: asking to talk to a nurse    What are your questions or concerns:  Had surgery Monday and is not unable to bend leg. It is very stiff.     Date of last appointment with provider: n/a    Okay to leave a detailed message:Yes at Cell number on file:    Telephone Information:   Mobile [phone]

## 2020-10-02 NOTE — TELEPHONE ENCOUNTER
Patient called nurse line and reports that he is having a 6 or 7 out of ten pain. He is having a hard time sleeping. He is trying to avoid taking the Oxycodone. He only took a half a pill last night. Patient is also concerned about constipation with narcotic. Informed patient that he was prescribed senna for this reason. Patient reports that he had a BM yesterday. Informed him to monitor for BM's and call if he has not produced another one in a few days. Will send message to MD. Carly Saxena LPN

## 2020-10-02 NOTE — PATIENT INSTRUCTIONS
Increased the dose of your Baclofen to 5x/day at this time given the severe spasticity.     Have added Milk of Magnesia to the Constipation regimen for improvement.     ===============================    Patient Education     Arm Care After a Stroke    After a stroke, many people are left with problems with one of their arms. Proper arm care after a stroke can help treat these problems. It can also help prevent new problems from starting. Arm care may include placing the arms in the proper position, using devices such as a sling or brace, and taking care to prevent further injury during rehabilitation.  How a stroke can cause arm problems  Stroke often causes paralysis (hemiplegia) or weakness (hemiparalysis) of one or more of the muscles in your arm or shoulder. The muscles might feel tight instead of weak (spasticity). In general, stroke might increase or decrease the normal tension (muscle tone) in these muscles. You may also have numbness or limited feeling in your arm.  The shoulder is often a problem area after a stroke. The shoulder blade (scapula) and the upper arm bone (humerus) come together to form the shoulder joint. This joint is shaped like a ball and socket. Problems with the shoulder muscles can cause this joint to partly dislocate because of the weight of your arm. This partial separation (subluxation) makes your shoulder droop down.  Subluxation can cause pain when you move your arm. You may also feel like your shoulder is out of joint. Muscles, tendons, and ligaments can become overstretched. These muscle problems can lead to other problems with your shoulder. For example, you may not be able to move your shoulder as much as you used to. Some of the muscles may also be permanently shortened. This is called contracture.  How arm care helps  Arm care after a stroke helps prevent and treat problems. If you have had a stroke, it is very likely that you will need some sort of arm care treatment while  you recover function. Most people who have a stroke need treatment for trouble with the muscles of their arm or shoulder, and shoulder pain is common. This treatment often starts right after a stroke. Even if you only have minor harm from your stroke, proper arm care can help keep future problems from occurring.  Your treatment plan  Learn everything you can about your treatment plan. Your healthcare team will work with you to design a treatment plan to fit your needs. You may work with a physiatrist. This is a doctor who specializes in rehabilitative medicine. You will likely work with a physical therapist. This is a therapist who can teach you safe exercises to improve the strength, endurance, and range of motion in your arm, shoulder, and hand. An occupational therapist can help you learn to regain skills needed for everyday living using your arm. This may include using assistive devices, such as braces or arm rails.  Expect your treatment plan to change as you recover. Talk with the members of your medical team about how things are going. If an exercise causes pain, stop the exercise and let someone know right away.  Some people regain full use of their arm in the weeks after a stroke. Many others still have some weakness, pain, or other problems with their arm. You may continue to benefit from arm therapy. Your medical team can tailor your treatment plan to your needs.  Protecting your shoulder joint  Preventing subluxation is one of the most important goals of arm care after a stroke. To prevent this problem, you must protect your arm at the shoulder joint. You will need to control the shoulder joint when you move around. Make sure all of your caregivers know about the proper ways to help you.    Don't let anyone pull on your arm.    Don't let anyone help you stand or move by lifting under your armpits.    Don't lean your body weight on anything in your armpit while standing or walking.    Keep your affected  arm supported and immobile when you stand up. Use your strong arm to help pull yourself up.    Keep your arm in a sling or harness after your stroke, if advised.    Support your affected arm while sitting. If you re in a wheelchair, rest it on one of the chair s arm. You can also rest your arm on things such as a lap tray or pillow.  Other types of proper positioning    When lying on your unaffected side, use 1 or 2 pillows for your head. Your affected shoulder should be forward, with your arm resting on a pillow.    When lying on your affected side, use 1 or 2 pillows for your head. Your affected shoulder should be positioned comfortably.    When sitting up, sit fully back into the chair. Place your arms forward onto 2 pillows on a table. Your feet should be flat on the floor.    When lying on your back, place 3 pillows supporting both your shoulders and your head. Place your affected arm on a pillow.    When sitting in bed, sit upright, well supported by pillows. Place both arms on pillows. This is usually only recommended for limited periods.  Your physical therapist may advise other positions that are safe for you. You may also do physical therapy exercises. These are to help you regain strength and flexibility in your muscles.  Additional treatments  If you continue to have arm problems, your healthcare team might try other treatments such as:    Constraint-induced movement therapy. This involves using your affected arm a lot and not using your unaffected arm. A therapist might help you with this. Or it could be robot-assisted.    Botulinum toxin injections. This can help to reduce tightness in the arm muscles.    Electrical stimulation of muscles. The weakened muscles in your arm or shoulder may be treated with electricity. This can help strengthen your weakened arm.    Electrical stimulation of the brain. This may be done during rehabilitation exercises and may help you be able to move your arm  better.    Motor imagery. This method may help you be able to use your arm more easily.    Biofeedback exercises. These may help you be able to move your arm better and reduce pain.    Pain medicine. These may be needed to ease shoulder pain if subluxation has occurred.  Depending on your situation, these treatments might be used early or late in your therapy. Ongoing physical therapy may also help you reduce chronic pain as you regain your strength and flexibility.  Date Last Reviewed: 6/1/2017 2000-2019 [employer]. [address] [place] [address], [place] [address]. All rights reserved. This information is not intended as a substitute for professional medical care. Always follow your healthcare professional's instructions.

## 2020-10-02 NOTE — TELEPHONE ENCOUNTER
Called and left message for wife requesting a call back if further assistance is needed after appointment today.    Tatum Schaefer RN on 10/2/2020 at 3:03 PM

## 2020-10-02 NOTE — LETTER
10/2/2020       RE: Marcus Hodges  [address] [place] [address]     Dear Colleague,    Thank you for referring your patient, Marcus Hodges, to the [facility] [place] at [facility]. Please see a copy of my visit note below.    CHIEF COMPLAINT   Marcus Hodges who is a 65 year old male returns today for follow-up POD#4 s/p robotic assisted left pyelolithotomy    HPI   Marcus Hodges is a 65 year old male with h/o neurogenic bladder due to stroke 2012, ASD s/p remote repair, h/o a. flutter s/p RF ablation 2004, afib, on eliquis for stroke prevention who presents with a history of left renal stone now POD#4 s/p robotic assisted left pyelolithotomy, here for followup.    Has had one BM since the surgery (yesterday, large soft BM). H/o neurogenic bowel and bladder from stroke so commonly has issues with constipation. Today noticing worsening abdominal distension, having some nausea and vomiting, small volumes. No fever or chills. Having some blood tinged urine output in condom cath    PHYSICAL EXAM  Patient is a 65 year old  male   Vitals: There were no vitals taken for this visit.  There is no height or weight on file to calculate BMI.  General Appearance Adult:   NAD  HENT: throat/mouth:normal, good dentition  Lungs: no respiratory distress, or pursed lip breathing  Heart: No obvious jugular venous distension present  Abdomen: abdomen softly distended, nontender, no peritoneal signs  Incisions c/d/i; former drain site has now healed up nicely  Back:  no  Musculoskeltal: left upper extremity contracture, left AFO in place  Skin: no suspicious lesions or rashes  Neuro: Alert, oriented, speech and mentation normal  Psych: affect and mood normal  Gait: shuffling gait, s/p stroke with residual left sided weakness  : condom catheter in place, clear pink urine in bag    ASSESSMENT and PLAN  65 year old male with h/o neurogenic bladder due to  stroke 2012, ASD s/p remote repair, h/o a. flutter s/p RF ablation 2004, afib, on eliquis for stroke prevention who presents with a history of left renal stone now POD#4 s/p robotic assisted left pyelolithotomy, here for followup. Gaseous distension of abdomen w/ nausea and vomiting suspicious for postoperative ileus. Benign exam not concerning for peritonitis    Recommended clear liquid diet, aggressive bowel regimen, patient has multiple stool softeners at home. Would also recommend suppository    Check KUB, CBC, BMP stat    If KUB shows severe ileus, he has any concerning lab abnormalities, or if having persistent nausea and vomiting, would recommend admission for treatment of ileus    Nehemiah Bloom MD   [facility]  [facility] Phone: [phone]

## 2020-10-02 NOTE — TELEPHONE ENCOUNTER
"Per MD-\"I would recommend tylenol 500 mg every 6 hours as needed for pain, alternating with ibuprofen 600 mg every 6 hours for pain (so take one or the other every 3 hours) if he wants to avoid narcotics. Otherwise, just take stool softeners along with the oxycodone to avoid constipation (over the counter colace or miralax)     For sleep, can try melatonin supplements 3 mg before bed (over the counter). I do not prescribe other sleep aides.\"    Called patient and informed him of MD's response. Patient and his wife expressed understanding.   They are now reporting that patient's stomach is hard. They did mention that patient had a BM yesterday. Will send this message to MD.     Carly Saxena LPN    "

## 2020-10-02 NOTE — TELEPHONE ENCOUNTER
Called and spoke with wife. She states that they got a virtual visit with Dr. Bills today.     Phone line was disconnected.     Tatum Schaefer RN on 10/2/2020 at 2:11 PM

## 2020-10-03 LAB
ANION GAP SERPL CALCULATED.3IONS-SCNC: 5 MMOL/L (ref 3–14)
BUN SERPL-MCNC: 20 MG/DL (ref 7–30)
CALCIUM SERPL-MCNC: 8.6 MG/DL (ref 8.5–10.1)
CHLORIDE SERPL-SCNC: 112 MMOL/L (ref 94–109)
CO2 SERPL-SCNC: 25 MMOL/L (ref 20–32)
CREAT SERPL-MCNC: 1.28 MG/DL (ref 0.66–1.25)
CREAT UR-MCNC: 124 MG/DL
ERYTHROCYTE [DISTWIDTH] IN BLOOD BY AUTOMATED COUNT: 13.8 % (ref 10–15)
FRACT EXCRET NA UR+SERPL-RTO: 0.1 %
GFR SERPL CREATININE-BSD FRML MDRD: 59 ML/MIN/{1.73_M2}
GLUCOSE SERPL-MCNC: 85 MG/DL (ref 70–99)
HCT VFR BLD AUTO: 35.8 % (ref 40–53)
HGB BLD-MCNC: 11.3 G/DL (ref 13.3–17.7)
LABORATORY COMMENT REPORT: NORMAL
MCH RBC QN AUTO: 29 PG (ref 26.5–33)
MCHC RBC AUTO-ENTMCNC: 31.6 G/DL (ref 31.5–36.5)
MCV RBC AUTO: 92 FL (ref 78–100)
OSMOLALITY UR: 421 MMOL/KG (ref 100–1200)
PLATELET # BLD AUTO: 158 10E9/L (ref 150–450)
POTASSIUM SERPL-SCNC: 4.2 MMOL/L (ref 3.4–5.3)
RBC # BLD AUTO: 3.9 10E12/L (ref 4.4–5.9)
SARS-COV-2 RNA SPEC QL NAA+PROBE: NEGATIVE
SARS-COV-2 RNA SPEC QL NAA+PROBE: NORMAL
SODIUM SERPL-SCNC: 142 MMOL/L (ref 133–144)
SODIUM UR-SCNC: 7 MMOL/L
SPECIMEN SOURCE: NORMAL
SPECIMEN SOURCE: NORMAL
WBC # BLD AUTO: 6.8 10E9/L (ref 4–11)

## 2020-10-03 PROCEDURE — 258N000003 HC RX IP 258 OP 636: Performed by: INTERNAL MEDICINE

## 2020-10-03 PROCEDURE — 80048 BASIC METABOLIC PNL TOTAL CA: CPT | Performed by: INTERNAL MEDICINE

## 2020-10-03 PROCEDURE — 99221 1ST HOSP IP/OBS SF/LOW 40: CPT | Performed by: UROLOGY

## 2020-10-03 PROCEDURE — 120N000004 HC R&B MS OVERFLOW

## 2020-10-03 PROCEDURE — 99232 SBSQ HOSP IP/OBS MODERATE 35: CPT | Performed by: INTERNAL MEDICINE

## 2020-10-03 PROCEDURE — 250N000013 HC RX MED GY IP 250 OP 250 PS 637: Performed by: INTERNAL MEDICINE

## 2020-10-03 PROCEDURE — 85027 COMPLETE CBC AUTOMATED: CPT | Performed by: INTERNAL MEDICINE

## 2020-10-03 PROCEDURE — 36415 COLL VENOUS BLD VENIPUNCTURE: CPT | Performed by: INTERNAL MEDICINE

## 2020-10-03 RX ORDER — BACLOFEN 10 MG/1
20 TABLET ORAL
Status: DISCONTINUED | OUTPATIENT
Start: 2020-10-03 | End: 2020-10-04 | Stop reason: HOSPADM

## 2020-10-03 RX ORDER — NALOXONE HYDROCHLORIDE 0.4 MG/ML
.1-.4 INJECTION, SOLUTION INTRAMUSCULAR; INTRAVENOUS; SUBCUTANEOUS
Status: DISCONTINUED | OUTPATIENT
Start: 2020-10-03 | End: 2020-10-04 | Stop reason: HOSPADM

## 2020-10-03 RX ORDER — POLYETHYLENE GLYCOL 3350 17 G/17G
17 POWDER, FOR SOLUTION ORAL DAILY
Status: DISCONTINUED | OUTPATIENT
Start: 2020-10-04 | End: 2020-10-04 | Stop reason: HOSPADM

## 2020-10-03 RX ORDER — LACTULOSE 10 G/15ML
20 SOLUTION ORAL 2 TIMES DAILY
Status: DISCONTINUED | OUTPATIENT
Start: 2020-10-03 | End: 2020-10-03

## 2020-10-03 RX ORDER — AMOXICILLIN 250 MG
2 CAPSULE ORAL 2 TIMES DAILY
Status: DISCONTINUED | OUTPATIENT
Start: 2020-10-03 | End: 2020-10-03

## 2020-10-03 RX ORDER — ACETAMINOPHEN 500 MG
1000 TABLET ORAL EVERY 8 HOURS PRN
Status: ON HOLD | COMMUNITY
End: 2020-12-01

## 2020-10-03 RX ORDER — CITALOPRAM HYDROBROMIDE 20 MG/1
30 TABLET ORAL DAILY
COMMUNITY
End: 2021-04-21

## 2020-10-03 RX ORDER — MAGNESIUM CARB/ALUMINUM HYDROX 105-160MG
296 TABLET,CHEWABLE ORAL ONCE
Status: DISCONTINUED | OUTPATIENT
Start: 2020-10-03 | End: 2020-10-04 | Stop reason: HOSPADM

## 2020-10-03 RX ORDER — ATORVASTATIN CALCIUM 20 MG/1
20 TABLET, FILM COATED ORAL DAILY
Status: DISCONTINUED | OUTPATIENT
Start: 2020-10-03 | End: 2020-10-04 | Stop reason: HOSPADM

## 2020-10-03 RX ORDER — BISACODYL 5 MG
10 TABLET, DELAYED RELEASE (ENTERIC COATED) ORAL DAILY PRN
Status: DISCONTINUED | OUTPATIENT
Start: 2020-10-03 | End: 2020-10-04 | Stop reason: HOSPADM

## 2020-10-03 RX ORDER — MIRABEGRON 50 MG/1
50 TABLET, EXTENDED RELEASE ORAL DAILY
COMMUNITY
End: 2021-04-22

## 2020-10-03 RX ORDER — ACETAMINOPHEN 325 MG/1
650 TABLET ORAL EVERY 4 HOURS PRN
Status: DISCONTINUED | OUTPATIENT
Start: 2020-10-03 | End: 2020-10-04 | Stop reason: HOSPADM

## 2020-10-03 RX ORDER — TAMSULOSIN HYDROCHLORIDE 0.4 MG/1
0.4 CAPSULE ORAL DAILY
Status: DISCONTINUED | OUTPATIENT
Start: 2020-10-03 | End: 2020-10-04 | Stop reason: HOSPADM

## 2020-10-03 RX ORDER — LEVETIRACETAM 500 MG/1
1000 TABLET ORAL 2 TIMES DAILY
Status: DISCONTINUED | OUTPATIENT
Start: 2020-10-03 | End: 2020-10-04 | Stop reason: HOSPADM

## 2020-10-03 RX ORDER — SENNOSIDES 8.6 MG
4 TABLET ORAL AT BEDTIME
Status: DISCONTINUED | OUTPATIENT
Start: 2020-10-04 | End: 2020-10-04 | Stop reason: HOSPADM

## 2020-10-03 RX ORDER — ONDANSETRON 4 MG/1
4 TABLET, ORALLY DISINTEGRATING ORAL EVERY 6 HOURS PRN
Status: DISCONTINUED | OUTPATIENT
Start: 2020-10-03 | End: 2020-10-04 | Stop reason: HOSPADM

## 2020-10-03 RX ORDER — SODIUM CHLORIDE 9 MG/ML
INJECTION, SOLUTION INTRAVENOUS CONTINUOUS
Status: DISCONTINUED | OUTPATIENT
Start: 2020-10-03 | End: 2020-10-04 | Stop reason: HOSPADM

## 2020-10-03 RX ORDER — METOPROLOL SUCCINATE 25 MG/1
25 TABLET, EXTENDED RELEASE ORAL DAILY
COMMUNITY
End: 2021-04-22

## 2020-10-03 RX ORDER — ONDANSETRON 2 MG/ML
4 INJECTION INTRAMUSCULAR; INTRAVENOUS EVERY 6 HOURS PRN
Status: DISCONTINUED | OUTPATIENT
Start: 2020-10-03 | End: 2020-10-04 | Stop reason: HOSPADM

## 2020-10-03 RX ORDER — BISACODYL 5 MG
5 TABLET, DELAYED RELEASE (ENTERIC COATED) ORAL DAILY PRN
Status: DISCONTINUED | OUTPATIENT
Start: 2020-10-03 | End: 2020-10-04 | Stop reason: HOSPADM

## 2020-10-03 RX ORDER — IBUPROFEN 200 MG
400 TABLET ORAL EVERY 8 HOURS PRN
COMMUNITY
End: 2020-11-30

## 2020-10-03 RX ORDER — AMOXICILLIN 250 MG
1 CAPSULE ORAL 2 TIMES DAILY
Status: DISCONTINUED | OUTPATIENT
Start: 2020-10-03 | End: 2020-10-03

## 2020-10-03 RX ORDER — AMOXICILLIN 250 MG
4 CAPSULE ORAL DAILY PRN
COMMUNITY

## 2020-10-03 RX ORDER — BACLOFEN 20 MG/1
20 TABLET ORAL
COMMUNITY
End: 2020-11-30

## 2020-10-03 RX ORDER — BISACODYL 5 MG
15 TABLET, DELAYED RELEASE (ENTERIC COATED) ORAL DAILY PRN
Status: DISCONTINUED | OUTPATIENT
Start: 2020-10-03 | End: 2020-10-04 | Stop reason: HOSPADM

## 2020-10-03 RX ORDER — LIDOCAINE 40 MG/G
CREAM TOPICAL
Status: DISCONTINUED | OUTPATIENT
Start: 2020-10-03 | End: 2020-10-04 | Stop reason: HOSPADM

## 2020-10-03 RX ADMIN — BACLOFEN 20 MG: 10 TABLET ORAL at 15:12

## 2020-10-03 RX ADMIN — ATORVASTATIN CALCIUM 20 MG: 20 TABLET, FILM COATED ORAL at 10:52

## 2020-10-03 RX ADMIN — TAMSULOSIN HYDROCHLORIDE 0.4 MG: 0.4 CAPSULE ORAL at 18:46

## 2020-10-03 RX ADMIN — BACLOFEN 20 MG: 10 TABLET ORAL at 10:52

## 2020-10-03 RX ADMIN — SODIUM CHLORIDE: 9 INJECTION, SOLUTION INTRAVENOUS at 10:55

## 2020-10-03 RX ADMIN — CITALOPRAM HYDROBROMIDE 30 MG: 20 TABLET ORAL at 20:56

## 2020-10-03 RX ADMIN — BACLOFEN 20 MG: 10 TABLET ORAL at 20:56

## 2020-10-03 RX ADMIN — SODIUM CHLORIDE: 9 INJECTION, SOLUTION INTRAVENOUS at 01:06

## 2020-10-03 RX ADMIN — LACTULOSE 20 G: 20 SOLUTION ORAL at 12:49

## 2020-10-03 RX ADMIN — Medication 5 MG: at 20:57

## 2020-10-03 RX ADMIN — APIXABAN 5 MG: 5 TABLET, FILM COATED ORAL at 10:52

## 2020-10-03 RX ADMIN — ACETAMINOPHEN 650 MG: 325 TABLET, FILM COATED ORAL at 20:56

## 2020-10-03 RX ADMIN — LEVETIRACETAM 1000 MG: 500 TABLET, FILM COATED ORAL at 10:52

## 2020-10-03 RX ADMIN — LACTULOSE 20 G: 20 SOLUTION ORAL at 09:07

## 2020-10-03 RX ADMIN — SODIUM CHLORIDE: 9 INJECTION, SOLUTION INTRAVENOUS at 20:57

## 2020-10-03 RX ADMIN — DOCUSATE SODIUM AND SENNOSIDES 1 TABLET: 8.6; 5 TABLET ORAL at 09:07

## 2020-10-03 RX ADMIN — BACLOFEN 20 MG: 10 TABLET ORAL at 18:46

## 2020-10-03 RX ADMIN — APIXABAN 5 MG: 5 TABLET, FILM COATED ORAL at 20:56

## 2020-10-03 RX ADMIN — LEVETIRACETAM 1000 MG: 500 TABLET, FILM COATED ORAL at 20:56

## 2020-10-03 ASSESSMENT — MIFFLIN-ST. JEOR: SCORE: 1768.77

## 2020-10-03 NOTE — PLAN OF CARE
ROOM # 208    Living Situation (if not independent, order SW consult): Home with wife  Facility name:  : [facility] - [facility]    Activity level at baseline: Ax1 with cane  Activity level on admit: Ax2 - slide board      Patient registered to observation; given Patient Bill of Rights; given the opportunity to ask questions about observation status and their plan of care.  Patient has been oriented to the observation room, bathroom and call light is in place.    Discussed discharge goals and expectations with patient/family.

## 2020-10-03 NOTE — ED NOTES
[facility]  ED Nurse Handoff Report    Marcus Hodges is a 65 year old male   ED Chief complaint: Abnormal Labs  . ED Diagnosis:   Final diagnoses:   Urinary retention   Acute kidney injury (H)     Allergies:   Allergies   Allergen Reactions     Blood Transfusion Related (Informational Only) Other (See Comments)     Patient has a history of a clinically significant antibody against RBC antigens.  A delay in compatible RBCs may occur.     Lisinopril      No Known Drug Allergies        Code Status: Full Code  Activity level - Baseline/Home:  Assist X 1. Activity Level - Current:   Assist X 1. Lift room needed: No. Bariatric: No   Needed: No   Isolation: No. Infection: Not Applicable.     Vital Signs:   Vitals:    10/02/20 1905 10/02/20 1930 10/02/20 1945 10/02/20 2000   BP: 123/84 118/73 128/86 125/70   Pulse: 69 73 91 73   Resp: 16      Temp: 97.6  F (36.4  C)      TempSrc: Oral      SpO2: 98% 96% 97% 98%   Weight: 83 kg (183 lb)          Cardiac Rhythm:  ,      Pain level: 0-10 Pain Scale: 0  Patient confused: No. Patient Falls Risk: Yes.   Elimination Status: Has voided . Reyna in place, large output  Patient Report - Initial Complaint: abnormal labs. Focused Assessment: Abnormal labs, Distended labs  Tests Performed:   Labs Ordered and Resulted from Time of ED Arrival Up to the Time of Departure from the ED   CBC WITH PLATELETS DIFFERENTIAL - Abnormal; Notable for the following components:       Result Value    WBC 11.3 (*)     RBC Count 4.22 (*)     Hemoglobin 12.4 (*)     Hematocrit 38.7 (*)     Absolute Neutrophil 9.9 (*)     Absolute Lymphocytes 0.4 (*)     All other components within normal limits   INR - Abnormal; Notable for the following components:    INR 1.46 (*)     All other components within normal limits   COMPREHENSIVE METABOLIC PANEL - Abnormal; Notable for the following components:    Sodium 131 (*)     Glucose 115 (*)     Urea Nitrogen 32 (*)     Creatinine 2.62 (*)      GFR Estimate 25 (*)     GFR Estimate If Black 29 (*)     Bilirubin Total 1.4 (*)     All other components within normal limits   ROUTINE UA WITH MICROSCOPIC REFLEX TO CULTURE - Abnormal; Notable for the following components:    Ketones Urine Trace (*)     Blood Urine Large (*)     Protein Albumin Urine 30 (*)     Leukocyte Esterase Urine Small (*)     WBC Urine 22 (*)     RBC Urine >182 (*)     Bacteria Urine Few (*)     All other components within normal limits   PULSE OXIMETRY NURSING   PERIPHERAL IV CATHETER   URINE CULTURE AEROBIC BACTERIAL     . Abnormal Results:   CT Abdomen Pelvis w/o Contrast   Final Result   IMPRESSION:    1.  Left ureteral stent appearing in good position with persistent marked left-sided hydronephrosis and moderate right-sided hydronephrosis. Marked distention of the bladder. Findings may be due to lower tract obstruction. Marked prostate enlargement.      2.  Diffuse free intraperitoneal air and free fluid throughout the abdomen likely related to patient's very recent surgery. Subcutaneous gas within the anterior abdominal wall related to recent surgery.      3.  Large amount of stool throughout the colon.      Results discussed with Dr. Zaldivar at 10:25 PM and 10/02/2020.           .   Treatments provided: See EMAR  Family Comments: N/A  OBS brochure/video discussed/provided to patient:  Yes  ED Medications:   Medications   0.9% sodium chloride BOLUS (0 mLs Intravenous Stopped 10/2/20 2035)     Followed by   sodium chloride 0.9% infusion (has no administration in time range)   ondansetron (ZOFRAN) injection 4 mg (has no administration in time range)     Drips infusing:  No  For the majority of the shift, the patient's behavior Green. Interventions performed were N/A.    Sepsis treatment initiated: No     Patient tested for COVID 19 prior to admission: YES    ED Nurse Name/Phone Number: Logan Erwin RN,   11:34 PM    RECEIVING UNIT ED HANDOFF REVIEW    Above ED Nurse Handoff Report was  reviewed: Yes  Reviewed by: Blaire Salomon RN on October 2, 2020 at 11:43 PM

## 2020-10-03 NOTE — H&P
History and Physical     Marcus Hodges MRN# [id number]   YOB: 1955 Age: 65 year old      Date of Admission:  10/2/2020    Primary care provider: Don Aviles          Assessment and Plan:     Summary of Stay: Marcus Hodges is a 65 year old male with a history of AFib on chronic apixaban, htn/hlp, R MCA stroke in 2012 with Left sided weakness/spasticity and seizure disorder. He carries a diagnosis of neurogenic bowel and bladder that he attributes to his stroke.  His neurogenic bladder though does NOT require straight cathing or chronic faulkner.  Rather he just uses a condom cath.     He also had large kidney stones and underwent Left pyelolithotomy on 9/28/20.    He has not been doing well post operatively.  He's had ongoing issues with constipation and over the past two days his belly has gotten swollen/hard and painful. He went in to his urologist for f/u where lab work and an AXR were completed.  His blood work revealed a bun/creat 33/2.8 and so was called and told to come into the ER.  His AXR was unremarkable.    In the ER VSS and he was afebrile, labs reconfirmed PAULETTE, and a CT abdomen and pelvis obtained showing diffuse intraperitoneal air consistent with recent laparoscopic surgery, bilateral hydro L>R with left ureteral stent in good position, a markedly enlarged bladder consistent with LEE, and large amount of stool.    A faulkner was placed with 1300 ml out.      He  admitted on 10/2/2020 with LEE complicated by PAULETTE in association with L pyelolithotomy on 9/28/20    Problem List:   Bladder Outlet Obstruction: not quite sure how to relate this to his recent procedure, he hasn;t taken much in the way of narcotics so that seems unlikely.  He denies hx of BPH and was placed empirically on tamsulosin after his surgery.  He reports a neurogenic bladder but has not required strait cath or indwelling catheter.  There is Marked BPH on CT scanning  -faulkner in place  -follow I and O's, high risk  for post obstructive diuresis  -IVF at 100 ml per hour  -urology consultation   -cont tamsulosin    PAULETTE  2/2 above.    -faulkner/IVF, every day bmp x 3    Increased spasticity due to PAULETTE  Cont baseline baclofen and have one additional dose available on a prn basis    Insomnia  Just lately and in association with his current medical illness  -trial SL melatonin    Constipation  Chronic issue in the setting of neurogenic bowels.  Will start on a regimen of lactulose in the am     AFib: on chronic apixaban   -will resume    htn  Pt regimen is metop 25 mg every day, which will be continued    hlp cont pta statin     R MCA stroke in 2012 complicated by TBI, left sided weakness, spasticity, and seizures  -cont pta levitericitam 1 g bid  -cont baclofen 20 mg qid    Depression  Cont citalopram, I'm not worried about the slight decrease in his sodium level, I suspect that will come up nicely     COVID 19:  Tested and negative on 9/24/20    DVT Prophylaxis: on apixaban  Code Status: Full Code  Functional Status:  Lives with his wife in a house and walks with a cane  Faulkner:  Placed in ER for acute LEE  Access:  PIV              Chief Complaint:     Abdominal distension, PAULETTE, after recent surgery        History of Present Illness:   Marcus Hodges is a 65 year old male with a history of AFib on chronic apixaban, htn/hlp, R MCA stroke in 2012 with Left sided weakness/spasticity and seizure disorder. He carries a diagnosis of neurogenic bowel and bladder that he attributes to his stroke.  His neurogenic bladder though does NOT require straight cathing or chronic faulkner.  Rather he just uses a condom cath.     He also had large kidney stones and underwent Left pyelolithotomy on 9/28/20.    He has not been doing well post operatively.  He's had ongoing issues with constipation and over the past two days his belly has gotten swollen/hard and painful. He went in to his urologist for f/u where lab work and an AXR were completed.  His  blood work revealed a bun/creat 33/2.8 and so was called and told to come into the ER.  His AXR was unremarkable.    In the ER VSS and he was afebrile, labs reconfirmed PAULETTE, and a CT abdomen and pelvis obtained showing diffuse intraperitoneal air consistent with recent laparoscopic surgery, bilateral hydro L>R with left ureteral stent in good position, a markedly enlarged bladder consistent with LEE, and large amount of stool.    A faulkner was placed with 1300 ml out.      He  admitted on 10/2/2020 with LEE complicated by PAULETTE in association with L pyelolithotomy on 9/28/20    He denies any fevers/cough/sob/cp/diarrhea/loss of sense of taste or smell, or COVID exposures.  He was tested on 9/24/20 and was negative      The history is obtained in discussion with the ER provider Dr Andre Zaldivar, the patient and his wife who seem quite reliable           Past Medical History:     Past Medical History:   Diagnosis Date     * * * SBE PROPHYLAXIS * * *      Atrial enlargement, bilateral      Atrial flutter 2004    radiofrequency ablation 2004, resolved     ATRIAL SEPTAL DEFECT     repaired 1977     Chronic atrial fibrillation (H)     on apixaban     CVA (cerebral vascular accident) (H) 04/2012    R MCA, complicated by left sided weakness, walks with a cane, and seizures     Depression      Dysphagia 04/22/2012    [facility], following CVA     Hernia, abdominal      History of thrombophlebitis      hyperlipidemia      Mitral regurgitation     mitral valve damage related to ASD repair     Mumps      Palpitations      Respiratory failure (H) 04/22/2012    [facility], following CVA, prolonged requiring tracheostomy, [place] [facility]      Tricuspid regurgitation      Unspecified glaucoma(365.9)     right     Urinary incontinence              Past Surgical History:     Past Surgical History:   Procedure Laterality Date     CATHETER, ABLATION  2004     COMBINED CYSTOSCOPY, INSERT CATHETER URETER  9/28/2020    Procedure: cystoscopy  and left ureteral catheter placement, left ureteral stent placement;  Surgeon: Nehemiah Bloom MD;  Location:  OR     Craniotomy reconstruction  2012    [facility], repair of hemicraniectomy defect     CYSTOSCOPY       DAVINCI PYELOPLASTY Left 9/28/2020    Procedure: left robotic assisted laparoscopic pyelolithotomy;  Surgeon: Nehemiah Bloom MD;  Location:  OR     GASTROSTOMY TUBE  April 2012    [facility], following CVA     HERNIA REPAIR       REPAIR ATRIAL SEPTAL DEFECT  1978    ASD Repair     Right hemicraniectomy  April 2012 [facility], following CVA, mgmt malignant cerebral edema     SURGICAL HISTORY OF -   2009    right eye enucleation     TRACHEOSTOMY  April 2012 [facility], following CVA     ZZC NONSPECIFIC PROCEDURE      tonsillectomy     ZZC NONSPECIFIC PROCEDURE      Ing. Hernia Repair             Social History:     Social History     Tobacco Use     Smoking status: Never Smoker     Smokeless tobacco: Never Used   Substance Use Topics     Alcohol use: No   lives with his wife in a house, he gets around with the use of a cane.  Code status is full but he would not want to be left on machines if he would unable to return to his current living situation           Family History:     Family History   Problem Relation Age of Onset     Hypertension Mother      Cerebrovascular Disease Father      Cancer Paternal Grandmother      Diabetes Maternal Grandmother      Congenital Anomalies Brother         several brothers and sisters born with congential heart defects, but all have been repaired     Congenital Anomalies Sister             Allergies:     Allergies   Allergen Reactions     Blood Transfusion Related (Informational Only) Other (See Comments)     Patient has a history of a clinically significant antibody against RBC antigens.  A delay in compatible RBCs may occur.     Lisinopril      No Known Drug Allergies              Medications:     Prior to Admission medications    Medication Sig  Last Dose Taking? Auth Provider   apixaban ANTICOAGULANT (ELIQUIS ANTICOAGULANT) 5 MG tablet Take 1 tablet (5 mg) by mouth 2 times daily   Hayes Yip MD   atorvastatin (LIPITOR) 20 MG tablet TAKE 1 TABLET BY MOUTH EVERY DAY   Don Aviles MD   baclofen (LIORESAL) 20 MG tablet Take 1 tablet (20 mg) by mouth 4 times daily   Nory Reed, ERIC CNP   citalopram (CELEXA) 20 MG tablet TAKE 1 AND 1/2 TABLETS BY MOUTH EVERY DAY  Patient taking differently: Take 30 mg by mouth daily (takes 1.5 x 20mg)   Don Aviles MD   docusate sodium (COLACE) 100 MG tablet Take 100 mg by mouth daily as needed    Reported, Patient   levETIRAcetam (KEPPRA) 1000 MG tablet Take 1 tablet (1,000 mg) by mouth 2 times daily   Nory Reed APRN CNP   magnesium hydroxide (MILK OF MAGNESIA) 400 MG/5ML suspension Take 15 mLs by mouth daily as needed for constipation or heartburn   Melida Bills MD   metoprolol succinate ER (TOPROL-XL) 25 MG 24 hr tablet TAKE 1 TABLET BY MOUTH EVERY DAY  Patient taking differently: Take 25 mg by mouth daily    Don Aviles MD   Multiple Vitamin (MULTI-VITAMIN) per tablet Take 1 tablet by mouth daily    Don Aviles MD   MYRBETRIQ 50 MG 24 hr tablet TAKE 1 TABLET BY MOUTH EVERY DAY  Patient taking differently: Take 50 mg by mouth daily    Don Aviles MD   NONFORMULARY Take 1 tablet by mouth daily Protandim - herbal supplement    Reported, Patient   Omega-3 Fatty Acids (FISH OIL PO) Take 1 Dose by mouth daily   Reported, Patient   order for DME Equipment being ordered: Adhesive remover, wipes  ([facility]PCS Code )   Don Aviles MD   order for DME Equipment being ordered:    Nightime urinary leg bag 1600cc  (HCPCS Code )   Don Aviles MD   order for DME Equipment being ordered: Daytime Urinary leg bag-800cc  (HCPCS Code )   Don Aviles MD   order for DME Equipment being ordered: Incontinence supplies - Posies   [facility]   Don Aviles MD   order for DME Equipment being ordered: Skin prep (no code - requested by patient)   Don Aviles MD   order for DME Equipment being ordered:   Freedom Clear Condom catheter  ([facility]PCS Code )   Don Aviles MD   oxyCODONE (ROXICODONE) 5 MG tablet Take 1-2 tablets (5-10 mg) by mouth every 3 hours as needed for severe pain   Nehemiah Bloom MD   Polyethylene Glycol 3350 (MIRALAX PO) Take 1 Dose by mouth daily as needed    Reported, Patient   senna-docusate (SENOKOT-S/PERICOLACE) 8.6-50 MG tablet Take 1 tablet by mouth 2 times daily for 14 days   Nehemiah Bloom MD   tamsulosin (FLOMAX) 0.4 MG capsule Take 1 capsule (0.4 mg) by mouth daily   Nehemiah Bloom MD   vitamin D3 (CHOLECALCIFEROL) 50 mcg (2000 units) tablet Take 2,000 Units by mouth daily    Don Aviles MD                 Review of Systems:     A Comprehensive greater than 10 system review of systems was carried out.  Pertinent positives and negatives are noted above.  Otherwise negative for contributory information.           Physical Exam:   Blood pressure 125/70, pulse 73, temperature 97.6  F (36.4  C), temperature source Oral, resp. rate 16, weight 83 kg (183 lb), SpO2 98 %.  Exam:    General:  Pleasant nad looks stated age mild facial asymmetry noted  HEENT:  Head nc/at sclera clear PERRL O/P:  Moist mucus membranes no posterior pharyngeal erythema or exudate.  Neck is supple  Lungs: cta b nl effort   CV:  IIR no m/r/g no le edema  Abd:  S/nt/ still with some moderate distension no r/g  Neuro:  Cn 2-12 grossly intact dodd  Alert and oriented affect appropriate   Skin:  W/d no c/c               Data:          Lab Results   Component Value Date     10/02/2020    Lab Results   Component Value Date    CHLORIDE 97 10/02/2020    Lab Results   Component Value Date    BUN 32 10/02/2020      Lab Results   Component Value Date    POTASSIUM 4.8 10/02/2020    Lab Results   Component  Value Date    CO2 26 10/02/2020    Lab Results   Component Value Date    CR 2.62 10/02/2020        Lab Results   Component Value Date    WBC 11.3 (H) 10/02/2020    HGB 12.4 (L) 10/02/2020    HCT 38.7 (L) 10/02/2020    MCV 92 10/02/2020     10/02/2020     Lab Results   Component Value Date     (H) 10/02/2020     Lab Results   Component Value Date    AST 41 10/02/2020    ALT 26 10/02/2020    ALKPHOS 67 10/02/2020    BILITOTAL 1.4 (H) 10/02/2020     UA:  Trace ketones, 30 protein, large blood, small LE, 22 wbc, > 182 RBC, few bacteria       Imaging:     Recent Results (from the past 24 hour(s))   XR Abdomen 1 View    Narrative    ABDOMEN ONE VIEW  10/2/2020 4:38 PM     HISTORY: POD#4 status post robotic pyelolithotomy, worsening abdominal  distension, assess for ileus. Abdominal distension (gaseous).    COMPARISON: None.       Impression    IMPRESSION: Large amount of stool. Nonobstructed bowel gas pattern.  Left ureteral stent in place. No definite stone along the course of  the stent.    CHANG REED MD   CT Abdomen Pelvis w/o Contrast    Narrative    EXAM: CT ABDOMEN PELVIS W/O CONTRAST  LOCATION: [facility]  DATE/TIME: 10/2/2020 9:45 PM    INDICATION: Acute generalized abdominal pain in a patient with recent laparoscopic surgery.  COMPARISON: 05/10/2018  TECHNIQUE: CT scan of the abdomen and pelvis was performed without IV contrast. Multiplanar reformats were obtained. Dose reduction techniques were used.  CONTRAST: None.    FINDINGS:   LOWER CHEST: Linear atelectasis in both lung bases. Marked cardiac enlargement. Partially visualized coronary artery calcification. Pericardial calcification.    HEPATOBILIARY: Normal.    PANCREAS: Normal.    SPLEEN: Normal.    ADRENAL GLANDS: No significant nodules.    KIDNEYS/BLADDER: Moderate right-sided hydronephrosis with marked left-sided hydronephrosis. Left ureteral stent in good position. 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 Moderate bilateral ureteral dilatation down to   the level of a markedly distended bladder. Marked prostate enlargement. No evidence for obstructing calculi.    BOWEL: No evidence for bowel dilatation or obstruction. Large amount of stool throughout the colon.    LYMPH NODES: No lymphadenopathy.    VASCULATURE: Normal caliber abdominal aorta. Minimal atherosclerotic vascular calcification.    Additional findings: Diffuse free intraperitoneal air throughout the abdomen and pelvis compatible with recent surgery. Minimal to moderate diffuse free fluid throughout the abdomen and pelvis. Subcutaneous gas related to very recent surgery. Mild   diffuse subcutaneous edema.    MUSCULOSKELETAL: Degenerative changes in the spine.      Impression    IMPRESSION:   1.  Left ureteral stent appearing in good position with persistent marked left-sided hydronephrosis and moderate right-sided hydronephrosis. Marked distention of the bladder. Findings may be due to lower tract obstruction. Marked prostate enlargement.    2.  Diffuse free intraperitoneal air and free fluid throughout the abdomen likely related to patient's very recent surgery. Subcutaneous gas within the anterior abdominal wall related to recent surgery.    3.  Large amount of stool throughout the colon.    Results discussed with Dr. Zaldivar at 10:25 PM and 10/02/2020.

## 2020-10-03 NOTE — PLAN OF CARE
See flowsheets for vital signs and assessments.    Pertinent Assessments: Denies pain, SOB, and nausea. AP irregular. Left side numbness r/t CVA. Incisions CDI, CLAUS. Reyna in place with 3,700mL out.     Major Shift Events: Admitted to the floor.    Treatment Plan: IVF, monitor kidney function    Discharge: TBD    Bedside RN: Blaire Salomon

## 2020-10-03 NOTE — ED PROVIDER NOTES
"  History     Chief Complaint: Abnormal Labs      HPI   Marcus Hodges is a 65 year old male with a history of stroke and atrial fibrillation, taking Eliquis, who presents with abnormal labs. 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 Then today, the patient reports his abdomen feeling \"tight\" and painful, as well as repeated vomiting. He was seen at the urologist earlier today, where they did an x-ray on his abdomen and blood work. His blood work revealed elevated creatinine to 2.81, at which point he was referred to the emergency department for further evaluation. He also notes decreased urine output, and reports that his last bowel movement was yesterday.    Allergies:  Lisinopril     Medications:    Eliquis  Lipitor  Lioresal  Celexa  Colace  Keppra  Toprol-LX  Myrbetriq  Oxycodone  Miralax  Flomax    Past Medical History:    Atrial enlargement, bilateral  Atrial flutter  Atrial septal defect  CVA  Dysphagia  Hernia, abdominal  History of thrombophlebitis  Mitral regurgitation  Mumps  Respiratory failure  Tricuspid regurgitation  Glaucoma    Past Surgical History:    Tonsillectomy  Hernia repair, inguinal  Heart Catheter, ablation  Craniotomy reconstruction  DaVinci Pyeloplasty  Gastrotomy tube  Repair atrial septal defect  Tracheostomy     Family History:    Hypertension  Cerebrovascular disease  Congenital anomalies     Social History:  Smoking Status: Never Smoker  Smokeless Tobacco: Never Used  Alcohol Use: No  Drug Use: No  PCP: Don Aviles        Review of Systems   Gastrointestinal: Positive for abdominal pain, nausea and vomiting. Negative for constipation.   Genitourinary: Positive for decreased urine volume.   All other systems reviewed and are negative.        Physical Exam     Patient Vitals for the past 24 hrs:   BP Temp Temp src Pulse Resp SpO2 Weight   10/02/20 2000 125/70 " -- -- 73 -- 98 % --   10/02/20 1945 128/86 -- -- 91 -- 97 % --   10/02/20 1930 118/73 -- -- 73 -- 96 % --   10/02/20 1905 123/84 97.6  F (36.4  C) Oral 69 16 98 % 83 kg (183 lb)         Physical Exam  Constitutional:       Appearance: He is well-developed.   HENT:      Right Ear: External ear normal.      Left Ear: External ear normal.      Mouth/Throat:      Mouth: Mucous membranes are moist.      Pharynx: Oropharynx is clear. No oropharyngeal exudate or posterior oropharyngeal erythema.   Eyes:      General: No scleral icterus.     Extraocular Movements: Extraocular movements intact.      Conjunctiva/sclera: Conjunctivae normal.      Pupils: Pupils are equal, round, and reactive to light.   Neck:      Musculoskeletal: Normal range of motion and neck supple.   Cardiovascular:      Rate and Rhythm: Normal rate and regular rhythm.      Heart sounds: Normal heart sounds. No murmur. No friction rub. No gallop.    Pulmonary:      Effort: Pulmonary effort is normal. No respiratory distress.      Breath sounds: Normal breath sounds. No wheezing or rales.   Abdominal:      General: Bowel sounds are normal. There is distension.      Palpations: Abdomen is soft. There is no mass.      Tenderness: There is abdominal tenderness.      Comments: Diffuse distention and abdominal TTP   Musculoskeletal: Normal range of motion.   Skin:     General: Skin is warm and dry.      Capillary Refill: Capillary refill takes less than 2 seconds.      Findings: No rash.   Neurological:      Mental Status: He is alert.   Psychiatric:         Mood and Affect: Mood normal.       Emergency Department Course   ECG (19:52:59):  Rate 72 bpm. OR interval *. QRS duration 104. QT/QTc 440/481. P-R-T axes * 124 124. Atrial fibrillation. Possible right ventricular hypertrophy. ST & T wave abnormality, consider anterior ischemia. Prolonged QT. Interpreted at 1957 by Andre Zaldivar MD.     Imaging:  Radiology findings were communicated with the patient who  voiced understanding of the findings.    CT Abdomen Pelvis w/o Contrast  IMPRESSION:   1.  Left ureteral stent appearing in good position with persistent marked left-sided hydronephrosis and moderate right-sided hydronephrosis. Marked distention of the bladder. Findings may be due to lower tract obstruction. Marked prostate enlargement.  2.  Diffuse free intraperitoneal air and free fluid throughout the abdomen likely related to patient's very recent surgery. Subcutaneous gas within the anterior abdominal wall related to recent surgery.  3.  Large amount of stool throughout the colon.  As read by Radiology.     Laboratory:  Laboratory findings were communicated with the patient who voiced understanding of the findings.    CBC: WBC 11.3 (H), HGB 12.4 (L), WNL ()     BMP: Glucose 115 (H), Sodium 131 (L), Urea Nitrogen 32 (H), Creatinine 2.62 (H), GFR 25 (L), Bilirubin total 2.4 (H), o/w WNL     UA: Ketones trace (A), Albumin 30 (A), Leukocyte esterase small (A), Blood large (A), WBC 22 (H), RBC >182 (H), Bacteria few (A), o/w Negative    (1928) INR: 1.46 (H)    Interventions:  1930 NS 1 L IV Bolus    Emergency Department Course:  The patient arrived in the emergency department.  Past medical records, nursing notes, and vitals reviewed.    1915: I performed an exam of the patient and obtained history, as documented above.    IV inserted and blood drawn. This was sent to laboratory for testing, findings above.      Urinalysis and culture obtained and sent for evaluation.    The patient was sent for a CT scan while in the emergency department, findings above.     2210: I consulted with Dr. Lorezno, Urology, regarding the patient's condition.    2253: Findings and plan explained to the Patient who consents to admission. Discussed the patient with Dr. Robbins, who will admit the patient to a medicine bed for further monitoring, evaluation, and treatment.    I personally reviewed the laboratory and imaging results with the  Patient and answered all related questions prior to admission.     Impression & Plan   Covid-19  Marcus Hodges was evaluated during a global COVID-19 pandemic, which necessitated consideration that the patient might be at risk for infection with the SARS-CoV-2 virus that causes COVID-19.   Applicable protocols for evaluation were followed during the patient's care.   COVID-19 was considered as part of the patient's evaluation. The plan for testing is: a test was obtained during this visit.    Medical Decision Making:  Patient presents with above complaints. He does have distention on exam as well as mild tenderness. We did perform CT, which actually showed urinary retention. No complications from surgery were seen. We placed a Reyna and a large amount of urine was drained; he then felt better. He has acute kidney injury from urinary retention. He will be admitted for further evaluation and monitoring. I discussed with Dr. Lorenzo of urology and he is aware of the patient and the plan of care.     Diagnosis:    ICD-10-CM    1. Urinary retention  R33.9 UA with Microscopic reflex to Culture     Urine Culture Aerobic Bacterial     Urine Culture Aerobic Bacterial     CANCELED: Urine Culture Aerobic Bacterial   2. Acute kidney injury (H)  N17.9        Disposition: Admitted to Dr. Robbins.    Scribe Disclosure:  I, Jeanette Monsalve, am serving as a scribe at 7:10 PM on 10/2/2020 to document services personally performed by Andre Zaldivar MD based on my observations and the provider's statements to me.      Jeanette Monsalve  10/02/20  [facility] Emergency Department     Andre Zaldivar MD  10/02/20 [id number]

## 2020-10-03 NOTE — CONSULTS
Urology Consult History and Physical    Name: Marcus Hodges    MRN: [id number]   YOB: 1955       We were asked to see Marcus Hodges at the request of Dr. Robbins for evaluation and treatment of Urinary retention.          Chief Complaint:   Urinary retention    History is obtained from the patient          History of Present Illness:   Marcus Hodges is a 65 year old male with history of A. fib, hypertension, right MCA stroke in 2012 with left-sided weakness and spasticity and seizure disorder, neurogenic bowel and bladder, who is status post a left robotic pyelolithotomy on 9/28/2020 with Dr. Bloom and myself.  He was discharged after an unremarkable postoperative course.  He has developed constipation over the past 2 days with abdominal distention.  He was seen in the urology outpatient office by Dr. Bloom yesterday where lab work and an abdominal x-ray was completed.  Blood work revealed an elevated serum creatinine of 2.8 and he was advised to present to the emergency room.  CT scan was completed with evidence of bladder outlet obstruction and bladder distention.  There was no evidence of urinary leak from his robotic pyelolithotomy.  His ureteral stent is in good position.    Seen and examined this morning.  He notes that he has passed some flatus and is currently sitting on the commode for an attempted bowel movement.  He notes some improvement of his abdominal distention and is overall feeling better than yesterday.           Past Medical History:     Past Medical History:   Diagnosis Date     * * * SBE PROPHYLAXIS * * *      Atrial enlargement, bilateral      Atrial flutter 2004    radiofrequency ablation 2004, resolved     ATRIAL SEPTAL DEFECT     repaired 1977     Chronic atrial fibrillation (H)     on apixaban     CVA (cerebral vascular accident) (H) 04/2012    R MCA, complicated by left sided weakness, walks with a cane, and seizures     Depression      Dysphagia 04/22/2012    [facility], following CVA     Hernia, abdominal      History of thrombophlebitis      hyperlipidemia      Mitral regurgitation     mitral valve damage related to ASD repair     Mumps      Palpitations      Respiratory failure (H) 04/22/2012    [facility], following CVA, prolonged requiring tracheostomy, [place] [facility]      Tricuspid regurgitation      Unspecified glaucoma(365.9)     right     Urinary incontinence             Past Surgical History:     Past Surgical History:   Procedure Laterality Date     CATHETER, ABLATION  2004     COMBINED CYSTOSCOPY, INSERT CATHETER URETER  9/28/2020    Procedure: cystoscopy and left ureteral catheter placement, left ureteral stent placement;  Surgeon: Nehemiah Bloom MD;  Location:  OR     Craniotomy reconstruction  2012    [facility], repair of hemicraniectomy defect     CYSTOSCOPY       DAVINCI PYELOPLASTY Left 9/28/2020    Procedure: left robotic assisted laparoscopic pyelolithotomy;  Surgeon: Nehemiah Bloom MD;  Location:  OR     GASTROSTOMY TUBE  April 2012    [facility], following CVA     HERNIA REPAIR       REPAIR ATRIAL SEPTAL DEFECT  1978    ASD Repair     Right hemicraniectomy  April 2012    [facility], following CVA, mgmt malignant cerebral edema     SURGICAL HISTORY OF -   2009    right eye enucleation     TRACHEOSTOMY  April 2012    [facility], following CVA     ZZ NONSPECIFIC PROCEDURE      tonsillectomy     [facility] NONSPECIFIC PROCEDURE      Ing. Hernia Repair            Social History:     Social History     Tobacco Use     Smoking status: Never Smoker     Smokeless tobacco: Never Used   Substance Use Topics     Alcohol use: No            Family History:     Family History   Problem Relation Age of Onset     Hypertension Mother      Cerebrovascular Disease Father      Cancer Paternal Grandmother      Diabetes Maternal Grandmother      Congenital Anomalies Brother         several brothers and sisters born with congential heart defects, but all have been  "repaired     Congenital Anomalies Sister               Allergies:     Allergies   Allergen Reactions     Blood Transfusion Related (Informational Only) Other (See Comments)     Patient has a history of a clinically significant antibody against RBC antigens.  A delay in compatible RBCs may occur.     Lisinopril      No Known Drug Allergies             Medications:     Current Facility-Administered Medications   Medication     acetaminophen (TYLENOL) tablet 650 mg     apixaban ANTICOAGULANT (ELIQUIS) tablet 5 mg     atorvastatin (LIPITOR) tablet 20 mg     baclofen (LIORESAL) tablet 20 mg     bisacodyl (DULCOLAX) EC tablet 5 mg    Or     bisacodyl (DULCOLAX) EC tablet 10 mg    Or     bisacodyl (DULCOLAX) EC tablet 15 mg     lactulose (CHRONULAC) solution 20 g     levETIRAcetam (KEPPRA) tablet 1,000 mg     lidocaine (LMX4) cream     lidocaine 1 % 0.1-1 mL     magnesium hydroxide (MILK OF MAGNESIA) suspension 30 mL     melatonin tablet 5 mg     naloxone (NARCAN) injection 0.1-0.4 mg     ondansetron (ZOFRAN-ODT) ODT tab 4 mg    Or     ondansetron (ZOFRAN) injection 4 mg     Patient is already receiving anticoagulation with heparin, enoxaparin (LOVENOX), warfarin (COUMADIN)  or other anticoagulant medication     senna-docusate (SENOKOT-S/PERICOLACE) 8.6-50 MG per tablet 1 tablet    Or     senna-docusate (SENOKOT-S/PERICOLACE) 8.6-50 MG per tablet 2 tablet     sodium chloride (PF) 0.9% PF flush 3 mL     sodium chloride (PF) 0.9% PF flush 3 mL     sodium chloride 0.9% infusion     tamsulosin (FLOMAX) capsule 0.4 mg             Review of Systems:    ROS: 10 point ROS neg other than the symptoms noted above in the HPI.          Physical Exam:     Patient Vitals for the past 24 hrs:   BP Temp Temp src Pulse Resp SpO2 Height Weight   10/03/20 0759 109/72 98.2  F (36.8  C) Oral 60 16 98 % -- --   10/03/20 0023 110/66 97  F (36.1  C) Oral 74 16 97 % 1.88 m (6' 2\") 90.4 kg (199 lb 4.8 oz)   10/02/20 2000 125/70 -- -- 73 -- 98 % -- " --   10/02/20 1945 128/86 -- -- 91 -- 97 % -- --   10/02/20 1930 118/73 -- -- 73 -- 96 % -- --   10/02/20 1905 123/84 97.6  F (36.4  C) Oral 69 16 98 % -- 83 kg (183 lb)     General: age-appropriate appearing male in NAD, evidence of prior stroke  HEENT: Head AT/NC, EOMI, CN Grossly intact  Lungs: no respiratory distress, or pursed lip breathing  Heart: No obvious jugular venous distension present  Back: no bony midline tenderness, no CVAT bilaterally.  Abdomen: soft, mildly-distended, non-tender. No organomegaly  Incisions: clean, dry and intact, some serous drainage from his prior MAIKEL site  : Reyna in place draining clear/sulma urine  Lymph: no palpable inguinal lymphadenopathy.  LE: no edema.   Musculoskeltal: extremities normal, no peripheral edema  Skin: no suspicious lesions or rashes  Neuro: Alert, oriented, speech and mentation normal  Psych: affect and mood normal          Data:   All laboratory data reviewed:    Recent Labs   Lab 10/03/20  0619 10/02/20  1928 10/02/20  1650 09/29/20  0715   WBC 6.8 11.3* 11.5* 5.9   HGB 11.3* 12.4* 12.4* 12.1*    181 182 155     Recent Labs   Lab 10/03/20  0619 10/02/20  1928 10/02/20  1650 09/29/20  0715    131* 131* 141   POTASSIUM 4.2 4.8 4.6 4.1   CHLORIDE 112* 97 99 110*   CO2 25 26 25 28   BUN 20 32* 33* 12   CR 1.28* 2.62* 2.81* 0.85   GLC 85 115* 114* 75   STEPHANI 8.6 9.4 9.4 8.2*     Recent Labs   Lab 10/02/20  2227   COLOR Yellow   APPEARANCE Slightly Cloudy   URINEGLC Negative   URINEBILI Negative   URINEKETONE Trace*   SG 1.020   URINEPH 5.0   PROTEIN 30*   NITRITE Negative   LEUKEST Small*   RBCU >182*   WBCU 22*     IMAGING:  All pertinent imaging reviewed:    All imaging studies reviewed by me.  I personally reviewed these imaging films.  A formal report from radiology will follow.    CT ABD/PEL:  FINDINGS:   LOWER CHEST: Linear atelectasis in both lung bases. Marked cardiac enlargement. Partially visualized coronary artery calcification.  Pericardial calcification.     HEPATOBILIARY: Normal.     PANCREAS: Normal.     SPLEEN: Normal.     ADRENAL GLANDS: No significant nodules.     KIDNEYS/BLADDER: Moderate right-sided hydronephrosis with marked left-sided hydronephrosis. Left ureteral stent in good position. 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 Moderate bilateral ureteral dilatation down to   the level of a markedly distended bladder. Marked prostate enlargement. No evidence for obstructing calculi.     BOWEL: No evidence for bowel dilatation or obstruction. Large amount of stool throughout the colon.     LYMPH NODES: No lymphadenopathy.     VASCULATURE: Normal caliber abdominal aorta. Minimal atherosclerotic vascular calcification.     Additional findings: Diffuse free intraperitoneal air throughout the abdomen and pelvis compatible with recent surgery. Minimal to moderate diffuse free fluid throughout the abdomen and pelvis. Subcutaneous gas related to very recent surgery. Mild   diffuse subcutaneous edema.     MUSCULOSKELETAL: Degenerative changes in the spine.                                                                      IMPRESSION:   1.  Left ureteral stent appearing in good position with persistent marked left-sided hydronephrosis and moderate right-sided hydronephrosis. Marked distention of the bladder. Findings may be due to lower tract obstruction. Marked prostate enlargement.     2.  Diffuse free intraperitoneal air and free fluid throughout the abdomen likely related to patient's very recent surgery. Subcutaneous gas within the anterior abdominal wall related to recent surgery.     3.  Large amount of stool throughout the colon.         Impression and Plan:   Impression:   65-year-old man with complex medical history who is status post a robotic left pyelolithotomy on 9/28/2020 found to have urinary retention with acute kidney injury and postoperative ileus on top of baseline neurogenic bowel       Plan:   Urinary retention  -He has chronic neurogenic bladder which had been previously managed with an condom catheter  -Reyna catheter placement yesterday in the emergency room with subsequent improvement of his acute kidney injury and decrease of his serum creatinine today  -Plan to maintain Reyna catheter for least 1 to 2 weeks and this will be addressed as an outpatient    Neurogenic bowel with likely component of postoperative ileus  -It is encouraging that he is 20 past flatus today  -Agree with bowel regimen     Chart documentation with Dragon Voice recognition Software. Although reviewed after completion, some words and grammatical errors may remain.     Lexa Bacon MD   Urology  [facility]  Clinic Phone [phone]

## 2020-10-03 NOTE — ED TRIAGE NOTES
Pt had surgery for kidney stones on Monday and had a follow up today and had elevated creatinine.  Pt also reports abdominal bloating and constipation and has vomited.

## 2020-10-03 NOTE — PROGRESS NOTES
[facility]  Hospitalist Progress Note  Patient Name: Marcus Hodges    MRN: [id number]  Provider: Lan Sosa MD  10/03/20             Assessment and Plan:      Summary: Marcus Hodges is a 65 year old male with a history of AFib on chronic apixaban, htn/hlp, R MCA stroke in 2012 with Left sided weakness/spasticity and seizure disorder. He has history of neurogenic bowel and bladder that he attributes to his previous stroke.  He uses multiple laxatives for his neurogenic bowel. His neurogenic bladder does not require straight cathing or chronic faulkner- rather he just uses a condom cath.  Lex also had large kidney stones and underwent left pyelolithotomy on 9/28/20.  Since surgery he has had ongoing issues with constipation and over the two days prior to presentation his abdomen had gotten tender and distended. He went to Urology follow up on 10/2/20 and had lab work and AXR completed.  AXR was unremarkable but his blood work showed bun/creat of 33/2.8 so he was referred to the ED for evaluation.  ED evaluation showed stable vital signs.  Labs confirmed PAULETTE and CT of abdomen and pelvis showed diffuse intraperitoneal air consistent with recent laparoscopic surgery, bilateral hydro (L>R) with left ureteral stent in good position, markedly enlarged bladder consistent with LEE, and a large amount of stool.  Faulkner catheter was placed with return of 1300 ml of urine. Lex was admitted to the hospital with constipation and bladder outlet obstruction complicated by acute kidney injury.      Problem List:   1.  Urine retention in setting of neurogenic bladder.  Faulkner catheter was placed with return of 1300 mL of urine.  Allergy consult appreciated-leave Faulkner catheter in for now and follow-up with urology in clinic in 1 to 2 weeks.    2.  Acute kidney injury due to urinary obstruction.  This is improving after placement of Faulkner catheter.  Continue monitoring.  Watch for post obstructive diuresis.  Repeat  "basic metabolic panel tomorrow.    3.  Constipation.  Lex has a complicated sounding bowel regimen.  I have ordered magnesium citrate for today.  Hopefully he will have a bowel movement before tomorrow.    4.  Atrial fibrillation, stable.  Continue prior to admission apixaban.     5.  Hypertension.  Continue prior to admission metoprolol 25 mg daily     6.  Dyslipidemia.  Continue prior to admission Lipitor.     7.  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  Continue prior to admission levitericitam 1 g bid and baclofen 20 mg qid     8.  Depression  Continue prior to admission citalopram.     9.  COVID 19 test was negative.        DVT Prophylaxis: on apixaban   Code Status: Full Code  Functional Status:  Lives with his wife in a house and walks with a cane  Reyna:  Placed in ER for acute LEE  Access:  PIV  Disposition:  Likely home tomorrow if constipation has resolved and renal function continues to improve        Interval History:      No new problems.  Constipation persists.                    Physical Exam:      Last Vital Signs:  /75 (BP Location: Right arm)   Pulse 68   Temp 96.9  F (36.1  C) (Oral)   Resp 16   Ht 1.88 m (6' 2\")   Wt 90.4 kg (199 lb 4.8 oz)   SpO2 98%   BMI 25.59 kg/m      Intake/Output Summary (Last 24 hours) at 10/3/2020 1441  Last data filed at 10/3/2020 0940  Gross per 24 hour   Intake 773 ml   Output 5000 ml   Net -4227 ml       GENERAL:  Comfortable. Cooperative.  PSYCH: pleasant, oriented, No acute distress.  EYES: PERRLA, Normal conjunctiva.  HEART:  Regular rate and rhythm. No JVD. Pulses normal. No edema.  LUNGS:  Clear to auscultation, normal Respiratory effort.  ABDOMEN:  Soft, no hepatosplenomegaly, normal bowel sounds.  EXTREMETIES: No clubbing, cyanosis or ischemia  SKIN:  Dry to touch, No rash.           Medications:      All current medications were reviewed.         Data:      All " new lab and imaging data was reviewed.   Labs:       Lab Results   Component Value Date     10/03/2020     10/02/2020     10/02/2020    Lab Results   Component Value Date    CHLORIDE 112 10/03/2020    CHLORIDE 97 10/02/2020    CHLORIDE 99 10/02/2020    Lab Results   Component Value Date    BUN 20 10/03/2020    BUN 32 10/02/2020    BUN 33 10/02/2020      Lab Results   Component Value Date    POTASSIUM 4.2 10/03/2020    POTASSIUM 4.8 10/02/2020    POTASSIUM 4.6 10/02/2020    Lab Results   Component Value Date    CO2 25 10/03/2020    CO2 26 10/02/2020    CO2 25 10/02/2020    Lab Results   Component Value Date    CR 1.28 10/03/2020    CR 2.62 10/02/2020    CR 2.81 10/02/2020        Recent Labs   Lab 10/03/20  0619 10/02/20  1928 10/02/20  1650   WBC 6.8 11.3* 11.5*   HGB 11.3* 12.4* 12.4*   HCT 35.8* 38.7* 39.1*   MCV 92 92 92    181 182      Imaging:   Recent Results (from the past 48 hour(s))   XR Abdomen 1 View    Narrative    ABDOMEN ONE VIEW  10/2/2020 4:38 PM     HISTORY: POD#4 status post robotic pyelolithotomy, worsening abdominal  distension, assess for ileus. Abdominal distension (gaseous).    COMPARISON: None.       Impression    IMPRESSION: Large amount of stool. Nonobstructed bowel gas pattern.  Left ureteral stent in place. No definite stone along the course of  the stent.    CHANG REED MD   CT Abdomen Pelvis w/o Contrast    Narrative    EXAM: CT ABDOMEN PELVIS W/O CONTRAST  LOCATION: [facility]  DATE/TIME: 10/2/2020 9:45 PM    INDICATION: Acute generalized abdominal pain in a patient with recent laparoscopic surgery.  COMPARISON: 05/10/2018  TECHNIQUE: CT scan of the abdomen and pelvis was performed without IV contrast. Multiplanar reformats were obtained. Dose reduction techniques were used.  CONTRAST: None.    FINDINGS:   LOWER CHEST: Linear atelectasis in both lung bases. Marked cardiac enlargement. Partially visualized coronary artery calcification.  Pericardial calcification.    HEPATOBILIARY: Normal.    PANCREAS: Normal.    SPLEEN: Normal.    ADRENAL GLANDS: No significant nodules.    KIDNEYS/BLADDER: Moderate right-sided hydronephrosis with marked left-sided hydronephrosis. Left ureteral stent in good position. 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 Moderate bilateral ureteral dilatation down to   the level of a markedly distended bladder. Marked prostate enlargement. No evidence for obstructing calculi.    BOWEL: No evidence for bowel dilatation or obstruction. Large amount of stool throughout the colon.    LYMPH NODES: No lymphadenopathy.    VASCULATURE: Normal caliber abdominal aorta. Minimal atherosclerotic vascular calcification.    Additional findings: Diffuse free intraperitoneal air throughout the abdomen and pelvis compatible with recent surgery. Minimal to moderate diffuse free fluid throughout the abdomen and pelvis. Subcutaneous gas related to very recent surgery. Mild   diffuse subcutaneous edema.    MUSCULOSKELETAL: Degenerative changes in the spine.      Impression    IMPRESSION:   1.  Left ureteral stent appearing in good position with persistent marked left-sided hydronephrosis and moderate right-sided hydronephrosis. Marked distention of the bladder. Findings may be due to lower tract obstruction. Marked prostate enlargement.    2.  Diffuse free intraperitoneal air and free fluid throughout the abdomen likely related to patient's very recent surgery. Subcutaneous gas within the anterior abdominal wall related to recent surgery.    3.  Large amount of stool throughout the colon.    Results discussed with Dr. Zaldivar at 10:25 PM and 10/02/2020.

## 2020-10-03 NOTE — PHARMACY-ADMISSION MEDICATION HISTORY
Admission medication history interview status for this patient is complete. See [facility] admission navigator for allergy information, prior to admission medications and immunization status.     Medication history interview done via telephone during Covid-19 pandemic, indicate source(s): Patient  Medication history resources (including written lists, pill bottles, clinic record): SureScripts and Care Everywhere (urology visit 10/2)  Pharmacy: [facility]    Changes made to PTA medication list:  Added: acetaminophen and ibuprofen - has been taking post-op after surgery this week  Deleted: none  Changed: baclofen 20mg four times daily --> five times daily. Docusate 100mg daily prn --> 200-mg at bedtime prn. miralax 17g daily prn --> 17g every other day. Senna-S 1 tab twice daily --> 4 tablets at bedtime.     Actions taken by pharmacist (provider contacted, etc): Called pt to verify home med list.     Additional medication history information: Pt taking more constipation medications than listed in his AVS from 10/2. Pt also takes 4 prunes at lunch and 4 prunes in the evening every day to help treat constipation.    Medication reconciliation/reorder completed by provider prior to medication history?  Y   (Y/N)         Prior to Admission medications    Medication Sig Last Dose Taking? Auth Provider   acetaminophen (TYLENOL) 500 MG tablet Take 1,000 mg by mouth every 8 hours as needed for mild pain Alternating with ibuprofen for post surgical pain 10/2/2020 at am Yes Unknown, Entered By History   apixaban ANTICOAGULANT (ELIQUIS ANTICOAGULANT) 5 MG tablet Take 1 tablet (5 mg) by mouth 2 times daily 10/2/2020 at am Yes Hayes Yip MD   atorvastatin (LIPITOR) 20 MG tablet TAKE 1 TABLET BY MOUTH EVERY DAY 10/2/2020 at am Yes Don Aviles MD   baclofen (LIORESAL) 20 MG tablet Take 1 tablet (20 mg) by mouth five times daily 10/2/2020 at 1600 Yes Nory Reed APRN CNP   citalopram (CELEXA) 20 MG  tablet Take 30 mg by mouth daily 10/2/2020 at am Yes Unknown, Entered By History   docusate sodium (COLACE) 100 MG tablet Take 200 mg by mouth nightly as needed  10/2/2020 at Unknown time Yes Reported, Patient   ibuprofen (ADVIL/MOTRIN) 200 MG tablet Take 400 mg by mouth every 8 hours as needed for mild pain Alternating with acetaminophen for post surgical pain 10/2/2020 at am Yes Unknown, Entered By History   levETIRAcetam (KEPPRA) 1000 MG tablet Take 1 tablet (1,000 mg) by mouth 2 times daily 10/2/2020 at am Yes Nory Reed APRN CNP   magnesium hydroxide (MILK OF MAGNESIA) 400 MG/5ML suspension Take 15 mLs by mouth daily as needed for constipation or heartburn  Yes Melida Bills MD   metoprolol succinate ER (TOPROL-XL) 25 MG 24 hr tablet Take 25 mg by mouth daily 10/2/2020 at am Yes Unknown, Entered By History   mirabegron (MYRBETRIQ) 50 MG 24 hr tablet Take 50 mg by mouth daily 10/2/2020 at am Yes Unknown, Entered By History   Multiple Vitamin (MULTI-VITAMIN) per tablet Take 1 tablet by mouth daily  10/2/2020 at Unknown time Yes Don Aviles MD   NONFORMULARY Take 1 tablet by mouth daily Protandim - herbal supplement  10/1/2020 at am Yes Reported, Patient   Omega-3 Fatty Acids (FISH OIL PO) Take 1 Dose by mouth daily 10/2/2020 at am Yes Reported, Patient   oxyCODONE (ROXICODONE) 5 MG tablet Take 1-2 tablets (5-10 mg) by mouth every 3 hours as needed for severe pain 10/2/2020 at 0400 Yes Nehemiah Bloom MD   Polyethylene Glycol 3350 (MIRALAX PO) Take 17 g by mouth every other day  Past Week at Unknown time Yes Reported, Patient   senna-docusate (SENOKOT-S/PERICOLACE) 8.6-50 MG tablet Take 4 tablets by mouth At Bedtime 10/1/2020 at pm Yes Unknown, Entered By History   tamsulosin (FLOMAX) 0.4 MG capsule Take 1 capsule (0.4 mg) by mouth daily 10/1/2020 at pm Yes Nehemiah Bloom MD   vitamin D3 (CHOLECALCIFEROL) 50 mcg (2000 units) tablet Take 2,000 Units by mouth daily  10/2/2020 at am  Yes Don Aviles MD

## 2020-10-04 VITALS
RESPIRATION RATE: 16 BRPM | SYSTOLIC BLOOD PRESSURE: 118 MMHG | HEIGHT: 74 IN | OXYGEN SATURATION: 97 % | TEMPERATURE: 97 F | HEART RATE: 65 BPM | DIASTOLIC BLOOD PRESSURE: 77 MMHG | WEIGHT: 196.7 LBS | BODY MASS INDEX: 25.24 KG/M2

## 2020-10-04 LAB
ANION GAP SERPL CALCULATED.3IONS-SCNC: 5 MMOL/L (ref 3–14)
BUN SERPL-MCNC: 11 MG/DL (ref 7–30)
CALCIUM SERPL-MCNC: 8.2 MG/DL (ref 8.5–10.1)
CHLORIDE SERPL-SCNC: 111 MMOL/L (ref 94–109)
CO2 SERPL-SCNC: 25 MMOL/L (ref 20–32)
CREAT SERPL-MCNC: 0.66 MG/DL (ref 0.66–1.25)
GFR SERPL CREATININE-BSD FRML MDRD: >90 ML/MIN/{1.73_M2}
GLUCOSE SERPL-MCNC: 86 MG/DL (ref 70–99)
POTASSIUM SERPL-SCNC: 3.9 MMOL/L (ref 3.4–5.3)
SODIUM SERPL-SCNC: 141 MMOL/L (ref 133–144)

## 2020-10-04 PROCEDURE — 36415 COLL VENOUS BLD VENIPUNCTURE: CPT | Performed by: INTERNAL MEDICINE

## 2020-10-04 PROCEDURE — 250N000013 HC RX MED GY IP 250 OP 250 PS 637: Performed by: INTERNAL MEDICINE

## 2020-10-04 PROCEDURE — 258N000003 HC RX IP 258 OP 636: Performed by: INTERNAL MEDICINE

## 2020-10-04 PROCEDURE — 99238 HOSP IP/OBS DSCHRG MGMT 30/<: CPT | Performed by: INTERNAL MEDICINE

## 2020-10-04 PROCEDURE — 80048 BASIC METABOLIC PNL TOTAL CA: CPT | Performed by: INTERNAL MEDICINE

## 2020-10-04 PROCEDURE — 99232 SBSQ HOSP IP/OBS MODERATE 35: CPT | Performed by: UROLOGY

## 2020-10-04 RX ADMIN — ACETAMINOPHEN 650 MG: 325 TABLET, FILM COATED ORAL at 08:20

## 2020-10-04 RX ADMIN — ACETAMINOPHEN 650 MG: 325 TABLET, FILM COATED ORAL at 04:27

## 2020-10-04 RX ADMIN — BACLOFEN 20 MG: 10 TABLET ORAL at 08:20

## 2020-10-04 RX ADMIN — CITALOPRAM HYDROBROMIDE 30 MG: 20 TABLET ORAL at 08:20

## 2020-10-04 RX ADMIN — ACETAMINOPHEN 650 MG: 325 TABLET, FILM COATED ORAL at 13:30

## 2020-10-04 RX ADMIN — SODIUM CHLORIDE: 9 INJECTION, SOLUTION INTRAVENOUS at 06:51

## 2020-10-04 RX ADMIN — BACLOFEN 20 MG: 10 TABLET ORAL at 11:11

## 2020-10-04 RX ADMIN — BACLOFEN 20 MG: 10 TABLET ORAL at 13:30

## 2020-10-04 RX ADMIN — POLYETHYLENE GLYCOL 3350 17 G: 17 POWDER, FOR SOLUTION ORAL at 08:20

## 2020-10-04 RX ADMIN — LEVETIRACETAM 1000 MG: 500 TABLET, FILM COATED ORAL at 08:20

## 2020-10-04 RX ADMIN — APIXABAN 5 MG: 5 TABLET, FILM COATED ORAL at 08:20

## 2020-10-04 RX ADMIN — ATORVASTATIN CALCIUM 20 MG: 20 TABLET, FILM COATED ORAL at 08:20

## 2020-10-04 ASSESSMENT — MIFFLIN-ST. JEOR: SCORE: 1756.98

## 2020-10-04 NOTE — DISCHARGE SUMMARY
"Discharge Summary    Marcus Hodges MRN# [id number]   YOB: 1955 Age: 65 year old     Date of Admission:  10/2/2020  Date of Discharge:  10/4/2020  Admitting Physician:  Shelly Robbins MD  Discharge Physician:  Lan Sosa MD  Discharging Service:  Hospitalist     Home clinic: [facility]  Primary Provider: Don Aviles          Discharge Diagnosis:   1.  Urine retention in setting of neurogenic bladder and constipation.     2.  Acute kidney injury due to urinary obstruction, resolved.     3.  Constipation, resolved.     4.  Atrial fibrillation, stable.       5.  Hypertension.       6.  Dyslipidemia.       7.  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       8.  Depression.     9.  COVID 19 test was negative.              Discharge Disposition:   Discharged to home           Allergies:   Allergies   Allergen Reactions     Blood Transfusion Related (Informational Only) Other (See Comments)     Patient has a history of a clinically significant antibody against RBC antigens.  A delay in compatible RBCs may occur.     Lisinopril      No Known Drug Allergies                 Condition on Discharge:   Discharge condition: Stable   Discharge vitals: Blood pressure 118/77, pulse 65, temperature 97  F (36.1  C), temperature source Oral, resp. rate 16, height 1.88 m (6' 2\"), weight 89.2 kg (196 lb 11.2 oz), SpO2 97 %.   Code status on discharge: Full Code   Physical exam on day of discharge:   GENERAL:  Comfortable. Cooperative.  PSYCH: pleasant, oriented, No acute distress.  EYES: PERRLA, Normal conjunctiva.  HEART:  Regular rate and rhythm. No JVD. Pulses normal. No edema.  LUNGS:  Clear to auscultation, normal Respiratory effort.  ABDOMEN:  Soft, no hepatosplenomegaly, normal bowel sounds.  EXTREMETIES: No clubbing, cyanosis or ischemia  SKIN:  Dry to touch, No rash.         History of Present Illness and Hospital " Course:     See detailed admission note for full details.  Marcus Hodges is a 65 year old male with history of AFib (chronically on apixaban), hypertension, hyperlipidemia, right middle cerebral artery stroke in 2012 with subsequent left sided weakness and spasticity, and seizure disorder. He has history of neurogenic bowel and bladder that he attributes to his previous stroke.  He uses multiple laxatives for his neurogenic bowel. His neurogenic bladder does not require straight cathing or chronic faulkner- rather he just uses a condom cath.  Lex also had large kidney stones and underwent left pyelolithotomy on 9/28/20.  After surgery he had issues with constipation and over the two days prior to presentation his abdomen had gotten tender and distended. He went to Urology follow up on 10/2/20 and had lab work and AXR completed.  AXR was unremarkable but his blood work showed bun/creat of 33/2.8 so he was referred to the ED for evaluation.  ED evaluation showed stable vital signs.  Labs confirmed PAULETTE and CT of abdomen and pelvis showed diffuse intraperitoneal air consistent with recent laparoscopic surgery, bilateral hydro (L>R) with left ureteral stent in good position, markedly enlarged bladder consistent with LEE, and a large amount of stool.  Faulkner catheter was placed with return of 1300 ml of urine. Lex was admitted to the hospital with constipation and bladder outlet obstruction complicated by acute kidney injury. Renal function normalized after catheter placement. Constipation resolved.  Lex will discharge home today and follow up with primary care in one week with a BMP and in 1-2 weeks with Urology.            Procedures / Imaging:   CT of abdomen           Consultations:   Consultation during this admission received from urology             Pending Results:   None           Discharge Instructions and Follow-Up:   Discharge diet: Regular, mechanical soft   Discharge activity: Activity as tolerated    Discharge follow-up: Follow up with primary care provider in 1 week with BMP  Follow up with Urology in 1-2 weeks   Outpatient therapy: None    Other instructions: Discharging home with Reyna catheter in place            Discharge Medications:   Current Discharge Medication List      CONTINUE these medications which have NOT CHANGED    Details   acetaminophen (TYLENOL) 500 MG tablet Take 1,000 mg by mouth every 8 hours as needed for mild pain Alternating with ibuprofen for post surgical pain      apixaban ANTICOAGULANT (ELIQUIS ANTICOAGULANT) 5 MG tablet Take 1 tablet (5 mg) by mouth 2 times daily  Qty: 180 tablet, Refills: 3    Associated Diagnoses: Chronic atrial fibrillation (H)      atorvastatin (LIPITOR) 20 MG tablet TAKE 1 TABLET BY MOUTH EVERY DAY  Qty: 90 tablet, Refills: 3    Associated Diagnoses: Hyperlipidemia LDL goal <100      baclofen (LIORESAL) 20 MG tablet Take 20 mg by mouth Five times daily      citalopram (CELEXA) 20 MG tablet Take 30 mg by mouth daily      docusate sodium (COLACE) 100 MG tablet Take 200 mg by mouth nightly as needed       ibuprofen (ADVIL/MOTRIN) 200 MG tablet Take 400 mg by mouth every 8 hours as needed for mild pain Alternating with acetaminophen for post surgical pain      levETIRAcetam (KEPPRA) 1000 MG tablet Take 1 tablet (1,000 mg) by mouth 2 times daily  Qty: 180 tablet, Refills: 3    Associated Diagnoses: Seizure prophylaxis      magnesium hydroxide (MILK OF MAGNESIA) 400 MG/5ML suspension Take 15 mLs by mouth daily as needed for constipation or heartburn  Qty: 360 mL, Refills: 1    Associated Diagnoses: Constipation, unspecified constipation type      metoprolol succinate ER (TOPROL-XL) 25 MG 24 hr tablet Take 25 mg by mouth daily      mirabegron (MYRBETRIQ) 50 MG 24 hr tablet Take 50 mg by mouth daily      Multiple Vitamin (MULTI-VITAMIN) per tablet Take 1 tablet by mouth daily   Qty: 100 tablet, Refills: 3      NONFORMULARY Take 1 tablet by mouth daily Protandim -  herbal supplement       Omega-3 Fatty Acids (FISH OIL PO) Take 1 Dose by mouth daily      oxyCODONE (ROXICODONE) 5 MG tablet Take 1-2 tablets (5-10 mg) by mouth every 3 hours as needed for severe pain  Qty: 12 tablet, Refills: 0    Associated Diagnoses: Kidney stone on left side      polyethylene glycol (MIRALAX) 17 g packet Take 17 g by mouth every other day       senna-docusate (SENOKOT-S/PERICOLACE) 8.6-50 MG tablet Take 4 tablets by mouth At Bedtime      tamsulosin (FLOMAX) 0.4 MG capsule Take 1 capsule (0.4 mg) by mouth daily  Qty: 30 capsule, Refills: 0    Associated Diagnoses: Kidney stone on left side      vitamin D3 (CHOLECALCIFEROL) 50 mcg (2000 units) tablet Take 2,000 Units by mouth daily   Qty: 90 tablet, Refills: 3    Associated Diagnoses: CVA (cerebral vascular accident) (H)                Total time spent in face to face contact with the patient and coordinating discharge was:  25 Minutes

## 2020-10-04 NOTE — PROGRESS NOTES
[facility] Consult Progress Note          Assessment and Plan:     Active Problems:  65-year-old man with complex medical history who is status post a robotic left pyelolithotomy on 9/28/2020 found to have urinary retention with acute kidney injury and postoperative ileus on top of baseline neurogenic bowel    - Scr improved with faulkner catheter  - Ileus resolved  - OK for discharge to home with faulkner catheter in place  - I will arrange for out-patient follow up with Dr. Merle Bacon MD   [facility]  Office: [phone]               Interval History:     doing well; no cp, sob, n/v/d, or abd pain. Faulkner draining clear. Had large BM yesterday              Review of Systems:     The 5 point Review of Systems is negative other than noted in the HPI             Medications:     Current Facility-Administered Medications Ordered in Epic   Medication Dose Route Frequency Last Rate Last Dose     acetaminophen (TYLENOL) tablet 650 mg  650 mg Oral Q4H PRN   650 mg at 10/04/20 0820     apixaban ANTICOAGULANT (ELIQUIS) tablet 5 mg  5 mg Oral BID   5 mg at 10/04/20 0820     atorvastatin (LIPITOR) tablet 20 mg  20 mg Oral Daily   20 mg at 10/04/20 0820     baclofen (LIORESAL) tablet 20 mg  20 mg Oral 5x Daily   20 mg at 10/04/20 1111     bisacodyl (DULCOLAX) EC tablet 5 mg  5 mg Oral Daily PRN        Or     bisacodyl (DULCOLAX) EC tablet 10 mg  10 mg Oral Daily PRN        Or     bisacodyl (DULCOLAX) EC tablet 15 mg  15 mg Oral Daily PRN         citalopram (celeXA) tablet 30 mg  30 mg Oral Daily   30 mg at 10/04/20 0820     levETIRAcetam (KEPPRA) tablet 1,000 mg  1,000 mg Oral BID   1,000 mg at 10/04/20 0820     lidocaine (LMX4) cream   Topical Q1H PRN         lidocaine 1 % 0.1-1 mL  0.1-1 mL Other Q1H PRN         magnesium citrate solution 296 mL  296 mL Oral Once         magnesium hydroxide (MILK OF MAGNESIA) suspension 30 mL  30 mL Oral Daily PRN         melatonin tablet 5 mg  5 mg  Oral At Bedtime PRN   5 mg at 10/03/20 2057     naloxone (NARCAN) injection 0.1-0.4 mg  0.1-0.4 mg Intravenous Q2 Min PRN         ondansetron (ZOFRAN-ODT) ODT tab 4 mg  4 mg Oral Q6H PRN        Or     ondansetron (ZOFRAN) injection 4 mg  4 mg Intravenous Q6H PRN         oxyCODONE IR (ROXICODONE) half-tab 2.5 mg  2.5 mg Oral Q8H PRN         Patient is already receiving anticoagulation with heparin, enoxaparin (LOVENOX), warfarin (COUMADIN)  or other anticoagulant medication   Does not apply Continuous PRN         polyethylene glycol (MIRALAX) Packet 17 g  17 g Oral Daily   17 g at 10/04/20 0820     prune juice juice 5 mL  5 mL Oral BID         sennosides (SENOKOT) tablet 4 tablet  4 tablet Oral At Bedtime         sodium chloride (PF) 0.9% PF flush 3 mL  3 mL Intracatheter q1 min prn         sodium chloride (PF) 0.9% PF flush 3 mL  3 mL Intracatheter Q8H   3 mL at 10/03/20 0106     sodium chloride 0.9% infusion   Intravenous Continuous 100 mL/hr at 10/04/20 0651       tamsulosin (FLOMAX) capsule 0.4 mg  0.4 mg Oral Daily   0.4 mg at 10/03/20 1846     No current [facility]-ordered outpatient medications on file.                  Physical Exam:   Vitals were reviewed  Patient Vitals for the past 8 hrs:   BP Temp Temp src Pulse Resp SpO2 Weight   10/04/20 0807 118/77 97  F (36.1  C) Oral 65 16 97 % --   10/04/20 0603 -- -- -- -- -- -- 89.2 kg (196 lb 11.2 oz)     GEN: NAD, lying in bed  HEENT: EOMI  NECK: Supple  ABD: Obese, soft  EXT: No LE edema  : Reyna draining clear            Data:     Lab Results   Component Value Date    CR 0.66 10/04/2020    CR 1.28 (H) 10/03/2020    CR 2.62 (H) 10/02/2020    CR 2.81 (H) 10/02/2020    CR 0.85 09/29/2020     Lab Results   Component Value Date    WBC 6.8 10/03/2020    WBC 11.3 (H) 10/02/2020    WBC 11.5 (H) 10/02/2020    HGB 11.3 (L) 10/03/2020    HGB 12.4 (L) 10/02/2020    HGB 12.4 (L) 10/02/2020    HCT 35.8 (L) 10/03/2020    HCT 38.7 (L) 10/02/2020    HCT 39.1 (L) 10/02/2020    MCV  92 10/03/2020    MCV 92 10/02/2020    MCV 92 10/02/2020     10/03/2020     10/02/2020     10/02/2020     Lab Results   Component Value Date    INR 1.46 (H) 10/02/2020    INR 2.4 (A) 12/04/2017    INR 2.5 (A) 10/23/2017

## 2020-10-04 NOTE — PLAN OF CARE
See flowsheets for vital signs and assessments.    Pertinent Assessments: Mild pain, given Tylenol x 2 which was effective. Apical pulse irregular. Lap sites CDI, CLAUS. Dressing changed over former MAIKEL site. Reyna patent with 1200mL out, urine clear sulma/brown. Ambulated in hallway once, Ax1 to get out of bed then SBA with cane in the gray.     Major Shift Events: None    Treatment Plan: Pain management, monitor BMP.     Discharge: Possibly today, will discharge with Reyna.    Bedside RN: Blaire Salomon

## 2020-10-04 NOTE — PLAN OF CARE
Vitals: /67  Pulse 67   Temp 97.8  F (Oral)  Resp 15  SpO2 97%      Neuro: Appears at neurological baseline. Forgetful at times. Very pleasant, calm.   Cardiac: Apical pulse irregular; afib  Lungs: WNL  GI: WNL - very large BM this afternoon after lactulose x 2. Patient reported improvement in bloated feeling and abdominal cramping after.   : Faulkner in place with dark, brown/pink output. Clear. No clots. Output slowed from previous shift.  Pain: Declined intervention throughout shift.  IV: NS infusing.  Diet: Mech soft; tolerating well.  Activity: Up with Ax2, GB, and cane. Gait impaired d/t previous stroke. Wife states ambulation appears at baseline, would like patient to trial stairs before discharge (lives in split level home).  Plan: Will discharge with faulkner per urology. Continue to monitor.

## 2020-10-04 NOTE — PLAN OF CARE
"Patient showered with assistance from NST. At end of shower, patient requested to stand to have bottom washed. Writer educated patient on risk of slipping in shower. Told patient RN would provide pericare in room after completing shower. Patient was adamant that he stand and have bottom washed. Patient stood with assistance from RN; held on to grab bar with R hand. After washing, patient's right foot slid out from underneath him. RN & NST assisted back to standing position. No body part touched the floor. Did not hit head. Did not hit any limb. Patient denied injury. Stated \"Oh that was a little slip\". Shower completed. Patient assisted back to room. Wife updated.   "

## 2020-10-04 NOTE — PLAN OF CARE
Vitals: /77  Pulse 65  Temp 97  F (Oral)  Resp 16  SpO2 97%      Neuro: Appears at neurological baseline. Forgetful at times. Very pleasant, calm.   Cardiac: Apical pulse irregular; afib  Lungs: WNL  GI: WNL. Reports some abdominal tenderness around lap sites.  : Faulkner in place with dark, pink/sulma output. Clear. No clots.   Pain: Tylenol given x 2.   IV: Removed for discharge.   Diet: Mech soft; tolerating well.  Activity: Up with Ax2, GB, and cane. Gait impaired d/t previous stroke. Patient trialed stairs; completed 12 stairs (with 2 assist for safety). Wife watched ambulation and felt patient was at baseline.  Plan: Discharge this afternoon with faulkner in place & close follow up with urology and PCP.

## 2020-10-04 NOTE — PLAN OF CARE
Patient's After Visit Summary was reviewed with patient and/or spouse.   Patient verbalized understanding of After Visit Summary, recommended follow up and was given an opportunity to ask questions.   Discharge medications sent home with patient/family: Not applicable, no new meds.   Discharged with spouse.    Discharged in stable condition with wife as ride. Reyna education thoroughly reviewed with wife; provided with literature from [employer]. Wife familiar with catheter drainage bags (utilizes condom cath at home).

## 2020-10-05 ENCOUNTER — TELEPHONE (OUTPATIENT)
Dept: PEDIATRICS | Facility: CLINIC | Age: 65
End: 2020-10-05

## 2020-10-05 DIAGNOSIS — N17.9 ACUTE KIDNEY INJURY (H): Primary | ICD-10-CM

## 2020-10-05 LAB
APPEARANCE STONE: NORMAL
BACTERIA SPEC CULT: ABNORMAL
COMPN STONE: NORMAL
INTERPRETATION ECG - MUSE: NORMAL
INTERPRETATION ECG - MUSE: NORMAL
Lab: ABNORMAL
NUMBER STONE: 1
SIZE STONE: > 9 MM
SPECIMEN SOURCE: ABNORMAL
WT STONE: NORMAL MG

## 2020-10-05 NOTE — TELEPHONE ENCOUNTER
Please contact patient for In-patient follow up.  [phone] (home) NONE (work)    Visit date: 100420  Diagnosis listed: Urinary Retention  Number of visits in past 12 months: 0 ED / 1 IP

## 2020-10-05 NOTE — TELEPHONE ENCOUNTER
"Hospital/TCU/ED for chronic condition Discharge Protocol    \"Hi, my name is Mariana Magaña RN, a registered nurse, and I am calling from [facility] (formerly [facility]3].  I am calling to follow up and see how things are going for you after your recent emergency visit/hospital/TCU stay.\"    Tell me how you are doing now that you are home?\" Doing well. Had a bowel movement. Took a shower. Overall feels better.   Some mild edema of both ankles. Noticed on discharge. Denies worrisome signs and symptoms of blood clot or CHF.   Advised to elevate legs, wear compression stockings and follow up if symptoms change or worsen.       Discharge Instructions    \"Let's review your discharge instructions.  What is/are the follow-up recommendations?  Pt. Response: w/ PCP & Urology    \"Has an appointment with your primary care provider been scheduled?\"   Would like to schedule with Urology first then schedule with Dr. Aviles. Will call patient tomorrow.     \"When you see the provider, I would recommend that you bring your medications with you.\"    Medications    \"Tell me what changed about your medicines when you discharged?\"    Changes to chronic meds?    0-1    \"What questions do you have about your medications?\"    None     New diagnoses of heart failure, COPD, diabetes, or MI?    No              Post Discharge Medication Reconciliation Status: discharge medications reconciled, continue medications without change.    Was MTM referral placed (*Make sure to put transitions as reason for referral)?   No    Call Summary    \"What questions or concerns do you have about your recent visit and your follow-up care?\"     none    \"If you have questions or things don't continue to improve, we encourage you contact us through the main clinic number (give number).  Even if the clinic is not open, triage nurses are available 24/7 to help you.     We would like you to know that our clinic has extended hours (provide information).  We also have urgent care " "(provide details on closest location and hours/contact info)\"      \"Thank you for your time and take care!\"             "

## 2020-10-07 NOTE — TELEPHONE ENCOUNTER
Scheduled lab only on 10/14/2020. BMP entered.   Patient would like to defer office visit with Dr. Aviles for now.

## 2020-10-12 ENCOUNTER — ALLIED HEALTH/NURSE VISIT (OUTPATIENT)
Dept: UROLOGY | Facility: CLINIC | Age: 65
End: 2020-10-12
Payer: MEDICARE

## 2020-10-12 DIAGNOSIS — R33.9 URINARY RETENTION: Primary | ICD-10-CM

## 2020-10-12 PROCEDURE — 51700 IRRIGATION OF BLADDER: CPT | Mod: 58

## 2020-10-12 NOTE — PROGRESS NOTES
Marcus Hodges comes into clinic today at the request of Dr. Bloom Ordering Provider for TOV (trial of void).    Patient presents to clinic for a trial of void per MD order. Patient properly identified and procedure explained to patient. Patient's leg-bag emptied, 200cc's clear-yellow urine drained from patient's catheter.Catheter detached from leg-bag and sterile cysto tubing inserted into catheter opening. Via gravity 300cc's sterile water was gently instilled with brief pauses into bladder. Patient tolerated fairly well and then 8cc's was removed from balloon. 16 Fr. Faulkner catheter was removed from bladder. Patient was not able to successfully void following procedure. Paient tried to void for about a half hour. Patient was then agreeable to have catheter put back in. Patient's urethra was cleansed with iodine and lidocaine gel was inserted into urethra. A well-lubricated 16 Faroese faulkner coude tip catheter was gently inserted into urethra to Y and balloon was filled with 8cc sterile water. 350cc clear-yellow urine return was noted. Patient's catheter was connected to a leg-bag and secured to upper (L) thigh using a stat-lock device. Velcro strap was also given to patient.     This service provided today was under the supervising provider of the day Dr. Tinoco, who was available if needed.    Carly Saxena LPN

## 2020-10-14 DIAGNOSIS — N17.9 ACUTE KIDNEY INJURY (H): ICD-10-CM

## 2020-10-14 PROCEDURE — 80048 BASIC METABOLIC PNL TOTAL CA: CPT | Performed by: INTERNAL MEDICINE

## 2020-10-15 LAB
ANION GAP SERPL CALCULATED.3IONS-SCNC: 3 MMOL/L (ref 3–14)
BUN SERPL-MCNC: 13 MG/DL (ref 7–30)
CALCIUM SERPL-MCNC: 8.7 MG/DL (ref 8.5–10.1)
CHLORIDE SERPL-SCNC: 108 MMOL/L (ref 94–109)
CO2 SERPL-SCNC: 28 MMOL/L (ref 20–32)
CREAT SERPL-MCNC: 0.73 MG/DL (ref 0.66–1.25)
GFR SERPL CREATININE-BSD FRML MDRD: >90 ML/MIN/{1.73_M2}
GLUCOSE SERPL-MCNC: 84 MG/DL (ref 70–99)
POTASSIUM SERPL-SCNC: 4.6 MMOL/L (ref 3.4–5.3)
SODIUM SERPL-SCNC: 139 MMOL/L (ref 133–144)

## 2020-10-27 ENCOUNTER — OFFICE VISIT (OUTPATIENT)
Dept: UROLOGY | Facility: CLINIC | Age: 65
End: 2020-10-27
Payer: MEDICARE

## 2020-10-27 VITALS
BODY MASS INDEX: 25.15 KG/M2 | HEART RATE: 80 BPM | HEIGHT: 74 IN | SYSTOLIC BLOOD PRESSURE: 110 MMHG | WEIGHT: 196 LBS | DIASTOLIC BLOOD PRESSURE: 70 MMHG

## 2020-10-27 DIAGNOSIS — R33.9 URINARY RETENTION: ICD-10-CM

## 2020-10-27 DIAGNOSIS — N20.0 LEFT RENAL STONE: Primary | ICD-10-CM

## 2020-10-27 DIAGNOSIS — Z79.2 PROPHYLACTIC ANTIBIOTIC: ICD-10-CM

## 2020-10-27 PROCEDURE — 52310 CYSTOSCOPY AND TREATMENT: CPT | Mod: 58 | Performed by: STUDENT IN AN ORGANIZED HEALTH CARE EDUCATION/TRAINING PROGRAM

## 2020-10-27 RX ORDER — TAMSULOSIN HYDROCHLORIDE 0.4 MG/1
0.4 CAPSULE ORAL DAILY
Qty: 90 CAPSULE | Refills: 3 | Status: SHIPPED | OUTPATIENT
Start: 2020-10-27 | End: 2021-10-13

## 2020-10-27 RX ORDER — CIPROFLOXACIN 500 MG/1
500 TABLET, FILM COATED ORAL ONCE
Qty: 1 TABLET | Refills: 0 | Status: SHIPPED | OUTPATIENT
Start: 2020-10-27 | End: 2020-10-27

## 2020-10-27 ASSESSMENT — PAIN SCALES - GENERAL: PAINLEVEL: NO PAIN (0)

## 2020-10-27 ASSESSMENT — MIFFLIN-ST. JEOR: SCORE: 1753.8

## 2020-10-27 NOTE — PATIENT INSTRUCTIONS
- Drink more water, to maintain urine output >2 liters a day  - Start or maintain a low sodium diet  - Reduce the amount of oxalate in your diet    Do a 24 hour urine collection to see why you form stones    We will refer you for urodynamics    Patient Education     Having a Urodynamics Study     The equipment used for the study varies depending upon the facility and what tests are done.     Urodynamic studies may be done in your doctor s office, a clinic, or a hospital. The studies may take up to an hour or more. This depends on which tests your doctor does. The tests are generally painless. You won t need sedating medicine.  Tests that may be done  Uroflowmetry. This measures the amount and speed of urine you void from your bladder. You urinate into a funnel. It s attached to a computer that records your urine flow over time. The amount of urine left in your bladder after you void may also be measured right after this test.  Cystometry. This test evaluates how much your bladder can hold. It also measures how strong your bladder muscle is and how well the signals work that tell you when your bladder is full. Your healthcare provider fills your bladder with sterile water or saline solution, through a catheter. Your doctor will instruct you to report any sensations you feel. Mention if they re similar to symptoms you ve felt at home. Your doctor may ask you to cough, stand and walk, or bear down during this test.  Electromyogram. This helps evaluate the muscle contractions that control urination, such as sphincter muscle contractions. Your healthcare provider may place electrode patches or wires near your rectum or urethra to make the recording. He or she may ask you to try to tighten or relax your sphincter muscles during this test.  Pressure flow study. This test measures your detrusor, urethral, and abdominal pressures. Detrusor is the muscle surrounding the bladder walls that relaxes to allow your bladder to  fill, and and contracts to squeeze out urine. A pressure flow study is often done after cystometry. You re asked to urinate while a probe in your urethra measures pressures.  Video cystourethrography. This takes video pictures of urine flow through your urinary tract. It can help identify blockages or other problems. The bladder is filled with an X-ray contrast fluid. Then X-ray video pictures are taken as the fluid is urinated out. Ultrasound imaging may also be combined with routine urodynamic studies.  Ambulatory urodynamics. This test can be used to evaluate you while doing usual activities.  Getting your results  After the study, you ll get dressed and return to the consultation room. Test results may be ready soon after the study is finished. Or, you may return to your doctor s office in a few days for your results. Your doctor can talk with you about the study report and your options.   Date Last Reviewed: 1/1/2017 2000-2019 [employer]. [address] [place] [address], [place] [address]. All rights reserved. This information is not intended as a substitute for professional medical care. Always follow your healthcare professional's instructions.

## 2020-10-27 NOTE — PROGRESS NOTES
PRE-PROCEDURE DIAGNOSIS: left kidney stone    POST-PROCEDURE DIAGNOSIS: left kidney stone    PROCEDURE: Cystoscopy with left ureteral stent removal    HISTORY: 65 year old male with h/o neurogenic bladder due to stroke 2012, ASD s/p remote repair, h/o a. 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 Was readmitted 10/2/2020 for urinary retention in setting of neurogenic bladder and constipation, PAULETTE. Reyna was placed.     Inner portion of calculus composed primarily of:   70% calcium oxalate monohydrate, and   30% calcium oxalate dihydrate.     DESCRIPTION OF PROCEDURE: After informed consent was obtained, the patient was brought to the procedure room where he was placed in the supine position with all pressure points well padded.  The penis was prepped and draped in sterile fashion. A flexible cystoscope was introduced through a well-lubricated urethra. The stent was visualized, grasped with a flexible grasper and removed intact and discarded    The flexible cystoscope was removed and the findings were described to the patient.     ASSESSMENT AND PLAN: 65 year old male with h/o neurogenic bladder due to stroke 2012, ASD s/p remote repair, h/o a. 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 Stent removed intact and discarded    Today had persistent urinary retention, so catheter was replaced and he was discharged with indwelling Reyna. Stone prevention discussed    Recommendations:  - Drink more water, to maintain urine output >2 liters a day  - Start or maintain a low sodium diet  - Reduce the amount of oxalate in  your diet    - Follow up after urodynamics to assess neurogenic bladder and Litholink    Nehemiah Bloom MD   [facility]  [phone] clinic phone

## 2020-10-27 NOTE — LETTER
10/27/2020       RE: Marcus Hodges  [address] [place] [address]     Dear Colleague,    Thank you for referring your patient, Marcus Hodges, to the [facility] UROLOGY CLINIC [place] at [facility]. Please see a copy of my visit note below.    PRE-PROCEDURE DIAGNOSIS: left kidney stone    POST-PROCEDURE DIAGNOSIS: left kidney stone    PROCEDURE: Cystoscopy with left ureteral stent removal    HISTORY: 65 year old male with h/o neurogenic bladder due to stroke 2012, ASD s/p remote repair, h/o a. 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 Was readmitted 10/2/2020 for urinary retention in setting of neurogenic bladder and constipation, PAULETTE. Reyna was placed.     Inner portion of calculus composed primarily of:   70% calcium oxalate monohydrate, and   30% calcium oxalate dihydrate.     DESCRIPTION OF PROCEDURE: After informed consent was obtained, the patient was brought to the procedure room where he was placed in the supine position with all pressure points well padded.  The penis was prepped and draped in sterile fashion. A flexible cystoscope was introduced through a well-lubricated urethra. The stent was visualized, grasped with a flexible grasper and removed intact and discarded    The flexible cystoscope was removed and the findings were described to the patient.     ASSESSMENT AND PLAN: 65 year old male with h/o neurogenic bladder due to stroke 2012, ASD s/p remote repair, h/o a. 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 Stent removed intact and  discarded    Today had persistent urinary retention, so catheter was replaced and he was discharged with indwelling Reyna. Stone prevention discussed    Recommendations:  - Drink more water, to maintain urine output >2 liters a day  - Start or maintain a low sodium diet  - Reduce the amount of oxalate in your diet    - Follow up after urodynamics to assess neurogenic bladder and Litholink    Nehemiah Bloom MD   [facility]  [phone] clinic phone

## 2020-10-28 ENCOUNTER — TELEPHONE (OUTPATIENT)
Dept: UROLOGY | Facility: CLINIC | Age: 65
End: 2020-10-28

## 2020-10-28 DIAGNOSIS — R23.8 SKIN IRRITATION: Primary | ICD-10-CM

## 2020-10-28 RX ORDER — LIDOCAINE 40 MG/G
CREAM TOPICAL PRN
Qty: 15 G | Refills: 0 | Status: SHIPPED | OUTPATIENT
Start: 2020-10-28 | End: 2021-10-27

## 2020-10-28 NOTE — TELEPHONE ENCOUNTER
Urology pt of Dr. Bloom s/p (L) robotic laparoscopic pyelolithotomy with stent placement 9/28/20, and seen yesterday (10/27) for stent removal. Reyna placed. Marcus calls today c/o irritation at the tip of his penis and states he was told by one of the clinic nurses that Lidocaine might help. He requests an Rx for this. Will review with provider.  Per Dr. Bloom, Rx sent to pt's pharmacy. Called Marcus back with update. Also reviewed gentle cleansing and may try Aquaphor application. He expressed understanding.

## 2020-11-02 ENCOUNTER — PRE VISIT (OUTPATIENT)
Dept: UROLOGY | Facility: CLINIC | Age: 65
End: 2020-11-02

## 2020-11-02 DIAGNOSIS — R33.9 URINARY RETENTION: Primary | ICD-10-CM

## 2020-11-02 NOTE — PROGRESS NOTES
Spoke with patient this morning. Per Blaire Wooten PA-C, patient will need a catheterized UA/UC prior to his Urodynamics Study next week to rule out UTI.  Orders placed.  Will watch for results.    Tatum Angeles, CMA

## 2020-11-03 ENCOUNTER — DOCUMENTATION ONLY (OUTPATIENT)
Dept: OTHER | Facility: CLINIC | Age: 65
End: 2020-11-03

## 2020-11-03 ENCOUNTER — AMBULATORY - HEALTHEAST (OUTPATIENT)
Dept: OTHER | Facility: CLINIC | Age: 65
End: 2020-11-03

## 2020-11-05 ENCOUNTER — ALLIED HEALTH/NURSE VISIT (OUTPATIENT)
Dept: UROLOGY | Facility: CLINIC | Age: 65
End: 2020-11-05
Payer: MEDICARE

## 2020-11-05 ENCOUNTER — TRANSFERRED RECORDS (OUTPATIENT)
Dept: HEALTH INFORMATION MANAGEMENT | Facility: CLINIC | Age: 65
End: 2020-11-05

## 2020-11-05 DIAGNOSIS — R33.9 URINARY RETENTION: ICD-10-CM

## 2020-11-05 LAB
ALBUMIN UR-MCNC: NEGATIVE MG/DL
APPEARANCE UR: CLEAR
BILIRUB UR QL STRIP: NEGATIVE
COLOR UR AUTO: YELLOW
GLUCOSE UR STRIP-MCNC: NEGATIVE MG/DL
HGB UR QL STRIP: ABNORMAL
KETONES UR STRIP-MCNC: NEGATIVE MG/DL
LEUKOCYTE ESTERASE UR QL STRIP: ABNORMAL
NITRATE UR QL: NEGATIVE
PH UR STRIP: 7.5 PH (ref 5–7)
SOURCE: ABNORMAL
SP GR UR STRIP: 1.01 (ref 1–1.03)
UROBILINOGEN UR STRIP-ACNC: 0.2 EU/DL (ref 0.2–1)

## 2020-11-05 PROCEDURE — 87186 SC STD MICRODIL/AGAR DIL: CPT | Performed by: STUDENT IN AN ORGANIZED HEALTH CARE EDUCATION/TRAINING PROGRAM

## 2020-11-05 PROCEDURE — 87088 URINE BACTERIA CULTURE: CPT | Performed by: STUDENT IN AN ORGANIZED HEALTH CARE EDUCATION/TRAINING PROGRAM

## 2020-11-05 PROCEDURE — 87086 URINE CULTURE/COLONY COUNT: CPT | Performed by: STUDENT IN AN ORGANIZED HEALTH CARE EDUCATION/TRAINING PROGRAM

## 2020-11-05 PROCEDURE — 99211 OFF/OP EST MAY X REQ PHY/QHP: CPT

## 2020-11-05 PROCEDURE — 81003 URINALYSIS AUTO W/O SCOPE: CPT | Performed by: STUDENT IN AN ORGANIZED HEALTH CARE EDUCATION/TRAINING PROGRAM

## 2020-11-05 NOTE — PROGRESS NOTES
Chief Complaint   Patient presents with     Urinary Retention     Patient has a neurogenic bladder here today for UA/UC before his urodynamics        Marcus Hodges comes into clinic today at the request of Dr Bloom Ordering Provider for UA/UC cath specimen.      Mr Call came into  the clinic for catheter specimen  Reason for Cath speimen: For Urodynamics  Order has been verified. YES     Faulkner Catheter had already been  successfully inserted into the urethral meatus in the usual sterile fashion without immediate complication 8 day's ago.  Type of catheter in  placed: 16FR Faulkner Coude tip  Urine is clear in color.today was clear  20 cc's of urine output returned. For UA/UC today sent out   PER Dr Tinoco no need to change faulkner catheter because it was recently changed on 10/27/2020 in the [place] office.      This service provided today was under the supervising provider of the day Dr Tinoco, who was available if needed.    JAMES Miller

## 2020-11-07 LAB
BACTERIA SPEC CULT: ABNORMAL
Lab: ABNORMAL
SPECIMEN SOURCE: ABNORMAL

## 2020-11-10 DIAGNOSIS — N39.0 URINARY TRACT INFECTION: Primary | ICD-10-CM

## 2020-11-10 RX ORDER — NITROFURANTOIN 25; 75 MG/1; MG/1
100 CAPSULE ORAL 2 TIMES DAILY
Qty: 10 CAPSULE | Refills: 0 | Status: SHIPPED | OUTPATIENT
Start: 2020-11-10 | End: 2020-11-15

## 2020-11-12 ENCOUNTER — ANCILLARY PROCEDURE (OUTPATIENT)
Dept: RADIOLOGY | Facility: AMBULATORY SURGERY CENTER | Age: 65
End: 2020-11-12
Attending: PHYSICIAN ASSISTANT
Payer: MEDICARE

## 2020-11-12 ENCOUNTER — OFFICE VISIT (OUTPATIENT)
Dept: UROLOGY | Facility: CLINIC | Age: 65
End: 2020-11-12
Payer: MEDICARE

## 2020-11-12 DIAGNOSIS — R33.9 URINARY RETENTION: Primary | ICD-10-CM

## 2020-11-12 DIAGNOSIS — N31.9 NEUROGENIC BLADDER: ICD-10-CM

## 2020-11-12 LAB
ALBUMIN UR-MCNC: NEGATIVE MG/DL
ALBUMIN UR-MCNC: NEGATIVE MG/DL
APPEARANCE UR: ABNORMAL
APPEARANCE UR: CLEAR
BACTERIA #/AREA URNS HPF: ABNORMAL /HPF
BILIRUB UR QL STRIP: NEGATIVE
BILIRUB UR QL STRIP: NEGATIVE
COLOR UR AUTO: YELLOW
COLOR UR AUTO: YELLOW
GLUCOSE UR STRIP-MCNC: NEGATIVE MG/DL
GLUCOSE UR STRIP-MCNC: NEGATIVE MG/DL
HGB UR QL STRIP: ABNORMAL
HGB UR QL STRIP: ABNORMAL
KETONES UR STRIP-MCNC: NEGATIVE MG/DL
KETONES UR STRIP-MCNC: NEGATIVE MG/DL
LEUKOCYTE ESTERASE UR QL STRIP: ABNORMAL
LEUKOCYTE ESTERASE UR QL STRIP: ABNORMAL
MUCOUS THREADS #/AREA URNS LPF: PRESENT /LPF
NITRATE UR QL: NEGATIVE
NITRATE UR QL: NEGATIVE
PH UR STRIP: 5 PH (ref 5–7)
PH UR STRIP: 6 PH (ref 5–7)
RBC #/AREA URNS AUTO: 21 /HPF (ref 0–2)
SOURCE: ABNORMAL
SP GR UR STRIP: 1.01 (ref 1–1.03)
SP GR UR STRIP: 1.01 (ref 1–1.03)
SQUAMOUS #/AREA URNS AUTO: <1 /HPF (ref 0–1)
UROBILINOGEN UR STRIP-ACNC: 0.2 EU/DL (ref 0.2–1)
UROBILINOGEN UR STRIP-MCNC: 0 MG/DL (ref 0–2)
WBC #/AREA URNS AUTO: 81 /HPF (ref 0–5)
WBC CLUMPS #/AREA URNS HPF: PRESENT /HPF

## 2020-11-12 PROCEDURE — 51784 ANAL/URINARY MUSCLE STUDY: CPT | Mod: 51 | Performed by: PHYSICIAN ASSISTANT

## 2020-11-12 PROCEDURE — 51797 INTRAABDOMINAL PRESSURE TEST: CPT | Performed by: PHYSICIAN ASSISTANT

## 2020-11-12 PROCEDURE — 99207 PR NO CHARGE INJECTABLE MED/DRUG: CPT | Performed by: PHYSICIAN ASSISTANT

## 2020-11-12 PROCEDURE — 51728 CYSTOMETROGRAM W/VP: CPT | Performed by: PHYSICIAN ASSISTANT

## 2020-11-12 PROCEDURE — 74430 CONTRAST X-RAY BLADDER: CPT | Performed by: PHYSICIAN ASSISTANT

## 2020-11-12 PROCEDURE — 81001 URINALYSIS AUTO W/SCOPE: CPT | Performed by: PATHOLOGY

## 2020-11-12 PROCEDURE — 51600 INJECTION FOR BLADDER X-RAY: CPT | Mod: 51 | Performed by: PHYSICIAN ASSISTANT

## 2020-11-12 PROCEDURE — 81003 URINALYSIS AUTO W/O SCOPE: CPT

## 2020-11-12 PROCEDURE — 87086 URINE CULTURE/COLONY COUNT: CPT | Performed by: PHYSICIAN ASSISTANT

## 2020-11-12 NOTE — PATIENT INSTRUCTIONS
Follow up with Dr. Bloom to discuss the results of your Urodynamics Study, and further treatment options.    It was a pleasure meeting with you today.  Thank you for allowing me and my team the privilege of caring for you today.  YOU are the reason we are here, and I truly hope we provided you with the excellent service you deserve.  Please let us know if there is anything else we can do for you so that we can be sure you are leaving completely satisfied with your care experience.        Tatum Angeles, CMA

## 2020-11-12 NOTE — NURSING NOTE
Per MD- Following procedure patient should have a trial of void. Patient's urethra was cleansed with iodine and lidocaine gel was inserted into urethra. A 16 Emirati coude-tip catheter was inserted into urethra. Cysto tubing was then connected to catheter and 250cc sterile water was gently instilled into bladder via gravity. Patient tolerated procedure well and then catheter was removed with ease. Patient was given a significant amount of time to urinate. Well-over a half-hour with no success. Patient was then agreeable to have catheter replaced to avoid going to ER. Patient's urethra was then cleansed again with iodine and lidocaine gel was inserted into urethra. A 16 Emirati coude-tip catheter was gently inserted into urethra and clear-yellow urine return was noted. Catheter was inserted to Y and catheter balloon was inflated with 8cc sterile water. No discomfort was voiced by patient.Patient's catheter was connected to leg-bag and secured to upper thigh using velcro strap. Patient's wife requested two more leg security devices velcro/stat-lock Patient and his wife had lots of questions during this visit. All questions were answered and catheter  hygiene/management was reviewed. Patient was instructed to make follow-up appointment at  after scheduled Urodynamics.     Carly Saxena LPN

## 2020-11-12 NOTE — LETTER
11/12/2020     RE: Marcus Hodges  [address] [place] [address]     Dear Colleague,    Thank you for referring your patient, Marcus Hodges, to the [facility] UROLOGY CLINIC [place] at [facility]. Please see a copy of my visit note below.    PREPROCEDURE DIAGNOSES:    1. Neurogenic bladder secondary to CVA in 2012  2. Urinary retention    POSTPROCEDURE DIAGNOSES:  -Normal bladder capacity (630 mL) with delayed/diminished filling sensations.  -Normal bladder compliance with pressures close to 0 cm H2O throughout filling until 430 mL at which point there is a sudden and rapid rise in detrusor pressure, representing a terminal uninhibited detrusor contraction/detrusor overactivity. The patient senses this as a strong urge to void but he never leaks despite max filling detrusor pressure 62 cm H2O. There is mild increased EMG activity but the bladder neck remains closed on fluoroscopy.  -Patient then reports capacity at 497 mL and is given permission to void. Filling is stopped and there is a subsequent decrease in the detrusor pressure and he is unable to void. Filling is resumed in an attempt to help stimulate a detrusor contraction. There is then a second rise in the detrusor pressure to ~40-45 cm H2O, but he is ultimately unable to void any volume. Favor this second contraction to represent another uninhibited detrusor contraction/episode of detrusor overactivity due to resumption of bladder filling rather than a true volitional contraction. There is once again some increased EMG activity during attempts to void, but the bladder neck remains closed on fluoroscopy.   -Complete urinary retention with final residual volume 630 mL.  -Difficult to accurately assess for bladder outlet obstruction as patient unable to void any volume, though this seems like a possibility given the high detrusor pressures without resulting flow. Differentials may include BPH,  stricture, DSD/DESD in the setting of prior stroke, vs other.   -Fluoroscopy otherwise reveals a mild/moderately trabeculated bladder wall with a possible small diverticulum on the left lateral aspect (best visualized during attempts to void, iSite series #15 in PACS). No vesicoureteral reflux was observed. The bladder neck is closed throughout.    PROCEDURE:    1. Sterile urethral catheterization for measurement of residual urine volume.  2. Complex filling cystometrogram with measurement of bladder and rectal pressures.  3. Complex voiding cystometrogram with measurement of bladder and rectal pressures.  4. Electromyography of the pelvic floor during urodynamics.  5. Fluoroscopic imaging of the bladder during urodynamics, at least 3 views.    6. Interpretation of urodynamics and flouroscopic imaging.      INDICATIONS FOR PROCEDURE:  Mr. Marcus Hodges is a pleasant 65 year old male with neurogenic bladder secondary to a stroke in 2012 as well as urinary retention, currently managed with an indwelling Reyna catheter. Baseline video urodynamic assessment is requested today by Dr. Bloom to better characterize Mr. Marcus Hodges's voiding dysfunction.      VOIDING DIARY:  Consumes 8491-5407 mL per day, mainly water, some soy milk.  Urine output 1118-5111 mL per day.     DESCRIPTION OF PROCEDURE:  Risks, benefits, and alternatives to urodynamics were discussed with the patient and he wished to proceed.  Urodynamics are planned to better assess the primary etiology for Mr. Hodges's urologic dysfunction.  The patient last took mirabegron within the last 24 hours.  After informed consent was obtained, the patient was taken to the procedure room where uroflowmetry was performed. Findings below.     PRE-STUDY UROFLOWMETRY:  Not performed (patient has indwelling Reyna catheter).  Residual by catheter: 5 mL.  Pretest urine dipstick was negative for nitrites, positive for moderate leukocytes and moderate blood. Patient  currently taking Macrobid for a positive urine culture on 11/5 (bacteria sensitive to Macrobid). He denies any fevers, chills, gross hematuria, or other UTI symptoms today. In this patient with a chronic indwelling Reyna catheter, colonization is likely and therefore some degree of pyuria is not unexpected. Okay to proceed with the study as planned. Urine will be sent for formal UA/UC per department protocol.     The patient's Reyna catheter was removed uneventfully. Next a 7F double-lumen urodynamics catheter was inserted into the bladder under sterile technique via urethra.  A 7F abdominal manometry catheter was placed in the rectum.  EMG pads were placed on both sides of the anal verge.  The bladder was filled with 200 mL of Iohexol at 50 mL/minute and serial pressures were recorded.  With coughing there was an appropriate rise in vesical and abdominal pressures with no change in detrusor pressure, confirming good study catheter placement.    DURING THE FILLING PHASE:  First sensation: 331 mL.  First Desire: 430 mL.  Strong Desire: 446 mL.  Maximum Capacity: filled to >500 mL; true group home 630 mL based on final catheterized residual volume.    Uninhibited detrusor contractions: terminal uninhibited detrusor contraction without incontinence despite max filling detrusor pressure 62 cm H2O.  Compliance: normal with pressures near 0 cm H2O throughout filling until terminal DO with pressures starting to quickly rise around 430 mL.  Continence: no DOI or ERMIAS.  EMG: concordant during filling.    DURING THE VOIDING PHASE:  Maximum detrusor contraction with void: no apparent volitional contraction during attempts to void. He is given permission to void when Pdet is already high at 53 cm H2O. Filling is stopped and Pdet pressures actually start to decrease. Filling is then resumed in an effort to help stimulate a contraction with a second rise in detrusor pressures - seems likely due to resumption of bladder filling and less  likely due to a volitional contraction.  Voided volume: 0 mL.  Postvoid Residual: 630 mL.  EMG activity: increased    FLUOROSCOPIC IMAGING OF THE BLADDER DURING URODYNAMICS:  Please note, image numbers on UDS tracings correlate with iSite series numbers on PACS images. Fluoroscopy during today's procedure demonstrated a mild/moderately trabeculated bladder wall with possible small diverticulum on the left lateral aspect (best visualized during attempts to void, iSite series #15 in PACS). No vesicoureteral reflux was observed.  The bladder neck was closed during filling and did not open appreciably during attempts to void.  At the conclusion of today's study, all study catheters were removed, and a Reyna catheter was replaced uneventfully (see separate nursing note for details).    ASSESSMENT/PLAN:  Mr. Marcus Hodges is a pleasant 65 year old male with neurogenic bladder and urinary retention who demonstrated the following findings today on urodynamic evaluation:    -Normal bladder capacity (630 mL) with delayed/diminished filling sensations.  -Normal bladder compliance with pressures close to 0 cm H2O throughout filling until 430 mL at which point there is a sudden and rapid rise in detrusor pressure, representing a terminal uninhibited detrusor contraction/detrusor overactivity. The patient senses this as a strong urge to void but he never leaks despite max filling detrusor pressure 62 cm H2O. There is mild increased EMG activity but the bladder neck remains closed on fluoroscopy.  -Patient then reports capacity at 497 mL and is given permission to void. Filling is stopped and there is a subsequent decrease in the detrusor pressure and he is unable to void. Filling is resumed in an attempt to help stimulate a detrusor contraction. There is then a second rise in the detrusor pressure to ~40-45 cm H2O, but he is ultimately unable to void any volume. Favor this second contraction to represent another uninhibited  detrusor contraction/episode of detrusor overactivity due to resumption of bladder filling rather than a true volitional contraction. There is once again some increased EMG activity during attempts to void, but the bladder neck remains closed on fluoroscopy.   -Complete urinary retention with final residual volume 630 mL.  -Difficult to accurately assess for bladder outlet obstruction as patient unable to void any volume, though this seems like a possibility given the high detrusor pressures without resulting flow. Differentials may include BPH, stricture, DSD/DESD in the setting of prior stroke, vs other.   -Fluoroscopy otherwise reveals a mild/moderately trabeculated bladder wall with a possible small diverticulum on the left lateral aspect (best visualized during attempts to void, iSite series #15 in PACS). No vesicoureteral reflux was observed. The bladder neck is closed throughout.    The patient will follow up as scheduled with Dr. Bloom to further discuss today's study results and make plans for how best to proceed.      - The patient is currently taking Macrobid for a recent positive urine culture on 11/5/2020. Therefore, additional antibiotic prophylaxis was not administered today. The risk of UTI with VUDS is low at ~2.5-3%.      Thank you for allowing me to participate in the care of Mr. Marcus Hodges and please don't hesitate to contact me with any questions or concerns.      Blaire Wooten PA-C   Urology Physician Assistant

## 2020-11-12 NOTE — NURSING NOTE
Chief Complaint   Patient presents with     Urodynamics Study         Patient Active Problem List   Diagnosis     * * * SBE PROPHYLAXIS * * *     Health Care Home     Vitamin D deficiency     Hyperlipidemia LDL goal <100     Long-term (current) use of anticoagulants     Seizure prophylaxis     Urinary incontinence     Neurogenic bladder     Chronic atrial fibrillation (H)     Tricuspid regurgitation     Mitral regurgitation     Atrial enlargement, bilateral     Major depressive disorder, recurrent episode, moderate (H)     Traumatic brain injury with loss of consciousness, sequela (H)     Neurogenic bowel     Kidney stone on left side     Left renal stone     Dysphagia     Gait abnormality     Left hemiparesis (H)     Spastic hemiparesis affecting nondominant side (H)     S/P craniotomy     Seizure disorder (H)     Visual field defect     Anemia     Urinary retention     Acute kidney injury (H)       Allergies   Allergen Reactions     Blood Transfusion Related (Informational Only) Other (See Comments)     Patient has a history of a clinically significant antibody against RBC antigens.  A delay in compatible RBCs may occur.     Lisinopril      No Known Drug Allergies        Current Outpatient Medications   Medication Sig Dispense Refill     acetaminophen (TYLENOL) 500 MG tablet Take 1,000 mg by mouth every 8 hours as needed for mild pain Alternating with ibuprofen for post surgical pain       apixaban ANTICOAGULANT (ELIQUIS ANTICOAGULANT) 5 MG tablet Take 1 tablet (5 mg) by mouth 2 times daily 180 tablet 3     atorvastatin (LIPITOR) 20 MG tablet TAKE 1 TABLET BY MOUTH EVERY DAY 90 tablet 3     baclofen (LIORESAL) 20 MG tablet Take 20 mg by mouth Five times daily       citalopram (CELEXA) 20 MG tablet Take 30 mg by mouth daily       docusate sodium (COLACE) 100 MG tablet Take 200 mg by mouth nightly as needed        ibuprofen (ADVIL/MOTRIN) 200 MG tablet Take 400 mg by mouth every 8 hours as needed for mild pain  Alternating with acetaminophen for post surgical pain       levETIRAcetam (KEPPRA) 1000 MG tablet Take 1 tablet (1,000 mg) by mouth 2 times daily 180 tablet 3     lidocaine (LMX4) 4 % external cream Apply topically as needed for mild pain (topical irritation) Apply sparingly to affected area twice daily, as needed. 15 g 0     metoprolol succinate ER (TOPROL-XL) 25 MG 24 hr tablet Take 25 mg by mouth daily       mirabegron (MYRBETRIQ) 50 MG 24 hr tablet Take 50 mg by mouth daily       Multiple Vitamin (MULTI-VITAMIN) per tablet Take 1 tablet by mouth daily  100 tablet 3     nitroFURantoin macrocrystal-monohydrate (MACROBID) 100 MG capsule Take 1 capsule (100 mg) by mouth 2 times daily for 5 days 10 capsule 0     NONFORMULARY Take 1 tablet by mouth daily Protandim - herbal supplement        Omega-3 Fatty Acids (FISH OIL PO) Take 1 Dose by mouth daily       polyethylene glycol (MIRALAX) 17 g packet Take 17 g by mouth every other day        senna-docusate (SENOKOT-S/PERICOLACE) 8.6-50 MG tablet Take 4 tablets by mouth At Bedtime       tamsulosin (FLOMAX) 0.4 MG capsule Take 1 capsule (0.4 mg) by mouth daily 90 capsule 3     vitamin D3 (CHOLECALCIFEROL) 50 mcg (2000 units) tablet Take 2,000 Units by mouth daily  90 tablet 3       Social History     Tobacco Use     Smoking status: Never Smoker     Smokeless tobacco: Never Used   Substance Use Topics     Alcohol use: No     Drug use: No       Invasive Procedure Safety Checklist:    Procedure: Urodynamics    Action: Complete sections and checkboxes as appropriate.  Pre-procedure:  1. Patient ID Verified with 2 identifiers (Kiah and  or MRN) : YES    2. Procedure and site verified with patient/designee (when able) : YES    3. Accurate consent documentation in medical record : YES    4. H&P (or appropriate assessment) documented in medical record : N/A  H&P must be up to 30 days prior to procedure an updated within 24 hours of Procedure as applicable.     5. Relevant  diagnostic and radiology test results appropriately labeled and displayed as applicable : YES    6. Blood products, implants, devices, and/or special equipment available for the procedure as applicable : YES    7. Procedure site(s) marked with provider initials [Exclusions: none] : NO    8. Marking not required. Reason : Yes  Procedure does not require site marking    Time Out:     Time-Out performed immediately prior to starting procedure, including verbal and active participation of all team members addressing: YES    1. Correct patient identity.  2. Confirmed that the correct side and site are marked.  3. An accurate procedure to be done.  4. Agreement on the procedure to be done.  5. Correct patient position.  6. Relevant images and results are properly labeled and appropriately displayed.  7. The need to administer antibiotics or fluids for irrigation purposes during the procedure as applicable.  8. Safety precautions based on patient history or medication use.    During Procedure: Verification of correct person, site, and procedure occurs any time the responsibility for care of the patient is transferred to another member of the care team.        The following medication was given:     MEDICATION:  Omnipaque (Iohexol Injection) (240mgI/mL)  ROUTE: Provider Administered  SITE: Provider Administered via catheter  DOSE: 200mL  LOT #: [id number]   : [facility] [facility]  EXPIRATION DATE: 2/15/2023  NDC#: [address]-06   Was there drug waste? No        Tatum Angeles CMA  11/12/2020  10:22 AM

## 2020-11-12 NOTE — NURSING NOTE
Removal:  16 Fr Coude tipped latex faulkner catheter removed from urethral meatus without difficulty.    Insertion:  16 Fr straight tipped latex faulkner catheter inserted into urethral meatus in the usual sterile fashion without difficulty.  Balloon filled with 10 mL sterile H2O.  Received 630 ml pink urine output.   Catheter secured in place with leg strap: Yes.       Patient did tolerate procedure well.    Patient instructed as to where to call or go for pain, fever, leakage, or decreased urine flow.       Tatum Angeles CMA  11/12/2020  12:04 PM       Monitor.

## 2020-11-12 NOTE — PROGRESS NOTES
PREPROCEDURE DIAGNOSES:    1. Neurogenic bladder secondary to CVA in 2012  2. Urinary retention    POSTPROCEDURE DIAGNOSES:  -Normal bladder capacity (630 mL) with delayed/diminished filling sensations.  -Normal bladder compliance with pressures close to 0 cm H2O throughout filling until 430 mL at which point there is a sudden and rapid rise in detrusor pressure, representing a terminal uninhibited detrusor contraction/detrusor overactivity. The patient senses this as a strong urge to void but he never leaks despite max filling detrusor pressure 62 cm H2O. There is mild increased EMG activity but the bladder neck remains closed on fluoroscopy.  -Patient then reports capacity at 497 mL and is given permission to void. Filling is stopped and there is a subsequent decrease in the detrusor pressure and he is unable to void. Filling is resumed in an attempt to help stimulate a detrusor contraction. There is then a second rise in the detrusor pressure to ~40-45 cm H2O, but he is ultimately unable to void any volume. Favor this second contraction to represent another uninhibited detrusor contraction/episode of detrusor overactivity due to resumption of bladder filling rather than a true volitional contraction. There is once again some increased EMG activity during attempts to void, but the bladder neck remains closed on fluoroscopy.   -Complete urinary retention with final residual volume 630 mL.  -Difficult to accurately assess for bladder outlet obstruction as patient unable to void any volume, though this seems like a possibility given the high detrusor pressures without resulting flow. Differentials may include BPH, stricture, DSD/DESD in the setting of prior stroke, vs other.   -Fluoroscopy otherwise reveals a mild/moderately trabeculated bladder wall with a possible small diverticulum on the left lateral aspect (best visualized during attempts to void, iSite series #15 in PACS). No vesicoureteral reflux was observed.  The bladder neck is closed throughout.    PROCEDURE:    1. Sterile urethral catheterization for measurement of residual urine volume.  2. Complex filling cystometrogram with measurement of bladder and rectal pressures.  3. Complex voiding cystometrogram with measurement of bladder and rectal pressures.  4. Electromyography of the pelvic floor during urodynamics.  5. Fluoroscopic imaging of the bladder during urodynamics, at least 3 views.    6. Interpretation of urodynamics and flouroscopic imaging.      INDICATIONS FOR PROCEDURE:  Mr. Marcus Hodges is a pleasant 65 year old male with neurogenic bladder secondary to a stroke in 2012 as well as urinary retention, currently managed with an indwelling Reyna catheter. Baseline video urodynamic assessment is requested today by Dr. Bloom to better characterize Mr. Marcus Hodges's voiding dysfunction.      VOIDING DIARY:  Consumes 1566-2026 mL per day, mainly water, some soy milk.  Urine output 7665-6062 mL per day.     DESCRIPTION OF PROCEDURE:  Risks, benefits, and alternatives to urodynamics were discussed with the patient and he wished to proceed.  Urodynamics are planned to better assess the primary etiology for Mr. Hodges's urologic dysfunction.  The patient last took mirabegron within the last 24 hours.  After informed consent was obtained, the patient was taken to the procedure room where uroflowmetry was performed. Findings below.     PRE-STUDY UROFLOWMETRY:  Not performed (patient has indwelling Reyna catheter).  Residual by catheter: 5 mL.  Pretest urine dipstick was negative for nitrites, positive for moderate leukocytes and moderate blood. Patient currently taking Macrobid for a positive urine culture on 11/5 (bacteria sensitive to Macrobid). He denies any fevers, chills, gross hematuria, or other UTI symptoms today. In this patient with a chronic indwelling Reyna catheter, colonization is likely and therefore some degree of pyuria is not unexpected.  Okay to proceed with the study as planned. Urine will be sent for formal UA/UC per department protocol.     The patient's Reyna catheter was removed uneventfully. Next a 7F double-lumen urodynamics catheter was inserted into the bladder under sterile technique via urethra.  A 7F abdominal manometry catheter was placed in the rectum.  EMG pads were placed on both sides of the anal verge.  The bladder was filled with 200 mL of Iohexol at 50 mL/minute and serial pressures were recorded.  With coughing there was an appropriate rise in vesical and abdominal pressures with no change in detrusor pressure, confirming good study catheter placement.    DURING THE FILLING PHASE:  First sensation: 331 mL.  First Desire: 430 mL.  Strong Desire: 446 mL.  Maximum Capacity: filled to >500 mL; true  mL based on final catheterized residual volume.    Uninhibited detrusor contractions: terminal uninhibited detrusor contraction without incontinence despite max filling detrusor pressure 62 cm H2O.  Compliance: normal with pressures near 0 cm H2O throughout filling until terminal DO with pressures starting to quickly rise around 430 mL.  Continence: no DOI or ERMIAS.  EMG: concordant during filling.    DURING THE VOIDING PHASE:  Maximum detrusor contraction with void: no apparent volitional contraction during attempts to void. He is given permission to void when Pdet is already high at 53 cm H2O. Filling is stopped and Pdet pressures actually start to decrease. Filling is then resumed in an effort to help stimulate a contraction with a second rise in detrusor pressures - seems likely due to resumption of bladder filling and less likely due to a volitional contraction.  Voided volume: 0 mL.  Postvoid Residual: 630 mL.  EMG activity: increased    FLUOROSCOPIC IMAGING OF THE BLADDER DURING URODYNAMICS:  Please note, image numbers on UDS tracings correlate with iSite series numbers on PACS images. Fluoroscopy during today's procedure  demonstrated a mild/moderately trabeculated bladder wall with possible small diverticulum on the left lateral aspect (best visualized during attempts to void, iSite series #15 in PACS). No vesicoureteral reflux was observed.  The bladder neck was closed during filling and did not open appreciably during attempts to void.  At the conclusion of today's study, all study catheters were removed, and a Reyna catheter was replaced uneventfully (see separate nursing note for details).    ASSESSMENT/PLAN:  Mr. Marcus Hodges is a pleasant 65 year old male with neurogenic bladder and urinary retention who demonstrated the following findings today on urodynamic evaluation:    -Normal bladder capacity (630 mL) with delayed/diminished filling sensations.  -Normal bladder compliance with pressures close to 0 cm H2O throughout filling until 430 mL at which point there is a sudden and rapid rise in detrusor pressure, representing a terminal uninhibited detrusor contraction/detrusor overactivity. The patient senses this as a strong urge to void but he never leaks despite max filling detrusor pressure 62 cm H2O. There is mild increased EMG activity but the bladder neck remains closed on fluoroscopy.  -Patient then reports capacity at 497 mL and is given permission to void. Filling is stopped and there is a subsequent decrease in the detrusor pressure and he is unable to void. Filling is resumed in an attempt to help stimulate a detrusor contraction. There is then a second rise in the detrusor pressure to ~40-45 cm H2O, but he is ultimately unable to void any volume. Favor this second contraction to represent another uninhibited detrusor contraction/episode of detrusor overactivity due to resumption of bladder filling rather than a true volitional contraction. There is once again some increased EMG activity during attempts to void, but the bladder neck remains closed on fluoroscopy.   -Complete urinary retention with final residual  volume 630 mL.  -Difficult to accurately assess for bladder outlet obstruction as patient unable to void any volume, though this seems like a possibility given the high detrusor pressures without resulting flow. Differentials may include BPH, stricture, DSD/DESD in the setting of prior stroke, vs other.   -Fluoroscopy otherwise reveals a mild/moderately trabeculated bladder wall with a possible small diverticulum on the left lateral aspect (best visualized during attempts to void, iSite series #15 in PACS). No vesicoureteral reflux was observed. The bladder neck is closed throughout.    The patient will follow up as scheduled with Dr. Bloom to further discuss today's study results and make plans for how best to proceed.      - The patient is currently taking Macrobid for a recent positive urine culture on 11/5/2020. Therefore, additional antibiotic prophylaxis was not administered today. The risk of UTI with VUDS is low at ~2.5-3%.      Thank you for allowing me to participate in the care of Mr. Marcus Hodges and please don't hesitate to contact me with any questions or concerns.      Blaire Wooten PA-C  Urology Physician Assistant

## 2020-11-13 LAB
BACTERIA SPEC CULT: NORMAL
Lab: NORMAL
SPECIMEN SOURCE: NORMAL

## 2020-11-16 ENCOUNTER — TRANSFERRED RECORDS (OUTPATIENT)
Dept: HEALTH INFORMATION MANAGEMENT | Facility: CLINIC | Age: 65
End: 2020-11-16

## 2020-11-23 ENCOUNTER — VIRTUAL VISIT (OUTPATIENT)
Dept: UROLOGY | Facility: CLINIC | Age: 65
End: 2020-11-23
Payer: MEDICARE

## 2020-11-23 VITALS — HEIGHT: 74 IN | WEIGHT: 185 LBS | BODY MASS INDEX: 23.74 KG/M2

## 2020-11-23 DIAGNOSIS — N40.1 BENIGN PROSTATIC HYPERPLASIA WITH URINARY RETENTION: Primary | ICD-10-CM

## 2020-11-23 DIAGNOSIS — R82.992 HYPEROXALURIA: ICD-10-CM

## 2020-11-23 DIAGNOSIS — R33.8 BENIGN PROSTATIC HYPERPLASIA WITH URINARY RETENTION: Primary | ICD-10-CM

## 2020-11-23 PROCEDURE — 99214 OFFICE O/P EST MOD 30 MIN: CPT | Mod: 24 | Performed by: STUDENT IN AN ORGANIZED HEALTH CARE EDUCATION/TRAINING PROGRAM

## 2020-11-23 RX ORDER — CEFAZOLIN SODIUM 2 G/50ML
2 SOLUTION INTRAVENOUS
Status: CANCELLED | OUTPATIENT
Start: 2020-11-23

## 2020-11-23 RX ORDER — CEFAZOLIN SODIUM 1 G/50ML
1 INJECTION, SOLUTION INTRAVENOUS SEE ADMIN INSTRUCTIONS
Status: CANCELLED | OUTPATIENT
Start: 2020-11-23

## 2020-11-23 ASSESSMENT — PAIN SCALES - GENERAL: PAINLEVEL: NO PAIN (0)

## 2020-11-23 ASSESSMENT — MIFFLIN-ST. JEOR: SCORE: 1703.9

## 2020-11-23 NOTE — LETTER
"11/23/2020       RE: Marcus Hodges  [address] [place] [address]     Dear Colleague,    Thank you for referring your patient, Marcus Hodges, to the [facility]OLOG[facility] [place] [facility]. Please see a copy of my visit note below.    USE PHONE NUMBER  [phone]  FOR VIDEO VISIT  ------------------------------------------------------------------------------------------------------------------------------------------------------------------------      Marcus Hodges is a 65 year old male who is being evaluated via a billable video visit.      The patient has been notified of following:     \"This video visit will be conducted via a call between you and your physician/provider. We have found that certain health care needs can be provided without the need for an in-person physical exam.  This service lets us provide the care you need with a video conversation.  If a prescription is necessary we can send it directly to your pharmacy.  If lab work is needed we can place an order for that and you can then stop by our lab to have the test done at a later time.    Video visits are billed at different rates depending on your insurance coverage.  Please reach out to your insurance provider with any questions.    If during the course of the call the physician/provider feels a video visit is not appropriate, you will not be charged for this service.\"    Patient has given verbal consent for Video visit? Yes  How would you like to obtain your AVS? MyChart  If you are dropped from the video visit, the video invite should be resent to: Text to cell phone: [phone]  Will anyone else be joining your video visit? No    D. Carlstrom CMA    Video-Visit Details    Type of service:  Video Visit    Video Start Time: 11:58 AM  Video End Time: 12:37pm    Originating Location (pt. Location): Home    Distant Location (provider location):  [facility] " [facility]     Platform used for Video Visit: Doximity    CHIEF COMPLAINT   Marcus Hodges who is a 65 year old male with h/o neurogenic bladder due to stroke 2012, ASD s/p remote repair, h/o a. 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 Neurogenic bladder previously managed with condom catheter. Had never had urodynamics after the stroke. After the robotic pyelolithotomy for stone, had urinary retention and failed a trial of void. Having some pain and irritation with the faulkner in place. No flank pain.    Underwent UDS 11/12/2020:    -Normal bladder capacity (630 mL) with delayed/diminished filling sensations.  -Normal bladder compliance with pressures close to 0 cm H2O throughout filling until 430 mL at which point there is a sudden and rapid rise in detrusor pressure, representing a terminal uninhibited detrusor contraction/detrusor overactivity. The patient senses this as a strong urge to void but he never leaks despite max filling detrusor pressure 62 cm H2O. There is mild increased EMG activity but the bladder neck remains closed on fluoroscopy.  -Patient then reports capacity at 497 mL and is given permission to void. Filling is stopped and there is a subsequent decrease in the detrusor pressure and he is unable to void. Filling is resumed in an attempt to help stimulate a detrusor contraction. There is then a second rise  "in the detrusor pressure to ~40-45 cm H2O, but he is ultimately unable to void any volume. Favor this second contraction to represent another uninhibited detrusor contraction/episode of detrusor overactivity due to resumption of bladder filling rather than a true volitional contraction. There is once again some increased EMG activity during attempts to void, but the bladder neck remains closed on fluoroscopy.   -Complete urinary retention with final residual volume 630 mL.  -Difficult to accurately assess for bladder outlet obstruction as patient unable to void any volume, though this seems like a possibility given the high detrusor pressures without resulting flow. Differentials may include BPH, stricture, DSD/DESD in the setting of prior stroke, vs other.   -Fluoroscopy otherwise reveals a mild/moderately trabeculated bladder wall with a possible small diverticulum on the left lateral aspect (best visualized during attempts to void, iSite series #15 in PACS). No vesicoureteral reflux was observed. The bladder neck is closed throughout.    Cystoscopy intraoperatively revealed trilobar obstructive hypertrophy with ~4 cm prostatic urethral length    Litholink results with great urine output (~4 l/day), borderline hyperoxaluria.    PHYSICAL EXAM  Patient is a 65 year old  male   Vitals: Height 1.88 m (6' 2\"), weight 83.9 kg (185 lb).  Body mass index is 23.75 kg/m .  General Appearance Adult:   Alert, no acute distress, oriented  HENT: throat/mouth:normal, good dentition  Lungs: no respiratory distress, or pursed lip breathing  Heart: No obvious jugular venous distension present  Musculoskeltal: extremities normal, no peripheral edema  Skin: no suspicious lesions or rashes  Neuro: Alert, oriented, speech and mentation normal  Psych: affect and mood normal      ASSESSMENT and PLAN  65 year old male with h/o neurogenic bladder due to stroke 2012, ASD s/p remote repair, h/o a. 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 In any case, his options if he wants to be free from an indwelling Reyna are to intermittently catheterize (patient unable to do this, only has use of one upper extremity after stroke) or to be able to void spontaneously. I discussed that even if I open up his prostate, he may still be unable to void due to the neurogenic bladder. His bladder does still have good contractility on the UDS so I am hopeful that opening up his outlet will allow him to be able to empty.    - recommend reduce oxalate in the diet for hyperoxaluria    - schedule Greenlight photovaporization of the prostate. Plan for nurse visit for trial of void POD#1    I spent over 39 minutes with the patient.  Over half this time was spent on counseling regarding urinary retention, neurogenic bladder, kidney stone prevention.    Nehemiah Bloom MD   [facility] Urology  [facility] Phone: [phone]

## 2020-11-23 NOTE — PROGRESS NOTES
"USE PHONE NUMBER  [phone]  FOR VIDEO VISIT  ------------------------------------------------------------------------------------------------------------------------------------------------------------------------      Marcus Hodges is a 65 year old male who is being evaluated via a billable video visit.      The patient has been notified of following:     \"This video visit will be conducted via a call between you and your physician/provider. We have found that certain health care needs can be provided without the need for an in-person physical exam.  This service lets us provide the care you need with a video conversation.  If a prescription is necessary we can send it directly to your pharmacy.  If lab work is needed we can place an order for that and you can then stop by our lab to have the test done at a later time.    Video visits are billed at different rates depending on your insurance coverage.  Please reach out to your insurance provider with any questions.    If during the course of the call the physician/provider feels a video visit is not appropriate, you will not be charged for this service.\"    Patient has given verbal consent for Video visit? Yes  How would you like to obtain your AVS? MyChart  If you are dropped from the video visit, the video invite should be resent to: Text to cell phone: [phone]  Will anyone else be joining your video visit? Stephanie Turcios CMA    Video-Visit Details    Type of service:  Video Visit    Video Start Time: 11:58 AM  Video End Time: 12:37pm    Originating Location (pt. Location): Home    Distant Location (provider location):  [facility] UROLOGY [facility]     Platform used for Video Visit: Doximity    CHIEF COMPLAINT   Marcus Hodges who is a 65 year old male with h/o neurogenic bladder due to stroke 2012, ASD s/p remote repair, h/o a. 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 Neurogenic bladder previously managed with condom catheter. Had never had urodynamics after the stroke. After the robotic pyelolithotomy for stone, had urinary retention and failed a trial of void. Having some pain and irritation with the faulkner in place. No flank pain.    Underwent UDS 11/12/2020:    -Normal bladder capacity (630 mL) with delayed/diminished filling sensations.  -Normal bladder compliance with pressures close to 0 cm H2O throughout filling until 430 mL at which point there is a sudden and rapid rise in detrusor pressure, representing a terminal uninhibited detrusor contraction/detrusor overactivity. The patient senses this as a strong urge to void but he never leaks despite max filling detrusor pressure 62 cm H2O. There is mild increased EMG activity but the bladder neck remains closed on fluoroscopy.  -Patient then reports capacity at 497 mL and is given permission to void. Filling is stopped and there is a subsequent decrease in the detrusor pressure and he is unable to void. Filling is resumed in an attempt to help stimulate a detrusor contraction. There is then a second rise in the detrusor pressure to ~40-45 cm H2O, but he is ultimately unable to void any volume. Favor this second contraction to represent another uninhibited detrusor contraction/episode of detrusor overactivity due to resumption of bladder filling rather than a true volitional contraction. There is once again  "some increased EMG activity during attempts to void, but the bladder neck remains closed on fluoroscopy.   -Complete urinary retention with final residual volume 630 mL.  -Difficult to accurately assess for bladder outlet obstruction as patient unable to void any volume, though this seems like a possibility given the high detrusor pressures without resulting flow. Differentials may include BPH, stricture, DSD/DESD in the setting of prior stroke, vs other.   -Fluoroscopy otherwise reveals a mild/moderately trabeculated bladder wall with a possible small diverticulum on the left lateral aspect (best visualized during attempts to void, iSite series #15 in PACS). No vesicoureteral reflux was observed. The bladder neck is closed throughout.    Cystoscopy intraoperatively revealed trilobar obstructive hypertrophy with ~4 cm prostatic urethral length    Litholink results with great urine output (~4 l/day), borderline hyperoxaluria.    PHYSICAL EXAM  Patient is a 65 year old  male   Vitals: Height 1.88 m (6' 2\"), weight 83.9 kg (185 lb).  Body mass index is 23.75 kg/m .  General Appearance Adult:   Alert, no acute distress, oriented  HENT: throat/mouth:normal, good dentition  Lungs: no respiratory distress, or pursed lip breathing  Heart: No obvious jugular venous distension present  Musculoskeltal: extremities normal, no peripheral edema  Skin: no suspicious lesions or rashes  Neuro: Alert, oriented, speech and mentation normal  Psych: affect and mood normal      ASSESSMENT and PLAN  65 year old male with h/o neurogenic bladder due to stroke 2012, ASD s/p remote repair, h/o a. 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 In any case, his options if he wants to be free from an indwelling Reyna are to intermittently catheterize (patient unable to do this, only has use of one upper extremity after stroke) or to be able to void spontaneously. I discussed that even if I open up his prostate, he may still be unable to void due to the neurogenic bladder. His bladder does still have good contractility on the UDS so I am hopeful that opening up his outlet will allow him to be able to empty.    - recommend reduce oxalate in the diet for hyperoxaluria    - schedule Greenlight photovaporization of the prostate. Plan for nurse visit for trial of void POD#1    I spent over 39 minutes with the patient.  Over half this time was spent on counseling regarding urinary retention, neurogenic bladder, kidney stone prevention.    Nehemiah Bloom MD   [facility] Urology  [facility] Phone: [phone]

## 2020-11-24 DIAGNOSIS — Z11.59 ENCOUNTER FOR SCREENING FOR OTHER VIRAL DISEASES: Primary | ICD-10-CM

## 2020-11-24 PROBLEM — R33.8 BENIGN PROSTATIC HYPERPLASIA WITH URINARY RETENTION: Status: ACTIVE | Noted: 2020-11-24

## 2020-11-24 PROBLEM — N40.1 BENIGN PROSTATIC HYPERPLASIA WITH URINARY RETENTION: Status: ACTIVE | Noted: 2020-11-24

## 2020-11-28 DIAGNOSIS — Z11.59 ENCOUNTER FOR SCREENING FOR OTHER VIRAL DISEASES: ICD-10-CM

## 2020-11-28 PROCEDURE — U0003 INFECTIOUS AGENT DETECTION BY NUCLEIC ACID (DNA OR RNA); SEVERE ACUTE RESPIRATORY SYNDROME CORONAVIRUS 2 (SARS-COV-2) (CORONAVIRUS DISEASE [COVID-19]), AMPLIFIED PROBE TECHNIQUE, MAKING USE OF HIGH THROUGHPUT TECHNOLOGIES AS DESCRIBED BY CMS-2020-01-R: HCPCS | Performed by: STUDENT IN AN ORGANIZED HEALTH CARE EDUCATION/TRAINING PROGRAM

## 2020-11-29 LAB
LABORATORY COMMENT REPORT: NORMAL
SARS-COV-2 RNA SPEC QL NAA+PROBE: NEGATIVE
SARS-COV-2 RNA SPEC QL NAA+PROBE: NORMAL
SPECIMEN SOURCE: NORMAL
SPECIMEN SOURCE: NORMAL

## 2020-11-30 ENCOUNTER — OFFICE VISIT (OUTPATIENT)
Dept: PEDIATRICS | Facility: CLINIC | Age: 65
End: 2020-11-30
Payer: MEDICARE

## 2020-11-30 VITALS
OXYGEN SATURATION: 98 % | DIASTOLIC BLOOD PRESSURE: 70 MMHG | RESPIRATION RATE: 18 BRPM | BODY MASS INDEX: 23.1 KG/M2 | SYSTOLIC BLOOD PRESSURE: 102 MMHG | WEIGHT: 179.9 LBS | TEMPERATURE: 97 F | HEART RATE: 55 BPM

## 2020-11-30 DIAGNOSIS — N20.0 NEPHROLITHIASIS: ICD-10-CM

## 2020-11-30 DIAGNOSIS — S06.9X9S TRAUMATIC BRAIN INJURY WITH LOSS OF CONSCIOUSNESS, SEQUELA (H): ICD-10-CM

## 2020-11-30 DIAGNOSIS — Z01.818 PREOP GENERAL PHYSICAL EXAM: Primary | ICD-10-CM

## 2020-11-30 DIAGNOSIS — R33.8 BENIGN PROSTATIC HYPERPLASIA WITH URINARY RETENTION: ICD-10-CM

## 2020-11-30 DIAGNOSIS — N40.1 BENIGN PROSTATIC HYPERPLASIA WITH URINARY RETENTION: ICD-10-CM

## 2020-11-30 DIAGNOSIS — R33.9 URINARY RETENTION: ICD-10-CM

## 2020-11-30 PROBLEM — N17.9 ACUTE KIDNEY INJURY (H): Status: RESOLVED | Noted: 2020-10-02 | Resolved: 2020-11-30

## 2020-11-30 PROCEDURE — 99214 OFFICE O/P EST MOD 30 MIN: CPT | Performed by: INTERNAL MEDICINE

## 2020-11-30 RX ORDER — BACLOFEN 20 MG/1
TABLET ORAL
Qty: 60 TABLET | COMMUNITY
Start: 2020-11-30 | End: 2021-04-20

## 2020-11-30 NOTE — PROGRESS NOTES
[facility]  [address]  [address]  [place] [address]  Phone: [phone]  Fax: [phone]  Primary Provider: Irene Jeffers  Pre-op Performing Provider: IRENE JEFFERS    PREOPERATIVE EVALUATION:  Today's date: 11/30/2020    Marcus Hodges is a 65 year old male who presents for a preoperative evaluation.    Surgical Information:  Surgery/Procedure: Cystoscopy   Surgery Location: [facility]   Surgeon: Dr. Nehemiah Bloom   Surgery Date: 12/01/2020  Time of Surgery: 8:50 AM   Where patient plans to recover: At home with family  Fax number for surgical facility: Note does not need to be faxed, will be available electronically in Epic.    Questions about what medications should be taken before procedure.     Type of Anesthesia Anticipated: to be determined    Subjective     HPI related to upcoming procedure:       Marcus Hodges is a 65 year old who presents for preoperative evaluation as requested by Dr Bloom prior to cystoscopy.   65-year-old male with a history of TBI, prior CVA, neurogenic bladder and BPH who presents for preop evaluation prior to cystoscopy for evaluation and management of ongoing urinary retention.  Recent history of left renal stone and robotic left pyelolithotomy 9/28/2020 and urinary retention since then.  Patient currently has a Reyna catheter.        Preop Questions 11/30/2020   1. Have you ever had a heart attack or stroke? YES - cva 2012   2. Have you ever had surgery on your heart or blood vessels, such as a stent placement, a coronary artery bypass, or surgery on an artery in your head, neck, heart, or legs? No   3. Do you have chest pain with activity? No   4. Do you have a history of  heart failure? No   5. Do you currently have a cold, bronchitis or symptoms of other infection? No   6. Do you have a cough, shortness of breath, or wheezing? No   7. Do you or anyone in your family have previous history of blood clots? No hx  dvt   8. Do you or does anyone in your family have a serious bleeding problem such as prolonged bleeding following surgeries or cuts? No   9. Have you ever had problems with anemia or been told to take iron pills? YES    10. Have you had any abnormal blood loss such as black, tarry or bloody stools? No   11. Have you ever had a blood transfusion? YES    11a. Have you ever had a transfusion reaction? No   12. Are you willing to have a blood transfusion if it is medically needed before, during, or after your surgery? Yes   13. Have you or any of your relatives ever had problems with anesthesia? No   14. Do you have sleep apnea, excessive snoring or daytime drowsiness? No   15. Do you have any artifical heart valves or other implanted medical devices like a pacemaker, defibrillator, or continuous glucose monitor? No   16. Do you have artificial joints? No   17. Are you allergic to latex? No     Health Care Directive:  Patient has a Health Care Directive on file      Preoperative Review of :   reviewed - no record of controlled substances prescribed.      Patient Active Problem List   Diagnosis     * * * SBE PROPHYLAXIS * * *     Health Care Home     Vitamin D deficiency     Hyperlipidemia LDL goal <100     Long-term (current) use of anticoagulants     Seizure prophylaxis     Urinary incontinence     Neurogenic bladder     Chronic atrial fibrillation (H)     Tricuspid regurgitation     Mitral regurgitation     Atrial enlargement, bilateral     Major depressive disorder, recurrent episode, moderate (H)     Traumatic brain injury with loss of consciousness, sequela (H)     Neurogenic bowel     Left renal stone     Dysphagia     Left hemiparesis (H)     S/P craniotomy     Seizure disorder (H)     Visual field defect     Anemia     Benign prostatic hyperplasia with urinary retention       Review of Systems  Constitutional, neuro, ENT, endocrine, pulmonary, cardiac, gastrointestinal, genitourinary, musculoskeletal,  integument and psychiatric systems are negative, except as otherwise noted.    Patient Active Problem List    Diagnosis Date Noted     Traumatic brain injury with loss of consciousness, sequela (H) 02/29/2016     Priority: High     Benign prostatic hyperplasia with urinary retention 11/24/2020     Priority: Medium     Added automatically from request for surgery [id number]       Left renal stone 09/28/2020     Priority: Medium     Neurogenic bowel 04/01/2019     Priority: Medium     Major depressive disorder, recurrent episode, moderate (H) 02/29/2016     Priority: Medium     Tricuspid regurgitation      Priority: Medium     Mitral regurgitation      Priority: Medium     mitral valve damage related to ASD repair       Atrial enlargement, bilateral      Priority: Medium     Seizure disorder (H) 09/04/2015     Priority: Medium     Visual field defect 09/04/2015     Priority: Medium     Urinary incontinence 03/22/2013     Priority: Medium     Neurogenic bladder 03/22/2013     Priority: Medium     Chronic atrial fibrillation (H) 03/22/2013     Priority: Medium     Seizure prophylaxis 10/14/2012     Priority: Medium     Post CVA       Long-term (current) use of anticoagulants 10/11/2012     Priority: Medium     Hyperlipidemia LDL goal <100 06/19/2012     Priority: Medium     Dysphagia 06/04/2012     Priority: Medium     Left hemiparesis (H) 06/04/2012     Priority: Medium     S/P craniotomy 06/04/2012     Priority: Medium     Anemia 06/04/2012     Priority: Medium     Vitamin D deficiency 02/09/2012     Priority: Medium     * * * SBE PROPHYLAXIS * * * 11/16/2005     Priority: Medium     Health Care Home 07/15/2011     Priority: Low     EMERGENCY CARE PLAN  Presenting Problem Signs and Symptoms Treatment Plan    Questions or conerns during clinic hours    I will call the clinic directly     Questions or conerns outside clinic hours    I will call the 24 hour nurse line at [phone]    Patient needs to schedule an  appointment    I will call the 24 hour scheduling team at [phone] or clinic directly    Same day treatment     I will call the clinic first, nurse line if after hours, urgent care and express care if needed                            DX V65.8 REPLACED WITH [id number] [facility] HOME (04/08/2013)        Past Medical History:   Diagnosis Date     * * * SBE PROPHYLAXIS * * *      Antiplatelet or antithrombotic long-term use     on Xarelto     Arrhythmia     A fib     Atrial enlargement, bilateral      Atrial flutter 2004    radiofrequency ablation 2004, resolved     ATRIAL SEPTAL DEFECT     repaired 1977     Chronic atrial fibrillation (H)     on apixaban     CVA (cerebral vascular accident) (H) 04/2012    R MCA, complicated by left sided weakness, walks with a cane, and seizures     Depression      Dysphagia 04/22/2012    [facility], following CVA     Hernia, abdominal      History of thrombophlebitis      hyperlipidemia      Mitral regurgitation     mitral valve damage related to ASD repair     Mumps      Neurogenic bladder      Neurogenic bowel      Palpitations      Respiratory failure (H) 04/22/2012    [facility], following CVA, prolonged requiring tracheostomy, [place] [facility]      Tricuspid regurgitation      Unspecified glaucoma(365.9)     right     Urinary incontinence      Past Surgical History:   Procedure Laterality Date     CATHETER, ABLATION  2004     COMBINED CYSTOSCOPY, INSERT CATHETER URETER  9/28/2020    Procedure: cystoscopy and left ureteral catheter placement, left ureteral stent placement;  Surgeon: Nehemiah Bloom MD;  Location:  OR     Craniotomy reconstruction  2012    [facility], repair of hemicraniectomy defect     CYSTOSCOPY       DAVINCI PYELOPLASTY Left 9/28/2020    Procedure: left robotic assisted laparoscopic pyelolithotomy;  Surgeon: Nehemiah Bloom MD;  Location:  OR     GASTROSTOMY TUBE  April 2012    [facility], following CVA     HERNIA REPAIR       REPAIR ATRIAL SEPTAL  DEFECT  1978    ASD Repair     Right hemicraniectomy  April 2012    [facility], following CVA, mgmt malignant cerebral edema     SURGICAL HISTORY OF -   2009    right eye enucleation     TRACHEOSTOMY  April 2012    [facility], following CVA     Z NONSPECIFIC PROCEDURE      tonsillectomy     [facility] NONSPECIFIC PROCEDURE      Ing. Hernia Repair     Current Outpatient Medications   Medication Sig Dispense Refill     acetaminophen (TYLENOL) 500 MG tablet Take 1,000 mg by mouth every 8 hours as needed for mild pain Alternating with ibuprofen for post surgical pain       apixaban ANTICOAGULANT (ELIQUIS ANTICOAGULANT) 5 MG tablet Take 1 tablet (5 mg) by mouth 2 times daily 180 tablet 3     atorvastatin (LIPITOR) 20 MG tablet TAKE 1 TABLET BY MOUTH EVERY DAY 90 tablet 3     baclofen (LIORESAL) 20 MG tablet 1 tab 4 times daily 60 tablet      citalopram (CELEXA) 20 MG tablet Take 30 mg by mouth daily       docusate sodium (COLACE) 100 MG tablet Take 200 mg by mouth nightly as needed        levETIRAcetam (KEPPRA) 1000 MG tablet Take 1 tablet (1,000 mg) by mouth 2 times daily 180 tablet 3     lidocaine (LMX4) 4 % external cream Apply topically as needed for mild pain (topical irritation) Apply sparingly to affected area twice daily, as needed. 15 g 0     metoprolol succinate ER (TOPROL-XL) 25 MG 24 hr tablet Take 25 mg by mouth daily       mirabegron (MYRBETRIQ) 50 MG 24 hr tablet Take 50 mg by mouth daily       Multiple Vitamin (MULTI-VITAMIN) per tablet Take 1 tablet by mouth daily  100 tablet 3     NONFORMULARY Take 1 tablet by mouth daily Protandim - herbal supplement        Omega-3 Fatty Acids (FISH OIL PO) Take 1 Dose by mouth daily       polyethylene glycol (MIRALAX) 17 g packet Take 17 g by mouth every other day        senna-docusate (SENOKOT-S/PERICOLACE) 8.6-50 MG tablet Take 4 tablets by mouth At Bedtime       tamsulosin (FLOMAX) 0.4 MG capsule Take 1 capsule (0.4 mg) by mouth daily 90 capsule 3     vitamin D3  (CHOLECALCIFEROL) 50 mcg (2000 units) tablet Take 2,000 Units by mouth daily  90 tablet 3       Allergies   Allergen Reactions     Blood Transfusion Related (Informational Only) Other (See Comments)     Patient has a history of a clinically significant antibody against RBC antigens.  A delay in compatible RBCs may occur.     Lisinopril      No Known Drug Allergies         Social History     Tobacco Use     Smoking status: Never Smoker     Smokeless tobacco: Never Used   Substance Use Topics     Alcohol use: No     History   Drug Use No         Objective     /70 (BP Location: Right arm, Patient Position: Sitting, Cuff Size: Adult Regular)   Pulse 55   Temp 97  F (36.1  C) (Tympanic)   Resp 18   Wt 81.6 kg (179 lb 14.4 oz)   SpO2 98%   BMI 23.10 kg/m      Physical Exam    GENERAL APPEARANCE: alert and no distress     EYES: EOMI,  PERRL     HENT: OP normal     NECK: no adenopathy, no asymmetry, masses     RESP: lungs clear to auscultation - no rales, rhonchi or wheezes     CV: regular rates and rhythm, normal S1 S2     ABDOMEN:  soft, nontender, no HSM or masses and bowel sounds normal     SKIN: no suspicious lesions or rashes     NEURO: left hemiparesis with impaired gait     PSYCH: mentation appears normal. and affect normal     LYMPHATICS: No cervical adenopathy    Recent Labs   Lab Test 10/14/20  1208 10/04/20  0557 10/03/20  0619 10/02/20  1928   HGB  --   --  11.3* 12.4*   PLT  --   --  158 181   INR  --   --   --  1.46*    141 142 131*   POTASSIUM 4.6 3.9 4.2 4.8   CR 0.73 0.66 1.28* 2.62*        Revised Cardiac Risk Index (RCRI):  The patient has the following serious cardiovascular risks for perioperative complications:   - Cerebrovascular Disease (TIA or CVA) = 1 point     RCRI Interpretation: 1 point: Class II (low risk - 0.9% complication rate)       Assessment & Plan   The proposed surgical procedure is considered LOW risk.      ICD-10-CM    1. Preop general physical exam  Z01.818      2.  Urinary retention  R33.9 Plan for cystoscopy.  Neurogenic bladder and BPH contributing   3. Benign prostatic hyperplasia with urinary retention  N40.1     R33.8    4. Traumatic brain injury with loss of consciousness, sequela (H)  S06.9X9S Baseline left  hemiparesis.  Note: pt had significant muscular spasms following most recent procedure.  Pt will take baclofen morning of cystoscopy, may need IV dose post op if symptoms develop     5. Nephrolithiasis  N20.0 NUTRITION REFERRAL     Pt requests nutrition visit to discuss low oxalate diet      Risks and Recommendations:  The patient has the following additional risks and recommendations for perioperative complications:   - No identified additional risk factors other than previously addressed    Medication Instructions:   - apixaban (Eliquis), edoxaban (Savaysa), rivaroxaban (Xarelto): Bleeding risk is moderate or high for this procedure AND CrCl  (>=) 50 mL/min. HOLD 2 days before surgery.    - Beta Blockers: Continue taking on the day of surgery.     Hold lipitor, citalopram morning of procedure  Take baclofen, Keppra and metoprolol as scheduled morning of procedure with sip of water  Resume Eliquis evening after procedure    RECOMMENDATION:  APPROVAL GIVEN to proceed with proposed procedure, without further diagnostic evaluation.    Signed Electronically by: Don Aviles MD    Copy of this evaluation report is provided to requesting physician.    Preop [facility] Preop Guidelines    Revised Cardiac Risk Index

## 2020-11-30 NOTE — PATIENT INSTRUCTIONS
"Hold lipitor, citalopram morning of procedure  Take baclofen, Keppra and metoprolol morning of with sip of water  Resume Eliquis evening after procedure  Preparing for Your Surgery  Getting started  A surgery nurse will call you to review your health history and instructions. They will give you an arrival time based on your scheduled surgery time.  Please be ready to share the following:    Your doctor's clinic name and phone number    Your medical, surgical and anesthesia history    A list of allergies and sensitivities    A list of medicines, including herbal treatments and over-the-counter drugs    Whether the patient has a legal guardian (ask how to send us the papers in advance)  If your child is having surgery, please ask for a copy of Preparing for Your Child's Surgery.    Preparing for surgery    Within 30 days of surgery: Have an exam at your family clinic (primary care clinic), or go to a pre-operative clinic. This exam is called a \"History and Physical,\" or H&P.    At your H&P exam, talk to your care team about all medicines you take. If you need to stop any medicines before surgery, ask when to start taking them again.  ? We do this for your safety. Many medicines can make you bleed too much during surgery. Some change how well surgery (anesthesia) drugs work.    Call your insurance company to see what it will and won't pay for. Ask if they need to pre-approve the surgery. (If no insurance, call [phone].)    Call your surgeon's clinic if there's any change in your health. This includes signs of a cold or flu (sore throat, runny nose, cough, rash, fever). It also includes a scrape or scratch near the surgery site.    If you have questions on the day of surgery, call your surgery center.  Eating and drinking guidelines  For your safety: Unless your surgeon tells you otherwise, follow the guidelines below.    Eat and drink as usual until 8 hours before surgery. After that, no food or milk.    Drink " clear liquids until 2 hours before surgery. These are liquids you can see through, like water, Gatorade and Propel Water. You may also have black coffee and tea (no cream or milk).    Nothing by mouth within 2 hours of surgery. This includes gum, candy and breath mints.    Stop alcohol the midnight before surgery.    If your family clinic tells you to take medicine on the morning of surgery, it's okay to take it with a sip of water.  Preventing infection    Shower or bathe the night before and morning of your surgery. Follow the instructions your clinic gave you. (If no instructions, use regular soap.)    Don't shave or clip hair near your surgery site. This can lead to skin infection.    Don't smoke the morning of surgery. Smoking increases the risk of infection. You may chew nicotine gum up to 2 hours before surgery. A nicotine patch is okay.  ? Note: Some surgeries require you to completely quit smoking and nicotine. Check with your surgeon.    Your care team will make every effort to keep you safe from infection. We will:  ? Clean our hands often with soap and water (or an alcohol-based hand rub).  ? Clean the skin at your surgery site with a special soap that kills germs. We'll also remove hair from the site as needed.  ? Wear special hair covers, masks, gowns and gloves during surgery.  ? Give antibiotic medicine, if prescribed. Not all surgeries need antibiotics.  What to bring on the day of surgery    Photo ID and insurance card    Copy of your health care directive, if you have one    Glasses and hearing aides (bring cases)  ? You can't wear contacts during surgery    Inhaler and eye drops, if you use them (tell us about these when you arrive)    CPAP machine or breathing device, if you use them    A few personal items, if spending the night    If you have . . .  ? A pacemaker or ICD (cardiac defibrillator): Bring the ID card.  ? An implanted stimulator: Bring the remote control.  ? A legal guardian: Bring  a copy of the certified (court-stamped) guardianship papers.  Please remove any jewelry, including body piercings. Leave jewelry and other valuables at home.  If you're going home the day of surgery  Important: If you don't follow the rules below, we must cancel your surgery.     Arrange for someone to drive you home after surgery. You may not drive, take a taxi or take public transportation by yourself (unless you'll have local anesthesia only).    Arrange for a responsible adult to stay with you overnight. If you don't, we may keep you in the hospital overnight, and you may need to pay the costs yourself.  Questions?   If you have any questions for your care team, list them here: _________________________________________________________________________________________________________________________________________________________________________________________________________________________________________________________________________________________________________________________  For informational purposes only. Not to replace the advice of your health care provider. Copyright   1969-6678 [facility]. All rights reserved. Clinically reviewed by Janet Bee MD. [employer] [id number] - REV 07/19.

## 2020-12-01 ENCOUNTER — ANESTHESIA (OUTPATIENT)
Dept: SURGERY | Facility: CLINIC | Age: 65
End: 2020-12-01
Payer: MEDICARE

## 2020-12-01 ENCOUNTER — ANESTHESIA EVENT (OUTPATIENT)
Dept: SURGERY | Facility: CLINIC | Age: 65
End: 2020-12-01
Payer: MEDICARE

## 2020-12-01 ENCOUNTER — HOSPITAL ENCOUNTER (OUTPATIENT)
Facility: CLINIC | Age: 65
Discharge: HOME | End: 2020-12-01
Attending: STUDENT IN AN ORGANIZED HEALTH CARE EDUCATION/TRAINING PROGRAM | Admitting: STUDENT IN AN ORGANIZED HEALTH CARE EDUCATION/TRAINING PROGRAM
Payer: MEDICARE

## 2020-12-01 VITALS
DIASTOLIC BLOOD PRESSURE: 77 MMHG | OXYGEN SATURATION: 96 % | HEIGHT: 74 IN | RESPIRATION RATE: 16 BRPM | WEIGHT: 179 LBS | SYSTOLIC BLOOD PRESSURE: 132 MMHG | TEMPERATURE: 98.2 F | BODY MASS INDEX: 22.97 KG/M2 | HEART RATE: 76 BPM

## 2020-12-01 DIAGNOSIS — R33.8 BENIGN PROSTATIC HYPERPLASIA WITH URINARY RETENTION: ICD-10-CM

## 2020-12-01 DIAGNOSIS — N40.1 BENIGN PROSTATIC HYPERPLASIA WITH URINARY RETENTION: ICD-10-CM

## 2020-12-01 LAB — GLUCOSE BLDC GLUCOMTR-MCNC: 83 MG/DL (ref 70–99)

## 2020-12-01 PROCEDURE — 370N000002 HC ANESTHESIA TECHNICAL FEE, EACH ADDTL 15 MIN: Performed by: STUDENT IN AN ORGANIZED HEALTH CARE EDUCATION/TRAINING PROGRAM

## 2020-12-01 PROCEDURE — 272N000002 HC OR SUPPLY OTHER OPNP: Performed by: STUDENT IN AN ORGANIZED HEALTH CARE EDUCATION/TRAINING PROGRAM

## 2020-12-01 PROCEDURE — 250N000013 HC RX MED GY IP 250 OP 250 PS 637: Performed by: ANESTHESIOLOGY

## 2020-12-01 PROCEDURE — 250N000009 HC RX 250: Performed by: NURSE ANESTHETIST, CERTIFIED REGISTERED

## 2020-12-01 PROCEDURE — 999N000136 HC STATISTIC PRE PROC ASSESS II: Performed by: STUDENT IN AN ORGANIZED HEALTH CARE EDUCATION/TRAINING PROGRAM

## 2020-12-01 PROCEDURE — 761N000007 HC RECOVERY PHASE 2 EACH 15 MINS: Performed by: STUDENT IN AN ORGANIZED HEALTH CARE EDUCATION/TRAINING PROGRAM

## 2020-12-01 PROCEDURE — 250N000011 HC RX IP 250 OP 636: Performed by: NURSE ANESTHETIST, CERTIFIED REGISTERED

## 2020-12-01 PROCEDURE — 250N000009 HC RX 250: Performed by: STUDENT IN AN ORGANIZED HEALTH CARE EDUCATION/TRAINING PROGRAM

## 2020-12-01 PROCEDURE — 370N000001 HC ANESTHESIA TECHNICAL FEE, 1ST 30 MIN: Performed by: STUDENT IN AN ORGANIZED HEALTH CARE EDUCATION/TRAINING PROGRAM

## 2020-12-01 PROCEDURE — 52648 LASER SURGERY OF PROSTATE: CPT | Mod: 79 | Performed by: STUDENT IN AN ORGANIZED HEALTH CARE EDUCATION/TRAINING PROGRAM

## 2020-12-01 PROCEDURE — 250N000011 HC RX IP 250 OP 636: Performed by: ANESTHESIOLOGY

## 2020-12-01 PROCEDURE — 250N000011 HC RX IP 250 OP 636: Performed by: STUDENT IN AN ORGANIZED HEALTH CARE EDUCATION/TRAINING PROGRAM

## 2020-12-01 PROCEDURE — 272N000001 HC OR GENERAL SUPPLY STERILE: Performed by: STUDENT IN AN ORGANIZED HEALTH CARE EDUCATION/TRAINING PROGRAM

## 2020-12-01 PROCEDURE — 250N000009 HC RX 250: Performed by: ANESTHESIOLOGY

## 2020-12-01 PROCEDURE — 360N000038 HC SURGERY LEVEL 6 1ST 30 MIN: Performed by: STUDENT IN AN ORGANIZED HEALTH CARE EDUCATION/TRAINING PROGRAM

## 2020-12-01 PROCEDURE — 999N001017 HC STATISTIC GLUCOSE BY METER IP

## 2020-12-01 PROCEDURE — 761N000001 HC RECOVERY PHASE 1 LEVEL 1 FIRST HR: Performed by: STUDENT IN AN ORGANIZED HEALTH CARE EDUCATION/TRAINING PROGRAM

## 2020-12-01 PROCEDURE — 258N000003 HC RX IP 258 OP 636: Performed by: ANESTHESIOLOGY

## 2020-12-01 PROCEDURE — 360N000039 HC SURGERY LEVEL 6 EA 15 ADDTL MIN: Performed by: STUDENT IN AN ORGANIZED HEALTH CARE EDUCATION/TRAINING PROGRAM

## 2020-12-01 RX ORDER — NALOXONE HYDROCHLORIDE 0.4 MG/ML
0.4 INJECTION, SOLUTION INTRAMUSCULAR; INTRAVENOUS; SUBCUTANEOUS
Status: DISCONTINUED | OUTPATIENT
Start: 2020-12-01 | End: 2020-12-01 | Stop reason: HOSPADM

## 2020-12-01 RX ORDER — KETOROLAC TROMETHAMINE 30 MG/ML
30 INJECTION, SOLUTION INTRAMUSCULAR; INTRAVENOUS EVERY 6 HOURS PRN
Status: DISCONTINUED | OUTPATIENT
Start: 2020-12-01 | End: 2020-12-01 | Stop reason: HOSPADM

## 2020-12-01 RX ORDER — DOCUSATE SODIUM 100 MG/1
100 CAPSULE, LIQUID FILLED ORAL 2 TIMES DAILY
Qty: 30 CAPSULE | Refills: 0 | Status: SHIPPED | OUTPATIENT
Start: 2020-12-01 | End: 2022-10-26

## 2020-12-01 RX ORDER — CEFAZOLIN SODIUM 1 G/3ML
1 INJECTION, POWDER, FOR SOLUTION INTRAMUSCULAR; INTRAVENOUS SEE ADMIN INSTRUCTIONS
Status: DISCONTINUED | OUTPATIENT
Start: 2020-12-01 | End: 2020-12-01 | Stop reason: HOSPADM

## 2020-12-01 RX ORDER — DIAZEPAM 10 MG/2ML
2.5 INJECTION, SOLUTION INTRAMUSCULAR; INTRAVENOUS
Status: DISCONTINUED | OUTPATIENT
Start: 2020-12-01 | End: 2020-12-01 | Stop reason: HOSPADM

## 2020-12-01 RX ORDER — OXYCODONE HYDROCHLORIDE 5 MG/1
5 TABLET ORAL EVERY 4 HOURS PRN
Status: DISCONTINUED | OUTPATIENT
Start: 2020-12-01 | End: 2020-12-01 | Stop reason: HOSPADM

## 2020-12-01 RX ORDER — MEPERIDINE HYDROCHLORIDE 25 MG/ML
12.5 INJECTION INTRAMUSCULAR; INTRAVENOUS; SUBCUTANEOUS
Status: DISCONTINUED | OUTPATIENT
Start: 2020-12-01 | End: 2020-12-01 | Stop reason: HOSPADM

## 2020-12-01 RX ORDER — EPHEDRINE SULFATE 50 MG/ML
INJECTION, SOLUTION INTRAVENOUS PRN
Status: DISCONTINUED | OUTPATIENT
Start: 2020-12-01 | End: 2020-12-01

## 2020-12-01 RX ORDER — ACETAMINOPHEN 650 MG/1
650 SUPPOSITORY RECTAL ONCE
Status: DISCONTINUED | OUTPATIENT
Start: 2020-12-01 | End: 2020-12-01 | Stop reason: HOSPADM

## 2020-12-01 RX ORDER — HYDROMORPHONE HYDROCHLORIDE 1 MG/ML
.3-.5 INJECTION, SOLUTION INTRAMUSCULAR; INTRAVENOUS; SUBCUTANEOUS EVERY 10 MIN PRN
Status: DISCONTINUED | OUTPATIENT
Start: 2020-12-01 | End: 2020-12-01 | Stop reason: HOSPADM

## 2020-12-01 RX ORDER — SODIUM CHLORIDE, SODIUM LACTATE, POTASSIUM CHLORIDE, CALCIUM CHLORIDE 600; 310; 30; 20 MG/100ML; MG/100ML; MG/100ML; MG/100ML
INJECTION, SOLUTION INTRAVENOUS CONTINUOUS
Status: DISCONTINUED | OUTPATIENT
Start: 2020-12-01 | End: 2020-12-01 | Stop reason: HOSPADM

## 2020-12-01 RX ORDER — LABETALOL 20 MG/4 ML (5 MG/ML) INTRAVENOUS SYRINGE
10
Status: DISCONTINUED | OUTPATIENT
Start: 2020-12-01 | End: 2020-12-01 | Stop reason: HOSPADM

## 2020-12-01 RX ORDER — DEXAMETHASONE SODIUM PHOSPHATE 4 MG/ML
INJECTION, SOLUTION INTRA-ARTICULAR; INTRALESIONAL; INTRAMUSCULAR; INTRAVENOUS; SOFT TISSUE PRN
Status: DISCONTINUED | OUTPATIENT
Start: 2020-12-01 | End: 2020-12-01

## 2020-12-01 RX ORDER — NALOXONE HYDROCHLORIDE 0.4 MG/ML
0.2 INJECTION, SOLUTION INTRAMUSCULAR; INTRAVENOUS; SUBCUTANEOUS
Status: DISCONTINUED | OUTPATIENT
Start: 2020-12-01 | End: 2020-12-01 | Stop reason: HOSPADM

## 2020-12-01 RX ORDER — CEFAZOLIN SODIUM 2 G/100ML
2 INJECTION, SOLUTION INTRAVENOUS
Status: COMPLETED | OUTPATIENT
Start: 2020-12-01 | End: 2020-12-01

## 2020-12-01 RX ORDER — ACETAMINOPHEN 500 MG
1000 TABLET ORAL EVERY 6 HOURS PRN
Qty: 100 TABLET | Refills: 0 | Status: SHIPPED | OUTPATIENT
Start: 2020-12-01

## 2020-12-01 RX ORDER — ONDANSETRON 4 MG/1
4 TABLET, ORALLY DISINTEGRATING ORAL EVERY 30 MIN PRN
Status: DISCONTINUED | OUTPATIENT
Start: 2020-12-01 | End: 2020-12-01 | Stop reason: HOSPADM

## 2020-12-01 RX ORDER — ONDANSETRON 2 MG/ML
INJECTION INTRAMUSCULAR; INTRAVENOUS PRN
Status: DISCONTINUED | OUTPATIENT
Start: 2020-12-01 | End: 2020-12-01

## 2020-12-01 RX ORDER — ALBUTEROL SULFATE 0.83 MG/ML
2.5 SOLUTION RESPIRATORY (INHALATION) EVERY 4 HOURS PRN
Status: DISCONTINUED | OUTPATIENT
Start: 2020-12-01 | End: 2020-12-01 | Stop reason: HOSPADM

## 2020-12-01 RX ORDER — FENTANYL CITRATE 50 UG/ML
25-50 INJECTION, SOLUTION INTRAMUSCULAR; INTRAVENOUS
Status: DISCONTINUED | OUTPATIENT
Start: 2020-12-01 | End: 2020-12-01 | Stop reason: HOSPADM

## 2020-12-01 RX ORDER — PROPOFOL 10 MG/ML
INJECTION, EMULSION INTRAVENOUS PRN
Status: DISCONTINUED | OUTPATIENT
Start: 2020-12-01 | End: 2020-12-01

## 2020-12-01 RX ORDER — ATROPA BELLADONNA AND OPIUM 16.2; 3 MG/1; MG/1
SUPPOSITORY RECTAL PRN
Status: DISCONTINUED | OUTPATIENT
Start: 2020-12-01 | End: 2020-12-01 | Stop reason: HOSPADM

## 2020-12-01 RX ORDER — LIDOCAINE 40 MG/G
CREAM TOPICAL
Status: DISCONTINUED | OUTPATIENT
Start: 2020-12-01 | End: 2020-12-01 | Stop reason: HOSPADM

## 2020-12-01 RX ORDER — ONDANSETRON 2 MG/ML
4 INJECTION INTRAMUSCULAR; INTRAVENOUS EVERY 30 MIN PRN
Status: DISCONTINUED | OUTPATIENT
Start: 2020-12-01 | End: 2020-12-01 | Stop reason: HOSPADM

## 2020-12-01 RX ORDER — PHENYLEPHRINE HYDROCHLORIDE 10 MG/ML
INJECTION INTRAVENOUS PRN
Status: DISCONTINUED | OUTPATIENT
Start: 2020-12-01 | End: 2020-12-01

## 2020-12-01 RX ORDER — GLYCOPYRROLATE 0.2 MG/ML
INJECTION, SOLUTION INTRAMUSCULAR; INTRAVENOUS PRN
Status: DISCONTINUED | OUTPATIENT
Start: 2020-12-01 | End: 2020-12-01

## 2020-12-01 RX ORDER — HYDRALAZINE HYDROCHLORIDE 20 MG/ML
2.5-5 INJECTION INTRAMUSCULAR; INTRAVENOUS EVERY 10 MIN PRN
Status: DISCONTINUED | OUTPATIENT
Start: 2020-12-01 | End: 2020-12-01 | Stop reason: HOSPADM

## 2020-12-01 RX ORDER — FENTANYL CITRATE 50 UG/ML
INJECTION, SOLUTION INTRAMUSCULAR; INTRAVENOUS PRN
Status: DISCONTINUED | OUTPATIENT
Start: 2020-12-01 | End: 2020-12-01

## 2020-12-01 RX ADMIN — CEFAZOLIN SODIUM 2 G: 2 INJECTION, SOLUTION INTRAVENOUS at 09:10

## 2020-12-01 RX ADMIN — FENTANYL CITRATE 50 MCG: 50 INJECTION, SOLUTION INTRAMUSCULAR; INTRAVENOUS at 11:52

## 2020-12-01 RX ADMIN — FENTANYL CITRATE 50 MCG: 50 INJECTION, SOLUTION INTRAMUSCULAR; INTRAVENOUS at 10:20

## 2020-12-01 RX ADMIN — PHENYLEPHRINE HYDROCHLORIDE 50 MCG: 10 INJECTION INTRAVENOUS at 09:39

## 2020-12-01 RX ADMIN — PHENYLEPHRINE HYDROCHLORIDE 50 MCG: 10 INJECTION INTRAVENOUS at 09:33

## 2020-12-01 RX ADMIN — MIDAZOLAM 2 MG: 1 INJECTION INTRAMUSCULAR; INTRAVENOUS at 09:10

## 2020-12-01 RX ADMIN — PROPOFOL 150 MG: 10 INJECTION, EMULSION INTRAVENOUS at 09:19

## 2020-12-01 RX ADMIN — GLYCOPYRROLATE 0.2 MG: 0.2 INJECTION, SOLUTION INTRAMUSCULAR; INTRAVENOUS at 09:19

## 2020-12-01 RX ADMIN — DEXAMETHASONE SODIUM PHOSPHATE 4 MG: 4 INJECTION, SOLUTION INTRA-ARTICULAR; INTRALESIONAL; INTRAMUSCULAR; INTRAVENOUS; SOFT TISSUE at 09:19

## 2020-12-01 RX ADMIN — SODIUM CHLORIDE, POTASSIUM CHLORIDE, SODIUM LACTATE AND CALCIUM CHLORIDE: 600; 310; 30; 20 INJECTION, SOLUTION INTRAVENOUS at 08:40

## 2020-12-01 RX ADMIN — EPHEDRINE SULFATE 5 MG: 50 INJECTION, SOLUTION INTRAVENOUS at 09:30

## 2020-12-01 RX ADMIN — ONDANSETRON HYDROCHLORIDE 4 MG: 2 INJECTION, SOLUTION INTRAVENOUS at 09:19

## 2020-12-01 RX ADMIN — LIDOCAINE HYDROCHLORIDE 30 MG: 10 INJECTION, SOLUTION EPIDURAL; INFILTRATION; INTRACAUDAL; PERINEURAL at 09:19

## 2020-12-01 RX ADMIN — PHENYLEPHRINE HYDROCHLORIDE 50 MCG: 10 INJECTION INTRAVENOUS at 10:41

## 2020-12-01 RX ADMIN — PROPOFOL 20 MG: 10 INJECTION, EMULSION INTRAVENOUS at 10:20

## 2020-12-01 RX ADMIN — PROPOFOL 30 MG: 10 INJECTION, EMULSION INTRAVENOUS at 10:17

## 2020-12-01 RX ADMIN — OXYCODONE HYDROCHLORIDE 5 MG: 5 TABLET ORAL at 12:42

## 2020-12-01 RX ADMIN — FENTANYL CITRATE 100 MCG: 50 INJECTION, SOLUTION INTRAMUSCULAR; INTRAVENOUS at 09:19

## 2020-12-01 RX ADMIN — KETOROLAC TROMETHAMINE 30 MG: 30 INJECTION, SOLUTION INTRAMUSCULAR at 12:42

## 2020-12-01 RX ADMIN — EPHEDRINE SULFATE 5 MG: 50 INJECTION, SOLUTION INTRAVENOUS at 09:32

## 2020-12-01 RX ADMIN — EPHEDRINE SULFATE 5 MG: 50 INJECTION, SOLUTION INTRAVENOUS at 09:27

## 2020-12-01 RX ADMIN — FENTANYL CITRATE 50 MCG: 50 INJECTION, SOLUTION INTRAMUSCULAR; INTRAVENOUS at 11:39

## 2020-12-01 RX ADMIN — FENTANYL CITRATE 50 MCG: 50 INJECTION, SOLUTION INTRAMUSCULAR; INTRAVENOUS at 10:07

## 2020-12-01 RX ADMIN — EPHEDRINE SULFATE 5 MG: 50 INJECTION, SOLUTION INTRAVENOUS at 09:25

## 2020-12-01 SDOH — HEALTH STABILITY: MENTAL HEALTH: CURRENT SMOKER: 0

## 2020-12-01 ASSESSMENT — ENCOUNTER SYMPTOMS
SEIZURES: 1
DYSRHYTHMIAS: 1

## 2020-12-01 ASSESSMENT — MIFFLIN-ST. JEOR: SCORE: 1676.69

## 2020-12-01 NOTE — DISCHARGE INSTRUCTIONS
POSTOPERATIVE INSTRUCTIONS    Diagnosis-------------------------------   BPH with LUTS    Procedure-------------------------------  Procedure(s) (LRB):  Cystoscopy and greenlight photovaporization of the prostate (N/A)      Findings--------------------------------  Trilobar obstructive hypertrophy of the prostate, photovaporized with 329 kJ total energy    Home-going instructions-----------------    JHA CATHETER  - You will go home with the catheter. You will be instructed on catheter care  - you will return in about a week for a nursing visit for trial of void         Activity Limitation:     - No driving or operating heavy machinery while on narcotic pain medication.     FOLLOW THESE INSTRUCTIONS AS INDICATED BELOW:  - Observe operative area for signs of excessive bleeding.  - You may shower.  - Increase fluid intake to promote clear urine.  - Resume usual diet as tolerated    What to expect while recovering-----------    For the first few weeks after your procedure, you may notice that your urine is cloudy. Or you may have blood or blood clots in your urine. This is normal while your body rids itself of the treated tissue. These symptoms may begin to get better during the first few weeks. But it may take a few months before they go away. Your provider can tell you when you can have sex again and when you can go back to work.    You also may be told to avoid lifting anything over 10 pounds or bending over to lift things from the ground.    You should drink plenty of fluids to help flush out your bladder.    You may experience some intermittent bleeding that makes your urine pink or cherry colored. This is normal.    However, if you are unable to urinate, passing large amount of clots, have slim blood in your urine, or have a temperature >101 degrees, call the urology nurse on call, or present to your nearest emergency department.      When to call your healthcare provider  Contact your healthcare provider right  away if:    You have a fever of 100.4 F (38 C) or higher, or as directed by your provider    You have excessive bleeding    You have pain not relieved by medicines    You notice that no urine is draining from the catheter or the catheter falls out    You have a frequent or very strong urge to urinate    You re not able to urinate, or notice a decrease in urine flow    Discharge Medications/instructions:     - Take Tylenol 1000mg every 6 hours for pain    - Take an over the counter stool softener to promote soft bowel movements    - resume taking eliquis 24 hours after surgery as long as the urine is clear yellow. If it is still pink wait another day before resuming it      Questions/concerns------------------------  [facility]: [phone]    Future appointments  Follow up in one week for nursing visit for trial of void  Follow up with Dr. Bloom in about 4 weeks for postop      Nehemiah Bloom          GENERAL ANESTHESIA OR SEDATION ADULT DISCHARGE INSTRUCTIONS   SPECIAL PRECAUTIONS FOR 24 HOURS AFTER SURGERY    IT IS NOT UNUSUAL TO FEEL LIGHT-HEADED OR FAINT, UP TO 24 HOURS AFTER SURGERY OR WHILE TAKING PAIN MEDICATION.  IF YOU HAVE THESE SYMPTOMS; SIT FOR A FEW MINUTES BEFORE STANDING AND HAVE SOMEONE ASSIST YOU WHEN YOU GET UP TO WALK OR USE THE BATHROOM.    YOU SHOULD REST AND RELAX FOR THE NEXT 24 HOURS AND YOU MUST MAKE ARRANGEMENTS TO HAVE SOMEONE STAY WITH YOU FOR AT LEAST 24 HOURS AFTER YOUR DISCHARGE.  AVOID HAZARDOUS AND STRENUOUS ACTIVITIES.  DO NOT MAKE IMPORTANT DECISIONS FOR 24 HOURS.    DO NOT DRIVE ANY VEHICLE OR OPERATE MECHANICAL EQUIPMENT FOR 24 HOURS FOLLOWING THE END OF YOUR SURGERY.  EVEN THOUGH YOU MAY FEEL NORMAL, YOUR REACTIONS MAY BE AFFECTED BY THE MEDICATION YOU HAVE RECEIVED.    DO NOT DRINK ALCOHOLIC BEVERAGES FOR 24 HOURS FOLLOWING YOUR SURGERY.    DRINK CLEAR LIQUIDS (APPLE JUICE, GINGER ALE, 7-UP, BROTH, ETC.).  PROGRESS TO YOUR REGULAR DIET AS YOU FEEL  ABLE.    YOU MAY HAVE A DRY MOUTH, A SORE THROAT, MUSCLES ACHES OR TROUBLE SLEEPING.  THESE SHOULD GO AWAY AFTER 24 HOURS.    CALL YOUR DOCTOR FOR ANY OF THE FOLLOWING:  SIGNS OF INFECTION (FEVER, GROWING TENDERNESS AT THE SURGERY SITE, A LARGE AMOUNT OF DRAINAGE OR BLEEDING, SEVERE PAIN, FOUL-SMELLING DRAINAGE, REDNESS OR SWELLING.    IT HAS BEEN OVER 8 TO 10 HOURS SINCE SURGERY AND YOU ARE STILL NOT ABLE TO URINATE (PASS WATER).

## 2020-12-01 NOTE — ANESTHESIA POSTPROCEDURE EVALUATION
Patient: Marcus Hodges    Procedure(s):  Cystoscopy and greenlight photovaporization of the prostate    Diagnosis:Benign prostatic hyperplasia with urinary retention [N40.1, R33.8]  Diagnosis Additional Information: No value filed.    Anesthesia Type:  General    Note:  Anesthesia Post Evaluation    Patient location during evaluation: PACU  Patient participation: Able to fully participate in evaluation  Level of consciousness: awake  Pain management: adequate  multimodal analgesia used between 6 hours prior to anesthesia start to PACU dischargeAirway patency: patent  Cardiovascular status: acceptable  Respiratory status: acceptable  Hydration status: acceptable  PONV: none             Last vitals:  Vitals:    12/01/20 1130 12/01/20 1145 12/01/20 1152   BP: 111/77 107/65 107/65   Pulse: 65 66 70   Resp: 15 11 10   Temp:      SpO2: 96% 98% 96%         Electronically Signed By: Gabriel Stack MD  December 1, 2020  12:01 PM

## 2020-12-01 NOTE — ANESTHESIA CARE TRANSFER NOTE
Patient: Marcus Hodges    Procedure(s):  Cystoscopy and greenlight photovaporization of the prostate    Diagnosis: Benign prostatic hyperplasia with urinary retention [N40.1, R33.8]  Diagnosis Additional Information: No value filed.    Anesthesia Type:   General     Note:  Airway :Face Mask  Patient transferred to:PACU  Comments:   Spontaneous respirations, oral suctioned, bilateral eye opening and hand grasps.  Extubated to FM O2 6lpm.  VSS to PACU.Handoff Report: Identifed the Patient, Identified the Reponsible Provider, Reviewed the pertinent medical history, Discussed the surgical course, Reviewed Intra-OP anesthesia mangement and issues during anesthesia, Set expectations for post-procedure period and Allowed opportunity for questions and acknowledgement of understanding      Vitals: (Last set prior to Anesthesia Care Transfer)    CRNA VITALS  12/1/2020 1033 - 12/1/2020 1113      12/1/2020             NIBP:  122/78    Pulse:  61    Resp Rate (observed):  16                Electronically Signed By: ERIC Simpson CRNA  December 1, 2020  11:13 AM

## 2020-12-01 NOTE — ANESTHESIA PROCEDURE NOTES
Airway    Patient location during procedure: OR    Staff -   Anesthesiologist:  Gabriel Stcak MD  Performed By: CRNA and anesthesiologist    Consent for Airway   Urgency: elective    Indications and Patient Condition  Indications for airway management: isael-procedural  Induction type:intravenousMask difficulty assessment: 1 - vent by mask    Final Airway Details  Final airway type: supraglottic airway    Endotracheal Airway Details   Secured with: plastic tape    Post intubation assessment   Placement verified by: capnometry, equal breath sounds and chest rise   Number of attempts at approach: 1  Number of other approaches attempted: 0  Secured with:plastic tape  Ease of procedure: easy  Dentition: Intact and Unchanged

## 2020-12-01 NOTE — ANESTHESIA PREPROCEDURE EVALUATION
Anesthesia Pre-Procedure Evaluation    Patient: Marcus Hodges   MRN: [id number] : 1955          Preoperative Diagnosis: Benign prostatic hyperplasia with urinary retention [N40.1, R33.8]    Procedure(s):  Cystoscopy and greenlight photovaporization of the prostate    Past Medical History:   Diagnosis Date     * * * SBE PROPHYLAXIS * * *      Antiplatelet or antithrombotic long-term use     on Xarelto     Arrhythmia     A fib     Atrial enlargement, bilateral      Atrial flutter     radiofrequency ablation , resolved     ATRIAL SEPTAL DEFECT     repaired      Chronic atrial fibrillation (H)     on apixaban     CVA (cerebral vascular accident) (H) 2012    R MCA, complicated by left sided weakness, walks with a cane, and seizures     Depression      Dysphagia 2012    [facility], following CVA     Hernia, abdominal      History of thrombophlebitis      hyperlipidemia      Mitral regurgitation     mitral valve damage related to ASD repair     Mumps      Neurogenic bladder      Neurogenic bowel      Palpitations      Respiratory failure (H) 2012    [facility], following CVA, prolonged requiring tracheostomy, [place] [facility]      Tricuspid regurgitation      Unspecified glaucoma(365.9)     right     Urinary incontinence      Past Surgical History:   Procedure Laterality Date     CATHETER, ABLATION       COMBINED CYSTOSCOPY, INSERT CATHETER URETER  2020    Procedure: cystoscopy and left ureteral catheter placement, left ureteral stent placement;  Surgeon: Nehemiah Bloom MD;  Location:  OR     Craniotomy reconstruction      [facility], repair of hemicraniectomy defect     CYSTOSCOPY       DAVINCI PYELOPLASTY Left 2020    Procedure: left robotic assisted laparoscopic pyelolithotomy;  Surgeon: Nehemiah Bloom MD;  Location:  OR     GASTROSTOMY TUBE  2012    [facility], following CVA     HERNIA REPAIR       REPAIR ATRIAL SEPTAL DEFECT      ASD Repair      Right hemicraniectomy  April 2012    [facility], following CVA, mgmt malignant cerebral edema     SURGICAL HISTORY OF -   2009    right eye enucleation     TRACHEOSTOMY  April 2012    [facility], following CVA     Z NONSPECIFIC PROCEDURE      tonsillectomy     Z NONSPECIFIC PROCEDURE      Ing. Hernia Repair     Anesthesia Evaluation     . Pt has had prior anesthetic.            ROS/MED HX    ENT/Pulmonary:  - neg pulmonary ROS    (-) sleep apnea   Neurologic:     (+)seizures CVA other neuro h/o TBI with resultant left hemiparesis    Cardiovascular:     (+) Dyslipidemia, hypertension----. Taking blood thinners Pt has received instructions: Instructions Given to patient: stopped for appropriate time. . . :. dysrhythmias a-fib, .       METS/Exercise Tolerance:     Hematologic: Comments: Does has significant RBC antigens which will likely delay blood administration    (+) History of Transfusion no previous transfusion reaction -      Musculoskeletal:         GI/Hepatic:  - neg GI/hepatic ROS       Renal/Genitourinary:     (+) Nephrolithiasis , BPH, Other Renal/ Genitourinary, neurogenic bladder      Endo:  - neg endo ROS       Psychiatric:         Infectious Disease:  - neg infectious disease ROS       Malignancy:      - no malignancy   Other:    - neg other ROS                      Physical Exam      Airway   Mallampati: II  TM distance: >3 FB  Neck ROM: full    Dental   (+) missing    Cardiovascular   Rhythm and rate: regular and normal      Pulmonary    breath sounds clear to auscultation    Other findings: Lab Test        10/03/20     10/02/20     10/02/20     12/04/17     12/04/17     10/23/17                       0619          1928          1650          0000                                              WBC          6.8          11.3*        11.5*          < >         --           --           HGB          11.3*        12.4*        12.4*          < >         --           --           MCV          92            92           92             < >         --           --           PLT          158          181          182            < >         --           --           INR           --          1.46*         --           --          2.4*         2.5*           < > = values in this interval not displayed.                  Lab Test        10/14/20     10/04/20     10/03/20                       1208          0557          0619          NA           139          141          142           POTASSIUM    4.6          3.9          4.2           CHLORIDE     108          111*         112*          CO2          28           25           25            BUN          13           11           20            CR           0.73         0.66         1.28*         ANIONGAP     3            5            5             STEPHANI          8.7          8.2*         8.6           GLC          84           86           85                    ECG  Atrial flutter with variable A-V block  Possible Right ventricular hypertrophy  ST & T wave abnormality, consider anterior ischemia  Prolonged QT  Abnormal ECG  When compared with ECG of 02-OCT-2020 19:52, (unconfirmed)  Atrial flutter has replaced Atrial fibrillation        Lab Results   Component Value Date    WBC 6.8 10/03/2020    HGB 11.3 (L) 10/03/2020    HCT 35.8 (L) 10/03/2020     10/03/2020     10/14/2020    POTASSIUM 4.6 10/14/2020    CHLORIDE 108 10/14/2020    CO2 28 10/14/2020    BUN 13 10/14/2020    CR 0.73 10/14/2020    GLC 84 10/14/2020    STEPHANI 8.7 10/14/2020    ALBUMIN 3.5 10/02/2020    PROTTOTAL 7.1 10/02/2020    ALT 26 10/02/2020    AST 41 10/02/2020    ALKPHOS 67 10/02/2020    BILITOTAL 1.4 (H) 10/02/2020    PTT 31 11/06/2015    INR 1.46 (H) 10/02/2020    TSH 2.20 02/18/2013       Preop Vitals  BP Readings from Last 3 Encounters:   12/01/20 104/76   11/30/20 102/70   10/27/20 110/70    Pulse Readings from Last 3 Encounters:   12/01/20 64   11/30/20 55   10/27/20 80      Resp  "Readings from Last 3 Encounters:   12/01/20 20   11/30/20 18   10/04/20 16    SpO2 Readings from Last 3 Encounters:   12/01/20 99%   11/30/20 98%   10/04/20 97%      Temp Readings from Last 1 Encounters:   12/01/20 97.7  F (36.5  C) (Temporal)    Ht Readings from Last 1 Encounters:   12/01/20 1.88 m (6' 2\")      Wt Readings from Last 1 Encounters:   12/01/20 81.2 kg (179 lb)    Estimated body mass index is 22.98 kg/m  as calculated from the following:    Height as of this encounter: 1.88 m (6' 2\").    Weight as of this encounter: 81.2 kg (179 lb).       Anesthesia Plan      History & Physical Review  History and physical reviewed and following examination; no interval change.    ASA Status:  3 .    NPO Status:  > 8 hours    Plan for General with Intravenous and Propofol induction. Maintenance will be Balanced.    PONV prophylaxis:  Ondansetron (or other 5HT-3) and Dexamethasone or Solumedrol    The patient is not a current smoker      Postoperative Care  Postoperative pain management:  IV analgesics, Oral pain medications and Multi-modal analgesia.      Consents  Anesthetic plan, risks, benefits and alternatives discussed with:  Patient..                 Yunior Panchal MD                    .  "

## 2020-12-02 ENCOUNTER — TELEPHONE (OUTPATIENT)
Dept: UROLOGY | Facility: CLINIC | Age: 65
End: 2020-12-02

## 2020-12-02 NOTE — TELEPHONE ENCOUNTER
Urology pt of Dr. Bloom s/p Cystoscopy and greenlight photovaporization of the prostate yesterday 12/1/2020.   Call today from Marcus and his wife reporting blood draining in Reyna and concern for reduced urine output. Blood in catheter was bright red at first and now is darker. Urine is draining in tubing and wife states she emptied bag recently for about 250 ml. Water intake is good. No temp. No c/o pain or nausea. Denies pressure in lower abdomen but Lex states he may not be able to sense this due to his stroke.  Advice is to monitor output for now. Keep up good water intake. As per provider's note, wait until urine clear to restart Eliquis. May call back later this afternoon, if concerns continue.

## 2020-12-02 NOTE — OP NOTE
[facility]  Operative Note    Pre-operative diagnosis: Benign prostatic hyperplasia with urinary retention [N40.1, R33.8]   Post-operative diagnosis Same as preop   Procedure: Procedure(s):  Cystoscopy and greenlight photovaporization of the prostate   Surgeon: Nehemiah Bloom MD   Assistants(s): none   Anesthesia: General    Estimated blood loss: Less than 10 ml    Total IV fluids: (See anesthesia record)   Blood transfusion: No transfusion was given during surgery   Total urine output: Not measured   Drains: 22 Fr 3-way Reyna catheter   Specimens: none   Implants: none   Findings: Trilobar obstructive hypertrophy  Greenlight XPS 80-120W, 329,154J total, [id number] laser time.   Complications: None.   Condition: Stable       Description of procedure:  65 year old male with h/o neurogenic bladder due to stroke 2012, ASD s/p remote repair, h/o a. 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 In any case, his options if he wants to be free from an indwelling Reyna are to intermittently catheterize (patient unable to do this, only has use of one upper extremity after stroke) or to be able to void spontaneously. I discussed that even if I open up his prostate, he may still be unable to void due to the neurogenic bladder. His bladder does still have good contractility on the UDS so I am hopeful that opening up his outlet will allow him to be able to empty. Risks and benefits were discussed and the  patient agrees to undergo cystoscopy with laser photovaporization of the prostate. He held his Eliquis preoperatively and I expect to be able to restart it on post op day #1    Patient was brought to OR #14.  Weight and renal appropriate dose of Ancef antibiotics were given prior to the procedure.  General anesthesia was induced, the existing Reyna catheter was removed, he was placed in dorsolithotomy position, and prepped and draped in standard sterile fashion.  A timeout was performed.    The laser resectoscope was assembled with a Maldonado visual obturator and passed through the urethra into the bladder.  The prostate was noted to have trilobar obstructive hypertrophy and was approximately 5 cm in length.  The bladder was seen to have mild to moderate trabeculation with some cellules, no bladder stones and no urothelial papillary lesions noted.  Bilateral ureteral orifices were noted to be well away from the bladder neck.  The Maldonado was exchanged for the laser bridge. Using the verumontanum and the ureteral orifices as landmarks, the prostate was systematically vaporized.  Starting at 5:00 and 7:00, a Moxy fiber was used along with the Greenlight XPS system to carve furrows into the prostate and delineate the median lobe.  The median lobe was then taken down flush with the bladder neck.  The right lateral lobe followed by left lateral lobe was then vaporized.  Anterior prostatic tissue was nonobstructing and spared.  The distal extent of the resection was the verumontanum.  At the end of the procedure, the prostatic urethra was noted to be widely patent.  There was excellent hemostasis provided by the laser.  The ureteral orifices were intact.  The external urethral sphincter was noted to coapt nicely.  The resectoscope was removed and a 22 [place]uvian three-way Reyna catheter was placed atraumatically.  30 cc sterile water were used to fill the balloon.  There was return of clear effluent.  A Stanley syringe was used  to irrigate the catheter multiple times.  The catheter was placed on light traction and started on continuous saline bladder irrigation.  The patient was cleaned up, awoken from anesthesia and brought to PACU in stable condition.     The plan is to remove traction about 2 hours postop, then wean the CBI down.  He will be discharged home with a Reyna catheter in place.  He may restart his Eliquis on postop day 1 if the urine remains clear.  He will return in 1 week for trial of void.    Nehemiah Bloom MD   [facility]rology  [phone] clinic phone

## 2020-12-08 ENCOUNTER — ALLIED HEALTH/NURSE VISIT (OUTPATIENT)
Dept: UROLOGY | Facility: CLINIC | Age: 65
End: 2020-12-08
Payer: MEDICARE

## 2020-12-08 DIAGNOSIS — R33.9 URINARY RETENTION: ICD-10-CM

## 2020-12-08 DIAGNOSIS — Z79.2 PROPHYLACTIC ANTIBIOTIC: Primary | ICD-10-CM

## 2020-12-08 PROCEDURE — 51702 INSERT TEMP BLADDER CATH: CPT

## 2020-12-08 RX ORDER — CIPROFLOXACIN 500 MG/1
500 TABLET, FILM COATED ORAL ONCE
Qty: 1 TABLET | Refills: 0 | Status: SHIPPED | OUTPATIENT
Start: 2020-12-08 | End: 2020-12-08

## 2020-12-08 RX ORDER — LIDOCAINE HYDROCHLORIDE 20 MG/ML
JELLY TOPICAL ONCE
Status: COMPLETED | OUTPATIENT
Start: 2020-12-08 | End: 2020-12-08

## 2020-12-08 RX ADMIN — LIDOCAINE HYDROCHLORIDE: 20 JELLY TOPICAL at 15:55

## 2020-12-08 NOTE — PROGRESS NOTES
Marcus Hodges comes into clinic today at the request of Dr Bloom Ordering Provider for TOV (trial of void).          This service provided today was under the supervising provider of the day Dr Bloom , who was available if needed.    Bladder was filled with sterile water  With slow gravity fill  After approximately 210 ml instilled patient had a strong urge and began having bladder spasms  In which about 75 ml leaked from his bladder . Filling was stopped and patient felt comfortable  So filling was resumed for another 70 ml instilled .Again patient had a strong urge and began urinating around faulkner  Or having spasms . Faulkner was removed  But patient was unable to void any more out. Ask patient to drink water  And attempt to void . He was abhijeet to void out another 75 ml after numerous glasses of water but bladder scan showed 266 ml in bladder PVR. Patient opted to have faulkner replaced .  Catheter insertion documentation on 12/8/2020:  5mL 2% lidocaine hydrochloride Urojet instilled into urethra.    NDC# [address]-7  Lot #: [id number]  Expiration Date:  07/22      Marcus Hodges presents to the clinic for catheter insertion.  Reason for insertion: urinary retention  Order has been verified. yes  Catheter successfully inserted into the urethral meatus in the usual sterile fashion without immediate complication.  Type of catheter placed: 16 [place] Coude catheter  Urine is clear in color.  300 cc's of urine output returned.  Balloon was filled with 08cc's of normal saline.  Securement device placed for the catheter.  The patient tolerated the procedure and was instructed to take one Cipro today . Instructed wife on how to deflate the balloon and remove faulkner . Wife will do this at home in about 2 weeks early in am . Patient will then return that afternoon for PVR bladder scan     HARRIET Lopes LPN

## 2020-12-22 ENCOUNTER — OFFICE VISIT (OUTPATIENT)
Dept: UROLOGY | Facility: CLINIC | Age: 65
End: 2020-12-22
Payer: MEDICARE

## 2020-12-22 VITALS
HEART RATE: 53 BPM | HEIGHT: 74 IN | DIASTOLIC BLOOD PRESSURE: 64 MMHG | BODY MASS INDEX: 22.97 KG/M2 | OXYGEN SATURATION: 98 % | SYSTOLIC BLOOD PRESSURE: 110 MMHG | WEIGHT: 179 LBS

## 2020-12-22 DIAGNOSIS — N20.0 CALCULUS OF KIDNEY: ICD-10-CM

## 2020-12-22 DIAGNOSIS — R33.9 URINARY RETENTION: Primary | ICD-10-CM

## 2020-12-22 DIAGNOSIS — Z12.5 SPECIAL SCREENING FOR MALIGNANT NEOPLASM OF PROSTATE: ICD-10-CM

## 2020-12-22 LAB — PR INTERVAL - MUSE: NORMAL

## 2020-12-22 PROCEDURE — 51798 US URINE CAPACITY MEASURE: CPT | Mod: 58 | Performed by: STUDENT IN AN ORGANIZED HEALTH CARE EDUCATION/TRAINING PROGRAM

## 2020-12-22 PROCEDURE — 99214 OFFICE O/P EST MOD 30 MIN: CPT | Mod: 24 | Performed by: STUDENT IN AN ORGANIZED HEALTH CARE EDUCATION/TRAINING PROGRAM

## 2020-12-22 ASSESSMENT — PAIN SCALES - GENERAL: PAINLEVEL: NO PAIN (0)

## 2020-12-22 ASSESSMENT — MIFFLIN-ST. JEOR: SCORE: 1676.69

## 2020-12-22 NOTE — PROGRESS NOTES
"CHIEF COMPLAINT   Marcus Hodges w/ h/o nephrolithiasis, neurogenic bladder, BPH with obstruction     HPI   Marcus Hodges is a 65 year old male with h/o neurogenic bladder due to stroke 2012, ASD s/p remote repair, h/o a. 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 Underwent UDS for evaluation, which indicated inability to void from obstruction. Discussed risks and benefits of intervention and then underwent photovaporization of the prostate 12/1/2020. Failed postop TOV 12/8/2020 and faulkner replaced. Now returns for followup. Faulkner was removed this morning and patient has been able to void.  ml.    PHYSICAL EXAM  Patient is a 65 year old  male   Vitals: Blood pressure 110/64, pulse 53, height 1.88 m (6' 2\"), weight 81.2 kg (179 lb), SpO2 98 %.  Body mass index is 22.98 kg/m .  General Appearance Adult:   Alert, no acute distress, oriented  HENT: throat/mouth:normal, good dentition  Lungs: no respiratory distress, or pursed lip breathing  Heart: No obvious jugular venous distension present  Abdomen: soft, nontender, no organomegaly or masses  Musculoskeltal: extremities normal, no peripheral edema  Skin: no suspicious lesions or rashes  Neuro: Alert, oriented, speech and mentation normal  Psych: affect and mood normal    PVR 163ml today    ASSESSMENT and PLAN  65 year old male with h/o neurogenic bladder due to stroke 2012, ASD s/p remote repair, h/o a. 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 Reyna now removed and voiding freely without discomfort, with moderate post void residual of 163 ml. Given that he feels comfortable, will tolerate this moderately elevated residual urine in his bladder as long as he does not develop issues with UTI, etc.    Discussed PSA screening for prostate cancer, and that AUA guidelines recommend routine screening in men ages 55-70 yo. Will recheck PSA    Discussed stone prevention diet (reduced oxalate, moderate calcium). Continue to drink fluids. Revisited the litholink results which had shown hyperoxaluria but no other specific anomalies.    Continue using condom catheter as needed  Return to clinic 6 months with PSA, litholink , CT abd/pelvis prior    I spent over 25 minutes with the patient.  Over half this time was spent on counseling regarding kidney stone prevention, BPH with retention.    Nehemiah Bloom MD   [facility]rology  [facility] Phone: [phone]

## 2020-12-22 NOTE — LETTER
"12/22/2020       RE: Marcus Hodges  [address] [place] [address]     Dear Colleague,    Thank you for referring your patient, Marcus Hodges, to the [facility] [place] at [facility]. Please see a copy of my visit note below.    CHIEF COMPLAINT   Marcus Hodges w/ h/o nephrolithiasis, neurogenic bladder, BPH with obstruction     HPI   Marcus Hodges is a 65 year old male with h/o neurogenic bladder due to stroke 2012, ASD s/p remote repair, h/o a. 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 Underwent UDS for evaluation, which indicated inability to void from obstruction. Discussed risks and benefits of intervention and then underwent photovaporization of the prostate 12/1/2020. Failed postop TOV 12/8/2020 and faulkner replaced. Now returns for followup. Faulkner was removed this morning and patient has been able to void.  ml.    PHYSICAL EXAM  Patient is a 65 year old  male   Vitals: Blood pressure 110/64, pulse 53, height 1.88 m (6' 2\"), weight 81.2 kg (179 lb), SpO2 98 %.  Body mass index is 22.98 kg/m .  General Appearance Adult:   Alert, no acute distress, oriented  HENT: throat/mouth:normal, good dentition  Lungs: no respiratory distress, or pursed lip breathing  Heart: No obvious jugular venous distension present  Abdomen: soft, nontender, no organomegaly or masses  Musculoskeltal: extremities normal, no peripheral edema  Skin: no suspicious lesions or rashes  Neuro: Alert, oriented, speech and mentation normal  Psych: affect and mood normal    PVR 163ml today    ASSESSMENT and PLAN  65 year " old male with h/o neurogenic bladder due to stroke 2012, ASD s/p remote repair, h/o a. 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 Reyna now removed and voiding freely without discomfort, with moderate post void residual of 163 ml. Given that he feels comfortable, will tolerate this moderately elevated residual urine in his bladder as long as he does not develop issues with UTI, etc.    Discussed PSA screening for prostate cancer, and that AUA guidelines recommend routine screening in men ages 55-68 yo. Will recheck PSA    Discussed stone prevention diet (reduced oxalate, moderate calcium). Continue to drink fluids. Revisited the litholink results which had shown hyperoxaluria but no other specific anomalies.    Continue using condom catheter as needed  Return to clinic 6 months with PSA, litholink , CT abd/pelvis prior    I spent over 25 minutes with the patient.  Over half this time was spent on counseling regarding kidney stone prevention, BPH with retention.    Nehemiah Bloom MD   [facility]  [facility] Phone: [phone]

## 2020-12-23 ENCOUNTER — MYC MEDICAL ADVICE (OUTPATIENT)
Dept: PEDIATRICS | Facility: CLINIC | Age: 65
End: 2020-12-23

## 2020-12-23 DIAGNOSIS — S06.9X9S TRAUMATIC BRAIN INJURY WITH LOSS OF CONSCIOUSNESS, SEQUELA (H): Primary | ICD-10-CM

## 2020-12-23 DIAGNOSIS — G81.94 LEFT HEMIPARESIS (H): ICD-10-CM

## 2021-01-11 NOTE — PROGRESS NOTES
[facility]      OUTPATIENT PHYSICAL THERAPY  PLAN OF TREATMENT FOR OUTPATIENT REHABILITATION    Patient's Last Name, First Name, M.I.                YOB: 1955  Marcus Hodges                        Provider's Name  [facility] Medical Record No.  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   Goals will need to be reassessed when pt returns for PT in January. Anticipate no change in goals          Certification date from 12/22/2020 to 3/21/2021.    Ada Scott, PT          I CERTIFY THE NEED FOR THESE SERVICES FURNISHED UNDER        THIS PLAN OF TREATMENT AND WHILE UNDER MY CARE     (Physician co-signature of this document indicates review and certification of the therapy plan).                Dr. Don Aviles

## 2021-01-11 NOTE — PROGRESS NOTES
[facility]      OUTPATIENT PHYSICAL THERAPY  PLAN OF TREATMENT FOR OUTPATIENT REHABILITATION    Patient's Last Name, First Name, M.I.                YOB: 1955  Marcus Hodges                        Provider's Name  [facility] Medical Record No.  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         Frequency/Duration: 0 visits in 90 days     Goals:  No change in goals.     Certification date from 9/25/2020-12/22/2020.    Ada Scott, PT          I CERTIFY THE NEED FOR THESE SERVICES FURNISHED UNDER        THIS PLAN OF TREATMENT AND WHILE UNDER MY CARE     (Physician co-signature of this document indicates review and certification of the therapy plan).                Referring Provider: Dr. Don Aviles

## 2021-01-12 ENCOUNTER — HOSPITAL ENCOUNTER (OUTPATIENT)
Dept: PHYSICAL THERAPY | Facility: CLINIC | Age: 66
Setting detail: THERAPIES SERIES
End: 2021-01-12
Attending: INTERNAL MEDICINE
Payer: MEDICARE

## 2021-01-12 PROCEDURE — 97112 NEUROMUSCULAR REEDUCATION: CPT | Mod: GP | Performed by: PHYSICAL THERAPIST

## 2021-01-12 PROCEDURE — 97110 THERAPEUTIC EXERCISES: CPT | Mod: GP | Performed by: PHYSICAL THERAPIST

## 2021-01-12 PROCEDURE — 97116 GAIT TRAINING THERAPY: CPT | Mod: GP | Performed by: PHYSICAL THERAPIST

## 2021-01-13 NOTE — PROGRESS NOTES
"Outpatient Physical Therapy Progress Note     Patient: Marcus Hodges  : 1955    Beginning/End Dates of Reporting Period:  2020 to 2021    Referring Provider: Dr. Don Aviles    Therapy Diagnosis: Decreased independence  and functional activity tolerance due to impairments from CVA and  secondary impairments following CVA.(R knee  pain, shoulderpain     Client Self Report: \"had a hard go after the surgery\"   \"we have seen before that I decline when not having PT \"  \"I haven't had a good hamstring stretch since Sept.     Objective Measurements:  Objective Measure: 25 foot walk  Details: 21  24.69 sec and 24.81 sec 24-26 steps. trekking pole.  AFO on the L. 2020 20.45 and 21.5 sec both with trekking pole , AFO on,  24 steps with each  Objective Measure: 4 square  Details: 2020  30.43 sec first trial over tape and pt stopping  /needing cues on direction adding to the time.  With cues as performed completed it in 19.63 sec.  Both with trekking pole, AFO on. SBA  Feet would not have cleared the pole if was stepping over that instead of tape.   Objective Measure: curb/step over shabana  Details: initiates stepping over upright shabana R and L foot but kicks it over.   Objective Measure: alternating tapping - decreased coordinatino knee and hip flexion/ext on the L, UE support on handrail.      Objective Measure: stairs   Details: up forward and down backwards assist with L LE extesnion and foot placement L LE  Objective Measure: 4 square  Details: 28.94 sec stepping over tape - needing CGA and verbal cues , use of AFO and trekking pole.       Goals:  Goal Identifier (continues to be able to have 6 inches TAYLOR, less comfortable )   Goal Description Marcus to demonstrate the ability to stand with a more narrow TAYLOR going from 12 inches feet apart at eval to 6 inches apart or less to demonstrate improved balance , loading on the L side to assist with improvement in gait pattern therefore efficiency " and decreased secondary impairments such as his R knee pain.    Target Date 05/24/19   Date Met  05/21/19   Progress:     Goal Identifier 2- Forward reach   Goal Description Marcus to increase his forward reach from 4 inches at evaulation to 8 inches or greater to demonstrate improved balance, improved ability to bring COM forward over TAYLOR to assist in meeting all goals, improved sit to stand and improved gait.    Target Date 10/31/20   Date Met  09/01/20   Progress:     Goal Identifier 3-  Curb/stairs(9/1/20 able to do with the R and CGA not the L)   Goal Description Marcus to have increased disassociation of movement/improved timing and sequencing of muscle activation for improved hip and knee flexion in standing demonstrated by the ability to step over upright shabana (4-6 inch object) with the L LE to allow him to step up onto a curb without catching foot.   Target Date 10/31/20   Date Met      Progress:     Goal Identifier 4- Gait(9/1/2020 in progress as noted)   Goal Description Marcus to increase gait speed to 19 seconds or less with AD and 24 steps or less to demonstrate improved gait speed and efficiency for community gait and also decreased overuse of the R side.    Target Date 11/30/18   Date Met      Progress:     Goal Identifier 5- Floor transfers. (9/1/2020 in progress)   Goal Description Marcus to perform transfer to and from floor with outside UE support and min assist of 1 to allow him to recover from a fall with assist from wife or caregiver.    Target Date 10/31/20   Date Met      Progress:     Goal Identifier 6 --  4 square(9/1/20 time met with step over tape and cues)   Goal Description Marcus to complete 4 square in 25 seconds with and AD and 40 seconds or less without an AD to demonstrate improved interlimb coordination, balance and body control to assist in meeting all goals and overall function.    Target Date 10/31/20   Date Met      Progress:     Goal Identifier HEP(continues to be developed)    Goal Description Marcus to be independent in appropriate HEP to continue to address his impairments following d/c from skilled PT intervention.    Target Date 10/31/20   Date Met      Progress:     Goal Identifier 8 functional reach/neuro motor control(9/1/20 in progress)       PROGRESS:  Marcus had not been seen for PT since Sept 15, 2020, due to complications after surgery for kidney stone removal.  He has decreased in his gait speed since last seen and also declined in his gait pattern with return to decreased stnace time on the L LE and rotation trunk and pelvis forward to the R with gait.  He has also had return of R knee pain, which I feel is primarily due to compensations in alignmetn with gait and transfers, ER tibia and femur and greater load onto the LE LE.    Marcus is appropriate to continue/return to skilled PT intervention and wishes to continue.  He is without questions.  He reports decline in his function with not having PT.Goals remain appropriate.     Plan:  Continue therapy per current plan of care.    Discharge:  No

## 2021-01-19 ENCOUNTER — HOSPITAL ENCOUNTER (OUTPATIENT)
Dept: PHYSICAL THERAPY | Facility: CLINIC | Age: 66
Setting detail: THERAPIES SERIES
End: 2021-01-19
Attending: INTERNAL MEDICINE
Payer: MEDICARE

## 2021-01-19 PROCEDURE — 97116 GAIT TRAINING THERAPY: CPT | Mod: GP | Performed by: PHYSICAL THERAPIST

## 2021-01-19 PROCEDURE — 97140 MANUAL THERAPY 1/> REGIONS: CPT | Mod: GP | Performed by: PHYSICAL THERAPIST

## 2021-01-19 PROCEDURE — 97110 THERAPEUTIC EXERCISES: CPT | Mod: GP | Performed by: PHYSICAL THERAPIST

## 2021-01-19 PROCEDURE — 97112 NEUROMUSCULAR REEDUCATION: CPT | Mod: GP | Performed by: PHYSICAL THERAPIST

## 2021-01-21 ENCOUNTER — TRANSFERRED RECORDS (OUTPATIENT)
Dept: HEALTH INFORMATION MANAGEMENT | Facility: CLINIC | Age: 66
End: 2021-01-21

## 2021-01-26 ENCOUNTER — HOSPITAL ENCOUNTER (OUTPATIENT)
Dept: PHYSICAL THERAPY | Facility: CLINIC | Age: 66
Setting detail: THERAPIES SERIES
End: 2021-01-26
Attending: INTERNAL MEDICINE
Payer: MEDICARE

## 2021-01-26 PROCEDURE — 97112 NEUROMUSCULAR REEDUCATION: CPT | Mod: GP | Performed by: PHYSICAL THERAPIST

## 2021-01-26 PROCEDURE — 97116 GAIT TRAINING THERAPY: CPT | Mod: GP | Performed by: PHYSICAL THERAPIST

## 2021-01-26 PROCEDURE — 97110 THERAPEUTIC EXERCISES: CPT | Mod: GP | Performed by: PHYSICAL THERAPIST

## 2021-02-02 ENCOUNTER — HOSPITAL ENCOUNTER (OUTPATIENT)
Dept: PHYSICAL THERAPY | Facility: CLINIC | Age: 66
Setting detail: THERAPIES SERIES
End: 2021-02-02
Attending: INTERNAL MEDICINE
Payer: MEDICARE

## 2021-02-02 ENCOUNTER — TELEPHONE (OUTPATIENT)
Dept: PEDIATRICS | Facility: CLINIC | Age: 66
End: 2021-02-02

## 2021-02-02 DIAGNOSIS — G81.94 LEFT HEMIPARESIS (H): Primary | ICD-10-CM

## 2021-02-02 DIAGNOSIS — S06.9X9S TRAUMATIC BRAIN INJURY WITH LOSS OF CONSCIOUSNESS, SEQUELA (H): ICD-10-CM

## 2021-02-02 PROCEDURE — 97110 THERAPEUTIC EXERCISES: CPT | Mod: GP | Performed by: PHYSICAL THERAPIST

## 2021-02-02 PROCEDURE — 97112 NEUROMUSCULAR REEDUCATION: CPT | Mod: GP | Performed by: PHYSICAL THERAPIST

## 2021-02-02 NOTE — TELEPHONE ENCOUNTER
No referrals were ever placed, were only [facility] for [facility]. Because patient was previously established, no new referrals were likely needed at time of encounter. RN has cancelled/removed pended referrals from the 12/23 encounter and closed the encounter.    Received message that OT is now needing new referral to continue care for patient. New referral abdulkadir'd up and signed per standing order for Don Aviles MD to co-sign.

## 2021-02-02 NOTE — TELEPHONE ENCOUNTER
----- Message from Mirna Ramirez sent at 2/2/2021 10:40 AM CST -----  Regarding: Occupational therapy order  Hello,    I have received a request from this patients physical therapist to schedule OT from the order placed on 12/23/2020.   However, I am not able to schedule with an RN signature:  see below.    Hello,  We received an order for the above patient for therapy. Per order requirements, therapy orders are required to be signed by a physician or non-physician practitioners. This order is currently signed by an RN and we are unable to accept it.   Please have the order co-signed by a physician or NPP, or the RN can place the order in a co-signature mode.  Thank you for your help and understanding!  Sincerely, the Rehab Central Scheduling Team

## 2021-02-04 ENCOUNTER — TELEPHONE (OUTPATIENT)
Dept: PEDIATRICS | Facility: CLINIC | Age: 66
End: 2021-02-04

## 2021-02-04 DIAGNOSIS — G81.94 LEFT HEMIPARESIS (H): ICD-10-CM

## 2021-02-04 DIAGNOSIS — S06.9X9S TRAUMATIC BRAIN INJURY WITH LOSS OF CONSCIOUSNESS, SEQUELA (H): Primary | ICD-10-CM

## 2021-02-09 ENCOUNTER — HOSPITAL ENCOUNTER (OUTPATIENT)
Dept: PHYSICAL THERAPY | Facility: CLINIC | Age: 66
Setting detail: THERAPIES SERIES
End: 2021-02-09
Attending: INTERNAL MEDICINE
Payer: MEDICARE

## 2021-02-09 PROCEDURE — 97116 GAIT TRAINING THERAPY: CPT | Mod: GP | Performed by: PHYSICAL THERAPIST

## 2021-02-09 PROCEDURE — 97110 THERAPEUTIC EXERCISES: CPT | Mod: GP | Performed by: PHYSICAL THERAPIST

## 2021-02-09 PROCEDURE — 97112 NEUROMUSCULAR REEDUCATION: CPT | Mod: GP | Performed by: PHYSICAL THERAPIST

## 2021-02-16 ENCOUNTER — HOSPITAL ENCOUNTER (OUTPATIENT)
Dept: PHYSICAL THERAPY | Facility: CLINIC | Age: 66
Setting detail: THERAPIES SERIES
End: 2021-02-16
Attending: INTERNAL MEDICINE
Payer: MEDICARE

## 2021-02-16 PROCEDURE — 97110 THERAPEUTIC EXERCISES: CPT | Mod: GP | Performed by: PHYSICAL THERAPIST

## 2021-02-16 PROCEDURE — 97112 NEUROMUSCULAR REEDUCATION: CPT | Mod: GP | Performed by: PHYSICAL THERAPIST

## 2021-02-16 PROCEDURE — 97116 GAIT TRAINING THERAPY: CPT | Mod: GP | Performed by: PHYSICAL THERAPIST

## 2021-02-23 ENCOUNTER — HOSPITAL ENCOUNTER (OUTPATIENT)
Dept: PHYSICAL THERAPY | Facility: CLINIC | Age: 66
Setting detail: THERAPIES SERIES
End: 2021-02-23
Attending: INTERNAL MEDICINE
Payer: MEDICARE

## 2021-02-23 PROCEDURE — 97112 NEUROMUSCULAR REEDUCATION: CPT | Mod: GP | Performed by: PHYSICAL THERAPIST

## 2021-02-23 PROCEDURE — 97110 THERAPEUTIC EXERCISES: CPT | Mod: GP | Performed by: PHYSICAL THERAPIST

## 2021-02-23 PROCEDURE — 97116 GAIT TRAINING THERAPY: CPT | Mod: GP | Performed by: PHYSICAL THERAPIST

## 2021-03-02 ENCOUNTER — HOSPITAL ENCOUNTER (OUTPATIENT)
Dept: PHYSICAL THERAPY | Facility: CLINIC | Age: 66
Setting detail: THERAPIES SERIES
End: 2021-03-02
Attending: INTERNAL MEDICINE
Payer: MEDICARE

## 2021-03-02 PROCEDURE — 97140 MANUAL THERAPY 1/> REGIONS: CPT | Mod: GP | Performed by: PHYSICAL THERAPIST

## 2021-03-02 PROCEDURE — 97116 GAIT TRAINING THERAPY: CPT | Mod: GP | Performed by: PHYSICAL THERAPIST

## 2021-03-02 PROCEDURE — 97112 NEUROMUSCULAR REEDUCATION: CPT | Mod: GP | Performed by: PHYSICAL THERAPIST

## 2021-03-02 PROCEDURE — 97110 THERAPEUTIC EXERCISES: CPT | Mod: GP | Performed by: PHYSICAL THERAPIST

## 2021-03-09 ENCOUNTER — HOSPITAL ENCOUNTER (OUTPATIENT)
Dept: OCCUPATIONAL THERAPY | Facility: CLINIC | Age: 66
Setting detail: THERAPIES SERIES
End: 2021-03-09
Attending: INTERNAL MEDICINE
Payer: MEDICARE

## 2021-03-09 ENCOUNTER — HOSPITAL ENCOUNTER (OUTPATIENT)
Dept: PHYSICAL THERAPY | Facility: CLINIC | Age: 66
Setting detail: THERAPIES SERIES
End: 2021-03-09
Attending: INTERNAL MEDICINE
Payer: MEDICARE

## 2021-03-09 ENCOUNTER — MYC MEDICAL ADVICE (OUTPATIENT)
Dept: PEDIATRICS | Facility: CLINIC | Age: 66
End: 2021-03-09

## 2021-03-09 DIAGNOSIS — G81.94 LEFT HEMIPARESIS (H): ICD-10-CM

## 2021-03-09 DIAGNOSIS — S06.9X9S TRAUMATIC BRAIN INJURY WITH LOSS OF CONSCIOUSNESS, SEQUELA (H): ICD-10-CM

## 2021-03-09 PROCEDURE — 97112 NEUROMUSCULAR REEDUCATION: CPT | Mod: GP | Performed by: PHYSICAL THERAPIST

## 2021-03-09 PROCEDURE — 97535 SELF CARE MNGMENT TRAINING: CPT | Mod: GO | Performed by: OCCUPATIONAL THERAPIST

## 2021-03-09 PROCEDURE — 97110 THERAPEUTIC EXERCISES: CPT | Mod: GP | Performed by: PHYSICAL THERAPIST

## 2021-03-09 PROCEDURE — 97116 GAIT TRAINING THERAPY: CPT | Mod: GP | Performed by: PHYSICAL THERAPIST

## 2021-03-09 PROCEDURE — 97166 OT EVAL MOD COMPLEX 45 MIN: CPT | Mod: GO | Performed by: OCCUPATIONAL THERAPIST

## 2021-03-10 NOTE — PROGRESS NOTES
03/09/21 1800   Quick Adds   Quick Adds Certification   Type of Visit Initial Outpatient Occupational Therapy Evaluation   General Information   Start Of Care Date 03/09/21   Referring Physician Don Aviles MD   Orders Evaluate and treat as indicated   Other Orders Pt started botox again with new Provider Dr. Haddad/Baltazar Shaffer, who suggested his contractures in L shoulder would benefit from return for further OT.     Orders Date 02/04/21   Medical Diagnosis Traumatic brain injury with loss of consciousness, sequela.   (Initial onset April 20,2012)   Onset of Illness/Injury or Date of Surgery 02/04/21  (MD referral date, ongonig symptoms all of 2020.)   Surgical/Medical History Reviewed Yes   Additional Occupational Profile Info/Pertinent History of Current Problem Marcus is familiar to/with this writer from previous OT in 2019.  He presents with orders as above and goals for increasing AROM, updating HEP and use of L UE as able Marcus's most recent medical history was needing 2 surgeries for kidney stone removal and stenting.  His bowel and bladder care are going much better after his 10/2/20 -10/4/20 hospital stay. Lex also  has a history of  an atrial septal defect originally repaired back in 1978 and went back for a second surgery 9 months later for mitral valve repair and additional repair of his atrial septal defect.  In 2004, he had atrial flutter and that was treated with ablation.  He went off of anticoagulation and had no problems until 4/22/2012 when he suffered a large right-sided stroke due to atrial fibrillation with thromboembolus.  He continues to have fairly severe left-sided hemiparesis with spasticity and contractures.   He has had R knee pain presumably from greater loading and use on this side and is seeing physical therapy for this.  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 He has  a  history of recurrent UTI's and has a history of depression.   Role/Living Environment   Patient role/Employment history Retired  (,  at [employer])   Community/Avocational Activities ,  at [employer]   Current Living Environment House   Home/Community Accessibility Comments split level,Shower/tub chair;Extended tub bench;Commode over toilet   Prior Level - Transfers Assistive equipment and person  (treker pole, wife with him for steps, uneven surfaces.)   Prior Level - Ambulation Assitive equipment and person  (treker pole, wife with him for steps, uneven surfaces.)   Prior Level - ADLS Assitive equipment and person   Prior Level Comments Pt's wife retired in 2019 and now does all of his assist with ADL's (bath), ROM HEP and participation in work outs at the gym/home, IADLs including transportation. (in 2016, reported patient did dishes, microwave meals, folding light laundry - didn't address this date if he is still completing these tasks).    Patient/family Goals Statement To get help with L UE contractures, updating HEP for more self directed tasks,  increased hand use.    Pain   Pain comments Nightly pain , taking Tylenol for R knee pain, L hand/wrist pain ( splint gets tight at night 4-5/10, some neck and L sided trunk pain, rating 3/10)   Fall Risk Screen   Fall screen completed by PT   Fall screen comments seeing PT currently for balance/gait   Cognitive Status Examination   Orientation Orientation to person, place and time   Follows Commands and Answers Questions Able to follow single-step instructions;100% of the time   Personal Safety and Judgment Intact   Visual Perception   Visual Field From Dec 2018 evaluation--Has L field cut, he reports primarily in LLQ; plan to further assess.  Used periometer to screen visual field in left eye: Nasal visual field ~55 degrees and temporal/peripheral visual field appears to be about 30 degrees -BNLs (numerous trials  completed for temporal field with patient inconsistent in responses and patient having difficulty keeping his gaze on target).    Visual Perception Comments ADpted vision stratgeies addressed wtih vsion OT in 2019.    Range of Motion (ROM)   ROM Comments LUE PROM limited by tone /spasticity-- Scap upward rotation limited to 30 of 60 dgrees. SH FL to 108 of 180, SH ABD to 120 of 180, ER to 30 of 45 degrees. EL FL to 125 of 140 ( hard springy end feel). EL EX limited to 105 of 180, but with moderate/maixal resist, can get to 175 of 180.  WR FL with digits in fist to 70, ER EX to 70 (digits in fist). WIth digits extended gets to 20 dgrees WR EX , WR FL.  Supination to 10 PROM/tone, with moderate pressure, gets to 25 of 90 dgrees.   MCP extension, lacking 40 of 90.  Trunk rotation L 60 of 60, R 30 of 60  (limited at t8=10 L ant/leteral ribs and R posterior t8/10 ribs,  Side bend R to 20 of 45 ( L QL, Latissimus.paraspinals rigid) and L to 35 of 45.  L vance rests in intrinsic  minus/hooked posture with  TH  abd to 45 degrees. Stretching 5 days per week by wife for SH FL/ABD/ER, EL EX, supination, WR/finger EX.    (At 2018 eval shoulder PROM flexion 100 degrees with tabletop stretch.  At 2019 d/c improved to  L SH FL to 120 degrees PROM in sitting and 150 degrees in supine.)   Strength   Strength Comments Applies moderate resistance with all of these planes--60 degrees abduction, 20 degrees flexion with upper extremity in abduction (compensating and unable to avoid), 30 degrees shoulder extension with shoulder in abduction; SH IR to 50  degrees of 80.  SH ER-unable. scapular retraction and elevation-- trace.     Hand Strength   Hand Dominance Right   Left Hand  (pounds) 5 pounds   Right Hand  (pounds) 66 pounds   Left Lateral Pinch (pounds) 1 pounds   Right Lateral Pinch (pounds) 17 pounds   Left Three Point Pinch (pounds) 0 pounds   Right Three Point Pinch (pounds) 16 pounds   Hand Strength Comments R,L  are  similar to Dec 2019 d/c. L key pinch same as last d/c.  L Unable to sustain 3 pt position.   R key pinch  improved by 3# and 3 pt by 3#.   Coordination   Upper Extremity Coordination Left UE impaired   Functional Limitations Object transport impaired;Reach to targets impaired;Fine motor ADL performance impaired;Impaired ability to perform bilateral tasks   Coordination Comments Pt able to move his L hand  from lap to hip to rib side, assists and applies resistance with repositioning and rolling.   Functional Mobility   Ambulation trekker pole wtih close CGA on uneven /stair surfaces   Transfer Skill   Level of [place]: Transfers stand-by assist   Bathing   Level of [place] - Bathing moderate assist (50% patients effort)   Assistive Device shower chair;tub rails   Upper Body Dressing   Level of [place]: Dress Upper Body stand-by assist   Upper Body Dressing Comments all R handed   Lower Body Dressing   Level of [place]: Dress Lower Body maximum assist (25% patients effort)   Toileting   Level of [place]: Toilet minimum assist (75% patients effort)   Toileting Comments max assist with clothing mgmt and occsaional min A isael care aftert BM.  Max a with o=cndom catheter.  Deos do some breiefs when going without condom cath after shower days/skin breaks.     Grooming   Level of [place]: Grooming independent   Grooming Comments all R handed   Eating/Self-Feeding   Level of [place]: Eating independent   Eating/Self Feeding Comments all R handed   Activity Tolerance   Activity Tolerance Wakes at 3-4 am to remove splint custom WHFO with velcro straps, pain 4-5/10.     Planned Therapy Interventions   Planned Therapy Interventions ADL training;IADL training;Coordination training;Joint mobilization;Manual therapy;Neuromuscular re-education;ROM;Self care/Home management;Strengthening;Stretching;Therapeutic activities   Adult OT Eval Goals   OT Eval Goals (Adult) 1;2;3    OT Goal 1   Goal  Identifier HEP   Goal Description Pt/wife to demonstrate independence with HEP tasks to  support ROM, weightbearing, hand use, NMES and tone management strategies for addressing LUE pain/contractures and increase functional strength and ROM for maximum independence with performance of ADLs as gross assist and gross stabilizer.    Target Date 06/07/21    OT Goal 2   Goal Identifier Hand use   Goal Description Patient to demonstrate functional tasks (wiping the counter/table, washing dishes, etc.) with at least 15% use of L UE as gross stabilizer/gross assist for increased ADL/IADL independence and improved functional use of L UE.   Target Date 06/07/21    OT Goal 3   Goal Identifier UE AROM/SROM   Goal Description Pt to demonstrate improved SROM/AAROM of shoulder flexion/abdbustion to 150 degrees, scpaular rotation to 45 degrees, elbow extension to lacking 30 dgrees, supination to 60 fo 90 degrees, wrist extension/wrist flexion to 50 of 70 degrees with hand open to support funcational hand use in varied positions and reduced pain at night/improved sleep pattern.   Target Date 06/07/21   Clinical Impression   Criteria for Skilled Therapeutic Interventions Met Yes, treatment indicated   OT Diagnosis Decreased ADL/IADL, contractures, pain   Influenced by the following impairments contractures, pain, needing new home program updates for more independence.     Identified Performance Deficits no tusing L hand as stabilzer in  or pinch during daily tasks, loses sleep per pain.     Clinical Decision Making (Complexity) Moderate complexity   Therapy Frequency 1x/week until next botox 4/28/21, then 2x/week x 2 weeks and reduce to 1x/ week every 2 weeks until next botox and reassess.   Risks and Benefits of Treatment have been explained. Yes   Patient, Family & other staff in agreement with plan of care Yes   Clinical Impression Comments Strengths are his physical and social support system, his perseverance in wanting to  keep improving while in session.  Barriers to progress include high levels of spasticity tone with contractures and compensatory movement patterns that make it harder for him to move his left upper extremity freely.  In the past has benefited from soft tissue mobilization and  exercise. Has needed moderate encouragement to increase use of his left upper extremity in functional patterns.   Education Assessment   Barriers To Learning Visual   Preferred Learning Style Listening;Reading;Demonstration;Pictures/video   Therapy Certification   Certification date from 03/09/21   Certification date to 06/07/21   Certification I certify the need for these services furnished under this plan of treatment and while under my care.  (Physician co-signature of this document indicates review and certification of the therapy plan)   Total Evaluation Time   OT Milind, Moderate Complexity Minutes (84998) 30     Thank you for this thoughtful referral! If you have any questions, please call me at [phone].  Nice to share in this patient's care with you.    Sincerely,   Ada Enrique, OTR/l

## 2021-03-10 NOTE — PROGRESS NOTES
[unfilled]                                                                               [facility]          OUTPATIENT OCCUPATIONAL THERAPY  EVALUATION  PLAN OF TREATMENT FOR OUTPATIENT REHABILITATION  (COMPLETE FOR INITIAL CLAIMS ONLY)  Patient's Last Name, First Name, M.I.  YOB: 1955  MendelfawadMarcus TORRES                        Provider's Name  [facility] Medical Record No.  [id number]                               Onset Date:     02/04/21(MD referral date, ongonig symptoms all of 2020.)   Start of Care Date:     03/09/21   Type:     ___PT   _X_OT   ___SLP Medical Diagnosis:     Traumatic brain injury with loss of consciousness, sequela. (Initial onset April 20,2012)                          OT Diagnosis:     Decreased ADL/IADL, contractures, pain Visits from SOC:  1   _________________________________________________________________________________  Plan of Treatment/Functional Goals:  ADL training, IADL training, Coordination training, Joint mobilization, Manual therapy, Neuromuscular re-education, ROM, Self care/Home management, Strengthening, Stretching, Therapeutic activities                    Goals  Goal Identifier: HEP  Goal Description: Pt/wife to demonstrate independence with HEP tasks to  support ROM, weightbearing, hand use, NMES and tone management strategies for addressing LUE pain/contractures and increase functional strength and ROM for maximum independence with performance of ADLs as gross assist and gross stabilizer.   Target Date: 06/07/21     Goal Identifier: Hand use  Goal Description: Patient to demonstrate functional tasks (wiping the counter/table, washing dishes, etc.) with at least 15% use of L UE as gross stabilizer/gross assist for increased ADL/IADL independence and improved functional use of L UE.  Target Date: 06/07/21     Goal Identifier: UE AROM/SROM  Goal Description: Pt to demonstrate improved SROM/AAROM  of shoulder flexion/abdbustion to 150 degrees, scpaular rotation to 45 degrees, elbow extension to lacking 30 dgrees, supination to 60 fo 90 degrees, wrist extension/wrist flexion to 50 of 70 degrees with hand open to support funcational hand use in varied positions and reduced pain at night/improved sleep pattern.  Target Date: 06/07/21              Therapy Frequency: 1x/week until next botox 4/28/21, then 2x/week x 2 weeks and reduce to 1x/ week every 2 weeks until next botox and reassess.        Ada Enrique, OTR          I CERTIFY THE NEED FOR THESE SERVICES FURNISHED UNDER        THIS PLAN OF TREATMENT AND WHILE UNDER MY CARE     (Physician co-signature of this document indicates review and certification of the therapy plan).                 ,    Certification date from: 03/09/21, Certification date to: 06/07/21               Referring Physician: Don Aviles MD     Initial Assessment        See Epic Evaluation      Start Of Care Date: 03/09/21

## 2021-03-11 ENCOUNTER — TELEPHONE (OUTPATIENT)
Dept: PEDIATRICS | Facility: CLINIC | Age: 66
End: 2021-03-11

## 2021-03-11 NOTE — TELEPHONE ENCOUNTER
Called patient and offered to schedule him and his wife at the [facility] location but they were not interested in going to that location. They would like us to contact them next week when we have a schedule for [facility] or [facility].

## 2021-03-11 NOTE — TELEPHONE ENCOUNTER
Please call pt.   He does qualify for COVID shot based on new criteria.   Please schedule pt for covid vaccine  His wife also qualifies as a care giver--both can be scheduled for the shot

## 2021-03-16 ENCOUNTER — HOSPITAL ENCOUNTER (OUTPATIENT)
Dept: PHYSICAL THERAPY | Facility: CLINIC | Age: 66
Setting detail: THERAPIES SERIES
End: 2021-03-16
Attending: INTERNAL MEDICINE
Payer: MEDICARE

## 2021-03-16 PROCEDURE — 97112 NEUROMUSCULAR REEDUCATION: CPT | Mod: GP | Performed by: PHYSICAL THERAPIST

## 2021-03-16 PROCEDURE — 97116 GAIT TRAINING THERAPY: CPT | Mod: GP | Performed by: PHYSICAL THERAPIST

## 2021-03-16 PROCEDURE — 97110 THERAPEUTIC EXERCISES: CPT | Mod: GP | Performed by: PHYSICAL THERAPIST

## 2021-03-17 NOTE — PROGRESS NOTES
PT PROGRESS NOTE     03/16/21 0800   Signing Clinician's Name / Credentials   Signing clinician's name / credentials Ada Scott, PT   Session Number   Session Number 10/10   Progress Note/Recertification   Recertification Due Date 03/21/21   Goal 3   Goal Identifier 3-  Curb/stairs  (as below, apprehensive without rail at steps)   Goal Description Marcus to have increased disassociation of movement/improved timing and sequencing of muscle activation for improved hip and knee flexion in standing demonstrated by the ability to step over upright shabana (4-6 inch object) with the L LE to allow him to step up onto a curb without catching foot.   Target Date 03/21/21   Goal 4   Goal Identifier 4- Gait  (met inconsistently, improving)   Goal Description Marcus to increase gait speed to 19 seconds or less with AD and 24 steps or less to demonstrate improved gait speed and efficiency for community gait and also decreased overuse of the R side.    Target Date 03/21/21   Goal 5   Goal Identifier 5- Floor transfers.   (not tested this PN time frame)   Goal Description Marcus to perform transfer to and from floor with outside UE support and min assist of 1 to allow him to recover from a fall with assist from wife or caregiver.    Target Date 03/21/21   Goal 6   Goal Identifier 6 --  4 square  (improved time and foot clearance B, espec L)   Goal Description Marcus to complete 4 square in 25 seconds with and AD and 40 seconds or less without an AD to demonstrate improved interlimb coordination, balance and body control to assist in meeting all goals and overall function.    Target Date 03/21/21   Goal 7   Goal Identifier HEP   Goal Description Marcus to be independent in appropriate HEP to continue to address his impairments following d/c from skilled PT intervention.   (in progress)   Target Date 03/21/21   Goal 8   Goal Identifier 8 functional reach/neuro motor control  (improving diss of movement, not able to maintain L hand Ind)    Goal Description Marcus to perform reaching to the floor to the left to pick object up off the floor while maintaining L hand contact/closed chain on the mat to demonstrate impropved neuromotor planning, ability to bring COM to the left while keeping proper muscle activation/deactivation L and R side of body for greater safety with reaching activiites to the floor, unstrapping shoe, AFO.  Also for carryover of proper motor function for improved gait and transfers.    Target Date 03/21/21   Subjective Report   Subjective Report wife reports new bruise on the ankle L foot.     Objective Measure 1   Objective Measure 25 foot walk   Details 3/16/21 21.46  24 steps.  3/2/21  at start of session 22.69 sec and 26 steps , at end of session 20.3 sec, 19.89 sec with trekking pole 21 steps vc for longer step. 1/12/21  24.69 sec and 24.81 sec 24-26 steps. trekking pole.  AFO on the L. 9/1/2020 20.45 and 21.5 sec both with trekking pole , AFO on,  24 steps with each   Objective Measure 2   Objective Measure 4 square   Details 3/16/2021 20.66 over tape with trekkign pole, without trekking pole 36.34 sec.   Stepping over cane/poles CGA with trekking pole and 53.53 sec, with improved clearance of the L LE over the poles. 9/1/2020  30.43 sec first trial over tape and pt stopping  /needing cues on direction adding to the time.  With cues as performed completed it in 19.63 sec.  Both with trekking pole, AFO on. SBA  Feet would not have cleared the pole if was stepping over that instead of tape.    Objective Measure 3   Objective Measure curb/step over shabana   Details 3/16/21 with trekking pole step over the shabana but knocks it down, closer to clearing the shabana than in the past  .  Attempted stepping up 4 inch step with trekking pole without rail - pt leans into wall on the R and grabbed for rail. 1/12/21 initiates stepping over upright shabana R and L foot but kicks it over.  6/30/2020 not able to step over flat shabana clearing  iwth the L   Objective Measure 4   Objective Measure alternating tapping - decreased coordinatino knee and hip flexion/ext on the L, UE support on handrail.    Details 5 reps in 30 sec with close SBA intermittent touch to the rail   Therapeutic Procedure/exercise   Therapeutic Procedures: strength, endurance, ROM, flexibillity minutes (11634) 16   Skilled Intervention Manual stretches   Patient Response No issues, cues to relax with stretch to right hamstrings   Treatment Detail Patient supine and stretches to bilateral lower extremities including gastrocs, midfoot on the left, hamstrings, knee and hip extension with overpressure ASIS and lower femur, rib cage and bilateral anterior chest wall.   Progress Working towards all goals.  Patient is not able to receive an adequate stretch on his own or with assist through his wife.   Neuromuscular Re-education   Neuromuscular re-ed of mvmt, balance, coord, kinesthetic sense, posture, proprioception minutes (55030) 17   Skilled Intervention Assessment of testing, assessment of alignment in standing and with gait to address bruising on the left lateral malleoli and lateral fifth metatarsal.   Patient Response In standing patient rotates his pelvis posteriorly to the right in the transverse plane leading to greater load on the lateral side of his left foot, with gait he also holds this position with greater load to the lateral side of his foot.  With the AFO there is slight play in the forefoot and his foot may be sliding and bumping up to the side of the AFO.   Treatment Detail Assessment of 4 square as noted above, alternating toe tap to 4 inch step.  As above.  Stepping over right shabana also as above.   Progress 4 square times as above.  Patient demonstrates increased ability to clear his left foot over the cold/canes with the fourscore test.  He has better disassociation of movement between his joints of the lower extremities.  He is less apprehensive with neuro  challenges apparent with less over activation throughout his whole body as well as ability to keep a strong voice.  In the past he would start to whisper, restrict his voice when he was challenged.   Gait Training   Gait Training Minutes, includes stair climbing (93150) 10   Skilled Intervention As noted   Patient Response No issues   Treatment Detail Gait with trekking pole bilateral shoes and AFO on the left, 25 foot walk assessed, gait training with focus on continuation of forward center mass alignment of pelvis and trunk in transverse and sagittal plane.   Progress Times fluctuate with 25 foot walk.  Patient continues with three-point gait.  There are times when he is able to go into more of a 2.8 pattern advancing the trekking pole with the left lower extremity.   Education   Learner Patient;Significant Other   Readiness Acceptance   Method Explanation;Demonstration   Response Verbalizes Understanding   Education Comments AFO fit and bruising on the foot, contact his prosthetists to set up an appointment to see if there adjustments are needed.  Education to return to doing the standing out his shoes or brace on a stretch to his forefoot and ankle as well as increased sensation/proprioception to the foot on the left especially.   Plan   Home program As noted in education   Plan for next session Continue with skilled PT intervention addressing alignment/structure posture for optimal muscle function, address neuromotor coordination and disassociation between joints with focus on trunk and pelvis this time.   Comments   Comments Progress note-Marcus continues to make progression with his function, he has not been able to attend his community center for exercise due to Covid, he has demonstrated improved function since resuming PT post Covid restrictions.  He continues to progress but expect this to be small due to his visual deficits with blindness in the right eye and left hemianopsia and significance of his CVA.   He is without questions and wishes to continue with skilled PT intervention at this time.  All goals remain appropriate and timeline will be advanced per Medicare recertification.   Total Session Time   Timed Code Treatment Minutes 43   Total Treatment Time (sum of timed and untimed services) 43

## 2021-03-22 ENCOUNTER — IMMUNIZATION (OUTPATIENT)
Dept: NURSING | Facility: CLINIC | Age: 66
End: 2021-03-22
Payer: MEDICARE

## 2021-03-22 PROCEDURE — 0001A PR COVID VAC PFIZER DIL RECON 30 MCG/0.3 ML IM: CPT

## 2021-03-22 PROCEDURE — 91300 PR COVID VAC PFIZER DIL RECON 30 MCG/0.3 ML IM: CPT

## 2021-03-23 ENCOUNTER — HOSPITAL ENCOUNTER (OUTPATIENT)
Dept: PHYSICAL THERAPY | Facility: CLINIC | Age: 66
Setting detail: THERAPIES SERIES
End: 2021-03-23
Attending: INTERNAL MEDICINE
Payer: MEDICARE

## 2021-03-23 ENCOUNTER — HOSPITAL ENCOUNTER (OUTPATIENT)
Dept: OCCUPATIONAL THERAPY | Facility: CLINIC | Age: 66
Setting detail: THERAPIES SERIES
End: 2021-03-23
Attending: INTERNAL MEDICINE
Payer: MEDICARE

## 2021-03-23 PROCEDURE — 97112 NEUROMUSCULAR REEDUCATION: CPT | Mod: GP | Performed by: PHYSICAL THERAPIST

## 2021-03-23 PROCEDURE — 97140 MANUAL THERAPY 1/> REGIONS: CPT | Mod: GO | Performed by: OCCUPATIONAL THERAPIST

## 2021-03-23 PROCEDURE — 97110 THERAPEUTIC EXERCISES: CPT | Mod: GP | Performed by: PHYSICAL THERAPIST

## 2021-03-23 PROCEDURE — 97116 GAIT TRAINING THERAPY: CPT | Mod: GP | Performed by: PHYSICAL THERAPIST

## 2021-03-23 PROCEDURE — 97112 NEUROMUSCULAR REEDUCATION: CPT | Mod: GO | Performed by: OCCUPATIONAL THERAPIST

## 2021-03-24 NOTE — PROGRESS NOTES
Rehabilitation Services      OUTPATIENT PHYSICAL THERAPY  PLAN OF TREATMENT FOR OUTPATIENT REHABILITATION    Patient's Last Name, First Name, M.I.                YOB: 1955  Marcus Hodges                        Provider's Name  Ada Scott, PT Medical Record No.  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     Frequency/Duration: 1 x a week x 90 days     Goals:  See PN dated 3/16/2021    All goals remain appropriate    Certification date from 3/22/21 to 6/21/21 .    Ada Scott, PT          I CERTIFY THE NEED FOR THESE SERVICES FURNISHED UNDER        THIS PLAN OF TREATMENT AND WHILE UNDER MY CARE     (Physician co-signature of this document indicates review and certification of the therapy plan).                Referring Provider: Dr. Don Aviles

## 2021-03-30 ENCOUNTER — HOSPITAL ENCOUNTER (OUTPATIENT)
Dept: OCCUPATIONAL THERAPY | Facility: CLINIC | Age: 66
Setting detail: THERAPIES SERIES
End: 2021-03-30
Attending: INTERNAL MEDICINE
Payer: MEDICARE

## 2021-03-30 ENCOUNTER — HOSPITAL ENCOUNTER (OUTPATIENT)
Dept: PHYSICAL THERAPY | Facility: CLINIC | Age: 66
Setting detail: THERAPIES SERIES
End: 2021-03-30
Attending: INTERNAL MEDICINE
Payer: MEDICARE

## 2021-03-30 PROCEDURE — 97112 NEUROMUSCULAR REEDUCATION: CPT | Mod: GP | Performed by: PHYSICAL THERAPIST

## 2021-03-30 PROCEDURE — 97112 NEUROMUSCULAR REEDUCATION: CPT | Mod: GO | Performed by: OCCUPATIONAL THERAPIST

## 2021-03-30 PROCEDURE — 97110 THERAPEUTIC EXERCISES: CPT | Mod: GP | Performed by: PHYSICAL THERAPIST

## 2021-04-06 ENCOUNTER — HOSPITAL ENCOUNTER (OUTPATIENT)
Dept: OCCUPATIONAL THERAPY | Facility: CLINIC | Age: 66
Setting detail: THERAPIES SERIES
End: 2021-04-06
Attending: INTERNAL MEDICINE
Payer: MEDICARE

## 2021-04-06 ENCOUNTER — HOSPITAL ENCOUNTER (OUTPATIENT)
Dept: PHYSICAL THERAPY | Facility: CLINIC | Age: 66
Setting detail: THERAPIES SERIES
End: 2021-04-06
Attending: INTERNAL MEDICINE
Payer: MEDICARE

## 2021-04-06 PROCEDURE — 97140 MANUAL THERAPY 1/> REGIONS: CPT | Mod: GO | Performed by: OCCUPATIONAL THERAPIST

## 2021-04-06 PROCEDURE — 97112 NEUROMUSCULAR REEDUCATION: CPT | Mod: GO | Performed by: OCCUPATIONAL THERAPIST

## 2021-04-06 PROCEDURE — 97112 NEUROMUSCULAR REEDUCATION: CPT | Mod: GP | Performed by: PHYSICAL THERAPIST

## 2021-04-06 PROCEDURE — 97110 THERAPEUTIC EXERCISES: CPT | Mod: GP | Performed by: PHYSICAL THERAPIST

## 2021-04-06 PROCEDURE — 97140 MANUAL THERAPY 1/> REGIONS: CPT | Mod: GP | Performed by: PHYSICAL THERAPIST

## 2021-04-06 PROCEDURE — 97116 GAIT TRAINING THERAPY: CPT | Mod: GP | Performed by: PHYSICAL THERAPIST

## 2021-04-09 ENCOUNTER — TELEPHONE (OUTPATIENT)
Dept: PEDIATRICS | Facility: CLINIC | Age: 66
End: 2021-04-09

## 2021-04-09 NOTE — TELEPHONE ENCOUNTER
Forms/Letter Request    Name of form/letter: form in envelope-sealed    Have you been seen for this request: N/A    Do we have the form/letter: Yes    When is form/letter needed by: soon    How would you like the form/letter returned: Fax to # on form;  And;  Mail original to pt at home address:[address] [phone]    Patient Notified form requests are processed in 3-5 business days:Yes    Okay to leave a detailed message? Yes Home number on file [phone] (home)    Thank you.  mansi

## 2021-04-12 ENCOUNTER — IMMUNIZATION (OUTPATIENT)
Dept: NURSING | Facility: CLINIC | Age: 66
End: 2021-04-12
Attending: INTERNAL MEDICINE
Payer: MEDICARE

## 2021-04-12 PROCEDURE — 91300 PR COVID VAC PFIZER DIL RECON 30 MCG/0.3 ML IM: CPT

## 2021-04-12 PROCEDURE — 0002A PR COVID VAC PFIZER DIL RECON 30 MCG/0.3 ML IM: CPT

## 2021-04-13 ENCOUNTER — HOSPITAL ENCOUNTER (OUTPATIENT)
Dept: OCCUPATIONAL THERAPY | Facility: CLINIC | Age: 66
Setting detail: THERAPIES SERIES
End: 2021-04-13
Attending: INTERNAL MEDICINE
Payer: MEDICARE

## 2021-04-13 ENCOUNTER — HOSPITAL ENCOUNTER (OUTPATIENT)
Dept: PHYSICAL THERAPY | Facility: CLINIC | Age: 66
Setting detail: THERAPIES SERIES
End: 2021-04-13
Attending: INTERNAL MEDICINE
Payer: MEDICARE

## 2021-04-13 PROCEDURE — 97110 THERAPEUTIC EXERCISES: CPT | Mod: GP | Performed by: PHYSICAL THERAPIST

## 2021-04-13 PROCEDURE — 97140 MANUAL THERAPY 1/> REGIONS: CPT | Mod: GO | Performed by: OCCUPATIONAL THERAPIST

## 2021-04-13 PROCEDURE — 97112 NEUROMUSCULAR REEDUCATION: CPT | Mod: GP | Performed by: PHYSICAL THERAPIST

## 2021-04-13 PROCEDURE — 97116 GAIT TRAINING THERAPY: CPT | Mod: GP | Performed by: PHYSICAL THERAPIST

## 2021-04-20 ENCOUNTER — HOSPITAL ENCOUNTER (OUTPATIENT)
Dept: OCCUPATIONAL THERAPY | Facility: CLINIC | Age: 66
Setting detail: THERAPIES SERIES
End: 2021-04-20
Attending: INTERNAL MEDICINE
Payer: MEDICARE

## 2021-04-20 ENCOUNTER — HOSPITAL ENCOUNTER (OUTPATIENT)
Dept: PHYSICAL THERAPY | Facility: CLINIC | Age: 66
Setting detail: THERAPIES SERIES
End: 2021-04-20
Attending: INTERNAL MEDICINE
Payer: MEDICARE

## 2021-04-20 DIAGNOSIS — Z29.89 SEIZURE PROPHYLAXIS: ICD-10-CM

## 2021-04-20 DIAGNOSIS — R25.2 SPASTICITY: Primary | ICD-10-CM

## 2021-04-20 PROCEDURE — 97116 GAIT TRAINING THERAPY: CPT | Mod: GP | Performed by: PHYSICAL THERAPIST

## 2021-04-20 PROCEDURE — 97110 THERAPEUTIC EXERCISES: CPT | Mod: GP | Performed by: PHYSICAL THERAPIST

## 2021-04-20 PROCEDURE — 97112 NEUROMUSCULAR REEDUCATION: CPT | Mod: GP | Performed by: PHYSICAL THERAPIST

## 2021-04-20 PROCEDURE — 97140 MANUAL THERAPY 1/> REGIONS: CPT | Mod: GO | Performed by: OCCUPATIONAL THERAPIST

## 2021-04-20 RX ORDER — LEVETIRACETAM 1000 MG/1
TABLET ORAL
Qty: 180 TABLET | Refills: 3 | Status: SHIPPED | OUTPATIENT
Start: 2021-04-20 | End: 2022-04-11

## 2021-04-20 RX ORDER — BACLOFEN 20 MG/1
TABLET ORAL
Qty: 360 TABLET | Refills: 3 | Status: SHIPPED | OUTPATIENT
Start: 2021-04-20 | End: 2022-04-11

## 2021-04-21 DIAGNOSIS — F33.1 MAJOR DEPRESSIVE DISORDER, RECURRENT EPISODE, MODERATE (H): Primary | ICD-10-CM

## 2021-04-22 RX ORDER — CITALOPRAM HYDROBROMIDE 20 MG/1
30 TABLET ORAL DAILY
Qty: 90 TABLET | Refills: 3 | Status: SHIPPED | OUTPATIENT
Start: 2021-04-22 | End: 2021-06-30

## 2021-04-22 NOTE — TELEPHONE ENCOUNTER
Routing refill request to provider for review/approval because:  Medication is reported/historical  Jacqueline Rivers RN, BSN  Message handled by CLINIC NURSE.

## 2021-04-27 ENCOUNTER — HOSPITAL ENCOUNTER (OUTPATIENT)
Dept: PHYSICAL THERAPY | Facility: CLINIC | Age: 66
Setting detail: THERAPIES SERIES
End: 2021-04-27
Attending: INTERNAL MEDICINE
Payer: MEDICARE

## 2021-04-27 ENCOUNTER — HOSPITAL ENCOUNTER (OUTPATIENT)
Dept: OCCUPATIONAL THERAPY | Facility: CLINIC | Age: 66
Setting detail: THERAPIES SERIES
End: 2021-04-27
Attending: INTERNAL MEDICINE
Payer: MEDICARE

## 2021-04-27 PROCEDURE — 97140 MANUAL THERAPY 1/> REGIONS: CPT | Mod: GO | Performed by: OCCUPATIONAL THERAPIST

## 2021-04-27 PROCEDURE — 97116 GAIT TRAINING THERAPY: CPT | Mod: GP | Performed by: PHYSICAL THERAPIST

## 2021-04-27 PROCEDURE — 97110 THERAPEUTIC EXERCISES: CPT | Mod: GP | Performed by: PHYSICAL THERAPIST

## 2021-04-27 PROCEDURE — 97112 NEUROMUSCULAR REEDUCATION: CPT | Mod: GP | Performed by: PHYSICAL THERAPIST

## 2021-04-27 NOTE — PROGRESS NOTES
Outpatient Occupational Therapy Progress Note     Patient: Marcus Hodges  : 1955    Beginning/End Dates of Reporting Period:  3/9/21 to 2021 (7 visits)   NOTE today is to support upcoming BOTOX VISIT with Dr. Haddad 21.    Referring Provider: Dr. Haddad, Rosemarie Strange/[place].    Therapy Diagnosis: Decreased ADL/IADL after Traumatic brain injury with loss of consciousness, sequela of L hemiparesis, spasticity  (Initial onset )    Client Self Report: (P) Pt tolerating splint % of every night now, straps much more comfortable.  Feels like his shoulder is more tight this week. ( 110 degrees SH FL AAROM at start of session, improves to 140 by the end of session.) Continues to have contracture of L forearm, limited supination to 20 of 90 degrees.    Objective Measurements:       Objective Measure: , pinch   Details: Using  can stablize golf abll in plam key pinch poker chip w key pinch, susing NMES to complete release at midline/ low bin.       Objective Measure: L UE ROM   Details: PROM SH FL to 140, H ABD to 95 of 110 after MFR/traction/joint mobilizations.  Supination has more tone, gets to 20 of 90 supination (13 at start). ER at 90 dgrees ABD to 45 of 90 degrees          Goals:     Goal Identifier HEP   Goal Description Pt/wife to demonstrate independence with HEP tasks to  support ROM, weightbearing, hand use, NMES and tone management strategies for addressing LUE pain/contractures and increase functional strength and ROM for maximum independence with performance of ADLs as gross assist and gross stabilizer.    Target Date 21   Date Met      Progress: Progress continues. Updated NMES for WR /hand extension during grasp/transfer/release pattern.  Is able to get to  2/3 or WR EX during NMES and hand feels looser after.       Goal Identifier Hand use   Goal Description Patient to demonstrate functional tasks (wiping the counter/table, washing dishes, etc.) with at  least 15% use of L UE as gross stabilizer/gross assist for increased ADL/IADL independence and improved functional use of L UE.   Target Date 06/07/21   Date Met      Progress: Progress continues.      Goal Identifier UE AROM/SROM   Goal Description Pt to demonstrate improved SROM/AAROM of shoulder flexion/abduction to 150 degrees, scapular rotation to 45 degrees, elbow extension to lacking 30 degrees, supination to 60 fo 90 degrees, wrist extension/wrist flexion to 50 of 70 degrees with hand open to support fun tional hand use in varied positions and reduced pain at night/improved sleep pattern.   Target Date 06/07/21   Date Met      Progress:  Tone/spasticity is firm/springy for >50% of planes restricting scapular rotation to 30 of 60 degrees (levator, upper trap), H Abduction to 85 of 140 degrees (pec minor/major), elbow extension to 135 of 180 ( biceps), supination to 20 of 90 degrees ( pronator teres), and flexed wrist/hooked hand posture (wrist flexors, [employer]). Pt demonstrates poor trunk rotation during gait, some patients have benefited from injection at latissimus/lateral flank.     Progress Toward Goals:   Progress this reporting period: Pt has been seen for 7 visits of OT in preparation for new course of botox, using home program changes/incorporating hand use into routines and myofascial release/instrument assisted soft tissue mobilization for improving AROM and supporting more functional UE use patterns.    Plan:  Continue therapy per current plan of care.    Discharge:  No    Thank you for this thoughtful referral! If you have any questions, please call me at [phone]. Feel free to fax your note from botox visit to [phone].  Nice to share in this patient's care with you. I am hoping my information is helpful for your session, but I will understand/support any choices you make!    Sincerely,   LOUIS Perkins/marty

## 2021-04-27 NOTE — MR AVS SNAPSHOT
After Visit Summary   3/6/2017    Marcus Hodges    MRN: [id number]           Patient Information     Date Of Birth          1955        Visit Information        Provider Department      3/6/2017 8:00 AM Don Aviles MD [facility]        Today's Diagnoses     Encounter for routine adult health examination with abnormal findings    -  1    Traumatic brain injury, with loss of consciousness of unspecified duration, sequela (H)        Major depressive disorder, recurrent episode, moderate (H)        Chronic atrial fibrillation (H)        Neurogenic bladder        Urinary incontinence, unspecified type        Seizure prophylaxis        Hyperlipidemia LDL goal <100        Left hemiplegia (H)          Care Instructions      Preventive Health Recommendations  Male Ages 50 - 64    Yearly exam:             See your health care provider every year in order to  o   Review health changes.   o   Discuss preventive care.    o   Review your medicines if your doctor has prescribed any.     Have a cholesterol test every 5 years, or more frequently if you are at risk for high cholesterol/heart disease.     Have a diabetes test (fasting glucose) every three years. If you are at risk for diabetes, you should have this test more often.     Have a colonoscopy at age 50, or have a yearly FIT test (stool test). These exams will check for colon cancer.      Talk with your health care provider about whether or not a prostate cancer screening test (PSA) is right for you.    You should be tested each year for STDs (sexually transmitted diseases), if you re at risk.     Shots: Get a flu shot each year. Get a tetanus shot every 10 years.     Nutrition:    Eat at least 5 servings of fruits and vegetables daily.     Eat whole-grain bread, whole-wheat pasta and brown rice instead of white grains and rice.     Talk to your provider about Calcium and Vitamin D.     Lifestyle    Exercise for at least 150 minutes  Next appt 8-5-21  Last appt 2-4-21    Refill request for/Last refilled info;  lisinopril-hydroCHLOROthiazide (ZESTORETIC) 20-12.5 MG per tablet 180 tablet 0 1/26/2021     Sig - Route: Take 1 tablet by mouth 2 times daily. - Oral      lovastatin (MEVACOR) 40 MG tablet 90 tablet 0 1/26/2021     Sig - Route: Take 1 tablet by mouth daily. - Oral      Refill completed per standing protocol   Thank you, Refill Center Staff       a week (30 minutes a day, 5 days a week). This will help you control your weight and prevent disease.     Limit alcohol to one drink per day.     No smoking.     Wear sunscreen to prevent skin cancer.     See your dentist every six months for an exam and cleaning.     See your eye doctor every 1 to 2 years.          Follow-ups after your visit        Your next 10 appointments already scheduled     Mar 06, 2017  1:00 PM CST   Treatment 60 with Ada Scott PT   [facility] Physical Therapy ([facility])    [address] [address]   [phone]            Mar 14, 2017  9:30 AM CDT   Treatment 60 with Ada Scott PT   [facility] ([facility])    [address] [address]   [phone]            Mar 20, 2017  1:00 PM CDT   Treatment 60 with Ada Scott PT   [facility] ([facility])    [address] [address]   [phone]            Mar 24, 2017 10:15 AM CDT   Anticoagulation Visit with [facility]   [facility]    [address]  [address]  [place] [address] [phone]            Apr 03, 2017  1:00 PM CDT   Treatment 60 with Ada Scott PT   [facility] ([facility])    [address] [address]   [phone]            Apr 10, 2017  1:00 PM CDT   Treatment 60 with Ada Scott PT   [facility] ([facility])    [address] [address]   [phone]            Apr 18, 2017  9:30 AM CDT   Treatment 60 with Ada Scott PT   [facility] ([facility])    [address] [address]   [phone]            Apr 24, 2017  1:00 PM CDT   Treatment 60 with Ada Scott PT   [facility]  "Physical Therapy ([facility])    [address] [address]   [phone]            May 01, 2017  1:00 PM CDT   Treatment 60 with Ada Scott, PT   [facility] Therapy ([facility])    [address] [address]   [phone]            May 08, 2017  1:00 PM CDT   Treatment 60 with Ada Scott, PT   [facility] Physical Therapy ([facility])    [address] [address]   [phone]              Who to contact     If you have questions or need follow up information about today's clinic visit or your schedule please contact [facility] directly at [phone].  Normal or non-critical lab and imaging results will be communicated to you by Interhyphart, letter or phone within 4 business days after the clinic has received the results. If you do not hear from us within 7 days, please contact the clinic through Interhyphart or phone. If you have a critical or abnormal lab result, we will notify you by phone as soon as possible.  Submit refill requests through Cabify or call your pharmacy and they will forward the refill request to us. Please allow 3 business days for your refill to be completed.          Additional Information About Your Visit        Cabify Information     Cabify gives you secure access to your electronic health record. If you see a primary care provider, you can also send messages to your care team and make appointments. If you have questions, please call your primary care clinic.  If you do not have a primary care provider, please call [phone] and they will assist you.        Care EveryWhere ID     This is your Care EveryWhere ID. This could be used by other organizations to access your [place] medical records  [id number]        Your Vitals Were     Pulse Temperature Height Pulse Oximetry BMI (Body Mass Index)       76 97.5  F (36.4  C) (Oral) 6' 2\" (1.88 m) " 98% 22.47 kg/m2        Blood Pressure from Last 3 Encounters:   03/06/17 108/70   12/23/16 114/70   12/06/16 100/74    Weight from Last 3 Encounters:   03/06/17 175 lb (79.4 kg)   01/23/17 179 lb (81.2 kg)   01/09/17 179 lb (81.2 kg)              We Performed the Following     Comprehensive metabolic panel (BMP + Alb, Alk Phos, ALT, AST, Total. Bili, TP)     Lipid Profile with reflex to direct LDL          Where to get your medicines      These medications were sent to [facility] - [place], [place] - [address] [facility]  [address] [facility], [place] [address]     Phone:  [phone]     atorvastatin 20 MG tablet    baclofen 20 MG tablet    citalopram 20 MG tablet    levETIRAcetam 1000 MG Tabs    metoprolol 25 MG 24 hr tablet         Some of these will need a paper prescription and others can be bought over the counter.  Ask your nurse if you have questions.     Bring a paper prescription for each of these medications     order for DME    order for DME    order for DME    order for DME    order for DME    order for DME          Primary Care Provider Office Phone # Fax #    Don Aviles [phone]       [address] [place] [address]        Thank you!     Thank you for choosing [facility]  for your care. Our goal is always to provide you with excellent care. Hearing back from our patients is one way we can continue to improve our services. Please take a few minutes to complete the written survey that you may receive in the mail after your visit with us. Thank you!             Your Updated Medication List - Protect others around you: Learn how to safely use, store and throw away your medicines at www.disposemymeds.org.          This list is accurate as of: 3/6/17  8:30 AM.  Always use your most recent med list.                   Brand Name Dispense Instructions for use    acetaminophen 325 MG tablet    TYLENOL    100  tablet    Take  by mouth every 4 hours as needed for pain.       amoxicillin 500 MG capsule    AMOXIL         atorvastatin 20 MG tablet    LIPITOR    90 tablet    Take 1 tablet (20 mg) by mouth daily       baclofen 20 MG tablet    LIORESAL    360 tablet    Take 1 tablet (20 mg) by mouth 4 times daily       cholecalciferol 1000 UNIT tablet    vitamin D    90 tablet    Take 2 tablets (2,000 Units) by mouth daily       citalopram 20 MG tablet    celeXA    135 tablet    Take 1.5 tablets (30 mg) by mouth daily       levETIRAcetam 1000 MG Tabs    KEPPRA    180 tablet    Take 1 tablet by mouth 2 times daily       metoprolol 25 MG 24 hr tablet    TOPROL-XL    90 tablet    Take 1 tablet (25 mg) by mouth daily       mirabegron 50 MG 24 hr tablet    MYRBETRIQ    90 tablet    Take 1 tablet (50 mg) by mouth daily       Multi-vitamin Tabs tablet   Generic drug:  multivitamin, therapeutic with minerals     100 tablet    Take 1 tablet by mouth daily.       NONFORMULARY      Protandim - herbal supplement       * order for DME     1 each    Equipment being ordered: Dispense a WHFO Leg Brace       * order for DME     1 each    Equipment being ordered: Quad Cane-39 inch       * order for DME     4 each    Equipment being ordered: daytime urinary leg bag   800cc       * order for DME     12 each    Equipment being ordered: Daytime Urinary leg bag-800cc  (HCPCS Code )       * order for DME     12 each    Equipment being ordered:    Nightime urinary leg bag 1600cc  (HCPCS Code )       * order for DME     90 each    Equipment being ordered:   Condom catheter  (HCPCS Code )       * order for DME     12 each    Equipment being ordered: Incontinence supplies - Posies  (HCPCS Code )       * order for DME     150 each    Equipment being ordered: Adhesive remover, wipes  (HCPCS Code )       * order for DME     150 each    Equipment being ordered: Skin prep (no code - requested by patient)       warfarin 5 MG tablet     COUMADIN    135 tablet    Take 5mg TTSa, 7.5mg MWFSu OR as directed by INR Clinic.       * Notice:  This list has 9 medication(s) that are the same as other medications prescribed for you. Read the directions carefully, and ask your doctor or other care provider to review them with you.

## 2021-05-04 ENCOUNTER — HOSPITAL ENCOUNTER (OUTPATIENT)
Dept: PHYSICAL THERAPY | Facility: CLINIC | Age: 66
Setting detail: THERAPIES SERIES
End: 2021-05-04
Attending: INTERNAL MEDICINE
Payer: MEDICARE

## 2021-05-04 ENCOUNTER — HOSPITAL ENCOUNTER (OUTPATIENT)
Dept: OCCUPATIONAL THERAPY | Facility: CLINIC | Age: 66
Setting detail: THERAPIES SERIES
End: 2021-05-04
Attending: INTERNAL MEDICINE
Payer: MEDICARE

## 2021-05-04 PROCEDURE — 97140 MANUAL THERAPY 1/> REGIONS: CPT | Mod: GP | Performed by: PHYSICAL THERAPIST

## 2021-05-04 PROCEDURE — 97110 THERAPEUTIC EXERCISES: CPT | Mod: GP | Performed by: PHYSICAL THERAPIST

## 2021-05-04 PROCEDURE — 97112 NEUROMUSCULAR REEDUCATION: CPT | Mod: GP,KX | Performed by: PHYSICAL THERAPIST

## 2021-05-04 PROCEDURE — 97116 GAIT TRAINING THERAPY: CPT | Mod: GP,KX | Performed by: PHYSICAL THERAPIST

## 2021-05-04 PROCEDURE — 97140 MANUAL THERAPY 1/> REGIONS: CPT | Mod: GO | Performed by: OCCUPATIONAL THERAPIST

## 2021-05-06 ENCOUNTER — TRANSFERRED RECORDS (OUTPATIENT)
Dept: HEALTH INFORMATION MANAGEMENT | Facility: CLINIC | Age: 66
End: 2021-05-06

## 2021-05-11 ENCOUNTER — HOSPITAL ENCOUNTER (OUTPATIENT)
Dept: PHYSICAL THERAPY | Facility: CLINIC | Age: 66
Setting detail: THERAPIES SERIES
End: 2021-05-11
Attending: INTERNAL MEDICINE
Payer: MEDICARE

## 2021-05-11 PROCEDURE — 97110 THERAPEUTIC EXERCISES: CPT | Mod: GP,KX | Performed by: PHYSICAL THERAPIST

## 2021-05-11 PROCEDURE — 97116 GAIT TRAINING THERAPY: CPT | Mod: GP,KX | Performed by: PHYSICAL THERAPIST

## 2021-05-11 PROCEDURE — 97112 NEUROMUSCULAR REEDUCATION: CPT | Mod: GP,KX | Performed by: PHYSICAL THERAPIST

## 2021-05-18 ENCOUNTER — HOSPITAL ENCOUNTER (OUTPATIENT)
Dept: PHYSICAL THERAPY | Facility: CLINIC | Age: 66
Setting detail: THERAPIES SERIES
End: 2021-05-18
Attending: INTERNAL MEDICINE
Payer: MEDICARE

## 2021-05-18 ENCOUNTER — HOSPITAL ENCOUNTER (OUTPATIENT)
Dept: OCCUPATIONAL THERAPY | Facility: CLINIC | Age: 66
Setting detail: THERAPIES SERIES
End: 2021-05-18
Attending: INTERNAL MEDICINE
Payer: MEDICARE

## 2021-05-18 PROCEDURE — 97110 THERAPEUTIC EXERCISES: CPT | Mod: GP,KX | Performed by: PHYSICAL THERAPIST

## 2021-05-18 PROCEDURE — 97112 NEUROMUSCULAR REEDUCATION: CPT | Mod: GP,KX | Performed by: PHYSICAL THERAPIST

## 2021-05-18 PROCEDURE — 97140 MANUAL THERAPY 1/> REGIONS: CPT | Mod: GO | Performed by: OCCUPATIONAL THERAPIST

## 2021-05-18 PROCEDURE — 97116 GAIT TRAINING THERAPY: CPT | Mod: GP,KX | Performed by: PHYSICAL THERAPIST

## 2021-05-21 ENCOUNTER — HOSPITAL ENCOUNTER (OUTPATIENT)
Dept: OCCUPATIONAL THERAPY | Facility: CLINIC | Age: 66
Setting detail: THERAPIES SERIES
End: 2021-05-21
Attending: INTERNAL MEDICINE
Payer: MEDICARE

## 2021-05-21 PROCEDURE — 97140 MANUAL THERAPY 1/> REGIONS: CPT | Mod: GO | Performed by: OCCUPATIONAL THERAPIST

## 2021-05-21 NOTE — PROGRESS NOTES
--OP OT 10th Visit NOTE--       05/21/21 1600   Signing Clinician's Name / Credentials   Signing clinician's name / credentials Ada Enrique OTR/marty   Session Number   Session Number 10/10 Medicare with BCBS secondary   Authorization status cert required.   Progress/Recertification   Recertification Due 06/07/21   Adult OT Eval Goals   OT Eval Goals (Adult) 1;2;3    OT Goal 1   Goal Identifier HEP   Goal Description Pt/wife to demonstrate independence with HEP tasks to  support ROM, weightbearing, hand use, NMES and tone management strategies for addressing LUE pain/contractures and increase functional strength and ROM for maximum independence with performance of ADLs as gross assist and gross stabilizer.    Target Date 06/07/21    OT Goal 2   Goal Identifier Hand use   Goal Description Patient to demonstrate functional tasks (wiping the counter/table, washing dishes, etc.) with at least 15% use of L UE as gross stabilizer/gross assist for increased ADL/IADL independence and improved functional use of L UE.   Target Date 06/07/21    OT Goal 3   Goal Identifier UE AROM/SROM   Goal Description Pt to demonstrate improved SROM/AAROM of shoulder flexion/abdbustion to 150 degrees, scpaular rotation to 45 degrees, elbow extension to lacking 30 dgrees, supination to 60 fo 90 degrees, wrist extension/wrist flexion to 50 of 70 degrees with hand open to support funcational hand use in varied positions and reduced pain at night/improved sleep pattern.   Target Date 06/07/21   Subjective Report   Subjective Report Pt's skin is more fragile after botox, having increased slough at L axilla, with trial of new cream.  Range is going better after botox, bending L elbow some while turning light swith on/off ( large pressure plate).     Objective Measure 4   Objective Measure , pinch   Objective Measure 5   Objective Measure L UE ROM   Details PROM SH FL to 145, H ABD to 100 of 110 after MFR/traction/joint mobilizations ER at 90  dgrees ABD to 50 of 90 degrees. (pec minor tone is still really limiting).  Upper cervicals much looser, mild to moderate resistance needed to  attain last 45 of triceps and biceps flexion.     Therapeutic Interventions   Therapeutic Interventions Self Care/Home Management   Neuromuscular Re-education   Progress Uses his NMES 5 of 7 days, 50% with grasp relase patterns of foam block and 50% without, just for ROM/tone mgmt.     Manual Therapy   Manual Therapy Minutes (22531) 40   Skilled Intervention IASTM, MFR to L UE to reduce contractures., support increased AROM.     Patient Response OTR better able to clean L axilla of slough/red areas.     Treatment Detail (pec minor tone, biceps, pronator tone is really limiting).  Pt has continued tone/thickness and tissue texture changes at SCM, pec major/ minor, brachioradialis (thick ropy like, biceps margins (bulging, thick),  and Medial 1/3 of  circumference of forearm/flexors/pronators). OTR cont w instrument assisted soft tissue mobilization, myofascial release, deompression/compression mobilzation techqniues (STM/MFR) with min to moderate pressure following longitudinal patterns at origin, insert and muscle bellies, then cross fiber in fanning patterns  from distal to proximal and proximal to distal patterns, and around radius of bony prominences for these areas. Addressed axillary border of scapula, clavical to  SCM vs scalenes margins, pec minor, pec major Completed pec minor with shoulder circles/pin and stretch, traction, compression scrubbing circles. IASTM to brachioradialis, biceps and pronator teres.     Plan   Homework UE stretches.   Updates to plan of care 1x/week until next botox 4/28/21, then 2x/week x 2 weeks and reduce to 1x/ week every 2 weeks until next botox and reassess.   Plan for next session Check/IASTM FA, WR EX. THat that NMEs still works w gripping  poker chips/  chestnuts golf vs tennnis ball vs gripper (try as pull back, can also try with  band/PVC pole), Bring/compare his newer and older NMES in to adjust parameters as needed.  Add shoulder pulley kit. in past, Instructed in tasks to increase L hand functional use such as holding with L for  toothbrush, yogurt cup,  faucet, fridge handle, Carry water bottle/coffee cup, small tote bag between rooms w L Hand. ...still?  ping pong? micormeals? diushes?, folding laundry bits?  Can he do squirt bottle, pull grab bar, cut mail?  CHeck EL EX/FL MMT and AROM.     Total Session Time   Timed Code Treatment Minutes 40   Total Treatment Time (sum of timed and untimed services) 40   AMBULATORY CLINICS ONLY-MEDICAL AND TREATMENT DIAGNOSIS   Medical Diagnosis Traumatic brain injury with loss of consciousness, sequela.   (Initial onset April 20,2012)   OT Diagnosis Decreased ADL/IADL, contractures, pain       Thank you for this thoughtful referral! If you have any questions, please call me at [phone].  Nice to share in this patient's care with you.    Sincerely,   Ada Enrique, OTR/l

## 2021-05-25 ENCOUNTER — HOSPITAL ENCOUNTER (OUTPATIENT)
Dept: PHYSICAL THERAPY | Facility: CLINIC | Age: 66
Setting detail: THERAPIES SERIES
End: 2021-05-25
Attending: INTERNAL MEDICINE
Payer: MEDICARE

## 2021-05-25 ENCOUNTER — HOSPITAL ENCOUNTER (OUTPATIENT)
Dept: OCCUPATIONAL THERAPY | Facility: CLINIC | Age: 66
Setting detail: THERAPIES SERIES
End: 2021-05-25
Attending: INTERNAL MEDICINE
Payer: MEDICARE

## 2021-05-25 PROCEDURE — 97116 GAIT TRAINING THERAPY: CPT | Mod: GP,KX | Performed by: PHYSICAL THERAPIST

## 2021-05-25 PROCEDURE — 97110 THERAPEUTIC EXERCISES: CPT | Mod: GP,KX | Performed by: PHYSICAL THERAPIST

## 2021-05-25 PROCEDURE — 97140 MANUAL THERAPY 1/> REGIONS: CPT | Mod: GO | Performed by: OCCUPATIONAL THERAPIST

## 2021-05-25 PROCEDURE — 97112 NEUROMUSCULAR REEDUCATION: CPT | Mod: GP,KX | Performed by: PHYSICAL THERAPIST

## 2021-05-25 NOTE — PROGRESS NOTES
Outpatient Physical Therapy Progress Note     Patient: Marcus Hodges  : 1955    Beginning/End Dates of Reporting Period: 3/17/21  to 2021    Referring Provider: Dr. Don Aviles    Therapy Diagnosis:   Decreased independence and functional activity  tolerance due to impairments from CVA and  secondary impairments following CVA.(R knee  pain, shoulderpain)     Client Self Report: Marcus states that his right knee still bothers him significantly and he will take ibuprofen at night prior to going to sleep to allow him to sleep better.  He points to the superior medial patellar area in regards to where the pain is.  He states that he and his wife are going to check with the [place] fitness center and see what their restrictions are currently with Covid and possibly return to walking at the fitness center.  When discussing goals patient states that there are not any specific alarms but he does trust my guidance as far as challenges and purposes of activities in the PT sessions.  When asked specifically regarding the use of the ankle support splint rather than his AFO he states that he is open to using the ankle splint if it helps him to continue to improve.    Objective Measurements:  Objective Measure: 25 foot walk  Details: 21  start of session AFO, one trekking pole R 22.53 sec. End of session 20.66 sec AFO and one trekking pole.     Details: 21  24.69 sec and 24.81 sec 24-26 steps. trekking pole.  AFO on the L. 2020 20.45 and 21.5 sec both with trekking pole , AFO on,  24 steps with each      GAIT -  Improving gait pattern with better disassociation of lumbo pelvic, pelvic femoral , knee motion and continuation forward COM mid to end stance B, continues to rotate pelvis and trunk forward in transverse plane but improving.  We have been walking in the clinic with ankle sport splint on the L for medial lateral support and allowing better ankle dorsi/plantar flexion. Part of the knee  hyperextension I feel is secondary to overactive plantar flexors, decreased ankle dorsiflexion available ROM due to this as well as rigidity of AFO.     Transfers -  5/25/21   From standing down into single to double kneel. Floor transfer completed with mat on the floor, patient turning with left foot being a pivot for an upper extremity support on the mat, min assist given and monitoring of left ankle stability.  Patient did not have his AFO on and he had slight inversion of the left ankle with loading but did not have a flexor response or did not turn his ankle which is an improvement being on a conforming surface/softer surface on the floor.  Patient transferred from standing down to single kneel onto the right lower extremity, min assist of 2 for guiding down into position and mod to max assist for positioning of the left lower extremity into double kneel.    Return back to standing with min assist to mod assist of 2 with assist for placement of the right foot forward/left kneel and then mod assist for standing upright, min assist for turning and sitting back down on the mat.  In supine and repositioning patient needing assist with position of the lower extremities and applying cues for bridge and shift to the left did very well with this and then shift to shoulders over much improved disassociation throughout and incorporating use of the left side of his body.          Goals:      Goal Identifier 4- Gait   Goal Description Marcus to increase gait speed to 19 seconds or less with AD and 24 steps or less to demonstrate improved gait speed and efficiency for community gait and also decreased overuse of the R side.    Target Date 06/21/21   Date Met   in progress as above.    Progress: improving gait speed as above.  Improving gait pattern with better disassociation of lumbo pelvic, pelvic femoral , knee motion and ankle dorsiflexion,       Goal Identifier 5- Floor transfers.    Goal Description Marcus to perform  transfer to and from floor with outside UE support and min assist of 1 to allow him to recover from a fall with assist from wife or caregiver.    Target Date 06/21/21   Date Met   in progress   Progress: as above. Able to complete the transfer .  In the past not able to go down to kneeling.      Goal Identifier 6 --  4 square   Goal Description Marcus to complete 4 square in 25 seconds with and AD and 40 seconds or less without an AD to demonstrate improved interlimb coordination, balance and body control to assist in meeting all goals and overall function.    Target Date 06/21/21   Date Met   in progress   Progress:not tested this progress note session.      Goal Identifier HEP   Goal Description Marcus to be independent in appropriate HEP to continue to address his impairments following d/c from skilled PT intervention.    Target Date 09/21/21   Date Met   in progress   Progress:HEP continues to be adapted and changed /progressed     Goal Identifier 8 functional reach/neuro motor control   Goal Description Marcus to perform reaching to the floor to the left to pick object up off the floor while maintaining L hand contact/closed chain on the mat to demonstrate impropved neuromotor planning, ability to bring COM to the left while keeping proper muscle activation/deactivation L and R side of body for greater safety with reaching activiites to the floor, unstrapping shoe, AFO.  Also for carryover of proper motor function for improved gait and transfers.    Target Date 06/21/21   Date Met   in progress   Progress:  Much improved disassociation b/w trunk, upper quadrant, rib cage allowing better forward reach  5/18/21.sitting reach  work on goal 8 - wrist extension, elbow/flex and ext , relax upper trap upper quadrant. MWM for wrist extension L, facilitation of improved scapular and rib cage movement vc as well.  reaching down to the floor with the R UE, gradually decreasing input/assist .          Progress Toward Goals:    Progress this reporting period: Marcus continues to make slow and steady progress, main area of focus in decreasing overactivation of musculature/tone and improving ability to bring COM forward past TAYLOR to allow for this change.  He has several barriers including significance of CVA, blindness R eye, secondary impairments/tissue shortening from compensatory movements.  He continues to work hard, he is becoming more comfortable with lesser restrictive bracing L ankle to allow for better ankle dorsiflexion and forward COM past TAYLOR with gait and standing in general.    He is without questions and wishes to continue with skilled PT intervention. Wife reports improved ease /ability to perform standing without shoes/AFO in place.    Please see daily notes for greater information    Plan:  Continue therapy per current plan of care. 1 x a week 60 min sessions  Discharge:  No

## 2021-05-27 ENCOUNTER — TRANSFERRED RECORDS (OUTPATIENT)
Dept: HEALTH INFORMATION MANAGEMENT | Facility: CLINIC | Age: 66
End: 2021-05-27

## 2021-05-28 ENCOUNTER — HOSPITAL ENCOUNTER (OUTPATIENT)
Dept: OCCUPATIONAL THERAPY | Facility: CLINIC | Age: 66
Setting detail: THERAPIES SERIES
End: 2021-05-28
Attending: INTERNAL MEDICINE
Payer: MEDICARE

## 2021-05-28 PROCEDURE — 97140 MANUAL THERAPY 1/> REGIONS: CPT | Mod: GO | Performed by: OCCUPATIONAL THERAPIST

## 2021-06-01 ENCOUNTER — HOSPITAL ENCOUNTER (OUTPATIENT)
Dept: PHYSICAL THERAPY | Facility: CLINIC | Age: 66
Setting detail: THERAPIES SERIES
End: 2021-06-01
Attending: INTERNAL MEDICINE
Payer: MEDICARE

## 2021-06-01 ENCOUNTER — HOSPITAL ENCOUNTER (OUTPATIENT)
Dept: OCCUPATIONAL THERAPY | Facility: CLINIC | Age: 66
Setting detail: THERAPIES SERIES
End: 2021-06-01
Attending: INTERNAL MEDICINE
Payer: MEDICARE

## 2021-06-01 PROCEDURE — 97112 NEUROMUSCULAR REEDUCATION: CPT | Mod: GP,KX | Performed by: PHYSICAL THERAPIST

## 2021-06-01 PROCEDURE — 97110 THERAPEUTIC EXERCISES: CPT | Mod: GP,KX | Performed by: PHYSICAL THERAPIST

## 2021-06-01 PROCEDURE — 97116 GAIT TRAINING THERAPY: CPT | Mod: GP,KX | Performed by: PHYSICAL THERAPIST

## 2021-06-01 PROCEDURE — 97140 MANUAL THERAPY 1/> REGIONS: CPT | Mod: GO | Performed by: OCCUPATIONAL THERAPIST

## 2021-06-08 ENCOUNTER — HOSPITAL ENCOUNTER (OUTPATIENT)
Dept: PHYSICAL THERAPY | Facility: CLINIC | Age: 66
Setting detail: THERAPIES SERIES
End: 2021-06-08
Attending: INTERNAL MEDICINE
Payer: MEDICARE

## 2021-06-08 ENCOUNTER — HOSPITAL ENCOUNTER (OUTPATIENT)
Dept: OCCUPATIONAL THERAPY | Facility: CLINIC | Age: 66
Setting detail: THERAPIES SERIES
End: 2021-06-08
Attending: INTERNAL MEDICINE
Payer: MEDICARE

## 2021-06-08 PROCEDURE — 97110 THERAPEUTIC EXERCISES: CPT | Mod: GP,KX | Performed by: PHYSICAL THERAPIST

## 2021-06-08 PROCEDURE — 97140 MANUAL THERAPY 1/> REGIONS: CPT | Mod: GO | Performed by: OCCUPATIONAL THERAPIST

## 2021-06-08 PROCEDURE — 97140 MANUAL THERAPY 1/> REGIONS: CPT | Mod: GP,KX | Performed by: PHYSICAL THERAPIST

## 2021-06-08 PROCEDURE — 97112 NEUROMUSCULAR REEDUCATION: CPT | Mod: GP,KX | Performed by: PHYSICAL THERAPIST

## 2021-06-08 PROCEDURE — 97116 GAIT TRAINING THERAPY: CPT | Mod: GP,KX | Performed by: PHYSICAL THERAPIST

## 2021-06-15 ENCOUNTER — HOSPITAL ENCOUNTER (OUTPATIENT)
Dept: PHYSICAL THERAPY | Facility: CLINIC | Age: 66
Setting detail: THERAPIES SERIES
End: 2021-06-15
Attending: INTERNAL MEDICINE
Payer: MEDICARE

## 2021-06-15 ENCOUNTER — HOSPITAL ENCOUNTER (OUTPATIENT)
Dept: OCCUPATIONAL THERAPY | Facility: CLINIC | Age: 66
Setting detail: THERAPIES SERIES
End: 2021-06-15
Attending: INTERNAL MEDICINE
Payer: MEDICARE

## 2021-06-15 PROCEDURE — 97140 MANUAL THERAPY 1/> REGIONS: CPT | Mod: GP,KX | Performed by: PHYSICAL THERAPIST

## 2021-06-15 PROCEDURE — 97110 THERAPEUTIC EXERCISES: CPT | Mod: GP,KX | Performed by: PHYSICAL THERAPIST

## 2021-06-15 PROCEDURE — 97140 MANUAL THERAPY 1/> REGIONS: CPT | Mod: GO | Performed by: OCCUPATIONAL THERAPIST

## 2021-06-15 PROCEDURE — 97112 NEUROMUSCULAR REEDUCATION: CPT | Mod: GP | Performed by: PHYSICAL THERAPIST

## 2021-06-15 PROCEDURE — 97116 GAIT TRAINING THERAPY: CPT | Mod: GP | Performed by: PHYSICAL THERAPIST

## 2021-06-18 DIAGNOSIS — I48.20 CHRONIC ATRIAL FIBRILLATION (H): ICD-10-CM

## 2021-06-18 DIAGNOSIS — E78.5 HYPERLIPIDEMIA LDL GOAL <100: ICD-10-CM

## 2021-06-18 RX ORDER — ATORVASTATIN CALCIUM 20 MG/1
TABLET, FILM COATED ORAL
Qty: 90 TABLET | Refills: 0 | OUTPATIENT
Start: 2021-06-18

## 2021-06-18 RX ORDER — MIRABEGRON 50 MG/1
TABLET, FILM COATED, EXTENDED RELEASE ORAL
Qty: 90 TABLET | Refills: 0 | OUTPATIENT
Start: 2021-06-18

## 2021-06-18 RX ORDER — METOPROLOL SUCCINATE 25 MG/1
TABLET, EXTENDED RELEASE ORAL
Qty: 90 TABLET | Refills: 0 | OUTPATIENT
Start: 2021-06-18

## 2021-06-22 ENCOUNTER — HOSPITAL ENCOUNTER (OUTPATIENT)
Dept: PHYSICAL THERAPY | Facility: CLINIC | Age: 66
Setting detail: THERAPIES SERIES
End: 2021-06-22
Attending: INTERNAL MEDICINE
Payer: MEDICARE

## 2021-06-22 DIAGNOSIS — S06.9X9S TRAUMATIC BRAIN INJURY WITH LOSS OF CONSCIOUSNESS, SEQUELA (H): Primary | ICD-10-CM

## 2021-06-22 DIAGNOSIS — Z98.890 S/P CRANIOTOMY: ICD-10-CM

## 2021-06-22 DIAGNOSIS — G81.94 LEFT HEMIPARESIS (H): ICD-10-CM

## 2021-06-22 PROCEDURE — 97116 GAIT TRAINING THERAPY: CPT | Mod: GP,KX | Performed by: PHYSICAL THERAPIST

## 2021-06-22 PROCEDURE — 97110 THERAPEUTIC EXERCISES: CPT | Mod: GP,KX | Performed by: PHYSICAL THERAPIST

## 2021-06-22 PROCEDURE — 97112 NEUROMUSCULAR REEDUCATION: CPT | Mod: GP,KX | Performed by: PHYSICAL THERAPIST

## 2021-06-22 PROCEDURE — 97140 MANUAL THERAPY 1/> REGIONS: CPT | Mod: GP,KX | Performed by: PHYSICAL THERAPIST

## 2021-06-24 ENCOUNTER — HOSPITAL ENCOUNTER (OUTPATIENT)
Dept: CT IMAGING | Facility: CLINIC | Age: 66
Discharge: HOME | End: 2021-06-24
Attending: STUDENT IN AN ORGANIZED HEALTH CARE EDUCATION/TRAINING PROGRAM | Admitting: STUDENT IN AN ORGANIZED HEALTH CARE EDUCATION/TRAINING PROGRAM
Payer: MEDICARE

## 2021-06-24 DIAGNOSIS — N20.0 CALCULUS OF KIDNEY: ICD-10-CM

## 2021-06-24 DIAGNOSIS — Z12.5 SPECIAL SCREENING FOR MALIGNANT NEOPLASM OF PROSTATE: ICD-10-CM

## 2021-06-24 PROCEDURE — 74176 CT ABD & PELVIS W/O CONTRAST: CPT | Mod: MG

## 2021-06-24 PROCEDURE — G0103 PSA SCREENING: HCPCS | Performed by: STUDENT IN AN ORGANIZED HEALTH CARE EDUCATION/TRAINING PROGRAM

## 2021-06-24 PROCEDURE — 36415 COLL VENOUS BLD VENIPUNCTURE: CPT | Performed by: STUDENT IN AN ORGANIZED HEALTH CARE EDUCATION/TRAINING PROGRAM

## 2021-06-24 NOTE — PROGRESS NOTES
Rehabilitation Services      OUTPATIENT PHYSICAL THERAPY  PLAN OF TREATMENT FOR OUTPATIENT REHABILITATION    Patient's Last Name, First Name, M.I.                YOB: 1955  TracieMarcus TORRES                        Provider's Name  Ada Scott PT Medical Record No.  [id number]                               Onset Date: 4/1/2019  Start of Care Date: 7/1/2019   Type:     _X_PT   ___OT   ___SLP Medical Diagnosis:  Left hemiplegia (G81.94-  ),  Tramatic brain  injury with loss of  consciousness, sequela  (S06.9X9S)                                             PT Diagnosis: Decreased independence  and functional activity  tolerance due to impair-  ments from CVA and  secondary impairments  following CVA.(R knee  pain, shoulder pain      _________________________________________________________________________________  Plan of Treatment:    Frequency/Duration: 1 x a week x 90 days     Goals:  See PN 5/25/21    Certification date from 6/22/21 to 9/13/21.    Ada Scott, PT          I CERTIFY THE NEED FOR THESE SERVICES FURNISHED UNDER        THIS PLAN OF TREATMENT AND WHILE UNDER MY CARE     (Physician co-signature of this document indicates review and certification of the therapy plan).                Referring Provider: IRENE JEFFERS

## 2021-06-25 LAB — PSA SERPL-MCNC: 1.85 UG/L (ref 0–4)

## 2021-06-29 ENCOUNTER — HOSPITAL ENCOUNTER (OUTPATIENT)
Dept: OCCUPATIONAL THERAPY | Facility: CLINIC | Age: 66
Setting detail: THERAPIES SERIES
End: 2021-06-29
Attending: INTERNAL MEDICINE
Payer: MEDICARE

## 2021-06-29 ENCOUNTER — HOSPITAL ENCOUNTER (OUTPATIENT)
Dept: PHYSICAL THERAPY | Facility: CLINIC | Age: 66
Setting detail: THERAPIES SERIES
End: 2021-06-29
Attending: INTERNAL MEDICINE
Payer: MEDICARE

## 2021-06-29 DIAGNOSIS — E78.5 HYPERLIPIDEMIA LDL GOAL <100: ICD-10-CM

## 2021-06-29 DIAGNOSIS — F33.1 MAJOR DEPRESSIVE DISORDER, RECURRENT EPISODE, MODERATE (H): ICD-10-CM

## 2021-06-29 PROCEDURE — 97116 GAIT TRAINING THERAPY: CPT | Mod: GP,KX | Performed by: PHYSICAL THERAPIST

## 2021-06-29 PROCEDURE — 97140 MANUAL THERAPY 1/> REGIONS: CPT | Mod: GO | Performed by: OCCUPATIONAL THERAPIST

## 2021-06-29 PROCEDURE — 97140 MANUAL THERAPY 1/> REGIONS: CPT | Mod: GP,KX | Performed by: PHYSICAL THERAPIST

## 2021-06-29 PROCEDURE — 97110 THERAPEUTIC EXERCISES: CPT | Mod: GP,KX | Performed by: PHYSICAL THERAPIST

## 2021-06-29 PROCEDURE — 97112 NEUROMUSCULAR REEDUCATION: CPT | Mod: GP,KX | Performed by: PHYSICAL THERAPIST

## 2021-06-30 RX ORDER — CITALOPRAM HYDROBROMIDE 20 MG/1
30 TABLET ORAL DAILY
Qty: 135 TABLET | Refills: 1 | Status: SHIPPED | OUTPATIENT
Start: 2021-06-30 | End: 2021-10-19

## 2021-06-30 RX ORDER — ATORVASTATIN CALCIUM 20 MG/1
TABLET, FILM COATED ORAL
Qty: 90 TABLET | Refills: 0 | Status: SHIPPED | OUTPATIENT
Start: 2021-06-30 | End: 2021-10-12

## 2021-06-30 RX ORDER — METOPROLOL SUCCINATE 25 MG/1
TABLET, EXTENDED RELEASE ORAL
Qty: 90 TABLET | Refills: 1 | Status: SHIPPED | OUTPATIENT
Start: 2021-06-30 | End: 2021-10-19

## 2021-06-30 RX ORDER — MIRABEGRON 50 MG/1
TABLET, FILM COATED, EXTENDED RELEASE ORAL
Qty: 90 TABLET | Refills: 1 | Status: SHIPPED | OUTPATIENT
Start: 2021-06-30 | End: 2021-10-19

## 2021-06-30 NOTE — TELEPHONE ENCOUNTER
Prescription approved per [facility] Refill Protocol.    Tyler BUTLER Pharmacist left a voicemail on my line asking to change citalopram from 60 day to 90 day supply.    This was done.

## 2021-07-01 ENCOUNTER — OFFICE VISIT (OUTPATIENT)
Dept: UROLOGY | Facility: CLINIC | Age: 66
End: 2021-07-01
Payer: MEDICARE

## 2021-07-01 VITALS
BODY MASS INDEX: 22.97 KG/M2 | SYSTOLIC BLOOD PRESSURE: 96 MMHG | HEIGHT: 74 IN | HEART RATE: 51 BPM | OXYGEN SATURATION: 97 % | DIASTOLIC BLOOD PRESSURE: 60 MMHG | WEIGHT: 179 LBS

## 2021-07-01 DIAGNOSIS — R82.992 HYPEROXALURIA: Primary | ICD-10-CM

## 2021-07-01 DIAGNOSIS — Z12.5 SPECIAL SCREENING FOR MALIGNANT NEOPLASM OF PROSTATE: ICD-10-CM

## 2021-07-01 DIAGNOSIS — N40.0 BENIGN PROSTATIC HYPERPLASIA WITHOUT LOWER URINARY TRACT SYMPTOMS: ICD-10-CM

## 2021-07-01 DIAGNOSIS — N20.0 CALCULUS OF KIDNEY: ICD-10-CM

## 2021-07-01 PROCEDURE — 99214 OFFICE O/P EST MOD 30 MIN: CPT | Performed by: STUDENT IN AN ORGANIZED HEALTH CARE EDUCATION/TRAINING PROGRAM

## 2021-07-01 ASSESSMENT — PAIN SCALES - GENERAL: PAINLEVEL: NO PAIN (0)

## 2021-07-01 ASSESSMENT — MIFFLIN-ST. JEOR: SCORE: 1676.69

## 2021-07-01 NOTE — PROGRESS NOTES
"CHIEF COMPLAINT   Marcus Hodges who is a 65 year old male returns today for follow-up of NGB, kidney stone, BPH with obstruction    HPI  Marcus Hodges is a 65 year old male with h/o neurogenic bladder due to stroke 2012, ASD s/p remote repair, h/o a. 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 Underwent UDS for evaluation, which indicated inability to void from obstruction. Discussed risks and benefits of intervention and then underwent photovaporization of the prostate 12/1/2020. Failed postop TOV 12/8/2020 and faulkner replaced. Subsequently passed TOV 12/22/2020 with moderately elevated  ml.    Litholink again shows borderline hyperoxaluria, as seen back in Nov 2020. Patient is having more than adequate urine output.    Has been urinating quite well after the PVP. Pleased with this. No more leaking. Still using condom catheter      PHYSICAL EXAM  Patient is a 65 year old  male   Vitals: Blood pressure 96/60, pulse 51, height 1.88 m (6' 2\"), weight 81.2 kg (179 lb), SpO2 97 %.  Body mass index is 22.98 kg/m .  General Appearance Adult:   Alert, no acute distress, oriented  HENT: throat/mouth:normal, good dentition  Lungs: no respiratory distress, or pursed lip breathing  Heart: No obvious jugular venous distension present  Abdomen: nondistended  Musculoskeltal: extremities normal, no peripheral edema  Skin: no suspicious lesions or rashes  Neuro: Alert, oriented, speech and mentation normal  Psych: affect and mood normal  Gait: Normal    All pertinent imaging reviewed:    CT abd/pelvis 6/24/2021  IMPRESSION:   1.  Improvement in the ascites since prior exam. Clearing of " the  bibasilar atelectatic changes since prior exam.  2.  Colonic constipation. No obstruction, diverticulitis or  appendicitis.  3.  No urinary tract calculi or hydronephrosis. Bladder is  unremarkable.  4.  Fat-containing left inguinal hernia.     I personally reviewed the images and agree with no urolithiasis    Litholink        Component PSA   Latest Ref Rng & Units 0 - 4 ug/L   7/23/2004 0.48   11/8/2005 0.50   12/22/2006 0.65   1/14/2009 0.85   3/18/2010 1.09   1/31/2011 1.16   1/9/2012 1.40   6/24/2021 1.85       ASSESSMENT and PLAN   65 year old male with h/o neurogenic bladder due to stroke 2012, ASD s/p remote repair, h/o a. 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     Urinating well after Greenlight PVP    No stones seen on repeat imaging    PSA normal. Continue annual PSA for prostate cancer screening    Continues to have borderline hyperoxaluria. Again recommended low oxalate diet given that he had a calcium oxalate stone. Will not repeat the litholink at this point given that he hasn't had other anomalies and the hyperoxaluria is mild    - return to clinic in 1 year with KUB and PSA prior, for AUA symptom score, PVR      Nehemiah Bloom MD   [facility]  [facility] Phone: [phone]

## 2021-07-01 NOTE — LETTER
"7/1/2021       RE: Marcus Hodges  [address]  [place] [address]     Dear Colleague,    Thank you for referring your patient, Marcus Hodges, to the [facility] [place] at [facility]. Please see a copy of my visit note below.    CHIEF COMPLAINT   Marcus Hodges who is a 65 year old male returns today for follow-up of NGB, kidney stone, BPH with obstruction    HPI  Marcus Hodges is a 65 year old male with h/o neurogenic bladder due to stroke 2012, ASD s/p remote repair, h/o a. 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 Underwent UDS for evaluation, which indicated inability to void from obstruction. Discussed risks and benefits of intervention and then underwent photovaporization of the prostate 12/1/2020. Failed postop TOV 12/8/2020 and faulkner replaced. Subsequently passed TOV 12/22/2020 with moderately elevated  ml.    Litholink again shows borderline hyperoxaluria, as seen back in Nov 2020. Patient is having more than adequate urine output.    Has been urinating quite well after the PVP. Pleased with this. No more leaking. Still using condom catheter      PHYSICAL EXAM  Patient is a 65 year old  male   Vitals: Blood pressure 96/60, pulse 51, height 1.88 m (6' 2\"), weight 81.2 kg (179 lb), SpO2 97 %.  Body mass index is 22.98 kg/m .  General Appearance Adult:   Alert, no acute distress, oriented  HENT: throat/mouth:normal, good dentition  Lungs: no respiratory distress, or pursed lip breathing  Heart: No obvious jugular venous distension present  Abdomen: nondistended  Musculoskeltal: " extremities normal, no peripheral edema  Skin: no suspicious lesions or rashes  Neuro: Alert, oriented, speech and mentation normal  Psych: affect and mood normal  Gait: Normal    All pertinent imaging reviewed:    CT abd/pelvis 6/24/2021  IMPRESSION:   1.  Improvement in the ascites since prior exam. Clearing of the  bibasilar atelectatic changes since prior exam.  2.  Colonic constipation. No obstruction, diverticulitis or  appendicitis.  3.  No urinary tract calculi or hydronephrosis. Bladder is  unremarkable.  4.  Fat-containing left inguinal hernia.     I personally reviewed the images and agree with no urolithiasis    Litholink        Component PSA   Latest Ref Rng & Units 0 - 4 ug/L   7/23/2004 0.48   11/8/2005 0.50   12/22/2006 0.65   1/14/2009 0.85   3/18/2010 1.09   1/31/2011 1.16   1/9/2012 1.40   6/24/2021 1.85       ASSESSMENT and PLAN   65 year old male with h/o neurogenic bladder due to stroke 2012, ASD s/p remote repair, h/o a. 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     Urinating well after Greenlight PVP    No stones seen on repeat imaging    PSA normal. Continue annual PSA for prostate cancer screening    Continues to have borderline hyperoxaluria. Again recommended low oxalate diet given that he had a calcium oxalate stone. Will not repeat the litholink at this point given that he hasn't had other anomalies and the hyperoxaluria is mild    - return to clinic in 1 year with KUB and PSA prior, for AUA symptom score, PVR      Nehemiah Bloom MD   [facility]  [facility] Phone: [phone]

## 2021-07-01 NOTE — PATIENT INSTRUCTIONS
General recommendations for stone prevention are listed below    - Drink more water, to maintain urine output >2 liters a day  - Start or maintain a low sodium diet  - Reduce the amount of oxalate in your diet  - Do not take calcium supplements but continue to have a moderate amount of calcium in your diet.    You may read more about stone prevention at https://[email or web address].org/urology-[email or web address]-z/k/kidney-stones

## 2021-07-06 ENCOUNTER — HOSPITAL ENCOUNTER (OUTPATIENT)
Dept: OCCUPATIONAL THERAPY | Facility: CLINIC | Age: 66
Setting detail: THERAPIES SERIES
End: 2021-07-06
Attending: INTERNAL MEDICINE
Payer: MEDICARE

## 2021-07-06 PROCEDURE — 97140 MANUAL THERAPY 1/> REGIONS: CPT | Mod: GO | Performed by: OCCUPATIONAL THERAPIST

## 2021-07-08 ENCOUNTER — HOSPITAL ENCOUNTER (OUTPATIENT)
Dept: PHYSICAL THERAPY | Facility: CLINIC | Age: 66
Setting detail: THERAPIES SERIES
End: 2021-07-08
Attending: INTERNAL MEDICINE
Payer: MEDICARE

## 2021-07-08 PROCEDURE — 97112 NEUROMUSCULAR REEDUCATION: CPT | Mod: GP,KX | Performed by: PHYSICAL THERAPIST

## 2021-07-08 PROCEDURE — 97110 THERAPEUTIC EXERCISES: CPT | Mod: GP | Performed by: PHYSICAL THERAPIST

## 2021-07-15 ENCOUNTER — HOSPITAL ENCOUNTER (OUTPATIENT)
Dept: PHYSICAL THERAPY | Facility: CLINIC | Age: 66
Setting detail: THERAPIES SERIES
End: 2021-07-15
Attending: INTERNAL MEDICINE
Payer: MEDICARE

## 2021-07-15 PROCEDURE — 97110 THERAPEUTIC EXERCISES: CPT | Mod: GP,KX | Performed by: PHYSICAL THERAPIST

## 2021-07-15 PROCEDURE — 97112 NEUROMUSCULAR REEDUCATION: CPT | Mod: GP | Performed by: PHYSICAL THERAPIST

## 2021-07-22 ENCOUNTER — HOSPITAL ENCOUNTER (OUTPATIENT)
Dept: PHYSICAL THERAPY | Facility: CLINIC | Age: 66
Setting detail: THERAPIES SERIES
End: 2021-07-22
Attending: INTERNAL MEDICINE
Payer: MEDICARE

## 2021-07-22 PROCEDURE — 97116 GAIT TRAINING THERAPY: CPT | Mod: GP,KX | Performed by: PHYSICAL THERAPIST

## 2021-07-22 PROCEDURE — 97110 THERAPEUTIC EXERCISES: CPT | Mod: GP,KX | Performed by: PHYSICAL THERAPIST

## 2021-07-22 PROCEDURE — 97112 NEUROMUSCULAR REEDUCATION: CPT | Mod: GP,KX | Performed by: PHYSICAL THERAPIST

## 2021-07-26 ENCOUNTER — HOSPITAL ENCOUNTER (OUTPATIENT)
Dept: OCCUPATIONAL THERAPY | Facility: CLINIC | Age: 66
Setting detail: THERAPIES SERIES
End: 2021-07-26
Attending: INTERNAL MEDICINE
Payer: MEDICARE

## 2021-07-26 PROCEDURE — 97140 MANUAL THERAPY 1/> REGIONS: CPT | Mod: GO | Performed by: OCCUPATIONAL THERAPIST

## 2021-07-26 NOTE — PROGRESS NOTES
Outpatient Occupational Therapy Progress Note     Patient: Marcus Hodges  : 1955 to 2021 (8 visits)   NOTE today is to support upcoming BOTOX VISIT with Dr. Haddad 21.     Referring Provider: Dr. Haddad, Rosemarie Strange/[place].     Therapy Diagnosis: Decreased ADL/IADL after Traumatic brain injury with loss of consciousness, sequela of L hemiparesis, spasticity  (Initial onset )      Client Self Report: Pt continues with nightly splinting (more comfortable) and daily PROM/stretching exercises, NMES reduces tone, using 3-4 days per week.  Pt noted he had some numbness in his left hand, tighter tone [place].  This does not happen very often.  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       Objective Measurements:           Objective Measure: , pinch  Using  can stablize golf ball in plam key pinch poker chip w key pinch, using NMES to complete release at midline/ low bin.              Objective Measure: L UE, Trunk ROM   Details: PROM SH FL to 150, H ABD to 95 of 110 after MFR/traction/joint mobilizations.  Supination with continued tone, gets to 20 of 90 supination (13 at start of session). ER at 90 dgrees ABD to 45 of 90 degrees (After session, OTR notes that hand and cervical tone improves better after periscapular releases than planes in supine/anterior chest and bceips forearm work.)       Goals:     Goal Identifier HEP   Goal Description Pt/wife to demonstrate independence with HEP tasks to  support ROM, weightbearing, hand use, NMES and tone management strategies for addressing LUE pain/contractures and increase functional strength and ROM for maximum independence with performance of ADLs as gross assist and gross  stabilizer.    Target Date 8/19/21   Date Met      Progress (detail required for progress note):More consistent with use of splint and NMES.       Goal Identifier Hand use   Goal Description Patient to demonstrate functional tasks (wiping the counter/table, washing dishes, etc.) with at least 15% use of L UE as gross stabilizer/gross assist for increased ADL/IADL independence and improved functional use of L UE.   Target Date 8/19/21   Date Met      Progress (detail required for progress note): Pt uses L UE to push up from supine to sit and with rolling. Can stabilize pinched items/key pinch in his lap after placed there with maximal assist.     Goal Identifier UE AROM/SROM   Goal Description Pt to demonstrate improved SROM/AAROM of shoulder flexion/abdbustion to 150 degrees, scpaular rotation to 45 degrees, elbow extension to lacking 30 dgrees, supination to 60 fo 90 degrees, wrist extension/wrist flexion to 50 of 70 degrees with hand open to support funcational hand use in varied positions and reduced pain at night/improved sleep pattern.   Target Date 8/19/21   Date Met      Progress (detail required for progress note): Pt requires botox injections and benefits from instrument assisted soft tissue mobilization to assist with managing his firm/springy tone that requires mod A to stretching for last 50% arc of the affected planes-- restricting scapular rotation to 30 of 60 degrees (levator, upper trap), H Abduction to 85 of 140 degrees (pec minor/major), elbow extension to 135 of 180 ( biceps), supination to 20 of 90 degrees ( pronator teres), and flexed wrist/hooked hand posture (wrist flexors, [employer]). Pt demonstrates poor trunk rotation during gait, some patients have benefited from injection at latissimus/lateral flank.       Progress this reporting period: Pt has been seen for 8 visits of OT in preparation for new course of botox, using home program changes/incorporating hand use into routines and  myofascial release/instrument assisted soft tissue mobilization for improving AROM and supporting more functional UE use patterns.      Plan:  Continue therapy per current plan of care. We will see him 2x/week for 2 weeks after botox and 1x/week following, until next injection series.  Discharge:  No    Thank you for this thoughtful referral! If you have any questions, please call me at [phone]. Feel free to fax your note from botox visit to [phone].  Nice to share in this patient's care with you. I am hoping my information is helpful for your session, but I will understand/support any choices you make!     Sincerely,   Ada Enrique, OTR/l

## 2021-07-26 NOTE — PROGRESS NOTES
OUTPATIENT OCCUPATIONAL THERAPY  PLAN OF TREATMENT FOR OUTPATIENT REHABILITATION AND PROGRESS NOTE    Patient's Last Name, First Name, M.I.                         YOB: 1955  Marcus Hodges                        Provider's Name  [facility] Medical Record No.  [id number]                                Onset Date:     02/04/21(MD referral date, ongonig symptoms all of 2020.)    Start of Care Date:     03/09/21   Type:     ___PT   _X_OT   ___SLP Medical Diagnosis:     Traumatic brain injury with loss of consciousness, sequela. (Initial onset April 20,2012)                          OT Diagnosis:     Decreased ADL/IADL, contractures, pain Visits from SOC:  18   _________________________________________________________________________________  Plan of Treatment/Functional Goals:  ADL training, IADL training, Coordination training, Joint mobilization, Manual therapy, Neuromuscular re-education, ROM, Self care/Home management, Strengthening, Stretching, Therapeutic activities                    Goals:    Goal Identifier HEP   Goal Description Pt/wife to demonstrate independence with HEP tasks to  support ROM, weightbearing, hand use, NMES and tone management strategies for addressing LUE pain/contractures and increase functional strength and ROM for maximum independence with performance of ADLs as gross assist and gross stabilizer.    Target Date 08/19/21   Date Met      Progress (detail required for progress note):     Goal Identifier Hand use   Goal Description Patient to demonstrate functional tasks (wiping the counter/table, washing dishes, etc.) with at least 15% use of L UE as gross stabilizer/gross assist for increased ADL/IADL independence and improved functional use of L UE.   Target Date 08/19/21   Date Met      Progress (detail required for progress note):     Goal Identifier UE AROM/SROM   Goal Description Pt to demonstrate improved SROM/AAROM of shoulder  flexion/abdbustion to 150 degrees, scpaular rotation to 45 degrees, elbow extension to lacking 30 dgrees, supination to 60 fo 90 degrees, wrist extension/wrist flexion to 50 of 70 degrees with hand open to support funcational hand use in varied positions and reduced pain at night/improved sleep pattern.   Target Date 08/19/21   Date Met      Progress (detail required for progress note):         Beginning/End Dates of Progress Note Reporting Period:  3/9/21 to 5/21/21    Progress Toward Goals:   Progress this reporting period: See attached progress note    Client Self (Subjective) Report for Progress Note Reporting Period: Pt's skin is more fragile after botox, having increased slough at L axilla, with trial of new cream.  Range is going better after botox, bending L elbow some while turning light swith on/off ( large pressure plate).      Objective Measurements:        Objective Measure: L UE ROM   Details: PROM SH FL to 145, H ABD to 100 of 110 after MFR/traction/joint mobilizations ER at 90 dgrees ABD to 50 of 90 degrees. (pec minor tone is still really limiting).  Upper cervicals much looser, mild to moderate resistance needed to  attain last 45 of triceps and biceps flexion.                   I CERTIFY THE NEED FOR THESE SERVICES FURNISHED UNDER        THIS PLAN OF TREATMENT AND WHILE UNDER MY CARE .             Physician Signature               Date    X_____________________________________________________                      Referring Provider: IRENE JEFFERS, TERRYR

## 2021-07-27 ENCOUNTER — HOSPITAL ENCOUNTER (OUTPATIENT)
Dept: PHYSICAL THERAPY | Facility: CLINIC | Age: 66
Setting detail: THERAPIES SERIES
End: 2021-07-27
Attending: INTERNAL MEDICINE
Payer: MEDICARE

## 2021-07-27 PROCEDURE — 97112 NEUROMUSCULAR REEDUCATION: CPT | Mod: GP,KX | Performed by: PHYSICAL THERAPIST

## 2021-07-27 PROCEDURE — 97530 THERAPEUTIC ACTIVITIES: CPT | Mod: GP,KX | Performed by: PHYSICAL THERAPIST

## 2021-07-27 PROCEDURE — 97110 THERAPEUTIC EXERCISES: CPT | Mod: GP,KX | Performed by: PHYSICAL THERAPIST

## 2021-07-27 PROCEDURE — 97116 GAIT TRAINING THERAPY: CPT | Mod: GP,KX | Performed by: PHYSICAL THERAPIST

## 2021-07-29 ENCOUNTER — TRANSFERRED RECORDS (OUTPATIENT)
Dept: HEALTH INFORMATION MANAGEMENT | Facility: CLINIC | Age: 66
End: 2021-07-29
Payer: MEDICARE

## 2021-08-03 ENCOUNTER — HOSPITAL ENCOUNTER (OUTPATIENT)
Dept: PHYSICAL THERAPY | Facility: CLINIC | Age: 66
Setting detail: THERAPIES SERIES
End: 2021-08-03
Attending: INTERNAL MEDICINE
Payer: MEDICARE

## 2021-08-03 PROCEDURE — 97116 GAIT TRAINING THERAPY: CPT | Mod: GP,KX | Performed by: PHYSICAL THERAPIST

## 2021-08-03 PROCEDURE — 97112 NEUROMUSCULAR REEDUCATION: CPT | Mod: GP | Performed by: PHYSICAL THERAPIST

## 2021-08-03 PROCEDURE — 97110 THERAPEUTIC EXERCISES: CPT | Mod: GP | Performed by: PHYSICAL THERAPIST

## 2021-08-04 NOTE — PROGRESS NOTES
Outpatient Physical Therapy Progress Note     Patient: Marcus Hodges  : 1955    Beginning/End Dates of Reporting Period:  21 to 8/3/2021   10th visit PN    Referring Provider: Dr. Don Aviles    Therapy Diagnosis: Decreased independence and functional activity  tolerance due to impairments from CVA and secondary impairments  following CVA.(R knee pain, shoulder pain)     Client Self Report: Had R knee pain after last session  but not too bad.  Overall tolerated the floor transfer.     Objective Measurements:  Objective Measure: 25 foot walk  Details: 22.3 sec with trekking pole and AFO on the L on 8/3/21    Objective Measure: 4 square  Details: 24.2 sec stepping over tape/marking on the floor .  Stepping over object (pole and weighted bars) therapist stabilizing object 48 sec both with trekking pole, AFO on   Objective Measure: curb/step over shabana  Details: stepping over the shabana leading with the R able to step over but not clear the L as trailing or initial leg.         Objective Measure: stairs   Details: up and down 6 inch stairs with one railing , alternating stepping and assist for improved movement pattern L LE with ascending and descending the stairs.  Improved forward progression over the L LE with stepping up and also improved hip and knee ext L stepping up/ascending.  Descending decreased hip and knee ext on the L stepping down with the L, rotate body to the R with step down R.       improving gradually have progressed from double  kneel down to partial quadriped - L hand not able to be placed on floor due to overactiva-  tion trunk and L UE. Challenged with return to stand needing mod  assist.  See daily note specifics. 21        Goals:      Goal Identifier 3-  Curb/stairs   Goal Description Marcus to have increased disassociation of movement/improved timing and sequencing of muscle activation for improved hip and knee flexion in standing demonstrated by the ability to step over  upright shabana (4-6 inch object) with the L LE to allow him to step up onto a curb without catching foot.   Target Date 12/31/21   Date Met      Progress (detail required for progress note): Progressing to this goal. Able to step over shabana leading with the R LE, initiates hip flexion on the L but not able to perform hip and knee adequately to clear the L LE. Wife states that Lex is doing much better with performing curbs with her.      Goal Identifier 4- Gait   Goal Description Marcus to increase gait speed to 19 seconds or less with AD and 24 steps or less to demonstrate improved gait speed and efficiency for community gait and also decreased overuse of the R side.    Target Date 12/31/21   Date Met   inconsistent. Times vary from 19.9 sec to 23 sec   Progress (detail required for progress note):   As noted     Goal Identifier 8 functional reach/neuro motor control   Goal Description Marcus to perform reaching to the floor to the left to pick object up off the floor while maintaining L hand contact/closed chain on the mat to demonstrate impropved neuromotor planning, ability to bring COM to the left while keeping proper muscle activation/deactivation L and R side of body for greater safety with reaching activiites to the floor, unstrapping shoe, AFO.  Also for carryover of proper motor function for improved gait and transfers.    GOAL IN PROGRESS    5- Floor transfers.     Goal Description Marcus to perform transfer to and from floor with outside UE support and min assist of 1 to allow him to recover from a fall with assist from wife or caregiver.    Target Date 07/26/18   IN PROGRESS SEE ABOVE        limited improvement in gait speed but gait quality has improved. Pt and wife also report  improved ability to do stairs at home as well as curbs in the community.  Overall improved gait quality, improved ability to bring COM forward past TAYLOR, improved progression with floor transfer, improved movement patterns with  transitions - sit to stand, sit to and from supine. Barriers include significance of CVA, blindness R eye. He is appropriate to continue with skilled PT intervention, without PT he has shown a decline in his function.       Plan:  Continue therapy per current plan of care. 1 x a week    Discharge:  No

## 2021-08-10 ENCOUNTER — HOSPITAL ENCOUNTER (OUTPATIENT)
Dept: OCCUPATIONAL THERAPY | Facility: CLINIC | Age: 66
Setting detail: THERAPIES SERIES
End: 2021-08-10
Attending: INTERNAL MEDICINE
Payer: MEDICARE

## 2021-08-10 ENCOUNTER — HOSPITAL ENCOUNTER (OUTPATIENT)
Dept: PHYSICAL THERAPY | Facility: CLINIC | Age: 66
Setting detail: THERAPIES SERIES
End: 2021-08-10
Attending: INTERNAL MEDICINE
Payer: MEDICARE

## 2021-08-10 PROCEDURE — 97110 THERAPEUTIC EXERCISES: CPT | Mod: GP,KX | Performed by: PHYSICAL THERAPIST

## 2021-08-10 PROCEDURE — 97112 NEUROMUSCULAR REEDUCATION: CPT | Mod: GP | Performed by: PHYSICAL THERAPIST

## 2021-08-10 PROCEDURE — 97116 GAIT TRAINING THERAPY: CPT | Mod: GP | Performed by: PHYSICAL THERAPIST

## 2021-08-10 PROCEDURE — 97112 NEUROMUSCULAR REEDUCATION: CPT | Mod: GO | Performed by: OCCUPATIONAL THERAPIST

## 2021-08-10 PROCEDURE — 97140 MANUAL THERAPY 1/> REGIONS: CPT | Mod: GO | Performed by: OCCUPATIONAL THERAPIST

## 2021-08-17 ENCOUNTER — HOSPITAL ENCOUNTER (OUTPATIENT)
Dept: OCCUPATIONAL THERAPY | Facility: CLINIC | Age: 66
Setting detail: THERAPIES SERIES
End: 2021-08-17
Attending: INTERNAL MEDICINE
Payer: MEDICARE

## 2021-08-17 ENCOUNTER — HOSPITAL ENCOUNTER (OUTPATIENT)
Dept: PHYSICAL THERAPY | Facility: CLINIC | Age: 66
Setting detail: THERAPIES SERIES
End: 2021-08-17
Attending: INTERNAL MEDICINE
Payer: MEDICARE

## 2021-08-17 PROCEDURE — 97112 NEUROMUSCULAR REEDUCATION: CPT | Mod: GO | Performed by: OCCUPATIONAL THERAPIST

## 2021-08-17 PROCEDURE — 97140 MANUAL THERAPY 1/> REGIONS: CPT | Mod: GO,KX | Performed by: OCCUPATIONAL THERAPIST

## 2021-08-17 PROCEDURE — 97112 NEUROMUSCULAR REEDUCATION: CPT | Mod: GP,KX | Performed by: PHYSICAL THERAPIST

## 2021-08-17 PROCEDURE — 97116 GAIT TRAINING THERAPY: CPT | Mod: GP,KX | Performed by: PHYSICAL THERAPIST

## 2021-08-17 PROCEDURE — 97110 THERAPEUTIC EXERCISES: CPT | Mod: GP,KX | Performed by: PHYSICAL THERAPIST

## 2021-08-20 ENCOUNTER — HOSPITAL ENCOUNTER (OUTPATIENT)
Dept: OCCUPATIONAL THERAPY | Facility: CLINIC | Age: 66
Setting detail: THERAPIES SERIES
End: 2021-08-20
Attending: INTERNAL MEDICINE
Payer: MEDICARE

## 2021-08-20 PROCEDURE — 97140 MANUAL THERAPY 1/> REGIONS: CPT | Mod: GO,KX | Performed by: OCCUPATIONAL THERAPIST

## 2021-08-20 PROCEDURE — 97112 NEUROMUSCULAR REEDUCATION: CPT | Mod: GO,KX | Performed by: OCCUPATIONAL THERAPIST

## 2021-08-20 NOTE — PROGRESS NOTES
OUTPATIENT OCCUPATIONAL THERAPY  PLAN OF TREATMENT FOR OUTPATIENT REHABILITATION AND PROGRESS NOTE    Patient's Last Name, First Name, Marcus Mcgee Date of Birth  1955   Provider's Name  [facility] Medical Record No.  [id number]    Onset Date  02/04/21(MD referral date, ongoing symptoms all of 2020.) Start of Care Date  3/9/2021   Type:     __PT   _X_OT   __SLP Medical Diagnosis  *Traumatic brain injury with loss of consciousness, sequela. (Initial onset April 20,2012)                       OT Diagnosis  Decreased ADL/IADL, contractures, pain Plan of Treatment  Frequency/Duration: 2x/week x 2 weeks after botox, then 1x/week x 10 weeks after.  Certification date from 8/17/21 to 11/15/21     Goals:  ADL training, IADL training, Coordination training, Joint mobilization, Manual therapy, Neuromuscular re-education, ROM, Self care/Home management, Strengthening, Stretching, Therapeutic activities     Beginning/End Dates of Progress Note Reporting Period:  5/21/21 to 8/20/21    Progress Toward Goals:   Progress this reporting period: See attached progress note dated 7/26/21.    Client Self (Subjective) Report for Progress Note Reporting Period: Pt pleased his L arm is moving more after botox.             Objective Measure: L UE, Trunk ROM   Details: PROM SH HABD to 95 of 110, EL EX to full end range mild to medium resistance for last 40% of ROM/tone.  SH FL to 145, tone at pec minor and lat/teres major ( axilla skin still pink).  SH ER at 90 degrees ABD to 60 of 90 degrees, moderate springy tone. AAROM   H Abd 90 to Hadd 40 of 40 degrees, combined with biceps  flexion 0-  90 dgrees with moderate support under UE/humerus. Scap retraction and H abd  for 1/3 of initial range mod A with remainder.   SH EX with mild to mod Assist 80 degrees to 30 degrees.                 I CERTIFY THE NEED FOR THESE SERVICES FURNISHED UNDER        THIS PLAN OF TREATMENT AND WHILE  UNDER MY CARE     (Physician co-signature of this document indicates review and certification of the therapy plan).                Referring Provider: Don Enrique, TERRYR

## 2021-08-25 ENCOUNTER — HOSPITAL ENCOUNTER (OUTPATIENT)
Dept: PHYSICAL THERAPY | Facility: CLINIC | Age: 66
Setting detail: THERAPIES SERIES
End: 2021-08-25
Attending: INTERNAL MEDICINE
Payer: MEDICARE

## 2021-08-25 ENCOUNTER — HOSPITAL ENCOUNTER (OUTPATIENT)
Dept: OCCUPATIONAL THERAPY | Facility: CLINIC | Age: 66
Setting detail: THERAPIES SERIES
End: 2021-08-25
Attending: INTERNAL MEDICINE
Payer: MEDICARE

## 2021-08-25 PROCEDURE — 97140 MANUAL THERAPY 1/> REGIONS: CPT | Mod: GO,KX | Performed by: OCCUPATIONAL THERAPIST

## 2021-08-25 PROCEDURE — 97112 NEUROMUSCULAR REEDUCATION: CPT | Mod: GO,KX | Performed by: OCCUPATIONAL THERAPIST

## 2021-08-25 PROCEDURE — 97112 NEUROMUSCULAR REEDUCATION: CPT | Mod: GP,KX | Performed by: PHYSICAL THERAPIST

## 2021-08-25 PROCEDURE — 97110 THERAPEUTIC EXERCISES: CPT | Mod: GP | Performed by: PHYSICAL THERAPIST

## 2021-08-31 ENCOUNTER — HOSPITAL ENCOUNTER (OUTPATIENT)
Dept: PHYSICAL THERAPY | Facility: CLINIC | Age: 66
Setting detail: THERAPIES SERIES
End: 2021-08-31
Attending: INTERNAL MEDICINE
Payer: MEDICARE

## 2021-08-31 PROCEDURE — 97110 THERAPEUTIC EXERCISES: CPT | Mod: GP,KX | Performed by: PHYSICAL THERAPIST

## 2021-08-31 PROCEDURE — 97112 NEUROMUSCULAR REEDUCATION: CPT | Mod: GP,KX | Performed by: PHYSICAL THERAPIST

## 2021-09-07 ENCOUNTER — HOSPITAL ENCOUNTER (OUTPATIENT)
Dept: PHYSICAL THERAPY | Facility: CLINIC | Age: 66
Setting detail: THERAPIES SERIES
End: 2021-09-07
Attending: INTERNAL MEDICINE
Payer: MEDICARE

## 2021-09-07 ENCOUNTER — HOSPITAL ENCOUNTER (OUTPATIENT)
Dept: OCCUPATIONAL THERAPY | Facility: CLINIC | Age: 66
Setting detail: THERAPIES SERIES
End: 2021-09-07
Attending: INTERNAL MEDICINE
Payer: MEDICARE

## 2021-09-07 ENCOUNTER — MYC MEDICAL ADVICE (OUTPATIENT)
Dept: PEDIATRICS | Facility: CLINIC | Age: 66
End: 2021-09-07

## 2021-09-07 PROCEDURE — 97110 THERAPEUTIC EXERCISES: CPT | Mod: GP,KX | Performed by: PHYSICAL THERAPIST

## 2021-09-07 PROCEDURE — 97112 NEUROMUSCULAR REEDUCATION: CPT | Mod: GP,KX | Performed by: PHYSICAL THERAPIST

## 2021-09-07 PROCEDURE — 97112 NEUROMUSCULAR REEDUCATION: CPT | Mod: GO | Performed by: OCCUPATIONAL THERAPIST

## 2021-09-07 PROCEDURE — 97140 MANUAL THERAPY 1/> REGIONS: CPT | Mod: GO | Performed by: OCCUPATIONAL THERAPIST

## 2021-09-07 PROCEDURE — 97116 GAIT TRAINING THERAPY: CPT | Mod: GP | Performed by: PHYSICAL THERAPIST

## 2021-09-21 ENCOUNTER — HOSPITAL ENCOUNTER (OUTPATIENT)
Dept: PHYSICAL THERAPY | Facility: CLINIC | Age: 66
Setting detail: THERAPIES SERIES
End: 2021-09-21
Attending: INTERNAL MEDICINE
Payer: MEDICARE

## 2021-09-21 ENCOUNTER — HOSPITAL ENCOUNTER (OUTPATIENT)
Dept: OCCUPATIONAL THERAPY | Facility: CLINIC | Age: 66
Setting detail: THERAPIES SERIES
End: 2021-09-21
Attending: INTERNAL MEDICINE
Payer: MEDICARE

## 2021-09-21 PROCEDURE — 97110 THERAPEUTIC EXERCISES: CPT | Mod: GP,KX | Performed by: PHYSICAL THERAPIST

## 2021-09-21 PROCEDURE — 97112 NEUROMUSCULAR REEDUCATION: CPT | Mod: GP,KX | Performed by: PHYSICAL THERAPIST

## 2021-09-21 PROCEDURE — 97116 GAIT TRAINING THERAPY: CPT | Mod: GP,KX | Performed by: PHYSICAL THERAPIST

## 2021-09-21 PROCEDURE — 97112 NEUROMUSCULAR REEDUCATION: CPT | Mod: GO,KX | Performed by: OCCUPATIONAL THERAPIST

## 2021-09-21 PROCEDURE — 97140 MANUAL THERAPY 1/> REGIONS: CPT | Mod: GO,KX | Performed by: OCCUPATIONAL THERAPIST

## 2021-09-28 ENCOUNTER — HOSPITAL ENCOUNTER (OUTPATIENT)
Dept: PHYSICAL THERAPY | Facility: CLINIC | Age: 66
Setting detail: THERAPIES SERIES
End: 2021-09-28
Attending: INTERNAL MEDICINE
Payer: MEDICARE

## 2021-09-28 PROCEDURE — 97116 GAIT TRAINING THERAPY: CPT | Mod: GP,KX | Performed by: PHYSICAL THERAPIST

## 2021-09-28 PROCEDURE — 97112 NEUROMUSCULAR REEDUCATION: CPT | Mod: GP,KX | Performed by: PHYSICAL THERAPIST

## 2021-09-28 PROCEDURE — 97110 THERAPEUTIC EXERCISES: CPT | Mod: GP,KX | Performed by: PHYSICAL THERAPIST

## 2021-10-05 ENCOUNTER — HOSPITAL ENCOUNTER (OUTPATIENT)
Dept: OCCUPATIONAL THERAPY | Facility: CLINIC | Age: 66
Setting detail: THERAPIES SERIES
End: 2021-10-05
Attending: INTERNAL MEDICINE
Payer: MEDICARE

## 2021-10-05 ENCOUNTER — HOSPITAL ENCOUNTER (OUTPATIENT)
Dept: PHYSICAL THERAPY | Facility: CLINIC | Age: 66
Setting detail: THERAPIES SERIES
End: 2021-10-05
Attending: INTERNAL MEDICINE
Payer: MEDICARE

## 2021-10-05 PROCEDURE — 97112 NEUROMUSCULAR REEDUCATION: CPT | Mod: GP,KX | Performed by: PHYSICAL THERAPIST

## 2021-10-05 PROCEDURE — 97116 GAIT TRAINING THERAPY: CPT | Mod: GP,KX | Performed by: PHYSICAL THERAPIST

## 2021-10-05 PROCEDURE — 97112 NEUROMUSCULAR REEDUCATION: CPT | Mod: GO,KX | Performed by: OCCUPATIONAL THERAPIST

## 2021-10-05 PROCEDURE — 97140 MANUAL THERAPY 1/> REGIONS: CPT | Mod: GO,KX | Performed by: OCCUPATIONAL THERAPIST

## 2021-10-05 PROCEDURE — 97110 THERAPEUTIC EXERCISES: CPT | Mod: GP,KX | Performed by: PHYSICAL THERAPIST

## 2021-10-09 DIAGNOSIS — E78.5 HYPERLIPIDEMIA LDL GOAL <100: ICD-10-CM

## 2021-10-12 ENCOUNTER — HOSPITAL ENCOUNTER (OUTPATIENT)
Dept: PHYSICAL THERAPY | Facility: CLINIC | Age: 66
Setting detail: THERAPIES SERIES
End: 2021-10-12
Attending: INTERNAL MEDICINE
Payer: MEDICARE

## 2021-10-12 PROCEDURE — 97112 NEUROMUSCULAR REEDUCATION: CPT | Mod: GP,KX | Performed by: PHYSICAL THERAPIST

## 2021-10-12 PROCEDURE — 97110 THERAPEUTIC EXERCISES: CPT | Mod: GP,KX | Performed by: PHYSICAL THERAPIST

## 2021-10-12 PROCEDURE — 97530 THERAPEUTIC ACTIVITIES: CPT | Mod: GP,KX | Performed by: PHYSICAL THERAPIST

## 2021-10-12 PROCEDURE — 97116 GAIT TRAINING THERAPY: CPT | Mod: GP,KX | Performed by: PHYSICAL THERAPIST

## 2021-10-12 RX ORDER — ATORVASTATIN CALCIUM 20 MG/1
20 TABLET, FILM COATED ORAL DAILY
Qty: 90 TABLET | Refills: 3 | Status: SHIPPED | OUTPATIENT
Start: 2021-10-12 | End: 2022-10-14

## 2021-10-12 NOTE — TELEPHONE ENCOUNTER
Failed protocol.  please route to  team if patient needs an appointment     Criss DIASRN BSN  [facility]  [phone]

## 2021-10-13 ENCOUNTER — TELEPHONE (OUTPATIENT)
Dept: UROLOGY | Facility: CLINIC | Age: 66
End: 2021-10-13

## 2021-10-13 DIAGNOSIS — R33.9 URINARY RETENTION: ICD-10-CM

## 2021-10-13 RX ORDER — TAMSULOSIN HYDROCHLORIDE 0.4 MG/1
0.4 CAPSULE ORAL DAILY
Qty: 90 CAPSULE | Refills: 3 | Status: SHIPPED | OUTPATIENT
Start: 2021-10-13 | End: 2022-08-10

## 2021-10-13 NOTE — TELEPHONE ENCOUNTER
Refill sent to pt's pharmacy as requested.  MAN Holder RN       [facility]    Phone Message    May a detailed message be left on voicemail: yes     Reason for Call: Medication Refill Request    Has the patient contacted the pharmacy for the refill? Yes   Name of medication being requested: tamsulosin (FLOMAX) 0.4 MG capsule  Provider who prescribed the medication:   Pharmacy: [id number] - [place], [place] - [address]  Date medication is needed: asap         Action Taken: Message routed to:  [facility] uro    Travel Screening: Not Applicable

## 2021-10-18 ENCOUNTER — HOSPITAL ENCOUNTER (OUTPATIENT)
Dept: OCCUPATIONAL THERAPY | Facility: CLINIC | Age: 66
Setting detail: THERAPIES SERIES
End: 2021-10-18
Attending: INTERNAL MEDICINE
Payer: MEDICARE

## 2021-10-18 PROCEDURE — 97140 MANUAL THERAPY 1/> REGIONS: CPT | Mod: GO,KX | Performed by: OCCUPATIONAL THERAPIST

## 2021-10-18 NOTE — PROGRESS NOTES
OUTPATIENT OCCUPATIONAL THERAPY  PLAN OF TREATMENT FOR OUTPATIENT REHABILITATION AND PROGRESS NOTE    Patient's Last Name, First Name, Marcus Mcgee Date of Birth  1955   Provider's Name  [facility] Medical Record No.  [id number]    Onset Date  02/04/21(MD referral date, ongoing symptoms all of 2020.) Start of Care Date  3/9/2021   Type:     __PT   _X_OT   __SLP Medical Diagnosis  *Traumatic brain injury with loss of consciousness, sequela. (Initial onset April 20,2012)            OT Diagnosis  Decreased ADL/IADL, contractures, pain Plan of Treatment  Frequency/Duration: 2x/week x 2 weeks after botox, then 1x/week x 10 weeks after.  Certification date from 10/18/21 to 1/16/22     Goals:    Goal Identifier HEP   Goal Description Pt/wife to demonstrate independence with HEP tasks to  support ROM, weightbearing, hand use, NMES and tone management strategies for addressing LUE pain/contractures and increase functional strength and ROM for maximum independence with performance of ADLs as gross assist and gross stabilizer.    Target Date 01/16/22   Date Met      Progress (detail required for progress note): Continues diligent noc splinting, P/AAROM and NMES by wife.        Goal Identifier Hand use   Goal Description Patient to demonstrate functional tasks (wiping the counter/table, washing dishes, etc.) with at least 15% use of L UE as gross stabilizer/gross assist for increased ADL/IADL independence and improved functional use of L UE.   Target Date 01/16/22   Date Met      Progress (detail required for progress note): Is using L hand to help with dressing ( pulling out of sleeve, SH EX/ADD to stabilize tshirt in axilla).  Can stabilize golf ball placed in palm or poker chip placed into key pinch, but needs to use NMES to complete release at midline/ low bin.  (Usually does NMES as a more passive activity now. pushes intensity to 70 ( skin tolerates fine, gets  moveent of hand/digit int the  lower 40's. OTR encourages pt to lower the intensity so that he does not habilituate to the charge too soon)     Goal Identifier UE AROM/SROM   Goal Description Pt to demonstrate improved SROM/AAROM of shoulder flexion/abdbuction to 150 degrees, scapular rotation to 45 degrees, elbow extension to lacking 30 dgrees, supination to 60 of 90 degrees, wrist extension/wrist flexion to 50 of 70 degrees with hand open to support functional hand use in varied positions and reduced pain at night/improved sleep pattern.   Target Date 01/16/22   Date Met      Progress (detail required for progress note): Pt due for his next L UE botox series 10/21/21. PROM SH HABD to 84 of 110, EL EX to lacking 8 degrees, passive range requiring  mild to medium resistance for last 30% of ROM/tone.  SH FL to 128 at start of session, improves to 145 degrees (per tone at pec minor, brachialis, teres minor and upper trap/levator restricted at scap rotation). SH ER at 90 degrees ABD to 32 of 90 degrees, higher springy tone noted at pec minor. SH ER at ribside to 24 of 45 dgrees, similar restrictions at pec minor. Has active to AAROM  arc at H Abd 0-50 degrees to Hadd 40 of 40 degrees. After stretched PROM into near full EL EX, gets AROM combined with biceps  flexion 0-  100 degrees with moderate support under UE/humerus. Scap retraction and H abd  for 1/3 of initial range  min  A/middle 1/3 and mod A with remainder 1/3.   SH EX with mild  Assist 80 degrees to 30 degrees.   Lacks 30 dgrees MCP extension for IF/MF and 20 dgrees lacking for RF/SF.  TH EX, gets to 30 of 60 PROM.       Beginning/End Dates of Progress Note Reporting Period:  8/17/21 to 10/18/21      Progress Toward Goals:   Progress this reporting period: Pt continues to be affected by strong tone/spasicity but responds better to combination of Botox injections every 12 weeks and increased frequency to 2x/week x 2 weeks ( to reduce pain, contractures and  help get P/AAROM back while adding more functional movement patterns when he is moving easier)    AREAS THAT MAY BENEFIT FROM CONTINUED BOTOX INJECTIONS include--  ~~Upper trap, levator ( head positioning, cervical stiffness and intermittent whole hand paraesthesia  ~~ Pec Minor. Teres minor/Infraspintus ( to support GH AAROM)  ~~ Biceps, brachioradialis (  Reduce tone limiting EL EX)  ~~ Wrist flexors, pronator teres ( contracture of forearm  Lacking >60 of 90 supination  ~ ~Interossei  and Thenar eminence ( support reduced paraesthesia and hand contracture)    Client Self (Subjective) Report for Progress Note Reporting Period: Pt is doing better with being able to wear his hand splint and sleeping through the night, even at the end of his 12 week botox cycle, which was harder when he wasn't doing occupational therapy.  He has times where he is relaxed and the elbow rests much straighter. Sleep positioning can get hard with his neck and he will have short 5 min bouts of whole hand tingling in L hand   approximately 2x/week, more so as the botox wears off.  He also uses NMES for digit/WR extension getting to 20 fo 60 degreees WR EX,  75% open wth MCP's and full digit extension. ( TH does  not extend during NMES). He has been better able to get his tshirts on independently and uses his R SH actively to push off/help with hip scooting, roll over, shift laterally in bed.  He keeps  mentally engaged by reading, web research, playing 7 Little Words puzzles on PC and ping pong matches with his wife.      Objective Measurements:            Objective Measure: , pinch   Details: Can stabilize golf ball placed in palm or poker chip placed into key pinch, but needs to use NMES to complete release at midline/ low bin.  (Usually does NMES as a more passive activity now. pushes intensity to 70 ( skin tolerates fine, gets moveent of hand/digit int the  lower 40's. OTR encourages pt to lower the intensity so that he does not  habilituate to the charge too soon)     Objective Measure: L UE, Trunk ROM   Details: Pt due for his next L UE botox series 10/21/21. PROM SH HABD to 84 of 110, EL EX to lacking 8 degrees, passive range requiring  mild to medium resistance for last 30% of ROM/tone.  SH FL to 128 at start of session, improves to 145 degrees (per tone at pec minor, brachialis, teres minor and upper trap/levator restricted at scap rotation). SH ER at 90 degrees ABD to 32 of 90 degrees, higher springy tone noted at pec minor. SH ER at ribside to 24 of 45 dgrees, similar restrictions at pec minor. Has active to AAROM  arc at H Abd 0-50 degrees to Hadd 40 of 40 degrees. After stretched PROM into near full EL EX, gets AROM combined with biceps  flexion 0-  100 degrees with moderate support under UE/humerus. Scap retraction and H abd  for 1/3 of initial range  min  A/middle 1/3 and mod A with remainder 1/3.   SH EX with mild  Assist 80 degrees to 30 degrees.   Lacks 30 dgrees MCP extension for IF/MF and 20 dgrees lacking for RF/SF.  TH EX, gets to 30 of 60 PROM.                   I CERTIFY THE NEED FOR THESE SERVICES FURNISHED UNDER        THIS PLAN OF TREATMENT AND WHILE UNDER MY CARE .             Physician Signature               Date    X_____________________________________________________                      Referring Provider: Perla Haddad. MD Ada Enrique, OTR

## 2021-10-19 ENCOUNTER — OFFICE VISIT (OUTPATIENT)
Dept: PEDIATRICS | Facility: CLINIC | Age: 66
End: 2021-10-19
Payer: MEDICARE

## 2021-10-19 VITALS
BODY MASS INDEX: 21.25 KG/M2 | DIASTOLIC BLOOD PRESSURE: 50 MMHG | TEMPERATURE: 98.7 F | RESPIRATION RATE: 14 BRPM | OXYGEN SATURATION: 98 % | WEIGHT: 165.5 LBS | HEART RATE: 55 BPM | SYSTOLIC BLOOD PRESSURE: 90 MMHG

## 2021-10-19 DIAGNOSIS — N31.9 NEUROGENIC BLADDER: ICD-10-CM

## 2021-10-19 DIAGNOSIS — Z00.00 ENCOUNTER FOR MEDICARE ANNUAL WELLNESS EXAM: Primary | ICD-10-CM

## 2021-10-19 DIAGNOSIS — R63.4 WEIGHT LOSS: ICD-10-CM

## 2021-10-19 DIAGNOSIS — R33.8 BENIGN PROSTATIC HYPERPLASIA WITH URINARY RETENTION: ICD-10-CM

## 2021-10-19 DIAGNOSIS — K59.2 NEUROGENIC BOWEL: ICD-10-CM

## 2021-10-19 DIAGNOSIS — G40.909 SEIZURE DISORDER (H): ICD-10-CM

## 2021-10-19 DIAGNOSIS — E78.5 HYPERLIPIDEMIA LDL GOAL <100: ICD-10-CM

## 2021-10-19 DIAGNOSIS — D64.9 ANEMIA, UNSPECIFIED TYPE: ICD-10-CM

## 2021-10-19 DIAGNOSIS — Z12.11 SCREENING FOR COLON CANCER: ICD-10-CM

## 2021-10-19 DIAGNOSIS — S06.9X9S TRAUMATIC BRAIN INJURY WITH LOSS OF CONSCIOUSNESS, SEQUELA (H): ICD-10-CM

## 2021-10-19 DIAGNOSIS — F33.1 MAJOR DEPRESSIVE DISORDER, RECURRENT EPISODE, MODERATE (H): ICD-10-CM

## 2021-10-19 DIAGNOSIS — N40.1 BENIGN PROSTATIC HYPERPLASIA WITH URINARY RETENTION: ICD-10-CM

## 2021-10-19 DIAGNOSIS — G81.94 LEFT HEMIPARESIS (H): ICD-10-CM

## 2021-10-19 DIAGNOSIS — B37.2 CANDIDIASIS OF SKIN: ICD-10-CM

## 2021-10-19 DIAGNOSIS — I48.20 CHRONIC ATRIAL FIBRILLATION (H): ICD-10-CM

## 2021-10-19 DIAGNOSIS — E55.9 VITAMIN D DEFICIENCY: ICD-10-CM

## 2021-10-19 LAB
ALBUMIN SERPL-MCNC: 4 G/DL (ref 3.4–5)
ALP SERPL-CCNC: 93 U/L (ref 40–150)
ALT SERPL W P-5'-P-CCNC: 37 U/L (ref 0–70)
ANION GAP SERPL CALCULATED.3IONS-SCNC: 4 MMOL/L (ref 3–14)
AST SERPL W P-5'-P-CCNC: 27 U/L (ref 0–45)
BASOPHILS # BLD AUTO: 0 10E3/UL (ref 0–0.2)
BASOPHILS NFR BLD AUTO: 1 %
BILIRUB SERPL-MCNC: 1 MG/DL (ref 0.2–1.3)
BUN SERPL-MCNC: 15 MG/DL (ref 7–30)
CALCIUM SERPL-MCNC: 9.1 MG/DL (ref 8.5–10.1)
CHLORIDE BLD-SCNC: 108 MMOL/L (ref 94–109)
CHOLEST SERPL-MCNC: 115 MG/DL
CO2 SERPL-SCNC: 26 MMOL/L (ref 20–32)
CREAT SERPL-MCNC: 0.81 MG/DL (ref 0.66–1.25)
DEPRECATED CALCIDIOL+CALCIFEROL SERPL-MC: 52 UG/L (ref 20–75)
EOSINOPHIL # BLD AUTO: 0.1 10E3/UL (ref 0–0.7)
EOSINOPHIL NFR BLD AUTO: 3 %
ERYTHROCYTE [DISTWIDTH] IN BLOOD BY AUTOMATED COUNT: 13.6 % (ref 10–15)
FASTING STATUS PATIENT QL REPORTED: YES
GFR SERPL CREATININE-BSD FRML MDRD: >90 ML/MIN/1.73M2
GLUCOSE BLD-MCNC: 78 MG/DL (ref 70–99)
HCT VFR BLD AUTO: 39.1 % (ref 40–53)
HDLC SERPL-MCNC: 62 MG/DL
HGB BLD-MCNC: 12.9 G/DL (ref 13.3–17.7)
LDLC SERPL CALC-MCNC: 44 MG/DL
LYMPHOCYTES # BLD AUTO: 0.6 10E3/UL (ref 0.8–5.3)
LYMPHOCYTES NFR BLD AUTO: 14 %
MCH RBC QN AUTO: 30.1 PG (ref 26.5–33)
MCHC RBC AUTO-ENTMCNC: 33 G/DL (ref 31.5–36.5)
MCV RBC AUTO: 91 FL (ref 78–100)
MONOCYTES # BLD AUTO: 0.4 10E3/UL (ref 0–1.3)
MONOCYTES NFR BLD AUTO: 8 %
NEUTROPHILS # BLD AUTO: 3.1 10E3/UL (ref 1.6–8.3)
NEUTROPHILS NFR BLD AUTO: 74 %
NONHDLC SERPL-MCNC: 53 MG/DL
PLATELET # BLD AUTO: 177 10E3/UL (ref 150–450)
POTASSIUM BLD-SCNC: 4.2 MMOL/L (ref 3.4–5.3)
PROT SERPL-MCNC: 7.5 G/DL (ref 6.8–8.8)
RBC # BLD AUTO: 4.29 10E6/UL (ref 4.4–5.9)
SODIUM SERPL-SCNC: 138 MMOL/L (ref 133–144)
TRIGL SERPL-MCNC: 43 MG/DL
TSH SERPL DL<=0.005 MIU/L-ACNC: 2.82 MU/L (ref 0.4–4)
WBC # BLD AUTO: 4.2 10E3/UL (ref 4–11)

## 2021-10-19 PROCEDURE — 99396 PREV VISIT EST AGE 40-64: CPT | Mod: 25 | Performed by: INTERNAL MEDICINE

## 2021-10-19 PROCEDURE — 80053 COMPREHEN METABOLIC PANEL: CPT | Performed by: INTERNAL MEDICINE

## 2021-10-19 PROCEDURE — 90682 RIV4 VACC RECOMBINANT DNA IM: CPT | Performed by: INTERNAL MEDICINE

## 2021-10-19 PROCEDURE — 82306 VITAMIN D 25 HYDROXY: CPT | Performed by: INTERNAL MEDICINE

## 2021-10-19 PROCEDURE — 85025 COMPLETE CBC W/AUTO DIFF WBC: CPT | Performed by: INTERNAL MEDICINE

## 2021-10-19 PROCEDURE — G0008 ADMIN INFLUENZA VIRUS VAC: HCPCS | Performed by: INTERNAL MEDICINE

## 2021-10-19 PROCEDURE — 80061 LIPID PANEL: CPT | Performed by: INTERNAL MEDICINE

## 2021-10-19 PROCEDURE — 36415 COLL VENOUS BLD VENIPUNCTURE: CPT | Performed by: INTERNAL MEDICINE

## 2021-10-19 PROCEDURE — 99214 OFFICE O/P EST MOD 30 MIN: CPT | Mod: 25 | Performed by: INTERNAL MEDICINE

## 2021-10-19 PROCEDURE — 84443 ASSAY THYROID STIM HORMONE: CPT | Performed by: INTERNAL MEDICINE

## 2021-10-19 RX ORDER — METOPROLOL SUCCINATE 25 MG/1
25 TABLET, EXTENDED RELEASE ORAL DAILY
Qty: 90 TABLET | Refills: 3 | Status: SHIPPED | OUTPATIENT
Start: 2021-10-19 | End: 2021-10-27

## 2021-10-19 RX ORDER — CLOTRIMAZOLE 1 %
CREAM (GRAM) TOPICAL 2 TIMES DAILY PRN
Qty: 113 G | Refills: 3 | Status: SHIPPED | OUTPATIENT
Start: 2021-10-19 | End: 2022-10-26

## 2021-10-19 RX ORDER — CITALOPRAM HYDROBROMIDE 20 MG/1
30 TABLET ORAL DAILY
Qty: 135 TABLET | Refills: 3 | Status: SHIPPED | OUTPATIENT
Start: 2021-10-19 | End: 2022-10-26

## 2021-10-19 RX ORDER — MIRABEGRON 50 MG/1
50 TABLET, EXTENDED RELEASE ORAL DAILY
Qty: 90 TABLET | Refills: 3 | Status: SHIPPED | OUTPATIENT
Start: 2021-10-19 | End: 2022-10-26

## 2021-10-19 ASSESSMENT — ENCOUNTER SYMPTOMS
HEMATURIA: 0
SORE THROAT: 0
EYE PAIN: 0
DYSURIA: 0
PARESTHESIAS: 0
FEVER: 0
DIZZINESS: 0
HEMATOCHEZIA: 0
HEADACHES: 0
HEARTBURN: 0
SHORTNESS OF BREATH: 0
NERVOUS/ANXIOUS: 0
ABDOMINAL PAIN: 0
JOINT SWELLING: 0
FREQUENCY: 0
PALPITATIONS: 0
WEAKNESS: 0
CONSTIPATION: 1
MYALGIAS: 0
ARTHRALGIAS: 1
DIARRHEA: 0
COUGH: 0
NAUSEA: 0
CHILLS: 0

## 2021-10-19 ASSESSMENT — ACTIVITIES OF DAILY LIVING (ADL)
CURRENT_FUNCTION: MONEY MANAGEMENT REQUIRES ASSISTANCE
CURRENT_FUNCTION: PREPARING MEALS REQUIRES ASSISTANCE
CURRENT_FUNCTION: TRANSPORTATION REQUIRES ASSISTANCE
CURRENT_FUNCTION: LAUNDRY REQUIRES ASSISTANCE
CURRENT_FUNCTION: BATHING REQUIRES ASSISTANCE
CURRENT_FUNCTION: HOUSEWORK REQUIRES ASSISTANCE
CURRENT_FUNCTION: MEDICATION ADMINISTRATION REQUIRES ASSISTANCE
CURRENT_FUNCTION: SHOPPING REQUIRES ASSISTANCE

## 2021-10-19 NOTE — PATIENT INSTRUCTIONS
Toe-epsom salt soaks 2-3 times daily, follow-up if not better in 1 week  Ok to try magnesium  cologuard    Preventive Health Recommendations  Male Ages 50 - 64    Yearly exam:             See your health care provider every year in order to  o   Review health changes.   o   Discuss preventive care.    o   Review your medicines if your doctor has prescribed any.     Have a cholesterol test every 5 years, or more frequently if you are at risk for high cholesterol/heart disease.     Have a diabetes test (fasting glucose) every three years. If you are at risk for diabetes, you should have this test more often.     Have a colonoscopy at age 50, or have a yearly FIT test (stool test). These exams will check for colon cancer.      Talk with your health care provider about whether or not a prostate cancer screening test (PSA) is right for you.    You should be tested each year for STDs (sexually transmitted diseases), if you re at risk.     Shots: Get a flu shot each year. Get a tetanus shot every 10 years.     Nutrition:    Eat at least 5 servings of fruits and vegetables daily.     Eat whole-grain bread, whole-wheat pasta and brown rice instead of white grains and rice.     Get adequate Calcium and Vitamin D.     Lifestyle    Exercise for at least 150 minutes a week (30 minutes a day, 5 days a week). This will help you control your weight and prevent disease.     Limit alcohol to one drink per day.     No smoking.     Wear sunscreen to prevent skin cancer.     See your dentist every six months for an exam and cleaning.     See your eye doctor every 1 to 2 years.    Patient Education   Personalized Prevention Plan  You are due for the preventive services outlined below.  Your care team is available to assist you in scheduling these services.  If you have already completed any of these items, please share that information with your care team to update in your medical record.  Health Maintenance Due   Topic Date Due      Depression Assessment  10/01/2019     Cholesterol Lab  06/15/2021     Preventive Health Recommendations  See your health care provider every year to    Review health changes.     Discuss preventive care.      Review your medicines if your doctor has prescribed any.    Talk with your health care provider about whether you should have a test to screen for prostate cancer (PSA).    Every 3 years, have a diabetes test (fasting glucose). If you are at risk for diabetes, you should have this test more often.    Every 5 years, have a cholesterol test. Have this test more often if you are at risk for high cholesterol or heart disease.     Every 10 years, have a colonoscopy. Or, have a yearly FIT test (stool test). These exams will check for colon cancer.    Talk to with your health care provider about screening for Abdominal Aortic Aneurysm if you have a family history of AAA or have a history of smoking.    Shots:     Get a flu shot each year.     Get a tetanus shot every 10 years.     Talk to your doctor about your pneumonia vaccines. There are now two you should receive - Pneumovax (PPSV 23) and Prevnar (PCV 13).    Talk to your pharmacist about a shingles vaccine.     Talk to your doctor about the hepatitis B vaccine.    Nutrition:     Eat at least 5 servings of fruits and vegetables each day.     Eat whole-grain bread, whole-wheat pasta and brown rice instead of white grains and rice.     Get adequate Calcium and Vitamin D.     Lifestyle    Exercise for at least 150 minutes a week (30 minutes a day, 5 days a week). This will help you control your weight and prevent disease.     Limit alcohol to one drink per day.     No smoking.     Wear sunscreen to prevent skin cancer.     See your dentist every six months for an exam and cleaning.     See your eye doctor every 1 to 2 years to screen for conditions such as glaucoma, macular degeneration and cataracts.    Personalized Prevention Plan  You are due for the preventive  services outlined below.  Your care team is available to assist you in scheduling these services.  If you have already completed any of these items, please share that information with your care team to update in your medical record.  Health Maintenance   Topic Date Due     PHQ-9  10/01/2019     LIPID  06/15/2021     MEDICARE ANNUAL WELLNESS VISIT  10/19/2022     ANNUAL REVIEW OF HM ORDERS  10/19/2022     ADVANCE CARE PLANNING  11/03/2025     COLORECTAL CANCER SCREENING  03/01/2029     DTAP/TDAP/TD IMMUNIZATION (4 - Td or Tdap) 08/24/2030     HEPATITIS C SCREENING  Completed     HIV SCREENING  Completed     DEPRESSION ACTION PLAN  Completed     INFLUENZA VACCINE  Completed     ZOSTER IMMUNIZATION  Completed     COVID-19 Vaccine  Completed     Pneumococcal Vaccine: Pediatrics (0 to 5 Years) and At-Risk Patients (6 to 64 Years)  Aged Out     IPV IMMUNIZATION  Aged Out     MENINGITIS IMMUNIZATION  Aged Out     HEPATITIS B IMMUNIZATION  Aged Out

## 2021-10-19 NOTE — PROGRESS NOTES
SUBJECTIVE:   CC: Marcus Hodges is an 66 year old male who presents for preventative health visit.   Patient has been advised of split billing requirements and indicates understanding: Yes     HPI    Today's PHQ-2 Score:   PHQ-2 ( 1999 [employer]) 10/19/2021   Q1: Little interest or pleasure in doing things 0   Q2: Feeling down, depressed or hopeless 0   PHQ-2 Score 0   Q1: Little interest or pleasure in doing things Not at all   Q2: Feeling down, depressed or hopeless Not at all   PHQ-2 Score 0     Concerns today:  1- toe pain, recent nail clipping  2- rash under arm and buttocks  3-weight loss since last visit    Abuse: Current or Past(Physical, Sexual or Emotional)- No  Do you feel safe in your environment? Yes    Social History     Tobacco Use     Smoking status: Never Smoker     Smokeless tobacco: Never Used   Substance Use Topics     Alcohol use: No         Alcohol Use 10/19/2021   Prescreen: >3 drinks/day or >7 drinks/week? Not Applicable   Prescreen: >3 drinks/day or >7 drinks/week? -       Last PSA:   PSA   Date Value Ref Range Status   06/24/2021 1.85 0 - 4 ug/L Final     Comment:     Assay Method:  Chemiluminescence using Siemens Vista analyzer       Reviewed orders with patient. Reviewed health maintenance and updated orders accordingly - Yes  Patient Active Problem List   Diagnosis     * * * SBE PROPHYLAXIS * * *     Health Care Home     Vitamin D deficiency     Hyperlipidemia LDL goal <100     Long-term (current) use of anticoagulants     Urinary incontinence     Neurogenic bladder     Chronic atrial fibrillation (H)     Tricuspid regurgitation     Mitral regurgitation     Atrial enlargement, bilateral     Major depressive disorder, recurrent episode, moderate (H)     Traumatic brain injury with loss of consciousness, sequela (H)     Neurogenic bowel     Left renal stone     Dysphagia     Left hemiparesis (H)     S/P craniotomy     Seizure disorder (H)     Visual field defect     Anemia     Benign  prostatic hyperplasia with urinary retention     Past Surgical History:   Procedure Laterality Date     CATHETER, ABLATION  2004     COMBINED CYSTOSCOPY, INSERT CATHETER URETER  9/28/2020    Procedure: cystoscopy and left ureteral catheter placement, left ureteral stent placement;  Surgeon: Nehemiah Bloom MD;  Location:  OR     Craniotomy [facility]  2012    [facility], repair of hemicraniectomy defect     CYSTOSCOPY       DAVINCI PYELOPLASTY Left 9/28/2020    Procedure: left robotic assisted laparoscopic pyelolithotomy;  Surgeon: Nehemiah Bloom MD;  Location:  OR     GASTROSTOMY TUBE  April 2012    [facility], following CVA     HERNIA REPAIR       IR CAROTID ANGIOGRAM  4/22/2012     IR CAROTID ANGIOGRAM  4/22/2012     IR MISCELLANEOUS PROCEDURE  4/22/2012     IR MISCELLANEOUS PROCEDURE  4/25/2012     LASER KTP GREEN LIGHT PHOTOSELECTIVE VAPORIZATION PROSTATE N/A 12/1/2020    Procedure: Cystoscopy and greenlight photovaporization of the prostate;  Surgeon: Nehemiah Bloom MD;  Location: [facility] OR     REPAIR ATRIAL SEPTAL DEFECT  1978    ASD Repair     Right hemicraniectomy  April 2012    [facility], following CVA, mgmt malignant cerebral edema     SURGICAL HISTORY OF -   2009    right eye enucleation     TRACHEOSTOMY  April 2012    [facility], following CVA     ZZC NONSPECIFIC PROCEDURE      tonsillectomy     ZZC NONSPECIFIC PROCEDURE      Ing. Hernia Repair       Social History     Tobacco Use     Smoking status: Never Smoker     Smokeless tobacco: Never Used   Substance Use Topics     Alcohol use: No     Family History   Problem Relation Age of Onset     Hypertension Mother      Cerebrovascular Disease Father      Cancer Paternal Grandmother      Diabetes Maternal Grandmother      Congenital Anomalies Brother         several brothers and sisters born with congential heart defects, but all have been repaired     Congenital Anomalies Sister          Current Outpatient Medications   Medication Sig  Dispense Refill     acetaminophen (TYLENOL) 500 MG tablet Take 2 tablets (1,000 mg) by mouth every 6 hours as needed for mild pain 100 tablet 0     apixaban ANTICOAGULANT (ELIQUIS ANTICOAGULANT) 5 MG tablet Take 1 tablet (5 mg) by mouth 2 times daily 180 tablet 3     atorvastatin (LIPITOR) 20 MG tablet Take 1 tablet (20 mg) by mouth daily 90 tablet 3     baclofen (LIORESAL) 20 MG tablet TAKE ONE TABLET BY MOUTH FOUR TIMES DAILY 360 tablet 3     citalopram (CELEXA) 20 MG tablet Take 1.5 tablets (30 mg) by mouth daily 135 tablet 3     clotrimazole (LOTRIMIN) 1 % external cream Apply topically 2 times daily as needed (as needed for arm, buttock) 113 g 3     docusate sodium (COLACE) 100 MG capsule Take 1 capsule (100 mg) by mouth 2 times daily 30 capsule 0     docusate sodium (COLACE) 100 MG tablet Take 200 mg by mouth nightly as needed        levETIRAcetam (KEPPRA) 1000 MG tablet TAKE 1 TABLET BY MOUTH TWICE DAILY 180 tablet 3     lidocaine (LMX4) 4 % external cream Apply topically as needed for mild pain (topical irritation) Apply sparingly to affected area twice daily, as needed. 15 g 0     metoprolol succinate ER (TOPROL-XL) 25 MG 24 hr tablet Take 1 tablet (25 mg) by mouth daily 90 tablet 3     mirabegron (MYRBETRIQ) 50 MG 24 hr tablet Take 1 tablet (50 mg) by mouth daily 90 tablet 3     Multiple Vitamin (MULTI-VITAMIN) per tablet Take 1 tablet by mouth daily  100 tablet 3     NONFORMULARY Take 1 tablet by mouth daily Protandim - herbal supplement        Omega-3 Fatty Acids (FISH OIL PO) Take 1 Dose by mouth daily       polyethylene glycol (MIRALAX) 17 g packet Take 17 g by mouth every other day        senna-docusate (SENOKOT-S/PERICOLACE) 8.6-50 MG tablet Take 4 tablets by mouth At Bedtime       tamsulosin (FLOMAX) 0.4 MG capsule Take 1 capsule (0.4 mg) by mouth daily 90 capsule 3     vitamin D3 (CHOLECALCIFEROL) 50 mcg (2000 units) tablet Take 2,000 Units by mouth daily  90 tablet 3       Reviewed and updated as  needed this visit by clinical staff  Tobacco  Allergies  Meds   Med Hx  Surg Hx  Fam Hx  Soc Hx        Reviewed and updated as needed this visit by Provider                    Review of Systems  CONSTITUTIONAL: NEGATIVE for fever, chills, change in weight  INTEGUMENTARY/SKIN: see hpi  EYES: NEGATIVE for vision changes or irritation  ENT: NEGATIVE for ear, mouth and throat problems  RESP: NEGATIVE for significant cough or SOB  CV: NEGATIVE for chest pain, palpitations or peripheral edema  GI: NEGATIVE for nausea, abdominal pain, heartburn, or change in bowel habits   male: negative for dysuria, hematuria, decreased urinary stream, erectile dysfunction, urethral discharge  MUSCULOSKELETAL: NEGATIVE for significant arthralgias or myalgia  NEURO: left hemiparesis  PSYCHIATRIC: NEGATIVE for changes in mood or affect    OBJECTIVE:   BP 90/50 (BP Location: Right arm, Patient Position: Sitting, Cuff Size: Adult Regular)   Pulse 55   Temp 98.7  F (37.1  C) (Tympanic)   Resp 14   Wt 75.1 kg (165 lb 8 oz)   SpO2 98%   BMI 21.25 kg/m      Physical Exam  GENERAL: alert and no distress  EYES: PERRL and conjunctivae and sclerae normal  HENT: ear canals and TM's normal, nose and mouth without ulcers or lesions  NECK: no adenopathy, no asymmetry, masses  RESP: lungs clear to auscultation - no rales, rhonchi or wheezes  CV: irregular rate and rhythm, normal S1 S2, no S3 or S4, no murmur, click or rub, no peripheral edema   ABDOMEN: soft, nontender, no hepatosplenomegaly  SKIN: left axilla without erythema.  Buttocks without erythema or open wounds, small region of erythema perineum /skin otherwise intact  NEURO: left hemiparesis with left upper and lower flexion contractures, LLE AFO brace, gait impairment, requires standby assistance   PSYCH: mentation appears normal      ASSESSMENT/PLAN:   (Z00.00) Encounter for Medicare annual wellness exam  (primary encounter diagnosis)    (I48.20) Chronic atrial fibrillation  (H)  Comment: rate control: metoprolol, chronic anticoagulation  Plan: apixaban ANTICOAGULANT (ELIQUIS ANTICOAGULANT)         5 MG tablet          (F33.1) Major depressive disorder, recurrent episode, moderate (H)  Comment:   Plan: citalopram (CELEXA) 20 MG tablet          (E78.5) Hyperlipidemia LDL goal <100  Comment:  Continue lipitor  Lab Results   Component Value Date    LDL 42 06/15/2020   Plan: Lipid panel reflex to direct LDL Fasting          (S06.9X9S) Traumatic brain injury with loss of consciousness, sequela (H)  (G40.909) Seizure disorder (H)  Comment:   Plan: TBI, hemiparesis secondary to prior CVA. associated seizure disorder--stable/ continue keppra    (N31.9) Neurogenic bladder  Comment:   Plan: continue myrbetriq    (K59.2) Neurogenic bowel    (G81.94) Left hemiparesis (H)  Comment:   Plan: continue baclofen    (E55.9) Vitamin D deficiency  Comment:   Plan: Vitamin D Deficiency          (D64.9) Anemia, unspecified type  Comment:   Plan: CBC with platelets and differential          (Z12.11) Screening for colon cancer  Comment: discussed colonoscopy,agreed on cologuard testing instead  Plan: COLOGUARD([employer])          (R63.4) Weight loss  Comment:   Wt Readings from Last 4 Encounters:   10/19/21 75.1 kg (165 lb 8 oz)   07/01/21 81.2 kg (179 lb)   12/22/20 81.2 kg (179 lb)   12/01/20 81.2 kg (179 lb)    Plan: TSH with free T4 reflex        Suspect weight loss associated with recent diet changes/ has reduced certain higher calorie foods containing oxalate    (B37.2) Candidiasis of skin  Comment: axillary and buttock rashes- suspect candidal dermatitis,  Start clotrimazole prn      Rash on perineum may be pressure-related.  Sits all day/discussed position changes and offloading to reduce risk of pressure ulcers  Plan: clotrimazole (LOTRIMIN) 1 % external cream            COUNSELING:   Reviewed preventive health counseling, as reflected in patient instructions       Healthy  "diet/nutrition    Estimated body mass index is 21.25 kg/m  as calculated from the following:    Height as of 7/1/21: 1.88 m (6' 2\").    Weight as of this encounter: 75.1 kg (165 lb 8 oz).         He reports that he has never smoked. He has never used smokeless tobacco.      Counseling Resources:  ATP IV Guidelines  Pooled Cohorts Equation Calculator  FRAX Risk Assessment  ICSI Preventive Guidelines  Dietary Guidelines for Americans, 2010  USDA's MyPlate  ASA Prophylaxis  Lung CA Screening    Don Aviles MD  [facility]    "

## 2021-10-21 ENCOUNTER — TRANSFERRED RECORDS (OUTPATIENT)
Dept: HEALTH INFORMATION MANAGEMENT | Facility: CLINIC | Age: 66
End: 2021-10-21
Payer: MEDICARE

## 2021-10-26 ENCOUNTER — HOSPITAL ENCOUNTER (OUTPATIENT)
Dept: PHYSICAL THERAPY | Facility: CLINIC | Age: 66
Setting detail: THERAPIES SERIES
End: 2021-10-26
Attending: INTERNAL MEDICINE
Payer: MEDICARE

## 2021-10-26 PROCEDURE — 97112 NEUROMUSCULAR REEDUCATION: CPT | Mod: GP,KX | Performed by: PHYSICAL THERAPIST

## 2021-10-26 PROCEDURE — 97116 GAIT TRAINING THERAPY: CPT | Mod: GP,KX | Performed by: PHYSICAL THERAPIST

## 2021-10-26 PROCEDURE — 97110 THERAPEUTIC EXERCISES: CPT | Mod: GP,KX | Performed by: PHYSICAL THERAPIST

## 2021-10-26 PROCEDURE — 97140 MANUAL THERAPY 1/> REGIONS: CPT | Mod: GP,KX | Performed by: PHYSICAL THERAPIST

## 2021-10-27 ENCOUNTER — OFFICE VISIT (OUTPATIENT)
Dept: CARDIOLOGY | Facility: CLINIC | Age: 66
End: 2021-10-27
Payer: MEDICARE

## 2021-10-27 VITALS
SYSTOLIC BLOOD PRESSURE: 105 MMHG | OXYGEN SATURATION: 97 % | DIASTOLIC BLOOD PRESSURE: 73 MMHG | HEART RATE: 51 BPM | BODY MASS INDEX: 21.94 KG/M2 | WEIGHT: 171 LBS | HEIGHT: 74 IN

## 2021-10-27 DIAGNOSIS — I05.9 MITRAL VALVE DISEASE: ICD-10-CM

## 2021-10-27 DIAGNOSIS — I48.20 CHRONIC ATRIAL FIBRILLATION (H): Primary | ICD-10-CM

## 2021-10-27 DIAGNOSIS — E78.5 HYPERLIPIDEMIA LDL GOAL <100: ICD-10-CM

## 2021-10-27 PROCEDURE — 99214 OFFICE O/P EST MOD 30 MIN: CPT | Performed by: INTERNAL MEDICINE

## 2021-10-27 RX ORDER — METOPROLOL SUCCINATE 25 MG/1
12.5 TABLET, EXTENDED RELEASE ORAL DAILY
Qty: 45 TABLET | Refills: 3 | Status: SHIPPED | OUTPATIENT
Start: 2021-10-27 | End: 2022-10-26

## 2021-10-27 ASSESSMENT — MIFFLIN-ST. JEOR: SCORE: 1635.4

## 2021-10-27 NOTE — LETTER
10/27/2021    Don Aviles MD  [address] [place] [address]    RE: Marcus Hodges       Dear Colleague,    I had the pleasure of seeing Marcus Hodges in the [facility].    HPI and Plan:   See dictation    Today's clinic visit entailed:  Review of the result(s) of each unique test - echo, FLP  Ordering of each unique test  Prescription drug management  30 minutes spent on the date of the encounter doing chart review, review of test results, patient visit and documentation   Provider  Link to [facility] Help Grid     The level of medical decision making during this visit was of moderate complexity.    Orders Placed This Encounter   Procedures     Basic metabolic panel     Follow-Up with Cardiologist     Echocardiogram Complete       Orders Placed This Encounter   Medications     metoprolol succinate ER (TOPROL-XL) 25 MG 24 hr tablet     Sig: Take 0.5 tablets (12.5 mg) by mouth daily     Dispense:  45 tablet     Refill:  3       Medications Discontinued During This Encounter   Medication Reason     lidocaine (LMX4) 4 % external cream Therapy completed     NONFORMULARY Therapy completed     Omega-3 Fatty Acids (FISH OIL PO) Therapy completed     metoprolol succinate ER (TOPROL-XL) 25 MG 24 hr tablet          Encounter Diagnoses   Name Primary?     Chronic atrial fibrillation (H) Yes     Mitral valve disease      Hyperlipidemia LDL goal <100        CURRENT MEDICATIONS:  Current Outpatient Medications   Medication Sig Dispense Refill     acetaminophen (TYLENOL) 500 MG tablet Take 2 tablets (1,000 mg) by mouth every 6 hours as needed for mild pain 100 tablet 0     apixaban ANTICOAGULANT (ELIQUIS ANTICOAGULANT) 5 MG tablet Take 1 tablet (5 mg) by mouth 2 times daily 180 tablet 3     atorvastatin (LIPITOR) 20 MG tablet Take 1 tablet (20 mg) by mouth daily 90 tablet 3     baclofen (LIORESAL) 20 MG tablet TAKE ONE TABLET BY MOUTH FOUR TIMES DAILY 360  tablet 3     citalopram (CELEXA) 20 MG tablet Take 1.5 tablets (30 mg) by mouth daily 135 tablet 3     clotrimazole (LOTRIMIN) 1 % external cream Apply topically 2 times daily as needed (as needed for arm, buttock) 113 g 3     docusate sodium (COLACE) 100 MG capsule Take 1 capsule (100 mg) by mouth 2 times daily 30 capsule 0     docusate sodium (COLACE) 100 MG tablet Take 200 mg by mouth nightly as needed        levETIRAcetam (KEPPRA) 1000 MG tablet TAKE 1 TABLET BY MOUTH TWICE DAILY 180 tablet 3     metoprolol succinate ER (TOPROL-XL) 25 MG 24 hr tablet Take 0.5 tablets (12.5 mg) by mouth daily 45 tablet 3     mirabegron (MYRBETRIQ) 50 MG 24 hr tablet Take 1 tablet (50 mg) by mouth daily 90 tablet 3     Multiple Vitamin (MULTI-VITAMIN) per tablet Take 1 tablet by mouth daily  100 tablet 3     polyethylene glycol (MIRALAX) 17 g packet Take 17 g by mouth every other day        senna-docusate (SENOKOT-S/PERICOLACE) 8.6-50 MG tablet Take 4 tablets by mouth At Bedtime       tamsulosin (FLOMAX) 0.4 MG capsule Take 1 capsule (0.4 mg) by mouth daily 90 capsule 3     vitamin D3 (CHOLECALCIFEROL) 50 mcg (2000 units) tablet Take 2,000 Units by mouth daily  90 tablet 3       ALLERGIES     Allergies   Allergen Reactions     Blood Transfusion Related (Informational Only) Other (See Comments)     Patient has a history of a clinically significant antibody against RBC antigens.  A delay in compatible RBCs may occur.     Lisinopril        PAST MEDICAL HISTORY:  Past Medical History:   Diagnosis Date     * * * SBE PROPHYLAXIS * * *      Antiplatelet or antithrombotic long-term use     on Xarelto     Arrhythmia     A fib     Atrial enlargement, bilateral      Atrial flutter 2004    radiofrequency ablation 2004, resolved     ATRIAL SEPTAL DEFECT     repaired 1977     Chronic atrial fibrillation (H)     on apixaban     CVA (cerebral vascular accident) (H) 04/2012    R MCA, complicated by left sided weakness, walks with a cane, and  seizures     Depression      Dysphagia 04/22/2012    [facility], following CVA     Hernia, abdominal      History of thrombophlebitis      hyperlipidemia      Mitral regurgitation     mitral valve damage related to ASD repair     Mumps      Neurogenic bladder      Neurogenic bowel      Palpitations      Respiratory failure (H) 04/22/2012    [facility], following CVA, prolonged requiring tracheostomy, [facility]      Tricuspid regurgitation      Unspecified glaucoma(365.9)     right     Urinary incontinence        PAST SURGICAL HISTORY:  Past Surgical History:   Procedure Laterality Date     CATHETER, ABLATION  2004     COMBINED CYSTOSCOPY, INSERT CATHETER URETER  9/28/2020    Procedure: cystoscopy and left ureteral catheter placement, left ureteral stent placement;  Surgeon: Nehemiah Bloom MD;  Location:  OR     Craniotomy [facility]  2012    [facility], repair of hemicraniectomy defect     CYSTOSCOPY       DAVINCI PYELOPLASTY Left 9/28/2020    Procedure: left robotic assisted laparoscopic pyelolithotomy;  Surgeon: Nehemiah Bloom MD;  Location:  OR     GASTROSTOMY TUBE  April 2012    [facility], following CVA     HERNIA REPAIR       IR CAROTID ANGIOGRAM  4/22/2012     IR CAROTID ANGIOGRAM  4/22/2012     IR MISCELLANEOUS PROCEDURE  4/22/2012     IR MISCELLANEOUS PROCEDURE  4/25/2012     LASER KTP GREEN LIGHT PHOTOSELECTIVE VAPORIZATION PROSTATE N/A 12/1/2020    Procedure: Cystoscopy and greenlight photovaporization of the prostate;  Surgeon: Nehemiah Bloom MD;  Location:  OR     REPAIR ATRIAL SEPTAL DEFECT  1978    ASD Repair     Right hemicraniectomy  April 2012    [facility], following CVA, mgmt malignant cerebral edema     SURGICAL HISTORY OF -   2009    right eye enucleation     TRACHEOSTOMY  April 2012    [facility], following CVA     ZZC NONSPECIFIC PROCEDURE      tonsillectomy     ZZ NONSPECIFIC PROCEDURE      Ing. Hernia Repair       FAMILY HISTORY:  Family History   Problem  Relation Age of Onset     Hypertension Mother      Cerebrovascular Disease Father      Cancer Paternal Grandmother      Diabetes Maternal Grandmother      Congenital Anomalies Brother         several brothers and sisters born with congential heart defects, but all have been repaired     Congenital Anomalies Sister        SOCIAL HISTORY:  Social History     Socioeconomic History     Marital status:      Spouse name: haim     Number of children: 0     Years of education: None     Highest education level: None   Occupational History     Employer: [employer]   Tobacco Use     Smoking status: Never Smoker     Smokeless tobacco: Never Used   Substance and Sexual Activity     Alcohol use: No     Drug use: No     Sexual activity: Yes     Partners: Female   Other Topics Concern      Service Not Asked     Blood Transfusions Not Asked     Caffeine Concern Yes     Comment: couple cups of tea a day     Occupational Exposure Not Asked     Hobby Hazards Not Asked     Sleep Concern Not Asked     Stress Concern Not Asked     Weight Concern Not Asked     Special Diet Yes     Comment: vegan; occ eggs/fish      Back Care Not Asked     Exercise Yes     Comment: 2 x weekly/gym , pysical therapy     Bike Helmet Not Asked     Seat Belt Yes     Self-Exams Not Asked     Parent/sibling w/ CABG, MI or angioplasty before 65F 55M? Not Asked   Social History Narrative     None     Social Determinants of Health     Financial Resource Strain:      Difficulty of Paying Living Expenses:    Food Insecurity:      Worried About Running Out of Food in the Last Year:      Ran Out of Food in the Last Year:    Transportation Needs:      Lack of Transportation (Medical):      Lack of Transportation (Non-Medical):    Physical Activity:      Days of Exercise per Week:      Minutes of Exercise per Session:    Stress:      Feeling of Stress :    Social Connections:      Frequency of Communication with Friends and Family:       "Frequency of Social Gatherings with Friends and Family:      Attends Confucianist Services:      Active Member of Clubs or Organizations:      Attends Club or Organization Meetings:      Marital Status:    Intimate Partner Violence:      Fear of Current or Ex-Partner:      Emotionally Abused:      Physically Abused:      Sexually Abused:        Review of Systems:  Skin:  Negative       Eyes:  Positive for glasses;visual blurring right eye false.  left eye visual lost  ENT:  Negative      Respiratory:  Negative       Cardiovascular:  Negative      Gastroenterology: Negative      Genitourinary:  Negative      Musculoskeletal:  Positive for neck pain;arthritis    Neurologic:  Positive for stroke;paralysis;numbness or tingling of feet;numbness or tingling of hands;local weakness;tremors left side paralysis  Uses a cane for mobility  Psychiatric:  Positive for depression    Heme/Lymph/Imm:  Positive for allergies    Endocrine:  Negative        Physical Exam:  Vitals: /73 (BP Location: Right arm, Cuff Size: Adult Regular)   Pulse 51   Ht 1.88 m (6' 2\")   Wt 77.6 kg (171 lb)   SpO2 97%   BMI 21.96 kg/m      Constitutional:           Skin:             Head:           Eyes:           Lymph:      ENT:           Neck:           Respiratory:            Cardiac:                                                           GI:           Extremities and Muscular Skeletal:                 Neurological:           Psych:           CC  No referring provider defined for this encounter.                  Thank you for allowing me to participate in the care of your patient.      Sincerely,     AZEB VILLARREAL MD     [facility]  cc:   No referring provider defined for this encounter.        "

## 2021-10-27 NOTE — PROGRESS NOTES
HPI and Plan:   See dictation    Today's clinic visit entailed:  Review of the result(s) of each unique test - echo, FLP  Ordering of each unique test  Prescription drug management  30 minutes spent on the date of the encounter doing chart review, review of test results, patient visit and documentation   Provider  Link to [facility] Help Grid     The level of medical decision making during this visit was of moderate complexity.    Orders Placed This Encounter   Procedures     Basic metabolic panel     Follow-Up with Cardiologist     Echocardiogram Complete       Orders Placed This Encounter   Medications     metoprolol succinate ER (TOPROL-XL) 25 MG 24 hr tablet     Sig: Take 0.5 tablets (12.5 mg) by mouth daily     Dispense:  45 tablet     Refill:  3       Medications Discontinued During This Encounter   Medication Reason     lidocaine (LMX4) 4 % external cream Therapy completed     NONFORMULARY Therapy completed     Omega-3 Fatty Acids (FISH OIL PO) Therapy completed     metoprolol succinate ER (TOPROL-XL) 25 MG 24 hr tablet          Encounter Diagnoses   Name Primary?     Chronic atrial fibrillation (H) Yes     Mitral valve disease      Hyperlipidemia LDL goal <100        CURRENT MEDICATIONS:  Current Outpatient Medications   Medication Sig Dispense Refill     acetaminophen (TYLENOL) 500 MG tablet Take 2 tablets (1,000 mg) by mouth every 6 hours as needed for mild pain 100 tablet 0     apixaban ANTICOAGULANT (ELIQUIS ANTICOAGULANT) 5 MG tablet Take 1 tablet (5 mg) by mouth 2 times daily 180 tablet 3     atorvastatin (LIPITOR) 20 MG tablet Take 1 tablet (20 mg) by mouth daily 90 tablet 3     baclofen (LIORESAL) 20 MG tablet TAKE ONE TABLET BY MOUTH FOUR TIMES DAILY 360 tablet 3     citalopram (CELEXA) 20 MG tablet Take 1.5 tablets (30 mg) by mouth daily 135 tablet 3     clotrimazole (LOTRIMIN) 1 % external cream Apply topically 2 times daily as needed (as needed for arm, buttock) 113 g 3     docusate sodium (COLACE) 100  MG capsule Take 1 capsule (100 mg) by mouth 2 times daily 30 capsule 0     docusate sodium (COLACE) 100 MG tablet Take 200 mg by mouth nightly as needed        levETIRAcetam (KEPPRA) 1000 MG tablet TAKE 1 TABLET BY MOUTH TWICE DAILY 180 tablet 3     metoprolol succinate ER (TOPROL-XL) 25 MG 24 hr tablet Take 0.5 tablets (12.5 mg) by mouth daily 45 tablet 3     mirabegron (MYRBETRIQ) 50 MG 24 hr tablet Take 1 tablet (50 mg) by mouth daily 90 tablet 3     Multiple Vitamin (MULTI-VITAMIN) per tablet Take 1 tablet by mouth daily  100 tablet 3     polyethylene glycol (MIRALAX) 17 g packet Take 17 g by mouth every other day        senna-docusate (SENOKOT-S/PERICOLACE) 8.6-50 MG tablet Take 4 tablets by mouth At Bedtime       tamsulosin (FLOMAX) 0.4 MG capsule Take 1 capsule (0.4 mg) by mouth daily 90 capsule 3     vitamin D3 (CHOLECALCIFEROL) 50 mcg (2000 units) tablet Take 2,000 Units by mouth daily  90 tablet 3       ALLERGIES     Allergies   Allergen Reactions     Blood Transfusion Related (Informational Only) Other (See Comments)     Patient has a history of a clinically significant antibody against RBC antigens.  A delay in compatible RBCs may occur.     Lisinopril        PAST MEDICAL HISTORY:  Past Medical History:   Diagnosis Date     * * * SBE PROPHYLAXIS * * *      Antiplatelet or antithrombotic long-term use     on Xarelto     Arrhythmia     A fib     Atrial enlargement, bilateral      Atrial flutter 2004    radiofrequency ablation 2004, resolved     ATRIAL SEPTAL DEFECT     repaired 1977     Chronic atrial fibrillation (H)     on apixaban     CVA (cerebral vascular accident) (H) 04/2012    R MCA, complicated by left sided weakness, walks with a cane, and seizures     Depression      Dysphagia 04/22/2012    [facility], following CVA     Hernia, abdominal      History of thrombophlebitis      hyperlipidemia      Mitral regurgitation     mitral valve damage related to ASD repair     Mumps      Neurogenic bladder       Neurogenic bowel      Palpitations      Respiratory failure (H) 04/22/2012    [facility], following CVA, prolonged requiring tracheostomy, [facility]      Tricuspid regurgitation      Unspecified glaucoma(365.9)     right     Urinary incontinence        PAST SURGICAL HISTORY:  Past Surgical History:   Procedure Laterality Date     CATHETER, ABLATION  2004     COMBINED CYSTOSCOPY, INSERT CATHETER URETER  9/28/2020    Procedure: cystoscopy and left ureteral catheter placement, left ureteral stent placement;  Surgeon: Nehemiah Bloom MD;  Location:  OR     Craniotomy [facility]  2012    [facility], repair of hemicraniectomy defect     CYSTOSCOPY       DAVINCI PYELOPLASTY Left 9/28/2020    Procedure: left robotic assisted laparoscopic pyelolithotomy;  Surgeon: Nehemiah Bloom MD;  Location:  OR     GASTROSTOMY TUBE  April 2012    [facility], following CVA     HERNIA REPAIR       IR CAROTID ANGIOGRAM  4/22/2012     IR CAROTID ANGIOGRAM  4/22/2012     IR MISCELLANEOUS PROCEDURE  4/22/2012     IR MISCELLANEOUS PROCEDURE  4/25/2012     LASER KTP GREEN LIGHT PHOTOSELECTIVE VAPORIZATION PROSTATE N/A 12/1/2020    Procedure: Cystoscopy and greenlight photovaporization of the prostate;  Surgeon: Nehemiah Bloom MD;  Location: [facility] OR     REPAIR ATRIAL SEPTAL DEFECT  1978    ASD Repair     Right hemicraniectomy  April 2012    [facility], following CVA, mgmt malignant cerebral edema     SURGICAL HISTORY OF -   2009    right eye enucleation     TRACHEOSTOMY  April 2012    [facility], following CVA     ZZC NONSPECIFIC PROCEDURE      tonsillectomy     ZZC NONSPECIFIC PROCEDURE      Ing. Hernia Repair       FAMILY HISTORY:  Family History   Problem Relation Age of Onset     Hypertension Mother      Cerebrovascular Disease Father      Cancer Paternal Grandmother      Diabetes Maternal Grandmother      Congenital Anomalies Brother         several brothers and sisters born with congential heart defects, but all  have been repaired     Congenital Anomalies Sister        SOCIAL HISTORY:  Social History     Socioeconomic History     Marital status:      Spouse name: haim     Number of children: 0     Years of education: None     Highest education level: None   Occupational History     Employer: [employer]   Tobacco Use     Smoking status: Never Smoker     Smokeless tobacco: Never Used   Substance and Sexual Activity     Alcohol use: No     Drug use: No     Sexual activity: Yes     Partners: Female   Other Topics Concern      Service Not Asked     Blood Transfusions Not Asked     Caffeine Concern Yes     Comment: couple cups of tea a day     Occupational Exposure Not Asked     Hobby Hazards Not Asked     Sleep Concern Not Asked     Stress Concern Not Asked     Weight Concern Not Asked     Special Diet Yes     Comment: vegan; occ eggs/fish      Back Care Not Asked     Exercise Yes     Comment: 2 x weekly/gym , pysical therapy     Bike Helmet Not Asked     Seat Belt Yes     Self-Exams Not Asked     Parent/sibling w/ CABG, MI or angioplasty before 65F 55M? Not Asked   Social History Narrative     None     Social Determinants of Health     Financial Resource Strain:      Difficulty of Paying Living Expenses:    Food Insecurity:      Worried About Running Out of Food in the Last Year:      Ran Out of Food in the Last Year:    Transportation Needs:      Lack of Transportation (Medical):      Lack of Transportation (Non-Medical):    Physical Activity:      Days of Exercise per Week:      Minutes of Exercise per Session:    Stress:      Feeling of Stress :    Social Connections:      Frequency of Communication with Friends and Family:      Frequency of Social Gatherings with Friends and Family:      Attends Baptism Services:      Active Member of Clubs or Organizations:      Attends Club or Organization Meetings:      Marital Status:    Intimate Partner Violence:      Fear of Current or Ex-Partner:       "Emotionally Abused:      Physically Abused:      Sexually Abused:        Review of Systems:  Skin:  Negative       Eyes:  Positive for glasses;visual blurring right eye false.  left eye visual lost  ENT:  Negative      Respiratory:  Negative       Cardiovascular:  Negative      Gastroenterology: Negative      Genitourinary:  Negative      Musculoskeletal:  Positive for neck pain;arthritis    Neurologic:  Positive for stroke;paralysis;numbness or tingling of feet;numbness or tingling of hands;local weakness;tremors left side paralysis  Uses a cane for mobility  Psychiatric:  Positive for depression    Heme/Lymph/Imm:  Positive for allergies    Endocrine:  Negative        Physical Exam:  Vitals: /73 (BP Location: Right arm, Cuff Size: Adult Regular)   Pulse 51   Ht 1.88 m (6' 2\")   Wt 77.6 kg (171 lb)   SpO2 97%   BMI 21.96 kg/m      Constitutional:           Skin:             Head:           Eyes:           Lymph:      ENT:           Neck:           Respiratory:            Cardiac:                                                           GI:           Extremities and Muscular Skeletal:                 Neurological:           Psych:           CC  No referring provider defined for this encounter.              "

## 2021-10-27 NOTE — PROGRESS NOTES
Outpatient Physical Therapy Progress Note     Patient: Marcus Hodges  : 1955    Beginning/End Dates of Reporting Period:  2021 to 10/26/2021. Lex has been seen for 10 skilled PT sessions since his last PN.    Referring Provider: Dr. Don Aviles    Therapy Diagnosis:  Decreased independence and functional activity  tolerance due to impairments from CVA and secondary impairments  following CVA.(R knee pain, shoulder pain)          Client Self Report: Got BOTOX last THursday, did not see orthotist as he was on vacation. Wondering about seeing orthotist closer to this area.        Goals:      Goal Identifier 3-  Curb/stairs   Goal Description Marcus to have increased disassociation of movement/improved timing and sequencing of muscle activation for improved hip and knee flexion in standing demonstrated by the ability to step over upright shabana (4-6 inch object) with the L LE to allow him to step up onto a curb without catching foot.   Target Date 21   Date Met      Progress (detail required for progress note): 10/26/21  Limited by decreased motor planning /sequencing for hip and knee flexion on the L so knocks over the shabana. He has demonstrated curbs leading with the L and is able to pick leg up to place on the curb, has AD and CGA. Wife and pt report better ease/performance witht he curb.      Goal Identifier 4- Gait   Goal Description Marcus to increase gait speed to 19 seconds or less with AD and 24 steps or less to demonstrate improved gait speed and efficiency for community gait and also decreased overuse of the R side.    Target Date 21   Date Met      Progress (detail required for progress note): 10/26/21  continues to be inconsistent with this.  His gait pattern is improving.  He has been ambulating without the AFO short distances, with carbon fiber AFO (ottobach trimable )  for better freedom of ankle motion and improved gait pattern/mid to end stance and pelvic alignment however he  does not feel comfortable mostly due to the change in how it feels, knowing he doesn't have his original AFO on .      Goal Identifier 5- Floor transfers.    Goal Description Marcus to perform transfer to and from floor with outside UE support and min assist of 1 to allow him to recover from a fall with assist from wife or caregiver.    Target Date 12/31/21   Date Met      Progress (detail required for progress note): 10/12/21 Stand facing mat, R hand on the mat attempted first with R leg forward - changed to L  forward, down to R knee, max assist to bring L leg back into double kneel.   Did not go to floor due to risk of too much torque/stress hips and knees. return to stand leading with the R leg forward able to bring L leg up , min assist at end for positioning L LE and mod assist to get to stand and cues to relax/dampen response.      Goal Identifier 6 --  4 square   Goal Description Marcus to complete 4 square in 25 seconds with and AD and 40 seconds or less without an AD to demonstrate improved interlimb coordination, balance and body control to assist in meeting all goals and overall function.    Target Date 12/31/21   Date Met      Progress (detail required for progress note): 10/26/21   1 min 6 sec ottSNAPCARDk brace therapist stabilizing the poles, 34.31 over tape, trekking pole.  stepping on tape .  With the pole to step over Lex is demonstrating improved ability to lift his legs higher to clear the pole.      Goal Identifier HEP   Goal Description Marcus to be independent in appropriate HEP to continue to address his impairments following d/c from skilled PT intervention.    Target Date 12/31/21   Date Met      Progress (detail required for progress note): 10/26/21 continues to perform HEP, he has not been able to do the stretches at home without PCA intervention anymore,  Difficult for wife to perform due to her own back issues. Stretches completed in clinic     Goal Identifier 8 functional reach/neuro motor  control   Goal Description Marcus to perform reaching to the floor to the left to pick object up off the floor while maintaining L hand contact/closed chain on the mat to demonstrate impropved neuromotor planning, ability to bring COM to the left while keeping proper muscle activation/deactivation L and R side of body for greater safety with reaching activiites to the floor, unstrapping shoe, AFO.  Also for carryover of proper motor function for improved gait and transfers.    Target Date 12/31/21   Date Met      Progress (detail required for progress note): 10/26/21  continues to need help stabilizing L hand on tiff mat due to overactivation of upper trap/quadrant and trunk leading to flexion shoulder and hand.  He is improving in his comfort level and ability to reach to the floor, demonstrates improved disassociation b/w joints and less overactivaiton of the R hip flexors leading to decreased foot contact on the floor.        Plan:  Continue therapy per current plan of care.    Discharge:  No

## 2021-10-27 NOTE — PROGRESS NOTES
Service Date: 10/27/2021    CARDIOLOGY OFFICE PROGRESS NOTE    REFERRING PHYSICIAN:  Don Aviles MD    HISTORY OF PRESENT ILLNESS:  I had the opportunity to see Mr. Lex Hodges in Cardiology Clinic today at [facility] in [place] for reevaluation of chronic atrial fibrillation with a history of atrial septal defect repair and mitral valve repair.    Mr. Hodges had an atrial septal defect repaired originally in 1978 and went back for a second surgery 9 months later for mitral valve repair in addition to additional repair of his atrial septal defect.  In 2004, he had atrial flutter, which was treated successfully with ablation.  In 2012, he suffered a large right-sided stroke, which was thought to be due to newly discovered atrial fibrillation.  He continues to have dense left-sided hemiparesis.  He continues to exercise regularly, although is somewhat limited by his slow walking pace with his walker.  Today he walked across the skyway from the parking ramp and did not have any significant difficulty with that.  He denies shortness of breath, chest discomfort, palpitations, lightheadedness and syncope.    His wife has noticed fairly consistent low blood pressures, especially since starting Flomax.  Fortunately, he seems to be asymptomatic in that regard.    He remains on Eliquis for stroke prevention, and his echocardiogram shows a normal left ventricular size and function and a normal right ventricular function as well.  He has severe biatrial enlargement with mild residual mitral regurgitation and no residual ASD noted.    PHYSICAL EXAMINATION:  Today his blood pressure is 105/73, heart rate 51 and weight 171 pounds.  His lungs are clear.  His heart rhythm is irregular.  He has no significant cardiac murmurs or edema.    IMPRESSIONS:  Mr. Marcus Hodges is a 66-year-old gentleman with hypertension, dyslipidemia and a history of atrial septal defect and mitral valve regurgitation.  He had the atrial  septal defect and mitral valve repaired when he was 23 years old and did well for quite a while until he developed atrial flutter and underwent an ablation in .  He then developed atrial fibrillation in  and suffered a fairly large stroke.  He seems to be doing well now with chronic atrial fibrillation, which is well rate controlled.  In fact, his heart rates are a bit slow, and his blood pressure is a bit low, and I suggested he cut his metoprolol XL in half to 12.5 mg daily.    He will continue his Eliquis and watch for issues with bleeding.    I will plan to see him back again in 1 year for reevaluation of these issues.    cc:  [place] [address]    Hayes Yip MD, [facility]        D: 10/27/2021   T: 10/27/2021   MT: aydee    Name:     STEPHANIE HARDEN  MRN:      [id number]        Account:      [id number]   :      1955           Service Date: 10/27/2021       Document: [id number]

## 2021-11-03 ENCOUNTER — HOSPITAL ENCOUNTER (OUTPATIENT)
Dept: OCCUPATIONAL THERAPY | Facility: CLINIC | Age: 66
Setting detail: THERAPIES SERIES
End: 2021-11-03
Attending: INTERNAL MEDICINE
Payer: MEDICARE

## 2021-11-03 ENCOUNTER — HOSPITAL ENCOUNTER (OUTPATIENT)
Dept: PHYSICAL THERAPY | Facility: CLINIC | Age: 66
Setting detail: THERAPIES SERIES
End: 2021-11-03
Attending: INTERNAL MEDICINE
Payer: MEDICARE

## 2021-11-03 PROCEDURE — 97112 NEUROMUSCULAR REEDUCATION: CPT | Mod: GP,KX | Performed by: PHYSICAL THERAPIST

## 2021-11-03 PROCEDURE — 97110 THERAPEUTIC EXERCISES: CPT | Mod: GO,KX

## 2021-11-03 PROCEDURE — 97535 SELF CARE MNGMENT TRAINING: CPT | Mod: GO,KX

## 2021-11-03 PROCEDURE — 97116 GAIT TRAINING THERAPY: CPT | Mod: GP | Performed by: PHYSICAL THERAPIST

## 2021-11-03 PROCEDURE — 97112 NEUROMUSCULAR REEDUCATION: CPT | Mod: GO

## 2021-11-03 PROCEDURE — 97110 THERAPEUTIC EXERCISES: CPT | Mod: GP,KX | Performed by: PHYSICAL THERAPIST

## 2021-11-05 ENCOUNTER — HOSPITAL ENCOUNTER (OUTPATIENT)
Dept: OCCUPATIONAL THERAPY | Facility: CLINIC | Age: 66
Setting detail: THERAPIES SERIES
End: 2021-11-05
Attending: INTERNAL MEDICINE
Payer: MEDICARE

## 2021-11-05 PROCEDURE — 97112 NEUROMUSCULAR REEDUCATION: CPT | Mod: GO,KX

## 2021-11-05 PROCEDURE — 97110 THERAPEUTIC EXERCISES: CPT | Mod: GO,KX

## 2021-11-09 ENCOUNTER — HOSPITAL ENCOUNTER (OUTPATIENT)
Dept: PHYSICAL THERAPY | Facility: CLINIC | Age: 66
Setting detail: THERAPIES SERIES
End: 2021-11-09
Attending: INTERNAL MEDICINE
Payer: MEDICARE

## 2021-11-09 ENCOUNTER — HOSPITAL ENCOUNTER (OUTPATIENT)
Dept: OCCUPATIONAL THERAPY | Facility: CLINIC | Age: 66
Setting detail: THERAPIES SERIES
End: 2021-11-09
Attending: INTERNAL MEDICINE
Payer: MEDICARE

## 2021-11-09 PROCEDURE — 97110 THERAPEUTIC EXERCISES: CPT | Mod: GP | Performed by: PHYSICAL THERAPIST

## 2021-11-09 PROCEDURE — 97112 NEUROMUSCULAR REEDUCATION: CPT | Mod: GP | Performed by: PHYSICAL THERAPIST

## 2021-11-09 PROCEDURE — 97112 NEUROMUSCULAR REEDUCATION: CPT | Mod: GO,KX

## 2021-11-09 PROCEDURE — 97110 THERAPEUTIC EXERCISES: CPT | Mod: GO,KX

## 2021-11-12 ENCOUNTER — HOSPITAL ENCOUNTER (OUTPATIENT)
Dept: OCCUPATIONAL THERAPY | Facility: CLINIC | Age: 66
Setting detail: THERAPIES SERIES
End: 2021-11-12
Attending: INTERNAL MEDICINE
Payer: MEDICARE

## 2021-11-12 PROCEDURE — 97110 THERAPEUTIC EXERCISES: CPT | Mod: GO,KX

## 2021-11-12 PROCEDURE — 97112 NEUROMUSCULAR REEDUCATION: CPT | Mod: GO,KX

## 2021-11-15 ENCOUNTER — HOSPITAL ENCOUNTER (OUTPATIENT)
Dept: PHYSICAL THERAPY | Facility: CLINIC | Age: 66
Setting detail: THERAPIES SERIES
End: 2021-11-15
Attending: INTERNAL MEDICINE
Payer: MEDICARE

## 2021-11-15 PROCEDURE — 97112 NEUROMUSCULAR REEDUCATION: CPT | Mod: GP,KX | Performed by: PHYSICAL THERAPIST

## 2021-11-15 PROCEDURE — 97116 GAIT TRAINING THERAPY: CPT | Mod: GP,KX | Performed by: PHYSICAL THERAPIST

## 2021-11-15 PROCEDURE — 97110 THERAPEUTIC EXERCISES: CPT | Mod: GP,KX | Performed by: PHYSICAL THERAPIST

## 2021-11-15 PROCEDURE — 97140 MANUAL THERAPY 1/> REGIONS: CPT | Mod: GP,KX | Performed by: PHYSICAL THERAPIST

## 2021-11-23 ENCOUNTER — HOSPITAL ENCOUNTER (OUTPATIENT)
Dept: PHYSICAL THERAPY | Facility: CLINIC | Age: 66
Setting detail: THERAPIES SERIES
End: 2021-11-23
Attending: INTERNAL MEDICINE
Payer: MEDICARE

## 2021-11-23 ENCOUNTER — HOSPITAL ENCOUNTER (OUTPATIENT)
Dept: OCCUPATIONAL THERAPY | Facility: CLINIC | Age: 66
Setting detail: THERAPIES SERIES
End: 2021-11-23
Attending: INTERNAL MEDICINE
Payer: MEDICARE

## 2021-11-23 PROCEDURE — 97110 THERAPEUTIC EXERCISES: CPT | Mod: GO,KX

## 2021-11-23 PROCEDURE — 97112 NEUROMUSCULAR REEDUCATION: CPT | Mod: GO,KX

## 2021-11-23 PROCEDURE — 97110 THERAPEUTIC EXERCISES: CPT | Mod: GP | Performed by: PHYSICAL THERAPIST

## 2021-11-23 PROCEDURE — 97535 SELF CARE MNGMENT TRAINING: CPT | Mod: GO,KX

## 2021-11-23 PROCEDURE — 97112 NEUROMUSCULAR REEDUCATION: CPT | Mod: GP,KX | Performed by: PHYSICAL THERAPIST

## 2021-11-30 ENCOUNTER — HOSPITAL ENCOUNTER (OUTPATIENT)
Dept: PHYSICAL THERAPY | Facility: CLINIC | Age: 66
Setting detail: THERAPIES SERIES
End: 2021-11-30
Attending: INTERNAL MEDICINE
Payer: MEDICARE

## 2021-11-30 PROCEDURE — 97110 THERAPEUTIC EXERCISES: CPT | Mod: GP,KX | Performed by: PHYSICAL THERAPIST

## 2021-11-30 PROCEDURE — 97116 GAIT TRAINING THERAPY: CPT | Mod: GP,KX | Performed by: PHYSICAL THERAPIST

## 2021-11-30 PROCEDURE — 97112 NEUROMUSCULAR REEDUCATION: CPT | Mod: GP,KX | Performed by: PHYSICAL THERAPIST

## 2021-12-07 ENCOUNTER — HOSPITAL ENCOUNTER (OUTPATIENT)
Dept: OCCUPATIONAL THERAPY | Facility: CLINIC | Age: 66
Setting detail: THERAPIES SERIES
End: 2021-12-07
Attending: INTERNAL MEDICINE
Payer: MEDICARE

## 2021-12-07 PROCEDURE — 97110 THERAPEUTIC EXERCISES: CPT | Mod: GO,KX

## 2021-12-07 PROCEDURE — 97112 NEUROMUSCULAR REEDUCATION: CPT | Mod: GO,KX

## 2021-12-14 ENCOUNTER — HOSPITAL ENCOUNTER (OUTPATIENT)
Dept: PHYSICAL THERAPY | Facility: CLINIC | Age: 66
Setting detail: THERAPIES SERIES
End: 2021-12-14
Attending: INTERNAL MEDICINE
Payer: MEDICARE

## 2021-12-14 PROCEDURE — 97116 GAIT TRAINING THERAPY: CPT | Mod: GP,KX | Performed by: PHYSICAL THERAPIST

## 2021-12-14 PROCEDURE — 97110 THERAPEUTIC EXERCISES: CPT | Mod: GP,KX | Performed by: PHYSICAL THERAPIST

## 2021-12-21 ENCOUNTER — HOSPITAL ENCOUNTER (OUTPATIENT)
Dept: PHYSICAL THERAPY | Facility: CLINIC | Age: 66
Setting detail: THERAPIES SERIES
End: 2021-12-21
Attending: INTERNAL MEDICINE
Payer: MEDICARE

## 2021-12-21 ENCOUNTER — HOSPITAL ENCOUNTER (OUTPATIENT)
Dept: OCCUPATIONAL THERAPY | Facility: CLINIC | Age: 66
Setting detail: THERAPIES SERIES
End: 2021-12-21
Attending: INTERNAL MEDICINE
Payer: MEDICARE

## 2021-12-21 PROCEDURE — 97110 THERAPEUTIC EXERCISES: CPT | Mod: GO

## 2021-12-21 PROCEDURE — 97110 THERAPEUTIC EXERCISES: CPT | Mod: GP,KX | Performed by: PHYSICAL THERAPIST

## 2021-12-21 PROCEDURE — 97112 NEUROMUSCULAR REEDUCATION: CPT | Mod: GP,KX | Performed by: PHYSICAL THERAPIST

## 2021-12-21 PROCEDURE — 97530 THERAPEUTIC ACTIVITIES: CPT | Mod: GP,KX | Performed by: PHYSICAL THERAPIST

## 2021-12-21 PROCEDURE — 97116 GAIT TRAINING THERAPY: CPT | Mod: GP,KX | Performed by: PHYSICAL THERAPIST

## 2021-12-21 NOTE — PROGRESS NOTES
[facility]    OUTPATIENT PHYSICAL THERAPY  PLAN OF TREATMENT FOR OUTPATIENT REHABILITATION AND PROGRESS NOTE           Patient's Last Name, First Name, Marcus Mcgee Date of Birth  1955   Provider's Name  [facility] Medical Record No.  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                Referring Provider: IRENE JEFFERS, PT

## 2021-12-22 NOTE — PROGRESS NOTES
[facility]    OUTPATIENT OCCUPATIONAL THERAPY  PLAN OF TREATMENT FOR OUTPATIENT REHABILITATION AND PROGRESS NOTE    Patient's Last Name, First Name, Marcus Mcgee Date of Birth  1955   Provider's Name  [facility] Medical Record No.  [id number]    Onset Date  02/04/21(MD referral date, ongoing symptoms all of 2020.) Start of Care Date  3/9/2021   Type:     __PT   _X_OT   __SLP Medical Diagnosis  Traumatic brain injury with loss of consciousness, sequela. (Initial onset April 20,2012)        OT Diagnosis  Decreased ADL/IADL, contractures, pain Plan of Treatment  Frequency/Duration: 2x/week x 2 weeks after botox, then 1x/week x 10 weeks after.  Certification date from 12/21/21 to 03/15/22     Goals:    Goal Identifier HEP   Goal Description Pt/wife to demonstrate independence with HEP tasks to  support ROM, weightbearing, hand use, NMES and tone management strategies for addressing LUE pain/contractures and increase functional strength and ROM for maximum independence with performance of ADLs as gross assist and gross stabilizer.    Target Date 03/15/22   Date Met      Progress (detail required for progress note): Continues diligent splinting, P/AAROM and NMES by wife. Demonstrates improved understanding of HEP since last progress assessment, including new HEP exercises like stretching using modified closed chain and AROM in supine.     Goal Identifier Hand use   Goal Description Patient to demonstrate functional tasks (wiping the counter/table, washing dishes, etc.) with at least 15% use of L UE as gross stabilizer/gross assist for increased ADL/IADL independence and improved functional use of L UE.   Target Date 03/15/22   Date Met      Progress (detail required for progress note): Is using L hand to help with dressing ( pulling out of sleeve, SH EX/ADD  to stabilize tshirt in axilla).  Can stabilize golf ball placed in palm or poker chip placed into key pinch, but needs to use NMES to complete release at midline/ low bin.  (Usually does NMES as a more passive activity now. Reports limiting his intensity to  lower 40's following OTR education to prevent habituation).     Goal Identifier UE AROM/SROM   Goal Description Pt to demonstrate improved SROM/AAROM of shoulder flexion/abdbuction to 150 degrees, scapular rotation to 45 degrees, elbow extension to lacking 30 dgrees, supination to 60 of 90 degrees, wrist extension/wrist flexion to 50 of 70 degrees with hand open to support functional hand use in varied positions and reduced pain at night/improved sleep pattern.   Target Date 03/15/22   Date Met      Progress (detail required for progress note): PROM SH HABD to 85, EL EX to lacking 10 degrees, passive range requiring  mild to medium resistance for last 30% of ROM/tone.  SH FL to 130 degrees (per tone at pec minor, brachialis, teres minor and upper trap/levator restricted at scap rotation). SH ER at 90 degrees ABD to 32, higher springy tone noted at pec minor. SH ER at ribside to 24 degrees, similar restrictions at pec minor. Has active to AAROM  arc at H Abd 0-50 degrees to Hadd 40 degrees. After stretched PROM into near full EL EX, gets AROM combined with biceps  flexion 0-110 degrees with verbal and visual cues. Lacks 30 dgrees MCP extension for IF/MF and 20 dgrees lacking for RF/SF.  TH EX, gets to 30 of 60 PROM.           Beginning/End Dates of Progress Note Reporting Period:  10/18/21 to 12/21/21    Progress Toward Goals:   Progress this reporting period: Pt continues to be affected by strong tone/spasicity but responds better to combination of Botox injections every 12 weeks and increased frequency to 2x/week x 2 weeks ( to reduce pain, contractures and help get P/AAROM back while adding more functional movement patterns when he is moving easier)    Client  Self (Subjective) Report for Progress Note Reporting Period: Pt reports overall doing well, looking forward to hopefully spending time with family for [place] if everyone is healthy. Brings in resting hand splint and shows there is a crack in hand piece, requests new referral to hand therapy for new splint to maintain joint integrity/contracture management.    Objective Measurements:      Objective Measure: , pinch  Details: Can stabilize golf ball placed in palm or poker chip placed into key pinch, but needs to use NMES to complete release at midline/ low bin. Limited thumb extension response despite multiple placements of electrodes attempted.       Objective Measure: L UE, Trunk ROM  Details: PROM SH HABD to 85, EL EX to lacking 10 degrees, passive range requiring  mild to medium resistance for last 30% of ROM/tone.  SH FL to 130 degrees (per tone at pec minor, brachialis, teres minor and upper trap/levator restricted at scap rotation). SH ER at 90 degrees ABD to 32, higher springy tone noted at pec minor. SH ER at ribside to 24 degrees, similar restrictions at pec minor. Has active to AAROM  arc at H Abd 0-50 degrees to Hadd 40 degrees. After stretched PROM into near full EL EX, gets AROM combined with biceps  flexion 0-110 degrees with verbal and visual cues. Lacks 30 dgrees MCP extension for IF/MF and 20 dgrees lacking for RF/SF.  TH EX, gets to 30 of 60 PROM.                    I CERTIFY THE NEED FOR THESE SERVICES FURNISHED UNDER        THIS PLAN OF TREATMENT AND WHILE UNDER MY CARE     (Physician co-signature of this document indicates review and certification of the therapy plan).                Referring Provider: Don Aviles MD Megan Peterson, OTR

## 2021-12-23 ENCOUNTER — TELEPHONE (OUTPATIENT)
Dept: PEDIATRICS | Facility: CLINIC | Age: 66
End: 2021-12-23
Payer: MEDICARE

## 2021-12-23 DIAGNOSIS — G81.94 LEFT HEMIPARESIS (H): Primary | ICD-10-CM

## 2021-12-23 NOTE — PROGRESS NOTES
[facility]    OUTPATIENT PHYSICAL THERAPY  PLAN OF TREATMENT FOR OUTPATIENT REHABILITATION AND PROGRESS NOTE           Patient's Last Name, First Name, Marcus Mcgee Date of Birth  1955   Provider's Name  [facility] Medical Record No.  [id number]    Onset Date  4/1/2019 Start of Care Date  7/1/2019   Type:     _X_PT   ___OT   ___SLP Medical DiagnosisLeft hemiplegia (G81.94-  ),  Tramatic brain injury with loss of  consciousness, sequela (S06.9X9S)     PT Diagnosis   Decreased independence and functional activity  tolerance due to impairments from CVA and  secondary impairmentsfollowing CVA.(R knee  pain, shoulder pain Plan of Treatment  Frequency/Duration: 1 x a week x 12 weeks  Certification date from 12/8/2021 to 3/2/22     Goals:      Goal Identifier 3-  Curb/stairs   Goal Description Marcus to have increased disassociation of movement/improved timing and sequencing of muscle activation for improved hip and knee flexion in standing demonstrated by the ability to step over upright shabana (4-6 inch object) with the L LE to allow him to step up onto a curb without catching foot.   Target Date 12/31/21   Date Met      Progress (detail required for progress note):       Goal Identifier 4- Gait   Goal Description Marcus to increase gait speed to 19 seconds or less with AD and 24 steps or less to demonstrate improved gait speed and efficiency for community gait and also decreased overuse of the R side.    Target Date 12/31/21   Date Met      Progress (detail required for progress note): 12/21/21 21.3 sec then 19.47 sec with trekking pole, AFO  10/26/21  continues to be inconsistent with this.  His gait pattern is improving.  He has been ambulating without the AFO short distances, with carbon fiber AFO (ottobach trimable )  for better freedom of ankle motion  and improved gait pattern/mid to end stance and pelvic alignment however he does not feel comfortable mostly due to the change in how it feels, knowing he doesn't have his original AFO on .      Goal Identifier 5- Floor transfers.    Goal Description Marcus to perform transfer to and from floor with outside UE support and min assist of 1 to allow him to recover from a fall with assist from wife or caregiver.    Target Date 12/31/21   Date Met      Progress (detail required for progress note):       Goal Identifier 6 --  4 square   Goal Description Marcus to complete 4 square in 25 seconds with and AD and 40 seconds or less without an AD to demonstrate improved interlimb coordination, balance and body control to assist in meeting all goals and overall function.    Target Date 12/31/21   Date Met      Progress (detail required for progress note):       Goal Identifier HEP   Goal Description Marcus to be independent in appropriate HEP to continue to address his impairments following d/c from skilled PT intervention.    Target Date 12/31/21   Date Met      Progress (detail required for progress note):       Goal Identifier 8 functional reach/neuro motor control   Goal Description Marcus to perform reaching to the floor to the left to pick object up off the floor while maintaining L hand contact/closed chain on the mat to demonstrate impropved neuromotor planning, ability to bring COM to the left while keeping proper muscle activation/deactivation L and R side of body for greater safety with reaching activiites to the floor, unstrapping shoe, AFO.  Also for carryover of proper motor function for improved gait and transfers.    Target Date 12/31/21   Date Met      Progress (detail required for progress note):            I CERTIFY THE NEED FOR THESE SERVICES FURNISHED UNDER        THIS PLAN OF TREATMENT AND WHILE UNDER MY CARE     (Physician co-signature of this document indicates review and certification of the therapy plan).                 Referring Provider: Dr Traci Scott, PT

## 2021-12-23 NOTE — TELEPHONE ENCOUNTER
Needs new hand splint, referral to hand PT    Please give pt referral info for hand PT to get a new splint:    Call one number to schedule at any of the above locations: [phone].

## 2021-12-23 NOTE — TELEPHONE ENCOUNTER
Called and spoke with patient's wife (C2C on file).  Discussed referral per Dr. Aviles, and that they will be contacted directly for scheduling.  Additionally provided direct scheduling number, if they would like to call and schedule themselves.    Swati Zimmer  Lead

## 2021-12-28 ENCOUNTER — HOSPITAL ENCOUNTER (OUTPATIENT)
Dept: PHYSICAL THERAPY | Facility: CLINIC | Age: 66
Setting detail: THERAPIES SERIES
End: 2021-12-28
Attending: INTERNAL MEDICINE
Payer: MEDICARE

## 2021-12-28 PROCEDURE — 97112 NEUROMUSCULAR REEDUCATION: CPT | Mod: GP,KX | Performed by: PHYSICAL THERAPIST

## 2021-12-28 PROCEDURE — 97110 THERAPEUTIC EXERCISES: CPT | Mod: GP | Performed by: PHYSICAL THERAPIST

## 2022-01-11 ENCOUNTER — HOSPITAL ENCOUNTER (OUTPATIENT)
Dept: PHYSICAL THERAPY | Facility: CLINIC | Age: 67
Setting detail: THERAPIES SERIES
End: 2022-01-11
Attending: INTERNAL MEDICINE
Payer: MEDICARE

## 2022-01-11 PROCEDURE — 97110 THERAPEUTIC EXERCISES: CPT | Mod: GP | Performed by: PHYSICAL THERAPIST

## 2022-01-11 PROCEDURE — 97112 NEUROMUSCULAR REEDUCATION: CPT | Mod: GP | Performed by: PHYSICAL THERAPIST

## 2022-01-11 PROCEDURE — 97116 GAIT TRAINING THERAPY: CPT | Mod: GP | Performed by: PHYSICAL THERAPIST

## 2022-01-17 ENCOUNTER — HOSPITAL ENCOUNTER (OUTPATIENT)
Dept: PHYSICAL THERAPY | Facility: CLINIC | Age: 67
Setting detail: THERAPIES SERIES
End: 2022-01-17
Attending: INTERNAL MEDICINE
Payer: MEDICARE

## 2022-01-17 ENCOUNTER — HOSPITAL ENCOUNTER (OUTPATIENT)
Dept: OCCUPATIONAL THERAPY | Facility: CLINIC | Age: 67
Setting detail: THERAPIES SERIES
End: 2022-01-17
Attending: INTERNAL MEDICINE
Payer: MEDICARE

## 2022-01-17 PROCEDURE — 97116 GAIT TRAINING THERAPY: CPT | Mod: GP | Performed by: PHYSICAL THERAPIST

## 2022-01-17 PROCEDURE — 97110 THERAPEUTIC EXERCISES: CPT | Mod: GP | Performed by: PHYSICAL THERAPIST

## 2022-01-17 PROCEDURE — 97110 THERAPEUTIC EXERCISES: CPT | Mod: GO

## 2022-01-17 PROCEDURE — 97112 NEUROMUSCULAR REEDUCATION: CPT | Mod: GP | Performed by: PHYSICAL THERAPIST

## 2022-01-18 NOTE — PROGRESS NOTES
[facility]    Outpatient Physical Therapy Progress Note  Patient: Marcus Hodges  : 1955    Beginning/End Dates of Reporting Period:  10/27/21 to 10/17/22  Lex has been seen for 10 skilled PT sessions since his last PN    Referring Provider: Dr. Don Aviles    Therapy Diagnosis:  Decreased independence and functional activity  tolerance due to impairments from CVA and secondary impairments  following CVA.(R knee pain, shoulder pain)     Client Self Report: states his neck has been a little sore.  Overall doing well. Nidia, Lex's wife, has reported noticing improved transfers from recliner at home, improved ability to perform stairs and curbs.  Pt continues to have R knee pain at times.  Education has been completed repeatedly regarding alignment with transfers sit to stand to decrease load and improper strain to the R knee .        Goals:  Goal Identifier     Goal Description     Target Date     Date Met      Progress (detail required for progress note):       Goal Identifier     Goal Description     Target Date     Date Met      Progress (detail required for progress note):       Goal Identifier 3-  Curb/stairs   Goal Description Marcus to have increased disassociation of movement/improved timing and sequencing of muscle activation for improved hip and knee flexion in standing demonstrated by the ability to step over upright shabana (4-6 inch object) with the L LE to allow him to step up onto a curb without catching foot.   Target Date 22   Date Met      Progress (detail required for progress note): 2022  Stepping over upright shabana not able to clear foot L lacks knee flexion/hip flexion movement , stepping over flat shabana on the floor able to do.      Goal Identifier 4- Gait   Goal Description Marcus to increase gait speed to 19 seconds or less with AD and 24 steps or less to demonstrate  improved gait speed and efficiency for community gait and also decreased overuse of the R side.    Target Date 03/02/22   Date Met      Progress (detail required for progress note): 1/17/22   21.88 sec , 22.45 sec, 24-26 steps  12/21/21 21.3 sec then 19.47 sec with trekking pole, AFO       Goal Identifier 5- Floor transfers.    Goal Description Marcus to perform transfer to and from floor with outside UE support and min assist of 1 to allow him to recover from a fall with assist from wife or caregiver.    Target Date 03/02/22   Date Met      Progress (detail required for progress note): 12/28/21 -  turn to face mat with R hand therapist shoulder, down to R knee, needing mod -max assist to bring L leg back,  down to R sidelying -attempting prone but rotated onto L side. NOT able to go to kneel.  Chair in front of him assist to bring L leg forward to single kneel R mod to mas assist, to partial stand mod max assist/ pt twist to the R and sat on mat. Not coming to a full stand.      Goal Identifier 6 --  4 square   Goal Description Marcus to complete 4 square in 25 seconds with and AD and 40 seconds or less without an AD to demonstrate improved interlimb coordination, balance and body control to assist in meeting all goals and overall function.    Target Date 03/02/22   Date Met      Progress (detail required for progress note): over weighted bars 78.34 sec with trekking pole and CGA, stepping on the pole.  Difficulty with stepping over with the L especially backwards.     Needing assist to stabilize the object stepping over.    Stepping over tape as object 35 sec. but stepping on tape which would be the object.   10/26/21   1 min 6 sec [facility] brace therapist stabilizing the poles, 34.31 over tape, trekking pole.  stepping on tape .  With the pole to step over Lex is demonstrating improved ability to lift his legs higher to clear the pole     Goal Identifier HEP   Goal Description Marcus to be independent in  appropriate HEP to continue to address his impairments following d/c from skilled PT intervention.    Target Date 03/02/22   Date Met      Progress (detail required for progress note): 1/17/22 doing home program without questions.  more comfortable with standing with shoes off      Goal Identifier 8 functional reach/neuro motor control   Goal Description Marcus to perform reaching to the floor to the left to pick object up off the floor while maintaining L hand contact/closed chain on the mat to demonstrate impropved neuromotor planning, ability to bring COM to the left while keeping proper muscle activation/deactivation L and R side of body for greater safety with reaching activiites to the floor, unstrapping shoe, AFO.  Also for carryover of proper motor function for improved gait and transfers.    Target Date 03/02/22   Date Met      Progress (detail required for progress note): not assessed 1/17/2022       Plan:  Continue therapy per current plan of care.  If there continues to be limited change will likely discharge to home program in the next couple of months.  It is benificial for Lex to continue to attend PT to bridge the gap from now until better weather as well as ability to return to community center for exercise once hopefully COVID has decreased in occurrence.     Discharge:  No

## 2022-01-25 ENCOUNTER — HOSPITAL ENCOUNTER (OUTPATIENT)
Dept: PHYSICAL THERAPY | Facility: CLINIC | Age: 67
Setting detail: THERAPIES SERIES
End: 2022-01-25
Attending: INTERNAL MEDICINE
Payer: MEDICARE

## 2022-01-25 PROCEDURE — 97110 THERAPEUTIC EXERCISES: CPT | Mod: GP | Performed by: PHYSICAL THERAPIST

## 2022-01-25 PROCEDURE — 97112 NEUROMUSCULAR REEDUCATION: CPT | Mod: GP | Performed by: PHYSICAL THERAPIST

## 2022-01-25 PROCEDURE — 97116 GAIT TRAINING THERAPY: CPT | Mod: GP | Performed by: PHYSICAL THERAPIST

## 2022-02-01 ENCOUNTER — HOSPITAL ENCOUNTER (OUTPATIENT)
Dept: OCCUPATIONAL THERAPY | Facility: CLINIC | Age: 67
Setting detail: THERAPIES SERIES
End: 2022-02-01
Attending: INTERNAL MEDICINE
Payer: MEDICARE

## 2022-02-01 ENCOUNTER — HOSPITAL ENCOUNTER (OUTPATIENT)
Dept: PHYSICAL THERAPY | Facility: CLINIC | Age: 67
Setting detail: THERAPIES SERIES
End: 2022-02-01
Attending: INTERNAL MEDICINE
Payer: MEDICARE

## 2022-02-01 PROCEDURE — 97140 MANUAL THERAPY 1/> REGIONS: CPT | Mod: GP | Performed by: PHYSICAL THERAPIST

## 2022-02-01 PROCEDURE — 97110 THERAPEUTIC EXERCISES: CPT | Mod: GP | Performed by: PHYSICAL THERAPIST

## 2022-02-01 PROCEDURE — 97116 GAIT TRAINING THERAPY: CPT | Mod: GP | Performed by: PHYSICAL THERAPIST

## 2022-02-01 PROCEDURE — 97112 NEUROMUSCULAR REEDUCATION: CPT | Mod: GP | Performed by: PHYSICAL THERAPIST

## 2022-02-01 PROCEDURE — 97110 THERAPEUTIC EXERCISES: CPT | Mod: GO

## 2022-02-03 ENCOUNTER — HOSPITAL ENCOUNTER (OUTPATIENT)
Dept: OCCUPATIONAL THERAPY | Facility: CLINIC | Age: 67
Setting detail: THERAPIES SERIES
End: 2022-02-03
Attending: INTERNAL MEDICINE
Payer: MEDICARE

## 2022-02-03 PROCEDURE — 97110 THERAPEUTIC EXERCISES: CPT | Mod: GO

## 2022-02-06 NOTE — PROGRESS NOTES
[facility]    Outpatient Occupational Therapy Progress Note  Patient: Marcus Hodges  : 1955    Beginning/End Dates of Reporting Period:  21 to 2/3/22    Referring Provider: Don Aviles MD    Therapy Diagnosis: Decreased ADL/IADL, contractures, pain    Client Self Report: Pt reports his fingers feel a little bit more responsive to his e-stim since his Botox. Reports Dr. Haddad was appreciative of updated note from OTR prior to receiving this round of Botox. Pt's spouse reports it will be helpful for patient to have SROM program. Notes they have used an arm bike in the past to further promote ROM but haven't used in awhile; are open to trying again.        Goals:     Goal Identifier HEP   Goal Description Pt/wife to demonstrate independence with HEP tasks to  support ROM, weightbearing, hand use, NMES and tone management strategies for addressing LUE pain/contractures and increase functional strength and ROM for maximum independence with performance of ADLs as gross assist and gross stabilizer.    Target Date 03/15/22   Date Met      Progress (detail required for progress note): Continues diligent splinting, P/AAROM and NMES by wife. Demonstrates improved understanding of HEP since last progress assessment, including new HEP exercises like stretching using modified closed chain, AROM in supine, and introduction to SROM exercises.     Goal Identifier Hand use   Goal Description Patient to demonstrate functional tasks (wiping the counter/table, washing dishes, etc.) with at least 15% use of L UE as gross stabilizer/gross assist for increased ADL/IADL independence and improved functional use of L UE.   Target Date 03/15/22   Date Met      Progress (detail required for progress note): Improved extension in L MCPs of hand with most recent Botox. Is using L hand to help with dressing ( pulling  out of sleeve, SH EX/ADD to stabilize tshirt in axilla).  Can stabilize golf ball placed in palm or poker chip placed into key pinch, but needs to use NMES to complete release at midline/ low bin.  (Usually does NMES as a more passive activity now. Reports limiting his intensity to  lower 40's following OTR education to prevent habituation).     Goal Identifier UE AROM/SROM   Goal Description Pt to demonstrate improved SROM/AAROM of shoulder flexion/abdbuction to 150 degrees, scapular rotation to 45 degrees, elbow extension to lacking 30 dgrees, supination to 60 of 90 degrees, wrist extension/wrist flexion to 50 of 70 degrees with hand open to support functional hand use in varied positions and reduced pain at night/improved sleep pattern.   Target Date 03/15/22   Date Met      Progress (detail required for progress note): PROM SH HABD to 85, EL EX to lacking 10 degrees, passive range requiring  mild to medium resistance for last 30% of ROM/tone.  SH FL to 130 degrees (per tone at pec minor, brachialis, teres minor and upper trap/levator restricted at scap rotation). SH ER at 90 degrees ABD to 32, higher springy tone noted at pec minor. SH ER at ribside to 24 degrees, similar restrictions at pec minor. Has active to AAROM  arc at H Abd 0-50 degrees to Hadd 40 degrees. After stretched PROM into near full EL EX, gets AROM combined with biceps  flexion 0-110 degrees with verbal and visual cues. Lacks 20 dgrees MCP extension for digits.  TH EX, gets to 30 of 60 PROM.            Plan:  Continue therapy per current plan of care. If there continues to be limited change, will consider discharge to home program in the next couple of months. At this time, Lex is benefiting from skilled OT services to further promote IND with HEP for continuation of pain and tone management in home environment for improved quality of life.    Discharge:  No    Regina Tolentino OTR/L

## 2022-02-08 ENCOUNTER — HOSPITAL ENCOUNTER (OUTPATIENT)
Dept: OCCUPATIONAL THERAPY | Facility: CLINIC | Age: 67
Setting detail: THERAPIES SERIES
End: 2022-02-08
Attending: INTERNAL MEDICINE
Payer: MEDICARE

## 2022-02-08 ENCOUNTER — HOSPITAL ENCOUNTER (OUTPATIENT)
Dept: PHYSICAL THERAPY | Facility: CLINIC | Age: 67
Setting detail: THERAPIES SERIES
End: 2022-02-08
Attending: INTERNAL MEDICINE
Payer: MEDICARE

## 2022-02-08 PROCEDURE — 97112 NEUROMUSCULAR REEDUCATION: CPT | Mod: GP | Performed by: PHYSICAL THERAPIST

## 2022-02-08 PROCEDURE — 97110 THERAPEUTIC EXERCISES: CPT | Mod: GO

## 2022-02-08 PROCEDURE — 97116 GAIT TRAINING THERAPY: CPT | Mod: GP | Performed by: PHYSICAL THERAPIST

## 2022-02-08 PROCEDURE — 97110 THERAPEUTIC EXERCISES: CPT | Mod: GP | Performed by: PHYSICAL THERAPIST

## 2022-02-10 ENCOUNTER — HOSPITAL ENCOUNTER (OUTPATIENT)
Dept: OCCUPATIONAL THERAPY | Facility: CLINIC | Age: 67
Setting detail: THERAPIES SERIES
End: 2022-02-10
Attending: INTERNAL MEDICINE
Payer: MEDICARE

## 2022-02-10 PROCEDURE — 97110 THERAPEUTIC EXERCISES: CPT | Mod: GO

## 2022-02-15 ENCOUNTER — HOSPITAL ENCOUNTER (OUTPATIENT)
Dept: OCCUPATIONAL THERAPY | Facility: CLINIC | Age: 67
Setting detail: THERAPIES SERIES
End: 2022-02-15
Attending: INTERNAL MEDICINE
Payer: MEDICARE

## 2022-02-15 ENCOUNTER — HOSPITAL ENCOUNTER (OUTPATIENT)
Dept: PHYSICAL THERAPY | Facility: CLINIC | Age: 67
Setting detail: THERAPIES SERIES
End: 2022-02-15
Attending: INTERNAL MEDICINE
Payer: MEDICARE

## 2022-02-15 PROCEDURE — 97116 GAIT TRAINING THERAPY: CPT | Mod: GP | Performed by: PHYSICAL THERAPIST

## 2022-02-15 PROCEDURE — 97112 NEUROMUSCULAR REEDUCATION: CPT | Mod: GP | Performed by: PHYSICAL THERAPIST

## 2022-02-15 PROCEDURE — 97110 THERAPEUTIC EXERCISES: CPT | Mod: GO

## 2022-02-15 PROCEDURE — 97110 THERAPEUTIC EXERCISES: CPT | Mod: GP | Performed by: PHYSICAL THERAPIST

## 2022-03-01 ENCOUNTER — HOSPITAL ENCOUNTER (OUTPATIENT)
Dept: OCCUPATIONAL THERAPY | Facility: CLINIC | Age: 67
Setting detail: THERAPIES SERIES
End: 2022-03-01
Attending: INTERNAL MEDICINE
Payer: MEDICARE

## 2022-03-01 ENCOUNTER — HOSPITAL ENCOUNTER (OUTPATIENT)
Dept: PHYSICAL THERAPY | Facility: CLINIC | Age: 67
Setting detail: THERAPIES SERIES
End: 2022-03-01
Attending: INTERNAL MEDICINE
Payer: MEDICARE

## 2022-03-01 PROCEDURE — 97112 NEUROMUSCULAR REEDUCATION: CPT | Mod: GP

## 2022-03-01 PROCEDURE — 97110 THERAPEUTIC EXERCISES: CPT | Mod: GO

## 2022-03-01 PROCEDURE — 97110 THERAPEUTIC EXERCISES: CPT | Mod: GP

## 2022-03-08 ENCOUNTER — HOSPITAL ENCOUNTER (OUTPATIENT)
Dept: PHYSICAL THERAPY | Facility: CLINIC | Age: 67
Setting detail: THERAPIES SERIES
End: 2022-03-08
Attending: INTERNAL MEDICINE
Payer: MEDICARE

## 2022-03-08 ENCOUNTER — HOSPITAL ENCOUNTER (OUTPATIENT)
Dept: OCCUPATIONAL THERAPY | Facility: CLINIC | Age: 67
Setting detail: THERAPIES SERIES
End: 2022-03-08
Attending: INTERNAL MEDICINE
Payer: MEDICARE

## 2022-03-08 PROCEDURE — 97110 THERAPEUTIC EXERCISES: CPT | Mod: GO

## 2022-03-08 PROCEDURE — 97112 NEUROMUSCULAR REEDUCATION: CPT | Mod: GP | Performed by: PHYSICAL THERAPIST

## 2022-03-08 PROCEDURE — 97116 GAIT TRAINING THERAPY: CPT | Mod: GP | Performed by: PHYSICAL THERAPIST

## 2022-03-08 PROCEDURE — 97110 THERAPEUTIC EXERCISES: CPT | Mod: GP | Performed by: PHYSICAL THERAPIST

## 2022-03-08 NOTE — PROGRESS NOTES
[facility]    Outpatient Occupational Therapy Discharge Note  Patient: Marcus Hodges  : 1955    Beginning/End Dates of Reporting Period:  21 to 22    Referring Provider: Don Aviles MD    Therapy Diagnosis: Decreased ADL/IADL, contractures, pain    Client Self Report: Pt reports that overall he is doing well and reflects on his upcoming 10 year anniversary of his stroke this April. Reports that he feels fairly comfortable with his HEP and the exercises he has been assigned to complete at home and admits that he would benefit from increasing the amount he is doing at home. Pt reports feeling content with today's discharge knowing that he may return in about 6 months or so to review and update HEP, as needed. Pt's spouse reporting some hesitancy with discharge due to wanting to maximize Botox benefits but is open to taking a therapy break and revisiting needs in future, as needed. Neither have any additional questions at this time.    Goals:     Goal Identifier HEP   Goal Description Pt/wife to demonstrate independence with HEP tasks to  support ROM, weightbearing, hand use, NMES and tone management strategies for addressing LUE pain/contractures and increase functional strength and ROM for maximum independence with performance of ADLs as gross assist and gross stabilizer.    Target Date 03/15/22   Date Met  22   Progress (detail required for progress note): Goal met. Pt and spouse demonstrating completion of HEP throughout course of plan of care. Provided additional education/instruction to pt and spouse at time of discharge and provided comprehensive handout regarding HEP to continue post-discharge. At this time, spouse is providing diligent PROM stretching and NMES, and pt continues to diligently wear his hand splint at night. Encouraged pt to add SROM exercises,  strengthening, and weight-bearing into regular HEP routine. Pt and spouse to continue with HEP and return to OP OT, in the future, as needed.     Goal Identifier Hand use   Goal Description Patient to demonstrate functional tasks (wiping the counter/table, washing dishes, etc.) with at least 15% use of L UE as gross stabilizer/gross assist for increased ADL/IADL independence and improved functional use of L UE.   Target Date 03/15/22   Date Met      Progress (detail required for progress note): Goal partially met. Improved extension in L MCPs of hand with most recent Botox. Is using L hand to help with dressing ( pulling out of sleeve, SH EX/ADD to stabilize tshirt in axilla).  Washes dishes at home. Can stabilize golf ball placed in palm or poker chip placed into key pinch, but needs to use NMES to complete release at midline/ low bin.  (Usually does NMES as a more passive activity now. Reports limiting his intensity to  lower 40's following OTR education to prevent habituation).     Goal Identifier UE AROM/SROM   Goal Description Pt to demonstrate improved SROM/AAROM of shoulder flexion/abdbuction to 150 degrees, scapular rotation to 45 degrees, elbow extension to lacking 30 dgrees, supination to 60 of 90 degrees, wrist extension/wrist flexion to 50 of 70 degrees with hand open to support functional hand use in varied positions and reduced pain at night/improved sleep pattern.   Target Date 03/15/22   Date Met      Progress (detail required for progress note): Goal partially met. PROM SH HABD to 85, EL EX to lacking 10 degrees, passive range requiring  mild to medium resistance for last 30% of ROM/tone.  SH FL to 130 degrees (per tone at pec minor, brachialis, teres minor and upper trap/levator restricted at scap rotation). SH ER at 90 degrees ABD to 32, higher springy tone noted at pec minor. SH ER at ribside to 24 degrees, similar restrictions at pec minor. Has active to AAROM  arc at H Abd 0-50 degrees to Hadd  40 degrees. After stretched PROM into near full EL EX, gets AROM combined with biceps  flexion 0-110 degrees with verbal and visual cues. Lacks 20 dgrees MCP extension for digits.  TH EX, gets to 30 of 60 PROM.        Plan:  Discharge from therapy. Pt has been seen for a total of 41 outpatient OT visits during his episode of care with focus on the above goal areas (see notes on progress above). Lex benefitted from skilled OT services to further promote IND with HEP for continuation of pain and tone management in home environment for improved quality of life. Due to limited changes noted in recent progress period, pt is being discharged with expectation to continue home program to further progress ROM and pain/tone management. Pt and spouse have been trained in home program and have contact information for clinic should they have any follow up questions or concerns.    Discharge:    Reason for Discharge: No further expectation of progress at this time.    Equipment Issued: n/a    Discharge Plan: Patient to continue home program and is welcome to return to outpatient OT in the future, as needed, with a new referral from his physician. Some reasons for return to OT may include, but are not limited to, 1) pt hiring a PCA and needing PCA to be trained in HEP, 2) it has been over 6 months and pt requires assessment of progress/update of HEP, 3) it has been over 3 months and pt has noticed significant change that requires upgraded HEP, or 4) pt notices significant decline or has additional health event where skilled OT services are indicated.    Regina Tolentino MA, OTR/L  Occupational Therapist  [address] [address]  Clinic Phone: [phone]  Clinic Fax:  [phone]

## 2022-03-08 NOTE — PROGRESS NOTES
Psychiatric    OUTPATIENT PHYSICAL THERAPY  PLAN OF TREATMENT FOR OUTPATIENT REHABILITATION            Patient's Last Name, First Name, Marcus Mcgee Date of Birth  1955   Provider's Name  Psychiatric Medical Record No.  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, sequela (S06.9X9S)      PT Diagnosis  Decreased independence and functional activity  tolerance due to impairments from CVA and  secondary impairmentsfollowing CVA.(R knee  pain, shoulder pain Plan of Treatment  Frequency/Duration: 1 x a week  Certification date from 3/3/22 to 3/31/22     Goals:    Progress (detail required for progress note):       Goal Identifier 3-  Curb/stairs   Goal Description Marcus to have increased disassociation of movement/improved timing and sequencing of muscle activation for improved hip and knee flexion in standing demonstrated by the ability to step over upright shabana (4-6 inch object) with the L LE to allow him to step up onto a curb without catching foot.   Target Date 03/02/22   Date Met      Progress (detail required for progress note): 1/17/2022  Stepping over upright shabana not able to clear foot L lacks knee flexion/hip flexion movement , stepping over flat shabana on the floor able to do.      Goal Identifier 4- Gait   Goal Description Marcus to increase gait speed to 19 seconds or less with AD and 24 steps or less to demonstrate improved gait speed and efficiency for community gait and also decreased overuse of the R side.    Target Date 03/02/22   Date Met      Progress (detail required for progress note): 3/8/22 21.36 sec 26 steps with trekking pole  1/17/22   21.88 sec , 22.45 sec, 24-26 steps   12/21/21 21.3 sec then 19.47 sec with trekking pole, AFO       Goal Identifier 5- Floor transfers.    Goal Description Marcus to perform transfer to and from floor with outside UE support and min assist of 1 to allow him to recover from a fall with assist from wife or caregiver.    Target Date 03/02/22   Date Met      Progress (detail required for progress note): 12/28/21 -  turn to face mat with R hand therapist shoulder, down to R knee, needing mod -max assist to bring L leg back,  down to R sidelying -attempting prone but rotated onto L side. NOT able to go to kneel.  Chair in front of him assist to bring L leg forward to single kneel R mod to mas assist, to partial stand mod max assist/ pt twist to the R and sat on mat. Not coming to a full stand.      Goal Identifier 6 --  4 square   Goal Description Marcus to complete 4 square in 25 seconds with and AD and 40 seconds or less without an AD to demonstrate improved interlimb coordination, balance and body control to assist in meeting all goals and overall function.    Target Date 03/02/22   Date Met      Progress (detail required for progress note): 3/8/22 over poles therapist stabilizing and with trekking pole for UE support 55.6 sec.  3/1/22  over weighted bars 78.34 sec with trekking pole and CGA, stepping on the pole.  Difficulty with stepping over with the L especially backwards.     Needing assist to stabilize the object stepping over.    Stepping over tape as object 35 sec. but stepping on tape which would be the object.   10/26/21   1 min 6 sec ottoback brace therapist stabilizing the poles, 34.31 over tape, trekking pole.  stepping on tape .  With the pole to step over Lex is demonstrating improved ability to lift his legs higher to clear the pole     Goal Identifier HEP   Goal Description Marcus to be independent in appropriate HEP to continue to address his impairments following d/c from skilled PT intervention.    Target Date 03/02/22   Date Met       Progress (detail required for progress note): 1/17/22 doing home program without questions.  more comfortable with standing with shoes off      Goal Identifier 8 functional reach/neuro motor control   Goal Description Marcus to perform reaching to the floor to the left to pick object up off the floor while maintaining L hand contact/closed chain on the mat to demonstrate impropved neuromotor planning, ability to bring COM to the left while keeping proper muscle activation/deactivation L and R side of body for greater safety with reaching activiites to the floor, unstrapping shoe, AFO.  Also for carryover of proper motor function for improved gait and transfers.    Target Date 03/02/22   Date Met      Progress (detail required for progress note): not assessed 1/17/2022     Goals remain appropriate.   POC - continue through the month of March and discharge to [facility]         I CERTIFY THE NEED FOR THESE SERVICES FURNISHED UNDER        THIS PLAN OF TREATMENT AND WHILE UNDER MY CARE     (Physician co-signature of this document indicates review and certification of the therapy plan).                Referring Provider: Dr. Traci Scott, PT

## 2022-03-15 ENCOUNTER — HOSPITAL ENCOUNTER (OUTPATIENT)
Dept: PHYSICAL THERAPY | Facility: CLINIC | Age: 67
Setting detail: THERAPIES SERIES
Discharge: HOME | End: 2022-03-15
Attending: INTERNAL MEDICINE
Payer: MEDICARE

## 2022-03-15 PROCEDURE — 97112 NEUROMUSCULAR REEDUCATION: CPT | Mod: GP | Performed by: PHYSICAL THERAPIST

## 2022-03-15 PROCEDURE — 97116 GAIT TRAINING THERAPY: CPT | Mod: GP | Performed by: PHYSICAL THERAPIST

## 2022-03-15 PROCEDURE — 97110 THERAPEUTIC EXERCISES: CPT | Mod: GP | Performed by: PHYSICAL THERAPIST

## 2022-03-22 ENCOUNTER — HOSPITAL ENCOUNTER (OUTPATIENT)
Dept: PHYSICAL THERAPY | Facility: CLINIC | Age: 67
Setting detail: THERAPIES SERIES
Discharge: HOME | End: 2022-03-22
Attending: INTERNAL MEDICINE
Payer: MEDICARE

## 2022-03-22 PROCEDURE — 97116 GAIT TRAINING THERAPY: CPT | Mod: GP | Performed by: PHYSICAL THERAPIST

## 2022-03-22 PROCEDURE — 97110 THERAPEUTIC EXERCISES: CPT | Mod: GP | Performed by: PHYSICAL THERAPIST

## 2022-03-22 PROCEDURE — 97112 NEUROMUSCULAR REEDUCATION: CPT | Mod: GP | Performed by: PHYSICAL THERAPIST

## 2022-03-29 ENCOUNTER — HOSPITAL ENCOUNTER (OUTPATIENT)
Dept: PHYSICAL THERAPY | Facility: CLINIC | Age: 67
Setting detail: THERAPIES SERIES
Discharge: HOME | End: 2022-03-29
Attending: INTERNAL MEDICINE
Payer: MEDICARE

## 2022-03-29 PROCEDURE — 97116 GAIT TRAINING THERAPY: CPT | Mod: GP | Performed by: PHYSICAL THERAPIST

## 2022-03-29 PROCEDURE — 97110 THERAPEUTIC EXERCISES: CPT | Mod: GP | Performed by: PHYSICAL THERAPIST

## 2022-03-29 PROCEDURE — 97112 NEUROMUSCULAR REEDUCATION: CPT | Mod: GP | Performed by: PHYSICAL THERAPIST

## 2022-03-29 NOTE — PROGRESS NOTES
"[facility]    Outpatient Physical Therapy Discharge Note  Patient: Marcus Hodges  : 1955    Referring Provider: Dr. Don Aviles    Therapy Diagnosis: Decreased independence and functional activity  tolerance due to impairments from CVA and secondary impairmentsfollowing CVA.(R knee pain, shoulder pain     Client Self Report: states the exercises went well. state the thing still struggle with is the sit to stand- being in correct alignment \"foot always wants to go back\"   Went to the [facility] and walked on the track   Went to grocery store and pushed cart - has not done this for quite some time - pre COVID pandemic.             Goals:  Identifier 1 - stance width   Goal Description Marcus to demonstrate the ability to stand with a more narrow TAYLOR going from 12 inches feet apart at eval to 6 inches apart or less to demonstrate improved balance , loading on the L side to assist with improvement in gait pattern therefore efficiency and decreased secondary impairments such as his R knee pain.    Target Date 19   Date Met  19   Progress:      Goal Identifier 2- Forward reach   Goal Description Marcus to increase his forward reach from 4 inches at evaulation to 8 inches or greater to demonstrate improved balance, improved ability to bring COM forward over TAYLOR to assist in meeting all goals, improved sit to stand and improved gait.    Target Date 19   Date Met   20   Progress:as above.  Improving ability to bring COM forward past TAYLOR which will help with transfers, gait, stairs.          Goal Identifier 3-  Curb/stairs   Goal Description Marcus to have increased disassociation of movement/improved timing and sequencing of muscle activation for improved hip and knee flexion in standing demonstrated by the ability to step over upright shabana (4-6 inch object) with the L LE to allow him to " step up onto a curb without catching foot.   Target Date 03/31/22   Date Met   (not fully met) - wife reports greater ability and ease/safety with doing curbs.    Progress (detail required for progress note): 3/29/22  min assist of 1 with trekking pole, leading with the R steps over the shabana, not able to coordinate/perform hip flexion /knee flexion to clear the L LE.  Wife states that the curb is going well/not having issues.  1/17/2022  Stepping over upright shabana not able to clear foot L lacks knee flexion/hip flexion movement , stepping over flat shabana on the floor able to do.      Goal Identifier 4- Gait   Goal Description Marcus to increase gait speed to 19 seconds or less with AD and 24 steps or less to demonstrate improved gait speed and efficiency for community gait and also decreased overuse of the R side.    Target Date 03/31/22   Date Met   (not met)   Progress (detail required for progress note): 3/29/22 24 sec with trekking pole.  More upright posture, less rotation of body forward to the L . Speed varies as noted, slower than in the past however better gait mechanics/neuro motor function which is also assisting with overall function and balance improvement. 3/8/22 21.36 sec 26 steps with trekking pole  1/17/22   21.88 sec , 22.45 sec, 24-26 steps  12/21/21 21.3 sec then 19.47 sec with trekking pole, AFO       Goal Identifier 5- Floor transfers.    Goal Description Marcus to perform transfer to and from floor with outside UE support and min assist of 1 to allow him to recover from a fall with assist from wife or caregiver.    Target Date 03/31/22   Date Met   (not met)   Progress (detail required for progress note): 12/28/21 -  turn to face mat with R hand therapist shoulder, down to R knee, needing mod -max assist to bring L leg back,  down to R sidelying -attempting prone but rotated onto L side. NOT able to go to kneel.  Chair in front of him assist to bring L leg forward to single kneel R mod to  mas assist, to partial stand mod max assist/ pt twist to the R and sat on mat. Not coming to a full stand.      Goal Identifier 6 --  4 square   Goal Description Marcus to complete 4 square in 25 seconds with and AD and 40 seconds or less without an AD to demonstrate improved interlimb coordination, balance and body control to assist in meeting all goals and overall function.    Target Date 03/31/22   Date Met   (not met)   Progress (detail required for progress note): 3/29/22 over tape -stepping on tape 26.6 sec with trekking pole, with poles 1 min 5 sec with therapist stabilizing the poles, pt stepping on the poles, better balance and control with challenges to balance. 3/8/22 over poles therapist stabilizing and with trekking pole for UE support 55.6 sec.  3/1/22  over weighted bars 78.34 sec with trekking pole and CGA, stepping on the pole.  Difficulty with stepping over with the L especially backwards.     Needing assist to stabilize the object stepping over.    Stepping over tape as object 35 sec. but stepping on tape which would be the object.   10/26/21   1 min 6 sec ottoback brace therapist stabilizing the poles, 34.31 over tape, trekking pole.  stepping on tape .  With the pole to step over Lex is demonstrating improved ability to lift his legs higher to clear the pole     Goal Identifier HEP   Goal Description Marcus to be independent in appropriate HEP to continue to address his impairments following d/c from skilled PT intervention.    Target Date 03/31/22   Date Met  03/29/22   Progress (detail required for progress note): 1/17/22 doing home program without questions.  more comfortable with standing with shoes off      Goal Identifier 8 functional reach/neuro motor control   Goal Description Marcus to perform reaching to the floor to the left to pick object up off the floor while maintaining L hand contact/closed chain on the mat to demonstrate impropved neuromotor planning, ability to bring COM to the  left while keeping proper muscle activation/deactivation L and R side of body for greater safety with reaching activiites to the floor, unstrapping shoe, AFO.  Also for carryover of proper motor function for improved gait and transfers.    Target Date 03/31/22   Date Met   (not met)   Progress (detail required for progress note): 3/29/22 continues to need assist to keep L  hand contact with the mat, able to reach to the floor and   object/computer mouse, less assist needed /less resistance/overactivation trunk L upper quadrant causing the L UE to flex.      See daily notes for further information. Lex has made gradual progress this episode of care, break in therapy due to COVID stay in place orders and return to PT.  He continued to make improvements.  It is felt he can safely discharge to [facility] and return to community [place] for gait. Wife and pt without questions.     Plan:  Discharge from therapy.    Discharge:    Reason for Discharge: No further expectation of progress.    Equipment Issued: [facility]    Discharge Plan: Patient to continue home program.

## 2022-04-06 DIAGNOSIS — N40.1 BENIGN PROSTATIC HYPERPLASIA WITH URINARY RETENTION: Primary | ICD-10-CM

## 2022-04-06 DIAGNOSIS — R33.8 BENIGN PROSTATIC HYPERPLASIA WITH URINARY RETENTION: Primary | ICD-10-CM

## 2022-04-09 DIAGNOSIS — Z29.89 SEIZURE PROPHYLAXIS: ICD-10-CM

## 2022-04-09 DIAGNOSIS — R25.2 SPASTICITY: ICD-10-CM

## 2022-04-11 RX ORDER — BACLOFEN 20 MG/1
TABLET ORAL
Qty: 360 TABLET | Refills: 3 | Status: SHIPPED | OUTPATIENT
Start: 2022-04-11 | End: 2022-10-26

## 2022-04-11 RX ORDER — LEVETIRACETAM 1000 MG/1
TABLET ORAL
Qty: 180 TABLET | Refills: 3 | Status: SHIPPED | OUTPATIENT
Start: 2022-04-11 | End: 2022-10-26

## 2022-04-11 NOTE — TELEPHONE ENCOUNTER
Routing refill request to provider for review/approval because:  Drug not on the [facility] refill protocol     Igor ZIEGLER RN

## 2022-04-13 ENCOUNTER — MEDICAL CORRESPONDENCE (OUTPATIENT)
Dept: NUCLEAR MEDICINE | Facility: CLINIC | Age: 67
End: 2022-04-13
Payer: MEDICARE

## 2022-04-22 ENCOUNTER — THERAPY VISIT (OUTPATIENT)
Dept: OCCUPATIONAL THERAPY | Facility: CLINIC | Age: 67
End: 2022-04-22
Payer: MEDICARE

## 2022-04-22 DIAGNOSIS — G81.94 LEFT HEMIPARESIS (H): ICD-10-CM

## 2022-04-22 PROCEDURE — 97760 ORTHOTIC MGMT&TRAING 1ST ENC: CPT | Mod: GO | Performed by: OCCUPATIONAL THERAPIST

## 2022-04-22 PROCEDURE — 97165 OT EVAL LOW COMPLEX 30 MIN: CPT | Mod: GO | Performed by: OCCUPATIONAL THERAPIST

## 2022-04-25 NOTE — PROGRESS NOTES
[facility]    OUTPATIENT Occupational Therapy ORTHOPEDIC EVALUATION  PLAN OF TREATMENT FOR OUTPATIENT REHABILITATION  (COMPLETE FOR INITIAL CLAIMS ONLY)  Patient's Last Name, First Name, M.I.  YOB: 1955  Marcus Hodges    Provider s Name:  MARYA [facility]   Medical Record No.  [id number]   Start of Care Date:  04/22/22   Onset Date:   12/23/21 (MD order date)   Type:     ___PT   _x__OT Medical Diagnosis:    Encounter Diagnosis   Name Primary?    Left hemiparesis (H)         Treatment Diagnosis:  L hemiparesis        Goals:     04/22/22 0500   Goal #1   Goal #1 self care   Previous Performance Level Independent   Current Functional Task Don/Doff   Current Performance Level unable   STG Target Performance Orthosis   STG Target Perform Level no difficulty   Due Date 04/22/22   Date Goal Met 04/22/22   LTG Target Task/Performance Independent in Home Exercise Program   Due Date   (4/22)   Date Goal Met 04/22/22       Therapy Frequency:  1x visit  Predicted Duration of Therapy Intervention:  1 visit, no further visits    Juju Myers OT                 I CERTIFY THE NEED FOR THESE SERVICES FURNISHED UNDER        THIS PLAN OF TREATMENT AND WHILE UNDER MY CARE     (Physician attestation of this document indicates review and certification of the therapy plan).                     Certification Date From:  04/22/22   Certification Date To:  04/22/22    Referring Provider:  Don Aviles    Initial Assessment        See Epic Evaluation SOC Date: 04/22/22

## 2022-04-25 NOTE — PROGRESS NOTES
Hand Therapy Initial Evaluation  Current Date:  4/22/22    Subjective:  Marcus Hodges is a 66 year old right hand dominant male.    Diagnosis: TBI with L hemiplegia  DOI:  4/22/12 (MD order date 12/23/21)    Patient reports symptoms of stiffness/loss of motion and weakness/loss of strength of the left hand and wrist which occurred due to CVA. Since onset symptoms are unchanged. Special tests:  none.  Previous treatment: custom orthotic and previous OT/PT. General health as reported by patient is good.  Pertinent medical history includes: Depression, Heart Problems, Incontinence, Seizures, Stroke.  Medical allergies: none.  Surgical history: heart: ASD repair in 1978.  Medication history: Anti-depressants, Anti-seizure, Cardiac, Heparin/Coumadiny, High Blood Pressure, Muscle Relaxants.    Occupational Profile Information:  Current occupation is disabled  Prior functional level:  independent-have help in some areas  Barriers include:requires assistance with dressing, personal hygiene, transfers, mobility  Mobility: Uses walker  Transportation: unable to use normal mode of transportation, due to current injury  Leisure activities/hobbies: none    Upper Extremity Functional Index Score:  SCORE:   Column Totals: /80: 13   (A lower score indicates greater disability.)    Objective:  Pain Level Report: no pain per pt report  Edema:  none    Sensation: WNL per pt report    ROM:  Pt positions hand in lumbrical position of fingers, wrist flexion, and thumb adduction.  Passively able to get fingers fully extended, thumb into moderate PABD, and wrist extension to neutral.  Strength:  contraindicated  Assessment:  Patient presents with symptoms consistent with diagnosis of L hemiparesis, with conservative intervention.     Patient's limitations or Problem List includes:  Decreased ROM/motion and Weakness of the left wrist, hand, thumb, index finger, long finger, ring finger and small finger which interferes with the patient's  ability to perform Self Care Tasks (dressing, eating, bathing, hygiene/toileting), Sleep Patterns, Recreational Activities and Household Chores as compared to previous level of function.    Rehab Potential: fair    Patient will benefit from skilled Occupational Therapy to increase flexibility to return to previous activity level and resume normal daily tasks and to reach their rehab potential.    Barriers to Learning:  No barrier    Communication Issues:  Patient appears to be able to clearly communicate and understand verbal and written communication and follow directions correctly.  Family member present for session to facilitate follow through of home program.    Assessment of Occupational Performance:  5 or more Performance Deficits  Identified Performance Deficits: bathing/showering, toileting, dressing, feeding, functional mobility, personal device care, hygiene and grooming, health management and maintenance, home establishment and management, meal preparation and cleanup, sleep and leisure activities      Clinical Decision Making (Complexity): Low complexity  Treatment Explanation:  The following has been discussed with the patient:  RX ordered/plan of care  Anticipated outcomes  Possible risks and side effects    P: Frequency:  1x visit, once daily.  Pt to return for splint adjustments, otherwise cont HEP independently. D/C [facility]    Treatment Plan:    Orthotic Fabrication:     Static orthosis and Forearm based orthosis     Home Program:    Orthotic Fabrication:   Static orthosis and Forearm based orthosis     Discharge Plan:  Achieve all LTG.  Independent in home treatment program.  Reach maximal therapeutic benefit.

## 2022-05-03 ENCOUNTER — TELEPHONE (OUTPATIENT)
Dept: PEDIATRICS | Facility: CLINIC | Age: 67
End: 2022-05-03
Payer: MEDICARE

## 2022-05-03 NOTE — TELEPHONE ENCOUNTER
Forms/Letter Request    Name of form/letter: Insurance form    Have you been seen for this request: Yes     Do we have the form/letter: Yes:     When is form/letter needed by: asap    How would you like the form/letter returned: Fax to [phone]    Patient Notified form requests are processed in 3-5 business days:Yes    Okay to leave a detailed message? Yes Home number on file [phone] (home)

## 2022-05-31 ENCOUNTER — LAB (OUTPATIENT)
Dept: LAB | Facility: CLINIC | Age: 67
End: 2022-05-31
Payer: MEDICARE

## 2022-05-31 DIAGNOSIS — N20.0 KIDNEY STONE: Primary | ICD-10-CM

## 2022-05-31 PROCEDURE — 82365 CALCULUS SPECTROSCOPY: CPT | Mod: 90

## 2022-05-31 PROCEDURE — 99000 SPECIMEN HANDLING OFFICE-LAB: CPT

## 2022-06-03 LAB
APPEARANCE STONE: NORMAL
COMPN STONE: NORMAL
SPECIMEN WT: 47 MG

## 2022-06-20 ENCOUNTER — TELEPHONE (OUTPATIENT)
Dept: NURSING | Facility: CLINIC | Age: 67
End: 2022-06-20
Payer: MEDICARE

## 2022-06-21 ENCOUNTER — MYC MEDICAL ADVICE (OUTPATIENT)
Dept: PEDIATRICS | Facility: CLINIC | Age: 67
End: 2022-06-21
Payer: MEDICARE

## 2022-06-21 ENCOUNTER — VIRTUAL VISIT (OUTPATIENT)
Dept: PEDIATRICS | Facility: CLINIC | Age: 67
End: 2022-06-21
Payer: MEDICARE

## 2022-06-21 DIAGNOSIS — U07.1 INFECTION DUE TO 2019 NOVEL CORONAVIRUS: Primary | ICD-10-CM

## 2022-06-21 DIAGNOSIS — G81.94 LEFT HEMIPARESIS (H): ICD-10-CM

## 2022-06-21 DIAGNOSIS — I48.20 CHRONIC ATRIAL FIBRILLATION (H): ICD-10-CM

## 2022-06-21 DIAGNOSIS — F33.1 MAJOR DEPRESSIVE DISORDER, RECURRENT EPISODE, MODERATE (H): ICD-10-CM

## 2022-06-21 DIAGNOSIS — G40.909 SEIZURE DISORDER (H): ICD-10-CM

## 2022-06-21 DIAGNOSIS — E78.5 HYPERLIPIDEMIA LDL GOAL <100: ICD-10-CM

## 2022-06-21 DIAGNOSIS — N31.9 NEUROGENIC BLADDER: ICD-10-CM

## 2022-06-21 DIAGNOSIS — S06.9X9S TRAUMATIC BRAIN INJURY WITH LOSS OF CONSCIOUSNESS, SEQUELA (H): ICD-10-CM

## 2022-06-21 PROCEDURE — 99214 OFFICE O/P EST MOD 30 MIN: CPT | Mod: 95 | Performed by: INTERNAL MEDICINE

## 2022-06-21 ASSESSMENT — PATIENT HEALTH QUESTIONNAIRE - PHQ9
SUM OF ALL RESPONSES TO PHQ QUESTIONS 1-9: 2
SUM OF ALL RESPONSES TO PHQ QUESTIONS 1-9: 2
10. IF YOU CHECKED OFF ANY PROBLEMS, HOW DIFFICULT HAVE THESE PROBLEMS MADE IT FOR YOU TO DO YOUR WORK, TAKE CARE OF THINGS AT HOME, OR GET ALONG WITH OTHER PEOPLE: NOT DIFFICULT AT ALL

## 2022-06-21 NOTE — PROGRESS NOTES
Lex is a 66 year old who is being evaluated via a billable telephone visit.      What phone number would you like to be contacted at? cell  How would you like to obtain your AVS? mychart    Assessment & Plan     (U07.1) Infection due to 2019 novel coronavirus  (primary encounter diagnosis)  Comment: Patient is at high risk for complications secondary to COVID-19.  He does have a history of full immunization and booster shot for COVID-19.  Reviewed antiviral and monoclonal antibody options in detail: Paxlovid vs monoclonal antibody  Side effects reviewed as well as detailed instructions regarding medication interactions and modifications to his current regimen.  Start 5-day course of Paxlovid  Instructed patient to follow-up in the next 3-5 days if cough worsens, shortness of breath or fevers develop.  Plan: nirmatrelvir and ritonavir (PAXLOVID) therapy         pack          (S06.9X9S) Traumatic brain injury with loss of consciousness, sequela (H)  (G81.94) Left hemiparesis (H)    (N31.9) Neurogenic bladder  Comment:   Plan: Recommended reducing Myrbetriq dose to 25 mg daily while on antiviral per guidelines    (I48.20) Chronic atrial fibrillation (H)  Comment:   Plan:  per guidelines reduce Eliquis from 5 mg to 2.5 mg BID while on antiviral     (E78.5) Hyperlipidemia LDL goal <100  Comment:   Plan: Per guidelines recommended stopping atorvastatin and resume 3 days after course of paxlovid    (F33.1) Major depressive disorder, recurrent episode, moderate (H)  Comment:   Plan: Stable continue current medication.    (G40.909) Seizure disorder (H)  Comment:   Plan: Stable continue Milagros Aviles MD  [facility] MALIHA Burnett is a 66 year old, presenting for the following health issues:    66-year-old male with a history of TBI, left hemiparesis, chronic atrial fibrillation, neurogenic bowel and bladder presents for evaluation of COVID-19.  Patient went [place] a [place] function in [place] on 6/17/2022 and developed acute respiratory symptoms including sore throat sinus congestion and cough shortly after that.  Symptoms persisted- home COVID test was initially negative, most recent home COVID test within the past 2 days was positive.  Symptoms have worsened.  Patient is requesting treatment for COVID-19    Patient Active Problem List   Diagnosis     * * * SBE PROPHYLAXIS * * *     Health Care Home     Vitamin D deficiency     Hyperlipidemia LDL goal <100     Long-term (current) use of anticoagulants     Urinary incontinence     Neurogenic bladder     Chronic atrial fibrillation (H)     Tricuspid regurgitation     Mitral regurgitation     Atrial enlargement, bilateral     Major depressive disorder, recurrent episode, moderate (H)     Traumatic brain injury with loss of consciousness, sequela (H)     Neurogenic bowel     Left renal stone     Dysphagia     Left hemiparesis (H)     S/P craniotomy     Seizure disorder (H)     Visual field defect     Anemia     Benign prostatic hyperplasia with urinary retention     Current Outpatient Medications   Medication Sig Dispense Refill     acetaminophen (TYLENOL) 500 MG tablet Take 2 tablets (1,000 mg) by mouth every 6 hours as needed for mild pain 100 tablet 0     apixaban ANTICOAGULANT (ELIQUIS ANTICOAGULANT) 5 MG tablet Take 1 tablet (5 mg) by mouth 2 times daily 180 tablet 3     atorvastatin (LIPITOR) 20 MG tablet Take 1 tablet (20 mg) by mouth daily 90 tablet 3     baclofen (LIORESAL) 20 MG tablet TAKE ONE TABLET BY MOUTH FOUR TIMES DAILY 360 tablet 3     citalopram (CELEXA) 20 MG tablet Take 1.5 tablets (30 mg) by mouth daily 135 tablet 3     clotrimazole (LOTRIMIN) 1 % external cream Apply topically 2 times daily as needed (as needed for arm, buttock) 113 g 3     docusate sodium (COLACE) 100 MG capsule Take 1 capsule (100 mg) by mouth 2 times daily 30 capsule 0     docusate sodium (COLACE) 100 MG tablet Take 200 mg by mouth nightly as needed         levETIRAcetam (KEPPRA) 1000 MG tablet TAKE 1 TABLET BY MOUTH TWICE DAILY 180 tablet 3     metoprolol succinate ER (TOPROL-XL) 25 MG 24 hr tablet Take 0.5 tablets (12.5 mg) by mouth daily 45 tablet 3     mirabegron (MYRBETRIQ) 50 MG 24 hr tablet Take 1 tablet (50 mg) by mouth daily 90 tablet 3     Multiple Vitamin (MULTI-VITAMIN) per tablet Take 1 tablet by mouth daily  100 tablet 3             polyethylene glycol (MIRALAX) 17 g packet Take 17 g by mouth every other day        senna-docusate (SENOKOT-S/PERICOLACE) 8.6-50 MG tablet Take 4 tablets by mouth At Bedtime       tamsulosin (FLOMAX) 0.4 MG capsule Take 1 capsule (0.4 mg) by mouth daily 90 capsule 3     vitamin D3 (CHOLECALCIFEROL) 50 mcg (2000 units) tablet Take 2,000 Units by mouth daily  90 tablet 3              Objective           Vitals:  No vitals were obtained today due to virtual visit.    Physical Exam   alert and no distress  PSYCH: Alert and oriented times 3; coherent speech, normal   rate and volume, able to articulate logical thoughts, able   to abstract reason, no tangential thoughts, no hallucinations   or delusions  His affect is normal  RESP: No cough, no audible wheezing, able to talk in full sentences  Remainder of exam unable to be completed due to telephone visits          Phone call duration:  17 minutes    .  ..  Answers for HPI/ROS submitted by the patient on 6/21/2022  If you checked off any problems, how difficult have these problems made it for you to do your work, take care of things at home, or get along with other people?: Not difficult at all  PHQ9 TOTAL SCORE: 2

## 2022-06-21 NOTE — TELEPHONE ENCOUNTER
Coronavirus (COVID-19) Notification    Caller Name (Patient, parent, daughter/son, grandparent, etc)  Pt wife Tatum calling to report positive home Covid test today     Gather patient reported symptoms   Assessment   Current Symptoms at time of phone call, reported by patient: (if no symptoms, document No symptoms] Sore throat, hoarseness, runny nose, fever    Date of Symptom(s) onset (if applicable) 6/17/22     If at time of call, Patients symptoms hare worsened, the Patient should contact 911 or have someone drive them to Emergency Dept promptly:      If Patient calling 911, inform 911 personal that you have tested positive for the Coronavirus (COVID-19).  Place mask on and await 911 to arrive.    If Emergency Dept, If possible, please have another adult drive you to the Emergency Dept but you need to wear mask when in contact with other people.      Treatment    You may be eligible to receive a new treatment for preventing hospitalization in patients at high risk for complications from COVID-19. This medication is still experimental and available on a limited basis. Please note that not all people who are eligible will receive the medication since it is in limited supply.  Is the patient symptomatic and onset of symptoms within the last 7 days?  Yes  Is the patient interested in a visit with a provider to discuss treatment options?: Yes  Is the patient seen at [facility]?  No: Warm transfer caller to [phone] to be scheduled with a virtual urgent provider.  During transfer, instruct  on appropriate time frame for visit     Review information with Patient    Your result was positive. This means you have COVID-19 (coronavirus).      How can I protect others?    These guidelines are for isolating before returning to work, school or .       If you DO have symptoms:  o Stay home and away from others  - For at least 5 days after your symptoms started, AND   - You are fever free for 24  hours (with no medicine that reduces fever), AND  - Your other symptoms are better.  o Wear a mask for 10 full days any time you are around others.    If you DON'T have symptoms:  o Stay at home and away from others for at least 5 days after your positive test.  o Wear a mask for 10 full days any time you are around others.    There may be different guidelines for healthcare facilities. Please check with the specific sites before arriving.     If you've been told by a doctor that you were severely ill with COVID-19 or are immunocompromised, you should isolate for at least 10 days.    You should not go back to work until you meet the guidelines above for ending your home isolation. You don't need to be retested for COVID-19 before going back to work--studies show that you won't spread the virus if it's been at least 10 days since your symptoms started (or 20 days, if you have a weak immune system).    Employers, schools, and daycares: This is an official notice for this person's medical guidelines for returning in-person. They must meet the above guidelines before going back to work, school, or  in person.    You will receive a positive COVID-19 letter via [employer] or the mail soon with additional self-care information (exception, no letters sent to presurgical/preprocedure patients).     Would you like me to review some of that information with you now?  No    If you were tested for an upcoming procedure, please contact your provider for next steps.     Eda Ramos RN      COVID 19 Nurse Triage Plan/Patient Instructions    Please be aware that novel coronavirus (COVID-19) may be circulating in the community. If you develop symptoms such as fever, cough, or SOB or if you have concerns about the presence of another infection including coronavirus (COVID-19), please contact your health care provider or visit [email or web address].     Disposition/Instructions    Additional COVID19 information to add  "for patients.   How can I protect others?  If you have symptoms (fever, cough, body aches or trouble breathing): Stay home and away from others (self-isolate) until:    At least 10 days have passed since your symptoms started, And     You ve had no fever--and no medicine that reduces fever--for 1 full day (24 hours), And      Your other symptoms have resolved (gotten better).     If you don t have symptoms, but a test showed that you have COVID-19 (you tested positive):    Stay home and away from others (self-isolate). Follow the tips under \"How do I self-isolate?\" below for 10 days (20 days if you have a weak immune system).    You don't need to be retested for COVID-19 before going back to school or work. As long as you're fever-free and feeling better, you can go back to school, work and other activities after waiting the 10 or 20 days.     How do I self-isolate?    Stay in your own room, even for meals. Use your own bathroom if you can.     Stay away from others in your home. No hugging, kissing or shaking hands. No visitors.    Don t go to work, school or anywhere else.     Clean  high touch  surfaces often (doorknobs, counters, handles, etc.). Use a household cleaning spray or wipes. You ll find a full list on the EPA website:  www.epa.gov/pesticide-registration/list-n-disinfectants-use-against-sars-cov-2.    Cover your mouth and nose with a mask, tissue or washcloth to avoid spreading germs.    Wash your hands and face often. Use soap and water.    Caregivers in these groups are at risk for severe illness due to COVID-19:  o People 65 years and older  o People who live in a nursing home or long-term care facility  o People with chronic disease (lung, heart, cancer, diabetes, kidney, liver, immunologic)  o People who have a weakened immune system, including those who:  - Are in cancer treatment  - Take medicine that weakens the immune system, such as corticosteroids  - Had a bone marrow or organ " transplant  - Have an immune deficiency  - Have poorly controlled HIV or AIDS  - Are obese (body mass index of 40 or higher)  - Smoke regularly    Caregivers should wear gloves while washing dishes, handling laundry and cleaning bedrooms and bathrooms.    Use caution when washing and drying laundry: Don t shake dirty laundry, and use the warmest water setting that you can.    For more tips, go to www.cdc.gov/coronavirus/2019-ncov/downloads/10Things.pdf.    How can I take care of myself?  1. Get lots of rest. Drink extra fluids (unless a doctor has told you not to).     2. Take Tylenol (acetaminophen) for fever or pain. If you have liver or kidney problems, ask your family doctor if it s okay to take Tylenol.     Adults can take either:     650 mg (two 325 mg pills) every 4 to 6 hours, or     1,000 mg (two 500 mg pills) every 8 hours as needed.     Note: Don t take more than 3,000 mg in one day.   Acetaminophen is found in many medicines (both prescribed and over-the-counter medicines). Read all labels to be sure you don t take too much.     For children, check the Tylenol bottle for the right dose. The dose is based on the child s age or weight.    3. If you have other health problems (like cancer, heart failure, an organ transplant or severe kidney disease): Call your specialty clinic if you don t feel better in the next 2 days.    4. Know when to call 911: Emergency warning signs include:    Trouble breathing or shortness of breath    Pain or pressure in the chest that doesn t go away    Feeling confused like you haven t felt before, or not being able to wake up    Bluish-colored lips or face    What are the symptoms of COVID-19?     The most common symptoms are cough, fever and trouble breathing.     Less common symptoms include body aches, chills, diarrhea (loose, watery poops), fatigue (feeling very tired), headache, runny nose, sore throat and loss of smell.    COVID-19 can cause severe coughing (bronchitis)  and lung infection (pneumonia).    How does it spread?     The virus may spread when a person coughs or sneezes into the air. The virus can travel about 6 feet this way, and it can live on surfaces.      Common  (household disinfectants) will kill the virus.    Who is at risk?  Anyone can catch COVID-19 if they re around someone who has the virus.    How can others protect themselves?     Stay away from people who have COVID-19 (or symptoms of COVID-19).    Wash hands often with soap and water. Or, use hand  with at least 60% alcohol.    Avoid touching the eyes, nose or mouth.     Wear a face mask when you go out in public, when sick or when caring for a sick person.    Where can I get more information?     [facility]: About COVID-19: [email or web address]    CDC: What to Do If You re Sick: www.cdc.gov/coronavirus/2019-ncov/about/steps-when-sick.html    CDC: Ending Home Isolation: www.cdc.gov/coronavirus/2019-ncov/hcp/disposition-in-home-patients.html     CDC: Caring for Someone: www.cdc.gov/coronavirus/2019-ncov/if-you-are-sick/care-for-someone.html     [facility]: Interim Guidance for Hospital Discharge to Home: www.health.[facility].mn./diseases/coronavirus/hcp/hospdischarge.pdf    [facility] clinical trials (COVID-19 research studies): clinicalaffairs.[facility]-clinical-trials     Below are the COVID-19 hotlines at the [facility] ([facility]). Interpreters are available.   o For health questions: Call 368-172-3278 or 1-467.101.8140 (7 a.m. to 7 p.m.)  o For questions about schools and childcare: Call 073-575-4351 or 1-587.577.5656 (7 a.m. to 7 p.m.)          Thank you for taking steps to prevent the spread of this virus.  o Limit your contact with others.  o Wear a simple mask to cover your cough.  o Wash your hands well and often.    Resources    [facility]: About COVID-19: [email or web address]    CDC: What to Do If You're Sick:  www.cdc.gov/coronavirus/2019-ncov/about/steps-when-sick.html    CDC: Ending Home Isolation: www.cdc.gov/coronavirus/2019-ncov/hcp/disposition-in-home-patients.html     CDC: Caring for Someone: www.cdc.gov/coronavirus/2019-ncov/if-you-are-sick/care-for-someone.html     [facility]: Interim Guidance for Hospital Discharge to Home: www.[facility].mn./diseases/coronavirus/hcp/hospdischarge.pdf    [facility] clinical trials (COVID-19 research studies): clinicalaffairs.[facility]-clinical-trials     Below are the COVID-19 hotlines at the [place] [facility] ([facility]). Interpreters are available.   o For health questions: Call 125-083-1593 or 1-222.581.9438 (7 a.m. to 7 p.m.)  o For questions about schools and childcare: Call 731-251-0195 or 1-476.300.1438 (7 a.m. to 7 p.m.)         Advised Tatum to call back if pt has new or worsening symptoms prior to appointment.    Tatum verbalizes understanding and agrees to plan.

## 2022-07-06 ENCOUNTER — TRANSFERRED RECORDS (OUTPATIENT)
Dept: HEALTH INFORMATION MANAGEMENT | Facility: CLINIC | Age: 67
End: 2022-07-06

## 2022-07-21 ENCOUNTER — HOSPITAL ENCOUNTER (OUTPATIENT)
Dept: PHYSICAL THERAPY | Facility: CLINIC | Age: 67
Setting detail: THERAPIES SERIES
Discharge: HOME | End: 2022-07-21
Attending: INTERNAL MEDICINE
Payer: MEDICARE

## 2022-07-21 PROCEDURE — 97161 PT EVAL LOW COMPLEX 20 MIN: CPT | Mod: GP | Performed by: PHYSICAL THERAPIST

## 2022-07-21 PROCEDURE — 97110 THERAPEUTIC EXERCISES: CPT | Mod: GP | Performed by: PHYSICAL THERAPIST

## 2022-07-21 PROCEDURE — 97162 PT EVAL MOD COMPLEX 30 MIN: CPT | Mod: GP | Performed by: PHYSICAL THERAPIST

## 2022-07-25 NOTE — PROGRESS NOTES
[facility]                                                                                   OUTPATIENT PHYSICAL THERAPY FUNCTIONAL EVALUATION  PLAN OF TREATMENT FOR OUTPATIENT REHABILITATION  (COMPLETE FOR INITIAL CLAIMS ONLY)  Patient's Last Name, First Name, M.I.  YOB: 1955  IsraelalvaroMarcus TORRES     Provider's Name   [facility]   Medical Record No.  [id number]     Start of Care Date:  07/21/22   Onset Date:  06/09/22 (date of order, CVA 4/20/12)   Type:     _X__PT   ____OT  ____SLP Medical Diagnosis:  L hemiparesis     PT Diagnosis:  impaired gait, transfers, decline in function Visits from SOC:  1                              __________________________________________________________________________________  Plan of Treatment/Functional Goals:  balance training, bed mobility training, gait training, joint mobilization, motor coordination training, neuromuscular re-education, orthotic fitting/training, ROM, strengthening, stretching, manual therapy, transfer training           GOALS  1 HEP  Lex to be independent in appropriate HEP with assist from Nidia as needed to promote life long health, ability to stay in home and improve function.  10/18/22    2 - 25 foot walk  Lex to complete 25 foot walk consistently in 19 sec or less and 22 steps or less demonstrating improved gait pattern and efficiency.  10/18/22    3 - TUG  Lex to complete TUG in 40 sec or less to demonstrate improved gait pattern, ability to transfer sit <> stand and overall increased function.  10/18/22    4 - stairs  Lex to complete stairs ascending with alternating stepping and one rail and min /SBA to demonstrate improved neuro motor function , balance and greater safety with completing stairs.  10/18/22    5 - pain  Lex to report decreased back pain and R knee pain to allow him to increase his  ambulation distance/ther ex at the gym as well as overall better QOL.  10/18/22    6 - 6MWT  Lex to increase his 6MWT distance by 150 feet or greater from when first assessed to demonstrate improved endurance, gait quality and function.  10/18/22    7- sit <> stand and COM forward past TAYLOR challenges.  Lex to perform sit to stand from 22 inch mat without bracing self with LE against the surface to demonstrate improved ability to bring COM forward past TAYLOR as well as better mechanics with transfer for decreased/excess load on the R LE to decrease knee pain.  10/18/22               Therapy Frequency:  1 time/week   Predicted Duration of Therapy Intervention:  6 months decreasing frequency as appopriate.  Will likely continue weekly as pt has demonstrated decline in function with stopping PT    Ada Scott, PT                                    I CERTIFY THE NEED FOR THESE SERVICES FURNISHED UNDER        THIS PLAN OF TREATMENT AND WHILE UNDER MY CARE     (Physician co-signature of this document indicates review and certification of the therapy plan).                Certification Date From:  07/21/22   Certification Date To:  10/19/22    Referring Provider:  Dr. Don Aviles    Initial Assessment  See Epic Evaluation- Start of Care Date: 07/21/22

## 2022-07-25 NOTE — PROGRESS NOTES
07/21/22 0733   Quick Adds   Quick Adds Certification   Type of Visit Initial OP PT Evaluation   General Information   Start of Care Date 07/21/22   Referring Physician Dr. Don Aviles   Orders Evaluate and Treat as Indicated   Order Date 06/09/22   Medical Diagnosis Left hemiparesis (H) (G81.94)   Onset of illness/injury or Date of Surgery 06/09/22  (date of order, CVA 4/20/12)   Precautions/Limitations fall precautions   Surgical/Medical history reviewed Yes   Pertinent history of current problem (include personal factors and/or comorbidities that impact the POC) Lex is familiar to this writer from prior episode of care for PT. He reports a decrease in his overall function, increased R knee pain and increased back pain since stopping PT in March 2022.  He has a history of A - flutter that was treated in ablation in 2004.  He went off of anticoagulation and had no problems until 4/22/2012 when he suffered a large right-sided stroke due to atrial fibrillation with thromboembolus.  He continues to have fairly severe left-sided hemiparesis with spasticity and contractures.He continues to have sever L sided hemiparesis with spasticity and contractures.  R knee pain, recurrent UTI's, history of depression, kidney stones,  COVID in June 2022, neurogenic bladder with faulkner catheter, seizure disorder, tricuspid regurgitation, mitral regurgitation, atrial enlargement B, s/p craniotomy, anemia   Pertinent Visual History  history of   R eye blindness,  After CVA  L sided field cut.   Prior level of function comment independent prior to stroke, worked as .   He has been seen in the past for PT and since discharge in March of this year he has declined in his function as noted in subjective.   Previous/Current Treatment Physical Therapy   Improvement after PT Moderate   Current Community Support Family/friend caregiver  (wife, Nidia)   Patient role/Employment history Disabled   Living environment [place]    Home/Community Accessibility Comments split level home, stairs with railing   Current Assistive Devices Walking Poles  (one trekking pole,AFO hinged L LE)   ADL Devices Extended Tub Bench;Shower/Tub Grab Bar;Shower/Tub Chair;Raised Toilet Seat   Patient/Family Goals Statement Lex reports a decrease in his function including greater difficulty wtih getting dressed - wife does normally assist as well, stairs more difficult, not able to tolerate walking as fall, back pain and R knee pain increased.  Wife, Nidia, states pt has not had back pain in the past but has had it since stopping regular PT sessions,  he is not walking as good.  He is not wantingn to play ping pong due to back pain/not wanting to bend down to  the ball.  He used to play daily.   Fall Risk Screen   Fall screen completed by PT   Have you fallen 2 or more times in the past year? No   Have you fallen and had an injury in the past year? No   Timed Up and Go score (seconds) 50.84  (with trekking pole, AFO L)   Is patient a fall risk? Yes   Abuse Screen (yes response referral indicated)   Feels Unsafe at Home or Work/School no   Feels Threatened by Someone no   Does Anyone Try to Keep You From Having Contact with Others or Doing Things Outside Your Home? no   Physical Signs of Abuse Present no   Pain   Patient currently in pain Yes   Pain comments back and right knee.   Cognitive Status Examination   Orientation orientation to person, place and time   Level of Consciousness alert   Follows Commands and Answers Questions 75% of the time;able to follow multistep instructions   Personal Safety and Judgment intact   Integumentary   Integumentary Comments no issues noted.  He has had some redness from AFO superior posterior border/upper gastroc, 1st MTP/arch in the past from AFO   Posture   Posture Comments standing with R knee flexed, L knee hyperextended , weight shift to the R, R lateral trunk flexion, flat back, significant thoracic rounding and  forward head.   Range of Motion (ROM)   ROM Comment decrease spinal rotation, thoracic extension, cervical rotation, knee extension in supine lacks 20 degrees passively , able to achieve lacking 5 degrees to full knee ext passively L,  hamstring tightness with increased activation/tone with stretches hip 90 degrees able to achieve lacking approx 50-55 degrees R and 65 L.  ankle dorsiflexion L lacking 35 degrees to neutral , able to achieve lacking 5 degrees approximately with manual stretch, forefoot decreased mobility, high arches B. R dirsiflexion in supine to neutral with assist.   Bed Mobility   Bed Mobility Comments transfers on and off mat sit to and from supine independent, slow , lack of disassociation trunk and LE   Transfer Skills   Transfer Comments sit to stand from 18 inch chair and 24 inch surface with posterior push /COM and more weight to the R.  Sitting lean to the R.   Gait   Gait Stairs   Gait Comments ambulation with trekking pole, AFO on the L, step to gait pattern, forward flexed trunk, posteior COM, rotation trunk /pelvis posterior on the L.   Stairs   Self Performance Stand by assist   Physical/Nonphysical Assist: Stairs 1 person assist   Rails 1 rail   Indicate number of stairs 4  (single step ascending leading with the R,  difficulty with toe clearance on the R/lack of knee flexion/timing and coordination hip/knee/ankle motion. stepping down alternating step heavy lean onto railing on the R  side.  COM posterior)   Gait Special Tests   Gait Special Tests 25 FOOT TIMED WALK   Gait Special Tests 25 Foot Timed Walk   Seconds 21.63   Steps 26 Steps   Comments with trekking pole and AFO   Balance   Balance Comments decreased forward COM past TAYLOR   Balance Special Tests   Balance Special Tests Romberg;Sharpened Romberg   Balance Special Tests Romberg   Comments not able to get into rhomberg, with attempts to bring the R to the L he continues to move L foot out laterally.   Balance Special Tests  Sharpened Romberg   Comments not able to stand tandem - able to achieve foot approximately 1/4 ahead of the other and feet approx 9 inches apart.   Sensory Examination   Sensory Perception Comments decreased sensation L side and decreased proprioceptive input.   Coordination   Coordination Comments impaired motor planning and motor coordination/neuo motor function R and L side   Muscle Tone   Muscle Tone Comments overactive tone /hypertonicity throughout the L side.  Avoidance of loading /COM to the L.   See past notes for specifics.  Over the years with PT pt has become much more comfortable tiwth challenges to the L as well as all directions past TAYLOR   Modality Interventions   Planned Modality Interventions Comments per therapist discretion   Planned Therapy Interventions   Planned Therapy Interventions balance training;bed mobility training;gait training;joint mobilization;motor coordination training;neuromuscular re-education;orthotic fitting/training;ROM;strengthening;stretching;manual therapy;transfer training   Clinical Impression   Criteria for Skilled Therapeutic Interventions Met yes, treatment indicated   PT Diagnosis impaired gait, transfers, decline in function   Influenced by the following impairments impaired neur motor function with spasticity, muscle and tissue shortening full body, impaired vision, impaired sensation and proprioception, decreased ROM trunk, cervical, UE and LE B, decreased balance and body control back pain, R knee pain   Functional limitations due to impairments impaired gait pattern, difficulty with stairs and curbs, decreased performance/participtation in usual activities -dressing and playing table tennis, not able to walk as far, greater fdifficulty with dressing, fall risk   Clinical Presentation Evolving/Changing   Clinical Presentation Rationale declining in function since stopping PT im March this year   Clinical Decision Making (Complexity) Moderate complexity   Therapy  Frequency 1 time/week   Predicted Duration of Therapy Intervention (days/wks) 6 months decreasing frequency as appopriate.  Will likely continue weekly as pt has demonstrated decline in function with stopping PT   Risk & Benefits of therapy have been explained Yes   Patient, Family & other staff in agreement with plan of care Yes   Clinical Impression Comments Pt presents with decline in function after stopping PT March 2022.   Education Assessment   Preferred Learning Style Listening;Reading;Demonstration;Pictures/video   Barriers to Learning Visual;Cognitive   GOALS   PT Eval Goals 1;2;4;3;5;6;7   Goal 1   Goal Identifier 1 HEP   Goal Description Lex to be independent in appropriate HEP with assist from Nidia as needed to promote life long health, ability to stay in home and improve function.   Goal Progress Baseline - doing HEP but decreased tolerance. Wife not able to do stretching of LE due to her own health concerns.   Target Date 10/18/22   Goal 2   Goal Identifier 2 - 25 foot walk   Goal Description Lex to complete 25 foot walk consistently in 19 sec or less and 22 steps or less demonstrating improved gait pattern and efficiency.   Goal Progress baseline 21.63 sec, 26 steps with trekking ple and AFO   Target Date 10/18/22   Goal 3   Goal Identifier 3 - TUG   Goal Description Lex to complete TUG in 40 sec or less to demonstrate improved gait pattern, ability to transfer sit <> stand and overall increased function.   Goal Progress baseline 50.83 sec with trekking pole   Target Date 10/18/22   Goal 4   Goal Identifier 4 - stairs   Goal Description Lex to complete stairs ascending with alternating stepping and one rail and min /SBA to demonstrate improved neuro motor function , balance and greater safety with completing stairs.   Goal Progress baseline single step up , alternating down with heavy lean on the rail.   Target Date 10/18/22   Goal 5   Goal Identifier 5 - pain   Goal Description Lex to report  decreased back pain and R knee pain to allow him to increase his ambulation distance/ther ex at the gym as well as overall better QOL.   Goal Progress baseline - doing 2 laps at gym usually does 3-4   Target Date 10/18/22   Goal 6   Goal Identifier 6 - 6MWT   Goal Description Lex to increase his 6MWT distance by 150 feet or greater from when first assessed to demonstrate improved endurance, gait quality and function.   Goal Progress eval - did not assess, to assess next session   Target Date 10/18/22   Goal 7   Goal Identifier 7- sit <> stand and COM forward past TAYLOR challenges.   Goal Description Lex to perform sit to stand from 22 inch mat without bracing self with LE against the surface to demonstrate improved ability to bring COM forward past TAYLOR as well as better mechanics with transfer for decreased/excess load on the R LE to decrease knee pain.   Goal Progress baseline, lean to the R and posterior lean against surface 24 inch height   Target Date 10/18/22   Total Evaluation Time   PT Eval, Moderate Complexity Minutes (11322) 24   Therapy Certification   Certification date from 07/21/22   Certification date to 10/19/22   Medical Diagnosis L hemiparesis

## 2022-07-29 ENCOUNTER — TELEPHONE (OUTPATIENT)
Dept: CARDIOLOGY | Facility: CLINIC | Age: 67
End: 2022-07-29

## 2022-07-29 DIAGNOSIS — I05.9 MITRAL VALVE DISEASE: Primary | ICD-10-CM

## 2022-07-29 DIAGNOSIS — I48.20 CHRONIC ATRIAL FIBRILLATION (H): ICD-10-CM

## 2022-07-29 DIAGNOSIS — E78.5 HYPERLIPIDEMIA LDL GOAL <100: ICD-10-CM

## 2022-07-29 NOTE — PROGRESS NOTES
Orders updated. Message sent to scheduling to call pt to set up. Mayi MCQUEEN July 29, 2022, 2:59 PM

## 2022-08-02 ENCOUNTER — LAB (OUTPATIENT)
Dept: LAB | Facility: CLINIC | Age: 67
End: 2022-08-02
Payer: MEDICARE

## 2022-08-02 ENCOUNTER — HOSPITAL ENCOUNTER (OUTPATIENT)
Dept: PHYSICAL THERAPY | Facility: CLINIC | Age: 67
Setting detail: THERAPIES SERIES
Discharge: HOME | End: 2022-08-02
Attending: INTERNAL MEDICINE
Payer: MEDICARE

## 2022-08-02 DIAGNOSIS — N40.1 BENIGN PROSTATIC HYPERPLASIA WITH URINARY RETENTION: ICD-10-CM

## 2022-08-02 DIAGNOSIS — R33.8 BENIGN PROSTATIC HYPERPLASIA WITH URINARY RETENTION: ICD-10-CM

## 2022-08-02 LAB — PSA SERPL-MCNC: 1.84 UG/L (ref 0–4)

## 2022-08-02 PROCEDURE — 36415 COLL VENOUS BLD VENIPUNCTURE: CPT

## 2022-08-02 PROCEDURE — 84153 ASSAY OF PSA TOTAL: CPT

## 2022-08-02 PROCEDURE — 97112 NEUROMUSCULAR REEDUCATION: CPT | Mod: GP | Performed by: PHYSICAL THERAPIST

## 2022-08-02 PROCEDURE — 97110 THERAPEUTIC EXERCISES: CPT | Mod: GP | Performed by: PHYSICAL THERAPIST

## 2022-08-09 ENCOUNTER — HOSPITAL ENCOUNTER (OUTPATIENT)
Dept: GENERAL RADIOLOGY | Facility: CLINIC | Age: 67
Discharge: HOME | End: 2022-08-09
Attending: STUDENT IN AN ORGANIZED HEALTH CARE EDUCATION/TRAINING PROGRAM | Admitting: STUDENT IN AN ORGANIZED HEALTH CARE EDUCATION/TRAINING PROGRAM
Payer: MEDICARE

## 2022-08-09 DIAGNOSIS — N20.0 CALCULUS OF KIDNEY: ICD-10-CM

## 2022-08-09 PROCEDURE — 74018 RADEX ABDOMEN 1 VIEW: CPT

## 2022-08-10 ENCOUNTER — OFFICE VISIT (OUTPATIENT)
Dept: UROLOGY | Facility: CLINIC | Age: 67
End: 2022-08-10
Payer: MEDICARE

## 2022-08-10 VITALS — BODY MASS INDEX: 22.46 KG/M2 | HEIGHT: 74 IN | OXYGEN SATURATION: 97 % | HEART RATE: 46 BPM | WEIGHT: 175 LBS

## 2022-08-10 DIAGNOSIS — Z12.5 SPECIAL SCREENING FOR MALIGNANT NEOPLASM OF PROSTATE: ICD-10-CM

## 2022-08-10 DIAGNOSIS — R33.9 URINARY RETENTION: ICD-10-CM

## 2022-08-10 DIAGNOSIS — N20.0 CALCULUS OF KIDNEY: Primary | ICD-10-CM

## 2022-08-10 DIAGNOSIS — N40.0 BENIGN PROSTATIC HYPERPLASIA WITHOUT LOWER URINARY TRACT SYMPTOMS: ICD-10-CM

## 2022-08-10 PROCEDURE — 99214 OFFICE O/P EST MOD 30 MIN: CPT | Performed by: STUDENT IN AN ORGANIZED HEALTH CARE EDUCATION/TRAINING PROGRAM

## 2022-08-10 RX ORDER — TAMSULOSIN HYDROCHLORIDE 0.4 MG/1
0.4 CAPSULE ORAL DAILY
Qty: 90 CAPSULE | Refills: 3 | Status: SHIPPED | OUTPATIENT
Start: 2022-08-10 | End: 2022-10-26

## 2022-08-10 ASSESSMENT — PAIN SCALES - GENERAL: PAINLEVEL: NO PAIN (0)

## 2022-08-10 NOTE — PROGRESS NOTES
"CHIEF COMPLAINT   Marcus Hodges who is a 66 year old male returns today for follow-up of kidney stone, BPH with obstruction.      HPI   Marcus Hodges is a 66 year old male with h/o neurogenic bladder due to stroke 2012, ASD s/p remote repair, h/o a. 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 He is still taking flomax and this seems to help.    He passed a stone spontaneously in May (the size of an apple seed) and analysis showed caox and caphos stone    PHYSICAL EXAM  Patient is a 66 year old  male   Vitals: Pulse (!) 46, height 1.88 m (6' 2\"), weight 79.4 kg (175 lb), SpO2 97 %.  Body mass index is 22.47 kg/m .  General Appearance Adult:   Alert, no acute distress, oriented  HENT: throat/mouth:normal, good dentition  Lungs: no respiratory distress, or pursed lip breathing  Heart: No obvious jugular venous distension present  Abdomen: soft, nontender, no organomegaly or masses  Musculoskeltal: extremities normal, no peripheral edema  Skin: no suspicious lesions or rashes  Neuro: Alert, oriented, speech and mentation normal  Psych: affect and mood normal  Gait: Normal  : deferred    All pertinent imaging reviewed:    kub 8/9/2022    Reviewed images. No obvious kidney stones    Component      Latest Ref Rng & Units 6/24/2021 8/2/2022   PSA      0.00 - 4.00 ug/L 1.85 1.84           Calculi composed primarily of:   10% calcium oxalate (monohydrate and dihydrate), and   90% calcium phosphate (hydroxy- and carbonate- apatite).     ASSESSMENT and PLAN  66 year old male with h/o neurogenic bladder due to stroke 2012, ASD s/p remote repair, h/o a. 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 litholink unremarkable aside from the high pH. He passed another stone spontaneously which was predominant caphos. Was asymptomatic, just found the stone in the tubing. Will refer to nephrology to see if there is anything else to be done from a medical prevention of stones standpoint.    PSA remains normal    Continue tamsulosin    Follow up in 1 year with KUB, repeat litholink, PSA prior  If becomes symptomatic will get a CT abd/pelvis w/o contrast instead          Nehemiah Bloom MD   [facility]  [facility] Phone: [phone]

## 2022-08-10 NOTE — LETTER
"8/10/2022       RE: Marcus Hodges  [address] [place] [address]     Dear Colleague,    Thank you for referring your patient, Marcus Hodges, to the [facility]. Please see a copy of my visit note below.    Chief Complaint   Patient presents with     Benign Prostatic Hypertrophy     Patient here to discuss kub. Also follow up with aua, psa and pvr. Pvr not done due to patient has catheter in.     Sara Nails, COLLIN      CHIEF COMPLAINT   Marcus Hodges who is a 66 year old male returns today for follow-up of kidney stone, BPH with obstruction.      HPI   Marcus Hodges is a 66 year old male with h/o neurogenic bladder due to stroke 2012, ASD s/p remote repair, h/o a. 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 He is still taking flomax and this seems to help.    He passed a stone spontaneously in May (the size of an apple seed) and analysis showed caox and caphos stone    PHYSICAL EXAM  Patient is a 66 year old  male   Vitals: Pulse (!) 46, height 1.88 m (6' 2\"), weight 79.4 kg (175 lb), SpO2 97 %.  Body mass index is 22.47 kg/m .  General Appearance Adult:   Alert, no acute distress, oriented  HENT: throat/mouth:normal, good dentition  Lungs: no respiratory distress, or pursed lip breathing  Heart: No obvious jugular venous distension present  Abdomen: soft, nontender, no organomegaly or masses  Musculoskeltal: extremities normal, no peripheral edema  Skin: no suspicious lesions or rashes  Neuro: Alert, oriented, speech and mentation normal  Psych: affect and mood normal  Gait: " Normal  : deferred    All pertinent imaging reviewed:    kub 8/9/2022    Reviewed images. No obvious kidney stones    Component      Latest Ref Rng & Units 6/24/2021 8/2/2022   PSA      0.00 - 4.00 ug/L 1.85 1.84           Calculi composed primarily of:   10% calcium oxalate (monohydrate and dihydrate), and   90% calcium phosphate (hydroxy- and carbonate- apatite).     ASSESSMENT and PLAN  66 year old male with h/o neurogenic bladder due to stroke 2012, ASD s/p remote repair, h/o a. 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 litholink unremarkable aside from the high pH. He passed another stone spontaneously which was predominant caphos. Was asymptomatic, just found the stone in the tubing. Will refer to nephrology to see if there is anything else to be done from a medical prevention of stones standpoint.    PSA remains normal    Continue tamsulosin    Follow up in 1 year with KUB, repeat litholink, PSA prior  If becomes symptomatic will get a CT abd/pelvis w/o contrast instead          Nehemiah Bloom MD   [facility]  [facility] Phone: [phone]

## 2022-08-10 NOTE — PROGRESS NOTES
Chief Complaint   Patient presents with     Benign Prostatic Hypertrophy     Patient here to discuss kub. Also follow up with aua, psa and pvr. Pvr not done due to patient has catheter in.     Sara Nails, CMA

## 2022-08-12 ENCOUNTER — HOSPITAL ENCOUNTER (OUTPATIENT)
Dept: PHYSICAL THERAPY | Facility: CLINIC | Age: 67
Setting detail: THERAPIES SERIES
Discharge: HOME | End: 2022-08-12
Attending: INTERNAL MEDICINE
Payer: MEDICARE

## 2022-08-12 PROCEDURE — 97112 NEUROMUSCULAR REEDUCATION: CPT | Mod: GP | Performed by: PHYSICAL THERAPIST

## 2022-08-12 PROCEDURE — 97110 THERAPEUTIC EXERCISES: CPT | Mod: GP | Performed by: PHYSICAL THERAPIST

## 2022-08-15 ENCOUNTER — HOSPITAL ENCOUNTER (OUTPATIENT)
Dept: PHYSICAL THERAPY | Facility: CLINIC | Age: 67
Setting detail: THERAPIES SERIES
Discharge: HOME | End: 2022-08-15
Attending: INTERNAL MEDICINE
Payer: MEDICARE

## 2022-08-15 PROCEDURE — 97116 GAIT TRAINING THERAPY: CPT | Mod: GP | Performed by: PHYSICAL THERAPIST

## 2022-08-15 PROCEDURE — 97110 THERAPEUTIC EXERCISES: CPT | Mod: GP | Performed by: PHYSICAL THERAPIST

## 2022-08-23 ENCOUNTER — HOSPITAL ENCOUNTER (OUTPATIENT)
Dept: PHYSICAL THERAPY | Facility: CLINIC | Age: 67
Setting detail: THERAPIES SERIES
Discharge: HOME | End: 2022-08-23
Attending: INTERNAL MEDICINE
Payer: MEDICARE

## 2022-08-23 PROCEDURE — 97116 GAIT TRAINING THERAPY: CPT | Mod: GP | Performed by: PHYSICAL THERAPIST

## 2022-08-23 PROCEDURE — 97112 NEUROMUSCULAR REEDUCATION: CPT | Mod: GP | Performed by: PHYSICAL THERAPIST

## 2022-08-23 PROCEDURE — 97110 THERAPEUTIC EXERCISES: CPT | Mod: GP | Performed by: PHYSICAL THERAPIST

## 2022-08-31 ENCOUNTER — HOSPITAL ENCOUNTER (OUTPATIENT)
Dept: PHYSICAL THERAPY | Facility: CLINIC | Age: 67
Setting detail: THERAPIES SERIES
Discharge: HOME | End: 2022-08-31
Attending: INTERNAL MEDICINE
Payer: MEDICARE

## 2022-08-31 PROCEDURE — 97110 THERAPEUTIC EXERCISES: CPT | Mod: GP | Performed by: PHYSICAL THERAPIST

## 2022-08-31 PROCEDURE — 97112 NEUROMUSCULAR REEDUCATION: CPT | Mod: GP | Performed by: PHYSICAL THERAPIST

## 2022-09-06 ENCOUNTER — HOSPITAL ENCOUNTER (OUTPATIENT)
Dept: PHYSICAL THERAPY | Facility: CLINIC | Age: 67
Setting detail: THERAPIES SERIES
Discharge: HOME | End: 2022-09-06
Attending: INTERNAL MEDICINE
Payer: MEDICARE

## 2022-09-06 PROCEDURE — 97110 THERAPEUTIC EXERCISES: CPT | Mod: GP,KX | Performed by: PHYSICAL THERAPIST

## 2022-09-06 PROCEDURE — 97112 NEUROMUSCULAR REEDUCATION: CPT | Mod: GP,KX | Performed by: PHYSICAL THERAPIST

## 2022-09-13 ENCOUNTER — HOSPITAL ENCOUNTER (OUTPATIENT)
Dept: PHYSICAL THERAPY | Facility: CLINIC | Age: 67
Setting detail: THERAPIES SERIES
Discharge: HOME | End: 2022-09-13
Attending: INTERNAL MEDICINE
Payer: MEDICARE

## 2022-09-13 PROCEDURE — 97140 MANUAL THERAPY 1/> REGIONS: CPT | Mod: GP | Performed by: PHYSICAL THERAPIST

## 2022-09-13 PROCEDURE — 97110 THERAPEUTIC EXERCISES: CPT | Mod: GP | Performed by: PHYSICAL THERAPIST

## 2022-09-13 PROCEDURE — 97116 GAIT TRAINING THERAPY: CPT | Mod: GP | Performed by: PHYSICAL THERAPIST

## 2022-09-15 ENCOUNTER — TELEPHONE (OUTPATIENT)
Dept: PEDIATRICS | Facility: CLINIC | Age: 67
End: 2022-09-15

## 2022-09-15 NOTE — TELEPHONE ENCOUNTER
Received orders, placed in Dr. Aviles in basket.    Please review, sign and fax back to [phone].

## 2022-09-19 ENCOUNTER — MEDICAL CORRESPONDENCE (OUTPATIENT)
Dept: HEALTH INFORMATION MANAGEMENT | Facility: CLINIC | Age: 67
End: 2022-09-19

## 2022-09-19 ENCOUNTER — TELEPHONE (OUTPATIENT)
Dept: PEDIATRICS | Facility: CLINIC | Age: 67
End: 2022-09-19

## 2022-09-19 NOTE — CONFIDENTIAL NOTE
Forms/Letter Request    Type of form/letter: CERTIFICATE OF MEDICAL NECESSITY    Have you been seen for this request: Yes     Do we have the form/letter: Yes    When is form/letter needed by: asap    How would you like the form/letter returned: N/A    Patient Notified form requests are processed in 3-5 business days:Yes    [facility] or would you prefer to receive a phone call?:   No preference   Okay to leave a detailed message?: No

## 2022-09-20 ENCOUNTER — HOSPITAL ENCOUNTER (OUTPATIENT)
Dept: PHYSICAL THERAPY | Facility: CLINIC | Age: 67
Setting detail: THERAPIES SERIES
Discharge: HOME | End: 2022-09-20
Attending: INTERNAL MEDICINE
Payer: MEDICARE

## 2022-09-20 PROCEDURE — 97140 MANUAL THERAPY 1/> REGIONS: CPT | Mod: GP | Performed by: PHYSICAL THERAPIST

## 2022-09-20 PROCEDURE — 97110 THERAPEUTIC EXERCISES: CPT | Mod: GP | Performed by: PHYSICAL THERAPIST

## 2022-09-20 PROCEDURE — 97116 GAIT TRAINING THERAPY: CPT | Mod: GP | Performed by: PHYSICAL THERAPIST

## 2022-09-22 NOTE — TELEPHONE ENCOUNTER
DIAGNOSIS: Calculus of kidney    DATE RECEIVED: 12.07.2022    NOTES STATUS DETAILS   OFFICE NOTE from referring provider Internal 08.10.2022 Nehemiah Bloom MD   OFFICE NOTE from other specialist      *Only VASCULITIS or LUPUS gather office notes for the following     *PULMONARY       *ENT     *DERMATOLOGY     *RHEUMATOLOGY     DISCHARGE SUMMARY from hospital     DISCHARGE REPORT from the ER     MEDICATION LIST Internal    IMAGING  (NEED IMAGES AND REPORTS)     KIDNEY CT SCAN Internal 06.24.2022 CT ABDOMEN/PELVIS WITHOUT    KIDNEY ULTRASOUND     MR ABDOMEN     NUCLEAR MEDICINE RENAL     LABS     CBC Internal 10.19.2021   CMP Internal 10.19.2021   BMP Internal 10.14.2020   UA Internal 11.12.2020   URINE PROTEIN Internal 11.12.2020   RENAL PANEL     BIOPSY     KIDNEY BIOPSY

## 2022-09-27 ENCOUNTER — HOSPITAL ENCOUNTER (OUTPATIENT)
Dept: PHYSICAL THERAPY | Facility: CLINIC | Age: 67
Setting detail: THERAPIES SERIES
Discharge: HOME | End: 2022-09-27
Attending: INTERNAL MEDICINE
Payer: MEDICARE

## 2022-09-27 PROCEDURE — 97116 GAIT TRAINING THERAPY: CPT | Mod: GP | Performed by: PHYSICAL THERAPIST

## 2022-09-27 PROCEDURE — 97110 THERAPEUTIC EXERCISES: CPT | Mod: GP | Performed by: PHYSICAL THERAPIST

## 2022-09-27 NOTE — PROGRESS NOTES
[facility]    Outpatient Physical Therapy Progress Note  Patient: Marcus Hodges  : 1955    Beginning/End Dates of Reporting Period:  22 to 10/19/22    Referring Provider: Dr. Don Aviles    Therapy Diagnosis:   impaired gait, transfers, decline in function     Client Self Report: states he is doing 3-4 laps when walks at the gym, R knee still sore , back not as much but is stiff.  Still notices tightness when reaches down to  ping pong ball from the floor.      Goals:  Goal Identifier 1 HEP   Goal Description Lex to be independent in appropriate HEP with assist from Nidia as needed to promote life long health, ability to stay in home and improve function.   Target Date 10/18/22   Date Met   in progress   Progress (detail required for progress note): 22  Attending community center/gym more and ambulating on track with trekking pole and rail.  Baseline - doing HEP but decreased tolerance. Wife not able to do stretching of LE due to her own health concerns.      Goal Identifier 2 - 25 foot walk   Goal Description Lex to complete 25 foot walk consistently in 19 sec or less and 22 steps or less demonstrating improved gait pattern and efficiency.   Target Date 10/18/22   Date Met   in progress as noted.    Progress (detail required for progress note): 22  22.8 sec, 21.68 sec with NBQC (forgot his trekking pole today) baseline 21.63 sec, 26 steps with trekking ple and AFO     Goal Identifier 3 - TUG   Goal Description Lex to complete TUG in 40 sec or less to demonstrate improved gait pattern, ability to transfer sit <> stand and overall increased function.   Target Date 10/18/22   Date Met   improved as noted, met 22 but will continue to assess for consistency   Progress (detail required for progress note): 22 38.72 sec.  with NBQC  baseline 50.83 sec with trekking pole  "    Goal Identifier 4 - stairs   Goal Description Lex to complete stairs ascending with alternating stepping and one rail and min /SBA to demonstrate improved neuro motor function , balance and greater safety with completing stairs.   Target Date 10/18/22   Date Met      Progress (detail required for progress note): baseline single step up , alternating down with heavy lean on the rail.  9/27/22 less lean on the railing , improved ability to keep body in frontal plane     Goal Identifier 5 - pain   Goal Description Lex to report decreased back pain and R knee pain to allow him to increase his ambulation distance/ther ex at the gym as well as overall better QOL.   Target Date 10/18/22   Date Met      Progress (detail required for progress note): baseline - doing 2 laps at gym usually does 3-4  9/27/22  stil has about the same R knee, back \"not as stiff\" but doing 3-4 laps.     Goal Identifier 6 - 6MWT   Goal Description Lex to increase his 6MWT distance by 150 feet or greater from when first assessed to demonstrate improved endurance, gait quality and function.   Target Date 10/18/22   Date Met      Progress (detail required for progress note): 8/2/22 300 feet with trekking pole, AFO L 9/27/22 325 feet with NBQC (forgot his trekking pole this session) and AFO     Goal Identifier 7- sit <> stand and COM forward past TAYLOR challenges.   Goal Description Lex to perform sit to stand from 22 inch mat without bracing self with LE against the surface to demonstrate improved ability to bring COM forward past TAYLOR as well as better mechanics with transfer for decreased/excess load on the R LE to decrease knee pain.   Target Date 10/18/22   Date Met      Progress (detail required for progress note): 9/27/22  baseline, lean to the R and posterior lean against surface 24 inch height     GOALS remain appropriate.  Pt and wife without questions and wish to continue with PT.  Not having weekly PT has demonstrated decline in " function in his home and increased back and R knee pain.     Plan:  Continue therapy per current plan of care.    Discharge:  No

## 2022-10-04 ENCOUNTER — HOSPITAL ENCOUNTER (OUTPATIENT)
Dept: PHYSICAL THERAPY | Facility: CLINIC | Age: 67
Setting detail: THERAPIES SERIES
Discharge: HOME | End: 2022-10-04
Attending: INTERNAL MEDICINE
Payer: MEDICARE

## 2022-10-04 PROCEDURE — 97110 THERAPEUTIC EXERCISES: CPT | Mod: GP,KX | Performed by: PHYSICAL THERAPIST

## 2022-10-04 PROCEDURE — 97116 GAIT TRAINING THERAPY: CPT | Mod: GP,KX | Performed by: PHYSICAL THERAPIST

## 2022-10-04 PROCEDURE — 97530 THERAPEUTIC ACTIVITIES: CPT | Mod: GP,KX | Performed by: PHYSICAL THERAPIST

## 2022-10-10 ENCOUNTER — HEALTH MAINTENANCE LETTER (OUTPATIENT)
Age: 67
End: 2022-10-10

## 2022-10-11 ENCOUNTER — HOSPITAL ENCOUNTER (OUTPATIENT)
Dept: PHYSICAL THERAPY | Facility: CLINIC | Age: 67
Setting detail: THERAPIES SERIES
Discharge: HOME | End: 2022-10-11
Attending: INTERNAL MEDICINE
Payer: MEDICARE

## 2022-10-11 PROCEDURE — 97116 GAIT TRAINING THERAPY: CPT | Mod: GP,KX | Performed by: PHYSICAL THERAPIST

## 2022-10-11 PROCEDURE — 97112 NEUROMUSCULAR REEDUCATION: CPT | Mod: GP,KX | Performed by: PHYSICAL THERAPIST

## 2022-10-11 PROCEDURE — 97110 THERAPEUTIC EXERCISES: CPT | Mod: GP,KX | Performed by: PHYSICAL THERAPIST

## 2022-10-13 DIAGNOSIS — E78.5 HYPERLIPIDEMIA LDL GOAL <100: ICD-10-CM

## 2022-10-14 ENCOUNTER — TRANSCRIBE ORDERS (OUTPATIENT)
Dept: OTHER | Age: 67
End: 2022-10-14

## 2022-10-14 DIAGNOSIS — Z74.09 IMPAIRED MOBILITY AND ACTIVITIES OF DAILY LIVING: ICD-10-CM

## 2022-10-14 DIAGNOSIS — R25.2 SPASTICITY: ICD-10-CM

## 2022-10-14 DIAGNOSIS — Z78.9 IMPAIRED MOBILITY AND ACTIVITIES OF DAILY LIVING: ICD-10-CM

## 2022-10-14 DIAGNOSIS — M75.02 ADHESIVE CAPSULITIS OF LEFT SHOULDER: ICD-10-CM

## 2022-10-14 DIAGNOSIS — M62.422 CONTRACTURE OF MUSCLE OF LEFT UPPER ARM: ICD-10-CM

## 2022-10-14 DIAGNOSIS — I69.352 SPASTIC HEMIPARESIS OF LEFT DOMINANT SIDE AS LATE EFFECT OF CEREBRAL INFARCTION (H): Primary | ICD-10-CM

## 2022-10-14 RX ORDER — ATORVASTATIN CALCIUM 20 MG/1
20 TABLET, FILM COATED ORAL DAILY
Qty: 90 TABLET | Refills: 0 | Status: SHIPPED | OUTPATIENT
Start: 2022-10-14 | End: 2022-10-26

## 2022-10-18 ENCOUNTER — HOSPITAL ENCOUNTER (OUTPATIENT)
Dept: PHYSICAL THERAPY | Facility: CLINIC | Age: 67
Setting detail: THERAPIES SERIES
Discharge: HOME | End: 2022-10-18
Attending: INTERNAL MEDICINE
Payer: MEDICARE

## 2022-10-18 PROCEDURE — 97116 GAIT TRAINING THERAPY: CPT | Mod: GP,KX | Performed by: PHYSICAL THERAPIST

## 2022-10-18 PROCEDURE — 97112 NEUROMUSCULAR REEDUCATION: CPT | Mod: GP,KX | Performed by: PHYSICAL THERAPIST

## 2022-10-18 PROCEDURE — 97110 THERAPEUTIC EXERCISES: CPT | Mod: GP,KX | Performed by: PHYSICAL THERAPIST

## 2022-10-21 ENCOUNTER — HOSPITAL ENCOUNTER (OUTPATIENT)
Dept: CARDIOLOGY | Facility: CLINIC | Age: 67
Discharge: HOME | End: 2022-10-21
Attending: INTERNAL MEDICINE | Admitting: INTERNAL MEDICINE
Payer: MEDICARE

## 2022-10-21 DIAGNOSIS — I05.9 MITRAL VALVE DISEASE: ICD-10-CM

## 2022-10-21 DIAGNOSIS — I48.20 CHRONIC ATRIAL FIBRILLATION (H): ICD-10-CM

## 2022-10-21 LAB — LVEF ECHO: NORMAL

## 2022-10-21 PROCEDURE — 93306 TTE W/DOPPLER COMPLETE: CPT | Mod: 26 | Performed by: INTERNAL MEDICINE

## 2022-10-21 PROCEDURE — 999N000208 ECHOCARDIOGRAM COMPLETE

## 2022-10-21 PROCEDURE — 255N000002 HC RX 255 OP 636: Performed by: INTERNAL MEDICINE

## 2022-10-21 RX ADMIN — HUMAN ALBUMIN MICROSPHERES AND PERFLUTREN 9 ML: 10; .22 INJECTION, SOLUTION INTRAVENOUS at 10:48

## 2022-10-25 SDOH — HEALTH STABILITY: PHYSICAL HEALTH: ON AVERAGE, HOW MANY DAYS PER WEEK DO YOU ENGAGE IN MODERATE TO STRENUOUS EXERCISE (LIKE A BRISK WALK)?: 1 DAY

## 2022-10-25 SDOH — ECONOMIC STABILITY: FOOD INSECURITY: WITHIN THE PAST 12 MONTHS, THE FOOD YOU BOUGHT JUST DIDN'T LAST AND YOU DIDN'T HAVE MONEY TO GET MORE.: NEVER TRUE

## 2022-10-25 SDOH — ECONOMIC STABILITY: FOOD INSECURITY: WITHIN THE PAST 12 MONTHS, YOU WORRIED THAT YOUR FOOD WOULD RUN OUT BEFORE YOU GOT MONEY TO BUY MORE.: NEVER TRUE

## 2022-10-25 SDOH — ECONOMIC STABILITY: TRANSPORTATION INSECURITY
IN THE PAST 12 MONTHS, HAS LACK OF TRANSPORTATION KEPT YOU FROM MEETINGS, WORK, OR FROM GETTING THINGS NEEDED FOR DAILY LIVING?: NO

## 2022-10-25 SDOH — ECONOMIC STABILITY: INCOME INSECURITY: IN THE LAST 12 MONTHS, WAS THERE A TIME WHEN YOU WERE NOT ABLE TO PAY THE MORTGAGE OR RENT ON TIME?: NO

## 2022-10-25 SDOH — ECONOMIC STABILITY: INCOME INSECURITY: HOW HARD IS IT FOR YOU TO PAY FOR THE VERY BASICS LIKE FOOD, HOUSING, MEDICAL CARE, AND HEATING?: NOT HARD AT ALL

## 2022-10-25 SDOH — ECONOMIC STABILITY: TRANSPORTATION INSECURITY
IN THE PAST 12 MONTHS, HAS THE LACK OF TRANSPORTATION KEPT YOU FROM MEDICAL APPOINTMENTS OR FROM GETTING MEDICATIONS?: NO

## 2022-10-25 SDOH — HEALTH STABILITY: PHYSICAL HEALTH: ON AVERAGE, HOW MANY MINUTES DO YOU ENGAGE IN EXERCISE AT THIS LEVEL?: 10 MIN

## 2022-10-25 ASSESSMENT — ENCOUNTER SYMPTOMS
HEMATOCHEZIA: 0
WEAKNESS: 1
ABDOMINAL PAIN: 0
HEARTBURN: 0
FREQUENCY: 0
CHILLS: 0
HEMATURIA: 0
HEADACHES: 0
EYE PAIN: 0
PALPITATIONS: 0
SHORTNESS OF BREATH: 0
ARTHRALGIAS: 1
JOINT SWELLING: 0
PARESTHESIAS: 0
CONSTIPATION: 1
SORE THROAT: 0
MYALGIAS: 0
NERVOUS/ANXIOUS: 0
FEVER: 0
COUGH: 0
NAUSEA: 0
DYSURIA: 0
DIARRHEA: 0
DIZZINESS: 0

## 2022-10-25 ASSESSMENT — SOCIAL DETERMINANTS OF HEALTH (SDOH)
HOW OFTEN DO YOU GET TOGETHER WITH FRIENDS OR RELATIVES?: ONCE A WEEK
HOW OFTEN DO YOU ATTEND CHURCH OR RELIGIOUS SERVICES?: MORE THAN 4 TIMES PER YEAR
DO YOU BELONG TO ANY CLUBS OR ORGANIZATIONS SUCH AS CHURCH GROUPS UNIONS, FRATERNAL OR ATHLETIC GROUPS, OR SCHOOL GROUPS?: YES
IN A TYPICAL WEEK, HOW MANY TIMES DO YOU TALK ON THE PHONE WITH FAMILY, FRIENDS, OR NEIGHBORS?: THREE TIMES A WEEK

## 2022-10-25 ASSESSMENT — LIFESTYLE VARIABLES
HOW OFTEN DO YOU HAVE A DRINK CONTAINING ALCOHOL: NEVER
HOW MANY STANDARD DRINKS CONTAINING ALCOHOL DO YOU HAVE ON A TYPICAL DAY: PATIENT DOES NOT DRINK
SKIP TO QUESTIONS 9-10: 1
HOW OFTEN DO YOU HAVE SIX OR MORE DRINKS ON ONE OCCASION: NEVER
AUDIT-C TOTAL SCORE: 0

## 2022-10-25 ASSESSMENT — ACTIVITIES OF DAILY LIVING (ADL)
CURRENT_FUNCTION: PREPARING MEALS REQUIRES ASSISTANCE
CURRENT_FUNCTION: BATHING REQUIRES ASSISTANCE
CURRENT_FUNCTION: TRANSPORTATION REQUIRES ASSISTANCE
CURRENT_FUNCTION: HOUSEWORK REQUIRES ASSISTANCE
CURRENT_FUNCTION: SHOPPING REQUIRES ASSISTANCE
CURRENT_FUNCTION: LAUNDRY REQUIRES ASSISTANCE

## 2022-10-26 ENCOUNTER — OFFICE VISIT (OUTPATIENT)
Dept: PEDIATRICS | Facility: CLINIC | Age: 67
End: 2022-10-26
Payer: MEDICARE

## 2022-10-26 VITALS
SYSTOLIC BLOOD PRESSURE: 112 MMHG | DIASTOLIC BLOOD PRESSURE: 72 MMHG | BODY MASS INDEX: 22.46 KG/M2 | HEIGHT: 74 IN | WEIGHT: 175 LBS | RESPIRATION RATE: 16 BRPM | TEMPERATURE: 97.7 F | HEART RATE: 63 BPM | OXYGEN SATURATION: 98 %

## 2022-10-26 DIAGNOSIS — F33.1 MAJOR DEPRESSIVE DISORDER, RECURRENT EPISODE, MODERATE (H): ICD-10-CM

## 2022-10-26 DIAGNOSIS — G81.94 LEFT HEMIPARESIS (H): ICD-10-CM

## 2022-10-26 DIAGNOSIS — Z00.00 ENCOUNTER FOR MEDICARE ANNUAL WELLNESS EXAM: Primary | ICD-10-CM

## 2022-10-26 DIAGNOSIS — N31.9 NEUROGENIC BLADDER: ICD-10-CM

## 2022-10-26 DIAGNOSIS — S06.9X9S TRAUMATIC BRAIN INJURY WITH LOSS OF CONSCIOUSNESS, SEQUELA (H): ICD-10-CM

## 2022-10-26 DIAGNOSIS — G40.909 SEIZURE DISORDER (H): ICD-10-CM

## 2022-10-26 DIAGNOSIS — M85.812 OTHER SPECIFIED DISORDERS OF BONE DENSITY AND STRUCTURE, LEFT SHOULDER: ICD-10-CM

## 2022-10-26 DIAGNOSIS — E78.5 HYPERLIPIDEMIA LDL GOAL <100: ICD-10-CM

## 2022-10-26 DIAGNOSIS — M85.80 OSTEOPENIA, UNSPECIFIED LOCATION: ICD-10-CM

## 2022-10-26 DIAGNOSIS — B37.2 CANDIDIASIS OF SKIN: ICD-10-CM

## 2022-10-26 DIAGNOSIS — I48.20 CHRONIC ATRIAL FIBRILLATION (H): ICD-10-CM

## 2022-10-26 DIAGNOSIS — R25.2 SPASTICITY: ICD-10-CM

## 2022-10-26 LAB
ALBUMIN SERPL-MCNC: 3.8 G/DL (ref 3.4–5)
ALP SERPL-CCNC: 99 U/L (ref 40–150)
ALT SERPL W P-5'-P-CCNC: 29 U/L (ref 0–70)
ANION GAP SERPL CALCULATED.3IONS-SCNC: 6 MMOL/L (ref 3–14)
AST SERPL W P-5'-P-CCNC: 33 U/L (ref 0–45)
BASOPHILS # BLD AUTO: 0 10E3/UL (ref 0–0.2)
BASOPHILS NFR BLD AUTO: 1 %
BILIRUB SERPL-MCNC: 1 MG/DL (ref 0.2–1.3)
BUN SERPL-MCNC: 13 MG/DL (ref 7–30)
CALCIUM SERPL-MCNC: 9 MG/DL (ref 8.5–10.1)
CHLORIDE BLD-SCNC: 109 MMOL/L (ref 94–109)
CHOLEST SERPL-MCNC: 116 MG/DL
CO2 SERPL-SCNC: 25 MMOL/L (ref 20–32)
CREAT SERPL-MCNC: 0.73 MG/DL (ref 0.66–1.25)
EOSINOPHIL # BLD AUTO: 0.2 10E3/UL (ref 0–0.7)
EOSINOPHIL NFR BLD AUTO: 5 %
ERYTHROCYTE [DISTWIDTH] IN BLOOD BY AUTOMATED COUNT: 13.7 % (ref 10–15)
FASTING STATUS PATIENT QL REPORTED: YES
GFR SERPL CREATININE-BSD FRML MDRD: >90 ML/MIN/1.73M2
GLUCOSE BLD-MCNC: 78 MG/DL (ref 70–99)
HCT VFR BLD AUTO: 36.8 % (ref 40–53)
HDLC SERPL-MCNC: 69 MG/DL
HGB BLD-MCNC: 12.2 G/DL (ref 13.3–17.7)
LDLC SERPL CALC-MCNC: 39 MG/DL
LYMPHOCYTES # BLD AUTO: 0.7 10E3/UL (ref 0.8–5.3)
LYMPHOCYTES NFR BLD AUTO: 19 %
MCH RBC QN AUTO: 29.5 PG (ref 26.5–33)
MCHC RBC AUTO-ENTMCNC: 33.2 G/DL (ref 31.5–36.5)
MCV RBC AUTO: 89 FL (ref 78–100)
MONOCYTES # BLD AUTO: 0.4 10E3/UL (ref 0–1.3)
MONOCYTES NFR BLD AUTO: 10 %
NEUTROPHILS # BLD AUTO: 2.6 10E3/UL (ref 1.6–8.3)
NEUTROPHILS NFR BLD AUTO: 66 %
NONHDLC SERPL-MCNC: 47 MG/DL
PLATELET # BLD AUTO: 186 10E3/UL (ref 150–450)
POTASSIUM BLD-SCNC: 4.6 MMOL/L (ref 3.4–5.3)
PROT SERPL-MCNC: 7 G/DL (ref 6.8–8.8)
RBC # BLD AUTO: 4.14 10E6/UL (ref 4.4–5.9)
SODIUM SERPL-SCNC: 140 MMOL/L (ref 133–144)
TRIGL SERPL-MCNC: 42 MG/DL
WBC # BLD AUTO: 3.9 10E3/UL (ref 4–11)

## 2022-10-26 PROCEDURE — 0124A COVID-19,PF,PFIZER BOOSTER BIVALENT: CPT | Performed by: INTERNAL MEDICINE

## 2022-10-26 PROCEDURE — 91312 COVID-19,PF,PFIZER BOOSTER BIVALENT: CPT | Performed by: INTERNAL MEDICINE

## 2022-10-26 PROCEDURE — 36415 COLL VENOUS BLD VENIPUNCTURE: CPT | Performed by: INTERNAL MEDICINE

## 2022-10-26 PROCEDURE — 90677 PCV20 VACCINE IM: CPT | Performed by: INTERNAL MEDICINE

## 2022-10-26 PROCEDURE — G0439 PPPS, SUBSEQ VISIT: HCPCS | Performed by: INTERNAL MEDICINE

## 2022-10-26 PROCEDURE — 85025 COMPLETE CBC W/AUTO DIFF WBC: CPT | Performed by: INTERNAL MEDICINE

## 2022-10-26 PROCEDURE — 80061 LIPID PANEL: CPT | Performed by: INTERNAL MEDICINE

## 2022-10-26 PROCEDURE — 99214 OFFICE O/P EST MOD 30 MIN: CPT | Mod: 25 | Performed by: INTERNAL MEDICINE

## 2022-10-26 PROCEDURE — G0009 ADMIN PNEUMOCOCCAL VACCINE: HCPCS | Performed by: INTERNAL MEDICINE

## 2022-10-26 PROCEDURE — 80053 COMPREHEN METABOLIC PANEL: CPT | Performed by: INTERNAL MEDICINE

## 2022-10-26 RX ORDER — LEVETIRACETAM 1000 MG/1
1000 TABLET ORAL 2 TIMES DAILY
Qty: 180 TABLET | Refills: 11 | Status: SHIPPED | OUTPATIENT
Start: 2022-10-26 | End: 2023-10-31

## 2022-10-26 RX ORDER — CLOTRIMAZOLE 1 %
CREAM (GRAM) TOPICAL 2 TIMES DAILY PRN
Qty: 113 G | Refills: 3 | Status: CANCELLED | OUTPATIENT
Start: 2022-10-26

## 2022-10-26 RX ORDER — METOPROLOL SUCCINATE 25 MG/1
12.5 TABLET, EXTENDED RELEASE ORAL DAILY
Qty: 45 TABLET | Refills: 11 | Status: SHIPPED | OUTPATIENT
Start: 2022-10-26 | End: 2023-10-31

## 2022-10-26 RX ORDER — BACLOFEN 20 MG/1
20 TABLET ORAL 4 TIMES DAILY
Qty: 360 TABLET | Refills: 3 | Status: SHIPPED | OUTPATIENT
Start: 2022-10-26 | End: 2023-10-31

## 2022-10-26 RX ORDER — TAMSULOSIN HYDROCHLORIDE 0.4 MG/1
0.4 CAPSULE ORAL DAILY
Qty: 90 CAPSULE | Refills: 11 | Status: SHIPPED | OUTPATIENT
Start: 2022-10-26 | End: 2023-10-31

## 2022-10-26 RX ORDER — MIRABEGRON 50 MG/1
50 TABLET, EXTENDED RELEASE ORAL DAILY
Qty: 90 TABLET | Refills: 11 | Status: SHIPPED | OUTPATIENT
Start: 2022-10-26 | End: 2023-10-31

## 2022-10-26 RX ORDER — ATORVASTATIN CALCIUM 20 MG/1
20 TABLET, FILM COATED ORAL DAILY
Qty: 90 TABLET | Refills: 3 | Status: SHIPPED | OUTPATIENT
Start: 2022-10-26 | End: 2023-10-31

## 2022-10-26 RX ORDER — CITALOPRAM HYDROBROMIDE 20 MG/1
30 TABLET ORAL DAILY
Qty: 135 TABLET | Refills: 11 | Status: SHIPPED | OUTPATIENT
Start: 2022-10-26 | End: 2023-10-31

## 2022-10-26 ASSESSMENT — ACTIVITIES OF DAILY LIVING (ADL)
CURRENT_FUNCTION: PREPARING MEALS REQUIRES ASSISTANCE
CURRENT_FUNCTION: HOUSEWORK REQUIRES ASSISTANCE
CURRENT_FUNCTION: SHOPPING REQUIRES ASSISTANCE
CURRENT_FUNCTION: BATHING REQUIRES ASSISTANCE
CURRENT_FUNCTION: LAUNDRY REQUIRES ASSISTANCE
CURRENT_FUNCTION: TRANSPORTATION REQUIRES ASSISTANCE

## 2022-10-26 ASSESSMENT — ENCOUNTER SYMPTOMS
NAUSEA: 0
SORE THROAT: 0
PARESTHESIAS: 0
JOINT SWELLING: 0
MYALGIAS: 0
HEMATURIA: 0
FEVER: 0
HEMATOCHEZIA: 0
COUGH: 0
EYE PAIN: 0
NERVOUS/ANXIOUS: 0
CONSTIPATION: 1
SHORTNESS OF BREATH: 0
FREQUENCY: 0
CHILLS: 0
HEARTBURN: 0
DIARRHEA: 0
ABDOMINAL PAIN: 0
ARTHRALGIAS: 1
WEAKNESS: 1
PALPITATIONS: 0
DYSURIA: 0
HEADACHES: 0
DIZZINESS: 0

## 2022-10-26 ASSESSMENT — PAIN SCALES - GENERAL: PAINLEVEL: NO PAIN (0)

## 2022-10-26 NOTE — PROGRESS NOTES
"SUBJECTIVE:   Lex is a 67 year old who presents for Preventive Visit.    {  Are you in the first 12 months of your Medicare coverage?  No    Healthy Habits:     In general, how would you rate your overall health?  Good    Frequency of exercise:  2-3 days/week    Duration of exercise:  N/A    Do you usually eat at least 4 servings of fruit and vegetables a day, include whole grains    & fiber and avoid regularly eating high fat or \"junk\" foods?  Yes    Taking medications regularly:  Yes    Medication side effects:  None    Ability to successfully perform activities of daily living:  Transportation requires assistance, shopping requires assistance, preparing meals requires assistance, housework requires assistance, bathing requires assistance and laundry requires assistance    Home Safety:  No safety concerns identified    Hearing Impairment:  No hearing concerns    In the past 6 months, have you been bothered by leaking of urine? Yes    In general, how would you rate your overall mental or emotional health?  Good      PHQ-2 Total Score: 0    Additional concerns today:  Yes    Do you feel safe in your environment? Yes    Have you ever done Advance Care Planning? (For example, a Health Directive, POLST, or a discussion with a medical provider or your loved ones about your wishes): Yes, advance care planning is on file.       Fall risk  Fallen 2 or more times in the past year?: No  Any fall with injury in the past year?: No    Cognitive Screening   1) Repeat 3 items (Leader, Season, Table)    2) Clock draw: NORMAL  3) 3 item recall: Recalls 3 objects  Results: NORMAL clock, 1-2 items recalled: COGNITIVE IMPAIRMENT LESS LIKELY    Mini-CogTM Copyright MARIANA [address]. Licensed by the author for use in [facility]; reprinted with permission (shantell@.[facility]). All rights reserved.      Do you have sleep apnea, excessive snoring or daytime drowsiness?: no    Reviewed and updated as needed this visit by clinical staff   " Tobacco  Allergies    Med Hx  Surg Hx  Fam Hx  Soc Hx        Reviewed and updated as needed this visit by Provider                 Social History     Tobacco Use     Smoking status: Never     Smokeless tobacco: Never   Substance Use Topics     Alcohol use: No     If you drink alcohol do you typically have >3 drinks per day or >7 drinks per week? No    Alcohol Use 10/26/2022   Prescreen: >3 drinks/day or >7 drinks/week? -   Prescreen: >3 drinks/day or >7 drinks/week? No         Current providers sharing in care for this patient include:   Patient Care Team:  Don Aviles MD as PCP - General  Don Aviles MD as Assigned PCP  Hayes Yip MD as Assigned Heart and Vascular Provider  Nehemiah Bloom MD as Assigned Surgical Provider    The following health maintenance items are reviewed in Epic and correct as of today:  Health Maintenance   Topic Date Due     HEPATITIS B IMMUNIZATION (1 of 3 - 3-dose series) Never done     COLORECTAL CANCER SCREENING  05/01/2021     ANNUAL REVIEW OF HM ORDERS  10/19/2022     PHQ-9  12/23/2022     MEDICARE ANNUAL WELLNESS VISIT  10/26/2023     LIPID  10/26/2023     FALL RISK ASSESSMENT  10/26/2023     ADVANCE CARE PLANNING  10/26/2027     DTAP/TDAP/TD IMMUNIZATION (4 - Td or Tdap) 08/24/2030     HEPATITIS C SCREENING  Completed     HIV SCREENING  Completed     DEPRESSION ACTION PLAN  Completed     INFLUENZA VACCINE  Completed     Pneumococcal Vaccine: 65+ Years  Completed     ZOSTER IMMUNIZATION  Completed     AORTIC ANEURYSM SCREENING (SYSTEM ASSIGNED)  Completed     COVID-19 Vaccine  Completed     IPV IMMUNIZATION  Aged Out     MENINGITIS IMMUNIZATION  Aged Out     Patient Active Problem List   Diagnosis     * * * SBE PROPHYLAXIS * * *     Health Care Home     Vitamin D deficiency     Hyperlipidemia LDL goal <100     Long-term (current) use of anticoagulants     Urinary incontinence     Neurogenic bladder     Chronic atrial fibrillation (H)     Tricuspid  regurgitation     Mitral regurgitation     Atrial enlargement, bilateral     Major depressive disorder, recurrent episode, moderate (H)     Traumatic brain injury with loss of consciousness, sequela (H)     Neurogenic bowel     Left renal stone     Dysphagia     Left hemiparesis (H)     S/P craniotomy     Seizure disorder (H)     Visual field defect     Anemia     Benign prostatic hyperplasia with urinary retention     Past Surgical History:   Procedure Laterality Date     CATHETER, ABLATION  2004     COMBINED CYSTOSCOPY, INSERT CATHETER URETER  9/28/2020    Procedure: cystoscopy and left ureteral catheter placement, left ureteral stent placement;  Surgeon: Nehemiah Bloom MD;  Location:  OR     Craniotomy reconstruction  2012    [facility], repair of hemicraniectomy defect     CYSTOSCOPY       DAVINCI PYELOPLASTY Left 9/28/2020    Procedure: left robotic assisted laparoscopic pyelolithotomy;  Surgeon: Nehemiah Bloom MD;  Location:  OR     GASTROSTOMY TUBE  April 2012    [facility], following CVA     HERNIA REPAIR       IR CAROTID ANGIOGRAM  4/22/2012     IR CAROTID ANGIOGRAM  4/22/2012     IR MISCELLANEOUS PROCEDURE  4/22/2012     IR MISCELLANEOUS PROCEDURE  4/25/2012     LASER KTP GREEN LIGHT PHOTOSELECTIVE VAPORIZATION PROSTATE N/A 12/1/2020    Procedure: Cystoscopy and greenlight photovaporization of the prostate;  Surgeon: Nehemiah Bloom MD;  Location: [facility] OR     REPAIR ATRIAL SEPTAL DEFECT  1978    ASD Repair     Right hemicraniectomy  April 2012 [facility], following CVA, mgmt malignant cerebral edema     SURGICAL HISTORY OF -   2009    right eye enucleation     TRACHEOSTOMY  April 2012 [facility], following CVA     ZZC NONSPECIFIC PROCEDURE      tonsillectomy     ZZC NONSPECIFIC PROCEDURE      Ing. Hernia Repair       Social History     Tobacco Use     Smoking status: Never     Smokeless tobacco: Never   Substance Use Topics     Alcohol use: No     Family History   Problem Relation Age  of Onset     Hypertension Mother      Cerebrovascular Disease Father      Cancer Paternal Grandmother      Diabetes Maternal Grandmother      Congenital Anomalies Brother         several brothers and sisters born with congential heart defects, but all have been repaired     Congenital Anomalies Sister          Current Outpatient Medications   Medication Sig Dispense Refill     acetaminophen (TYLENOL) 500 MG tablet Take 2 tablets (1,000 mg) by mouth every 6 hours as needed for mild pain 100 tablet 0     apixaban ANTICOAGULANT (ELIQUIS ANTICOAGULANT) 5 MG tablet Take 1 tablet (5 mg) by mouth 2 times daily 180 tablet 11     atorvastatin (LIPITOR) 20 MG tablet Take 1 tablet (20 mg) by mouth daily 90 tablet 3     baclofen (LIORESAL) 20 MG tablet Take 1 tablet (20 mg) by mouth 4 times daily 360 tablet 3     citalopram (CELEXA) 20 MG tablet Take 1.5 tablets (30 mg) by mouth daily 135 tablet 11     docusate sodium (COLACE) 100 MG tablet Take 200 mg by mouth nightly as needed        levETIRAcetam (KEPPRA) 1000 MG tablet Take 1 tablet (1,000 mg) by mouth 2 times daily 180 tablet 11     metoprolol succinate ER (TOPROL XL) 25 MG 24 hr tablet Take 0.5 tablets (12.5 mg) by mouth daily 45 tablet 11     mirabegron (MYRBETRIQ) 50 MG 24 hr tablet Take 1 tablet (50 mg) by mouth daily 90 tablet 11     Multiple Vitamin (MULTI-VITAMIN) per tablet Take 1 tablet by mouth daily  100 tablet 3     polyethylene glycol (MIRALAX) 17 g packet Take 17 g by mouth every other day        senna-docusate (SENOKOT-S/PERICOLACE) 8.6-50 MG tablet Take 4 tablets by mouth At Bedtime       tamsulosin (FLOMAX) 0.4 MG capsule Take 1 capsule (0.4 mg) by mouth daily 90 capsule 11     vitamin D3 (CHOLECALCIFEROL) 50 mcg (2000 units) tablet Take 2,000 Units by mouth daily  90 tablet 3             Review of Systems   Constitutional: Negative for chills and fever.   HENT: Negative for congestion, ear pain, hearing loss and sore throat.    Eyes: Negative for pain and  "visual disturbance.   Respiratory: Negative for cough and shortness of breath.    Cardiovascular: Negative for chest pain, palpitations and peripheral edema.   Gastrointestinal: Positive for constipation. Negative for abdominal pain, diarrhea, heartburn, hematochezia and nausea.   Genitourinary: Negative for dysuria, frequency, genital sores, hematuria, impotence, penile discharge and urgency.   Musculoskeletal: Positive for arthralgias. Negative for joint swelling and myalgias.   Skin: Positive for rash.   Neurological: Positive for weakness. Negative for dizziness, headaches and paresthesias.   Psychiatric/Behavioral: Negative for mood changes. The patient is not nervous/anxious.          OBJECTIVE:   /72 (Cuff Size: Adult Regular)   Pulse 63   Temp 97.7  F (36.5  C) (Tympanic)   Resp 16   Ht 1.88 m (6' 2\")   Wt 79.4 kg (175 lb)   SpO2 98%   BMI 22.47 kg/m   Estimated body mass index is 22.47 kg/m  as calculated from the following:    Height as of this encounter: 1.88 m (6' 2\").    Weight as of this encounter: 79.4 kg (175 lb).  Physical Exam  GENERAL: alert and no distress  EYES: left eye normal.   Right-prosthetic  HENT: ear canals and TM's normal, nose and mouth without ulcers or lesions  NECK: no adenopathy, no asymmetry, masses, or scars and thyroid normal to palpation  RESP: lungs clear to auscultation - no rales, rhonchi or wheezes  CV: regular rate and rhythm, normal S1 S2, no S3 or S4, no murmur  ABDOMEN: soft, nontender, no hepatosplenomegaly, no masses and bowel sounds normal  MS: no gross musculoskeletal defects noted, no edema  SKIN: no suspicious lesions or rashes  NEURO: left hemiparesis, left LLE brace, impaired gait  PSYCH: mentation appears normal, affect normal/bright        ASSESSMENT / PLAN:   (Z00.00) Encounter for Medicare annual wellness exam  (primary encounter diagnosis)    (S06.9X9S) Traumatic brain injury with loss of consciousness, sequela (H)  (G81.94) Left hemiparesis " "(H)  (R25.2) Spasticity  Comment:   History of traumatic brain injury, permanent left hemiparesis and associated spasticity following prior CVA.  Complicated by neurogenic bladder/bowel, impaired mobility.  Spouse is Planning to repeat course of occupational therapy here at [facility] in the next few months.  Plan: baclofen (LIORESAL) 20 MG tablet          (G40.909) Seizure disorder (H)  Comment:   Plan: Stable, continue Keppra.    (I48.20) Chronic atrial fibrillation (H)  Comment: Stable.  Anticoagulation-apixaban.  Adequate rate control.  Plan: apixaban ANTICOAGULANT (ELIQUIS ANTICOAGULANT)         5 MG tablet, metoprolol succinate ER (TOPROL         XL) 25 MG 24 hr tablet          (E78.5) Hyperlipidemia LDL goal <100  Comment:   Plan: atorvastatin (LIPITOR) 20 MG tablet,         Comprehensive metabolic panel (BMP + Alb, Alk         Phos, ALT, AST, Total. Bili, TP), CBC with         platelets and differential, Lipid panel reflex         to direct LDL Fasting          (F33.1) Major depressive disorder, recurrent episode, moderate (H)  Comment: Stable continue citalopram  Plan: citalopram (CELEXA) 20 MG tablet          (N31.9) Neurogenic bladder  Comment: Episodic incontinence.  Uses a condom catheter with leg bag for trips out of the home.  Plan: mirabegron (MYRBETRIQ) 50 MG 24 hr tablet          (M85.80) Osteopenia, unspecified location  (M85.812) Other specified disorders of bone density and structure, left shoulder  Comment: Recent shoulder films showed evidence of osteopenia-plan for DEXA scan  Plan: DX Hip/Pelvis/Spine                 COUNSELING:  Reviewed preventive health counseling, as reflected in patient instructions       Regular exercise       Colon cancer screening       Prostate cancer screening    Estimated body mass index is 22.47 kg/m  as calculated from the following:    Height as of this encounter: 1.88 m (6' 2\").    Weight as of this encounter: 79.4 kg (175 lb).        He reports that " he has never smoked. He has never used smokeless tobacco.      Appropriate preventive services were discussed with this patient, including applicable screening as appropriate for cardiovascular disease, diabetes, osteopenia/osteoporosis, and glaucoma.  As appropriate for age/gender, discussed screening for colorectal cancer, prostate cancer, breast cancer, and cervical cancer. Checklist reviewing preventive services available has been given to the patient.    Reviewed patients plan of care and provided an AVS. The Basic Care Plan (routine screening as documented in Health Maintenance) for Marcus meets the Care Plan requirement. This Care Plan has been established and reviewed with the Patient and spouse.    Counseling Resources:  ATP IV Guidelines  Pooled Cohorts Equation Calculator  Breast Cancer Risk Calculator  Breast Cancer: Medication to Reduce Risk  FRAX Risk Assessment  ICSI Preventive Guidelines  Dietary Guidelines for Americans, 2010  USDA's MyPlate  ASA Prophylaxis  Lung CA Screening    Don Aviles MD  [facility]    Identified Health Risks:

## 2022-10-26 NOTE — PATIENT INSTRUCTIONS
Patient Education   Personalized Prevention Plan  You are due for the preventive services outlined below.  Your care team is available to assist you in scheduling these services.  If you have already completed any of these items, please share that information with your care team to update in your medical record.  Health Maintenance Due   Topic Date Due     Hepatitis B Vaccine (1 of 3 - 3-dose series) Never done     Pneumococcal Vaccine (2 - PCV) 03/19/2019     Colorectal Cancer Screening  05/01/2021     COVID-19 Vaccine (5 - Booster for Pfizer series) 07/07/2022     Cholesterol Lab  10/19/2022     ANNUAL REVIEW OF HM ORDERS  10/19/2022     Activities of Daily Living    Your Health Risk Assessment indicates you have difficulties with activities of daily living such as housework, bathing, preparing meals, taking medication, etc. Please make a follow up appointment for us to address this issue in more detail.    Urinary Incontinence (Male)    Urinary incontinence means not being able to control the release of urine from the bladder.   Causes  Common causes of urinary incontinence in men include:    Infection    Certain medicines    Aging    Poor pelvic muscle tone    Bladder spasms    Obesity    Trouble urinating and fully emptying the bladder (urinary retention)  Other things that can cause incontinence are:     Nervous system diseases    Diabetes    Sleep apnea    Urinary tract infections    Prostate surgery    Pelvic injury  Constipation and smoking have also been identified as risk factors.   Symptoms    Urge incontinence (overactive bladder). This is a sudden urge to urinate. It occurs even though there may not be much urine in the bladder. The need to urinate often during the night is common. It's due to bladder spasms.    Stress incontinence. This is urine leakage that you can't control. It can occur with sneezing, coughing, and other actions that put stress on the bladder.    Treatment  Treatment depends on  what is causing the condition. Bladder infections are treated with antibiotics. Urinary retention is treated with a bladder catheter.   Home care  Follow these guidelines when caring for yourself at home:    Don't have any foods and drinks that may irritate the bladder. This includes:  ? Chocolate  ? Alcohol  ? Caffeine  ? Carbonated drinks  ? Acidic fruits and juices    Limit fluids to 6 to 8 cups a day.    Lose weight if you are overweight. This will reduce your symptoms.    If advised, do regular pelvic muscle-strengthening exercises such as Kegel exercises.    If needed, wear absorbent pads to catch urine. Change the pads often. This is for good hygiene and to prevent skin and bladder infections.    Bathe daily for good hygiene.    If an antibiotic was prescribed to treat a bladder infection, take it until it's finished. Keep taking it even if you are feeling better. This is to make sure your infection has cleared.    If a catheter was left in place, keep bacteria from getting into the collection bag. Don't disconnect the catheter from the collection bag.    Use a leg band to secure the catheter drainage tube, so it does not pull on the catheter. Drain the collection bag when it becomes full. To do this, use the drain spout at the bottom of the bag. Don't disconnect the bag from the catheter.    Don't pull on or try to remove a catheter. The catheter must be removed by a healthcare provider.    If you smoke, stop. Ask your provider for help if you can't do this on your own.  Follow-up care  Follow up with your healthcare provider, or as advised.  When to get medical advice  Call your healthcare provider right away if any of these occur:    Fever over 100.4 F (38 C), or as directed by your provider    Bladder pain or fullness    Belly swelling, nausea, or vomiting    Back pain    Weakness, dizziness, or fainting    If a catheter was left in place, return if:  ? The catheter falls out  ? The catheter stops  draining for 6 hours  ? Your urine gets cloudy or smells bad  [place] last reviewed this educational content on 1/1/2020 2000-2021 [employer]. All rights reserved. This information is not intended as a substitute for professional medical care. Always follow your healthcare professional's instructions.

## 2022-11-04 ENCOUNTER — HOSPITAL ENCOUNTER (OUTPATIENT)
Dept: PHYSICAL THERAPY | Facility: CLINIC | Age: 67
Setting detail: THERAPIES SERIES
Discharge: HOME | End: 2022-11-04
Attending: INTERNAL MEDICINE
Payer: MEDICARE

## 2022-11-04 ENCOUNTER — HOSPITAL ENCOUNTER (OUTPATIENT)
Dept: OCCUPATIONAL THERAPY | Facility: CLINIC | Age: 67
Setting detail: THERAPIES SERIES
Discharge: HOME | End: 2022-11-04
Attending: PHYSICAL MEDICINE & REHABILITATION
Payer: MEDICARE

## 2022-11-04 PROCEDURE — 97110 THERAPEUTIC EXERCISES: CPT | Mod: GP,KX | Performed by: PHYSICAL THERAPIST

## 2022-11-04 PROCEDURE — 97165 OT EVAL LOW COMPLEX 30 MIN: CPT | Mod: GO

## 2022-11-04 ASSESSMENT — ACTIVITIES OF DAILY LIVING (ADL): IADL_QUICK_ADDS: MEAL PLANNING/PREPARATION;HOME/FINANCIAL/MANAGEMENT;COMMUNICATION/COMPUTER USE;COMMUNITY MOBILITY

## 2022-11-04 NOTE — PROGRESS NOTES
[facility]          OUTPATIENT OCCUPATIONAL THERAPY  EVALUATION  PLAN OF TREATMENT FOR OUTPATIENT REHABILITATION  (COMPLETE FOR INITIAL CLAIMS ONLY)  Patient's Last Name, First Name, M.I.  YOB: 1955  IsraelalvaroMarcus TORRES                        Provider's Name  [facility] Medical Record No.  [id number]                               Onset Date:     10/14/22 (date of order used, CVA 4/20/12)   Start of Care Date:     11/04/22   Type:     ___PT   _X_OT   ___SLP Medical Diagnosis:     Spastic hemiparesis of left dominant side as late effect of cerebral infarction (H); Adhesive capsulitis of left shoulder; Contracture of muscle of left upper arm; Spasticity; Impaired mobility and activities of daily living                          OT Diagnosis:     Decreased safety and IND with ADLs/IADLs Visits from SOC:  1   _________________________________________________________________________________  Plan of Treatment/Functional Goals:  ADL training, IADL training, Community/work reintegration, Coordination training, Joint mobilization, Manual therapy, Neuromuscular re-education, Orthotic fitting/training, ROM, Self care/Home management, Strengthening, Stretching, Therapeutic activities, Transfer training, Visual perception                    Goals  Goal Identifier: LUE Functional Use  Goal Description: Patient to demonstrate functional tasks with 20% use of LUE as gross stabilizer and gross assist for IND with ADLs/IADLS (wiping the counter/table, washing dishes, stablizing laundry, etc.).  Target Date: 01/27/23     Goal Identifier: LUE ROM  Goal Description: Patient will demonstrate increased L shoulder flexion PROM/AAROM to 120 degrees for improved functional reach, IND with ADLs, and comfort/positioning for participation in daily activities and sleep.  Target Date: 01/27/23      Goal Identifier: UE HEP  Goal Description: Patient/family to demonstrate updated UE HEP with SBA and min cues for good follow through at home to increase functional strength and ROM for preventing pain and contractures and promoting ROM/strength for maximum independence with performance of ADLs.  Target Date: 01/27/23                                                                      Therapy Frequency:  (1x/week)     Predicted Duration of Therapy Intervention (days/wks): 12 weeks  LOUIS Leonard          I CERTIFY THE NEED FOR THESE SERVICES FURNISHED UNDER        THIS PLAN OF TREATMENT AND WHILE UNDER MY CARE .             Physician Signature               Date    X_____________________________________________________                       ,    Certification date from: 11/04/22, Certification date to: 01/27/23               Referring Physician: Perla Haddad MD     Initial Assessment        See Epic Evaluation      Start Of Care Date: 11/04/22

## 2022-11-04 NOTE — PROGRESS NOTES
11/04/22 1100   Quick Adds   Quick Adds Certification   Type of Visit Initial Outpatient Occupational Therapy Evaluation   General Information   Start Of Care Date 11/04/22   Referring Physician Perla Haddad MD   Orders Evaluate and treat as indicated  ( Eval and rx Left UE function/strength/mobility/ADLs, eval his estim unit and if he should do something different (doing the same for many years), left adhesive capsulitis and eval for robotic/Myoma etc. Update HEP. )   Other Orders Is seeing PT at this clinic   Orders Date 10/14/22   Medical Diagnosis Spastic hemiparesis of left dominant side as late effect of cerebral infarction (H); Adhesive capsulitis of left shoulder; Contracture of muscle of left upper arm; Spasticity; Impaired mobility and activities of daily living   Onset of Illness/Injury or Date of Surgery 10/14/22  (date of order used, CVA 4/20/12)   Precautions/Limitations Fall precautions   Surgical/Medical History Reviewed Yes   Additional Occupational Profile Info/Pertinent History of Current Problem Lex Wood is a pleasant 67 year old, right hand dominant male who is familiar to this writer from prior episode of care for OT. He reports a decrease in his overall function, increased tightness and discomfort in LUE since stopping OT in March 2022.  He has a history of A - flutter that was treated in ablation in 2004.  He went off of anticoagulation and had no problems until 4/22/2012 when he suffered a large right-sided stroke due to atrial fibrillation with thromboembolus.  He continues to have fairly severe left-sided hemiparesis with spasticity and contractures. He continues to have sever L sided hemiparesis with spasticity and contractures.  Medical history includes R knee pain, recurrent UTI's, history of depression, kidney stones,  COVID in June 2022, neurogenic bladder with faulkner catheter, seizure disorder, tricuspid regurgitation, mitral regurgitation, atrial enlargement B, s/p  "craniotomy, and anemia. He also has a history of R eye blindness due to childhood glaucoma prior to his stroke and has L peripheral visual filed cut since his stroke.   Comments/Observations Pt is accompanied to today's appointment by his wife, Nidia.   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   Current Living Environment House  (split-level)   Primary Bathroom Set Up/Equipment Shower/tub chair;Extended tub bench;Raised toilet seat   Prior Level - Transfers Assistive equipment and person  (trekking pole; wife with him for steps, transfers, and uneven surfaces)   Prior Level - Ambulation Assitive equipment and person  (trekking pole; wife with him for steps, transfers, and uneven surfaces)   Prior Level - ADLS Assitive equipment and person   Prior Level Comments Pt's wife retired in 2019 and now completes most of the IADLs within the home and assists with most of patient's ADLs as well. Nidia also assists with ROM HEP and participation in workouts at the gym/home.   Current Assistive Devices - Mobility   (trekking pole, AFO L)   Role/Living Environment Comments Pt reports they have a routine in the morning where they get up, Nidia helps him get his day catheter/bag, stretching in the morning and night. Nidia reports that  UE is \"definitely tighter.\" Most recent Botox was 2-3 weeks ago. Reports he unloads , completes puzzle. LUE relaxes when he focuses on another task such as playing ping-pong.   Patient/family Goals Statement To address \"left arm tightness\"   Pain   Patient currently in pain No   Pain comments Reports \"tightness\" but denies pain associated.   Fall Risk Screen   Fall screen completed by OT   Have you " fallen 2 or more times in the past year? No   Have you fallen and had an injury in the past year? No   Is patient a fall risk? Yes   Fall screen comments See PT dodie from July 2022 for further details. Is seeing PT in this clinic.   Abuse Screen (yes response referral indicated)   Feels Unsafe at Home or Work/School no   Feels Threatened by Someone no   Does Anyone Try to Keep You From Having Contact with Others or Doing Things Outside Your Home? no   Physical Signs of Abuse Present no   Cognitive Status Examination   Orientation Orientation to person, place and time   Level of Consciousness Alert   Follows Commands and Answers Questions 100% of the time;Able to follow single-step instructions   Personal Safety and Judgment Intact   Memory Intact   Cognitive Comment Denies changes in cognition since his last OT plan of care.   Visual Perception   Visual Perception Wears glasses  (Bifocals)   Visual Perception Comments Denies significant changes in vision.   Sensation   Sensation Comments Reports that when he stands for extended periods of time or without his brace, will get muscle spasms in his leg/foot at night. Decreased sensation L side.   Posture   Posture Forward head position;Protracted shoulders   Posture Comments standing with R knee flexed, L knee hyperextended, weight shift to the R, R lateral trunk flexion, flat back, significant thoracic rounding and forward head.   Range of Motion (ROM)   ROM Comments RUE WFLs per pt report. With AROM, able to achieve approximately 60 degrees of L shoulder abduction. With PROM, approximately  degrees of shoulder flexion and shoulder abduction with elbow flexed (hard end to shoulder abduction), about 15-20 degrees shoulder external rotation (internal rotation WFLs), WFLs horizontal adduction, horizontal abduction difficult to assesss due to spasticity, LUE rests at about 65-70 degrees elbow flexion and lacks about 10-15 degrees extension with PROM. Forearm about  10-15 degrees pronated in resting and able to achieve neutral forearm position with PROM, unable to supinate this date. PROM of wrist flexion/extension WFLs. MCPs lacking approximately 30-40 degrees extension with PROM. IP joints WFLs with PROM this date. Per spouse, they are completing stretching of LUE in 2x/day (morning and night) and completing e-stim for forearm 3-4x/week.   Strength   Strength Comments To be further assessed in future session. Able to achieve AROM (at least 3/5 muscle grade) with shoulder abduction in LUE. Reports RUE at baseline strength with no concerns.   Hand Strength   Hand Dominance Right   Hand Strength Comments No active motion in L hand this date. Will further assess in future session, as able.   Muscle Tone   Muscle Tone Comments Overactive tone /hypertonicity throughout the L side with limited AROM of LUE.  Avoidance of loading to L side with ambulation.   Coordination   Upper Extremity Coordination Left UE impaired   Functional Limitations Fine motor ADL performance impaired;Impaired ability to perform bilateral tasks;Object transport impaired;Reach to targets impaired   Coordination Comments impaired motor planning and motor coordination L side   Balance   Balance Comments Decreased balance secondary to weakness, spasticity. Is seeing PT in this clinic.   Functional Mobility   Ambulation trekker pole with SBA - close CGA particularly with uneven surfaces / stairs   Transfer Skill   Level of [place]: Transfers minimum assist (75% patients effort)   Transfer Skill Comments Uses trekker pole. Notes he has a lift recliner that is helpful for getting up from a chair easier. Notes that overall getting up from chairs seems a little more difficult. Nidia reports she assists a little more with transfers just due to them changing the type of chairs they are sitting in. Requires min A from spouse this date for transfers.   Bathing   Level of [place] - Bathing moderate assist (50%  "patients effort)   Bathing Comments Uses shower bench. Took a little longer to stand than normal after shower yesterday per pt's report. Otherwise reports this has been about the \"same\" since his last plan of care.   Upper Body Dressing   Level of [place]: Dress Upper Body moderate assist (50% patients effort)   Upper Body Dressing Comments Reports assistance with donning/doffing shirts/jackets. Reports this has been about the \"same\" since his last plan of care.   Lower Body Dressing   Level of [place]: Dress Lower Body maximum assist (25% patients effort)   Lower Body Dressing Comments Reports assistance with donning pants/socks/shoes but reports this feels about \"the same\" since his last plan of care.   Toileting   Level of [place]: Toilet maximum assist (25% patients effort)   Toileting Comments Uses catheter. Can take it out on his own but has assistance with donning and with cleaning/care.   Grooming   Level of [place]: Grooming other (see comments)  (mod I, increased time)   Grooming Comments Brushing teeth, combing hair, shaving (both electric and manual razor)   Eating/Self-Feeding   Level of [place]: Eating other (see comments)  (mod I, increased time)   Eating/Self Feeding Comments Reports they are vegetarian so no need for cutting through meats. Reports able to complete mod I.   Activity Tolerance   Activity Tolerance Generalized deconditioning, decreased activity tolerance.   Instrumental Activities of Daily Living Assessment   IADL Assessment/Observations Medicaion management: reports they setup pills in pillbox every Friday morning. Nidia supervises but he does not typically have any errors. Reports remembers to take pills on his own, reports it is part of his routine. Does require assistance with reaching into cupboard for retrieval of medications.   IADL Quick Adds Meal Planning/Preparation;Home/Financial/Management;Communication/Computer Use;Community Mobility   Meal " Planning/Preparation Reports spouse, Nidia, does most of the cooking. Can reheat foods in the microwave. Reports his role is to wash dishes.   Home/Financial Management Reports he assists with dusting around home and using the Swifter occasionally. Cleaning the mirrors in the bathroom.   Communication/Computer Use Uses iPad daily, uses R hand and denies difficulty.   Community Mobility Receives transportation from spouse for community mobility. Is able to ambulate in community settings with trekker pole and close CGA.   Planned Therapy Interventions   Planned Therapy Interventions ADL training;IADL training;Community/work reintegration;Coordination training;Joint mobilization;Manual therapy;Neuromuscular re-education;Orthotic fitting/training;ROM;Self care/Home management;Strengthening;Stretching;Therapeutic activities;Transfer training;Visual perception   Adult OT Eval Goals   OT Eval Goals (Adult) 1;2;3    OT Goal 1   Goal Identifier LUE Functional Use   Goal Description Patient to demonstrate functional tasks with 20% use of LUE as gross stabilizer and gross assist for IND with ADLs/IADLS (wiping the counter/table, washing dishes, stablizing laundry, etc.).   Target Date 01/27/23    OT Goal 2   Goal Identifier LUE ROM   Goal Description Patient will demonstrate increased L shoulder flexion PROM/AAROM to 120 degrees for improved functional reach, IND with ADLs, and comfort/positioning for participation in daily activities and sleep.   Target Date 01/27/23    OT Goal 3   Goal Identifier UE HEP   Goal Description Patient/family to demonstrate updated UE HEP with SBA and min cues for good follow through at home to increase functional strength and ROM for preventing pain and contractures and promoting ROM/strength for maximum independence with performance of ADLs.   Target Date 01/27/23   Clinical Impression   Criteria for Skilled Therapeutic Interventions Met Yes, treatment indicated   OT Diagnosis Decreased safety and  IND with ADLs/IADLs   Influenced by the following impairments ROM, strength, coordination, activity tolerance, balance, spasticity/muscle tone   Assessment of Occupational Performance 5 or more Performance Deficits   Identified Performance Deficits dressing, bathing, toileting, household management, medication management, meal preparation, leisure/hobbies   Clinical Decision Making (Complexity) Low complexity   Therapy Frequency   (1x/week)   Predicted Duration of Therapy Intervention (days/wks) 12 weeks   Risks and Benefits of Treatment have been explained. Yes   Patient, Family & other staff in agreement with plan of care Yes   Clinical Impression Comments Pt will benefit from skilled OT services to promote safety and IND with ADLs/IADLs   Education Assessment   Barriers To Learning Physical   Therapy Certification   Certification date from 11/04/22   Certification date to 01/27/23   Certification I certify the need for these services furnished under this plan of treatment and while under my care.  (Physician co-signature of this document indicates review and certification of the therapy plan)   Total Evaluation Time   OT Milind, Low Complexity Minutes (33524) 45     Thank you for the referral of this patient.  If you have any questions regarding the information in this report, please feel free to contact me per the information provided below.      Regina Tolentino MA, OTR/L  Occupational Therapist  [address] [address]  Clinic Fax:  [phone]  Clinic Phone: [phone]

## 2022-11-08 ENCOUNTER — HOSPITAL ENCOUNTER (OUTPATIENT)
Dept: PHYSICAL THERAPY | Facility: CLINIC | Age: 67
Setting detail: THERAPIES SERIES
Discharge: HOME | End: 2022-11-08
Attending: INTERNAL MEDICINE
Payer: MEDICARE

## 2022-11-08 PROCEDURE — 97110 THERAPEUTIC EXERCISES: CPT | Mod: GP,KX | Performed by: PHYSICAL THERAPIST

## 2022-11-08 NOTE — PROGRESS NOTES
[facility]    OUTPATIENT PHYSICAL THERAPY  PLAN OF TREATMENT FOR OUTPATIENT REHABILITATION AND PROGRESS NOTE           Patient's Last Name, First Name, Marcus Mcgee Date of Birth  1955   Provider's Name  [facility] Medical Record No.  [id number]    Onset Date  06/09/22 (date of order, CVA 4/20/12)   Start of Care Date  7/21/22   Type:     _X_PT   ___OT   ___SLP Medical Diagnosis  L hemiparesis   PT Diagnosis  impaired gait, transfers, decline in function Plan of Treatment  Frequency/Duration: 1 x a week x 90 days  Certification date from 10/20/22 to 1/18/23     Goals:  Goal Identifier 1 HEP   Goal Description Lex to be independent in appropriate HEP with assist from Nidia as needed to promote life long health, ability to stay in home and improve function.   Target Date 4/30/23   Date Met      Progress (detail required for progress note): HEP continues to be developed.  Baseline - doing HEP but decreased tolerance. Wife not able to do stretching of LE due to her own health concerns.     Goal Identifier 2 - 25 foot walk   Goal Description Lex to complete 25 foot walk consistently in 19 sec or less and 22 steps or less demonstrating improved gait pattern and efficiency.   Target Date 1/18/23   Date Met      Progress (detail required for progress note): 9/27/22  22.8 sec, 21.68 sec with NBQC (forgot his trekking pole today) baseline 21.63 sec, 26 steps with trekking ple and AFO     Goal Identifier 3 - TUG   Goal Description Lex to complete TUG in 40 sec or less to demonstrate improved gait pattern, ability to transfer sit <> stand and overall increased function.   Target Date 1/18/23   Date Met      Progress (detail required for progress note): 9/27/22 38.72 sec.  with NBQC  baseline 50.83 sec with trekking pole     Goal Identifier 4 - stairs   Goal  "Description Lex to complete stairs ascending with alternating stepping and one rail and min /SBA to demonstrate improved neuro motor function , balance and greater safety with completing stairs.   Target Date 1/18/23   Date Met      Progress (detail required for progress note): baseline single step up , alternating down with heavy lean on the rail.       Goal Identifier 5 - pain   Goal Description Lex to report decreased back pain and R knee pain to allow him to increase his ambulation distance/ther ex at the gym as well as overall better QOL.   Target Date  1/18/23   Date Met   in progress   Progress (detail required for progress note): 11/8/22 walking 3 laps , R knee \"doesn't bother me too much\"   He does continue to have knee pain off and on.  baseline - doing 2 laps at gym usually does 3-4  9/27/22  stil has about the same R knee, back \"not as stiff\" but doing 3-4 laps.     Goal Identifier 6 - 6MWT   Goal Description Lex to increase his 6MWT distance by 150 feet or greater from when first assessed to demonstrate improved endurance, gait quality and function.   Target Date 10/18/22   Date Met   in progress   Progress (detail required for progress note): eval 300 feet.      Goal Identifier 7- sit <> stand and COM forward past TAYLOR challenges.   Goal Description Lex to perform sit to stand from 22 inch mat without bracing self with LE against the surface to demonstrate improved ability to bring COM forward past TAYLOR as well as better mechanics with transfer for decreased/excess load on the R LE to decrease knee pain.   Target Date 10/18/22   Date Met      Progress (detail required for progress note): 9/27/22 continues to struggle with forward COM and weight to the L.  Limited by hemiparesis and visual challenges, perceptual challenges.   baseline, lean to the R and posterior lean against surface 24 inch height         Marcus continues to be appropriate for skilled PT intervention.  Anticipate long term " intervention of PT to allow him to improve his function and stay within his home.  Without PT he has demonstrated decline in his function and increased R knee and back pain.          I CERTIFY THE NEED FOR THESE SERVICES FURNISHED UNDER        THIS PLAN OF TREATMENT AND WHILE UNDER MY CARE     (Physician co-signature of this document indicates review and certification of the therapy plan).                Referring Provider: IRENE JEFFERS, PT

## 2022-11-15 ENCOUNTER — HOSPITAL ENCOUNTER (OUTPATIENT)
Dept: PHYSICAL THERAPY | Facility: CLINIC | Age: 67
Setting detail: THERAPIES SERIES
Discharge: HOME | End: 2022-11-15
Attending: INTERNAL MEDICINE
Payer: MEDICARE

## 2022-11-15 PROCEDURE — 97110 THERAPEUTIC EXERCISES: CPT | Mod: GP,KX | Performed by: PHYSICAL THERAPIST

## 2022-11-18 ENCOUNTER — OFFICE VISIT (OUTPATIENT)
Dept: CARDIOLOGY | Facility: CLINIC | Age: 67
End: 2022-11-18
Attending: INTERNAL MEDICINE
Payer: MEDICARE

## 2022-11-18 VITALS
HEART RATE: 60 BPM | OXYGEN SATURATION: 97 % | DIASTOLIC BLOOD PRESSURE: 70 MMHG | WEIGHT: 176.5 LBS | HEIGHT: 74 IN | BODY MASS INDEX: 22.65 KG/M2 | SYSTOLIC BLOOD PRESSURE: 104 MMHG

## 2022-11-18 DIAGNOSIS — I05.9 MITRAL VALVE DISEASE: ICD-10-CM

## 2022-11-18 DIAGNOSIS — E78.5 HYPERLIPIDEMIA LDL GOAL <100: Primary | ICD-10-CM

## 2022-11-18 DIAGNOSIS — I48.20 CHRONIC ATRIAL FIBRILLATION (H): ICD-10-CM

## 2022-11-18 PROCEDURE — 99214 OFFICE O/P EST MOD 30 MIN: CPT | Performed by: INTERNAL MEDICINE

## 2022-11-18 NOTE — PATIENT INSTRUCTIONS
It was a pleasure seeing you today and thank you for allowing me to be a part of your health care team.  Should you have any questions regarding your visit or future needs please feel free to reach out to my care team for assistance.      Thank you, Dr. Hayes Yip        **Nursing: [phone]       **Scheduling: [phone]

## 2022-11-18 NOTE — LETTER
11/18/2022    Don Aviles MD  [address] [place] [address]    RE: Marcus Hodges       Dear Colleague,     I had the pleasure of seeing Marcus Hodges in the [facility].  HPI and Plan:   See dictation    Today's clinic visit entailed:  Review of the result(s) of each unique test - echo, flp, bmp, alt, hgb  The following tests were independently interpreted by me as noted in my documentation: echo images  Ordering of each unique test  Prescription drug management  30 minutes spent on the date of the encounter doing chart review, review of test results, interpretation of tests, patient visit, documentation, discussion with family and wife present for evaluation   Provider  Link to MDM Help Grid     The level of medical decision making during this visit was of moderate complexity.      Orders Placed This Encounter   Procedures     Follow-Up with Cardiology       No orders of the defined types were placed in this encounter.      There are no discontinued medications.      Encounter Diagnoses   Name Primary?     Mitral valve disease      Chronic atrial fibrillation (H)      Hyperlipidemia LDL goal <100 Yes       CURRENT MEDICATIONS:  Current Outpatient Medications   Medication Sig Dispense Refill     acetaminophen (TYLENOL) 500 MG tablet Take 2 tablets (1,000 mg) by mouth every 6 hours as needed for mild pain 100 tablet 0     apixaban ANTICOAGULANT (ELIQUIS ANTICOAGULANT) 5 MG tablet Take 1 tablet (5 mg) by mouth 2 times daily 180 tablet 11     atorvastatin (LIPITOR) 20 MG tablet Take 1 tablet (20 mg) by mouth daily 90 tablet 3     baclofen (LIORESAL) 20 MG tablet Take 1 tablet (20 mg) by mouth 4 times daily 360 tablet 3     citalopram (CELEXA) 20 MG tablet Take 1.5 tablets (30 mg) by mouth daily 135 tablet 11     docusate sodium (COLACE) 100 MG tablet Take 200 mg by mouth nightly as needed        levETIRAcetam (KEPPRA) 1000 MG tablet Take 1 tablet (1,000 mg) by mouth 2 times  daily 180 tablet 11     metoprolol succinate ER (TOPROL XL) 25 MG 24 hr tablet Take 0.5 tablets (12.5 mg) by mouth daily 45 tablet 11     mirabegron (MYRBETRIQ) 50 MG 24 hr tablet Take 1 tablet (50 mg) by mouth daily 90 tablet 11     Multiple Vitamin (MULTI-VITAMIN) per tablet Take 1 tablet by mouth daily  100 tablet 3     polyethylene glycol (MIRALAX) 17 g packet Take 17 g by mouth every other day        senna-docusate (SENOKOT-S/PERICOLACE) 8.6-50 MG tablet Take 4 tablets by mouth At Bedtime       tamsulosin (FLOMAX) 0.4 MG capsule Take 1 capsule (0.4 mg) by mouth daily 90 capsule 11     vitamin D3 (CHOLECALCIFEROL) 50 mcg (2000 units) tablet Take 2,000 Units by mouth daily  90 tablet 3       ALLERGIES     Allergies   Allergen Reactions     Blood Transfusion Related (Informational Only) Other (See Comments)     Patient has a history of a clinically significant antibody against RBC antigens.  A delay in compatible RBCs may occur.     Lisinopril        PAST MEDICAL HISTORY:  Past Medical History:   Diagnosis Date     * * * SBE PROPHYLAXIS * * *      Antiplatelet or antithrombotic long-term use     on Xarelto     Arrhythmia     A fib     Atrial enlargement, bilateral      Atrial flutter 2004    radiofrequency ablation 2004, resolved     ATRIAL SEPTAL DEFECT     repaired 1977     Chronic atrial fibrillation (H)     on apixaban     CVA (cerebral vascular accident) (H) 04/2012    [facility], complicated by left sided weakness, walks with a cane, and seizures     Depression      Dysphagia 04/22/2012    [facility], following CVA     Hernia, abdominal      History of thrombophlebitis      hyperlipidemia      Mitral regurgitation     mitral valve damage related to ASD repair     Mumps      Neurogenic bladder      Neurogenic bowel      Palpitations      Respiratory failure (H) 04/22/2012    [facility], following CVA, prolonged requiring tracheostomy, [place] [facility]      Tricuspid regurgitation      Unspecified glaucoma(365.9)      right     Urinary incontinence        PAST SURGICAL HISTORY:  Past Surgical History:   Procedure Laterality Date     CATHETER, ABLATION  2004     COMBINED CYSTOSCOPY, INSERT CATHETER URETER  9/28/2020    Procedure: cystoscopy and left ureteral catheter placement, left ureteral stent placement;  Surgeon: Nehemiah Bloom MD;  Location:  OR     Craniotomy [facility]  2012    [facility], repair of hemicraniectomy defect     CYSTOSCOPY       DAVINCI PYELOPLASTY Left 9/28/2020    Procedure: left robotic assisted laparoscopic pyelolithotomy;  Surgeon: Nehemiah Bloom MD;  Location:  OR     GASTROSTOMY TUBE  April 2012    [facility], following CVA     HERNIA REPAIR       IR CAROTID ANGIOGRAM  4/22/2012     IR CAROTID ANGIOGRAM  4/22/2012     IR MISCELLANEOUS PROCEDURE  4/22/2012     IR MISCELLANEOUS PROCEDURE  4/25/2012     LASER KTP GREEN LIGHT PHOTOSELECTIVE VAPORIZATION PROSTATE N/A 12/1/2020    Procedure: Cystoscopy and greenlight photovaporization of the prostate;  Surgeon: Nehemiah Bloom MD;  Location: [facility] OR     REPAIR ATRIAL SEPTAL DEFECT  1978    ASD Repair     Right hemicraniectomy  April 2012    [facility], following CVA, mgmt malignant cerebral edema     SURGICAL HISTORY OF -   2009    right eye enucleation     TRACHEOSTOMY  April 2012    [facility], following CVA     ZZC NONSPECIFIC PROCEDURE      tonsillectomy     ZZC NONSPECIFIC PROCEDURE      Ing. Hernia Repair       FAMILY HISTORY:  Family History   Problem Relation Age of Onset     Hypertension Mother      Cerebrovascular Disease Father      Cancer Paternal Grandmother      Diabetes Maternal Grandmother      Congenital Anomalies Brother         several brothers and sisters born with congential heart defects, but all have been repaired     Congenital Anomalies Sister        SOCIAL HISTORY:  Social History     Socioeconomic History     Marital status:      Spouse name: haim     Number of children: 0     Years of education: None      Highest education level: None   Occupational History     Employer: [employer]   Tobacco Use     Smoking status: Never     Smokeless tobacco: Never   Substance and Sexual Activity     Alcohol use: No     Drug use: No     Sexual activity: Yes     Partners: Female   Other Topics Concern     Caffeine Concern Yes     Comment: couple cups of tea a day     Special Diet Yes     Comment: vegan; occ eggs/fish      Exercise Yes     Comment: 2 x weekly/gym , pysical therapy     Seat Belt Yes     Social Determinants of Health     Financial Resource Strain: Low Risk      Difficulty of Paying Living Expenses: Not hard at all   Food Insecurity: No Food Insecurity     Worried About Running Out of Food in the Last Year: Never true     Ran Out of Food in the Last Year: Never true   Transportation Needs: No Transportation Needs     Lack of Transportation (Medical): No     Lack of Transportation (Non-Medical): No   Physical Activity: Insufficiently Active     Days of Exercise per Week: 1 day     Minutes of Exercise per Session: 10 min   Stress: No Stress Concern Present     Feeling of Stress : Only a little   Social Connections: Socially Integrated     Frequency of Communication with Friends and Family: Three times a week     Frequency of Social Gatherings with Friends and Family: Once a week     Attends Congregation Services: More than 4 times per year     Active Member of Clubs or Organizations: Yes     Marital Status:    Housing Stability: Low Risk      Unable to Pay for Housing in the Last Year: No     Number of Places Lived in the Last Year: 1     Unstable Housing in the Last Year: No       Review of Systems:  Skin:  Positive for rash rashes that are being handled at derm   Eyes:  Positive for glasses    ENT:  Negative      Respiratory:  Negative       Cardiovascular:  Negative      Gastroenterology: Positive for constipation after the stroke, he has a neurogenic bowel, that's why he needs occ  "laxatives  Genitourinary:  Positive for   neurogenic bladder after the stroke, relies on condom catheter  Musculoskeletal:  Negative      Neurologic:  Positive for stroke    Psychiatric:  Negative      Heme/Lymph/Imm:  Negative      Endocrine:  Negative        Physical Exam:  Vitals: /70 (BP Location: Left arm, Patient Position: Sitting)   Pulse 60   Ht 1.88 m (6' 2\")   Wt 80.1 kg (176 lb 8 oz)   SpO2 97%   BMI 22.66 kg/m      Constitutional:           Skin:             Head:           Eyes:           Lymph:      ENT:           Neck:           Respiratory:            Cardiac:                                                           GI:           Extremities and Muscular Skeletal:                 Neurological:           Psych:           CC  Hayes Yip MD  [address]  [place] [address]    Thank you for allowing me to participate in the care of your patient.      Sincerely,     HAYES YIP MD     [facility]  "

## 2022-11-18 NOTE — PROGRESS NOTES
Service Date: 11/18/2022    HISTORY OF PRESENT ILLNESS:  I had the opportunity to see Mr. Marcus Hodges in Cardiology Clinic today at [facility] in [place] for reevaluation of mitral valve disease and chronic atrial fibrillation.    Mr. Hodges is a 67-year-old gentleman who underwent repair of an atrial septal defect originally in 1978 and then went back for a second surgery 9 months later for repair of the mitral valve and second repair of the atrial septal defect.  He seemed to do well until 2004 when he had atrial flutter, treated successfully with ablation.  He was eventually taken off of anticoagulation.  In 2012, he suffered a large right-sided stroke, which was thought to be due to newly discovered atrial fibrillation.  He continues to have dense left-sided hemiparesis.  He is doing physical therapy and limited exercise.  He is able to walk slowly with a walker and not have any significant symptoms of shortness of breath or chest discomfort.  He denies palpitations, lightheadedness and syncope, despite relatively slow heart rates.  Last year, he had some low blood pressures after starting Flomax and I reduced his metoprolol XL from 25 mg a day to 12.5 mg a day and his heart rates and blood pressure came up slightly.    In an echocardiogram this year which I reviewed, he was in atrial fibrillation at the time of the echo, but his left ventricular function looks entirely normal with excellent function of his mitral valve.  He has just a trace of mitral regurgitation and no stenosis.  He does have severe biatrial enlargement, but I see no residual interatrial shunting.    In addition, his fasting lipid panel and basic metabolic panel were excellent and his ALT was normal.  His hemoglobin tends to run slightly low, but he has had no recognized bleeding issues.    He remains on Eliquis for stroke prevention.    PHYSICAL EXAMINATION:  His blood pressure is 104/70, heart rate 60 and weight 176  pounds.  His lungs are clear.  His heart rhythm is irregularly irregular without murmur, and he has no edema.    IMPRESSIONS:  Mr. Marcus Hodges is a 67-year-old gentleman with a history of atrial septal defect and mitral valve repair in the late 70s.  In , he had atrial flutter ablation and then developed a stroke in  and was discovered to be in atrial fibrillation.  Unfortunately, he has persistent severe left hemiparesis from his stroke, but from a cardiac standpoint, he continues to do well.  We are managing his atrial fibrillation with a strategy of rate control and anticoagulation.  He is on Eliquis and a very low dose of metoprolol XL 12.5 mg a day.  So far, his heart rates are low, but he is not symptomatic with those.  We will continue his current medication program for now and I will meet with him again in 1 year for reevaluation.    Hayes Yip MD    cc:  [place] [address]    Hayes Yip MD, [facility]        D: 2022   T: 2022   MT: al    Name:     MARCUS HODGES  MRN:      [id number]        Account:      [id number]   :      1955           Service Date: 2022       Document: [id number]

## 2022-11-22 ENCOUNTER — HOSPITAL ENCOUNTER (OUTPATIENT)
Dept: PHYSICAL THERAPY | Facility: CLINIC | Age: 67
Setting detail: THERAPIES SERIES
Discharge: HOME | End: 2022-11-22
Attending: INTERNAL MEDICINE
Payer: MEDICARE

## 2022-11-22 ENCOUNTER — HOSPITAL ENCOUNTER (OUTPATIENT)
Dept: OCCUPATIONAL THERAPY | Facility: CLINIC | Age: 67
Setting detail: THERAPIES SERIES
Discharge: HOME | End: 2022-11-22
Attending: PHYSICAL MEDICINE & REHABILITATION
Payer: MEDICARE

## 2022-11-22 PROCEDURE — 97110 THERAPEUTIC EXERCISES: CPT | Mod: GP,KX | Performed by: PHYSICAL THERAPIST

## 2022-11-22 PROCEDURE — 97110 THERAPEUTIC EXERCISES: CPT | Mod: GO | Performed by: OCCUPATIONAL THERAPIST

## 2022-11-22 PROCEDURE — 97112 NEUROMUSCULAR REEDUCATION: CPT | Mod: GP,KX | Performed by: PHYSICAL THERAPIST

## 2022-11-22 PROCEDURE — 97116 GAIT TRAINING THERAPY: CPT | Mod: GP,KX | Performed by: PHYSICAL THERAPIST

## 2022-12-06 ENCOUNTER — HOSPITAL ENCOUNTER (OUTPATIENT)
Dept: PHYSICAL THERAPY | Facility: CLINIC | Age: 67
Setting detail: THERAPIES SERIES
Discharge: HOME | End: 2022-12-06
Attending: INTERNAL MEDICINE
Payer: MEDICARE

## 2022-12-06 ENCOUNTER — HOSPITAL ENCOUNTER (OUTPATIENT)
Dept: OCCUPATIONAL THERAPY | Facility: CLINIC | Age: 67
Setting detail: THERAPIES SERIES
Discharge: HOME | End: 2022-12-06
Attending: PHYSICAL MEDICINE & REHABILITATION
Payer: MEDICARE

## 2022-12-06 PROCEDURE — 97110 THERAPEUTIC EXERCISES: CPT | Mod: GO

## 2022-12-06 PROCEDURE — 97116 GAIT TRAINING THERAPY: CPT | Mod: GP,KX | Performed by: PHYSICAL THERAPIST

## 2022-12-06 PROCEDURE — 97112 NEUROMUSCULAR REEDUCATION: CPT | Mod: GP,KX | Performed by: PHYSICAL THERAPIST

## 2022-12-06 PROCEDURE — 97110 THERAPEUTIC EXERCISES: CPT | Mod: GP,KX | Performed by: PHYSICAL THERAPIST

## 2022-12-07 ENCOUNTER — PRE VISIT (OUTPATIENT)
Dept: NEPHROLOGY | Facility: CLINIC | Age: 67
End: 2022-12-07

## 2022-12-07 ENCOUNTER — VIRTUAL VISIT (OUTPATIENT)
Dept: NEPHROLOGY | Facility: CLINIC | Age: 67
End: 2022-12-07
Attending: STUDENT IN AN ORGANIZED HEALTH CARE EDUCATION/TRAINING PROGRAM
Payer: MEDICARE

## 2022-12-07 DIAGNOSIS — E83.50 CALCIUM METABOLISM DISORDER: Primary | ICD-10-CM

## 2022-12-07 DIAGNOSIS — N20.0 CALCULUS OF KIDNEY: ICD-10-CM

## 2022-12-07 DIAGNOSIS — Z51.81 MEDICATION MONITORING ENCOUNTER: ICD-10-CM

## 2022-12-07 PROCEDURE — 99205 OFFICE O/P NEW HI 60 MIN: CPT | Mod: 95 | Performed by: INTERNAL MEDICINE

## 2022-12-07 NOTE — PATIENT INSTRUCTIONS
Make lab appt for the end of May 2023 for vitamin D level    Will be in touch about the DXA scan    24 hour urine litholink May 2023  Return in about 6 months (around 6/7/2023).

## 2022-12-07 NOTE — PROGRESS NOTES
[facility]  12/07/2022     Lex is a 67 year old who is being evaluated via a billable video visit.      How would you like to obtain your AVS? MyChart  If the video visit is dropped, the invitation should be resent by: Send to e-mail at: [email or web address]  Will anyone else be joining your video visit? Wife will be on camera as well        Video-Visit Details    Video Start Time: 3:53 pm    Type of service:  Video Visit    Video End Time:4:23 PM    Originating Location (pt. Location): Home        Distant Location (provider location):  Off-site    Platform used for Video Visit: Blanquita    Marcus Hodges MRN:[id number] YOB: 1955  Primary care provider: Don Aviles  Requesting physician: Nehemiah Bloom     ASSESSMENT AND RECOMMENDATIONS:   Marcus Hodges is a 67 year old presenting for nephrolithiasis.  # Recurrent kidney stones: stone type primarily calcium oxalate 9/28/2020 and now 90% calcium phosphate 5/31/2022    Serum calcium normal at 9 on 10/26/22 but has new mild hypercalciuria (calcium to creatinine ratio 204)  On vitamin D supplement 2000 units daily  2020 urine chems showed hyperoxaluria with low sulfate compared to ammonium, urine pH typically high. Unlikely enteric hyperoxaluria.  Had + urine cultures in 2020 so likely infection was etiology of elevated ammonium at that time    Urine pH is likely high due to aqmdl-wxb-mnnqnbwcyea diet - lack of animal flesh (or acid) in diet will be associated with higher urine pH. Incorporating dietary acid or decreasing fruit/vegetable intake will not help with risk of kidney stones. Also considered RTA given history of neurogenic bladder but this seems less likely.    Kidney stone risk related to decreased physical activity since stroke, may also be related to low bone density and has DXA scheduled next week (Had osteopenia on XRay of shoulder so DXA was ordered). May benefit from treatment for low  bone density, potentially including thiazide if urine calcium remains elevated above goal.  Advised to decrease vitamin D to 2000 units every other day    At risk for making more kidney stones so will benefit from urine chemistry/supersaturation evaluation and monitoring and regular follow up for kidney stone prevention.      Other co-morbidities:  # CVA - 2012, embolic, complicated by malignant cerebral edema and required decompressive craniectomy  - spastic left hemiparesis, neglect, cognitive deficits (PMR note from [facility] reviewed)  # neurogenic bladder  # ASD  # mitral valve repair in the last 1970's  # atrial fibrillation on apixaban and metoprolol (Cardiology - Dr. Hayes Yip)  # seizure - on keppra  # hypovitaminosis D - last vitamin D level in Oct 2021 was a little over 50, goal in person with kidney stones is less than 50, would prefer closer to 30. Vitamin D supplement has not been shown to increase bone density. Decrease vitamin D from 2000 units daily to 2000 units every other day (or 1000 units daily if able to find smaller size capsules)  - repeat vitamin D levels ordered for May 2023  Known issue that I take into account for other medical decisions, no current exacerbations or new concerns.     Repeat 24 hour chemistries May 2023  Repeat imaging per Dr. Bloom  Return in about 6 months (around 6/7/2023).   62 minutes spent on visit, meeting with Marcus Hodges and in chart review and documentation on date of encounter, 12/07/22      Dawna Frances MD  [facility]  Department of Medicine  Division of Renal Disease and Hypertension  [facility]  latasha [employer] Web Console        REASON FOR CONSULT: nephrolithiasis    HISTORY OF PRESENT ILLNESS:  Marcus Hodges is a 67 year old with history of kidney stones, CVA with neurogenic bladder (2012), atrial fibrillation    Required left pyelolithotomy 9/2020 for 1.9 cm renal pelvis stone, has also passed small stone (size of apple  seed).  After stroke, has no feeling on left side in addition to hemiplegia and significant decrease in activity. Prior to stroke did a lot of hiking etc and was quite active.  Since the kidney stone event, has made many changes to diet.  Fluid intake includes 12 cups water per day, no longer drinks tea, coconut milk once a day, yogurt once a day, tries to get calcium with every meal. Follows low sodium, low sugar and low oxalate. Mostly vegetarian - lacto ovo pescatarian.    I reviewed 24 hour urine chemstries:  2020, 2021 and 2022            Family history is positive for kidney stones in older brother a couple of times    REVIEW OF SYSTEMS:  A 10 point review of systems was negative except as noted above.    Problem list  Patient Active Problem List   Diagnosis     * * * SBE PROPHYLAXIS * * *     Health Care Home     Vitamin D deficiency     Hyperlipidemia LDL goal <100     Long-term (current) use of anticoagulants     Urinary incontinence     Neurogenic bladder     Chronic atrial fibrillation (H)     Tricuspid regurgitation     Mitral regurgitation     Atrial enlargement, bilateral     Major depressive disorder, recurrent episode, moderate (H)     Traumatic brain injury with loss of consciousness, sequela (H)     Neurogenic bowel     Left renal stone     Dysphagia     Left hemiparesis (H)     S/P craniotomy     Seizure disorder (H)     Visual field defect     Anemia     Benign prostatic hyperplasia with urinary retention     PSH  Past Surgical History:   Procedure Laterality Date     CATHETER, ABLATION  2004     COMBINED CYSTOSCOPY, INSERT CATHETER URETER  9/28/2020    Procedure: cystoscopy and left ureteral catheter placement, left ureteral stent placement;  Surgeon: Nehemiah Bloom MD;  Location:  OR     Craniotomy reconstruction  [address] [place] [address], repair of hemicraniectomy defect     CYSTOSCOPY       DAVINCI PYELOPLASTY Left 9/28/2020    Procedure: left robotic assisted laparoscopic pyelolithotomy;   Surgeon: Nehemiah Bloom MD;  Location: [facility] OR     GASTROSTOMY TUBE  April 2012    [facility], following CVA     HERNIA REPAIR       IR CAROTID ANGIOGRAM  4/22/2012     IR CAROTID ANGIOGRAM  4/22/2012     IR MISCELLANEOUS PROCEDURE  4/22/2012     IR MISCELLANEOUS PROCEDURE  4/25/2012     LASER KTP GREEN LIGHT PHOTOSELECTIVE VAPORIZATION PROSTATE N/A 12/1/2020    Procedure: Cystoscopy and greenlight photovaporization of the prostate;  Surgeon: Nehemiah Bloom MD;  Location: [facility] OR     REPAIR ATRIAL SEPTAL DEFECT  1978    ASD Repair     Right hemicraniectomy  April 2012    [place], following CVA, mgmt malignant cerebral edema     SURGICAL HISTORY OF -   2009    right eye enucleation     TRACHEOSTOMY  April 2012    [place], following CVA     ZZC NONSPECIFIC PROCEDURE      tonsillectomy     ZZC NONSPECIFIC PROCEDURE      Ing. Hernia Repair       Social    Social History     Socioeconomic History     Marital status:      Spouse name: haim     Number of children: 0     Years of education: Not on file     Highest education level: Not on file   Occupational History     Employer: [employer]   Tobacco Use     Smoking status: Never     Smokeless tobacco: Never   Substance and Sexual Activity     Alcohol use: No     Drug use: No     Sexual activity: Yes     Partners: Female   Other Topics Concern      Service Not Asked     Blood Transfusions Not Asked     Caffeine Concern Yes     Comment: couple cups of tea a day     Occupational Exposure Not Asked     Hobby Hazards Not Asked     Sleep Concern Not Asked     Stress Concern Not Asked     Weight Concern Not Asked     Special Diet Yes     Comment: vegan; occ eggs/fish      Back Care Not Asked     Exercise Yes     Comment: 2 x weekly/gym , pysical therapy     Bike Helmet Not Asked     Seat Belt Yes     Self-Exams Not Asked     Parent/sibling w/ CABG, MI or angioplasty before 65F 55M? Not Asked   Social History Narrative     Not on file      Social Determinants of Health     Financial Resource Strain: Low Risk      Difficulty of Paying Living Expenses: Not hard at all   Food Insecurity: No Food Insecurity     Worried About Running Out of Food in the Last Year: Never true     Ran Out of Food in the Last Year: Never true   Transportation Needs: No Transportation Needs     Lack of Transportation (Medical): No     Lack of Transportation (Non-Medical): No   Physical Activity: Insufficiently Active     Days of Exercise per Week: 1 day     Minutes of Exercise per Session: 10 min   Stress: No Stress Concern Present     Feeling of Stress : Only a little   Social Connections: Socially Integrated     Frequency of Communication with Friends and Family: Three times a week     Frequency of Social Gatherings with Friends and Family: Once a week     Attends [employer] Services: More than 4 times per year     Active Member of Clubs or Organizations: Yes     Attends Club or Organization Meetings: Not on file     Marital Status:    Intimate Partner Violence: Not on file   Housing Stability: Low Risk      Unable to Pay for Housing in the Last Year: No     Number of Places Lived in the Last Year: 1     Unstable Housing in the Last Year: No     Family history  Family History   Problem Relation Age of Onset     Hypertension Mother      Cerebrovascular Disease Father      Cancer Paternal Grandmother      Diabetes Maternal Grandmother      Congenital Anomalies Brother         several brothers and sisters born with congential heart defects, but all have been repaired     Congenital Anomalies Sister           MEDICATIONS:  Current Outpatient Medications   Medication Sig Dispense Refill     acetaminophen (TYLENOL) 500 MG tablet Take 2 tablets (1,000 mg) by mouth every 6 hours as needed for mild pain 100 tablet 0     apixaban ANTICOAGULANT (ELIQUIS ANTICOAGULANT) 5 MG tablet Take 1 tablet (5 mg) by mouth 2 times daily 180 tablet 11     atorvastatin (LIPITOR) 20 MG tablet  Take 1 tablet (20 mg) by mouth daily 90 tablet 3     baclofen (LIORESAL) 20 MG tablet Take 1 tablet (20 mg) by mouth 4 times daily 360 tablet 3     citalopram (CELEXA) 20 MG tablet Take 1.5 tablets (30 mg) by mouth daily 135 tablet 11     docusate sodium (COLACE) 100 MG tablet Take 200 mg by mouth nightly as needed        levETIRAcetam (KEPPRA) 1000 MG tablet Take 1 tablet (1,000 mg) by mouth 2 times daily 180 tablet 11     metoprolol succinate ER (TOPROL XL) 25 MG 24 hr tablet Take 0.5 tablets (12.5 mg) by mouth daily 45 tablet 11     mirabegron (MYRBETRIQ) 50 MG 24 hr tablet Take 1 tablet (50 mg) by mouth daily 90 tablet 11     Multiple Vitamin (MULTI-VITAMIN) per tablet Take 1 tablet by mouth daily  100 tablet 3     polyethylene glycol (MIRALAX) 17 g packet Take 17 g by mouth every other day        senna-docusate (SENOKOT-S/PERICOLACE) 8.6-50 MG tablet Take 4 tablets by mouth At Bedtime       tamsulosin (FLOMAX) 0.4 MG capsule Take 1 capsule (0.4 mg) by mouth daily 90 capsule 11     vitamin D3 (CHOLECALCIFEROL) 50 mcg (2000 units) tablet Take 2,000 Units by mouth daily  90 tablet 3        ALLERGIES:    Allergies   Allergen Reactions     Blood Transfusion Related (Informational Only) Other (See Comments)     Patient has a history of a clinically significant antibody against RBC antigens.  A delay in compatible RBCs may occur.     Lisinopril          PHYSICAL EXAM:   *video visit  GENERAL APPEARANCE: alert and no distress  RESP: normal work of breathing, no conversational dyspnea  NEURO: mentation intact and speech normal    LABS:   I reviewed:  Electrolytes/Renal -   Recent Labs   Lab Test 10/26/22  0822 10/19/21  0808 10/14/20  1208    138 139   POTASSIUM 4.6 4.2 4.6   CHLORIDE 109 108 108   CO2 25 26 28   BUN 13 15 13   CR 0.73 0.81 0.73   GLC 78 78 84   STEPHANI 9.0 9.1 8.7       CBC -   Recent Labs   Lab Test 10/26/22  0822 10/19/21  0808 10/03/20  0619   WBC 3.9* 4.2 6.8   HGB 12.2* 12.9* 11.3*    177  158       LFTs -   Recent Labs   Lab Test 10/26/22  0822 10/19/21  0808 10/02/20  1928   ALKPHOS 99 93 67   BILITOTAL 1.0 1.0 1.4*   ALT 29 37 26   AST 33 27 41   PROTTOTAL 7.0 7.5 7.1   ALBUMIN 3.8 4.0 3.5       Coags -   Recent Labs   Lab Test 10/02/20  1928 12/04/17  0000 10/23/17  0000 12/04/15  0000 11/06/15  1130   INR 1.46* 2.4* 2.5*   < > 1.68*   PTT  --   --   --   --  31    < > = values in this interval not displayed.       Iron Panel - No lab results found.    Endocrine -   Recent Labs   Lab Test 10/19/21  0808   TSH 2.82       IMAGING:  All imaging studies reviewed by me.     Dawna Frances MD

## 2022-12-07 NOTE — LETTER
12/7/2022       RE: Marcus Hodges  [address] [place] [address]     Dear Colleague,    Thank you for referring your patient, Marcus Hodges, to the [facility] [place] at [facility]. Please see a copy of my visit note below.    [facility]  12/07/2022     Lex is a 67 year old who is being evaluated via a billable video visit.      How would you like to obtain your AVS? MyChart  If the video visit is dropped, the invitation should be resent by: Send to e-mail at: [email or web address]  Will anyone else be joining your video visit? Wife will be on camera as well        Video-Visit Details    Video Start Time: 3:53 pm    Type of service:  Video Visit    Video End Time:4:23 PM    Originating Location (pt. Location): Home        Distant Location (provider location):  Off-site    Platform used for Video Visit: Blanquita    Marcus Hodges MRN:[id number] YOB: 1955  Primary care provider: Don Aviles  Requesting physician: Nehemiah Bloom     ASSESSMENT AND RECOMMENDATIONS:   Marcus Hodges is a 67 year old presenting for nephrolithiasis.  # Recurrent kidney stones: stone type primarily calcium oxalate 9/28/2020 and now 90% calcium phosphate 5/31/2022    Serum calcium normal at 9 on 10/26/22 but has new mild hypercalciuria (calcium to creatinine ratio 204)  On vitamin D supplement 2000 units daily  2020 urine chems showed hyperoxaluria with low sulfate compared to ammonium, urine pH typically high. Unlikely enteric hyperoxaluria.  Had + urine cultures in 2020 so likely infection was etiology of elevated ammonium at that time    Urine pH is likely high due to [id number] diet - lack of animal flesh (or acid) in diet will be associated with higher urine pH. Incorporating dietary acid or decreasing fruit/vegetable intake will not help with risk of kidney stones. Also  considered RTA given history of neurogenic bladder but this seems less likely.    Kidney stone risk related to decreased physical activity since stroke, may also be related to low bone density and has DXA scheduled next week (Had osteopenia on XRay of shoulder so DXA was ordered). May benefit from treatment for low bone density, potentially including thiazide if urine calcium remains elevated above goal.  Advised to decrease vitamin D to 2000 units every other day    At risk for making more kidney stones so will benefit from urine chemistry/supersaturation evaluation and monitoring and regular follow up for kidney stone prevention.      Other co-morbidities:  # CVA - 2012, embolic, complicated by malignant cerebral edema and required decompressive craniectomy  - spastic left hemiparesis, neglect, cognitive deficits (PMR note from [facility] reviewed)  # neurogenic bladder  # ASD  # mitral valve repair in the last 1970's  # atrial fibrillation on apixaban and metoprolol (Cardiology - Dr. Hayes Yip)  # seizure - on keppra  # hypovitaminosis D - last vitamin D level in Oct 2021 was a little over 50, goal in person with kidney stones is less than 50, would prefer closer to 30. Vitamin D supplement has not been shown to increase bone density. Decrease vitamin D from 2000 units daily to 2000 units every other day (or 1000 units daily if able to find smaller size capsules)  - repeat vitamin D levels ordered for May 2023  Known issue that I take into account for other medical decisions, no current exacerbations or new concerns.     Repeat 24 hour chemistries May 2023  Repeat imaging per Dr. Bloom  Return in about 6 months (around 6/7/2023).   62 minutes spent on visit, meeting with Marcus Hodges and in chart review and documentation on date of encounter, 12/07/22      Dawna Frances MD  [facility]  Department of Medicine  Division of Renal Disease and Hypertension  [facility]  [email or web address]  Vocera Web  Console        REASON FOR CONSULT: nephrolithiasis    HISTORY OF PRESENT ILLNESS:  Marcus Hodges is a 67 year old with history of kidney stones, CVA with neurogenic bladder (2012), atrial fibrillation    Required left pyelolithotomy 9/2020 for 1.9 cm renal pelvis stone, has also passed small stone (size of apple seed).  After stroke, has no feeling on left side in addition to hemiplegia and significant decrease in activity. Prior to stroke did a lot of hiking etc and was quite active.  Since the kidney stone event, has made many changes to diet.  Fluid intake includes 12 cups water per day, no longer drinks tea, coconut milk once a day, yogurt once a day, tries to get calcium with every meal. Follows low sodium, low sugar and low oxalate. Mostly vegetarian - lacto ovo pescatarian.    I reviewed 24 hour urine chemstries:  2020, 2021 and 2022            Family history is positive for kidney stones in older brother a couple of times    REVIEW OF SYSTEMS:  A 10 point review of systems was negative except as noted above.    Problem list  Patient Active Problem List   Diagnosis     * * * SBE PROPHYLAXIS * * *     Health Care Home     Vitamin D deficiency     Hyperlipidemia LDL goal <100     Long-term (current) use of anticoagulants     Urinary incontinence     Neurogenic bladder     Chronic atrial fibrillation (H)     Tricuspid regurgitation     Mitral regurgitation     Atrial enlargement, bilateral     Major depressive disorder, recurrent episode, moderate (H)     Traumatic brain injury with loss of consciousness, sequela (H)     Neurogenic bowel     Left renal stone     Dysphagia     Left hemiparesis (H)     S/P craniotomy     Seizure disorder (H)     Visual field defect     Anemia     Benign prostatic hyperplasia with urinary retention     PSH  Past Surgical History:   Procedure Laterality Date     CATHETER, ABLATION  2004     COMBINED CYSTOSCOPY, INSERT CATHETER URETER  9/28/2020    Procedure: cystoscopy and left  ureteral catheter placement, left ureteral stent placement;  Surgeon: Nehemiah Bloom MD;  Location: [facility] OR     Craniotomy reconstruction  2012    [facility], repair of hemicraniectomy defect     CYSTOSCOPY       DAVINCI PYELOPLASTY Left 9/28/2020    Procedure: left robotic assisted laparoscopic pyelolithotomy;  Surgeon: Nehmeiah Bloom MD;  Location: [facility] OR     GASTROSTOMY TUBE  April 2012    [facility], following CVA     HERNIA REPAIR       IR CAROTID ANGIOGRAM  4/22/2012     IR CAROTID ANGIOGRAM  4/22/2012     IR MISCELLANEOUS PROCEDURE  4/22/2012     IR MISCELLANEOUS PROCEDURE  4/25/2012     LASER KTP GREEN LIGHT PHOTOSELECTIVE VAPORIZATION PROSTATE N/A 12/1/2020    Procedure: Cystoscopy and greenlight photovaporization of the prostate;  Surgeon: Nehemiah Bloom MD;  Location: [facility] OR     REPAIR ATRIAL SEPTAL DEFECT  1978    ASD Repair     Right hemicraniectomy  April 2012    [facility], following CVA, mgmt malignant cerebral edema     SURGICAL HISTORY OF -   2009    right eye enucleation     TRACHEOSTOMY  April 2012    [facility], following CVA     ZZC NONSPECIFIC PROCEDURE      tonsillectomy     ZZC NONSPECIFIC PROCEDURE      Ing. Hernia Repair       Social    Social History     Socioeconomic History     Marital status:      Spouse name: haim     Number of children: 0     Years of education: Not on file     Highest education level: Not on file   Occupational History     Employer: [employer]   Tobacco Use     Smoking status: Never     Smokeless tobacco: Never   Substance and Sexual Activity     Alcohol use: No     Drug use: No     Sexual activity: Yes     Partners: Female   Other Topics Concern      Service Not Asked     Blood Transfusions Not Asked     Caffeine Concern Yes     Comment: couple cups of tea a day     Occupational Exposure Not Asked     Hobby Hazards Not Asked     Sleep Concern Not Asked     Stress Concern Not Asked     Weight Concern Not Asked     Special Diet  Yes     Comment: vegan; occ eggs/fish      Back Care Not Asked     Exercise Yes     Comment: 2 x weekly/gym , pysical therapy     Bike Helmet Not Asked     Seat Belt Yes     Self-Exams Not Asked     Parent/sibling w/ CABG, MI or angioplasty before 65F 55M? Not Asked   Social History Narrative     Not on file     Social Determinants of Health     Financial Resource Strain: Low Risk      Difficulty of Paying Living Expenses: Not hard at all   Food Insecurity: No Food Insecurity     Worried About Running Out of Food in the Last Year: Never true     Ran Out of Food in the Last Year: Never true   Transportation Needs: No Transportation Needs     Lack of Transportation (Medical): No     Lack of Transportation (Non-Medical): No   Physical Activity: Insufficiently Active     Days of Exercise per Week: 1 day     Minutes of Exercise per Session: 10 min   Stress: No Stress Concern Present     Feeling of Stress : Only a little   Social Connections: Socially Integrated     Frequency of Communication with Friends and Family: Three times a week     Frequency of Social Gatherings with Friends and Family: Once a week     Attends Jehovah's witness Services: More than 4 times per year     Active Member of Clubs or Organizations: Yes     Attends Club or Organization Meetings: Not on file     Marital Status:    Intimate Partner Violence: Not on file   Housing Stability: Low Risk      Unable to Pay for Housing in the Last Year: No     Number of Places Lived in the Last Year: 1     Unstable Housing in the Last Year: No     Family history  Family History   Problem Relation Age of Onset     Hypertension Mother      Cerebrovascular Disease Father      Cancer Paternal Grandmother      Diabetes Maternal Grandmother      Congenital Anomalies Brother         several brothers and sisters born with congential heart defects, but all have been repaired     Congenital Anomalies Sister           MEDICATIONS:  Current Outpatient Medications   Medication  Sig Dispense Refill     acetaminophen (TYLENOL) 500 MG tablet Take 2 tablets (1,000 mg) by mouth every 6 hours as needed for mild pain 100 tablet 0     apixaban ANTICOAGULANT (ELIQUIS ANTICOAGULANT) 5 MG tablet Take 1 tablet (5 mg) by mouth 2 times daily 180 tablet 11     atorvastatin (LIPITOR) 20 MG tablet Take 1 tablet (20 mg) by mouth daily 90 tablet 3     baclofen (LIORESAL) 20 MG tablet Take 1 tablet (20 mg) by mouth 4 times daily 360 tablet 3     citalopram (CELEXA) 20 MG tablet Take 1.5 tablets (30 mg) by mouth daily 135 tablet 11     docusate sodium (COLACE) 100 MG tablet Take 200 mg by mouth nightly as needed        levETIRAcetam (KEPPRA) 1000 MG tablet Take 1 tablet (1,000 mg) by mouth 2 times daily 180 tablet 11     metoprolol succinate ER (TOPROL XL) 25 MG 24 hr tablet Take 0.5 tablets (12.5 mg) by mouth daily 45 tablet 11     mirabegron (MYRBETRIQ) 50 MG 24 hr tablet Take 1 tablet (50 mg) by mouth daily 90 tablet 11     Multiple Vitamin (MULTI-VITAMIN) per tablet Take 1 tablet by mouth daily  100 tablet 3     polyethylene glycol (MIRALAX) 17 g packet Take 17 g by mouth every other day        senna-docusate (SENOKOT-S/PERICOLACE) 8.6-50 MG tablet Take 4 tablets by mouth At Bedtime       tamsulosin (FLOMAX) 0.4 MG capsule Take 1 capsule (0.4 mg) by mouth daily 90 capsule 11     vitamin D3 (CHOLECALCIFEROL) 50 mcg (2000 units) tablet Take 2,000 Units by mouth daily  90 tablet 3        ALLERGIES:    Allergies   Allergen Reactions     Blood Transfusion Related (Informational Only) Other (See Comments)     Patient has a history of a clinically significant antibody against RBC antigens.  A delay in compatible RBCs may occur.     Lisinopril          PHYSICAL EXAM:   *video visit  GENERAL APPEARANCE: alert and no distress  RESP: normal work of breathing, no conversational dyspnea  NEURO: mentation intact and speech normal    LABS:   I reviewed:  Electrolytes/Renal -   Recent Labs   Lab Test 10/26/22  4251  10/19/21  0808 10/14/20  1208    138 139   POTASSIUM 4.6 4.2 4.6   CHLORIDE 109 108 108   CO2 25 26 28   BUN 13 15 13   CR 0.73 0.81 0.73   GLC 78 78 84   STEPHANI 9.0 9.1 8.7       CBC -   Recent Labs   Lab Test 10/26/22  0822 10/19/21  0808 10/03/20  0619   WBC 3.9* 4.2 6.8   HGB 12.2* 12.9* 11.3*    177 158       LFTs -   Recent Labs   Lab Test 10/26/22  0822 10/19/21  0808 10/02/20  1928   ALKPHOS 99 93 67   BILITOTAL 1.0 1.0 1.4*   ALT 29 37 26   AST 33 27 41   PROTTOTAL 7.0 7.5 7.1   ALBUMIN 3.8 4.0 3.5       Coags -   Recent Labs   Lab Test 10/02/20  1928 12/04/17  0000 10/23/17  0000 12/04/15  0000 11/06/15  1130   INR 1.46* 2.4* 2.5*   < > 1.68*   PTT  --   --   --   --  31    < > = values in this interval not displayed.       Iron Panel - No lab results found.    Endocrine -   Recent Labs   Lab Test 10/19/21  0808   TSH 2.82       IMAGING:  All imaging studies reviewed by me.     Dawna Frances MD

## 2022-12-13 ENCOUNTER — HOSPITAL ENCOUNTER (OUTPATIENT)
Dept: PHYSICAL THERAPY | Facility: CLINIC | Age: 67
Setting detail: THERAPIES SERIES
Discharge: HOME | End: 2022-12-13
Attending: INTERNAL MEDICINE
Payer: MEDICARE

## 2022-12-13 ENCOUNTER — HOSPITAL ENCOUNTER (OUTPATIENT)
Dept: OCCUPATIONAL THERAPY | Facility: CLINIC | Age: 67
Setting detail: THERAPIES SERIES
Discharge: HOME | End: 2022-12-13
Attending: INTERNAL MEDICINE
Payer: MEDICARE

## 2022-12-13 PROCEDURE — 97112 NEUROMUSCULAR REEDUCATION: CPT | Mod: GP | Performed by: PHYSICAL THERAPIST

## 2022-12-13 PROCEDURE — 97110 THERAPEUTIC EXERCISES: CPT | Mod: GO

## 2022-12-13 PROCEDURE — 97110 THERAPEUTIC EXERCISES: CPT | Mod: GP | Performed by: PHYSICAL THERAPIST

## 2022-12-16 ENCOUNTER — ANCILLARY PROCEDURE (OUTPATIENT)
Dept: BONE DENSITY | Facility: CLINIC | Age: 67
End: 2022-12-16
Attending: INTERNAL MEDICINE
Payer: MEDICARE

## 2022-12-16 DIAGNOSIS — M85.812 OTHER SPECIFIED DISORDERS OF BONE DENSITY AND STRUCTURE, LEFT SHOULDER: ICD-10-CM

## 2022-12-16 DIAGNOSIS — M85.80 OSTEOPENIA, UNSPECIFIED LOCATION: ICD-10-CM

## 2022-12-16 PROCEDURE — 77080 DXA BONE DENSITY AXIAL: CPT | Performed by: INTERNAL MEDICINE

## 2022-12-20 ENCOUNTER — HOSPITAL ENCOUNTER (OUTPATIENT)
Dept: OCCUPATIONAL THERAPY | Facility: CLINIC | Age: 67
Setting detail: THERAPIES SERIES
Discharge: HOME | End: 2022-12-20
Attending: PHYSICAL MEDICINE & REHABILITATION
Payer: MEDICARE

## 2022-12-20 ENCOUNTER — HOSPITAL ENCOUNTER (OUTPATIENT)
Dept: PHYSICAL THERAPY | Facility: CLINIC | Age: 67
Setting detail: THERAPIES SERIES
Discharge: HOME | End: 2022-12-20
Attending: INTERNAL MEDICINE
Payer: MEDICARE

## 2022-12-20 PROCEDURE — 97110 THERAPEUTIC EXERCISES: CPT | Mod: GO

## 2022-12-20 PROCEDURE — 97112 NEUROMUSCULAR REEDUCATION: CPT | Mod: GO

## 2022-12-20 PROCEDURE — 97110 THERAPEUTIC EXERCISES: CPT | Mod: GP,KX | Performed by: PHYSICAL THERAPIST

## 2022-12-20 PROCEDURE — 97112 NEUROMUSCULAR REEDUCATION: CPT | Mod: GP,KX | Performed by: PHYSICAL THERAPIST

## 2022-12-20 PROCEDURE — 97116 GAIT TRAINING THERAPY: CPT | Mod: GP,KX | Performed by: PHYSICAL THERAPIST

## 2022-12-20 NOTE — PROGRESS NOTES
PT 10th VISIT PROGRESS NOTE       12/20/22 0800   Signing Clinician's Name / Credentials   Signing clinician's name / credentials Ada Tyler, PT   Session Number   Session Number 10/10   Authorization status YSABEL Velazquez is appropriate for skilled PT intervention to address his impairments and improve his function to allow him to stay in his home with decreased burden on  caregiver (wife).  Without PT pt has shown decline in function and balance and increased pain - R knee and back.   Progress Note/Recertification   Recertification Due Date 01/18/23   Goal 1   Goal Identifier 1 HEP   Goal Description Lex to be independent in appropriate HEP with assist from Nidia as needed to promote life long health, ability to stay in home and improve function.   Goal Progress Baseline - doing HEP but decreased tolerance. Wife not able to do stretching of LE due to her own health concerns.   Target Date 04/30/23   Goal 2   Goal Identifier 2 - 25 foot walk   Goal Description Lex to complete 25 foot walk consistently in 19 sec or less and 22 steps or less demonstrating improved gait pattern and efficiency.   Goal Progress 12/20/22 21.9 sec m 26 steps, trekking pole, AFO start of session   after stretches 20.66 sec   12/6/22 19.8 sec and then 20.8 sec both with trekking pole , SBA  9/27/22  22.8 sec, 21.68 sec with NBQC (forgot his trekking pole today) baseline 21.63 sec, 26 steps with trekking ple and AFO   Target Date 01/18/23   Goal 3   Goal Identifier 3 - TUG   Goal Description Lex to complete TUG in 40 sec or less to demonstrate improved gait pattern, ability to transfer sit <> stand and overall increased function.   Goal Progress 12/20/22 40.28 sec, 40.27 sec trekking pole and SBA first time stopped and needing directions before sitting, second time issues with trekking pole slipping forward when stnading up and also when placing along wall to sit.  after stretches 40.23 sec   12/6/22  39.19 sec then 36.19 sec with trekking  "pole.  9/27/22 38.72 sec.  with NBQC  baseline 50.83 sec with trekking pole   Target Date 01/18/23   Goal 4   Goal Identifier 4 - stairs   Goal Description Lex to complete stairs ascending with alternating stepping and one rail and min /SBA to demonstrate improved neuro motor function , balance and greater safety with completing stairs.   Goal Progress 12/6/22 and 12/20/22   performing alternating stepping up and down , occasional lean onto the rail on the R when descending.  States partly why he does this is that he does it at home this way to protect wall/railing \"rail in sheet rock and don't want to pull it out.\" (of wall)     baseline single step up , alternating down with heavy lean on the rail.   Target Date 01/18/23   Goal 5   Goal Identifier 5 - pain   Goal Description Lex to report decreased back pain and R knee pain to allow him to increase his ambulation distance/ther ex at the gym as well as overall better QOL.   Goal Progress 11/8/22 walking 3 laps , R knee \"doesn't bother me too much\"   He does continue to have knee pain off and on.  baseline - doing 2 laps at gym usually does 3-4  9/27/22  stil has about the same R knee, back \"not as stiff\" but doing 3-4 laps.   Target Date 01/18/23   Goal 6   Goal Identifier 6 - 6MWT   Goal Description Lex to increase his 6MWT distance by 150 feet or greater from when first assessed to demonstrate improved endurance, gait quality and function.   Goal Progress 12/13/22 366 feet   Target Date 01/18/23   Goal 7   Goal Identifier 7- sit <> stand and COM forward past TAYLOR challenges.   Goal Description Lex to perform sit to stand from 22 inch mat without bracing self with LE against the surface to demonstrate improved ability to bring COM forward past TAYLOR as well as better mechanics with transfer for decreased/excess load on the R LE to decrease knee pain.   Goal Progress 12/13/22  from 22 inch height continues to brace leg on the bed but does better with trekking pole " held in front of him rather than placed to the side as he moves towards trekking pole/safety/support.  Needing cues on alignment LE and sequencing 9/27/22  baseline, lean to the R and posterior lean against surface 24 inch height   Target Date 01/18/22   Subjective Report   Subjective Report Nothing new.  Continues to have occasional right knee pain.  He did state that he had a bone density assessment, results in chart.   Objective Measures   Objective Measures Objective Measure 2;Objective Measure 3   Objective Measure 2   Objective Measure 25 foot walk   Details 21.9 sec m 26 steps, trekking pole, AFO start of session   after stretches 20.66 sec   Objective Measure 3   Objective Measure TUG   Details 40.28 sec, 40.27 sec trekking pole and SBA first time stopped and needing directions before sitting, second time issues with trekking pole slipping forward when stnading up and also when placing along wall to sit.  after stretches 40.23 sec   Therapeutic Procedure/exercise   Therapeutic Procedures: strength, endurance, ROM, flexibillity minutes (07161) 17   Skilled Intervention Passive stretches, assessment of patient response and adapting as needed.   Patient Response Over activation response with lower trunk rotation to the right combined with left shoulder abduction and glenohumeral external rotation-spinal rotation stretch   Treatment Detail stretches in supine left ankle dorsi flexion, foot inversion/eversion, hamstring, hip internal and external rotation, single knee-to-chest, lower trunk rotation with left upper extremity abduction and external rotation with lower extremity rotation to the right.  Left hamstring, single knee-to-chest and gastroc.   Neuromuscular Re-education   Neuromuscular re-ed of mvmt, balance, coord, kinesthetic sense, posture, proprioception minutes (27132) 10   Skilled Intervention Assessment cues assist progressing activities   Patient Response As noted   Treatment Detail Movement and  force production with patient placing left foot on 4 inch step right upper extremity support and stepping up laterally onto the platform with his right lower extremity.  Patient performing slight diagonal positioning of the left side.  Min assist and cues needed to perform correctly.  Performed 15 reps.  Sit to and from stand with education regarding set up and carryover from last session.   Progress Working on increasing movement pattern and force production left lower extremity for better stance time, functional use of the left lower extremity   Gait Training   Gait Training Minutes, includes stair climbing (58888) 15   Skilled Intervention Assessment and cues   Patient Response Tolerated well   Treatment Detail 25 foot walk, TUG, stairs up and down 4 inch and 6 inch stairs with 1 railing assist with pelvic alignment and cues for left knee flexion.  Ambulation with trekking pole and cues for not pausing with stance right or left.   Progress Times as above.  Times very.  Improving with stair performance   Manual Therapy   Manual Therapy: Mobilization, MFR, MLD, friction massage minutes (14668) 3   Treatment Detail Posterior subtalar glides left to increase ankle dorsiflexion   Education   Learner Patient   Readiness Acceptance   Method Explanation   Response Verbalizes Understanding   Education Comments proper movement patterns, alignmetn wtih activities.   Plan   Plan for next session activities addressing L LE multi joint function and neuro motor control, faster paced gait.  stretches   Comments   Comments PN covers dates 10/5/22 to 12/20/22  please see daily notes for specifics.  Lex continues to benifit from stretches passively from PT, he and his wife are not able to complete at home and get sufficient stretch.  His times vary with the 25 foot walk and TUG,  stair performance has improved.  He has had less R knee pain, able to get up and down from chairs easier at home, walking further at community gym.  He  is appropriate to continue with PT and is without questions.  Without skilled PT intervention he has demonstrated decline in his function, balance and safetly as well as increase in R knee pain.  Had bone density testing completed, results in epic, interesting that left femoral neck was significantly lower in number than the right femoral neck or the lumbar spine.   Total Session Time   Timed Code Treatment Minutes 40   Total Treatment Time (sum of timed and untimed services) 40   Medicare Claim Information   Medical Diagnosis Left hemiparesis (H) (G81.94)   PT Diagnosis impaired gait, transfers, decline in function

## 2022-12-26 ENCOUNTER — E-VISIT (OUTPATIENT)
Dept: PEDIATRICS | Facility: CLINIC | Age: 67
End: 2022-12-26
Payer: MEDICARE

## 2022-12-26 DIAGNOSIS — M81.0 OSTEOPOROSIS, UNSPECIFIED OSTEOPOROSIS TYPE, UNSPECIFIED PATHOLOGICAL FRACTURE PRESENCE: Primary | ICD-10-CM

## 2022-12-26 PROCEDURE — 99421 OL DIG E/M SVC 5-10 MIN: CPT | Performed by: INTERNAL MEDICINE

## 2022-12-27 ENCOUNTER — TELEPHONE (OUTPATIENT)
Dept: NEPHROLOGY | Facility: CLINIC | Age: 67
End: 2022-12-27

## 2022-12-27 ENCOUNTER — HOSPITAL ENCOUNTER (OUTPATIENT)
Dept: OCCUPATIONAL THERAPY | Facility: CLINIC | Age: 67
Setting detail: THERAPIES SERIES
Discharge: HOME | End: 2022-12-27
Attending: PHYSICAL MEDICINE & REHABILITATION
Payer: MEDICARE

## 2022-12-27 ENCOUNTER — HOSPITAL ENCOUNTER (OUTPATIENT)
Dept: PHYSICAL THERAPY | Facility: CLINIC | Age: 67
Setting detail: THERAPIES SERIES
Discharge: HOME | End: 2022-12-27
Attending: INTERNAL MEDICINE
Payer: MEDICARE

## 2022-12-27 PROCEDURE — 97110 THERAPEUTIC EXERCISES: CPT | Mod: GP,KX | Performed by: PHYSICAL THERAPIST

## 2022-12-27 PROCEDURE — 97116 GAIT TRAINING THERAPY: CPT | Mod: GP,KX | Performed by: PHYSICAL THERAPIST

## 2022-12-27 PROCEDURE — 97112 NEUROMUSCULAR REEDUCATION: CPT | Mod: GO

## 2022-12-27 PROCEDURE — 97110 THERAPEUTIC EXERCISES: CPT | Mod: GO

## 2022-12-27 NOTE — TELEPHONE ENCOUNTER
Per Teto Diego request sent out. To be completed in May  Cari Calderon LPN  Nephrology  [phone]

## 2023-01-02 ENCOUNTER — VIRTUAL VISIT (OUTPATIENT)
Dept: PEDIATRICS | Facility: CLINIC | Age: 68
End: 2023-01-02
Payer: MEDICARE

## 2023-01-02 DIAGNOSIS — M81.0 OSTEOPOROSIS, UNSPECIFIED OSTEOPOROSIS TYPE, UNSPECIFIED PATHOLOGICAL FRACTURE PRESENCE: Primary | ICD-10-CM

## 2023-01-02 PROCEDURE — 99213 OFFICE O/P EST LOW 20 MIN: CPT | Mod: 95 | Performed by: INTERNAL MEDICINE

## 2023-01-02 RX ORDER — ALENDRONATE SODIUM 70 MG/1
70 TABLET ORAL
Qty: 12 TABLET | Refills: 3 | Status: SHIPPED | OUTPATIENT
Start: 2023-01-02 | End: 2023-10-31

## 2023-01-02 NOTE — PROGRESS NOTES
Lex is a 67 year old who is being evaluated via a billable video visit.      How would you like to obtain your AVS? MyChart  If the video visit is dropped, the invitation should be resent by: text  Will anyone else be joining your video visit? wife      Assessment & Plan     (M81.0) Osteoporosis, unspecified osteoporosis type, unspecified pathological fracture presence  (primary encounter diagnosis)  Comment:    Recent DEXA results reviewed.  Demonstrates osteoporosis involving the left femoral neck and osteopenia involving the lumbar spine and right femoral neck.  Osteoporosis changes involving the left side consistent with left hemiplegia secondary to prior stroke and associated bone loss.  Recommended adding weekly Fosamax-side effects reviewed.   Return to clinic later this year for annual visit  Plan: alendronate (FOSAMAX) 70 MG tablet            Don Aviles MD  [facility]ett is a 67 year old, presenting for the following health issues:    Presents today for follow-up of recent DEXA scan demonstrating osteoporosis:    FINDINGS:               Lumbar Spine (L1-L4)      T-score:  -1.3               Left Femoral Neck            T-score:  -2.8               Right Femoral Neck          T-score:  -1.8     Patient Active Problem List   Diagnosis     * * * SBE PROPHYLAXIS * * *     Health Care Home     Vitamin D deficiency     Hyperlipidemia LDL goal <100     Long-term (current) use of anticoagulants     Urinary incontinence     Neurogenic bladder     Chronic atrial fibrillation (H)     Tricuspid regurgitation     Mitral regurgitation     Atrial enlargement, bilateral     Major depressive disorder, recurrent episode, moderate (H)     Traumatic brain injury with loss of consciousness, sequela (H)     Neurogenic bowel     Left renal stone     Dysphagia     Left hemiparesis (H)     S/P craniotomy     Seizure disorder (H)     Visual field defect     Anemia     Benign prostatic  hyperplasia with urinary retention     Current Outpatient Medications   Medication Sig Dispense Refill     alendronate (FOSAMAX) 70 MG tablet Take 1 tablet (70 mg) by mouth every 7 days 12 tablet 3     acetaminophen (TYLENOL) 500 MG tablet Take 2 tablets (1,000 mg) by mouth every 6 hours as needed for mild pain 100 tablet 0     apixaban ANTICOAGULANT (ELIQUIS ANTICOAGULANT) 5 MG tablet Take 1 tablet (5 mg) by mouth 2 times daily 180 tablet 11     atorvastatin (LIPITOR) 20 MG tablet Take 1 tablet (20 mg) by mouth daily 90 tablet 3     baclofen (LIORESAL) 20 MG tablet Take 1 tablet (20 mg) by mouth 4 times daily 360 tablet 3     citalopram (CELEXA) 20 MG tablet Take 1.5 tablets (30 mg) by mouth daily 135 tablet 11     docusate sodium (COLACE) 100 MG tablet Take 200 mg by mouth nightly as needed        levETIRAcetam (KEPPRA) 1000 MG tablet Take 1 tablet (1,000 mg) by mouth 2 times daily 180 tablet 11     metoprolol succinate ER (TOPROL XL) 25 MG 24 hr tablet Take 0.5 tablets (12.5 mg) by mouth daily 45 tablet 11     mirabegron (MYRBETRIQ) 50 MG 24 hr tablet Take 1 tablet (50 mg) by mouth daily 90 tablet 11     Multiple Vitamin (MULTI-VITAMIN) per tablet Take 1 tablet by mouth daily  100 tablet 3     polyethylene glycol (MIRALAX) 17 g packet Take 17 g by mouth every other day        senna-docusate (SENOKOT-S/PERICOLACE) 8.6-50 MG tablet Take 4 tablets by mouth At Bedtime       tamsulosin (FLOMAX) 0.4 MG capsule Take 1 capsule (0.4 mg) by mouth daily 90 capsule 11     vitamin D3 (CHOLECALCIFEROL) 50 mcg (2000 units) tablet Take 2,000 Units by mouth daily  90 tablet 3              Review of Systems         Objective           Vitals:  No vitals were obtained today due to virtual visit.    Physical Exam   GENERAL:alert and no distress  EYES: Eyes grossly normal to inspection.  No discharge or erythema, or obvious scleral/conjunctival abnormalities.  RESP: No audible wheeze, cough, or visible cyanosis.  No visible  retractions or increased work of breathing.    SKIN: Visible skin clear. No significant rash, abnormal pigmentation or lesions.  NEURO: Cranial nerves grossly intact.  Mentation and speech appropriate for age.  PSYCH: Mentation appears normal, affect normal/bright                Video-Visit Details    Type of service:  Video Visit   Video Start Time: 4:26 PM  Video End Time:4:41 PM    Originating Location (pt. Location): Home    Distant Location (provider location):  On-site  Platform used for Video Visit: [employer]anqu[employer]

## 2023-01-10 ENCOUNTER — HOSPITAL ENCOUNTER (OUTPATIENT)
Dept: PHYSICAL THERAPY | Facility: CLINIC | Age: 68
Setting detail: THERAPIES SERIES
Discharge: HOME | End: 2023-01-10
Attending: INTERNAL MEDICINE
Payer: MEDICARE

## 2023-01-10 ENCOUNTER — HOSPITAL ENCOUNTER (OUTPATIENT)
Dept: OCCUPATIONAL THERAPY | Facility: CLINIC | Age: 68
Setting detail: THERAPIES SERIES
Discharge: HOME | End: 2023-01-10
Attending: PHYSICAL MEDICINE & REHABILITATION
Payer: MEDICARE

## 2023-01-10 PROCEDURE — 97116 GAIT TRAINING THERAPY: CPT | Mod: GP | Performed by: PHYSICAL THERAPIST

## 2023-01-10 PROCEDURE — 97110 THERAPEUTIC EXERCISES: CPT | Mod: GP | Performed by: PHYSICAL THERAPIST

## 2023-01-10 PROCEDURE — 97112 NEUROMUSCULAR REEDUCATION: CPT | Mod: GP | Performed by: PHYSICAL THERAPIST

## 2023-01-10 PROCEDURE — 97112 NEUROMUSCULAR REEDUCATION: CPT | Mod: GO | Performed by: OCCUPATIONAL THERAPIST

## 2023-01-17 ENCOUNTER — HOSPITAL ENCOUNTER (OUTPATIENT)
Dept: PHYSICAL THERAPY | Facility: CLINIC | Age: 68
Setting detail: THERAPIES SERIES
Discharge: HOME | End: 2023-01-17
Attending: INTERNAL MEDICINE
Payer: MEDICARE

## 2023-01-17 ENCOUNTER — HOSPITAL ENCOUNTER (OUTPATIENT)
Dept: OCCUPATIONAL THERAPY | Facility: CLINIC | Age: 68
Setting detail: THERAPIES SERIES
Discharge: HOME | End: 2023-01-17
Attending: PHYSICAL MEDICINE & REHABILITATION
Payer: MEDICARE

## 2023-01-17 PROCEDURE — 97116 GAIT TRAINING THERAPY: CPT | Mod: GP | Performed by: PHYSICAL THERAPIST

## 2023-01-17 PROCEDURE — 97112 NEUROMUSCULAR REEDUCATION: CPT | Mod: GP | Performed by: PHYSICAL THERAPIST

## 2023-01-17 PROCEDURE — 97112 NEUROMUSCULAR REEDUCATION: CPT | Mod: GO

## 2023-01-17 PROCEDURE — 97110 THERAPEUTIC EXERCISES: CPT | Mod: GP | Performed by: PHYSICAL THERAPIST

## 2023-01-17 PROCEDURE — 97110 THERAPEUTIC EXERCISES: CPT | Mod: GO

## 2023-01-17 NOTE — PROGRESS NOTES
[facility]    OUTPATIENT OCCUPATIONAL THERAPY  PLAN OF TREATMENT FOR OUTPATIENT REHABILITATION AND PROGRESS NOTE    Patient's Last Name, First Name, Marcus Mcgee Date of Birth  1955   Provider's Name  [facility] Medical Record No.  [id number]    Onset Date  10/14/22 (date of order used, CVA 4/20/12) Start of Care Date  11/04/22   Type:     __PT   _X_OT   __SLP Medical Diagnosis  Spastic hemiparesis of left dominant side as late effect of cerebral infarction (H); Adhesive capsulitis of left shoulder; Contracture of muscle of left upper arm; Spasticity; Impaired mobility and activities of daily living          OT Diagnosis  Decreased safety and IND with ADLs/IADLs Plan of Treatment  Frequency/Duration: 1x/week for additional 12 weeks pending pt progress  Certification date from 1/17/23 to 4/11/2023     Goals:    Goal Identifier LUE Functional Use   Goal Description Patient to demonstrate functional tasks with 20% use of LUE as gross stabilizer and gross assist for IND with ADLs/IADLS (wiping the counter/table, washing dishes, stablizing laundry, etc.).   Target Date 04/11/23   Date Met      Progress (detail required for progress note):  Goal progressing. Educated in LUE HEP and positioning techniques (ex. Sleep positioning, resting hand splint) to promote tone management and functional ROM for use of LUE in daily activities. Pt reports minimal use of LUE due to decreased ability to complete functional grasp. Will continue to monitor and progress functional use of LUE using one-handed and adaptive strategies, as appropriate.     Goal Identifier LUE ROM   Goal Description Patient will demonstrate increased L shoulder flexion PROM/AAROM to 120 degrees for improved functional reach, IND with ADLs, and comfort/positioning for participation in daily activities  and sleep.   Target Date 04/11/23   Date Met      Progress (detail required for progress note):  Goal progressing. Pt demonstrating improved ROM in LUE shoulder flexion with increased repetitions of PROM/SROM/AAROM in sessions. However, increased tone this session due to need for next Botox session resulted in decreased LUE ROM this session, approximately 80-90 degrees of flexion compared to approximately 100 degrees in previous session. Will monitor and progress to maximize effects of next Botox (scheduled for 1/19/23).     Goal Identifier UE HEP   Goal Description Patient/family to demonstrate updated UE HEP with SBA and min cues for good follow through at home to increase functional strength and ROM for preventing pain and contractures and promoting ROM/strength for maximum independence with performance of ADLs.   Target Date 04/11/23   Date Met      Progress (detail required for progress note):  Goal progressing. Pt educated and trained in LUE HEP to promote tone management and functional ROM and strength, including PROM and SROM stretching, weight-bearing, and forearm flexor/extensor e-stim, as well as newly initiated shoulder e-stim and table slides. Pt demonstrating adherence to familiar stretching and e-stim routines with greater difficulty adhering to newer HEP items like SROM routines. Will continue to progress and determine strategies for improved carryover of HEP to home. Will monitor adherence and adjust POC accordingly as needed.     Beginning/End Dates of Progress Note Reporting Period:  11/04/22 to 1/17/23    Progress Toward Goals:   Progress this reporting period: Pt has been seen for a total of 8 outpatient OT sessions with focus on the above goals (see progress noted above). Pt demonstrating improved awareness of UE HEP and positioning techniques for LUE for tone management but continues to be significantly limited by flexor tone impacting comfort and engagement in ADLs/IADLs.  Progress limited to  "disruptions in schedule due to scheduling conflicts and weather/transportation limitations. Pt remains appropriate for skilled OT services at 1x/week for additional 12 weeks, pending pt progress, for continued training in tone management and adaptations for engagement in daily activities.    Client Self (Subjective) Report for Progress Note Reporting Period: Pt reports he is now on medication for his osteoporosis, which he feels is going \"pretty well.\" Reports he has Botox this Thursday, expects it'll be about the same as he has had before. Overall feels very tight today as he notices increase in tone closer he gets to needing to renew Botox.               I CERTIFY THE NEED FOR THESE SERVICES FURNISHED UNDER        THIS PLAN OF TREATMENT AND WHILE UNDER MY CARE .             Physician Signature               Date    X_____________________________________________________                      Referring Provider: Perla Haddad MD Megan Peterson, OTR      "

## 2023-01-19 ENCOUNTER — TRANSFERRED RECORDS (OUTPATIENT)
Dept: HEALTH INFORMATION MANAGEMENT | Facility: CLINIC | Age: 68
End: 2023-01-19

## 2023-01-24 ENCOUNTER — HOSPITAL ENCOUNTER (OUTPATIENT)
Dept: PHYSICAL THERAPY | Facility: CLINIC | Age: 68
Setting detail: THERAPIES SERIES
Discharge: HOME | End: 2023-01-24
Attending: INTERNAL MEDICINE
Payer: MEDICARE

## 2023-01-24 PROCEDURE — 97112 NEUROMUSCULAR REEDUCATION: CPT | Mod: GP | Performed by: PHYSICAL THERAPIST

## 2023-01-24 PROCEDURE — 97110 THERAPEUTIC EXERCISES: CPT | Mod: GP | Performed by: PHYSICAL THERAPIST

## 2023-01-24 PROCEDURE — 97116 GAIT TRAINING THERAPY: CPT | Mod: GP | Performed by: PHYSICAL THERAPIST

## 2023-01-30 ENCOUNTER — HOSPITAL ENCOUNTER (OUTPATIENT)
Dept: OCCUPATIONAL THERAPY | Facility: CLINIC | Age: 68
Setting detail: THERAPIES SERIES
Discharge: HOME | End: 2023-01-30
Attending: INTERNAL MEDICINE
Payer: MEDICARE

## 2023-01-30 PROCEDURE — 97112 NEUROMUSCULAR REEDUCATION: CPT | Mod: GO

## 2023-01-30 PROCEDURE — 97110 THERAPEUTIC EXERCISES: CPT | Mod: GO

## 2023-02-02 ENCOUNTER — HOSPITAL ENCOUNTER (OUTPATIENT)
Dept: PHYSICAL THERAPY | Facility: CLINIC | Age: 68
Setting detail: THERAPIES SERIES
Discharge: HOME | End: 2023-02-02
Attending: INTERNAL MEDICINE
Payer: MEDICARE

## 2023-02-02 PROCEDURE — 97110 THERAPEUTIC EXERCISES: CPT | Mod: GP | Performed by: PHYSICAL THERAPIST

## 2023-02-02 PROCEDURE — 97112 NEUROMUSCULAR REEDUCATION: CPT | Mod: GP | Performed by: PHYSICAL THERAPIST

## 2023-02-02 NOTE — PROGRESS NOTES
[facility]    OUTPATIENT PHYSICAL THERAPY  PLAN OF TREATMENT FOR OUTPATIENT REHABILITATION AND PROGRESS NOTE           Patient's Last Name, First Name, Marcus Mcgee Date of Birth  1955   Provider's Name  [facility] Medical Record No.  [id number]    Onset Date 6/9/22 - date of order. (CVA 4/20/12)   Start of Care Date  7/21/22   Type:     _X_PT   ___OT   ___SLP Medical Diagnosis  L hemiparesis   PT Diagnosis  Impaired gait, transfers, decline in function Plan of Treatment  Frequency/Duration: 1 x a week x 90 days  Certification date from 1/19/23 to 4/18/23     Goals:  Goal Identifier 1 HEP   Goal Description Lex to be independent in appropriate HEP with assist from Nidia as needed to promote life long health, ability to stay in home and improve function.   Target Date 04/30/23   Date Met      Progress (detail required for progress note): Baseline - doing HEP but decreased tolerance. Wife not able to do stretching of LE due to her own health concerns.     Goal Identifier 2 - 25 foot walk   Goal Description Lex to complete 25 foot walk consistently in 19 sec or less and 22 steps or less demonstrating improved gait pattern and efficiency.   Target Date 01/18/23   Date Met      Progress (detail required for progress note): 1/10/23 at start of the session 23.55, steps   end of session 20.58 sec 1712/20/22 21.9 sec m 26 steps, trekking pole, AFO start of session   after stretches 20.66 sec   12/6/22 19.8 sec and then 20.8 sec both with trekking pole , SBA  9/27/22  22.8 sec, 21.68 sec with NBQC (forgot his trekking pole today) baseline 21.63 sec, 26 steps with trekking ple and AFO     Goal Identifier 3 - TUG   Goal Description Lex to complete TUG in 40 sec or less to demonstrate improved gait pattern, ability to transfer sit <> stand and overall increased  "function.   Target Date 01/18/23   Date Met      Progress (detail required for progress note): 12/20/22 40.28 sec, 40.27 sec trekking pole and SBA first time stopped and needing directions before sitting, second time issues with trekking pole slipping forward when stnading up and also when placing along wall to sit.  after stretches 40.23 sec   12/6/22  39.19 sec then 36.19 sec with trekking pole.  9/27/22 38.72 sec.  with NBQC  baseline 50.83 sec with trekking pole     Goal Identifier 4 - stairs   Goal Description Lex to complete stairs ascending with alternating stepping and one rail and min /SBA to demonstrate improved neuro motor function , balance and greater safety with completing stairs.   Target Date 01/18/23   Date Met      Progress (detail required for progress note): 12/6/22 and 12/20/22   performing alternating stepping up and down , occasional lean onto the rail on the R when descending.  States partly why he does this is that he does it at home this way to protect wall/railing \"rail in sheet rock and don't want to pull it out.\" (of wall)     baseline single step up , alternating down with heavy lean on the rail.     Goal Identifier 5 - pain   Goal Description Lex to report decreased back pain and R knee pain to allow him to increase his ambulation distance/ther ex at the gym as well as overall better QOL.   Target Date 01/18/23   Date Met      Progress (detail required for progress note): 11/8/22 walking 3 laps , R knee \"doesn't bother me too much\"   He does continue to have knee pain off and on.  baseline - doing 2 laps at gym usually does 3-4  9/27/22  stil has about the same R knee, back \"not as stiff\" but doing 3-4 laps.     Goal Identifier 6 - 6MWT   Goal Description Lex to increase his 6MWT distance by 150 feet or greater from when first assessed to demonstrate improved endurance, gait quality and function.   Target Date 01/18/23   Date Met      Progress (detail required for progress note): " 12/13/22 366 feet     Goal Identifier 7- sit <> stand and COM forward past TAYLOR challenges.   Goal Description Lex to perform sit to stand from 22 inch mat without bracing self with LE against the surface to demonstrate improved ability to bring COM forward past TAYLOR as well as better mechanics with transfer for decreased/excess load on the R LE to decrease knee pain.   Target Date 01/18/22   Date Met      Progress (detail required for progress note): 12/13/22  from 22 inch height continues to brace leg on the bed but does better with trekking pole held in front of him rather than placed to the side as he moves towards trekking pole/safety/support.  Needing cues on alignment LE and sequencing 9/27/22  baseline, lean to the R and posterior lean against surface 24 inch height         See PN dated 12/20/22 for greater details.              I CERTIFY THE NEED FOR THESE SERVICES FURNISHED UNDER        THIS PLAN OF TREATMENT AND WHILE UNDER MY CARE     (Physician co-signature of this document indicates review and certification of the therapy plan).                Referring Provider: Don Capone, PT

## 2023-02-09 ENCOUNTER — HOSPITAL ENCOUNTER (OUTPATIENT)
Dept: PHYSICAL THERAPY | Facility: CLINIC | Age: 68
Setting detail: THERAPIES SERIES
Discharge: HOME | End: 2023-02-09
Attending: PHYSICAL MEDICINE & REHABILITATION
Payer: MEDICARE

## 2023-02-09 ENCOUNTER — HOSPITAL ENCOUNTER (OUTPATIENT)
Dept: OCCUPATIONAL THERAPY | Facility: CLINIC | Age: 68
Setting detail: THERAPIES SERIES
Discharge: HOME | End: 2023-02-09
Attending: PHYSICAL MEDICINE & REHABILITATION
Payer: MEDICARE

## 2023-02-09 PROCEDURE — 97110 THERAPEUTIC EXERCISES: CPT | Mod: GP | Performed by: PHYSICAL THERAPIST

## 2023-02-09 PROCEDURE — 97112 NEUROMUSCULAR REEDUCATION: CPT | Mod: GO

## 2023-02-09 PROCEDURE — 97112 NEUROMUSCULAR REEDUCATION: CPT | Mod: GP | Performed by: PHYSICAL THERAPIST

## 2023-02-09 PROCEDURE — 97116 GAIT TRAINING THERAPY: CPT | Mod: GP | Performed by: PHYSICAL THERAPIST

## 2023-02-09 PROCEDURE — 97110 THERAPEUTIC EXERCISES: CPT | Mod: GO

## 2023-02-14 ENCOUNTER — HOSPITAL ENCOUNTER (OUTPATIENT)
Dept: OCCUPATIONAL THERAPY | Facility: CLINIC | Age: 68
Setting detail: THERAPIES SERIES
Discharge: HOME | End: 2023-02-14
Attending: PHYSICAL MEDICINE & REHABILITATION
Payer: MEDICARE

## 2023-02-14 ENCOUNTER — HOSPITAL ENCOUNTER (OUTPATIENT)
Dept: PHYSICAL THERAPY | Facility: CLINIC | Age: 68
Setting detail: THERAPIES SERIES
Discharge: HOME | End: 2023-02-14
Attending: INTERNAL MEDICINE
Payer: MEDICARE

## 2023-02-14 PROCEDURE — 97112 NEUROMUSCULAR REEDUCATION: CPT | Mod: GO

## 2023-02-14 PROCEDURE — 97110 THERAPEUTIC EXERCISES: CPT | Mod: GP | Performed by: PHYSICAL THERAPIST

## 2023-02-14 PROCEDURE — 97110 THERAPEUTIC EXERCISES: CPT | Mod: GO

## 2023-02-14 PROCEDURE — 97112 NEUROMUSCULAR REEDUCATION: CPT | Mod: GP | Performed by: PHYSICAL THERAPIST

## 2023-02-21 ENCOUNTER — HOSPITAL ENCOUNTER (OUTPATIENT)
Dept: PHYSICAL THERAPY | Facility: CLINIC | Age: 68
Setting detail: THERAPIES SERIES
Discharge: HOME | End: 2023-02-21
Attending: INTERNAL MEDICINE
Payer: MEDICARE

## 2023-02-21 ENCOUNTER — HOSPITAL ENCOUNTER (OUTPATIENT)
Dept: OCCUPATIONAL THERAPY | Facility: CLINIC | Age: 68
Setting detail: THERAPIES SERIES
Discharge: HOME | End: 2023-02-21
Attending: PHYSICAL MEDICINE & REHABILITATION
Payer: MEDICARE

## 2023-02-21 PROCEDURE — 97112 NEUROMUSCULAR REEDUCATION: CPT | Mod: GP | Performed by: PHYSICAL THERAPIST

## 2023-02-21 PROCEDURE — 97116 GAIT TRAINING THERAPY: CPT | Mod: GP | Performed by: PHYSICAL THERAPIST

## 2023-02-21 PROCEDURE — 97110 THERAPEUTIC EXERCISES: CPT | Mod: GP | Performed by: PHYSICAL THERAPIST

## 2023-02-21 PROCEDURE — 97110 THERAPEUTIC EXERCISES: CPT | Mod: GO | Performed by: OCCUPATIONAL THERAPIST

## 2023-02-28 ENCOUNTER — HOSPITAL ENCOUNTER (OUTPATIENT)
Dept: PHYSICAL THERAPY | Facility: CLINIC | Age: 68
Setting detail: THERAPIES SERIES
Discharge: HOME | End: 2023-02-28
Attending: INTERNAL MEDICINE
Payer: MEDICARE

## 2023-02-28 ENCOUNTER — HOSPITAL ENCOUNTER (OUTPATIENT)
Dept: OCCUPATIONAL THERAPY | Facility: CLINIC | Age: 68
Setting detail: THERAPIES SERIES
Discharge: HOME | End: 2023-02-28
Attending: PHYSICAL MEDICINE & REHABILITATION
Payer: MEDICARE

## 2023-02-28 PROCEDURE — 97112 NEUROMUSCULAR REEDUCATION: CPT | Mod: GP | Performed by: PHYSICAL THERAPIST

## 2023-02-28 PROCEDURE — 97116 GAIT TRAINING THERAPY: CPT | Mod: GP | Performed by: PHYSICAL THERAPIST

## 2023-02-28 PROCEDURE — 97110 THERAPEUTIC EXERCISES: CPT | Mod: GO

## 2023-02-28 PROCEDURE — 97110 THERAPEUTIC EXERCISES: CPT | Mod: GP | Performed by: PHYSICAL THERAPIST

## 2023-03-07 ENCOUNTER — HOSPITAL ENCOUNTER (OUTPATIENT)
Dept: PHYSICAL THERAPY | Facility: CLINIC | Age: 68
Setting detail: THERAPIES SERIES
Discharge: HOME | End: 2023-03-07
Attending: INTERNAL MEDICINE
Payer: MEDICARE

## 2023-03-07 ENCOUNTER — HOSPITAL ENCOUNTER (OUTPATIENT)
Dept: OCCUPATIONAL THERAPY | Facility: CLINIC | Age: 68
Setting detail: THERAPIES SERIES
Discharge: HOME | End: 2023-03-07
Attending: PHYSICAL MEDICINE & REHABILITATION
Payer: MEDICARE

## 2023-03-07 PROCEDURE — 97110 THERAPEUTIC EXERCISES: CPT | Mod: GO

## 2023-03-07 PROCEDURE — 97110 THERAPEUTIC EXERCISES: CPT | Mod: GP | Performed by: PHYSICAL THERAPIST

## 2023-03-07 NOTE — PROGRESS NOTES
[facility]    Outpatient Physical Therapy 10th Visit Progress Note  Patient: Marcus Hodges  : 1955    Beginning/End Dates of Reporting Period:  23 to 3/7/23    Referring Provider: Don Tucker Diagnosis: impaired gait, transfers, decline in function     Client Self Report: Doing fine.    Objective Measurements:    Objective Measure: 6MWT  Details: 382 with L AFO and R trekking pole  Objective Measure: 25 ft walk,  Details: 23 19.5 sec, 18.85, 18.95 sec with trekking poles B. 23   22.5 sec, 20.38 sec second one cued for faster pace                    Goals:  Goal Identifier 1 HEP   Goal Description Lex to be independent in appropriate HEP with assist from Nidia as needed to promote life long health, ability to stay in home and improve function.   Target Date 23   Date Met   in progress   Progress (detail required for progress note): Baseline - doing HEP but decreased tolerance. Wife not able to do stretching of LE due to her own health concerns.     Goal Identifier 2 - 25 foot walk   Goal Description Lex to complete 25 foot walk consistently in 19 sec or less and 22 steps or less demonstrating improved gait pattern and efficiency.   Target Date 23   Date Met   in progress   Progress (detail required for progress note): 23:  19.5 sec, 18.85, 18.95 sec with trekking poles B.  1/10/23 at start of the session 23.55, steps   end of session 20.58 sec  21.9 sec m 26 steps, trekking pole, AFO start of session   after stretches 20.66 sec   22 19.8 sec and then 20.8 sec both with trekking pole , SBA  22  22.8 sec, 21.68 sec with NBQC (forgot his trekking pole today) baseline 21.63 sec, 26 steps with trekking ple and AFO     Goal Identifier 3 - TUG   Goal Description Lex to complete TUG in 40 sec or less to demonstrate improved gait pattern, ability to  "transfer sit <> stand and overall increased function.   Target Date 04/30/23   Date Met   in progress   Progress (detail required for progress note): 3/7/23:  not assessed d/t time constraints.  12/20/22 40.28 sec, 40.27 sec trekking pole and SBA first time stopped and needing directions before sitting, second time issues with trekking pole slipping forward when stnading up and also when placing along wall to sit.  after stretches 40.23 sec   12/6/22  39.19 sec then 36.19 sec with trekking pole.  9/27/22 38.72 sec.  with NBQC  baseline 50.83 sec with trekking pole     Goal Identifier 4 - stairs   Goal Description Lex to complete stairs ascending with alternating stepping and one rail and min /SBA to demonstrate improved neuro motor function , balance and greater safety with completing stairs.   Target Date 04/30/23   Date Met   in progress   Progress (detail required for progress note): 2/28/23:  Up-and-down 1 set of stairs with alternating stepping right upper extremity support on the railing.   12/6/22 and 12/20/22   performing alternating stepping up and down , occasional lean onto the rail on the R when descending.  States partly why he does this is that he does it at home this way to protect wall/railing \"rail in sheet rock and don't want to pull it out.\" (of wall)     baseline single step up , alternating down with heavy lean on the rail.     Goal Identifier 5 - pain   Goal Description Lex to report decreased back pain and R knee pain to allow him to increase his ambulation distance/ther ex at the gym as well as overall better QOL.   Target Date 04/30/23   Date Met      Progress (detail required for progress note): 3/7/23 : doing 4 laps at gym.  Knee and back don't seem to be much worse but sometimes knee is more sore.  11/8/22 walking 3 laps , R knee \"doesn't bother me too much\"   He does continue to have knee pain off and on.  baseline - doing 2 laps at gym usually does 3-4  9/27/22  stil has about the " "same R knee, back \"not as stiff\" but doing 3-4 laps.     Goal Identifier 6 - 6MWT   Goal Description Lex to increase his 6MWT distance by 150 feet or greater from when first assessed to demonstrate improved endurance, gait quality and function.   Target Date 04/30/23   Date Met   in progress   Progress (detail required for progress note): 3/7/23: 382 feet.   12/13/22 366 feet     Goal Identifier 7- sit <> stand and COM forward past TAYLOR challenges.   Goal Description Lex to perform sit to stand from 22 inch mat without bracing self with LE against the surface to demonstrate improved ability to bring COM forward past TAYLOR as well as better mechanics with transfer for decreased/excess load on the R LE to decrease knee pain.   Target Date 04/30/23   Date Met   in progress   Progress (detail required for progress note): 3/7/23:  trekking pole in front, braces LEs on mat.  with cues to scoot forward before rising, is able to push less against mat but still contacts mat. 12/13/22  from 22 inch height continues to brace leg on the bed but does better with trekking pole held in front of him rather than placed to the side as he moves towards trekking pole/safety/support.  Needing cues on alignment LE and sequencing 9/27/22  baseline, lean to the R and posterior lean against surface 24 inch height       Plan:  Continue therapy per current plan of care.    Discharge:  No  "

## 2023-03-14 ENCOUNTER — HOSPITAL ENCOUNTER (OUTPATIENT)
Dept: OCCUPATIONAL THERAPY | Facility: CLINIC | Age: 68
Setting detail: THERAPIES SERIES
Discharge: HOME | End: 2023-03-14
Attending: INTERNAL MEDICINE
Payer: MEDICARE

## 2023-03-14 ENCOUNTER — HOSPITAL ENCOUNTER (OUTPATIENT)
Dept: PHYSICAL THERAPY | Facility: CLINIC | Age: 68
Setting detail: THERAPIES SERIES
Discharge: HOME | End: 2023-03-14
Attending: INTERNAL MEDICINE
Payer: MEDICARE

## 2023-03-14 PROCEDURE — 97110 THERAPEUTIC EXERCISES: CPT | Mod: GP | Performed by: PHYSICAL THERAPIST

## 2023-03-14 PROCEDURE — 97116 GAIT TRAINING THERAPY: CPT | Mod: GP | Performed by: PHYSICAL THERAPIST

## 2023-03-14 PROCEDURE — 97112 NEUROMUSCULAR REEDUCATION: CPT | Mod: GP | Performed by: PHYSICAL THERAPIST

## 2023-03-14 PROCEDURE — 97110 THERAPEUTIC EXERCISES: CPT | Mod: GO

## 2023-03-21 ENCOUNTER — HOSPITAL ENCOUNTER (OUTPATIENT)
Dept: PHYSICAL THERAPY | Facility: CLINIC | Age: 68
Setting detail: THERAPIES SERIES
Discharge: HOME | End: 2023-03-21
Attending: INTERNAL MEDICINE
Payer: MEDICARE

## 2023-03-21 ENCOUNTER — HOSPITAL ENCOUNTER (OUTPATIENT)
Dept: OCCUPATIONAL THERAPY | Facility: CLINIC | Age: 68
Setting detail: THERAPIES SERIES
Discharge: HOME | End: 2023-03-21
Attending: INTERNAL MEDICINE
Payer: MEDICARE

## 2023-03-21 PROCEDURE — 97110 THERAPEUTIC EXERCISES: CPT | Mod: GP | Performed by: PHYSICAL THERAPIST

## 2023-03-21 PROCEDURE — 97110 THERAPEUTIC EXERCISES: CPT | Mod: GO

## 2023-03-21 PROCEDURE — 97112 NEUROMUSCULAR REEDUCATION: CPT | Mod: GP | Performed by: PHYSICAL THERAPIST

## 2023-03-21 PROCEDURE — 97116 GAIT TRAINING THERAPY: CPT | Mod: GP | Performed by: PHYSICAL THERAPIST

## 2023-03-28 ENCOUNTER — HOSPITAL ENCOUNTER (OUTPATIENT)
Dept: OCCUPATIONAL THERAPY | Facility: CLINIC | Age: 68
Setting detail: THERAPIES SERIES
Discharge: HOME | End: 2023-03-28
Attending: INTERNAL MEDICINE
Payer: MEDICARE

## 2023-03-28 ENCOUNTER — HOSPITAL ENCOUNTER (OUTPATIENT)
Dept: PHYSICAL THERAPY | Facility: CLINIC | Age: 68
Setting detail: THERAPIES SERIES
Discharge: HOME | End: 2023-03-28
Attending: INTERNAL MEDICINE
Payer: MEDICARE

## 2023-03-28 PROCEDURE — 97110 THERAPEUTIC EXERCISES: CPT | Mod: GP | Performed by: PHYSICAL THERAPIST

## 2023-03-28 PROCEDURE — 97116 GAIT TRAINING THERAPY: CPT | Mod: GP | Performed by: PHYSICAL THERAPIST

## 2023-03-28 PROCEDURE — 97112 NEUROMUSCULAR REEDUCATION: CPT | Mod: GO

## 2023-03-28 PROCEDURE — 97110 THERAPEUTIC EXERCISES: CPT | Mod: GO

## 2023-03-28 PROCEDURE — 97112 NEUROMUSCULAR REEDUCATION: CPT | Mod: GP | Performed by: PHYSICAL THERAPIST

## 2023-04-04 ENCOUNTER — HOSPITAL ENCOUNTER (OUTPATIENT)
Dept: PHYSICAL THERAPY | Facility: CLINIC | Age: 68
Setting detail: THERAPIES SERIES
Discharge: HOME | End: 2023-04-04
Attending: INTERNAL MEDICINE
Payer: MEDICARE

## 2023-04-04 PROCEDURE — 97110 THERAPEUTIC EXERCISES: CPT | Mod: GP | Performed by: PHYSICAL THERAPIST

## 2023-04-04 PROCEDURE — 97116 GAIT TRAINING THERAPY: CPT | Mod: GP | Performed by: PHYSICAL THERAPIST

## 2023-04-11 ENCOUNTER — HOSPITAL ENCOUNTER (OUTPATIENT)
Dept: OCCUPATIONAL THERAPY | Facility: CLINIC | Age: 68
Setting detail: THERAPIES SERIES
Discharge: HOME | End: 2023-04-11
Attending: INTERNAL MEDICINE
Payer: MEDICARE

## 2023-04-11 ENCOUNTER — HOSPITAL ENCOUNTER (OUTPATIENT)
Dept: PHYSICAL THERAPY | Facility: CLINIC | Age: 68
Setting detail: THERAPIES SERIES
Discharge: HOME | End: 2023-04-11
Attending: INTERNAL MEDICINE
Payer: MEDICARE

## 2023-04-11 PROCEDURE — 97116 GAIT TRAINING THERAPY: CPT | Mod: GP | Performed by: PHYSICAL THERAPIST

## 2023-04-11 PROCEDURE — 97110 THERAPEUTIC EXERCISES: CPT | Mod: GO

## 2023-04-11 PROCEDURE — 97110 THERAPEUTIC EXERCISES: CPT | Mod: GP | Performed by: PHYSICAL THERAPIST

## 2023-04-11 PROCEDURE — 97112 NEUROMUSCULAR REEDUCATION: CPT | Mod: GP | Performed by: PHYSICAL THERAPIST

## 2023-04-11 NOTE — PROGRESS NOTES
[facility]    OUTPATIENT OCCUPATIONAL THERAPY  PLAN OF TREATMENT FOR OUTPATIENT REHABILITATION AND PROGRESS NOTE     Patient's Last Name, First Name, Marcus Mcgee Date of Birth  1955   Provider's Name  [facility] Medical Record No.  [id number]    Onset Date  10/14/22 (date of order used, CVA 4/20/12) Start of Care Date  11/04/22   Type:     __PT   _X_OT   __SLP Medical Diagnosis  Spastic hemiparesis of left dominant side as late effect of cerebral infarction (H); Adhesive capsulitis of left shoulder; Contracture of muscle of left upper arm; Spasticity; Impaired mobility and activities of daily living          OT Diagnosis  Decreased safety and IND with ADLs/IADLs Plan of Treatment  Frequency/Duration: 1x/week for additional 12 weeks pending pt progress  Certification date from 4/11/23 to 7/04/23      Goals:     Goal Identifier LUE Functional Use   Goal Description Patient to demonstrate functional tasks with 20% use of LUE as gross stabilizer and gross assist for IND with ADLs/IADLS (wiping the counter/table, washing dishes, stablizing laundry, etc.).   Target Date 7/04/23   Date Met      Progress (detail required for progress note):  Goal progressing. Educated in LUE HEP and positioning techniques (ex. Sleep positioning, resting hand splint) to promote tone management and functional ROM for use of LUE in daily activities. Pt reports minimal use of LUE due to decreased ability to complete functional grasp. Trained in table slides with washcloth to promote weight-bearing/tone management as well as function use of LUE for tasks like washing table or mirror. Will continue to monitor and progress functional use of LUE using one-handed and adaptive strategies, as appropriate.      Goal Identifier LUE ROM   Goal Description Patient will  demonstrate increased L shoulder flexion PROM/AAROM to 120 degrees for improved functional reach, IND with ADLs, and comfort/positioning for participation in daily activities and sleep.   Target Date 7/04/23   Date Met      Progress (detail required for progress note):  Goal progressing. Pt demonstrating improved ROM in LUE shoulder flexion with increased repetitions of PROM/SROM/AAROM in sessions. Progressed to include trunk and cervical ROM exercises for improved ROM and functional use of LUE. High tone noted this date with pt due for upcoming Botox. Will plan to reassess in future session and progress to maximize effects of next Botox.      Goal Identifier UE HEP   Goal Description Patient/family to demonstrate updated UE HEP with SBA and min cues for good follow through at home to increase functional strength and ROM for preventing pain and contractures and promoting ROM/strength for maximum independence with performance of ADLs.   Target Date 7/04/23   Date Met      Progress (detail required for progress note):  Goal progressing. Pt educated and trained in LUE HEP to promote tone management and functional ROM and strength, including PROM and SROM stretching, cervical and trunk AROM, weight-bearing, table slides, and NMES programs (shoulder, tricep, and forearm) combined with functional tasks (theraband, dowel, hand  strengthener). Pt continuing to favor ROM and NMES programs over other HEP activities but does demonstrate incorporation of new updates to ROM and NMES programs to his HEP. Will continue to monitor needs and progress accordingly, as well as continue to monitor for adherence and response to HEP.        Beginning/End Dates of Progress Note Reporting Period:  01/17/23 to 04/11/23    Progress Toward Goals:   Progress this reporting period: Pt has been seen for a total of 18 outpatient OT sessions with focus on the above goals (see progress noted above). Pt demonstrating improved awareness of UE HEP  and positioning techniques for LUE for tone management but continues to be significantly limited by flexor tone impacting comfort and engagement in ADLs/IADLs. Pt demonstrating adherence to newly initiated NMES HEP routines. Pt remains appropriate for skilled OT services at 1x/week for additional 12 weeks, pending pt progress, for continued training in tone management and adaptations for engagement in daily activities. May consider reduced frequency in the future pending pt progress.    Client Self (Subjective) Report for Progress Note Reporting Period: Reports overall he is doing well. Was able to visit his family for Easter, which was the first time they had been able to get together in awhile. Overall continues to have significant difficulty with managing tone in LUE and overall discomfort as a result.    Objective Measurements:      Objective Measure:  strength   Details: As of 2/21/23 - R- 70#, L- unable to register     Objective Measure: ROM   Details: As of 4/11/23 - With PROM of LUE in seated, approximated using goniometer: Shoulder flexion about 80 degrees, abduction about 75 degrees, elbow flexion about 115 degrees and extension to lacking about 25 degrees from full extension, unable to stretch forearm past neutral position for supination, about 30 degrees wrist extension. With supine position: 85 degrees shoulder flexion, 80 degrees abduction, increased to approximately only 10 degrees lacking in elbow extension though increased stretch able to be placed on muscle in this plane of motion.                                         I CERTIFY THE NEED FOR THESE SERVICES FURNISHED UNDER        THIS PLAN OF TREATMENT AND WHILE UNDER MY CARE .             Physician Signature               Date    X_____________________________________________________                      Referring Provider: Perla Haddad MD Megan Peterson, OTR

## 2023-04-18 ENCOUNTER — HOSPITAL ENCOUNTER (OUTPATIENT)
Dept: OCCUPATIONAL THERAPY | Facility: CLINIC | Age: 68
Setting detail: THERAPIES SERIES
Discharge: HOME | End: 2023-04-18
Attending: INTERNAL MEDICINE
Payer: MEDICARE

## 2023-04-18 ENCOUNTER — HOSPITAL ENCOUNTER (OUTPATIENT)
Dept: PHYSICAL THERAPY | Facility: CLINIC | Age: 68
Setting detail: THERAPIES SERIES
Discharge: HOME | End: 2023-04-18
Attending: INTERNAL MEDICINE
Payer: MEDICARE

## 2023-04-18 PROCEDURE — 97110 THERAPEUTIC EXERCISES: CPT | Mod: GP | Performed by: PHYSICAL THERAPIST

## 2023-04-18 PROCEDURE — 97116 GAIT TRAINING THERAPY: CPT | Mod: GP | Performed by: PHYSICAL THERAPIST

## 2023-04-18 PROCEDURE — 97110 THERAPEUTIC EXERCISES: CPT | Mod: GO | Performed by: OCCUPATIONAL THERAPIST

## 2023-04-18 PROCEDURE — 97112 NEUROMUSCULAR REEDUCATION: CPT | Mod: GP | Performed by: PHYSICAL THERAPIST

## 2023-04-18 PROCEDURE — 97112 NEUROMUSCULAR REEDUCATION: CPT | Mod: GO | Performed by: OCCUPATIONAL THERAPIST

## 2023-04-18 NOTE — PROGRESS NOTES
[facility]    OUTPATIENT PHYSICAL THERAPY  PLAN OF TREATMENT FOR OUTPATIENT REHABILITATION AND PROGRESS NOTE           Patient's Last Name, First Name, Marcus Mcgee Date of Birth  1955   Provider's Name  [facility] Medical Record No.  [id number]    Onset Date  10/14/22  (date of order used, CVA 4/20/12) Start of Care Date  11/4/22   Type:     _X_PT   ___OT   ___SLP Diagnosis L hemiparesis   PT Diagnosis   Plan of Treatment  Frequency/Duration: 1 x a week x 90 days  Certification date from 4/19/23  to 7/17/23     Goals:  Goal Identifier 1 HEP   Goal Description Lex to be independent in appropriate HEP with assist from Nidia as needed to promote life long health, ability to stay in home and improve function.   Target Date 04/30/23   Date Met      Progress (detail required for progress note): Baseline - doing HEP but decreased tolerance. Wife not able to do stretching of LE due to her own health concerns.     Goal Identifier 2 - 25 foot walk   Goal Description Lex to complete 25 foot walk consistently in 19 sec or less and 22 steps or less demonstrating improved gait pattern and efficiency.   Target Date 04/30/23   Date Met      Progress (detail required for progress note): 3/21/23  19.7 sec 17 steps   2/28/23:  19.5 sec, 18.85, 18.95 sec with trekking poles B.  1/10/23 at start of the session 23.55, steps   end of session 20.58 sec 1712/20/22 21.9 sec m 26 steps, trekking pole, AFO start of session   after stretches 20.66 sec   12/6/22 19.8 sec and then 20.8 sec both with trekking pole , SBA  9/27/22  22.8 sec, 21.68 sec with NBQC (forgot his trekking pole today) baseline 21.63 sec, 26 steps with trekking ple and AFO     Goal Identifier 3 - TUG   Goal Description Lex to complete TUG in 40 sec or less to demonstrate improved gait pattern, ability to  "transfer sit <> stand and overall increased function.   Target Date 04/30/23   Date Met      Progress (detail required for progress note): 3/7/23:  not assessed d/t time constraints.  12/20/22 40.28 sec, 40.27 sec trekking pole and SBA first time stopped and needing directions before sitting, second time issues with trekking pole slipping forward when stnading up and also when placing along wall to sit.  after stretches 40.23 sec   12/6/22  39.19 sec then 36.19 sec with trekking pole.  9/27/22 38.72 sec.  with NBQC  baseline 50.83 sec with trekking pole     Goal Identifier 4 - stairs   Goal Description Lex to complete stairs ascending with alternating stepping and one rail and min /SBA to demonstrate improved neuro motor function , balance and greater safety with completing stairs.   Target Date 04/30/23   Date Met      Progress (detail required for progress note): 4/18/23  Up and down 4 six inch steps. 2/28/23:  Up-and-down 1 set of stairs with alternating stepping right upper extremity support on the railing.   12/6/22 and 12/20/22   performing alternating stepping up and down , occasional lean onto the rail on the R when descending.  States partly why he does this is that he does it at home this way to protect wall/railing \"rail in sheet rock and don't want to pull it out.\" (of wall)     baseline single step up , alternating down with heavy lean on the rail.     Goal Identifier 5 - pain   Goal Description Lex to report decreased back pain and R knee pain to allow him to increase his ambulation distance/ther ex at the gym as well as overall better QOL.   Target Date 04/30/23   Date Met      Progress (detail required for progress note): 3/7/23 : doing 4 laps at gym.  Knee and back don't seem to be much worse but sometimes knee is more sore.  11/8/22 walking 3 laps , R knee \"doesn't bother me too much\"   He does continue to have knee pain off and on.  baseline - doing 2 laps at gym usually does 3-4  9/27/22  stil " "has about the same R knee, back \"not as stiff\" but doing 3-4 laps.     Goal Identifier 6 - 6MWT   Goal Description Lex to increase his 6MWT distance by 150 feet or greater from when first assessed to demonstrate improved endurance, gait quality and function.   Target Date 04/30/23   Date Met      Progress (detail required for progress note): 4/18/23 450 feet with trekking pole, AFO on the L.  no knee pain     3/7/23: 382 feet.   12/13/22 366 feet     Goal Identifier 7- sit <> stand and COM forward past TAYLOR challenges.   Goal Description Lex to perform sit to stand from 22 inch mat without bracing self with LE against the surface to demonstrate improved ability to bring COM forward past TAYLOR as well as better mechanics with transfer for decreased/excess load on the R LE to decrease knee pain.   Target Date 04/30/23   Date Met      Progress (detail required for progress note): 3/7/23:  trekking pole in front, braces LEs on mat.  with cues to scoot forward before rising, is able to push less against mat but still contacts mat. 12/13/22  from 22 inch height continues to brace leg on the bed but does better with trekking pole held in front of him rather than placed to the side as he moves towards trekking pole/safety/support.  Needing cues on alignment LE and sequencing 9/27/22  baseline, lean to the R and posterior lean against surface 24 inch height     See PN dated 3/7/22 for specifics with progress           I CERTIFY THE NEED FOR THESE SERVICES FURNISHED UNDER        THIS PLAN OF TREATMENT AND WHILE UNDER MY CARE     (Physician co-signature of this document indicates review and certification of the therapy plan).                Referring Provider: Dr. Don Scott, PT    "

## 2023-04-25 ENCOUNTER — MYC MEDICAL ADVICE (OUTPATIENT)
Dept: PEDIATRICS | Facility: CLINIC | Age: 68
End: 2023-04-25
Payer: MEDICARE

## 2023-04-25 ENCOUNTER — HOSPITAL ENCOUNTER (OUTPATIENT)
Dept: OCCUPATIONAL THERAPY | Facility: CLINIC | Age: 68
Setting detail: THERAPIES SERIES
Discharge: HOME | End: 2023-04-25
Attending: INTERNAL MEDICINE
Payer: MEDICARE

## 2023-04-25 ENCOUNTER — HOSPITAL ENCOUNTER (OUTPATIENT)
Dept: PHYSICAL THERAPY | Facility: CLINIC | Age: 68
Setting detail: THERAPIES SERIES
Discharge: HOME | End: 2023-04-25
Attending: INTERNAL MEDICINE
Payer: MEDICARE

## 2023-04-25 PROCEDURE — 97116 GAIT TRAINING THERAPY: CPT | Mod: GP | Performed by: PHYSICAL THERAPIST

## 2023-04-25 PROCEDURE — 97530 THERAPEUTIC ACTIVITIES: CPT | Mod: GP | Performed by: PHYSICAL THERAPIST

## 2023-04-25 PROCEDURE — 97110 THERAPEUTIC EXERCISES: CPT | Mod: GP | Performed by: PHYSICAL THERAPIST

## 2023-04-25 PROCEDURE — 97110 THERAPEUTIC EXERCISES: CPT | Mod: GO

## 2023-05-01 ENCOUNTER — VIRTUAL VISIT (OUTPATIENT)
Dept: PEDIATRICS | Facility: CLINIC | Age: 68
End: 2023-05-01
Payer: MEDICARE

## 2023-05-01 DIAGNOSIS — Z86.73 HISTORY OF CVA (CEREBROVASCULAR ACCIDENT): Primary | ICD-10-CM

## 2023-05-01 PROCEDURE — 99213 OFFICE O/P EST LOW 20 MIN: CPT | Mod: VID | Performed by: INTERNAL MEDICINE

## 2023-05-01 NOTE — LETTER
May 1, 2023    RE:  Marcus Hodges  [address] [place] [address]        To Whom It May Concern:    Marcus Hodges is currently under our care.  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         Lex had cognitive impairment initially after the stroke.  However, his cognitive deficits have steadily improved since that time.  Lex is currently both physically and mentally able to manage his own financial affairs.  Lex is able to receive and manage his own Social Security checks.    However, Lex does continue to have significant physical disabilities since the stroke including left-sided hemiparesis.  Please contact our clinic if you have any additional questions regarding this matter.      Sincerely,        Don Aviles MD

## 2023-05-01 NOTE — PROGRESS NOTES
Lex is a 67 year old who is being evaluated via a billable video visit.      Assessment & Plan     (Z86.73) History of CVA (cerebrovascular accident)  (primary encounter diagnosis)  Comment:   Plan: Letter completed for Social Security today.  Lex has a history of traumatic brain injury following prior CVA in 2012 but has improved to the point where he would like to manage his own financial affairs.  See letters section.      Don Aviles MD  [facility]    Subjective   Lex is a 67 year old, presenting for the following health issues:    HPI     Lex and Tatum presents today with concerns regarding Social Security.  Currently Tatum acts as Valentina representative with regard to his disability checks and financial issues.  Lex will be transitioning to Medicare soon and the couple would like to allow him to resume his prior financial responsibilities.  Lex had some mild cognitive impairment following his stroke more than 10 years ago but has improved since that time and would like the ability to receive his social security checks rather than through a representative (his wife).  Henry are pursuing this to reduce the amount of paperwork they need to manage.    Patient Active Problem List   Diagnosis     * * * SBE PROPHYLAXIS * * *     Health Care Home     Vitamin D deficiency     Hyperlipidemia LDL goal <100     Long-term (current) use of anticoagulants     Urinary incontinence     Neurogenic bladder     Chronic atrial fibrillation (H)     Tricuspid regurgitation     Mitral regurgitation     Atrial enlargement, bilateral     Major depressive disorder, recurrent episode, moderate (H)     Traumatic brain injury with loss of consciousness, sequela (H)     Neurogenic bowel     Left renal stone     Dysphagia     Left hemiparesis (H)     S/P craniotomy     Seizure disorder (H)     Visual field defect     Anemia     Benign prostatic hyperplasia with urinary retention     Current Outpatient  Medications   Medication Sig Dispense Refill     acetaminophen (TYLENOL) 500 MG tablet Take 2 tablets (1,000 mg) by mouth every 6 hours as needed for mild pain 100 tablet 0     alendronate (FOSAMAX) 70 MG tablet Take 1 tablet (70 mg) by mouth every 7 days 12 tablet 3     apixaban ANTICOAGULANT (ELIQUIS ANTICOAGULANT) 5 MG tablet Take 1 tablet (5 mg) by mouth 2 times daily 180 tablet 11     atorvastatin (LIPITOR) 20 MG tablet Take 1 tablet (20 mg) by mouth daily 90 tablet 3     baclofen (LIORESAL) 20 MG tablet Take 1 tablet (20 mg) by mouth 4 times daily 360 tablet 3     citalopram (CELEXA) 20 MG tablet Take 1.5 tablets (30 mg) by mouth daily 135 tablet 11     docusate sodium (COLACE) 100 MG tablet Take 200 mg by mouth nightly as needed        levETIRAcetam (KEPPRA) 1000 MG tablet Take 1 tablet (1,000 mg) by mouth 2 times daily 180 tablet 11     metoprolol succinate ER (TOPROL XL) 25 MG 24 hr tablet Take 0.5 tablets (12.5 mg) by mouth daily 45 tablet 11     mirabegron (MYRBETRIQ) 50 MG 24 hr tablet Take 1 tablet (50 mg) by mouth daily 90 tablet 11     Multiple Vitamin (MULTI-VITAMIN) per tablet Take 1 tablet by mouth daily  100 tablet 3     polyethylene glycol (MIRALAX) 17 g packet Take 17 g by mouth every other day        senna-docusate (SENOKOT-S/PERICOLACE) 8.6-50 MG tablet Take 4 tablets by mouth At Bedtime       tamsulosin (FLOMAX) 0.4 MG capsule Take 1 capsule (0.4 mg) by mouth daily 90 capsule 11     vitamin D3 (CHOLECALCIFEROL) 50 mcg (2000 units) tablet Take 2,000 Units by mouth daily  90 tablet 3      Review of Systems         Objective           Vitals:  No vitals were obtained today due to virtual visit.    Physical Exam   GENERAL: Healthy, alert and no distress  RESP: No audible wheeze, cough, or visible cyanosis.  No visible retractions or increased work of breathing.    SKIN: Visible skin clear. No significant rash, abnormal pigmentation or lesions.  NEURO: Exam at baseline-left-sided  hemiparesis  PSYCH: Mentation appears normal, affect normal  normal speech and appearance well-groomed.        Video-Visit Details    Type of service:  Video Visit   Video start: 5:28pm  Video end: 5:46pm      Originating Location (pt. Location): Home    Distant Location (provider location):  On-site  Platform used for Video Visit: Blanquita

## 2023-05-02 ENCOUNTER — HOSPITAL ENCOUNTER (OUTPATIENT)
Dept: OCCUPATIONAL THERAPY | Facility: CLINIC | Age: 68
Setting detail: THERAPIES SERIES
Discharge: HOME | End: 2023-05-02
Attending: INTERNAL MEDICINE
Payer: MEDICARE

## 2023-05-02 ENCOUNTER — HOSPITAL ENCOUNTER (OUTPATIENT)
Dept: PHYSICAL THERAPY | Facility: CLINIC | Age: 68
Setting detail: THERAPIES SERIES
Discharge: HOME | End: 2023-05-02
Attending: INTERNAL MEDICINE
Payer: MEDICARE

## 2023-05-02 PROCEDURE — 97110 THERAPEUTIC EXERCISES: CPT | Mod: GO

## 2023-05-02 PROCEDURE — 97110 THERAPEUTIC EXERCISES: CPT | Mod: GP | Performed by: PHYSICAL THERAPIST

## 2023-05-02 PROCEDURE — 97116 GAIT TRAINING THERAPY: CPT | Mod: GP | Performed by: PHYSICAL THERAPIST

## 2023-05-09 ENCOUNTER — HOSPITAL ENCOUNTER (OUTPATIENT)
Dept: PHYSICAL THERAPY | Facility: CLINIC | Age: 68
Setting detail: THERAPIES SERIES
Discharge: HOME | End: 2023-05-09
Attending: INTERNAL MEDICINE
Payer: MEDICARE

## 2023-05-09 ENCOUNTER — HOSPITAL ENCOUNTER (OUTPATIENT)
Dept: OCCUPATIONAL THERAPY | Facility: CLINIC | Age: 68
Setting detail: THERAPIES SERIES
Discharge: HOME | End: 2023-05-09
Attending: INTERNAL MEDICINE
Payer: MEDICARE

## 2023-05-09 PROCEDURE — 97112 NEUROMUSCULAR REEDUCATION: CPT | Mod: GP | Performed by: PHYSICAL THERAPIST

## 2023-05-09 PROCEDURE — 97110 THERAPEUTIC EXERCISES: CPT | Mod: GP | Performed by: PHYSICAL THERAPIST

## 2023-05-09 PROCEDURE — 97110 THERAPEUTIC EXERCISES: CPT | Mod: GO

## 2023-05-09 PROCEDURE — 97116 GAIT TRAINING THERAPY: CPT | Mod: GP | Performed by: PHYSICAL THERAPIST

## 2023-05-16 ENCOUNTER — HOSPITAL ENCOUNTER (OUTPATIENT)
Dept: PHYSICAL THERAPY | Facility: CLINIC | Age: 68
Setting detail: THERAPIES SERIES
Discharge: HOME | End: 2023-05-16
Attending: INTERNAL MEDICINE
Payer: MEDICARE

## 2023-05-16 ENCOUNTER — HOSPITAL ENCOUNTER (OUTPATIENT)
Dept: OCCUPATIONAL THERAPY | Facility: CLINIC | Age: 68
Setting detail: THERAPIES SERIES
Discharge: HOME | End: 2023-05-16
Attending: INTERNAL MEDICINE
Payer: MEDICARE

## 2023-05-16 PROCEDURE — 97110 THERAPEUTIC EXERCISES: CPT | Mod: GO

## 2023-05-16 PROCEDURE — 97110 THERAPEUTIC EXERCISES: CPT | Mod: GP | Performed by: PHYSICAL THERAPIST

## 2023-05-16 PROCEDURE — 97116 GAIT TRAINING THERAPY: CPT | Mod: GP | Performed by: PHYSICAL THERAPIST

## 2023-05-16 NOTE — PROGRESS NOTES
PT 10th VISIT PROGRESS NOTE     05/16/23 0900   Signing Clinician's Name / Credentials   Signing clinician's name / credentials Adaadrien Scott, PT   Session Number   Session Number 10/10   Progress Note/Recertification   Progress Note Completed Date 04/18/23   Recertification Due Date 07/17/23   Goal 1   Goal Identifier 1 HEP   Goal Description Lex to be independent in appropriate HEP with assist from Nidia as needed to promote life long health, ability to stay in home and improve function.   Goal Progress Baseline - doing HEP but decreased tolerance. Wife not able to do stretching of LE due to her own health concerns.   Target Date 07/17/23   Goal 2   Goal Identifier 2 - 25 foot walk   Goal Description Lex to complete 25 foot walk consistently in 19 sec or less and 22 steps or less demonstrating improved gait pattern and efficiency.   Goal Progress 5/16/23  20.23   sec  20 steps,  with light assist/cues at plevis for faster pace 18.23 sec both wtih trekking pole and AFO on the L. 3/21/23  19.7 sec 17 steps   2/28/23:  19.5 sec, 18.85, 18.95 sec with trekking poles B.  1/10/23 at start of the session 23.55, steps   end of session 20.58 sec 1712/20/22 21.9 sec m 26 steps, trekking pole, AFO start of session   after stretches 20.66 sec   12/6/22 19.8 sec and then 20.8 sec both with trekking pole , SBA  9/27/22  22.8 sec, 21.68 sec with NBQC (forgot his trekking pole today) baseline 21.63 sec, 26 steps with trekking ple and AFO   Target Date 07/17/23   Goal 3   Goal Identifier 3 - TUG   Goal Description Lex to complete TUG in 40 sec or less to demonstrate improved gait pattern, ability to transfer sit <> stand and overall increased function.   Goal Progress 3/7/23:  not assessed d/t time constraints.  12/20/22 40.28 sec, 40.27 sec trekking pole and SBA first time stopped and needing directions before sitting, second time issues with trekking pole slipping forward when stnading up and also when placing along wall to sit.   "after stretches 40.23 sec   12/6/22  39.19 sec then 36.19 sec with trekking pole.  9/27/22 38.72 sec.  with NBQC  baseline 50.83 sec with trekking pole   Target Date 07/17/23   Goal 4   Goal Identifier 4 - stairs   Goal Description Lex to complete stairs ascending with alternating stepping and one rail and min /SBA to demonstrate improved neuro motor function , balance and greater safety with completing stairs.   Goal Progress 4/18/23  Up and down 4 six inch steps. 2/28/23:  Up-and-down 1 set of stairs with alternating stepping right upper extremity support on the railing.   12/6/22 and 12/20/22   performing alternating stepping up and down , occasional lean onto the rail on the R when descending.  States partly why he does this is that he does it at home this way to protect wall/railing \"rail in sheet rock and don't want to pull it out.\" (of wall)     baseline single step up , alternating down with heavy lean on the rail.   Target Date 07/17/23   Goal 5   Goal Identifier 5 - pain   Goal Description Lex to report decreased back pain and R knee pain to allow him to increase his ambulation distance/ther ex at the gym as well as overall better QOL.   Goal Progress 5/16/23 has been about the same - R knee pain.  Still gets a little sore with prolonged walking. back pain - a little bit sometimes. 3/7/23 : doing 4 laps at gym.  Knee and back don't seem to be much worse but sometimes knee is more sore.  11/8/22 walking 3 laps , R knee \"doesn't bother me too much\"   He does continue to have knee pain off and on.  baseline - doing 2 laps at gym usually does 3-4  9/27/22  stil has about the same R knee, back \"not as stiff\" but doing 3-4 laps.   Target Date 07/17/23   Goal 6   Goal Identifier 6 - 6MWT   Goal Description Lex to increase his 6MWT distance by 150 feet or greater from when first assessed to demonstrate improved endurance, gait quality and function.   Goal Progress 5/16/23 400 feet with trekking pole and AFO.  " "(need to continue to work on faster pace) 4/18/23 450 feet with trekking pole, AFO on the L.  no knee pain     3/7/23: 382 feet.   12/13/22 366 feet   Target Date 07/17/23   Goal 7   Goal Identifier 7- sit <> stand and COM forward past TAYLOR challenges.   Goal Description Lex to perform sit to stand from 22 inch mat without bracing self with LE against the surface to demonstrate improved ability to bring COM forward past TAYLOR as well as better mechanics with transfer for decreased/excess load on the R LE to decrease knee pain.   Goal Progress 5/16/23 continues to struggle with this . Wife subjectively reports transfers as an improvement above - offered without being specifically asked about transfers. 3/7/23:  trekking pole in front, braces LEs on mat.  with cues to scoot forward before rising, is able to push less against mat but still contacts mat. 12/13/22  from 22 inch height continues to brace leg on the bed but does better with trekking pole held in front of him rather than placed to the side as he moves towards trekking pole/safety/support.  Needing cues on alignment LE and sequencing 9/27/22  baseline, lean to the R and posterior lean against surface 24 inch height   Target Date 07/17/23   Subjective Report   Subjective Report walked 4 laps at gym yesterday.  R knee has been a little better. He states that he feels his L shoulder blade has been \"sticky\" just not moving well \"but maybe that is what it has always been like\" WIfe, Nidia , States she has noticed improvement in  Getting up from chair in [place] \"doesn't move the chair like he used to\"  \" Stronger going up and down the stairs. \"   Objective Measure 1   Objective Measure 6MWT   Details 400 feet with trekking pole and AFO   Vitals Signs   Heart Rate 51   SpO2 97   Blood Pressure 100/61   Vital Signs Comments 6MWT HR 56 bpm, sats 97%   /67   Therapeutic Procedure/exercise   Therapeutic Procedures: strength, endurance, ROM, flexibillity minutes " (57917) 40   Skilled Intervention passive stretches   Patient Response greater difficulty relaxing muscle activaiton wtih hamstring stretch R today   Treatment Detail stretches in supine B LE, hamstring, SKTC, LTR, ankle dorsiflexion 3 min hold and 2 x 40 sec hold.  Stretch at 6 inch stairs one foot on second step, UE support on rail and forward lean for hip and knee ext stretch posterior leg performed R x 1 rep and L x 2 reps assist for greater forward pelvis and stretch.   6MWT completed.   Gait Training   Gait Training Minutes, includes stair climbing (65680) 15   Skilled Intervention education, assessment   Patient Response no questions.   Treatment Detail TUG, 25 foot walk. education regardign gait and increasing gait speed, decreasing habit of 3 point stepping pattern.  Wife had vidoe of patient ambulating at the gym - better gait quality with movement L knee and ankle , slower pace.   Education   Learner Patient;Significant Other   Readiness Acceptance   Method Explanation   Response Verbalizes Understanding   Education Comments POC, goals - therapists and patients.   Plan   Plan for next session scapular mobs L, neuro motor re-ed, increase gait speed, TM   Comments   Comments PN covers dates 3/17/23 - 5/16/23.   Progress as noted above with the goals.  Some decrease in distance with 6MWT but better gait quality.  Improved function at home with subjective above.  Marcus is appropriate for continuation of skilled PT Intervention. He has demonstrated decline in his function and increase in his pain without intervention of PT.  Goals remain appropriate and we will continue to work towards these goals. Pt and wife without questions and wish to continue.   Total Session Time   Timed Code Treatment Minutes 55   Total Treatment Time (sum of timed and untimed services) 55   Medicare Claim Information   Medical Diagnosis Left hemiparesis (H) (G81.94)   PT Diagnosis impaired gait, transfers, decline in function

## 2023-05-23 ENCOUNTER — THERAPY VISIT (OUTPATIENT)
Dept: OCCUPATIONAL THERAPY | Facility: CLINIC | Age: 68
End: 2023-05-23
Attending: INTERNAL MEDICINE
Payer: MEDICARE

## 2023-05-23 ENCOUNTER — THERAPY VISIT (OUTPATIENT)
Dept: PHYSICAL THERAPY | Facility: CLINIC | Age: 68
End: 2023-05-23
Attending: INTERNAL MEDICINE
Payer: MEDICARE

## 2023-05-23 DIAGNOSIS — G81.94 LEFT HEMIPARESIS (H): Primary | ICD-10-CM

## 2023-05-23 PROCEDURE — 97140 MANUAL THERAPY 1/> REGIONS: CPT | Mod: GP,KX | Performed by: PHYSICAL THERAPIST

## 2023-05-23 PROCEDURE — 97110 THERAPEUTIC EXERCISES: CPT | Mod: GO

## 2023-05-23 PROCEDURE — 97110 THERAPEUTIC EXERCISES: CPT | Mod: GP,KX | Performed by: PHYSICAL THERAPIST

## 2023-05-30 ENCOUNTER — THERAPY VISIT (OUTPATIENT)
Dept: PHYSICAL THERAPY | Facility: CLINIC | Age: 68
End: 2023-05-30
Attending: INTERNAL MEDICINE
Payer: MEDICARE

## 2023-05-30 ENCOUNTER — THERAPY VISIT (OUTPATIENT)
Dept: OCCUPATIONAL THERAPY | Facility: CLINIC | Age: 68
End: 2023-05-30
Attending: INTERNAL MEDICINE
Payer: MEDICARE

## 2023-05-30 DIAGNOSIS — G81.94 LEFT HEMIPARESIS (H): Primary | ICD-10-CM

## 2023-05-30 DIAGNOSIS — Z98.890 S/P CRANIOTOMY: ICD-10-CM

## 2023-05-30 DIAGNOSIS — S06.9X9S TRAUMATIC BRAIN INJURY WITH LOSS OF CONSCIOUSNESS, SEQUELA (H): ICD-10-CM

## 2023-05-30 PROCEDURE — 97110 THERAPEUTIC EXERCISES: CPT | Mod: GP,KX | Performed by: PHYSICAL THERAPIST

## 2023-05-30 PROCEDURE — 97116 GAIT TRAINING THERAPY: CPT | Mod: GP,KX | Performed by: PHYSICAL THERAPIST

## 2023-05-30 PROCEDURE — 97110 THERAPEUTIC EXERCISES: CPT | Mod: GO

## 2023-05-30 PROCEDURE — 97140 MANUAL THERAPY 1/> REGIONS: CPT | Mod: GP,KX | Performed by: PHYSICAL THERAPIST

## 2023-06-06 ENCOUNTER — THERAPY VISIT (OUTPATIENT)
Dept: OCCUPATIONAL THERAPY | Facility: CLINIC | Age: 68
End: 2023-06-06
Attending: INTERNAL MEDICINE
Payer: MEDICARE

## 2023-06-06 ENCOUNTER — THERAPY VISIT (OUTPATIENT)
Dept: PHYSICAL THERAPY | Facility: CLINIC | Age: 68
End: 2023-06-06
Attending: INTERNAL MEDICINE
Payer: MEDICARE

## 2023-06-06 DIAGNOSIS — G81.94 LEFT HEMIPARESIS (H): Primary | ICD-10-CM

## 2023-06-06 DIAGNOSIS — S06.9X9S TRAUMATIC BRAIN INJURY WITH LOSS OF CONSCIOUSNESS, SEQUELA (H): ICD-10-CM

## 2023-06-06 PROCEDURE — 97112 NEUROMUSCULAR REEDUCATION: CPT | Mod: GP,KX | Performed by: PHYSICAL THERAPIST

## 2023-06-06 PROCEDURE — 97110 THERAPEUTIC EXERCISES: CPT | Mod: GO

## 2023-06-06 PROCEDURE — 97110 THERAPEUTIC EXERCISES: CPT | Mod: GP,KX | Performed by: PHYSICAL THERAPIST

## 2023-06-06 PROCEDURE — 97116 GAIT TRAINING THERAPY: CPT | Mod: GP,KX | Performed by: PHYSICAL THERAPIST

## 2023-06-13 ENCOUNTER — THERAPY VISIT (OUTPATIENT)
Dept: PHYSICAL THERAPY | Facility: CLINIC | Age: 68
End: 2023-06-13
Attending: INTERNAL MEDICINE
Payer: MEDICARE

## 2023-06-13 ENCOUNTER — THERAPY VISIT (OUTPATIENT)
Dept: OCCUPATIONAL THERAPY | Facility: CLINIC | Age: 68
End: 2023-06-13
Attending: INTERNAL MEDICINE
Payer: MEDICARE

## 2023-06-13 DIAGNOSIS — G81.94 LEFT HEMIPARESIS (H): Primary | ICD-10-CM

## 2023-06-13 DIAGNOSIS — S06.9X9S TRAUMATIC BRAIN INJURY WITH LOSS OF CONSCIOUSNESS, SEQUELA (H): ICD-10-CM

## 2023-06-13 PROCEDURE — 97110 THERAPEUTIC EXERCISES: CPT | Mod: GO

## 2023-06-13 PROCEDURE — 97110 THERAPEUTIC EXERCISES: CPT | Mod: GP,KX | Performed by: PHYSICAL THERAPIST

## 2023-06-13 PROCEDURE — 97112 NEUROMUSCULAR REEDUCATION: CPT | Mod: GP,KX | Performed by: PHYSICAL THERAPIST

## 2023-06-13 PROCEDURE — 97116 GAIT TRAINING THERAPY: CPT | Mod: GP,KX | Performed by: PHYSICAL THERAPIST

## 2023-06-20 ENCOUNTER — THERAPY VISIT (OUTPATIENT)
Dept: OCCUPATIONAL THERAPY | Facility: CLINIC | Age: 68
End: 2023-06-20
Attending: INTERNAL MEDICINE
Payer: MEDICARE

## 2023-06-20 ENCOUNTER — THERAPY VISIT (OUTPATIENT)
Dept: PHYSICAL THERAPY | Facility: CLINIC | Age: 68
End: 2023-06-20
Attending: INTERNAL MEDICINE
Payer: MEDICARE

## 2023-06-20 DIAGNOSIS — S06.9X9S TRAUMATIC BRAIN INJURY WITH LOSS OF CONSCIOUSNESS, SEQUELA (H): ICD-10-CM

## 2023-06-20 DIAGNOSIS — G81.94 LEFT HEMIPARESIS (H): Primary | ICD-10-CM

## 2023-06-20 DIAGNOSIS — Z98.890 S/P CRANIOTOMY: ICD-10-CM

## 2023-06-20 PROCEDURE — 97112 NEUROMUSCULAR REEDUCATION: CPT | Mod: GP,KX | Performed by: PHYSICAL THERAPIST

## 2023-06-20 PROCEDURE — 97116 GAIT TRAINING THERAPY: CPT | Mod: GP,KX | Performed by: PHYSICAL THERAPIST

## 2023-06-20 PROCEDURE — 97110 THERAPEUTIC EXERCISES: CPT | Mod: GP,KX | Performed by: PHYSICAL THERAPIST

## 2023-06-20 PROCEDURE — 97110 THERAPEUTIC EXERCISES: CPT | Mod: GO

## 2023-06-20 NOTE — PROGRESS NOTES
PLAN  Continue therapy per current plan of care.  Pt has been seen for a total of 27 outpatient OT sessions with focus on the below goals (see progress noted below). Pt demonstrating improved awareness of UE HEP and positioning techniques for LUE for tone management but continues to be significantly limited by flexor tone impacting comfort and engagement in ADLs/IADLs. Pt demonstrating adherence to NMES and PROM (completed by caregiver)HEP routines. Continue to encourage increased self-management of with introduction of SROM and AAROM exercises. Due to limited progress in reporting period, anticipate break from skilled OT services with plan for patient to continue with home program and return to OP OT in the future, as needed. Pt and spouse in agreement. Anticipate 1-2 more sessions to finalize HEP and discharge recommendations.    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; Spasticity; Impaired mobility and activities of daily living   OT Tx Diagnosis Decreased safety and IND with ADLs/IADLs   Progress Note/Certification   Start Of Care Date 11/04/22   Onset of Illness/Injury or Date of Surgery 10/14/22  (date of order used, CVA 4/20/12)   Therapy Frequency 1x/week   Predicted Duration 12 weeks   Certification date from 04/11/23   Certification date to 07/04/23   Progress Note Completed Date 04/11/23   Recertification Due 07/04/23   Goals   OT Goals 1;2;3   OT Goal 1   Goal Identifier LUE Functional Use   Goal Description Patient to demonstrate functional tasks with 20% use of LUE as gross stabilizer and gross assist for IND  with ADLs/IADLS (wiping the counter/table, washing dishes, stablizing laundry, etc.).   Goal Progress Goal progressing. Educated in LUE HEP and positioning techniques (ex. Sleep positioning, resting hand splint) to promote tone management and functional ROM for use of LUE in daily activities. Pt reports minimal use of LUE due to decreased ability to complete functional grasp. Does use L hand to assist with turning on/off light switches around home. Trained in table slides with washcloth to promote weight-bearing/tone management as well as function use of LUE for tasks like washing table or mirror. Will plan to further address functional use of LUE using one-handed and adaptive strategies, as appropriate, in future session.   Target Date 07/04/23   OT Goal 2   Goal Identifier LUE ROM   Goal Description Patient will demonstrate increased L shoulder flexion PROM/AAROM to 120 degrees for improved functional reach, IND with ADLs, and comfort/positioning for participation in daily activities and sleep.   Goal Progress Goal progressing. Pt demonstrating improved ROM in LUE shoulder flexion with increased repetitions of PROM/SROM/AAROM in sessions however sees increase/return to previous tone between sessions. HEP includes trunk and cervical ROM exercises for improved ROM and functional use of LUE. Progressed HEP for increased IND with table slides using washcloth and dowel exercises, including training in grasp assist glove to promote functional grasp with L hand on dowel. Will plan to assess additional needs and provide recommendations for discharge in future session.   Target Date 07/04/23   OT Goal 3   Goal Identifier UE HEP   Goal Description Patient/family to demonstrate updated UE HEP with SBA and min cues for good follow through at home to increase functional strength and ROM for preventing pain and contractures and promoting ROM/strength for maximum independence with performance of ADLs.   Goal Progress Goal  "progressing. Pt educated and trained in LUE HEP to promote tone management and functional ROM and strength, including PROM and SROM stretching, cervical and trunk AROM, weight-bearing, table slides, and NMES programs (shoulder, tricep, and forearm) combined with functional tasks (theraband, dowel, hand  strengthener). Pt continuing to favor ROM and NMES programs over other HEP activities but does demonstrate incorporation of new updates to ROM and NMES programs to his HEP. Recently progressed with use of table slides and dowel exercises (including grasp assist glove) to further promote IND and adherence to HEP. Will plan to assess additional needs and provide recommendations for discharge in future session.   Target Date 07/04/23   Subjective Report   Subjective Report Pt reports overall he is doing well, doesn't have any specific questions. Feels his L shoulder has been particularly \"tight\" lately and requests increased time spent focused on this area today.   Objective Measures   Objective Measures Objective Measure 1;Objective Measure 2   Objective Measure 1   Objective Measure  strength   Details As of 2/21/23 - R- 70#, L- unable to register   Objective Measure 2   Objective Measure ROM   Details As of 5/2/23 (post-Botox) - With PROM of LUE in seated, approximated using goniometer: 75-80 degrees shoulder flexion, 80-85 degrees shoulder abduction, elbow flexion approximately 100-110 degrees and lacking about 15-20 degrees extension. In supine, approximately 85 degrees shoulder flexion and 85 degrees shoulder abduction. As of 4/11/23 - With PROM of LUE in seated, approximated using goniometer: Shoulder flexion about 80 degrees, abduction about 75 degrees, elbow flexion about 115 degrees and extension to lacking about 25 degrees from full extension, unable to stretch forearm past neutral position for supination, about 30 degrees wrist extension. With supine position: 85 degrees shoulder flexion, 80 degrees " abduction, increased to approximately only 10 degrees lacking in elbow extension though increased stretch able to be placed on muscle in this plane of motion.   Treatment Interventions (OT)   Interventions Therapeutic Procedure/Exercise;Neuromuscular Re-education;Self Care/Home Management   Therapeutic Procedure/Exercise   Therapeutic Procedure: strength, endurance, ROM, flexibillity minutes (47349) 40   Skilled Intervention Skilled faciliation of ROM exercises for tone management and promote functional use of LUE for IND with ADLs/IADLs   Patient Response/Progress Pt endorses much easier to complete dowel exercises with use of grasp assist glove, requests ordering information   Treatment Detail To promote IND with HEP and maximize benefits of Botox, facilitated training in dowel ROM exercises using 1# dowel with use of grasp assist glove this date to complete 10 repetitions each of the followin) shoulder flexion, 2) side to side (shoulder horizontal abduction/adduction), 3) scapular protraction/retraction, 4) elbow flexion/extension, 5) forward row, 6) backward row, 7) clockwise stir the pot, and 8) counterclockwise stir the pot with min VCs for pacing and form. Educated in don/doff grasp assist glove and issued ordering information for use with HEP at home. To promote ROM for joint integrity, maximize effects of Botox, and promote functional use of LUE, facilitated skilled completion of PROM exercises with emphasis on moving joints through their full ROM as able, preventing/reducing tightness/risk of contracture, enable ROM for ADLs, and increasing circulation. Supine positioning used this date for improved end ROM, min A for transfer on/off mat table. Provided training and completion of 10-20 sec holds x5-10 reps each in the following exercises; 1) shoulder flexion, 2) shoulder extension, 3) shoulder abduction, 4) shoulder horizontal abduction/adduction, 5) shoulder internal/external rotation (arm at side  versus arm abducted), 6) elbow flexion/extension, 7) supination/pronation, 8) wrist flexion/extension, 9) thumb circumduction, 10) thumb and digit flexion/extension. Further progressed ROM with  circumduction of the shoulder in each direction to promote scapular mobility. Again reviewed plan to take break from skilled OT services following next session (end of current plan of care) with pt still in agreement.   Progress goals progressing   Education   Learner/Method Patient;Listening;Demonstration   Readiness Acceptance   Education Notes see tx details above   Plan   Home program PROM/SROM stretching, AROM cervical and trunk stretches, weight-bearing on palm , table slides, e-stim (forearm, shoulder, tricep), grasp assist glove with dowel   Plan for next session *discharge - provide recommendations/summary. any questions about grasp assist glove? cont manual scapular mobility in supine. NMES patterns: flexor/extensor program with 5# hand gripper (min A), tricep, shoulder. trunk stretches. assess: MMT. progress towel slides (vertical or inclined). Functional tasks. f/u PROM/SROM warm up, AROM cervical stretches, trunk mobility, weight-bearing, dowel.   Homework positioning of LUE (straight when sleeping, wrist/hand splint)   Total Session Time   Timed Code Treatment Minutes 40   Total Treatment Time (sum of timed and untimed services) 40   Session Number   Additional Session Number 27 total (eval counts as 1)   Authorization status CERT REQUIRED. TELEHEALTH -YES.   Clinical Impression   OT Diagnosis Decreased safety and IND with ADLs/IADLs

## 2023-06-27 ENCOUNTER — THERAPY VISIT (OUTPATIENT)
Dept: PHYSICAL THERAPY | Facility: CLINIC | Age: 68
End: 2023-06-27
Attending: INTERNAL MEDICINE
Payer: MEDICARE

## 2023-06-27 ENCOUNTER — THERAPY VISIT (OUTPATIENT)
Dept: OCCUPATIONAL THERAPY | Facility: CLINIC | Age: 68
End: 2023-06-27
Attending: INTERNAL MEDICINE
Payer: MEDICARE

## 2023-06-27 DIAGNOSIS — Z98.890 S/P CRANIOTOMY: ICD-10-CM

## 2023-06-27 DIAGNOSIS — G81.94 LEFT HEMIPARESIS (H): Primary | ICD-10-CM

## 2023-06-27 DIAGNOSIS — S06.9X9S TRAUMATIC BRAIN INJURY WITH LOSS OF CONSCIOUSNESS, SEQUELA (H): ICD-10-CM

## 2023-06-27 PROCEDURE — 97112 NEUROMUSCULAR REEDUCATION: CPT | Mod: GP,KX | Performed by: PHYSICAL THERAPIST

## 2023-06-27 PROCEDURE — 97110 THERAPEUTIC EXERCISES: CPT | Mod: GO

## 2023-06-27 PROCEDURE — 97110 THERAPEUTIC EXERCISES: CPT | Mod: GP,KX | Performed by: PHYSICAL THERAPIST

## 2023-06-27 PROCEDURE — 97116 GAIT TRAINING THERAPY: CPT | Mod: GP,KX | Performed by: PHYSICAL THERAPIST

## 2023-06-27 NOTE — PROGRESS NOTES
DISCHARGE  Reason for Discharge: No further expectation of progress. Patient chooses to discontinue therapy.  Pt was seen for a total of 28 outpatient OT sessions with focus on the below goals (see progress noted below). Pt demonstrating improved awareness of UE HEP and positioning techniques for LUE for tone management but continues to be significantly limited by flexor tone impacting comfort and engagement in ADLs/IADLs. Pt demonstrating adherence to NMES and PROM (completed by caregiver) HEP routines. Continue to encourage increased self-management of with introduction of SROM and AAROM exercises. Due to limited progress, plan for patient to discharge from skilled OT services, continue with home program, and return to OP OT in the future, as needed. Pt and spouse in agreement.    Equipment Issued: n/a    Discharge Plan: Patient to continue home program and is welcome to return to OP OT in the future, as needed, with new referral from his provider.    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; Spasticity; Impaired mobility and activities of daily living   OT Tx Diagnosis Decreased safety and IND with ADLs/IADLs   Progress Note/Certification   Start Of Care Date 11/04/22   Onset of Illness/Injury or Date of Surgery 10/14/22  (date of order used, CVA 4/20/12)   Therapy Frequency 1x/week   Predicted Duration 12 weeks   Certification date from 04/11/23   Certification date to 07/04/23   Progress Note Completed Date 04/11/23   Recertification Due 07/04/23   Goals   OT Goals 1;2;3   OT Goal 1   Goal Identifier LUE Functional Use   Goal Description Patient to demonstrate functional tasks with 20% use of LUE as gross  stabilizer and gross assist for IND with ADLs/IADLS (wiping the counter/table, washing dishes, stablizing laundry, etc.).   Goal Progress Goal progressing but not fully met. Educated in LUE HEP and positioning techniques (ex. Sleep positioning, resting hand splint) to promote tone management and functional ROM for use of LUE in daily activities. Pt reports minimal use of LUE due to decreased ability to complete functional grasp. Does use L hand to assist with turning on/off light switches around home. Trained in table slides with washcloth to promote weight-bearing/tone management as well as function use of LUE for tasks like washing table or mirror. Encouraged continuing to explore additional ways to use hand functionally throughout his day, especially as gross grasp/pinch/stabilization, as part of patient's HEP to continue beyond discharge.   Target Date 07/04/23   OT Goal 2   Goal Identifier LUE ROM   Goal Description Patient will demonstrate increased L shoulder flexion PROM/AAROM to 120 degrees for improved functional reach, IND with ADLs, and comfort/positioning for participation in daily activities and sleep.   Goal Progress Goal progressing but not fully met. Pt demonstrating improved ROM in LUE shoulder flexion with increased repetitions of PROM/SROM/AAROM in sessions however sees increase/return to previous tone between sessions. Able to achieve approximately 100-110 degrees shoulder ROM with PROM in sessions. HEP includes trunk and cervical ROM exercises for improved ROM and functional use of LUE. Progressed HEP for increased IND with table slides using washcloth and dowel exercises, including training in grasp assist glove to promote functional grasp with L hand on dowel. Encouraged continued completion of HEP exercises with emphasis on patient increasing his SROM/AAROM exercises to further promote tone management and functional ROM in LUE.   Target Date 07/04/23   OT Goal 3   Goal Identifier UE HEP    Goal Description Patient/family to demonstrate updated UE HEP with SBA and min cues for good follow through at home to increase functional strength and ROM for preventing pain and contractures and promoting ROM/strength for maximum independence with performance of ADLs.   Goal Progress Goal met. Pt educated and trained in LUE HEP to promote tone management and functional ROM and strength, including PROM and SROM stretching, cervical and trunk AROM, weight-bearing, table slides, and NMES programs (shoulder, tricep, and forearm) combined with functional tasks (theraband, dowel, hand  strengthener). Pt continuing to favor ROM and NMES programs over other HEP activities but does demonstrate incorporation of new updates to ROM and NMES programs to his HEP. Recently progressed with use of table slides and dowel exercises (including grasp assist glove) to further promote IND and adherence to HEP. Facilitated overview of HEP prior to discharge and encouraged continuation of exercises beyond time of discharge to further promote tone management and functional use of LUE.   Target Date 07/04/23   Date Met 06/27/23   Subjective Report   Subjective Report Pt endorses readiness for discharge today. Reports he does not have any specific questions or concerns on his mind at this time. Reports understanding of referral process for return to OP OT in the future, as needed.   Objective Measures   Objective Measures Objective Measure 1;Objective Measure 2   Objective Measure 1   Objective Measure  strength   Details As of 2/21/23 - R- 70#, L- unable to register   Objective Measure 2   Objective Measure ROM   Details As of 5/2/23 (post-Botox) - With PROM of LUE in seated, approximated using goniometer: 75-80 degrees shoulder flexion, 80-85 degrees shoulder abduction, elbow flexion approximately 100-110 degrees and lacking about 15-20 degrees extension. In supine, approximately 85 degrees shoulder flexion and 85 degrees  shoulder abduction. As of 4/11/23 - With PROM of LUE in seated, approximated using goniometer: Shoulder flexion about 80 degrees, abduction about 75 degrees, elbow flexion about 115 degrees and extension to lacking about 25 degrees from full extension, unable to stretch forearm past neutral position for supination, about 30 degrees wrist extension. With supine position: 85 degrees shoulder flexion, 80 degrees abduction, increased to approximately only 10 degrees lacking in elbow extension though increased stretch able to be placed on muscle in this plane of motion.   Treatment Interventions (OT)   Interventions Therapeutic Procedure/Exercise;Neuromuscular Re-education;Self Care/Home Management   Therapeutic Procedure/Exercise   Therapeutic Procedure: strength, endurance, ROM, flexibillity minutes (17411) 42   Skilled Intervention Skilled education in HEP and faciliation of ROM exercises for tone management and promote functional use of LUE for IND with ADLs/IADLs   Patient Response/Progress Pt reports understanding of HEP, has no specific questions/concerns   Treatment Detail To promote IND with HEP and overall functioal use of LUE, facilitated comprehensive review of patient's HEP this date with focus on the following exercises: PROM and SROM stretching, cervical and trunk AROM, weight-bearing, table slides, dowel exercises (with grasp assist glove), and NMES programs (shoulder, tricep, and forearm) combined with functional tasks (theraband, dowel, hand  strengthener). Issued custom HEP handout. To promote ROM for joint integrity, maximize effects of Botox, and promote functional use of LUE, facilitated skilled completion of PROM exercises with emphasis on moving joints through their full ROM as able, preventing/reducing tightness/risk of contracture, enable ROM for ADLs, and increasing circulation. Supine positioning used this date for improved end ROM, min A for transfer on/off mat table. Provided training  and completion of 10-20 sec holds x5-10 reps each in the following exercises; 1) shoulder flexion, 2) shoulder extension, 3) shoulder abduction, 4) shoulder horizontal abduction/adduction, 5) shoulder internal/external rotation (arm at side versus arm abducted), 6) elbow flexion/extension, 7) supination/pronation, 8) wrist flexion/extension, 9) thumb circumduction, 10) thumb and digit flexion/extension. Additionally, reviewed AROM trunk and cervical ROM stretches this date, including 1) flexion/extension, 2) rotation, and 3) lateral flexion. Reviewed referral process for return to OT and pt reports understanding and agreement with today's discharge.   Progress goals progressing   Education   Learner/Method Patient;Listening;Demonstration   Readiness Acceptance   Education Notes see tx details above   Plan   Home program PROM/SROM stretching, AROM cervical and trunk stretches, weight-bearing on palm , table slides, e-stim (forearm, shoulder, tricep), grasp assist glove with dowel   Updates to plan of care discharge   Homework positioning of LUE (straight when sleeping, wrist/hand splint)   Total Session Time   Timed Code Treatment Minutes 42   Total Treatment Time (sum of timed and untimed services) 42   Session Number   Additional Session Number 28 total (eval counts as 1)   Authorization status CERT REQUIRED. TELEHEALTH -YES.   Clinical Impression   OT Diagnosis Decreased safety and IND with ADLs/IADLs   Thank you for the referral of this patient.  If you have any questions regarding the information in this report, please feel free to contact me per the information provided below.      Regina Tolentino MA, OTR/L  Occupational Therapist  [address] [address]  Clinic Fax:  [phone]  Clinic Phone: [phone]

## 2023-07-11 ENCOUNTER — THERAPY VISIT (OUTPATIENT)
Dept: PHYSICAL THERAPY | Facility: CLINIC | Age: 68
End: 2023-07-11
Attending: INTERNAL MEDICINE
Payer: MEDICARE

## 2023-07-11 DIAGNOSIS — Z98.890 S/P CRANIOTOMY: ICD-10-CM

## 2023-07-11 DIAGNOSIS — G81.94 LEFT HEMIPARESIS (H): Primary | ICD-10-CM

## 2023-07-11 DIAGNOSIS — S06.9X9S TRAUMATIC BRAIN INJURY WITH LOSS OF CONSCIOUSNESS, SEQUELA (H): ICD-10-CM

## 2023-07-11 PROCEDURE — 97112 NEUROMUSCULAR REEDUCATION: CPT | Mod: GP,KX | Performed by: PHYSICAL THERAPIST

## 2023-07-11 PROCEDURE — 97110 THERAPEUTIC EXERCISES: CPT | Mod: GP,KX | Performed by: PHYSICAL THERAPIST

## 2023-07-11 PROCEDURE — 97116 GAIT TRAINING THERAPY: CPT | Mod: GP,KX | Performed by: PHYSICAL THERAPIST

## 2023-07-12 ENCOUNTER — TELEPHONE (OUTPATIENT)
Dept: CARDIOLOGY | Facility: CLINIC | Age: 68
End: 2023-07-12
Payer: MEDICARE

## 2023-07-12 NOTE — TELEPHONE ENCOUNTER
7/12/23 Called to patient and wife - echocardiogram was not ordered this year per DR Yip office note and plan.  Jacqueline Farooq RN 07/12/23 2:38 PM

## 2023-07-12 NOTE — TELEPHONE ENCOUNTER
[facility] Call Center    Phone Message    May a detailed message be left on voicemail: yes     Reason for Call: Other: The patient wants to know if he needs an Echo this year prior to his appt in November?  Please call wife to schedule     Action Taken: Other: Cardiology    Travel Screening: Not Applicable     Thank you!  [facility]

## 2023-07-25 ENCOUNTER — THERAPY VISIT (OUTPATIENT)
Dept: PHYSICAL THERAPY | Facility: CLINIC | Age: 68
End: 2023-07-25
Attending: INTERNAL MEDICINE
Payer: MEDICARE

## 2023-07-25 DIAGNOSIS — Z98.890 S/P CRANIOTOMY: ICD-10-CM

## 2023-07-25 DIAGNOSIS — G81.94 LEFT HEMIPARESIS (H): Primary | ICD-10-CM

## 2023-07-25 PROCEDURE — 97112 NEUROMUSCULAR REEDUCATION: CPT | Mod: GP,KX | Performed by: PHYSICAL THERAPIST

## 2023-07-25 PROCEDURE — 97116 GAIT TRAINING THERAPY: CPT | Mod: GP,KX | Performed by: PHYSICAL THERAPIST

## 2023-07-25 PROCEDURE — 97110 THERAPEUTIC EXERCISES: CPT | Mod: GP,KX | Performed by: PHYSICAL THERAPIST

## 2023-07-25 NOTE — PROGRESS NOTES
07/25/23 0500   Appointment Info   Signing clinician's name / credentials dAa Scott, PT   Visits Used 8/10   Quick Adds Certification   Progress Note/Certification   Start of Care Date 07/21/22   Onset of illness/injury or Date of Surgery 06/09/22   Therapy Frequency 1 x a week every other week.   Predicted Duration 90 days   Certification date from 07/18/23   Certification date to 10/05/23   KX Modifier Statement Therapy services meets medical necessity requirements beyond the therapy threshold for ongoing care.   PT Goal 1   Goal Identifier 1 HEP   Goal Description Lex to be independent in appropriate HEP with assist from Nidia as needed to promote life long health, ability to stay in home and improve function.   Goal Progress Baseline - doing HEP but decreased tolerance. Wife not able to do stretching of LE due to her own health concerns.   Target Date 07/17/23   PT Goal 2   Goal Identifier 2 - 25 foot walk   Goal Description Lex to complete 25 foot walk consistently in 19 sec or less and 22 steps or less demonstrating improved gait pattern and efficiency.   Goal Progress 6/27/23  times 18.66 sec, 18.5 sec and then 19.8 sec.   5/16/23  20.23   sec  20 steps,  with light assist/cues at plevis for faster pace 18.23 sec both wtih trekking pole and AFO on the L. 3/21/23  19.7 sec 17 steps   2/28/23:  19.5 sec, 18.85, 18.95 sec with trekking poles B.  1/10/23 at start of the session 23.55, steps   end of session 20.58 sec 1712/20/22 21.9 sec m 26 steps, trekking pole, AFO start of session   after stretches 20.66 sec   12/6/22 19.8 sec and then 20.8 sec both with trekking pole , SBA  9/27/22  22.8 sec, 21.68 sec with NBQC (forgot his trekking pole today) baseline 21.63 sec, 26 steps with trekking ple and AFO   Target Date 07/17/23   PT Goal 3   Goal Identifier 3 - TUG   Goal Description Lex to complete TUG in 40 sec or less to demonstrate improved gait pattern, ability to transfer sit <> stand and overall  "increased function.   Goal Progress 3/7/23:  not assessed d/t time constraints.  12/20/22 40.28 sec, 40.27 sec trekking pole and SBA first time stopped and needing directions before sitting, second time issues with trekking pole slipping forward when stnading up and also when placing along wall to sit.  after stretches 40.23 sec   12/6/22  39.19 sec then 36.19 sec with trekking pole.  9/27/22 38.72 sec.  with NBQC  baseline 50.83 sec with trekking pole   Target Date 07/17/23   PT Goal 4   Goal Identifier 4 - stairs   Goal Description Lex to complete stairs ascending with alternating stepping and one rail and min /SBA to demonstrate improved neuro motor function , balance and greater safety with completing stairs.   Goal Progress 4/18/23  Up and down 4 six inch steps. 2/28/23:  Up-and-down 1 set of stairs with alternating stepping right upper extremity support on the railing.   12/6/22 and 12/20/22   performing alternating stepping up and down , occasional lean onto the rail on the R when descending.  States partly why he does this is that he does it at home this way to protect wall/railing \"rail in sheet rock and don't want to pull it out.\" (of wall)     baseline single step up , alternating down with heavy lean on the rail.   Target Date 07/17/23   PT Goal 5   Goal Identifier 5 - pain   Goal Description Lex to report decreased back pain and R knee pain to allow him to increase his ambulation distance/ther ex at the gym as well as overall better QOL.   Goal Progress 5/16/23 has been about the same - R knee pain.  Still gets a little sore with prolonged walking. back pain - a little bit sometimes. 3/7/23 : doing 4 laps at gym.  Knee and back don't seem to be much worse but sometimes knee is more sore.  11/8/22 walking 3 laps , R knee \"doesn't bother me too much\"   He does continue to have knee pain off and on.  baseline - doing 2 laps at gym usually does 3-4  9/27/22  stil has about the same R knee, back \"not as " "stiff\" but doing 3-4 laps.   Target Date 07/17/23   PT Goal 6   Goal Identifier 6 - 6MWT   Goal Description Lex to increase his 6MWT distance by 150 feet or greater from when first assessed to demonstrate improved endurance, gait quality and function.   Goal Progress 5/16/23 400 feet with trekking pole and AFO.  (need to continue to work on faster pace) 4/18/23 450 feet with trekking pole, AFO on the L.  no knee pain     3/7/23: 382 feet.   12/13/22 366 feet   Target Date 07/17/23   PT Goal 7   Goal Identifier 7- sit <> stand and COM forward past TAYLOR challenges.   Goal Description Lex to perform sit to stand from 22 inch mat without bracing self with LE against the surface to demonstrate improved ability to bring COM forward past TAYLOR as well as better mechanics with transfer for decreased/excess load on the R LE to decrease knee pain.   Goal Progress 5/16/23 continues to struggle with this . Wife subjectively reports transfers as an improvement above - offered without being specifically asked about transfers. 3/7/23:  trekking pole in front, braces LEs on mat.  with cues to scoot forward before rising, is able to push less against mat but still contacts mat. 12/13/22  from 22 inch height continues to brace leg on the bed but does better with trekking pole held in front of him rather than placed to the side as he moves towards trekking pole/safety/support.  Needing cues on alignment LE and sequencing 9/27/22  baseline, lean to the R and posterior lean against surface 24 inch height   Target Date 07/17/23   Subjective Report   Subjective Report walked at gym Sunday. Did 4 laps.   Therapeutic Procedure/Exercise   Therapeutic Procedures: strength, endurance, ROM, flexibillity minutes (75587) 18   Ther Proc 1 supine stretches B LE as before, did not do ankle on the L- AFO kept on.  Pectorals, UT, GH extension and elbow extension, GH external rotation L   Skilled Intervention passive stretches.   Patient " Response/Progress tolerated well.   Neuromuscular Re-education   Neuromuscular re-ed of mvmt, balance, coord, kinesthetic sense, posture, proprioception minutes (53950) 14   Patient Response/Progress tolerated well, less apprehensive than in the past   Treatment Detail activities at 6 inch stairs R foot forward on first step and forward COM /extension hip and knee L with assist for pelvic alignmetn in transverse plane and glide forward and IR femur L,  progressed to step up to first step and then down x 10, second step and donw x 10, stp laterally x 10, step laterally with turn to the R.  All done with stepping R 1 UE support.   Progress better femoral acetabular motion with good carry over to gait L   Gait Training   Gait Training Minutes, includes stair climbing (91113) 9   Skilled Intervention as noted   Patient Response/Progress tolerated well.   Treatment Detail Gait with trekking pole cues for upright posture, knee extension mid to end stance R. 150 feet , 250 feet. and shorter session in clinic   Progress faster pace able with cues.   Manual Therapy   Manual Therapy: Mobilization, MFR, MLD, friction massage minutes (59988) 5   Skilled Intervention mobs GH L ,  Femoral acetabular L and R to improve hip extension   Plan   Plan for next session TM   Total Session Time   Timed Code Treatment Minutes 46   Total Treatment Time (sum of timed and untimed services) 46       GOALS REMAIN THE SAME and Will be further assessed at next session for PN.  Frequency of visits decreased to every other week.  Lex continues to benefit from skilled PT intervention to improve his impairments and without PT he declines in his function.     [facility]                                                                                   OUTPATIENT PHYSICAL THERAPY    PLAN OF TREATMENT FOR OUTPATIENT REHABILITATION   Patient's Last Name, First Name, Marcus Mcgee YOB: 1955    Provider's Name   [facility]   Medical Record No.  [id number]     Onset Date: 06/09/22  Start of Care Date: 07/21/22     Medical Diagnosis:         PT Treatment Diagnosis:    Plan of Treatment  Frequency/Duration: (P) 1 x a week every other week./ (P) 90 days    Certification date from (P) 07/18/23 to (P) 10/05/23         See note for plan of treatment details and functional goals     Ada Scott, PT                         I CERTIFY THE NEED FOR THESE SERVICES FURNISHED UNDER        THIS PLAN OF TREATMENT AND WHILE UNDER MY CARE     (Physician attestation of this document indicates review and certification of the therapy plan).                Referring Provider:  Don Aviles      Initial Assessment  See Epic Evaluation- Start of Care Date: 07/21/22

## 2023-08-02 ENCOUNTER — TRANSFERRED RECORDS (OUTPATIENT)
Dept: HEALTH INFORMATION MANAGEMENT | Facility: CLINIC | Age: 68
End: 2023-08-02
Payer: MEDICARE

## 2023-08-08 ENCOUNTER — HOSPITAL ENCOUNTER (OUTPATIENT)
Dept: GENERAL RADIOLOGY | Facility: CLINIC | Age: 68
Discharge: HOME | End: 2023-08-08
Attending: STUDENT IN AN ORGANIZED HEALTH CARE EDUCATION/TRAINING PROGRAM | Admitting: STUDENT IN AN ORGANIZED HEALTH CARE EDUCATION/TRAINING PROGRAM
Payer: MEDICARE

## 2023-08-08 ENCOUNTER — THERAPY VISIT (OUTPATIENT)
Dept: PHYSICAL THERAPY | Facility: CLINIC | Age: 68
End: 2023-08-08
Attending: INTERNAL MEDICINE
Payer: MEDICARE

## 2023-08-08 DIAGNOSIS — G81.94 LEFT HEMIPARESIS (H): Primary | ICD-10-CM

## 2023-08-08 DIAGNOSIS — N20.0 CALCULUS OF KIDNEY: ICD-10-CM

## 2023-08-08 DIAGNOSIS — Z98.890 S/P CRANIOTOMY: ICD-10-CM

## 2023-08-08 PROCEDURE — 74018 RADEX ABDOMEN 1 VIEW: CPT

## 2023-08-08 PROCEDURE — 97110 THERAPEUTIC EXERCISES: CPT | Mod: GP,KX | Performed by: PHYSICAL THERAPIST

## 2023-08-08 PROCEDURE — 97116 GAIT TRAINING THERAPY: CPT | Mod: GP,KX | Performed by: PHYSICAL THERAPIST

## 2023-08-08 PROCEDURE — 97140 MANUAL THERAPY 1/> REGIONS: CPT | Mod: GP,KX | Performed by: PHYSICAL THERAPIST

## 2023-08-09 DIAGNOSIS — N20.0 CALCULUS OF KIDNEY: Primary | ICD-10-CM

## 2023-08-11 ENCOUNTER — LAB (OUTPATIENT)
Dept: LAB | Facility: CLINIC | Age: 68
End: 2023-08-11
Payer: MEDICARE

## 2023-08-11 DIAGNOSIS — R33.9 URINARY RETENTION: ICD-10-CM

## 2023-08-11 DIAGNOSIS — Z12.5 SPECIAL SCREENING FOR MALIGNANT NEOPLASM OF PROSTATE: ICD-10-CM

## 2023-08-11 DIAGNOSIS — N40.0 BENIGN PROSTATIC HYPERPLASIA WITHOUT LOWER URINARY TRACT SYMPTOMS: ICD-10-CM

## 2023-08-11 LAB — PSA SERPL DL<=0.01 NG/ML-MCNC: 1.69 NG/ML (ref 0–4.5)

## 2023-08-11 PROCEDURE — 36415 COLL VENOUS BLD VENIPUNCTURE: CPT

## 2023-08-11 PROCEDURE — 84153 ASSAY OF PSA TOTAL: CPT

## 2023-08-22 ENCOUNTER — THERAPY VISIT (OUTPATIENT)
Dept: PHYSICAL THERAPY | Facility: CLINIC | Age: 68
End: 2023-08-22
Attending: INTERNAL MEDICINE
Payer: MEDICARE

## 2023-08-22 DIAGNOSIS — S06.9X9S TRAUMATIC BRAIN INJURY WITH LOSS OF CONSCIOUSNESS, SEQUELA (H): ICD-10-CM

## 2023-08-22 DIAGNOSIS — Z98.890 S/P CRANIOTOMY: ICD-10-CM

## 2023-08-22 DIAGNOSIS — G81.94 LEFT HEMIPARESIS (H): Primary | ICD-10-CM

## 2023-08-22 PROCEDURE — 94618 PULMONARY STRESS TESTING: CPT | Mod: KX | Performed by: PHYSICAL THERAPIST

## 2023-08-22 PROCEDURE — 97110 THERAPEUTIC EXERCISES: CPT | Mod: GP,KX | Performed by: PHYSICAL THERAPIST

## 2023-08-22 PROCEDURE — 97116 GAIT TRAINING THERAPY: CPT | Mod: GP,KX | Performed by: PHYSICAL THERAPIST

## 2023-08-22 NOTE — PROGRESS NOTES
08/22/23 0500   Appointment Info   Signing clinician's name / credentials Ada Scott, PT   Total/Authorized Visits 10/10   Quick Adds Certification   Progress Note/Certification   Start of Care Date 07/21/22   Onset of illness/injury or Date of Surgery 06/09/22   Therapy Frequency 1 x a week every other week.   Predicted Duration 90 days   Certification date from 07/18/23   Certification date to 10/05/23   KX Modifier Statement Therapy services meets medical necessity requirements beyond the therapy threshold for ongoing care.   PT Goal 1   Goal Identifier 1 HEP   Goal Description Lex to be independent in appropriate HEP with assist from Nidia as needed to promote life long health, ability to stay in home and improve function.   Goal Progress Baseline - doing HEP but decreased tolerance. Wife not able to do stretching of LE due to her own health concerns.   Target Date 12/31/23   PT Goal 2   Goal Identifier 2 - 25 foot walk   Goal Description Lex to complete 25 foot walk consistently in 19 sec or less and 22 steps or less demonstrating improved gait pattern and efficiency.   Goal Progress 8/22/23 -  20.68 sec  at start of the session  6/27/23  times 18.66 sec, 18.5 sec and then 19.8 sec.   5/16/23  20.23   sec  20 steps,  with light assist/cues at plevis for faster pace 18.23 sec both wtih trekking pole and AFO on the L. 3/21/23  19.7 sec 17 steps   2/28/23:  19.5 sec, 18.85, 18.95 sec with trekking poles B.  1/10/23 at start of the session 23.55, steps   end of session 20.58 sec 1712/20/22 21.9 sec m 26 steps, trekking pole, AFO start of session   after stretches 20.66 sec   12/6/22 19.8 sec and then 20.8 sec both with trekking pole , SBA  9/27/22  22.8 sec, 21.68 sec with NBQC (forgot his trekking pole today) baseline 21.63 sec, 26 steps with trekking ple and AFO   Target Date 12/31/23   PT Goal 3   Goal Identifier 3 - TUG   Goal Description Lex to complete TUG in 40 sec or less to demonstrate improved gait  "pattern, ability to transfer sit <> stand and overall increased function.   Goal Progress 8/22/23  38.52 sec    3/7/23:  not assessed d/t time constraints.  12/20/22 40.28 sec, 40.27 sec trekking pole and SBA first time stopped and needing directions before sitting, second time issues with trekking pole slipping forward when stnading up and also when placing along wall to sit.  after stretches 40.23 sec   12/6/22  39.19 sec then 36.19 sec with trekking pole.  9/27/22 38.72 sec.  with NBQC  baseline 50.83 sec with trekking pole   Target Date 12/31/23   PT Goal 4   Goal Identifier 4 - stairs   Goal Description Lex to complete stairs ascending with alternating stepping and one rail and min /SBA to demonstrate improved neuro motor function , balance and greater safety with completing stairs.   Goal Progress 8/22/23 up and down 4 six inch steps with one rail and alternating stepping, descending leans R side against the rail on R. Less heavy lean than baselien.   baseline single step up , alternating down with heavy lean on the rail.   Target Date 12/31/23   Date Met 08/22/23   PT Goal 5   Goal Identifier 5 - pain   Goal Description Lex to report decreased back pain and R knee pain to allow him to increase his ambulation distance/ther ex at the gym as well as overall better QOL.   Goal Progress 8/22/23  did the 4 laps at the gym yesterday and did not have R knee pain.  5/16/23 has been about the same - R knee pain.  Still gets a little sore with prolonged walking. back pain - a little bit sometimes. 3/7/23 : doing 4 laps at gym.  Knee and back don't seem to be much worse but sometimes knee is more sore.  11/8/22 walking 3 laps , R knee \"doesn't bother me too much\"   He does continue to have knee pain off and on.  baseline - doing 2 laps at gym usually does 3-4  9/27/22  stil has about the same R knee, back \"not as stiff\" but doing 3-4 laps.   Target Date 12/31/23   PT Goal 6   Goal Identifier 6 - 6MWT   Goal Description " Lex to increase his 6MWT distance by 150 feet or greater from when first assessed to demonstrate improved endurance, gait quality and function.   Goal Progress 8/22/23  363 feet wtih trekking pole , AFO on the L. 5/16/23 400 feet with trekking pole and AFO.  (need to continue to work on faster pace) 4/18/23 450 feet with trekking pole, AFO on the L.  no knee pain   cistractions - more people , noise in testing area than prior ? if this affected speed and less distance than in the past.   3/7/23: 382 feet.   12/13/22 366 feet   Target Date 12/31/23   PT Goal 7   Goal Identifier 7- sit <> stand and COM forward past TAYLOR challenges.   Goal Description Lex to perform sit to stand from 22 inch mat without bracing self with LE against the surface to demonstrate improved ability to bring COM forward past TAYLOR as well as better mechanics with transfer for decreased/excess load on the R LE to decrease knee pain.   Goal Progress 8/22/23 sit to stand from 22 inch mat with R UE support, did not brace against the mat with his LE.  Performance is inconsistent. Will continue with goal for consistency. 5/16/23 continues to struggle with this . Wife subjectively reports transfers as an improvement above - offered without being specifically asked about transfers. 3/7/23:  trekking pole in front, braces LEs on mat.  with cues to scoot forward before rising, is able to push less against mat but still contacts mat. 12/13/22  from 22 inch height continues to brace leg on the bed but does better with trekking pole held in front of him rather than placed to the side as he moves towards trekking pole/safety/support.  Needing cues on alignment LE and sequencing 9/27/22  baseline, lean to the R and posterior lean against surface 24 inch height   Target Date 12/31/23         PLAN  Continue therapy per current plan of care.    Beginning/End Dates of Progress Note Reporting Period:    5/16/23 to 08/22/2023.       Referring Provider:  Don  Bolivar Aviles

## 2023-08-29 ENCOUNTER — LAB (OUTPATIENT)
Dept: LAB | Facility: CLINIC | Age: 68
End: 2023-08-29
Payer: MEDICARE

## 2023-08-29 DIAGNOSIS — E83.50 CALCIUM METABOLISM DISORDER: ICD-10-CM

## 2023-08-29 DIAGNOSIS — N20.0 CALCULUS OF KIDNEY: ICD-10-CM

## 2023-08-29 DIAGNOSIS — Z51.81 MEDICATION MONITORING ENCOUNTER: ICD-10-CM

## 2023-08-29 LAB
ANION GAP SERPL CALCULATED.3IONS-SCNC: 10 MMOL/L (ref 7–15)
BUN SERPL-MCNC: 15.6 MG/DL (ref 8–23)
CALCIUM SERPL-MCNC: 9.1 MG/DL (ref 8.8–10.2)
CHLORIDE SERPL-SCNC: 105 MMOL/L (ref 98–107)
CREAT SERPL-MCNC: 0.81 MG/DL (ref 0.67–1.17)
DEPRECATED HCO3 PLAS-SCNC: 27 MMOL/L (ref 22–29)
GFR SERPL CREATININE-BSD FRML MDRD: >90 ML/MIN/1.73M2
GLUCOSE SERPL-MCNC: 57 MG/DL (ref 70–99)
MAGNESIUM SERPL-MCNC: 2.1 MG/DL (ref 1.7–2.3)
POTASSIUM SERPL-SCNC: 4.2 MMOL/L (ref 3.4–5.3)
SODIUM SERPL-SCNC: 142 MMOL/L (ref 136–145)

## 2023-08-29 PROCEDURE — 36415 COLL VENOUS BLD VENIPUNCTURE: CPT

## 2023-08-29 PROCEDURE — 83735 ASSAY OF MAGNESIUM: CPT

## 2023-08-29 PROCEDURE — 80048 BASIC METABOLIC PNL TOTAL CA: CPT

## 2023-08-29 PROCEDURE — 82306 VITAMIN D 25 HYDROXY: CPT

## 2023-08-31 ENCOUNTER — OFFICE VISIT (OUTPATIENT)
Dept: UROLOGY | Facility: CLINIC | Age: 68
End: 2023-08-31
Payer: MEDICARE

## 2023-08-31 VITALS
BODY MASS INDEX: 22.46 KG/M2 | SYSTOLIC BLOOD PRESSURE: 106 MMHG | WEIGHT: 175 LBS | HEIGHT: 74 IN | DIASTOLIC BLOOD PRESSURE: 70 MMHG

## 2023-08-31 DIAGNOSIS — R82.90 ABNORMAL URINE FINDING: ICD-10-CM

## 2023-08-31 DIAGNOSIS — N20.0 CALCULUS OF KIDNEY: Primary | ICD-10-CM

## 2023-08-31 DIAGNOSIS — R33.9 URINARY RETENTION: ICD-10-CM

## 2023-08-31 PROCEDURE — 87086 URINE CULTURE/COLONY COUNT: CPT | Performed by: STUDENT IN AN ORGANIZED HEALTH CARE EDUCATION/TRAINING PROGRAM

## 2023-08-31 PROCEDURE — 87186 SC STD MICRODIL/AGAR DIL: CPT | Performed by: STUDENT IN AN ORGANIZED HEALTH CARE EDUCATION/TRAINING PROGRAM

## 2023-08-31 PROCEDURE — 87088 URINE BACTERIA CULTURE: CPT | Performed by: STUDENT IN AN ORGANIZED HEALTH CARE EDUCATION/TRAINING PROGRAM

## 2023-08-31 PROCEDURE — 99214 OFFICE O/P EST MOD 30 MIN: CPT | Performed by: STUDENT IN AN ORGANIZED HEALTH CARE EDUCATION/TRAINING PROGRAM

## 2023-08-31 ASSESSMENT — PAIN SCALES - GENERAL: PAINLEVEL: NO PAIN (0)

## 2023-08-31 NOTE — LETTER
"8/31/2023       RE: Marcus Hodges  [address]  [place] [address]     Dear Colleague,    Thank you for referring your patient, Marcus Hodges, to the [facility] [place] [facility]. Please see a copy of my visit note below.    CHIEF COMPLAINT   Marcus Hodges who is a 67 year old male h/o neurogenic bladder due to stroke 2012, ASD s/p remote repair, h/o a. 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  Body mass index is 22.47 kg/m .  General Appearance Adult:   Alert, no acute distress, oriented  HENT: throat/mouth:normal, good dentition  Lungs: no respiratory distress, or pursed lip breathing  Heart: No obvious jugular venous distension present  Abdomen: nondistended  Musculoskeltal: extremities normal, no peripheral edema  Skin: no suspicious lesions or rashes  Neuro: Alert, oriented, speech and mentation normal  Psych: affect and mood normal  Gait: Normal      All pertinent imaging reviewed:    Kub 8/8/2023  IMPRESSION: No visualized urolithiasis, but this may " be obscured by  the bowel gas pattern. A few gas filled small bowel loops noted that  could represent an ileus. There is prominent stool throughout the  colon. Pericardial lead is unchanged.    I reviewed and interpreted imaging personally. No obvious large kidney stone seen       Component      Latest Ref Rng 8/11/2023  10:28 AM   PSA Tumor Marker      0.00 - 4.50 ng/mL 1.69          ASSESSMENT and PLAN  67 year old male h/o neurogenic bladder due to stroke 2012, ASD s/p remote repair, h/o a. 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 Very high urine output. All parameters within normal except urine pH. Clinical concern for urease splitting organism colonization. Will get cath sample and send for culture. If urease splitting organism confirmed then needs treatment and CT abd/pelvis w/o contrast now because chance of stone formation with radiolucent stone is high. He is due to follow up with [facility] soon    Urination is stable with condom catheter. PSA remains low    If no urease splitting organism seen then  Return 1 year with CT abd/pelvis w/o contrast, PSA prior        Nehemiah Bloom MD   [facility]rology  [facility] Phone: [phone]

## 2023-08-31 NOTE — PROGRESS NOTES
"CHIEF COMPLAINT   Marcus Hodges who is a 67 year old male h/o neurogenic bladder due to stroke 2012, ASD s/p remote repair, h/o a. 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  Body mass index is 22.47 kg/m .  General Appearance Adult:   Alert, no acute distress, oriented  HENT: throat/mouth:normal, good dentition  Lungs: no respiratory distress, or pursed lip breathing  Heart: No obvious jugular venous distension present  Abdomen: nondistended  Musculoskeltal: extremities normal, no peripheral edema  Skin: no suspicious lesions or rashes  Neuro: Alert, oriented, speech and mentation normal  Psych: affect and mood normal  Gait: Normal      All pertinent imaging reviewed:    Kub 8/8/2023  IMPRESSION: No visualized urolithiasis, but this may be obscured by  the bowel gas pattern. A few gas filled small bowel loops noted that  could represent an ileus. There is prominent stool throughout the  colon. Pericardial lead is unchanged.    I reviewed and interpreted imaging personally. No obvious large kidney stone seen       Component      Latest Ref Rng " 8/11/2023  10:28 AM   PSA Tumor Marker      0.00 - 4.50 ng/mL 1.69          ASSESSMENT and PLAN  67 year old male h/o neurogenic bladder due to stroke 2012, ASD s/p remote repair, h/o a. 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 Very high urine output. All parameters within normal except urine pH. Clinical concern for urease splitting organism colonization. Will get cath sample and send for culture. If urease splitting organism confirmed then needs treatment and CT abd/pelvis w/o contrast now because chance of stone formation with radiolucent stone is high. He is due to follow up with [facility] soon    Urination is stable with condom catheter. PSA remains low    If no urease splitting organism seen then  Return 1 year with CT abd/pelvis w/o contrast, PSA prior        Nehemiah Bloom MD   [facility]rology  [facility] Phone: [phone]

## 2023-08-31 NOTE — NURSING NOTE
Chief Complaint   Patient presents with    Benign Prostatic Hypertrophy     Review PSA    Kidney Stone Related     Review KUB    Neurogenic bladder     Pt states he has no new urinary concerns.  Still using the condom catheter  Denies gross hematuria.      Christine Crawford, EMT

## 2023-08-31 NOTE — PATIENT INSTRUCTIONS
We will check your urine for bacteria and if there are concerning bacteria you need treatment with antibiotics and get a CT scan  If your urine doesn't have concerning bacteria, then we can probably follow up in a year or earlier if you have issues

## 2023-09-02 LAB
DEPRECATED CALCIDIOL+CALCIFEROL SERPL-MC: <40 UG/L (ref 20–75)
VITAMIN D2 SERPL-MCNC: <5 UG/L
VITAMIN D3 SERPL-MCNC: 35 UG/L

## 2023-09-03 LAB — BACTERIA UR CULT: ABNORMAL

## 2023-09-05 ENCOUNTER — THERAPY VISIT (OUTPATIENT)
Dept: PHYSICAL THERAPY | Facility: CLINIC | Age: 68
End: 2023-09-05
Attending: INTERNAL MEDICINE
Payer: MEDICARE

## 2023-09-05 DIAGNOSIS — G81.94 LEFT HEMIPARESIS (H): Primary | ICD-10-CM

## 2023-09-05 DIAGNOSIS — N39.0 UTI (URINARY TRACT INFECTION): Primary | ICD-10-CM

## 2023-09-05 PROCEDURE — 97112 NEUROMUSCULAR REEDUCATION: CPT | Mod: GP,KX | Performed by: PHYSICAL THERAPIST

## 2023-09-05 PROCEDURE — 97110 THERAPEUTIC EXERCISES: CPT | Mod: GP,KX | Performed by: PHYSICAL THERAPIST

## 2023-09-05 PROCEDURE — 97116 GAIT TRAINING THERAPY: CPT | Mod: GP,KX | Performed by: PHYSICAL THERAPIST

## 2023-09-05 RX ORDER — CIPROFLOXACIN 500 MG/1
500 TABLET, FILM COATED ORAL 2 TIMES DAILY
Qty: 10 TABLET | Refills: 0 | Status: SHIPPED | OUTPATIENT
Start: 2023-09-05 | End: 2023-09-10

## 2023-09-06 ENCOUNTER — VIRTUAL VISIT (OUTPATIENT)
Dept: NEPHROLOGY | Facility: CLINIC | Age: 68
End: 2023-09-06
Attending: INTERNAL MEDICINE
Payer: MEDICARE

## 2023-09-06 VITALS
SYSTOLIC BLOOD PRESSURE: 102 MMHG | BODY MASS INDEX: 22.47 KG/M2 | DIASTOLIC BLOOD PRESSURE: 69 MMHG | HEART RATE: 54 BPM | WEIGHT: 175 LBS

## 2023-09-06 DIAGNOSIS — R82.994 HYPERCALCIURIA: ICD-10-CM

## 2023-09-06 DIAGNOSIS — N20.0 RECURRENT KIDNEY STONES: Primary | ICD-10-CM

## 2023-09-06 PROCEDURE — 99215 OFFICE O/P EST HI 40 MIN: CPT | Mod: VID | Performed by: INTERNAL MEDICINE

## 2023-09-06 ASSESSMENT — PAIN SCALES - GENERAL: PAINLEVEL: NO PAIN (0)

## 2023-09-06 NOTE — PROGRESS NOTES
[facility]  09/06/2023     Marcus Hodges MRN:[id number] YOB: 1955  Primary care provider: Don Aviles  Requesting physician: Nehemiah Bloom     ASSESSMENT AND RECOMMENDATIONS:   Marcus Hodges is a 67 year old presenting for nephrolithiasis.  # Recurrent kidney stones: stone type primarily calcium oxalate 9/28/2020 and now 90% calcium phosphate 5/31/2022    Most recent 24 hour urine showed high urine pH and higher ammonium raising concern for infection with urease organism which can increase risk of stones, Dr. Bloom had UC done and it grew staph epi, treatment with ciprofloxacin was initiated.  CT scan to assess kidney stone burden has been ordered (but not yet completed) by Dr. Bloom.    OK to liberalize foods with moderate oxalate content such as adding a few walnuts with breakfast, eating pecan cookies (calculated to have less than 1 oz pecan per cookie), can add a few more types of beans back into diet    Hypercalciuria improved. Has low blood pressures so prefer to avoid adding thiazide    At risk for making more kidney stones so will benefit from urine chemistry/supersaturation evaluation and monitoring and regular follow up for kidney stone prevention.    Other co-morbidities:  # osteoporosis - started alendronate January 2023  # hypovitaminosis D - vitamin D level in Oct 2021 was a little over 50, goal in person with kidney stones is less than 50, would prefer closer to 30.   Has decreased from 2000 units per day down to 1000 units per day  - repeat vitamin D level is 35 and at goal, continue cholecalciferol 1000 units daily    # CVA - 2012, embolic, complicated by malignant cerebral edema and required decompressive craniectomy  - spastic left hemiparesis, neglect, cognitive deficits (PMR note from [facility] reviewed)  # neurogenic bladder  # ASD  # mitral valve repair in the last 1970's  # atrial fibrillation on apixaban and metoprolol  (Cardiology - Dr. Hayes Yip)  # seizure - on keppra    Known issue that I take into account for other medical decisions, no current exacerbations or new concerns.     Repeat 24 hour chemistries July 2024  Repeat imaging pending - scheduled for next week  Return in about 1 year (around 9/6/2024).     40 minutes spent on visit, meeting with Marcus Hodges and in chart review and documentation on date of encounter, 09/06/23       Dawna Frances MD  [facility]  Department of Medicine  Division of Renal Disease and Hypertension  [facility]  karonil  Vocera Web Console        REASON FOR VISIT: follow up nephrolithiasis    HISTORY OF PRESENT ILLNESS:  Marcus Hodges is a 67 year old with history of kidney stones, CVA with neurogenic bladder (2012), atrial fibrillation    Required left pyelolithotomy 9/2020 for 1.9 cm renal pelvis stone, has also passed small stone (size of apple seed).  After stroke, has no feeling on left side in addition to hemiplegia and significant decrease in activity. Prior to stroke did a lot of hiking etc and was quite active.  Since the kidney stone event, has made many changes to diet.  Fluid intake includes 12 cups water per day, no longer drinks tea, coconut milk once a day, yogurt once a day, tries to get calcium with every meal. Follows low sodium, low sugar and low oxalate. Mostly vegetarian - lacto ovo pescatarian.    Last visit 12/7/2022 at which time we reviewed kidney stone history and risk factors, including concerns regarding bone density. DXA done 12/16/2022 showed osteoporosis and he started on alendronate in January 2023. I had also asked that he reduce vitamin D to 2000 units every other day. He is taking vitamin D 1000 units daily.    Otherwise doing OK, no new kidney stone symptoms.  Blood pressures running  systolic.    I reviewed 24 hour urine chemstries:                Family history is positive for kidney stones in older brother a couple  of times    REVIEW OF SYSTEMS:  A 10 point review of systems was negative except as noted above.    Problem list  Patient Active Problem List   Diagnosis    * * * SBE PROPHYLAXIS * * *    Health Care Home    Vitamin D deficiency    Hyperlipidemia LDL goal <100    Long-term (current) use of anticoagulants    Urinary incontinence    Neurogenic bladder    Chronic atrial fibrillation (H)    Tricuspid regurgitation    Mitral regurgitation    Atrial enlargement, bilateral    Major depressive disorder, recurrent episode, moderate (H)    Traumatic brain injury with loss of consciousness, sequela (H)    Neurogenic bowel    Left renal stone    Dysphagia    Left hemiparesis (H)    S/P craniotomy    Seizure disorder (H)    Visual field defect    Anemia    Benign prostatic hyperplasia with urinary retention    History of CVA (cerebrovascular accident)     PSH  Past Surgical History:   Procedure Laterality Date    CATHETER, ABLATION  2004    COMBINED CYSTOSCOPY, INSERT CATHETER URETER  9/28/2020    Procedure: cystoscopy and left ureteral catheter placement, left ureteral stent placement;  Surgeon: Nehemiah Bloom MD;  Location:  OR    Craniotomy reconstruction  2012    [facility], repair of hemicraniectomy defect    CYSTOSCOPY      DAVINCI PYELOPLASTY Left 9/28/2020    Procedure: left robotic assisted laparoscopic pyelolithotomy;  Surgeon: Nehemiah Bloom MD;  Location:  OR    GASTROSTOMY TUBE  April 2012    [facility], following CVA    HERNIA REPAIR      IR CAROTID ANGIOGRAM  4/22/2012    IR CAROTID ANGIOGRAM  4/22/2012    IR MISCELLANEOUS PROCEDURE  4/22/2012    IR MISCELLANEOUS PROCEDURE  4/25/2012    LASER KTP GREEN LIGHT PHOTOSELECTIVE VAPORIZATION PROSTATE N/A 12/1/2020    Procedure: Cystoscopy and greenlight photovaporization of the prostate;  Surgeon: Nehemiah Bloom MD;  Location: [facility] OR    REPAIR ATRIAL SEPTAL DEFECT  1978    ASD Repair    Right hemicraniectomy  April 2012    [facility], following CVA, mgmt  malignant cerebral edema    SURGICAL HISTORY OF -   2009    right eye enucleation    TRACHEOSTOMY  April 2012    [facility], following CVA    ZZC NONSPECIFIC PROCEDURE      tonsillectomy    ZZC NONSPECIFIC PROCEDURE      Ing. Hernia Repair       Social    Social History     Socioeconomic History    Marital status:      Spouse name: haim    Number of children: 0    Years of education: Not on file    Highest education level: Not on file   Occupational History     Employer: [employer]   Tobacco Use    Smoking status: Never    Smokeless tobacco: Never   Substance and Sexual Activity    Alcohol use: No    Drug use: No    Sexual activity: Yes     Partners: Female   Other Topics Concern     Service Not Asked    Blood Transfusions Not Asked    Caffeine Concern Yes     Comment: couple cups of tea a day    Occupational Exposure Not Asked    Hobby Hazards Not Asked    Sleep Concern Not Asked    Stress Concern Not Asked    Weight Concern Not Asked    Special Diet Yes     Comment: vegan; occ eggs/fish     Back Care Not Asked    Exercise Yes     Comment: 2 x weekly/gym , pysical therapy    Bike Helmet Not Asked    Seat Belt Yes    Self-Exams Not Asked    Parent/sibling w/ CABG, MI or angioplasty before 65F 55M? Not Asked   Social History Narrative    Not on file     Social Determinants of Health     Financial Resource Strain: Low Risk  (10/25/2022)    Overall Financial Resource Strain (CARDIA)     Difficulty of Paying Living Expenses: Not hard at all   Food Insecurity: No Food Insecurity (10/25/2022)    Hunger Vital Sign     Worried About Running Out of Food in the Last Year: Never true     Ran Out of Food in the Last Year: Never true   Transportation Needs: No Transportation Needs (10/25/2022)    PRAPARE - Transportation     Lack of Transportation (Medical): No     Lack of Transportation (Non-Medical): No   Physical Activity: Insufficiently Active (10/25/2022)    Exercise Vital Sign     Days of  Exercise per Week: 1 day     Minutes of Exercise per Session: 10 min   Stress: No Stress Concern Present (10/25/2022)    [place] [employer] - Occupational Stress Questionnaire     Feeling of Stress : Only a little   Social Connections: Socially Integrated (10/25/2022)    Social Connection and Isolation Panel [NHANES]     Frequency of Communication with Friends and Family: Three times a week     Frequency of Social Gatherings with Friends and Family: Once a week     Attends Yazdanism Services: More than 4 times per year     Active Member of Clubs or Organizations: Yes     Attends Club or Organization Meetings: Not on file     Marital Status:    Intimate Partner Violence: Not on file   Housing Stability: Low Risk  (10/25/2022)    Housing Stability Vital Sign     Unable to Pay for Housing in the Last Year: No     Number of Places Lived in the Last Year: 1     Unstable Housing in the Last Year: No     Family history  Family History   Problem Relation Age of Onset    Hypertension Mother     Cerebrovascular Disease Father     Cancer Paternal Grandmother     Diabetes Maternal Grandmother     Congenital Anomalies Brother         several brothers and sisters born with congential heart defects, but all have been repaired    Congenital Anomalies Sister           MEDICATIONS:  Current Outpatient Medications   Medication Sig Dispense Refill    acetaminophen (TYLENOL) 500 MG tablet Take 2 tablets (1,000 mg) by mouth every 6 hours as needed for mild pain 100 tablet 0    alendronate (FOSAMAX) 70 MG tablet Take 1 tablet (70 mg) by mouth every 7 days 12 tablet 3    apixaban ANTICOAGULANT (ELIQUIS ANTICOAGULANT) 5 MG tablet Take 1 tablet (5 mg) by mouth 2 times daily 180 tablet 11    atorvastatin (LIPITOR) 20 MG tablet Take 1 tablet (20 mg) by mouth daily 90 tablet 3    baclofen (LIORESAL) 20 MG tablet Take 1 tablet (20 mg) by mouth 4 times daily 360 tablet 3    ciprofloxacin (CIPRO) 500 MG tablet Take 1  tablet (500 mg) by mouth 2 times daily for 5 days 10 tablet 0    citalopram (CELEXA) 20 MG tablet Take 1.5 tablets (30 mg) by mouth daily 135 tablet 11    docusate sodium (COLACE) 100 MG tablet Take 200 mg by mouth nightly as needed       levETIRAcetam (KEPPRA) 1000 MG tablet Take 1 tablet (1,000 mg) by mouth 2 times daily 180 tablet 11    metoprolol succinate ER (TOPROL XL) 25 MG 24 hr tablet Take 0.5 tablets (12.5 mg) by mouth daily 45 tablet 11    mirabegron (MYRBETRIQ) 50 MG 24 hr tablet Take 1 tablet (50 mg) by mouth daily 90 tablet 11    Multiple Vitamin (MULTI-VITAMIN) per tablet Take 1 tablet by mouth daily  100 tablet 3    polyethylene glycol (MIRALAX) 17 g packet Take 17 g by mouth every other day       senna-docusate (SENOKOT-S/PERICOLACE) 8.6-50 MG tablet Take 4 tablets by mouth At Bedtime      tamsulosin (FLOMAX) 0.4 MG capsule Take 1 capsule (0.4 mg) by mouth daily 90 capsule 11    vitamin D3 (CHOLECALCIFEROL) 50 mcg (2000 units) tablet Take 1,000 Units by mouth daily 90 tablet 3        ALLERGIES:    Allergies   Allergen Reactions    Blood Transfusion Related (Informational Only) Other (See Comments)     Patient has a history of a clinically significant antibody against RBC antigens.  A delay in compatible RBCs may occur.    Lisinopril          PHYSICAL EXAM:   *video visit  GENERAL APPEARANCE: alert and no distress  RESP: normal work of breathing, no conversational dyspnea  NEURO: mentation intact and speech normal    LABS:   I reviewed:  Electrolytes/Renal -   Recent Labs   Lab Test 08/29/23  0948 10/26/22  0822 10/19/21  0808    140 138   POTASSIUM 4.2 4.6 4.2   CHLORIDE 105 109 108   CO2 27 25 26   BUN 15.6 13 15   CR 0.81 0.73 0.81   GLC 57* 78 78   STEPHANI 9.1 9.0 9.1   MAG 2.1  --   --        CBC -   Recent Labs   Lab Test 10/26/22  0822 10/19/21  0808 10/03/20  0619   WBC 3.9* 4.2 6.8   HGB 12.2* 12.9* 11.3*    177 158       LFTs -   Recent Labs   Lab Test 10/26/22  0822 10/19/21  0808  10/02/20  1928   ALKPHOS 99 93 67   BILITOTAL 1.0 1.0 1.4*   ALT 29 37 26   AST 33 27 41   PROTTOTAL 7.0 7.5 7.1   ALBUMIN 3.8 4.0 3.5       Coags -   Recent Labs   Lab Test 10/02/20  1928 12/04/17  0000 10/23/17  0000 12/04/15  0000 11/06/15  1130   INR 1.46* 2.4* 2.5*   < > 1.68*   PTT  --   --   --   --  31    < > = values in this interval not displayed.       Iron Panel - No lab results found.    Endocrine -   Recent Labs   Lab Test 10/19/21  0808   TSH 2.82       IMAGING:  All imaging studies reviewed by me.     Dawna Frances MD

## 2023-09-06 NOTE — LETTER
9/6/2023       RE: Marcus Hodges  [address] [place] [address]     Dear Colleague,    Thank you for referring your patient, Marcus Hodges, to the [facility] NEPHROLOGY [facility]IC [place] at [facility]. Please see a copy of my visit note below.    [facility]ph[facility]  09/06/2023     Marcus Hodges MRN:[id number] YOB: 1955  Primary care provider: Don Aviles  Requesting physician: Nehemiah Bloom     ASSESSMENT AND RECOMMENDATIONS:   Marucs Hodges is a 67 year old presenting for nephrolithiasis.  # Recurrent kidney stones: stone type primarily calcium oxalate 9/28/2020 and now 90% calcium phosphate 5/31/2022    Most recent 24 hour urine showed high urine pH and higher ammonium raising concern for infection with urease organism which can increase risk of stones, Dr. Bloom had UC done and it grew staph epi, treatment with ciprofloxacin was initiated.  CT scan to assess kidney stone burden has been ordered (but not yet completed) by Dr. Bloom.    OK to liberalize foods with moderate oxalate content such as adding a few walnuts with breakfast, eating pecan cookies (calculated to have less than 1 oz pecan per cookie), can add a few more types of beans back into diet    Hypercalciuria improved. Has low blood pressures so prefer to avoid adding thiazide    At risk for making more kidney stones so will benefit from urine chemistry/supersaturation evaluation and monitoring and regular follow up for kidney stone prevention.    Other co-morbidities:  # osteoporosis - started alendronate January 2023  # hypovitaminosis D - vitamin D level in Oct 2021 was a little over 50, goal in person with kidney stones is less than 50, would prefer closer to 30.   Has decreased from 2000 units per day down to 1000 units per day  - repeat vitamin D level is 35 and at goal, continue cholecalciferol 1000 units daily    #  CVA - 2012, embolic, complicated by malignant cerebral edema and required decompressive craniectomy  - spastic left hemiparesis, neglect, cognitive deficits (PMR note from [facility] reviewed)  # neurogenic bladder  # ASD  # mitral valve repair in the last 1970's  # atrial fibrillation on apixaban and metoprolol (Cardiology - Dr. Hayes Yip)  # seizure - on keppra    Known issue that I take into account for other medical decisions, no current exacerbations or new concerns.     Repeat 24 hour chemistries July 2024  Repeat imaging pending - scheduled for next week  Return in about 1 year (around 9/6/2024).     40 minutes spent on visit, meeting with Marcus Hodges and in chart review and documentation on date of encounter, 09/06/23       Dawna Frances MD  [facility]  Department of Medicine  Division of Renal Disease and Hypertension  [facility]  [email or web address]  Vocera Web Console        REASON FOR VISIT: follow up nephrolithiasis    HISTORY OF PRESENT ILLNESS:  Marcus Hodges is a 67 year old with history of kidney stones, CVA with neurogenic bladder (2012), atrial fibrillation    Required left pyelolithotomy 9/2020 for 1.9 cm renal pelvis stone, has also passed small stone (size of apple seed).  After stroke, has no feeling on left side in addition to hemiplegia and significant decrease in activity. Prior to stroke did a lot of hiking etc and was quite active.  Since the kidney stone event, has made many changes to diet.  Fluid intake includes 12 cups water per day, no longer drinks tea, coconut milk once a day, yogurt once a day, tries to get calcium with every meal. Follows low sodium, low sugar and low oxalate. Mostly vegetarian - lacto ovo pescatarian.    Last visit 12/7/2022 at which time we reviewed kidney stone history and risk factors, including concerns regarding bone density. DXA done 12/16/2022 showed osteoporosis and he started on alendronate in January 2023. I had also asked that he  reduce vitamin D to 2000 units every other day. He is taking vitamin D 1000 units daily.    Otherwise doing OK, no new kidney stone symptoms.  Blood pressures running  systolic.    I reviewed 24 hour urine chemstries:                Family history is positive for kidney stones in older brother a couple of times    REVIEW OF SYSTEMS:  A 10 point review of systems was negative except as noted above.    Problem list  Patient Active Problem List   Diagnosis    * * * SBE PROPHYLAXIS * * *    Health Care Home    Vitamin D deficiency    Hyperlipidemia LDL goal <100    Long-term (current) use of anticoagulants    Urinary incontinence    Neurogenic bladder    Chronic atrial fibrillation (H)    Tricuspid regurgitation    Mitral regurgitation    Atrial enlargement, bilateral    Major depressive disorder, recurrent episode, moderate (H)    Traumatic brain injury with loss of consciousness, sequela (H)    Neurogenic bowel    Left renal stone    Dysphagia    Left hemiparesis (H)    S/P craniotomy    Seizure disorder (H)    Visual field defect    Anemia    Benign prostatic hyperplasia with urinary retention    History of CVA (cerebrovascular accident)     PSH  Past Surgical History:   Procedure Laterality Date    CATHETER, ABLATION  2004    COMBINED CYSTOSCOPY, INSERT CATHETER URETER  9/28/2020    Procedure: cystoscopy and left ureteral catheter placement, left ureteral stent placement;  Surgeon: Nehemiah Bloom MD;  Location:  OR    Craniotomy reconstruction  2012    [facility], repair of hemicraniectomy defect    CYSTOSCOPY      DAVINCI PYELOPLASTY Left 9/28/2020    Procedure: left robotic assisted laparoscopic pyelolithotomy;  Surgeon: Nehemiah Bloom MD;  Location:  OR    GASTROSTOMY TUBE  April 2012    [facility], following CVA    HERNIA REPAIR      IR CAROTID ANGIOGRAM  4/22/2012    IR CAROTID ANGIOGRAM  4/22/2012    IR MISCELLANEOUS PROCEDURE  4/22/2012    IR MISCELLANEOUS PROCEDURE  4/25/2012    LASER KTP  GREEN LIGHT PHOTOSELECTIVE VAPORIZATION PROSTATE N/A 12/1/2020    Procedure: Cystoscopy and greenlight photovaporization of the prostate;  Surgeon: Nehemiah Bloom MD;  Location: [facility] OR    REPAIR ATRIAL SEPTAL DEFECT  1978    ASD Repair    Right hemicraniectomy  April 2012    [facility], following CVA, mgmt malignant cerebral edema    SURGICAL HISTORY OF -   2009    right eye enucleation    TRACHEOSTOMY  April 2012    [facility], following CVA    ZZC NONSPECIFIC PROCEDURE      tonsillectomy    ZZC NONSPECIFIC PROCEDURE      Ing. Hernia Repair       Social    Social History     Socioeconomic History    Marital status:      Spouse name: haim    Number of children: 0    Years of education: Not on file    Highest education level: Not on file   Occupational History     Employer: [employer]   Tobacco Use    Smoking status: Never    Smokeless tobacco: Never   Substance and Sexual Activity    Alcohol use: No    Drug use: No    Sexual activity: Yes     Partners: Female   Other Topics Concern     Service Not Asked    Blood Transfusions Not Asked    Caffeine Concern Yes     Comment: couple cups of tea a day    Occupational Exposure Not Asked    Hobby Hazards Not Asked    Sleep Concern Not Asked    Stress Concern Not Asked    Weight Concern Not Asked    Special Diet Yes     Comment: vegan; occ eggs/fish     Back Care Not Asked    Exercise Yes     Comment: 2 x weekly/gym , pysical therapy    Bike Helmet Not Asked    Seat Belt Yes    Self-Exams Not Asked    Parent/sibling w/ CABG, MI or angioplasty before 65F 55M? Not Asked   Social History Narrative    Not on file     Social Determinants of Health     Financial Resource Strain: Low Risk  (10/25/2022)    Overall Financial Resource Strain (CARDIA)     Difficulty of Paying Living Expenses: Not hard at all   Food Insecurity: No Food Insecurity (10/25/2022)    Hunger Vital Sign     Worried About Running Out of Food in the Last Year: Never true     Ran  Out of Food in the Last Year: Never true   Transportation Needs: No Transportation Needs (10/25/2022)    PRAPARE - Transportation     Lack of Transportation (Medical): No     Lack of Transportation (Non-Medical): No   Physical Activity: Insufficiently Active (10/25/2022)    Exercise Vital Sign     Days of Exercise per Week: 1 day     Minutes of Exercise per Session: 10 min   Stress: No Stress Concern Present (10/25/2022)    [employer] [place] [employer] - Occupational Stress Questionnaire     Feeling of Stress : Only a little   Social Connections: Socially Integrated (10/25/2022)    Social Connection and Isolation Panel [NHANES]     Frequency of Communication with Friends and Family: Three times a week     Frequency of Social Gatherings with Friends and Family: Once a week     Attends Bahai Services: More than 4 times per year     Active Member of Clubs or Organizations: Yes     Attends Club or Organization Meetings: Not on file     Marital Status:    Intimate Partner Violence: Not on file   Housing Stability: Low Risk  (10/25/2022)    Housing Stability Vital Sign     Unable to Pay for Housing in the Last Year: No     Number of Places Lived in the Last Year: 1     Unstable Housing in the Last Year: No     Family history  Family History   Problem Relation Age of Onset    Hypertension Mother     Cerebrovascular Disease Father     Cancer Paternal Grandmother     Diabetes Maternal Grandmother     Congenital Anomalies Brother         several brothers and sisters born with congential heart defects, but all have been repaired    Congenital Anomalies Sister           MEDICATIONS:  Current Outpatient Medications   Medication Sig Dispense Refill    acetaminophen (TYLENOL) 500 MG tablet Take 2 tablets (1,000 mg) by mouth every 6 hours as needed for mild pain 100 tablet 0    alendronate (FOSAMAX) 70 MG tablet Take 1 tablet (70 mg) by mouth every 7 days 12 tablet 3    apixaban ANTICOAGULANT (ELIQUIS  ANTICOAGULANT) 5 MG tablet Take 1 tablet (5 mg) by mouth 2 times daily 180 tablet 11    atorvastatin (LIPITOR) 20 MG tablet Take 1 tablet (20 mg) by mouth daily 90 tablet 3    baclofen (LIORESAL) 20 MG tablet Take 1 tablet (20 mg) by mouth 4 times daily 360 tablet 3    ciprofloxacin (CIPRO) 500 MG tablet Take 1 tablet (500 mg) by mouth 2 times daily for 5 days 10 tablet 0    citalopram (CELEXA) 20 MG tablet Take 1.5 tablets (30 mg) by mouth daily 135 tablet 11    docusate sodium (COLACE) 100 MG tablet Take 200 mg by mouth nightly as needed       levETIRAcetam (KEPPRA) 1000 MG tablet Take 1 tablet (1,000 mg) by mouth 2 times daily 180 tablet 11    metoprolol succinate ER (TOPROL XL) 25 MG 24 hr tablet Take 0.5 tablets (12.5 mg) by mouth daily 45 tablet 11    mirabegron (MYRBETRIQ) 50 MG 24 hr tablet Take 1 tablet (50 mg) by mouth daily 90 tablet 11    Multiple Vitamin (MULTI-VITAMIN) per tablet Take 1 tablet by mouth daily  100 tablet 3    polyethylene glycol (MIRALAX) 17 g packet Take 17 g by mouth every other day       senna-docusate (SENOKOT-S/PERICOLACE) 8.6-50 MG tablet Take 4 tablets by mouth At Bedtime      tamsulosin (FLOMAX) 0.4 MG capsule Take 1 capsule (0.4 mg) by mouth daily 90 capsule 11    vitamin D3 (CHOLECALCIFEROL) 50 mcg (2000 units) tablet Take 1,000 Units by mouth daily 90 tablet 3        ALLERGIES:    Allergies   Allergen Reactions    Blood Transfusion Related (Informational Only) Other (See Comments)     Patient has a history of a clinically significant antibody against RBC antigens.  A delay in compatible RBCs may occur.    Lisinopril          PHYSICAL EXAM:   *video visit  GENERAL APPEARANCE: alert and no distress  RESP: normal work of breathing, no conversational dyspnea  NEURO: mentation intact and speech normal    LABS:   I reviewed:  Electrolytes/Renal -   Recent Labs   Lab Test 08/29/23  0948 10/26/22  0822 10/19/21  0808    140 138   POTASSIUM 4.2 4.6 4.2   CHLORIDE 105 109 108    CO2 27 25 26   BUN 15.6 13 15   CR 0.81 0.73 0.81   GLC 57* 78 78   STEPHANI 9.1 9.0 9.1   MAG 2.1  --   --        CBC -   Recent Labs   Lab Test 10/26/22  0822 10/19/21  0808 10/03/20  0619   WBC 3.9* 4.2 6.8   HGB 12.2* 12.9* 11.3*    177 158       LFTs -   Recent Labs   Lab Test 10/26/22  0822 10/19/21  0808 10/02/20  1928   ALKPHOS 99 93 67   BILITOTAL 1.0 1.0 1.4*   ALT 29 37 26   AST 33 27 41   PROTTOTAL 7.0 7.5 7.1   ALBUMIN 3.8 4.0 3.5       Coags -   Recent Labs   Lab Test 10/02/20  1928 12/04/17  0000 10/23/17  0000 12/04/15  0000 11/06/15  1130   INR 1.46* 2.4* 2.5*   < > 1.68*   PTT  --   --   --   --  31    < > = values in this interval not displayed.       Iron Panel - No lab results found.    Endocrine -   Recent Labs   Lab Test 10/19/21  0808   TSH 2.82       IMAGING:  All imaging studies reviewed by me.     Dawna Frances MD

## 2023-09-06 NOTE — NURSING NOTE
Is the patient currently in the state of [place]? YES    Visit mode:VIDEO    If the visit is dropped, the patient can be reconnected by: VIDEO VISIT: Send to e-mail at: [email or web address]    Will anyone else be joining the visit? NO  (If patient encounters technical issues they should call [phone]    How would you like to obtain your AVS? MyChart    Are changes needed to the allergy or medication list? No    Reason for visit: No chief complaint on file.    Kathryn KHAN

## 2023-09-06 NOTE — PROGRESS NOTES
Virtual Visit Details    Type of service:  Video Visit   Joined the video at 9/6/2023, 1:26:53 pm.  Left the video at 9/6/2023, 1:51:37 pm.    Originating Location (pt. Location): Home  Distant Location (provider location):  Off-site  Platform used for Video Visit: Blanquita

## 2023-09-06 NOTE — PATIENT INSTRUCTIONS
Lex    As discussed, I am happy with your vitamin D level of 35. Keep taking Vitamin d 1000 units per day    OK to liberalize foods with moderate oxalate content such as adding a few walnuts with breakfast, eating pecan cookies (calculated to have less than 1 oz pecan per cookie), can add a few more types of beans back into diet    Litholink 24 hour urine July 2024    Return in about 1 year (around 9/6/2024).

## 2023-09-14 ENCOUNTER — HOSPITAL ENCOUNTER (OUTPATIENT)
Dept: CT IMAGING | Facility: CLINIC | Age: 68
Discharge: HOME | End: 2023-09-14
Attending: STUDENT IN AN ORGANIZED HEALTH CARE EDUCATION/TRAINING PROGRAM | Admitting: STUDENT IN AN ORGANIZED HEALTH CARE EDUCATION/TRAINING PROGRAM
Payer: MEDICARE

## 2023-09-14 DIAGNOSIS — N20.0 CALCULUS OF KIDNEY: ICD-10-CM

## 2023-09-14 PROCEDURE — G1010 CDSM STANSON: HCPCS

## 2023-09-18 ENCOUNTER — TELEPHONE (OUTPATIENT)
Dept: NEPHROLOGY | Facility: CLINIC | Age: 68
End: 2023-09-18
Payer: MEDICARE

## 2023-09-18 NOTE — TELEPHONE ENCOUNTER
----- Message from Dawna Frances MD sent at 9/6/2023  1:59 PM CDT -----  Regarding: order litholink  Rick Alcala - can you please order litholink for this patient?  I would like it to be completed July 2024    Thanks!    Dawna

## 2023-09-18 NOTE — TELEPHONE ENCOUNTER
Alycia request sent via [facility] website.  Message to patient to watch for kit.  Cari Calderon LPN  Nephrology  [phone]

## 2023-10-04 ENCOUNTER — MYC MEDICAL ADVICE (OUTPATIENT)
Dept: PEDIATRICS | Facility: CLINIC | Age: 68
End: 2023-10-04
Payer: MEDICARE

## 2023-10-20 ENCOUNTER — THERAPY VISIT (OUTPATIENT)
Dept: PHYSICAL THERAPY | Facility: CLINIC | Age: 68
End: 2023-10-20
Attending: INTERNAL MEDICINE
Payer: MEDICARE

## 2023-10-20 DIAGNOSIS — G81.94 LEFT HEMIPARESIS (H): Primary | ICD-10-CM

## 2023-10-20 PROCEDURE — 97110 THERAPEUTIC EXERCISES: CPT | Mod: GP,KX | Performed by: PHYSICAL THERAPIST

## 2023-10-20 PROCEDURE — 97112 NEUROMUSCULAR REEDUCATION: CPT | Mod: GP,KX | Performed by: PHYSICAL THERAPIST

## 2023-10-20 PROCEDURE — 97116 GAIT TRAINING THERAPY: CPT | Mod: GP,KX | Performed by: PHYSICAL THERAPIST

## 2023-10-28 ASSESSMENT — ACTIVITIES OF DAILY LIVING (ADL)
CURRENT_FUNCTION: TRANSPORTATION REQUIRES ASSISTANCE
CURRENT_FUNCTION: TELEPHONE REQUIRES ASSISTANCE
CURRENT_FUNCTION: HOUSEWORK REQUIRES ASSISTANCE
CURRENT_FUNCTION: BATHING REQUIRES ASSISTANCE
CURRENT_FUNCTION: SHOPPING REQUIRES ASSISTANCE
CURRENT_FUNCTION: LAUNDRY REQUIRES ASSISTANCE
CURRENT_FUNCTION: PREPARING MEALS REQUIRES ASSISTANCE

## 2023-10-28 ASSESSMENT — ENCOUNTER SYMPTOMS
ABDOMINAL PAIN: 0
COUGH: 0
JOINT SWELLING: 0
EYE PAIN: 0
DIARRHEA: 0
NERVOUS/ANXIOUS: 0
ARTHRALGIAS: 0
HEARTBURN: 0
CONSTIPATION: 1
SHORTNESS OF BREATH: 0
NAUSEA: 0
WEAKNESS: 0
SORE THROAT: 0
CHILLS: 0
DIZZINESS: 0
FREQUENCY: 0
PALPITATIONS: 0
HEMATOCHEZIA: 0
PARESTHESIAS: 0
HEMATURIA: 0
FEVER: 0
HEADACHES: 0
MYALGIAS: 0
DYSURIA: 0

## 2023-10-31 ENCOUNTER — THERAPY VISIT (OUTPATIENT)
Dept: PHYSICAL THERAPY | Facility: CLINIC | Age: 68
End: 2023-10-31
Attending: INTERNAL MEDICINE
Payer: MEDICARE

## 2023-10-31 ENCOUNTER — OFFICE VISIT (OUTPATIENT)
Dept: PEDIATRICS | Facility: CLINIC | Age: 68
End: 2023-10-31
Payer: MEDICARE

## 2023-10-31 VITALS
TEMPERATURE: 97.5 F | RESPIRATION RATE: 16 BRPM | DIASTOLIC BLOOD PRESSURE: 70 MMHG | HEART RATE: 60 BPM | OXYGEN SATURATION: 97 % | BODY MASS INDEX: 21.7 KG/M2 | SYSTOLIC BLOOD PRESSURE: 110 MMHG | WEIGHT: 169 LBS

## 2023-10-31 DIAGNOSIS — Z86.73 HISTORY OF CVA (CEREBROVASCULAR ACCIDENT): ICD-10-CM

## 2023-10-31 DIAGNOSIS — G81.94 LEFT HEMIPARESIS (H): Primary | ICD-10-CM

## 2023-10-31 DIAGNOSIS — Z00.00 ENCOUNTER FOR MEDICARE ANNUAL WELLNESS EXAM: Primary | ICD-10-CM

## 2023-10-31 DIAGNOSIS — G81.94 LEFT HEMIPARESIS (H): ICD-10-CM

## 2023-10-31 DIAGNOSIS — D64.9 NORMOCYTIC ANEMIA: ICD-10-CM

## 2023-10-31 DIAGNOSIS — I48.20 CHRONIC ATRIAL FIBRILLATION (H): ICD-10-CM

## 2023-10-31 DIAGNOSIS — Z12.5 SCREENING FOR PROSTATE CANCER: ICD-10-CM

## 2023-10-31 DIAGNOSIS — S06.9X9S TRAUMATIC BRAIN INJURY WITH LOSS OF CONSCIOUSNESS, SEQUELA (H): ICD-10-CM

## 2023-10-31 DIAGNOSIS — F33.1 MAJOR DEPRESSIVE DISORDER, RECURRENT EPISODE, MODERATE (H): ICD-10-CM

## 2023-10-31 DIAGNOSIS — E78.5 HYPERLIPIDEMIA LDL GOAL <100: ICD-10-CM

## 2023-10-31 DIAGNOSIS — N31.9 NEUROGENIC BLADDER: ICD-10-CM

## 2023-10-31 DIAGNOSIS — K59.2 NEUROGENIC BOWEL: ICD-10-CM

## 2023-10-31 DIAGNOSIS — R25.2 SPASTICITY: ICD-10-CM

## 2023-10-31 DIAGNOSIS — M81.0 OSTEOPOROSIS, UNSPECIFIED OSTEOPOROSIS TYPE, UNSPECIFIED PATHOLOGICAL FRACTURE PRESENCE: ICD-10-CM

## 2023-10-31 LAB
ALBUMIN SERPL BCG-MCNC: 4.4 G/DL (ref 3.5–5.2)
ALP SERPL-CCNC: 92 U/L (ref 40–129)
ALT SERPL W P-5'-P-CCNC: 23 U/L (ref 0–70)
ANION GAP SERPL CALCULATED.3IONS-SCNC: 9 MMOL/L (ref 7–15)
AST SERPL W P-5'-P-CCNC: 30 U/L (ref 0–45)
BASOPHILS # BLD AUTO: 0 10E3/UL (ref 0–0.2)
BASOPHILS NFR BLD AUTO: 1 %
BILIRUB SERPL-MCNC: 0.7 MG/DL
BUN SERPL-MCNC: 12.2 MG/DL (ref 8–23)
CALCIUM SERPL-MCNC: 9.1 MG/DL (ref 8.8–10.2)
CHLORIDE SERPL-SCNC: 103 MMOL/L (ref 98–107)
CHOLEST SERPL-MCNC: 118 MG/DL
CREAT SERPL-MCNC: 0.77 MG/DL (ref 0.67–1.17)
DEPRECATED HCO3 PLAS-SCNC: 27 MMOL/L (ref 22–29)
EGFRCR SERPLBLD CKD-EPI 2021: >90 ML/MIN/1.73M2
EOSINOPHIL # BLD AUTO: 0.1 10E3/UL (ref 0–0.7)
EOSINOPHIL NFR BLD AUTO: 3 %
ERYTHROCYTE [DISTWIDTH] IN BLOOD BY AUTOMATED COUNT: 14.4 % (ref 10–15)
GLUCOSE SERPL-MCNC: 78 MG/DL (ref 70–99)
HCT VFR BLD AUTO: 33.1 % (ref 40–53)
HDLC SERPL-MCNC: 65 MG/DL
HGB BLD-MCNC: 10 G/DL (ref 13.3–17.7)
IMM GRANULOCYTES # BLD: 0 10E3/UL
IMM GRANULOCYTES NFR BLD: 0 %
LDLC SERPL CALC-MCNC: 44 MG/DL
LYMPHOCYTES # BLD AUTO: 0.5 10E3/UL (ref 0.8–5.3)
LYMPHOCYTES NFR BLD AUTO: 18 %
MCH RBC QN AUTO: 25.3 PG (ref 26.5–33)
MCHC RBC AUTO-ENTMCNC: 30.2 G/DL (ref 31.5–36.5)
MCV RBC AUTO: 84 FL (ref 78–100)
MONOCYTES # BLD AUTO: 0.3 10E3/UL (ref 0–1.3)
MONOCYTES NFR BLD AUTO: 11 %
NEUTROPHILS # BLD AUTO: 2 10E3/UL (ref 1.6–8.3)
NEUTROPHILS NFR BLD AUTO: 67 %
NONHDLC SERPL-MCNC: 53 MG/DL
PLATELET # BLD AUTO: 189 10E3/UL (ref 150–450)
POTASSIUM SERPL-SCNC: 4.6 MMOL/L (ref 3.4–5.3)
PROT SERPL-MCNC: 6.9 G/DL (ref 6.4–8.3)
PSA SERPL DL<=0.01 NG/ML-MCNC: 1.29 NG/ML (ref 0–4.5)
RBC # BLD AUTO: 3.96 10E6/UL (ref 4.4–5.9)
SODIUM SERPL-SCNC: 139 MMOL/L (ref 135–145)
TRIGL SERPL-MCNC: 44 MG/DL
WBC # BLD AUTO: 3 10E3/UL (ref 4–11)

## 2023-10-31 PROCEDURE — 80053 COMPREHEN METABOLIC PANEL: CPT | Performed by: INTERNAL MEDICINE

## 2023-10-31 PROCEDURE — G0103 PSA SCREENING: HCPCS | Performed by: INTERNAL MEDICINE

## 2023-10-31 PROCEDURE — 91320 SARSCV2 VAC 30MCG TRS-SUC IM: CPT | Performed by: INTERNAL MEDICINE

## 2023-10-31 PROCEDURE — 85025 COMPLETE CBC W/AUTO DIFF WBC: CPT | Performed by: INTERNAL MEDICINE

## 2023-10-31 PROCEDURE — 99214 OFFICE O/P EST MOD 30 MIN: CPT | Mod: 25 | Performed by: INTERNAL MEDICINE

## 2023-10-31 PROCEDURE — G0439 PPPS, SUBSEQ VISIT: HCPCS | Performed by: INTERNAL MEDICINE

## 2023-10-31 PROCEDURE — 90480 ADMN SARSCOV2 VAC 1/ONLY CMP: CPT | Performed by: INTERNAL MEDICINE

## 2023-10-31 PROCEDURE — 97116 GAIT TRAINING THERAPY: CPT | Mod: GP,KX | Performed by: PHYSICAL THERAPIST

## 2023-10-31 PROCEDURE — 36415 COLL VENOUS BLD VENIPUNCTURE: CPT | Performed by: INTERNAL MEDICINE

## 2023-10-31 PROCEDURE — 97110 THERAPEUTIC EXERCISES: CPT | Mod: GP,KX | Performed by: PHYSICAL THERAPIST

## 2023-10-31 PROCEDURE — 80061 LIPID PANEL: CPT | Performed by: INTERNAL MEDICINE

## 2023-10-31 PROCEDURE — 97112 NEUROMUSCULAR REEDUCATION: CPT | Mod: GP,KX | Performed by: PHYSICAL THERAPIST

## 2023-10-31 RX ORDER — ATORVASTATIN CALCIUM 20 MG/1
20 TABLET, FILM COATED ORAL DAILY
Qty: 90 TABLET | Refills: 11 | Status: SHIPPED | OUTPATIENT
Start: 2023-10-31

## 2023-10-31 RX ORDER — RESPIRATORY SYNCYTIAL VIRUS VACCINE 120MCG/0.5
0.5 KIT INTRAMUSCULAR ONCE
Qty: 1 EACH | Refills: 0 | Status: CANCELLED | OUTPATIENT
Start: 2023-10-31 | End: 2023-10-31

## 2023-10-31 RX ORDER — LEVETIRACETAM 1000 MG/1
1000 TABLET ORAL 2 TIMES DAILY
Qty: 180 TABLET | Refills: 11 | Status: SHIPPED | OUTPATIENT
Start: 2023-10-31

## 2023-10-31 RX ORDER — CITALOPRAM HYDROBROMIDE 20 MG/1
20 TABLET ORAL DAILY
Qty: 90 TABLET | Refills: 11 | Status: SHIPPED | OUTPATIENT
Start: 2023-10-31

## 2023-10-31 RX ORDER — ALENDRONATE SODIUM 70 MG/1
70 TABLET ORAL
Qty: 12 TABLET | Refills: 3 | Status: SHIPPED | OUTPATIENT
Start: 2023-10-31

## 2023-10-31 RX ORDER — TAMSULOSIN HYDROCHLORIDE 0.4 MG/1
0.4 CAPSULE ORAL DAILY
Qty: 90 CAPSULE | Refills: 11 | Status: SHIPPED | OUTPATIENT
Start: 2023-10-31 | End: 2024-05-08

## 2023-10-31 RX ORDER — METOPROLOL SUCCINATE 25 MG/1
12.5 TABLET, EXTENDED RELEASE ORAL DAILY
Qty: 45 TABLET | Refills: 11 | Status: SHIPPED | OUTPATIENT
Start: 2023-10-31

## 2023-10-31 RX ORDER — MIRABEGRON 50 MG/1
50 TABLET, EXTENDED RELEASE ORAL DAILY
Qty: 90 TABLET | Refills: 11 | Status: SHIPPED | OUTPATIENT
Start: 2023-10-31

## 2023-10-31 RX ORDER — BACLOFEN 20 MG/1
20 TABLET ORAL 4 TIMES DAILY
Qty: 360 TABLET | Refills: 11 | Status: SHIPPED | OUTPATIENT
Start: 2023-10-31

## 2023-10-31 ASSESSMENT — ACTIVITIES OF DAILY LIVING (ADL)
CURRENT_FUNCTION: TRANSPORTATION REQUIRES ASSISTANCE
CURRENT_FUNCTION: SHOPPING REQUIRES ASSISTANCE
CURRENT_FUNCTION: BATHING REQUIRES ASSISTANCE
CURRENT_FUNCTION: TELEPHONE REQUIRES ASSISTANCE
CURRENT_FUNCTION: HOUSEWORK REQUIRES ASSISTANCE
CURRENT_FUNCTION: PREPARING MEALS REQUIRES ASSISTANCE
CURRENT_FUNCTION: LAUNDRY REQUIRES ASSISTANCE

## 2023-10-31 ASSESSMENT — ENCOUNTER SYMPTOMS
ARTHRALGIAS: 0
NAUSEA: 0
HEMATURIA: 0
JOINT SWELLING: 0
HEADACHES: 0
SORE THROAT: 0
SHORTNESS OF BREATH: 0
NERVOUS/ANXIOUS: 0
CONSTIPATION: 1
FREQUENCY: 0
HEMATOCHEZIA: 0
WEAKNESS: 0
DIZZINESS: 0
CHILLS: 0
COUGH: 0
PARESTHESIAS: 0
ABDOMINAL PAIN: 0
FEVER: 0
HEARTBURN: 0
EYE PAIN: 0
MYALGIAS: 0
DYSURIA: 0
DIARRHEA: 0
PALPITATIONS: 0

## 2023-10-31 ASSESSMENT — PAIN SCALES - GENERAL: PAINLEVEL: NO PAIN (0)

## 2023-10-31 NOTE — PATIENT INSTRUCTIONS
Patient Education   Personalized Prevention Plan  You are due for the preventive services outlined below.  Your care team is available to assist you in scheduling these services.  If you have already completed any of these items, please share that information with your care team to update in your medical record.  Health Maintenance Due   Topic Date Due     RSV VACCINE 60+ (1 - 1-dose 60+ series) Never done     ANNUAL REVIEW OF HM ORDERS  10/19/2022     Depression Assessment  12/23/2022     Flu Vaccine (1) 09/01/2023     COVID-19 Vaccine (6 - 2023-24 season) 09/01/2023     Cholesterol Lab  10/26/2023     Preventive Health Recommendations  See your health care provider every year to  Review health changes.   Discuss preventive care.    Review your medicines if your doctor has prescribed any.  Talk with your health care provider about whether you should have a test to screen for prostate cancer (PSA).  Every 3 years, have a diabetes test (fasting glucose). If you are at risk for diabetes, you should have this test more often.  Every 5 years, have a cholesterol test. Have this test more often if you are at risk for high cholesterol or heart disease.   Every 10 years, have a colonoscopy. Or, have a yearly FIT test (stool test). These exams will check for colon cancer.  Talk to with your health care provider about screening for Abdominal Aortic Aneurysm if you have a family history of AAA or have a history of smoking.    Shots:   Get a flu shot each year.   Get a tetanus shot every 10 years.   Talk to your doctor about your pneumonia vaccines. There are now two you should receive - Pneumovax (PPSV 23) and Prevnar (PCV 13).  Talk to your pharmacist about a shingles vaccine.   Talk to your doctor about the hepatitis B vaccine.    Nutrition:   Eat at least 5 servings of fruits and vegetables each day.   Eat whole-grain bread, whole-wheat pasta and brown rice instead of white grains and rice.   Get adequate Calcium and  Vitamin D.     Lifestyle  Exercise for at least 150 minutes a week (30 minutes a day, 5 days a week). This will help you control your weight and prevent disease.   Limit alcohol to one drink per day.   No smoking.   Wear sunscreen to prevent skin cancer.   See your dentist every six months for an exam and cleaning.   See your eye doctor every 1 to 2 years to screen for conditions such as glaucoma, macular degeneration and cataracts.    Personalized Prevention Plan  You are due for the preventive services outlined below.  Your care team is available to assist you in scheduling these services.  If you have already completed any of these items, please share that information with your care team to update in your medical record.  Health Maintenance   Topic Date Due     RSV VACCINE 60+ (1 - 1-dose 60+ series) Never done     ANNUAL REVIEW OF HM ORDERS  10/19/2022     PHQ-9  12/23/2022     INFLUENZA VACCINE (1) 09/01/2023     COVID-19 Vaccine (6 - 2023-24 season) 09/01/2023     LIPID  10/26/2023     MEDICARE ANNUAL WELLNESS VISIT  10/31/2024     FALL RISK ASSESSMENT  10/31/2024     COLORECTAL CANCER SCREENING  11/01/2024     ADVANCE CARE PLANNING  10/31/2028     DTAP/TDAP/TD IMMUNIZATION (3 - Td or Tdap) 08/24/2030     HEPATITIS C SCREENING  Completed     DEPRESSION ACTION PLAN  Completed     Pneumococcal Vaccine: 65+ Years  Completed     ZOSTER IMMUNIZATION  Completed     AORTIC ANEURYSM SCREENING (SYSTEM ASSIGNED)  Completed     IPV IMMUNIZATION  Aged Out     HPV IMMUNIZATION  Aged Out     MENINGITIS IMMUNIZATION  Aged Out

## 2023-10-31 NOTE — PROGRESS NOTES
"SUBJECTIVE:   Lex is a 68 year old who presents for Preventive Visit.      10/31/2023     7:47 AM   Additional Questions   Roomed by sylvia   Accompanied by haim         10/31/2023     7:47 AM   Patient Reported Additional Medications   Patient reports taking the following new medications no       Are you in the first 12 months of your Medicare coverage?  No    Healthy Habits:     In general, how would you rate your overall health?  Good    Frequency of exercise:  4-5 days/week    Duration of exercise:  15-30 minutes    Do you usually eat at least 4 servings of fruit and vegetables a day, include whole grains    & fiber and avoid regularly eating high fat or \"junk\" foods?  Yes    Taking medications regularly:  Yes    Medication side effects:  None    Ability to successfully perform activities of daily living:  Telephone requires assistance, transportation requires assistance, shopping requires assistance, preparing meals requires assistance, housework requires assistance, bathing requires assistance and laundry requires assistance    Home Safety:  No safety concerns identified    Hearing Impairment:  No hearing concerns    In the past 6 months, have you been bothered by leaking of urine? Yes    In general, how would you rate your overall mental or emotional health?  Good    Additional concerns today:  Yes    Have you ever done Advance Care Planning? (For example, a Health Directive, POLST, or a discussion with a medical provider or your loved ones about your wishes): Yes, advance care planning is on file.       Fall risk  Fallen 2 or more times in the past year?: No  Any fall with injury in the past year?: No    Cognitive Screening   1) Repeat 3 items (Leader, Season, Table)    2) Clock draw: NORMAL  3) 3 item recall: Recalls 3 objects  Results: NORMAL clock, 1-2 items recalled: COGNITIVE IMPAIRMENT LESS LIKELY    Mini-CogTM Copyright MARIANA [address]. Licensed by the author for use in [facility]; reprinted " with permission (shantell@[facility]). All rights reserved.          Reviewed and updated as needed this visit by clinical staff   Tobacco  Allergies  Meds              Reviewed and updated as needed this visit by Provider                 Social History     Tobacco Use    Smoking status: Never    Smokeless tobacco: Never   Substance Use Topics    Alcohol use: No             10/28/2023    11:20 AM   Alcohol Use   Prescreen: >3 drinks/day or >7 drinks/week? Not Applicable     Do you have a current opioid prescription? No  Do you use any other controlled substances or medications that are not prescribed by a provider? None              Current providers sharing in care for this patient include:   Patient Care Team:  Don Aviles MD as PCP - General  Don Aviles MD as Assigned PCP  Hayes Yip MD as Assigned Heart and Vascular Provider  Nehemiah Bloom MD as Assigned Surgical Provider  Dawna Frances MD as Assigned Nephrology Provider    The following health maintenance items are reviewed in Epic and correct as of today:  Health Maintenance   Topic Date Due    RSV VACCINE 60+ (1 - 1-dose 60+ series) Never done    ANNUAL REVIEW OF HM ORDERS  10/19/2022    PHQ-9  12/23/2022    INFLUENZA VACCINE (1) 09/01/2023    COVID-19 Vaccine (6 - 2023-24 season) 09/01/2023    LIPID  10/26/2023    MEDICARE ANNUAL WELLNESS VISIT  10/26/2023    FALL RISK ASSESSMENT  10/31/2024    COLORECTAL CANCER SCREENING  11/01/2024    ADVANCE CARE PLANNING  10/27/2027    DTAP/TDAP/TD IMMUNIZATION (3 - Td or Tdap) 08/24/2030    HEPATITIS C SCREENING  Completed    DEPRESSION ACTION PLAN  Completed    Pneumococcal Vaccine: 65+ Years  Completed    ZOSTER IMMUNIZATION  Completed    AORTIC ANEURYSM SCREENING (SYSTEM ASSIGNED)  Completed    IPV IMMUNIZATION  Aged Out    HPV IMMUNIZATION  Aged Out    MENINGITIS IMMUNIZATION  Aged Out     Patient Active Problem List   Diagnosis    * * * SBE PROPHYLAXIS * * *    Health Care Home     Vitamin D deficiency    Hyperlipidemia LDL goal <100    Long-term (current) use of anticoagulants    Urinary incontinence    Neurogenic bladder    Chronic atrial fibrillation (H)    Tricuspid regurgitation    Mitral regurgitation    Atrial enlargement, bilateral    Major depressive disorder, recurrent episode, moderate (H)    Traumatic brain injury with loss of consciousness, sequela (H24)    Neurogenic bowel    Left renal stone    Dysphagia    Left hemiparesis (H)    S/P craniotomy    Visual field defect    Anemia    Benign prostatic hyperplasia with urinary retention    History of CVA (cerebrovascular accident)     Past Surgical History:   Procedure Laterality Date    CATHETER, ABLATION  2004    COMBINED CYSTOSCOPY, INSERT CATHETER URETER  9/28/2020    Procedure: cystoscopy and left ureteral catheter placement, left ureteral stent placement;  Surgeon: Nehemiah Bloom MD;  Location:  OR    Craniotomy [facility]  2012    [facility], repair of hemicraniectomy defect    CYSTOSCOPY      DAVINCI PYELOPLASTY Left 9/28/2020    Procedure: left robotic assisted laparoscopic pyelolithotomy;  Surgeon: Nehemiah Bloom MD;  Location:  OR    GASTROSTOMY TUBE  April 2012 [facility], following CVA    HERNIA REPAIR      IR CAROTID ANGIOGRAM  4/22/2012    IR CAROTID ANGIOGRAM  4/22/2012    IR MISCELLANEOUS PROCEDURE  4/22/2012    IR MISCELLANEOUS PROCEDURE  4/25/2012    LASER KTP GREEN LIGHT PHOTOSELECTIVE VAPORIZATION PROSTATE N/A 12/1/2020    Procedure: Cystoscopy and greenlight photovaporization of the prostate;  Surgeon: Nehemiah Bloom MD;  Location: [facility] OR    REPAIR ATRIAL SEPTAL DEFECT  1978    ASD Repair    Right hemicraniectomy  April 2012 [facility], following CVA, mgmt malignant cerebral edema    SURGICAL HISTORY OF -   2009    right eye enucleation    TRACHEOSTOMY  April 2012 [facility], following CVA    ZZC NONSPECIFIC PROCEDURE      tonsillectomy    ZZC NONSPECIFIC PROCEDURE      Ing. Hernia  Repair       Social History     Tobacco Use    Smoking status: Never    Smokeless tobacco: Never   Substance Use Topics    Alcohol use: No     Family History   Problem Relation Age of Onset    Hypertension Mother     Cerebrovascular Disease Father     Cancer Paternal Grandmother     Diabetes Maternal Grandmother     Congenital Anomalies Brother         several brothers and sisters born with congential heart defects, but all have been repaired    Congenital Anomalies Sister          Current Outpatient Medications   Medication Sig Dispense Refill    acetaminophen (TYLENOL) 500 MG tablet Take 2 tablets (1,000 mg) by mouth every 6 hours as needed for mild pain 100 tablet 0    alendronate (FOSAMAX) 70 MG tablet Take 1 tablet (70 mg) by mouth every 7 days 12 tablet 3    apixaban ANTICOAGULANT (ELIQUIS ANTICOAGULANT) 5 MG tablet Take 1 tablet (5 mg) by mouth 2 times daily 180 tablet 11    atorvastatin (LIPITOR) 20 MG tablet Take 1 tablet (20 mg) by mouth daily 90 tablet 11    baclofen (LIORESAL) 20 MG tablet Take 1 tablet (20 mg) by mouth 4 times daily 360 tablet 11    citalopram (CELEXA) 20 MG tablet Take 1 tablet (20 mg) by mouth daily 90 tablet 11    docusate sodium (COLACE) 100 MG tablet Take 200 mg by mouth nightly as needed       levETIRAcetam (KEPPRA) 1000 MG tablet Take 1 tablet (1,000 mg) by mouth 2 times daily 180 tablet 11    metoprolol succinate ER (TOPROL XL) 25 MG 24 hr tablet Take 0.5 tablets (12.5 mg) by mouth daily 45 tablet 11    mirabegron (MYRBETRIQ) 50 MG 24 hr tablet Take 1 tablet (50 mg) by mouth daily 90 tablet 11    Multiple Vitamin (MULTI-VITAMIN) per tablet Take 1 tablet by mouth daily  100 tablet 3    polyethylene glycol (MIRALAX) 17 g packet Take 17 g by mouth every other day       senna-docusate (SENOKOT-S/PERICOLACE) 8.6-50 MG tablet Take 4 tablets by mouth At Bedtime      tamsulosin (FLOMAX) 0.4 MG capsule Take 1 capsule (0.4 mg) by mouth daily 90 capsule 11    vitamin D3 (CHOLECALCIFEROL)  "50 mcg (2000 units) tablet Take 1,000 Units by mouth daily 90 tablet 3         Review of Systems   Constitutional:  Negative for chills and fever.   HENT:  Negative for ear pain, hearing loss and sore throat.    Eyes:  Negative for pain.   Respiratory:  Negative for cough and shortness of breath.    Cardiovascular:  Negative for chest pain, palpitations and peripheral edema.   Gastrointestinal:  Positive for constipation. Negative for abdominal pain, diarrhea, heartburn, hematochezia and nausea.   Genitourinary:  Positive for impotence. Negative for dysuria, frequency, genital sores, hematuria, penile discharge and urgency.   Musculoskeletal:  Negative for arthralgias, joint swelling and myalgias.   Skin:  Negative for rash.   Neurological:  Negative for dizziness, weakness, headaches and paresthesias.   Psychiatric/Behavioral:  Negative for mood changes. The patient is not nervous/anxious.          OBJECTIVE:   /70 (Cuff Size: Adult Regular)   Pulse 60   Temp 97.5  F (36.4  C) (Tympanic)   Resp 16   Wt 76.7 kg (169 lb)   SpO2 97%   BMI 21.70 kg/m   Estimated body mass index is 21.7 kg/m  as calculated from the following:    Height as of 8/31/23: 1.88 m (6' 2\").    Weight as of this encounter: 76.7 kg (169 lb).  Physical Exam  GENERAL: alert and no distress  EYES: Eye :conjunctivae and sclerae normal  HENT: ear canals and TM's normal, nose and mouth without ulcers or lesions  NECK: no adenopathy, no asymmetry, masses, or scars and thyroid normal to palpation  RESP: lungs clear to auscultation - no rales, rhonchi or wheezes  CV: regular rate and rhythm, normal S1 S2, no S3 or S4, no murmur  ABDOMEN: soft, nontender, no hepatosplenomegaly, no masses and bowel sounds normal  MS: left lower leg AFO brace  SKIN: no suspicious lesions or rashes  NEURO: left hemiparesis, baseline  PSYCH: mentation appears normal, affect normal/bright        ASSESSMENT / PLAN:     (Z00.00) Encounter for Medicare annual wellness " exam  (primary encounter diagnosis)    (Z86.73) History of CVA (cerebrovascular accident)  (S06.9X9S) Traumatic brain injury with loss of consciousness, sequela (H24)  (G81.94) Left hemiparesis (H)  Comment:   stable, long term disability/ transitioning to social security.  Continue keppra. Script for new AFO brace  Plan: levETIRAcetam (KEPPRA) 1000 MG tablet   Orthotics and Prosthetics DME Other (Comments)         (left AFO brace)          (R25.2) Spasticity  Comment: continue baclofen  Plan: baclofen (LIORESAL) 20 MG tablet          (N31.9) Neurogenic bladder  Comment:   Plan: mirabegron (MYRBETRIQ) 50 MG 24 hr tablet,         tamsulosin (FLOMAX) 0.4 MG capsule          (I48.20) Chronic atrial fibrillation (H)  Comment: rate controlled, chronic AC  Plan: apixaban ANTICOAGULANT (ELIQUIS ANTICOAGULANT)         5 MG tablet, metoprolol succinate ER (TOPROL         XL) 25 MG 24 hr tablet          (E78.5) Hyperlipidemia LDL goal <100  Comment:   Plan: Lipid panel reflex to direct LDL Non-fasting,         atorvastatin (LIPITOR) 20 MG tablet,         Comprehensive metabolic panel (BMP + Alb, Alk         Phos, ALT, AST, Total. Bili, TP), CBC with         platelets and differential          (F33.1) Major depressive disorder, recurrent episode, moderate (H)  Comment: well controlled.  MTM concerns regarding 30mg dose, reduce to 20mg /pt will follow-up in the next month if mood symptoms worsen  Plan: citalopram (CELEXA) 20 MG tablet          (M81.0) Osteoporosis, unspecified osteoporosis type, unspecified pathological fracture presence  Comment:   Plan: alendronate (FOSAMAX) 70 MG tablet          (Z12.5) Screening for prostate cancer  Comment:   Plan: PSA, screen              COUNSELING:  Reviewed preventive health counseling, as reflected in patient instructions       Healthy diet/nutrition       Colon cancer screening       Prostate cancer screening        He reports that he has never smoked. He has never used smokeless  tobacco.      Appropriate preventive services were discussed with this patient, including applicable screening as appropriate for fall prevention, nutrition, physical activity, Tobacco-use cessation, weight loss and cognition.  Checklist reviewing preventive services available has been given to the patient.    Reviewed patients plan of care and provided an AVS. The Basic Care Plan (routine screening as documented in Health Maintenance) for Marcus meets the Care Plan requirement. This Care Plan has been established and reviewed with the Patient.          Don Aviles MD  [facility]

## 2023-11-01 ENCOUNTER — MYC MEDICAL ADVICE (OUTPATIENT)
Dept: PEDIATRICS | Facility: CLINIC | Age: 68
End: 2023-11-01
Payer: MEDICARE

## 2023-11-01 DIAGNOSIS — G81.94 LEFT HEMIPARESIS (H): Primary | ICD-10-CM

## 2023-11-13 ENCOUNTER — MYC MEDICAL ADVICE (OUTPATIENT)
Dept: PEDIATRICS | Facility: CLINIC | Age: 68
End: 2023-11-13
Payer: MEDICARE

## 2023-11-14 ENCOUNTER — THERAPY VISIT (OUTPATIENT)
Dept: PHYSICAL THERAPY | Facility: CLINIC | Age: 68
End: 2023-11-14
Attending: INTERNAL MEDICINE
Payer: MEDICARE

## 2023-11-14 DIAGNOSIS — G81.94 LEFT HEMIPARESIS (H): Primary | ICD-10-CM

## 2023-11-14 DIAGNOSIS — Z98.890 S/P CRANIOTOMY: ICD-10-CM

## 2023-11-14 PROCEDURE — 97112 NEUROMUSCULAR REEDUCATION: CPT | Mod: GP,KX | Performed by: PHYSICAL THERAPIST

## 2023-11-14 PROCEDURE — 97116 GAIT TRAINING THERAPY: CPT | Mod: GP,KX | Performed by: PHYSICAL THERAPIST

## 2023-11-14 PROCEDURE — 97110 THERAPEUTIC EXERCISES: CPT | Mod: GP,KX | Performed by: PHYSICAL THERAPIST

## 2023-11-14 NOTE — PROGRESS NOTES
11/14/23 0500   Appointment Info   Signing clinician's name / credentials Ada Scott, PT   Total/Authorized Visits 4/10   Medical Diagnosis L hemiparesis   PT Tx Diagnosis impaired gait, transfers, decline in function   Quick Adds Certification   Progress Note/Certification   Start of Care Date 07/21/22   Onset of illness/injury or Date of Surgery 06/09/22   Therapy Frequency 1 x a week every other week.   Predicted Duration 12 weeks   Certification date from 10/06/23   Certification date to 12/29/23   KX Modifier Statement Therapy services meets medical necessity requirements beyond the therapy threshold for ongoing care.   PT Goal 1   Goal Identifier 1 HEP   Goal Description Lex to be independent in appropriate HEP with assist from Nidia as needed to promote life long health, ability to stay in home and improve function.   Goal Progress Baseline - doing HEP but decreased tolerance. Wife not able to do stretching of LE due to her own health concerns.   Target Date 12/31/23   PT Goal 2   Goal Identifier 2 - 25 foot walk   Goal Description Lex to complete 25 foot walk consistently in 19 sec or less and 22 steps or less demonstrating improved gait pattern and efficiency.   Goal Progress 8/22/23 -  20.68 sec  at start of the session  6/27/23  times 18.66 sec, 18.5 sec and then 19.8 sec.   5/16/23  20.23   sec  20 steps,  with light assist/cues at plevis for faster pace 18.23 sec both wtih trekking pole and AFO on the L. 3/21/23  19.7 sec 17 steps   2/28/23:  19.5 sec, 18.85, 18.95 sec with trekking poles B.  1/10/23 at start of the session 23.55, steps   end of session 20.58 sec 1712/20/22 21.9 sec m 26 steps, trekking pole, AFO start of session   after stretches 20.66 sec   12/6/22 19.8 sec and then 20.8 sec both with trekking pole , SBA  9/27/22  22.8 sec, 21.68 sec with NBQC (forgot his trekking pole today) baseline 21.63 sec, 26 steps with trekking ple and AFO   Target Date 12/31/23   PT Goal 3   Goal  "Identifier 3 - TUG   Goal Description Lex to complete TUG in 40 sec or less to demonstrate improved gait pattern, ability to transfer sit <> stand and overall increased function.   Goal Progress 8/22/23  38.52 sec    3/7/23:  not assessed d/t time constraints.  12/20/22 40.28 sec, 40.27 sec trekking pole and SBA first time stopped and needing directions before sitting, second time issues with trekking pole slipping forward when stnading up and also when placing along wall to sit.  after stretches 40.23 sec   12/6/22  39.19 sec then 36.19 sec with trekking pole.  9/27/22 38.72 sec.  with NBQC  baseline 50.83 sec with trekking pole   Target Date 12/31/23   PT Goal 4   Goal Identifier 4 - stairs   Goal Description Lex to complete stairs ascending with alternating stepping and one rail and min /SBA to demonstrate improved neuro motor function , balance and greater safety with completing stairs.   Goal Progress 8/22/23 up and down 4 six inch steps with one rail and alternating stepping, descending leans R side against the rail on R. Less heavy lean than baselien.   baseline single step up , alternating down with heavy lean on the rail.   Target Date 12/31/23   Date Met 08/22/23   PT Goal 5   Goal Identifier 5 - pain   Goal Description Lex to report decreased back pain and R knee pain to allow him to increase his ambulation distance/ther ex at the gym as well as overall better QOL.   Goal Progress 8/22/23  did the 4 laps at the gym yesterday and did not have R knee pain.  5/16/23 has been about the same - R knee pain.  Still gets a little sore with prolonged walking. back pain - a little bit sometimes. 3/7/23 : doing 4 laps at gym.  Knee and back don't seem to be much worse but sometimes knee is more sore.  11/8/22 walking 3 laps , R knee \"doesn't bother me too much\"   He does continue to have knee pain off and on.  baseline - doing 2 laps at gym usually does 3-4  9/27/22  stil has about the same R knee, back \"not as " "stiff\" but doing 3-4 laps.   Target Date 12/31/23   PT Goal 6   Goal Identifier 6 - 6MWT   Goal Description Lex to increase his 6MWT distance by 150 feet or greater from when first assessed to demonstrate improved endurance, gait quality and function.   Goal Progress 8/22/23  363 feet wtih trekking pole , AFO on the L. 5/16/23 400 feet with trekking pole and AFO.  (need to continue to work on faster pace) 4/18/23 450 feet with trekking pole, AFO on the L.  no knee pain   cistractions - more people , noise in testing area than prior ? if this affected speed and less distance than in the past.   3/7/23: 382 feet.   12/13/22 366 feet   Target Date 12/31/23   PT Goal 7   Goal Identifier 7- sit <> stand and COM forward past TAYLOR challenges.   Goal Description Lex to perform sit to stand from 22 inch mat without bracing self with LE against the surface to demonstrate improved ability to bring COM forward past TAYLOR as well as better mechanics with transfer for decreased/excess load on the R LE to decrease knee pain.   Goal Progress 8/22/23 sit to stand from 22 inch mat with R UE support, did not brace against the mat with his LE.  Performance is inconsistent. Will continue with goal for consistency. 5/16/23 continues to struggle with this . Wife subjectively reports transfers as an improvement above - offered without being specifically asked about transfers. 3/7/23:  trekking pole in front, braces LEs on mat.  with cues to scoot forward before rising, is able to push less against mat but still contacts mat. 12/13/22  from 22 inch height continues to brace leg on the bed but does better with trekking pole held in front of him rather than placed to the side as he moves towards trekking pole/safety/support.  Needing cues on alignment LE and sequencing 9/27/22  baseline, lean to the R and posterior lean against surface 24 inch height   Target Date 12/31/23         [facility]                       "                                                             OUTPATIENT PHYSICAL THERAPY    PLAN OF TREATMENT FOR OUTPATIENT REHABILITATION   Patient's Last Name, First Name, Marcus Mcgee YOB: 1955   Provider's Name   [facility]   Medical Record No.  [id number]     Onset Date: 06/09/22  Start of Care Date: 07/21/22     Medical Diagnosis:  L hemiparesis      PT Treatment Diagnosis:  impaired gait, transfers, decline in function Plan of Treatment  Frequency/Duration: 1 x a week every other week./ (P) 12 weeks    Certification date from (P) 10/06/23 to (P) 12/29/23         See note for plan of treatment details and functional goals     Ada Scott, PT                         I CERTIFY THE NEED FOR THESE SERVICES FURNISHED UNDER        THIS PLAN OF TREATMENT AND WHILE UNDER MY CARE     (Physician attestation of this document indicates review and certification of the therapy plan).              Referring Provider:  Don Aviles    Initial Assessment  See Epic Evaluation- Start of Care Date: 07/21/22    PROGRESS NOTE TO BE COMPLETED NEXT SESSION

## 2023-11-21 ENCOUNTER — OFFICE VISIT (OUTPATIENT)
Dept: CARDIOLOGY | Facility: CLINIC | Age: 68
End: 2023-11-21
Payer: MEDICARE

## 2023-11-21 VITALS
BODY MASS INDEX: 22.72 KG/M2 | WEIGHT: 171.4 LBS | DIASTOLIC BLOOD PRESSURE: 63 MMHG | HEIGHT: 73 IN | SYSTOLIC BLOOD PRESSURE: 104 MMHG | OXYGEN SATURATION: 97 % | HEART RATE: 64 BPM

## 2023-11-21 DIAGNOSIS — E78.5 HYPERLIPIDEMIA LDL GOAL <100: ICD-10-CM

## 2023-11-21 DIAGNOSIS — I05.9 MITRAL VALVE DISEASE: Primary | ICD-10-CM

## 2023-11-21 DIAGNOSIS — D50.0 IRON DEFICIENCY ANEMIA DUE TO CHRONIC BLOOD LOSS: ICD-10-CM

## 2023-11-21 DIAGNOSIS — I48.20 CHRONIC ATRIAL FIBRILLATION (H): ICD-10-CM

## 2023-11-21 PROCEDURE — 99214 OFFICE O/P EST MOD 30 MIN: CPT | Performed by: INTERNAL MEDICINE

## 2023-11-21 RX ORDER — AMOXICILLIN 500 MG/1
CAPSULE ORAL
COMMUNITY
Start: 2022-09-30

## 2023-11-21 NOTE — PATIENT INSTRUCTIONS
It was a pleasure seeing you today and thank you for allowing me to be a part of your health care team.  Should you have any questions regarding your visit or future needs please feel free to reach out to my care team for assistance.      Thank you, Dr. Hayes Yip        **Nursing: [phone]       **Scheduling: [phone]

## 2023-11-21 NOTE — PROGRESS NOTES
General Cardiology Clinic Progress Note  Marcus Hodges MRN# [id number]   YOB: 1955 Age: 68 year old       Reason for visit: Mitral valve disease and chronic atrial fibrillation    History of presenting illness:     I had the opportunity to see Mr. Marcus Hodges in Cardiology Clinic today at [facility] in [place] for reevaluation of mitral valve disease and chronic atrial fibrillation.     Mr. Hodges is a 68-year-old gentleman who underwent repair of an atrial septal defect originally in 1978 and then went back for a second surgery 9 months later for repair of the mitral valve and second repair of the atrial septal defect.  He seemed to do well until 2004 when he had atrial flutter, treated successfully with ablation.  He was eventually taken off of anticoagulation.  In 2012, he suffered a large right-sided stroke, which was thought to be due to newly discovered atrial fibrillation.  He continues to have dense left-sided hemiparesis.  He is doing physical therapy and limited exercise.  He is able to walk slowly with a walker and not have any significant symptoms of shortness of breath or chest discomfort.  Recently he has been walking at the [facility] where there is an elevated track with a handrail.  He denies palpitations, lightheadedness and syncope, despite relatively slow heart rates.  Last year, he had some low blood pressures after starting Flomax and I reduced his metoprolol XL from 25 mg a day to 12.5 mg a day and his heart rates and blood pressure came up slightly.  He shows me some blood pressures from home today which are relatively low, in the range of 100/60, with heart rates in the 60s.     In an echocardiogram last year which I reviewed, he was in atrial fibrillation at the time of the echo, but his left ventricular function looks entirely normal with excellent function of his mitral valve.  He has just a trace of mitral regurgitation and no stenosis.  He  does have severe biatrial enlargement, but I see no residual interatrial shunting.  I noticed that his right ventricle appears enlarged with some degree of dysfunction, but he is not showing any signs of right ventricular failure.    His cholesterol numbers are excellent.  His basic metabolic panel and ALT are normal.  His hemoglobin this year was lower at 10.0 compared to 12.2 and 12.9 in the 2 years prior to this.  He has not seen any sign of bleeding.  Obviously he continues on anticoagulation with Eliquis 5 mg p.o. twice daily.            Assessment and Plan:     ASSESSMENT:    Mr. Marcus Hodges is a 68-year-old gentleman with a history of of ASD repair, mitral valve repair, and chronic atrial fibrillation.  He has had a stroke which is left him with left-sided hemiparesis.  He continues to walk slowly and have no concerning cardiac symptoms.  He denies palpitations, lightheadedness, shortness of breath and chest discomfort.  His blood pressures tend to run low but his heart rate is well controlled on a low-dose of metoprolol succinate now we will continue that for now since he remains asymptomatic with his low blood pressures.    I have added some iron studies, folate, and B12 level to labs that he will have done on December 12 to evaluate for the cause of his anemia.  If he has iron deficiency, he will likely require iron replacement therapy and a work-up for blood loss.    I will plan to see him back again in 1 year for reevaluation and repeat an echocardiogram again at that time.    Hayes Yip MD           Orders this Visit:  Orders Placed This Encounter   Procedures    CBC with platelets    Iron and iron binding capacity    Ferritin    Reticulocyte count    Vitamin B12    Folate    Follow-Up with Cardiology    Echocardiogram Complete     Orders Placed This Encounter   Medications    amoxicillin (AMOXIL) 500 MG capsule     Sig: TAKE FOUR CAPSULES BY MOUTH ONE HOUR PRIOR TO APPOINTMENT     There are no  "discontinued medications.    Today's clinic visit entailed:    30 minutes spent by me on the date of the encounter doing chart review, history and exam, documentation and further activities per the note  Provider  Link to [facility] Help Grid     The level of medical decision making during this visit was of high complexity.           Review of Systems:     Review of Systems:  Skin:  Negative     Eyes:  Positive for glasses  ENT:  Negative    Respiratory:  Negative    Cardiovascular:  Negative;palpitations;chest pain;syncope or near-syncope;dizziness;lightheadedness;cyanosis;edema    Gastroenterology: Negative    Genitourinary:  Negative    Musculoskeletal:       Neurologic:  Positive for stroke;local weakness  Psychiatric:  Negative    Heme/Lymph/Imm:  Negative    Endocrine:  Negative              Physical Exam:     Vitals: /63   Pulse 64   Ht 1.854 m (6' 1\")   Wt 77.7 kg (171 lb 6.4 oz)   SpO2 97%   BMI 22.61 kg/m    Constitutional: Well nourished and in no apparent distress.  Eyes: Pupils equal, round. Sclerae anicteric.   HEENT: Normocephalic, atraumatic.   Neck: Supple. JVD   Respiratory: Breathing non-labored. Lungs clear to auscultation bilaterally. No crackles, wheezes, rhonchi, or rales.  Cardiovascular:  Regular rate and rhythm, normal S1 and S2. No murmur, rub, or gallop.  Skin: Warm, dry. No rashes, cyanosis, or xanthelasma.  Extremities: No edema.  Neurologic: No gross motor deficits. Alert, awake, and oriented to person, place and time.  Psychiatric: Affect appropriate.             Medications:     Current Outpatient Medications   Medication Sig Dispense Refill    acetaminophen (TYLENOL) 500 MG tablet Take 2 tablets (1,000 mg) by mouth every 6 hours as needed for mild pain 100 tablet 0    alendronate (FOSAMAX) 70 MG tablet Take 1 tablet (70 mg) by mouth every 7 days 12 tablet 3    amoxicillin (AMOXIL) 500 MG capsule TAKE FOUR CAPSULES BY MOUTH ONE HOUR PRIOR TO APPOINTMENT      apixaban " ANTICOAGULANT (ELIQUIS ANTICOAGULANT) 5 MG tablet Take 1 tablet (5 mg) by mouth 2 times daily 180 tablet 11    atorvastatin (LIPITOR) 20 MG tablet Take 1 tablet (20 mg) by mouth daily 90 tablet 11    baclofen (LIORESAL) 20 MG tablet Take 1 tablet (20 mg) by mouth 4 times daily 360 tablet 11    citalopram (CELEXA) 20 MG tablet Take 1 tablet (20 mg) by mouth daily 90 tablet 11    docusate sodium (COLACE) 100 MG tablet Take 200 mg by mouth nightly as needed       levETIRAcetam (KEPPRA) 1000 MG tablet Take 1 tablet (1,000 mg) by mouth 2 times daily 180 tablet 11    metoprolol succinate ER (TOPROL XL) 25 MG 24 hr tablet Take 0.5 tablets (12.5 mg) by mouth daily 45 tablet 11    mirabegron (MYRBETRIQ) 50 MG 24 hr tablet Take 1 tablet (50 mg) by mouth daily 90 tablet 11    Multiple Vitamin (MULTI-VITAMIN) per tablet Take 1 tablet by mouth daily  100 tablet 3    polyethylene glycol (MIRALAX) 17 g packet Take 17 g by mouth every other day PRN      senna-docusate (SENOKOT-S/PERICOLACE) 8.6-50 MG tablet Take 4 tablets by mouth daily as needed      tamsulosin (FLOMAX) 0.4 MG capsule Take 1 capsule (0.4 mg) by mouth daily 90 capsule 11    vitamin D3 (CHOLECALCIFEROL) 50 mcg (2000 units) tablet Take 1,000 Units by mouth daily 90 tablet 3       Family History   Problem Relation Age of Onset    Hypertension Mother     Cerebrovascular Disease Father     Cancer Paternal Grandmother     Diabetes Maternal Grandmother     Congenital Anomalies Brother         several brothers and sisters born with congential heart defects, but all have been repaired    Congenital Anomalies Sister        Social History     Socioeconomic History    Marital status:      Spouse name: haim    Number of children: 0    Years of education: Not on file    Highest education level: Not on file   Occupational History     Employer: [employer]   Tobacco Use    Smoking status: Never    Smokeless tobacco: Never   Substance and Sexual Activity     Alcohol use: No    Drug use: No    Sexual activity: Yes     Partners: Female   Other Topics Concern     Service Not Asked    Blood Transfusions Not Asked    Caffeine Concern Yes     Comment: couple cups of tea a day    Occupational Exposure Not Asked    Hobby Hazards Not Asked    Sleep Concern Not Asked    Stress Concern Not Asked    Weight Concern Not Asked    Special Diet Yes     Comment: vegan; occ eggs/fish     Back Care Not Asked    Exercise Yes     Comment: 2 x weekly/gym , pysical therapy    Bike Helmet Not Asked    Seat Belt Yes    Self-Exams Not Asked    Parent/sibling w/ CABG, MI or angioplasty before 65F 55M? Not Asked   Social History Narrative    Not on file     Social Determinants of Health     Financial Resource Strain: Low Risk  (10/28/2023)    Financial Resource Strain     Within the past 12 months, have you or your family members you live with been unable to get utilities (heat, electricity) when it was really needed?: No   Food Insecurity: Low Risk  (10/28/2023)    Food Insecurity     Within the past 12 months, did you worry that your food would run out before you got money to buy more?: No     Within the past 12 months, did the food you bought just not last and you didn t have money to get more?: No   Transportation Needs: Low Risk  (10/28/2023)    Transportation Needs     Within the past 12 months, has lack of transportation kept you from medical appointments, getting your medicines, non-medical meetings or appointments, work, or from getting things that you need?: No   Physical Activity: Insufficiently Active (10/25/2022)    Exercise Vital Sign     Days of Exercise per Week: 1 day     Minutes of Exercise per Session: 10 min   Stress: No Stress Concern Present (10/25/2022)    [place] [employer] - Occupational Stress Questionnaire     Feeling of Stress : Only a little   Social Connections: Socially Integrated (10/25/2022)    Social Connection and Isolation Panel  [NHANES]     Frequency of Communication with Friends and Family: Three times a week     Frequency of Social Gatherings with Friends and Family: Once a week     Attends Zoroastrianism Services: More than 4 times per year     Active Member of Clubs or Organizations: Yes     Attends Club or Organization Meetings: Not on file     Marital Status:    Interpersonal Safety: Low Risk  (10/31/2023)    Interpersonal Safety     Do you feel physically and emotionally safe where you currently live?: Yes     Within the past 12 months, have you been hit, slapped, kicked or otherwise physically hurt by someone?: Not on file     Within the past 12 months, have you been humiliated or emotionally abused in other ways by your partner or ex-partner?: No   Housing Stability: Low Risk  (10/28/2023)    Housing Stability     Do you have housing? : Yes     Are you worried about losing your housing?: No            Past Medical History:     Past Medical History:   Diagnosis Date    * * * SBE PROPHYLAXIS * * *     Antiplatelet or antithrombotic long-term use     on Xarelto    Arrhythmia     A fib    Atrial enlargement, bilateral     Atrial flutter 2004    radiofrequency ablation 2004, resolved    ATRIAL SEPTAL DEFECT     repaired 1977    Chronic atrial fibrillation (H)     on apixaban    CVA (cerebral vascular accident) (H) 04/2012    R MCA, complicated by left sided weakness, walks with a cane, and seizures    Depression     Dysphagia 04/22/2012    [facility], following CVA    Hernia, abdominal     History of thrombophlebitis     hyperlipidemia     Mitral regurgitation     mitral valve damage related to ASD repair    Mumps     Neurogenic bladder     Neurogenic bowel     Palpitations     Respiratory failure (H) 04/22/2012    [facility], following CVA, prolonged requiring tracheostomy, [facility]     Tricuspid regurgitation     Unspecified glaucoma(365.9)     right    Urinary incontinence               Past Surgical History:     Past  Surgical History:   Procedure Laterality Date    CATHETER, ABLATION  2004    COMBINED CYSTOSCOPY, INSERT CATHETER URETER  9/28/2020    Procedure: cystoscopy and left ureteral catheter placement, left ureteral stent placement;  Surgeon: Nehemiah Bloom MD;  Location:  OR    Craniotomy reconstruction  2012    [facility], repair of hemicraniectomy defect    CYSTOSCOPY      DAVINCI PYELOPLASTY Left 9/28/2020    Procedure: left robotic assisted laparoscopic pyelolithotomy;  Surgeon: Nehemiah Bloom MD;  Location:  OR    GASTROSTOMY TUBE  April 2012    [facility], following CVA    HERNIA REPAIR      IR CAROTID ANGIOGRAM  4/22/2012    IR CAROTID ANGIOGRAM  4/22/2012    IR MISCELLANEOUS PROCEDURE  4/22/2012    IR MISCELLANEOUS PROCEDURE  4/25/2012    LASER KTP GREEN LIGHT PHOTOSELECTIVE VAPORIZATION PROSTATE N/A 12/1/2020    Procedure: Cystoscopy and greenlight photovaporization of the prostate;  Surgeon: Nehemiah Bloom MD;  Location: [facility] OR    REPAIR ATRIAL SEPTAL DEFECT  1978    ASD Repair    Right hemicraniectomy  April 2012    [facility], following CVA, mgmt malignant cerebral edema    SURGICAL HISTORY OF -   2009    right eye enucleation    TRACHEOSTOMY  April 2012    [facility], following CVA    ZZC NONSPECIFIC PROCEDURE      tonsillectomy    ZZC NONSPECIFIC PROCEDURE      Ing. Hernia Repair              Allergies:   Blood transfusion related (informational only) and Lisinopril       Data:   All laboratory data reviewed:    Recent Labs   Lab Test 10/31/23  0852 10/26/22  0822 10/19/21  0808   LDL 44 39 44   HDL 65 69 62   NHDL 53 47 53   CHOL 118 116 115   TRIG 44 42 43   TSH  --   --  2.82       Lab Results   Component Value Date    WBC 3.0 (L) 10/31/2023    WBC 6.8 10/03/2020    RBC 3.96 (L) 10/31/2023    RBC 3.90 (L) 10/03/2020    HGB 10.0 (L) 10/31/2023    HGB 11.3 (L) 10/03/2020    HCT 33.1 (L) 10/31/2023    HCT 35.8 (L) 10/03/2020    MCV 84 10/31/2023    MCV 92 10/03/2020    MCH 25.3 (L) 10/31/2023  "   MCH 29.0 10/03/2020    MCHC 30.2 (L) 10/31/2023    MCHC 31.6 10/03/2020    RDW 14.4 10/31/2023    RDW 13.8 10/03/2020     10/31/2023     10/03/2020       Lab Results   Component Value Date     10/31/2023     10/14/2020    POTASSIUM 4.6 10/31/2023    POTASSIUM 4.6 10/26/2022    POTASSIUM 4.6 10/14/2020    CHLORIDE 103 10/31/2023    CHLORIDE 109 10/26/2022    CHLORIDE 108 10/14/2020    CO2 27 10/31/2023    CO2 25 10/26/2022    CO2 28 10/14/2020    ANIONGAP 9 10/31/2023    ANIONGAP 6 10/26/2022    ANIONGAP 3 10/14/2020    GLC 78 10/31/2023    GLC 78 10/26/2022    GLC 84 10/14/2020    BUN 12.2 10/31/2023    BUN 13 10/26/2022    BUN 13 10/14/2020    CR 0.77 10/31/2023    CR 0.73 10/14/2020    GFRESTIMATED >90 10/31/2023    GFRESTIMATED >90 10/14/2020    GFRESTBLACK >90 10/14/2020    STEPHANI 9.1 10/31/2023    STEPHANI 8.7 10/14/2020      Lab Results   Component Value Date    AST 30 10/31/2023    AST 41 10/02/2020    ALT 23 10/31/2023    ALT 26 10/02/2020       No results found for: \"A1C\"    Lab Results   Component Value Date    INR 1.46 (H) 10/02/2020    INR 2.4 (A) 12/04/2017    INR 2.5 (A) 10/23/2017    INR 1.68 (H) 11/06/2015         AZEB VILLARREAL MD  [facility]  "

## 2023-11-21 NOTE — LETTER
11/21/2023    Don Aviles MD  [address] [place] [address]    RE: Marcus Hodges       Dear Colleague,     I had the pleasure of seeing Marcus Hodges in the [facility].    General Cardiology Clinic Progress Note  Marcus Hodges MRN# [id number]   YOB: 1955 Age: 68 year old       Reason for visit: Mitral valve disease and chronic atrial fibrillation    History of presenting illness:     I had the opportunity to see Mr. Marcus Hodges in Cardiology Clinic today at [facility] in [place] for reevaluation of mitral valve disease and chronic atrial fibrillation.     Mr. Hodges is a 68-year-old gentleman who underwent repair of an atrial septal defect originally in 1978 and then went back for a second surgery 9 months later for repair of the mitral valve and second repair of the atrial septal defect.  He seemed to do well until 2004 when he had atrial flutter, treated successfully with ablation.  He was eventually taken off of anticoagulation.  In 2012, he suffered a large right-sided stroke, which was thought to be due to newly discovered atrial fibrillation.  He continues to have dense left-sided hemiparesis.  He is doing physical therapy and limited exercise.  He is able to walk slowly with a walker and not have any significant symptoms of shortness of breath or chest discomfort.  Recently he has been walking at the [facility] where there is an elevated track with a handrail.  He denies palpitations, lightheadedness and syncope, despite relatively slow heart rates.  Last year, he had some low blood pressures after starting Flomax and I reduced his metoprolol XL from 25 mg a day to 12.5 mg a day and his heart rates and blood pressure came up slightly.  He shows me some blood pressures from home today which are relatively low, in the range of 100/60, with heart rates in the 60s.     In an echocardiogram last year which I reviewed, he was in  atrial fibrillation at the time of the echo, but his left ventricular function looks entirely normal with excellent function of his mitral valve.  He has just a trace of mitral regurgitation and no stenosis.  He does have severe biatrial enlargement, but I see no residual interatrial shunting.  I noticed that his right ventricle appears enlarged with some degree of dysfunction, but he is not showing any signs of right ventricular failure.    His cholesterol numbers are excellent.  His basic metabolic panel and ALT are normal.  His hemoglobin this year was lower at 10.0 compared to 12.2 and 12.9 in the 2 years prior to this.  He has not seen any sign of bleeding.  Obviously he continues on anticoagulation with Eliquis 5 mg p.o. twice daily.            Assessment and Plan:     ASSESSMENT:    Mr. Marcus Hodges is a 68-year-old gentleman with a history of of ASD repair, mitral valve repair, and chronic atrial fibrillation.  He has had a stroke which is left him with left-sided hemiparesis.  He continues to walk slowly and have no concerning cardiac symptoms.  He denies palpitations, lightheadedness, shortness of breath and chest discomfort.  His blood pressures tend to run low but his heart rate is well controlled on a low-dose of metoprolol succinate now we will continue that for now since he remains asymptomatic with his low blood pressures.    I have added some iron studies, folate, and B12 level to labs that he will have done on December 12 to evaluate for the cause of his anemia.  If he has iron deficiency, he will likely require iron replacement therapy and a work-up for blood loss.    I will plan to see him back again in 1 year for reevaluation and repeat an echocardiogram again at that time.    Hayes Yip MD           Orders this Visit:  Orders Placed This Encounter   Procedures    CBC with platelets    Iron and iron binding capacity    Ferritin    Reticulocyte count    Vitamin B12    Folate    Follow-Up with  "Cardiology    Echocardiogram Complete     Orders Placed This Encounter   Medications    amoxicillin (AMOXIL) 500 MG capsule     Sig: TAKE FOUR CAPSULES BY MOUTH ONE HOUR PRIOR TO APPOINTMENT     There are no discontinued medications.    Today's clinic visit entailed:    30 minutes spent by me on the date of the encounter doing chart review, history and exam, documentation and further activities per the note  Provider  Link to [facility] Help Grid     The level of medical decision making during this visit was of high complexity.           Review of Systems:     Review of Systems:  Skin:  Negative     Eyes:  Positive for glasses  ENT:  Negative    Respiratory:  Negative    Cardiovascular:  Negative;palpitations;chest pain;syncope or near-syncope;dizziness;lightheadedness;cyanosis;edema    Gastroenterology: Negative    Genitourinary:  Negative    Musculoskeletal:       Neurologic:  Positive for stroke;local weakness  Psychiatric:  Negative    Heme/Lymph/Imm:  Negative    Endocrine:  Negative              Physical Exam:     Vitals: /63   Pulse 64   Ht 1.854 m (6' 1\")   Wt 77.7 kg (171 lb 6.4 oz)   SpO2 97%   BMI 22.61 kg/m    Constitutional: Well nourished and in no apparent distress.  Eyes: Pupils equal, round. Sclerae anicteric.   HEENT: Normocephalic, atraumatic.   Neck: Supple. JVD   Respiratory: Breathing non-labored. Lungs clear to auscultation bilaterally. No crackles, wheezes, rhonchi, or rales.  Cardiovascular:  Regular rate and rhythm, normal S1 and S2. No murmur, rub, or gallop.  Skin: Warm, dry. No rashes, cyanosis, or xanthelasma.  Extremities: No edema.  Neurologic: No gross motor deficits. Alert, awake, and oriented to person, place and time.  Psychiatric: Affect appropriate.             Medications:     Current Outpatient Medications   Medication Sig Dispense Refill    acetaminophen (TYLENOL) 500 MG tablet Take 2 tablets (1,000 mg) by mouth every 6 hours as needed for mild pain 100 tablet 0    " alendronate (FOSAMAX) 70 MG tablet Take 1 tablet (70 mg) by mouth every 7 days 12 tablet 3    amoxicillin (AMOXIL) 500 MG capsule TAKE FOUR CAPSULES BY MOUTH ONE HOUR PRIOR TO APPOINTMENT      apixaban ANTICOAGULANT (ELIQUIS ANTICOAGULANT) 5 MG tablet Take 1 tablet (5 mg) by mouth 2 times daily 180 tablet 11    atorvastatin (LIPITOR) 20 MG tablet Take 1 tablet (20 mg) by mouth daily 90 tablet 11    baclofen (LIORESAL) 20 MG tablet Take 1 tablet (20 mg) by mouth 4 times daily 360 tablet 11    citalopram (CELEXA) 20 MG tablet Take 1 tablet (20 mg) by mouth daily 90 tablet 11    docusate sodium (COLACE) 100 MG tablet Take 200 mg by mouth nightly as needed       levETIRAcetam (KEPPRA) 1000 MG tablet Take 1 tablet (1,000 mg) by mouth 2 times daily 180 tablet 11    metoprolol succinate ER (TOPROL XL) 25 MG 24 hr tablet Take 0.5 tablets (12.5 mg) by mouth daily 45 tablet 11    mirabegron (MYRBETRIQ) 50 MG 24 hr tablet Take 1 tablet (50 mg) by mouth daily 90 tablet 11    Multiple Vitamin (MULTI-VITAMIN) per tablet Take 1 tablet by mouth daily  100 tablet 3    polyethylene glycol (MIRALAX) 17 g packet Take 17 g by mouth every other day PRN      senna-docusate (SENOKOT-S/PERICOLACE) 8.6-50 MG tablet Take 4 tablets by mouth daily as needed      tamsulosin (FLOMAX) 0.4 MG capsule Take 1 capsule (0.4 mg) by mouth daily 90 capsule 11    vitamin D3 (CHOLECALCIFEROL) 50 mcg (2000 units) tablet Take 1,000 Units by mouth daily 90 tablet 3       Family History   Problem Relation Age of Onset    Hypertension Mother     Cerebrovascular Disease Father     Cancer Paternal Grandmother     Diabetes Maternal Grandmother     Congenital Anomalies Brother         several brothers and sisters born with congential heart defects, but all have been repaired    Congenital Anomalies Sister        Social History     Socioeconomic History    Marital status:      Spouse name: haim    Number of children: 0    Years of education: Not on file     Highest education level: Not on file   Occupational History     Employer: [employer]   Tobacco Use    Smoking status: Never    Smokeless tobacco: Never   Substance and Sexual Activity    Alcohol use: No    Drug use: No    Sexual activity: Yes     Partners: Female   Other Topics Concern     Service Not Asked    Blood Transfusions Not Asked    Caffeine Concern Yes     Comment: couple cups of tea a day    Occupational Exposure Not Asked    Hobby Hazards Not Asked    Sleep Concern Not Asked    Stress Concern Not Asked    Weight Concern Not Asked    Special Diet Yes     Comment: vegan; occ eggs/fish     Back Care Not Asked    Exercise Yes     Comment: 2 x weekly/gym , pysical therapy    Bike Helmet Not Asked    Seat Belt Yes    Self-Exams Not Asked    Parent/sibling w/ CABG, MI or angioplasty before 65F 55M? Not Asked   Social History Narrative    Not on file     Social Determinants of Health     Financial Resource Strain: Low Risk  (10/28/2023)    Financial Resource Strain     Within the past 12 months, have you or your family members you live with been unable to get utilities (heat, electricity) when it was really needed?: No   Food Insecurity: Low Risk  (10/28/2023)    Food Insecurity     Within the past 12 months, did you worry that your food would run out before you got money to buy more?: No     Within the past 12 months, did the food you bought just not last and you didn t have money to get more?: No   Transportation Needs: Low Risk  (10/28/2023)    Transportation Needs     Within the past 12 months, has lack of transportation kept you from medical appointments, getting your medicines, non-medical meetings or appointments, work, or from getting things that you need?: No   Physical Activity: Insufficiently Active (10/25/2022)    Exercise Vital Sign     Days of Exercise per Week: 1 day     Minutes of Exercise per Session: 10 min   Stress: No Stress Concern Present (10/25/2022)    [employer]  [place] [employer] - Occupational Stress Questionnaire     Feeling of Stress : Only a little   Social Connections: Socially Integrated (10/25/2022)    Social Connection and Isolation Panel [NHANES]     Frequency of Communication with Friends and Family: Three times a week     Frequency of Social Gatherings with Friends and Family: Once a week     Attends Hoahaoism Services: More than 4 times per year     Active Member of Clubs or Organizations: Yes     Attends Club or Organization Meetings: Not on file     Marital Status:    Interpersonal Safety: Low Risk  (10/31/2023)    Interpersonal Safety     Do you feel physically and emotionally safe where you currently live?: Yes     Within the past 12 months, have you been hit, slapped, kicked or otherwise physically hurt by someone?: Not on file     Within the past 12 months, have you been humiliated or emotionally abused in other ways by your partner or ex-partner?: No   Housing Stability: Low Risk  (10/28/2023)    Housing Stability     Do you have housing? : Yes     Are you worried about losing your housing?: No            Past Medical History:     Past Medical History:   Diagnosis Date    * * * SBE PROPHYLAXIS * * *     Antiplatelet or antithrombotic long-term use     on Xarelto    Arrhythmia     A fib    Atrial enlargement, bilateral     Atrial flutter 2004    radiofrequency ablation 2004, resolved    ATRIAL SEPTAL DEFECT     repaired 1977    Chronic atrial fibrillation (H)     on apixaban    CVA (cerebral vascular accident) (H) 04/2012    R MCA, complicated by left sided weakness, walks with a cane, and seizures    Depression     Dysphagia 04/22/2012    [facility], following CVA    Hernia, abdominal     History of thrombophlebitis     hyperlipidemia     Mitral regurgitation     mitral valve damage related to ASD repair    Mumps     Neurogenic bladder     Neurogenic bowel     Palpitations     Respiratory failure (H) 04/22/2012     [facility],  following CVA, prolonged requiring tracheostomy, [place] [facility]     Tricuspid regurgitation     Unspecified glaucoma(365.9)     right    Urinary incontinence               Past Surgical History:     Past Surgical History:   Procedure Laterality Date    CATHETER, ABLATION  2004    COMBINED CYSTOSCOPY, INSERT CATHETER URETER  9/28/2020    Procedure: cystoscopy and left ureteral catheter placement, left ureteral stent placement;  Surgeon: Nehemiah Bloom MD;  Location:  OR    Craniotomy [facility]  2012    [facility], repair of hemicraniectomy defect    CYSTOSCOPY      DAVINCI PYELOPLASTY Left 9/28/2020    Procedure: left robotic assisted laparoscopic pyelolithotomy;  Surgeon: Nehemiah Bloom MD;  Location:  OR    GASTROSTOMY TUBE  April 2012    [facility], following CVA    HERNIA REPAIR      IR CAROTID ANGIOGRAM  4/22/2012    IR CAROTID ANGIOGRAM  4/22/2012    IR MISCELLANEOUS PROCEDURE  4/22/2012    IR MISCELLANEOUS PROCEDURE  4/25/2012    LASER KTP GREEN LIGHT PHOTOSELECTIVE VAPORIZATION PROSTATE N/A 12/1/2020    Procedure: Cystoscopy and greenlight photovaporization of the prostate;  Surgeon: Nehemiah Bloom MD;  Location: [facility] OR    REPAIR ATRIAL SEPTAL DEFECT  1978    ASD Repair    Right hemicraniectomy  April 2012    [facility], following CVA, mgmt malignant cerebral edema    SURGICAL HISTORY OF -   2009    right eye enucleation    TRACHEOSTOMY  April 2012    [facility], following CVA    ZZC NONSPECIFIC PROCEDURE      tonsillectomy    ZZC NONSPECIFIC PROCEDURE      Ing. Hernia Repair              Allergies:   Blood transfusion related (informational only) and Lisinopril       Data:   All laboratory data reviewed:    Recent Labs   Lab Test 10/31/23  0852 10/26/22  0822 10/19/21  0808   LDL 44 39 44   HDL 65 69 62   NHDL 53 47 53   CHOL 118 116 115   TRIG 44 42 43   TSH  --   --  2.82       Lab Results   Component Value Date    WBC 3.0 (L) 10/31/2023    WBC 6.8 10/03/2020    RBC 3.96 (L) 10/31/2023  "   RBC 3.90 (L) 10/03/2020    HGB 10.0 (L) 10/31/2023    HGB 11.3 (L) 10/03/2020    HCT 33.1 (L) 10/31/2023    HCT 35.8 (L) 10/03/2020    MCV 84 10/31/2023    MCV 92 10/03/2020    MCH 25.3 (L) 10/31/2023    MCH 29.0 10/03/2020    MCHC 30.2 (L) 10/31/2023    MCHC 31.6 10/03/2020    RDW 14.4 10/31/2023    RDW 13.8 10/03/2020     10/31/2023     10/03/2020       Lab Results   Component Value Date     10/31/2023     10/14/2020    POTASSIUM 4.6 10/31/2023    POTASSIUM 4.6 10/26/2022    POTASSIUM 4.6 10/14/2020    CHLORIDE 103 10/31/2023    CHLORIDE 109 10/26/2022    CHLORIDE 108 10/14/2020    CO2 27 10/31/2023    CO2 25 10/26/2022    CO2 28 10/14/2020    ANIONGAP 9 10/31/2023    ANIONGAP 6 10/26/2022    ANIONGAP 3 10/14/2020    GLC 78 10/31/2023    GLC 78 10/26/2022    GLC 84 10/14/2020    BUN 12.2 10/31/2023    BUN 13 10/26/2022    BUN 13 10/14/2020    CR 0.77 10/31/2023    CR 0.73 10/14/2020    GFRESTIMATED >90 10/31/2023    GFRESTIMATED >90 10/14/2020    GFRESTBLACK >90 10/14/2020    STEPHANI 9.1 10/31/2023    STEPHANI 8.7 10/14/2020      Lab Results   Component Value Date    AST 30 10/31/2023    AST 41 10/02/2020    ALT 23 10/31/2023    ALT 26 10/02/2020       No results found for: \"A1C\"    Lab Results   Component Value Date    INR 1.46 (H) 10/02/2020    INR 2.4 (A) 12/04/2017    INR 2.5 (A) 10/23/2017    INR 1.68 (H) 11/06/2015         AZEB VILLARREAL MD  [facility]    Thank you for allowing me to participate in the care of your patient.      Sincerely,     AZEB VILLARREAL MD     [facility]  cc:   No referring provider defined for this encounter.      "

## 2023-11-28 ENCOUNTER — THERAPY VISIT (OUTPATIENT)
Dept: PHYSICAL THERAPY | Facility: CLINIC | Age: 68
End: 2023-11-28
Attending: INTERNAL MEDICINE
Payer: MEDICARE

## 2023-11-28 DIAGNOSIS — S06.9X9S TRAUMATIC BRAIN INJURY WITH LOSS OF CONSCIOUSNESS, SEQUELA (H): ICD-10-CM

## 2023-11-28 DIAGNOSIS — G81.94 LEFT HEMIPARESIS (H): Primary | ICD-10-CM

## 2023-11-28 DIAGNOSIS — Z98.890 S/P CRANIOTOMY: ICD-10-CM

## 2023-11-28 PROCEDURE — 97110 THERAPEUTIC EXERCISES: CPT | Mod: GP,KX | Performed by: PHYSICAL THERAPIST

## 2023-11-28 PROCEDURE — 97112 NEUROMUSCULAR REEDUCATION: CPT | Mod: GP,KX | Performed by: PHYSICAL THERAPIST

## 2023-11-28 PROCEDURE — 97116 GAIT TRAINING THERAPY: CPT | Mod: GP,KX | Performed by: PHYSICAL THERAPIST

## 2023-12-07 ENCOUNTER — THERAPY VISIT (OUTPATIENT)
Dept: PHYSICAL THERAPY | Facility: CLINIC | Age: 68
End: 2023-12-07
Attending: INTERNAL MEDICINE
Payer: MEDICARE

## 2023-12-07 DIAGNOSIS — G81.94 LEFT HEMIPARESIS (H): Primary | ICD-10-CM

## 2023-12-07 PROCEDURE — 97110 THERAPEUTIC EXERCISES: CPT | Mod: GP,KX | Performed by: PHYSICAL THERAPIST

## 2023-12-07 PROCEDURE — 97116 GAIT TRAINING THERAPY: CPT | Mod: GP,KX | Performed by: PHYSICAL THERAPIST

## 2023-12-11 ENCOUNTER — TELEPHONE (OUTPATIENT)
Dept: NEPHROLOGY | Facility: CLINIC | Age: 68
End: 2023-12-11
Payer: MEDICARE

## 2023-12-11 NOTE — TELEPHONE ENCOUNTER
NIDIA and sent [facility] to schedule follow up around 9.6.24 with Dr. Frances// 12.11.23 KET

## 2023-12-12 ENCOUNTER — LAB (OUTPATIENT)
Dept: LAB | Facility: CLINIC | Age: 68
End: 2023-12-12
Payer: MEDICARE

## 2023-12-12 DIAGNOSIS — D64.9 NORMOCYTIC ANEMIA: ICD-10-CM

## 2023-12-12 DIAGNOSIS — D50.0 IRON DEFICIENCY ANEMIA DUE TO CHRONIC BLOOD LOSS: ICD-10-CM

## 2023-12-12 LAB
BASOPHILS # BLD AUTO: 0 10E3/UL (ref 0–0.2)
BASOPHILS NFR BLD AUTO: 1 %
EOSINOPHIL # BLD AUTO: 0.1 10E3/UL (ref 0–0.7)
EOSINOPHIL NFR BLD AUTO: 2 %
ERYTHROCYTE [DISTWIDTH] IN BLOOD BY AUTOMATED COUNT: 15.2 % (ref 10–15)
FERRITIN SERPL-MCNC: 14 NG/ML (ref 31–409)
FOLATE SERPL-MCNC: 26.1 NG/ML (ref 4.6–34.8)
HCT VFR BLD AUTO: 35.1 % (ref 40–53)
HGB BLD-MCNC: 10.5 G/DL (ref 13.3–17.7)
IMM GRANULOCYTES # BLD: 0 10E3/UL
IMM GRANULOCYTES NFR BLD: 0 %
IRON BINDING CAPACITY (ROCHE): 381 UG/DL (ref 240–430)
IRON SATN MFR SERPL: 7 % (ref 15–46)
IRON SERPL-MCNC: 25 UG/DL (ref 61–157)
LYMPHOCYTES # BLD AUTO: 0.7 10E3/UL (ref 0.8–5.3)
LYMPHOCYTES NFR BLD AUTO: 23 %
MCH RBC QN AUTO: 24.7 PG (ref 26.5–33)
MCHC RBC AUTO-ENTMCNC: 29.9 G/DL (ref 31.5–36.5)
MCV RBC AUTO: 83 FL (ref 78–100)
MONOCYTES # BLD AUTO: 0.3 10E3/UL (ref 0–1.3)
MONOCYTES NFR BLD AUTO: 9 %
NEUTROPHILS # BLD AUTO: 2 10E3/UL (ref 1.6–8.3)
NEUTROPHILS NFR BLD AUTO: 65 %
PLATELET # BLD AUTO: 189 10E3/UL (ref 150–450)
RBC # BLD AUTO: 4.25 10E6/UL (ref 4.4–5.9)
RETICS # AUTO: 0.04 10E6/UL (ref 0.03–0.1)
RETICS/RBC NFR AUTO: 0.8 % (ref 0.5–2)
VIT B12 SERPL-MCNC: 643 PG/ML (ref 232–1245)
WBC # BLD AUTO: 3.1 10E3/UL (ref 4–11)

## 2023-12-12 PROCEDURE — 85025 COMPLETE CBC W/AUTO DIFF WBC: CPT

## 2023-12-12 PROCEDURE — 82728 ASSAY OF FERRITIN: CPT

## 2023-12-12 PROCEDURE — 83550 IRON BINDING TEST: CPT

## 2023-12-12 PROCEDURE — 83540 ASSAY OF IRON: CPT

## 2023-12-12 PROCEDURE — 82746 ASSAY OF FOLIC ACID SERUM: CPT

## 2023-12-12 PROCEDURE — 36415 COLL VENOUS BLD VENIPUNCTURE: CPT

## 2023-12-12 PROCEDURE — 82607 VITAMIN B-12: CPT

## 2023-12-12 PROCEDURE — 85045 AUTOMATED RETICULOCYTE COUNT: CPT

## 2023-12-13 ENCOUNTER — CARE COORDINATION (OUTPATIENT)
Dept: CARDIOLOGY | Facility: CLINIC | Age: 68
End: 2023-12-13
Payer: MEDICARE

## 2023-12-13 DIAGNOSIS — D50.9 ANEMIA, IRON DEFICIENCY: Primary | ICD-10-CM

## 2023-12-13 DIAGNOSIS — D50.0 IRON DEFICIENCY ANEMIA DUE TO CHRONIC BLOOD LOSS: ICD-10-CM

## 2023-12-13 NOTE — PROGRESS NOTES
"Lab results noted from 12/12/23. Will route update to . Pt had 11/21 visit with . Per , \"I have added some iron studies, folate, and B12 level to labs that he will have done on December 12 to evaluate for the cause of his anemia.  If he has iron deficiency, he will likely require iron replacement therapy and a work-up for blood loss. \"      Component      Latest Ref Rn 12/12/2023  8:48 AM   Iron      61 - 157 ug/dL 25 (L)    Iron Binding Capacity      240 - 430 ug/dL 381    Iron Sat Index      15 - 46 % 7 (L)    % Retic      0.5 - 2.0 % 0.8    Absolute Retic      0.025 - 0.095 10e6/uL 0.036    Ferritin      31 - 409 ng/mL 14 (L)    Vitamin B12      232 - 1,245 pg/mL 643    Folate      4.6 - 34.8 ng/mL 26.1       Legend:  (L) Low      Component      Latest Ref Rn 12/12/2023  8:48 AM   WBC      4.0 - 11.0 10e3/uL 3.1 (L)    RBC Count      4.40 - 5.90 10e6/uL 4.25 (L)    Hemoglobin      13.3 - 17.7 g/dL 10.5 (L)    Hematocrit      40.0 - 53.0 % 35.1 (L)    MCV      78 - 100 fL 83    MCH      26.5 - 33.0 pg 24.7 (L)    MCHC      31.5 - 36.5 g/dL 29.9 (L)    RDW      10.0 - 15.0 % 15.2 (H)    Platelet Count      150 - 450 10e3/uL 189    % Neutrophils      % 65    % Lymphocytes      % 23    % Monocytes      % 9    % Eosinophils      % 2    % Basophils      % 1    % Immature Granulocytes      % 0    Absolute Neutrophils      1.6 - 8.3 10e3/uL 2.0    Absolute Lymphocytes      0.8 - 5.3 10e3/uL 0.7 (L)    Absolute Monocytes      0.0 - 1.3 10e3/uL 0.3    Absolute Eosinophils      0.0 - 0.7 10e3/uL 0.1    Absolute Basophils      0.0 - 0.2 10e3/uL 0.0    Absolute Immature Granulocytes      <=0.4 10e3/uL 0.0       Legend:  (L) Low  (H) High  "

## 2023-12-19 ENCOUNTER — MYC MEDICAL ADVICE (OUTPATIENT)
Dept: CARDIOLOGY | Facility: CLINIC | Age: 68
End: 2023-12-19
Payer: MEDICARE

## 2023-12-19 ENCOUNTER — MYC MEDICAL ADVICE (OUTPATIENT)
Dept: PEDIATRICS | Facility: CLINIC | Age: 68
End: 2023-12-19
Payer: MEDICARE

## 2023-12-19 NOTE — TELEPHONE ENCOUNTER
Dr Aviles     Patient had blood draw on 12/12. Please advise on results from CBC as able. Patient recommended follow up to Dr Yip for other labs.       Lulu LE RN on 12/19/2023 at 4:22 PM

## 2023-12-20 NOTE — TELEPHONE ENCOUNTER
Please call Lex.  Iron deficiency anemia.  Last colonoscopy 2008--has he had once since then ?  Has he noticed any blood in stools or black stools?  Please route back to me--

## 2023-12-20 NOTE — TELEPHONE ENCOUNTER
Called and spoke with patient. Patient has not had a colonoscopy since 2008. Patient has had cologuard testing since then. Last cologuard was in 2021. Patient reports those results were normal. Patient denies any blood in stools. No black stools.     Igor ZIEGLER RN 12/20/2023 at 4:48 PM

## 2023-12-21 ENCOUNTER — THERAPY VISIT (OUTPATIENT)
Dept: PHYSICAL THERAPY | Facility: CLINIC | Age: 68
End: 2023-12-21
Attending: INTERNAL MEDICINE
Payer: MEDICARE

## 2023-12-21 DIAGNOSIS — G81.94 LEFT HEMIPARESIS (H): Primary | ICD-10-CM

## 2023-12-21 DIAGNOSIS — Z98.890 S/P CRANIOTOMY: ICD-10-CM

## 2023-12-21 PROCEDURE — 97110 THERAPEUTIC EXERCISES: CPT | Mod: GP,KX | Performed by: PHYSICAL THERAPIST

## 2023-12-21 PROCEDURE — 97112 NEUROMUSCULAR REEDUCATION: CPT | Mod: GP,KX | Performed by: PHYSICAL THERAPIST

## 2023-12-21 PROCEDURE — 97116 GAIT TRAINING THERAPY: CPT | Mod: GP,KX | Performed by: PHYSICAL THERAPIST

## 2023-12-29 RX ORDER — HEPARIN SODIUM (PORCINE) LOCK FLUSH IV SOLN 100 UNIT/ML 100 UNIT/ML
5 SOLUTION INTRAVENOUS
Status: CANCELLED | OUTPATIENT
Start: 2023-12-29

## 2023-12-29 RX ORDER — EPINEPHRINE 1 MG/ML
0.3 INJECTION, SOLUTION, CONCENTRATE INTRAVENOUS EVERY 5 MIN PRN
Status: CANCELLED | OUTPATIENT
Start: 2023-12-29

## 2023-12-29 RX ORDER — HEPARIN SODIUM,PORCINE 10 UNIT/ML
5-20 VIAL (ML) INTRAVENOUS DAILY PRN
Status: CANCELLED | OUTPATIENT
Start: 2023-12-29

## 2023-12-29 RX ORDER — DIPHENHYDRAMINE HYDROCHLORIDE 50 MG/ML
50 INJECTION INTRAMUSCULAR; INTRAVENOUS
Status: CANCELLED
Start: 2023-12-29

## 2023-12-29 RX ORDER — ALBUTEROL SULFATE 90 UG/1
1-2 AEROSOL, METERED RESPIRATORY (INHALATION)
Status: CANCELLED
Start: 2023-12-29

## 2023-12-29 RX ORDER — MEPERIDINE HYDROCHLORIDE 25 MG/ML
25 INJECTION INTRAMUSCULAR; INTRAVENOUS; SUBCUTANEOUS EVERY 30 MIN PRN
Status: CANCELLED | OUTPATIENT
Start: 2023-12-29

## 2023-12-29 RX ORDER — ALBUTEROL SULFATE 0.83 MG/ML
2.5 SOLUTION RESPIRATORY (INHALATION)
Status: CANCELLED | OUTPATIENT
Start: 2023-12-29

## 2023-12-29 RX ORDER — METHYLPREDNISOLONE SODIUM SUCCINATE 125 MG/2ML
125 INJECTION, POWDER, LYOPHILIZED, FOR SOLUTION INTRAMUSCULAR; INTRAVENOUS
Status: CANCELLED
Start: 2023-12-29

## 2024-01-04 ENCOUNTER — THERAPY VISIT (OUTPATIENT)
Dept: PHYSICAL THERAPY | Facility: CLINIC | Age: 69
End: 2024-01-04
Attending: INTERNAL MEDICINE
Payer: MEDICARE

## 2024-01-04 DIAGNOSIS — G81.94 LEFT HEMIPARESIS (H): Primary | ICD-10-CM

## 2024-01-04 DIAGNOSIS — Z98.890 S/P CRANIOTOMY: ICD-10-CM

## 2024-01-04 PROCEDURE — 97116 GAIT TRAINING THERAPY: CPT | Mod: GP | Performed by: PHYSICAL THERAPIST

## 2024-01-04 PROCEDURE — 97110 THERAPEUTIC EXERCISES: CPT | Mod: GP | Performed by: PHYSICAL THERAPIST

## 2024-01-05 NOTE — PROGRESS NOTES
01/04/24 0500   Appointment Info   Medical Diagnosis L hemiparesis   PT Tx Diagnosis impaired gait, transfers, decline in function   Quick Adds Certification   Progress Note/Certification   Start of Care Date 07/21/22   Onset of illness/injury or Date of Surgery 06/09/22   Therapy Frequency 1 x a week every other week.   Predicted Duration 12 weeks   Certification date from 12/30/23   Certification date to 03/23/24   KX Modifier Statement Therapy services meets medical necessity requirements beyond the therapy threshold for ongoing care.   Progress Note Completed Date 01/04/24   PT Goal 1   Goal Identifier 1 HEP   Goal Description Lex to be independent in appropriate HEP with assist from Nidia as needed to promote life long health, ability to stay in home and improve function.   Goal Progress Baseline - doing HEP but decreased tolerance. Wife not able to do stretching of LE due to her own health concerns.   Target Date 06/28/24   PT Goal 2   Goal Identifier 2 - 25 foot walk   Goal Description Lex to complete 25 foot walk consistently in 19 sec or less and 22 steps or less demonstrating improved gait pattern and efficiency.   Goal Progress 1/4/23  20.2 sec 12/7/23 18.69 sec and 19. 7 sec after TM with trekking pole, L AFO  11/28/23 25 ft walk 18.9 sec, 20.4 sec.  after 250 feet gait.   8/22/23 -  20.68 sec  at start of the session  6/27/23  times 18.66 sec, 18.5 sec and then 19.8 sec.   5/16/23  20.23   sec  20 steps,  with light assist/cues at plevis for faster pace 18.23 sec both wtih trekking pole and AFO on the L. 3/21/23  19.7 sec 17 steps   2/28/23:  19.5 sec, 18.85, 18.95 sec with trekking poles B.  1/10/23 at start of the session 23.55, steps   end of session 20.58 sec 1712/20/22 21.9 sec m 26 steps, trekking pole, AFO start of session   after stretches 20.66 sec   12/6/22 19.8 sec and then 20.8 sec both with trekking pole , SBA  9/27/22  22.8 sec, 21.68 sec with NBQC (forgot his trekking pole today)  baseline 21.63 sec, 26 steps with trekking ple and AFO 12/21/23 start of session 22.87 sec   Target Date 03/23/24   PT Goal 3   Goal Identifier 3 - TUG   Goal Description Lex to complete TUG in 40 sec or less to demonstrate improved gait pattern, ability to transfer sit <> stand and overall increased function.   Goal Progress 1/4/24 34.10 sec turn to sit added approx 10 sec  8/22/23  38.52 sec    3/7/23:  not assessed d/t time constraints.  12/20/22 40.28 sec, 40.27 sec trekking pole and SBA first time stopped and needing directions before sitting, second time issues with trekking pole slipping forward when stnading up and also when placing along wall to sit.  after stretches 40.23 sec   12/6/22  39.19 sec then 36.19 sec with trekking pole.  9/27/22 38.72 sec.  with NBQC  baseline 50.83 sec with trekking pole   Target Date 03/23/24   PT Goal 4   Goal Identifier 4 - stairs   Goal Description Lex to complete stairs ascending with alternating stepping and one rail and min /SBA to demonstrate improved neuro motor function , balance and greater safety with completing stairs.   Goal Progress 8/22/23 up and down 4 six inch steps with one rail and alternating stepping, descending leans R side against the rail on R. Less heavy lean than baselien.   baseline single step up , alternating down with heavy lean on the rail.   Target Date 12/31/23   Date Met 08/22/23   PT Goal 5   Goal Identifier 5 - pain   Goal Description Lex to report decreased back pain and R knee pain to allow him to increase his ambulation distance/ther ex at the gym as well as overall better QOL.   Goal Progress 12/4/23 R knee pain continues to be present and fluctuate in intensity.  8/22/23  did the 4 laps at the gym yesterday and did not have R knee pain.  5/16/23 has been about the same - R knee pain.  Still gets a little sore with prolonged walking. back pain - a little bit sometimes. 3/7/23 : doing 4 laps at gym.  Knee and back don't seem to be much  "worse but sometimes knee is more sore.  11/8/22 walking 3 laps , R knee \"doesn't bother me too much\"   He does continue to have knee pain off and on.  baseline - doing 2 laps at gym usually does 3-4  9/27/22  dav has about the same R knee, back \"not as stiff\" but doing 3-4 laps.   Target Date 06/28/24   PT Goal 6   Goal Identifier 6 - 6MWT   Goal Description Lex to increase his 6MWT distance by 150 feet or greater from when first assessed to demonstrate improved endurance, gait quality and function.   Goal Progress 1/4/24  410 feet with trekking pole, AFO L. No R knee pain. 8/22/23  363 feet wtih trekking pole , AFO on the L. 5/16/23 400 feet with trekking pole and AFO.  (need to continue to work on faster pace) 4/18/23 450 feet with trekking pole, AFO on the L.  no knee pain   cistractions - more people , noise in testing area than prior ? if this affected speed and less distance than in the past.   3/7/23: 382 feet.   12/13/22 366 feet   Target Date 06/28/24   PT Goal 7   Goal Identifier 7- sit <> stand and COM forward past TAYLOR challenges.   Goal Description Lex to perform sit to stand from 22 inch mat without bracing self with LE against the surface to demonstrate improved ability to bring COM forward past TAYLOR as well as better mechanics with transfer for decreased/excess load on the R LE to decrease knee pain.   Goal Progress 1/4/24  able to do with cues not to brace the R side against the mat but without cues braces - INCONSISTENT with meeting this goal. 8/22/23 sit to stand from 22 inch mat with R UE support, did not brace against the mat with his LE.  Performance is inconsistent. Will continue with goal for consistency. 5/16/23 continues to struggle with this . Wife subjectively reports transfers as an improvement above - offered without being specifically asked about transfers. 3/7/23:  trekking pole in front, braces LEs on mat.  with cues to scoot forward before rising, is able to push less against mat " but still contacts mat. 12/13/22  from 22 inch height continues to brace leg on the bed but does better with trekking pole held in front of him rather than placed to the side as he moves towards trekking pole/safety/support.  Needing cues on alignment LE and sequencing 9/27/22  baseline, lean to the R and posterior lean against surface 24 inch height   Target Date 06/28/24         [facility]                                                                                   OUTPATIENT PHYSICAL THERAPY    PLAN OF TREATMENT FOR OUTPATIENT REHABILITATION   Patient's Last Name, First Name, Marcus Mcgee YOB: 1955   Provider's Name   [facility]   Medical Record No.  [id number]     Onset Date: 06/09/22  Start of Care Date: 07/21/22     Medical Diagnosis:  L hemiparesis      PT Treatment Diagnosis:  impaired gait, transfers, decline in function Plan of Treatment  Frequency/Duration: 1 x a week every other week./ 12 weeks    Certification date from (P) 12/30/23 to (P) 03/23/24         See note for plan of treatment details and functional goals     Ada cSott, PT                           Referring Provider:  Don Aviles    Initial Assessment  See Epic Evaluation- Start of Care Date: 07/21/22    PLAN  Continue therapy per current plan of care of 1 x a week every other week .  Progress slow and inconsistent due to long standing impairments however without PT pt has demonstrated decline in function , increase in R knee and back pain.     Beginning/End Dates of Progress Note Reporting Period:  9/5/23 to 01/04/2024    Referring Provider:  Don Aviles

## 2024-01-10 ENCOUNTER — VIRTUAL VISIT (OUTPATIENT)
Dept: PEDIATRICS | Facility: CLINIC | Age: 69
End: 2024-01-10
Payer: MEDICARE

## 2024-01-10 DIAGNOSIS — D50.9 IRON DEFICIENCY ANEMIA, UNSPECIFIED IRON DEFICIENCY ANEMIA TYPE: Primary | ICD-10-CM

## 2024-01-10 PROCEDURE — 99213 OFFICE O/P EST LOW 20 MIN: CPT | Mod: 95 | Performed by: INTERNAL MEDICINE

## 2024-01-10 NOTE — PROGRESS NOTES
Lex is a 68 year old who is being evaluated via a billable video visit.        Assessment & Plan     (D50.9) Iron deficiency anemia, unspecified iron deficiency anemia type  (primary encounter diagnosis)  Comment:   Reviewed recent lab work.  Iron deficiency with low normal reticulocyte count.  Patient has questions about iron supplementation/infusions.  Did recommend GI workup as next step/referral for colonoscopy.  Plan: Adult GI  Referral - Procedure Only            Don Aviles MD  [facility]    Bob Burnett is a 68 year old, presenting for the following health issues:    Video visit regarding iron deficiency anemia.  Recent lab work consistent with iron deficiency.  Denies melena or hematochezia, or other GI symptoms.  Diet: Does eat fish as well as eggs 3-4 times per week/diet is otherwise mainly vegetarian. He is on chronic anticoagulation for A-fib.  Lab Results   Component Value Date    HGB 10.5 12/12/2023    HGB 10.0 10/31/2023    HGB 11.3 10/03/2020    HGB 12.4 10/02/2020      Component      Latest Ref Rng 12/12/2023  8:48 AM   Iron      61 - 157 ug/dL 25 (L)    Iron Binding Capacity      240 - 430 ug/dL 381    Iron Sat Index      15 - 46 % 7 (L)    % Retic      0.5 - 2.0 % 0.8    Absolute Retic      0.025 - 0.095 10e6/uL 0.036    Ferritin      31 - 409 ng/mL 14 (L)    Vitamin B12      232 - 1,245 pg/mL 643    Folate      4.6 - 34.8 ng/mL 26.1       Legend:  (L) Low    Patient Active Problem List   Diagnosis    * * * SBE PROPHYLAXIS * * *    Health Care Home    Vitamin D deficiency    Hyperlipidemia LDL goal <100    Long-term (current) use of anticoagulants    Urinary incontinence    Neurogenic bladder    Chronic atrial fibrillation (H)    Tricuspid regurgitation    Mitral regurgitation    Atrial enlargement, bilateral    Major depressive disorder, recurrent episode, moderate (H)    Traumatic brain injury with loss of consciousness, sequela (H24)    Neurogenic bowel     Left renal stone    Dysphagia    Left hemiparesis (H)    S/P craniotomy    Visual field defect    Normocytic anemia    Benign prostatic hyperplasia with urinary retention    History of CVA (cerebrovascular accident)    Anemia, iron deficiency     Current Outpatient Medications   Medication Sig Dispense Refill    acetaminophen (TYLENOL) 500 MG tablet Take 2 tablets (1,000 mg) by mouth every 6 hours as needed for mild pain 100 tablet 0    alendronate (FOSAMAX) 70 MG tablet Take 1 tablet (70 mg) by mouth every 7 days 12 tablet 3    amoxicillin (AMOXIL) 500 MG capsule TAKE FOUR CAPSULES BY MOUTH ONE HOUR PRIOR TO APPOINTMENT      apixaban ANTICOAGULANT (ELIQUIS ANTICOAGULANT) 5 MG tablet Take 1 tablet (5 mg) by mouth 2 times daily 180 tablet 11    atorvastatin (LIPITOR) 20 MG tablet Take 1 tablet (20 mg) by mouth daily 90 tablet 11    baclofen (LIORESAL) 20 MG tablet Take 1 tablet (20 mg) by mouth 4 times daily 360 tablet 11    citalopram (CELEXA) 20 MG tablet Take 1 tablet (20 mg) by mouth daily 90 tablet 11    docusate sodium (COLACE) 100 MG tablet Take 200 mg by mouth nightly as needed       levETIRAcetam (KEPPRA) 1000 MG tablet Take 1 tablet (1,000 mg) by mouth 2 times daily 180 tablet 11    metoprolol succinate ER (TOPROL XL) 25 MG 24 hr tablet Take 0.5 tablets (12.5 mg) by mouth daily 45 tablet 11    mirabegron (MYRBETRIQ) 50 MG 24 hr tablet Take 1 tablet (50 mg) by mouth daily 90 tablet 11    Multiple Vitamin (MULTI-VITAMIN) per tablet Take 1 tablet by mouth daily  100 tablet 3    polyethylene glycol (MIRALAX) 17 g packet Take 17 g by mouth every other day PRN      senna-docusate (SENOKOT-S/PERICOLACE) 8.6-50 MG tablet Take 4 tablets by mouth daily as needed      tamsulosin (FLOMAX) 0.4 MG capsule Take 1 capsule (0.4 mg) by mouth daily 90 capsule 11    vitamin D3 (CHOLECALCIFEROL) 50 mcg (2000 units) tablet Take 1,000 Units by mouth daily 90 tablet 3        Review of Systems         Objective            Vitals:  No vitals were obtained today due to virtual visit.    Physical Exam   GENERAL:  alert and no distress  EYES: Eyes grossly normal to inspection.  No discharge or erythema, or obvious scleral/conjunctival abnormalities.  RESP: No audible wheeze, cough, or visible cyanosis.  No visible retractions or increased work of breathing.    SKIN: Visible skin clear. No significant rash, abnormal pigmentation or lesions.  NEURO: Cranial nerves grossly intact.  Mentation and speech appropriate for age.  PSYCH: Mentation appears normal, affect normal/bright.        Video-Visit Details    Type of service:  Video Visit   Video Start Time: 1:31 PM  Video End Time:1:51 PM    Originating Location (pt. Location): Home    Distant Location (provider location):  On-site  Platform used for Video Visit: [employer]

## 2024-01-11 ENCOUNTER — TELEPHONE (OUTPATIENT)
Dept: GASTROENTEROLOGY | Facility: CLINIC | Age: 69
End: 2024-01-11
Payer: MEDICARE

## 2024-01-11 DIAGNOSIS — Z12.11 SPECIAL SCREENING FOR MALIGNANT NEOPLASMS, COLON: Primary | ICD-10-CM

## 2024-01-11 NOTE — TELEPHONE ENCOUNTER
"Endoscopy Scheduling Screen    Have you had a positive Covid test in the last 14 days?  No    Are you active on MyChart?   Yes    What insurance is in the chart?  Other:  medicare/bcbs     Ordering/Referring Provider:     IRENE JEFFERS       (If ordering provider performs procedure, schedule with ordering provider unless otherwise instructed. )    BMI: Estimated body mass index is 22.61 kg/m  as calculated from the following:    Height as of 11/21/23: 1.854 m (6' 1\").    Weight as of 11/21/23: 77.7 kg (171 lb 6.4 oz).     Sedation Ordered  moderate sedation.   If patient BMI > 50 do not schedule in [facility].    If patient BMI > 45 do not schedule at [facility].    Are you taking methadone or Suboxone?  No    Are you taking any prescription medications for pain 3 or more times per week?   NO - No RN review required.    Do you have a history of malignant hyperthermia or adverse reaction to anesthesia?  No    (Females) Are you currently pregnant?   No     Have you been diagnosed or told you have pulmonary hypertension?   No    Do you have an LVAD?  No    Have you been told you have moderate to severe sleep apnea?  No    Have you been told you have COPD, asthma, or any other lung disease?  No    Do you have any heart conditions?  No  - a-fib     Have you ever had an organ transplant?   No    Have you ever had or are you awaiting a heart or lung transplant?   No    Have you had a stroke or transient ischemic attack (TIA aka \"mini stroke\" in the last 6 months?   No    Have you been diagnosed with or been told you have cirrhosis of the liver?   No    Are you currently on dialysis?   No    Do you need assistance transferring?   Yes (Hospital Only)    BMI: Estimated body mass index is 22.61 kg/m  as calculated from the following:    Height as of 11/21/23: 1.854 m (6' 1\").    Weight as of 11/21/23: 77.7 kg (171 lb 6.4 oz).     Is patients BMI > 40 and scheduling location [facility]?  No    Do you take an injectable medication for weight " loss or diabetes (excluding insulin)?  No    Do you take the medication Naltrexone?  No    Do you take blood thinners?  Yes  - eliquist     Are you taking Effient/Prasugrel?  No, you must contact your prescribing provider for direction on holding or bridging with a different medication.       Prep   Are you currently on dialysis or do you have chronic kidney disease?  No    Do you have a diagnosis of diabetes?  No    Do you have a diagnosis of cystic fibrosis (CF)?  No    On a regular basis do you go 3 -5 days between bowel movements?  Yes (Extended Prep)    BMI > 40?  No    Preferred Pharmacy:      [id number] - [place], [place] - [address]  [address] [address]  Phone: [phone] Fax: [phone]      Final Scheduling Details   Colonoscopy prep sent?  Golytely Extended Prep    Procedure scheduled  Colonoscopy    Surgeon:  Destiny      Date of procedure:  02/22/2024     Pre-OP / PAC:   No - Not required for this site.    Location  [facility] - Per order.    Sedation   Moderate Sedation - Per order.      Patient Reminders:   You will receive a call from a Nurse to review instructions and health history.  This assessment must be completed prior to your procedure.  Failure to complete the Nurse assessment may result in the procedure being cancelled.      On the day of your procedure, please designate an adult(s) who can drive you home stay with you for the next 24 hours. The medicines used in the exam will make you sleepy. You will not be able to drive.      You cannot take public transportation, ride share services, or non-medical taxi service without a responsible caregiver.  Medical transport services are allowed with the requirement that a responsible caregiver will receive you at your destination.  We require that drivers and caregivers are confirmed prior to your procedure.

## 2024-01-16 NOTE — PROGRESS NOTES
nst, Hayes Lozano MD   to Me  [facility] Heart Team 7   EE    12/29/23 11:57 AM   The patient's iron studies definitely indicate significant iron deficiency anemia.  I will sign the iron infusion orders.  Hayes Yip MD      Pt ended up seeing PCP on 1/10 and a GI workup was ordered. Pt is scheduled for a colonoscopy on 2/22.  Will send pt a my chart update. Mayi MCQUEEN January 16, 2024, 3:53 PM

## 2024-01-18 ENCOUNTER — THERAPY VISIT (OUTPATIENT)
Dept: PHYSICAL THERAPY | Facility: CLINIC | Age: 69
End: 2024-01-18
Attending: INTERNAL MEDICINE
Payer: MEDICARE

## 2024-01-18 DIAGNOSIS — G81.94 LEFT HEMIPARESIS (H): Primary | ICD-10-CM

## 2024-01-18 DIAGNOSIS — S06.9X9S TRAUMATIC BRAIN INJURY WITH LOSS OF CONSCIOUSNESS, SEQUELA (H): ICD-10-CM

## 2024-01-18 PROCEDURE — 97112 NEUROMUSCULAR REEDUCATION: CPT | Mod: GP | Performed by: PHYSICAL THERAPIST

## 2024-01-18 PROCEDURE — 97110 THERAPEUTIC EXERCISES: CPT | Mod: GP | Performed by: PHYSICAL THERAPIST

## 2024-01-23 ENCOUNTER — MYC MEDICAL ADVICE (OUTPATIENT)
Dept: PEDIATRICS | Facility: CLINIC | Age: 69
End: 2024-01-23
Payer: MEDICARE

## 2024-01-23 NOTE — TELEPHONE ENCOUNTER
Dr Aviles,     Please advise on medication question.     Visit?     Thanks,   Lulu LE RN on 1/23/2024 at 11:16 AM

## 2024-02-01 ENCOUNTER — THERAPY VISIT (OUTPATIENT)
Dept: PHYSICAL THERAPY | Facility: CLINIC | Age: 69
End: 2024-02-01
Attending: INTERNAL MEDICINE
Payer: MEDICARE

## 2024-02-01 DIAGNOSIS — G81.94 LEFT HEMIPARESIS (H): Primary | ICD-10-CM

## 2024-02-01 PROCEDURE — 97116 GAIT TRAINING THERAPY: CPT | Mod: GP | Performed by: PHYSICAL THERAPIST

## 2024-02-01 PROCEDURE — 97110 THERAPEUTIC EXERCISES: CPT | Mod: GP | Performed by: PHYSICAL THERAPIST

## 2024-02-01 PROCEDURE — 97112 NEUROMUSCULAR REEDUCATION: CPT | Mod: GP | Performed by: PHYSICAL THERAPIST

## 2024-02-01 NOTE — PATIENT INSTRUCTIONS
When sitting don't have Right hand behind you, keep feet flat on the floor.    Walking don't stop  on the Right leg , don't let trekking pole stay in front of feet - feet should step too or past .     When walking in the gym try to have Right on grab bar at side --- not too far ahead.     EXERCISES -     Standing one foot on the stair , stand tall and bring hips forward hold for a count of 30   repeat 10 times each leg       Standing back to the wall and then bring back away from the wall hold for 15 sec or greater.  Repeat 10 times , 1- 2 times a day.

## 2024-02-06 RX ORDER — BISACODYL 5 MG/1
TABLET, DELAYED RELEASE ORAL
Qty: 4 TABLET | Refills: 0 | Status: SHIPPED | OUTPATIENT
Start: 2024-02-06 | End: 2024-09-18

## 2024-02-06 NOTE — TELEPHONE ENCOUNTER
Extended Golytely Bowel Prep . Instructions were sent via [employer]. Bowel prep was sent 2/6/2024 to      [id number] [address] [place] [address].

## 2024-02-15 ENCOUNTER — THERAPY VISIT (OUTPATIENT)
Dept: PHYSICAL THERAPY | Facility: CLINIC | Age: 69
End: 2024-02-15
Attending: INTERNAL MEDICINE
Payer: MEDICARE

## 2024-02-15 DIAGNOSIS — S06.9X9S TRAUMATIC BRAIN INJURY WITH LOSS OF CONSCIOUSNESS, SEQUELA (H): ICD-10-CM

## 2024-02-15 DIAGNOSIS — G81.94 LEFT HEMIPARESIS (H): Primary | ICD-10-CM

## 2024-02-15 PROCEDURE — 97110 THERAPEUTIC EXERCISES: CPT | Mod: GP | Performed by: PHYSICAL THERAPIST

## 2024-02-15 PROCEDURE — 97116 GAIT TRAINING THERAPY: CPT | Mod: GP | Performed by: PHYSICAL THERAPIST

## 2024-02-15 PROCEDURE — 97112 NEUROMUSCULAR REEDUCATION: CPT | Mod: GP | Performed by: PHYSICAL THERAPIST

## 2024-02-22 ENCOUNTER — HOSPITAL ENCOUNTER (OUTPATIENT)
Facility: CLINIC | Age: 69
Discharge: HOME | End: 2024-02-22
Attending: INTERNAL MEDICINE | Admitting: INTERNAL MEDICINE
Payer: MEDICARE

## 2024-02-22 VITALS
OXYGEN SATURATION: 100 % | SYSTOLIC BLOOD PRESSURE: 121 MMHG | DIASTOLIC BLOOD PRESSURE: 91 MMHG | HEIGHT: 72 IN | WEIGHT: 175 LBS | HEART RATE: 52 BPM | BODY MASS INDEX: 23.7 KG/M2 | RESPIRATION RATE: 15 BRPM

## 2024-02-22 LAB — COLONOSCOPY: NORMAL

## 2024-02-22 PROCEDURE — 88305 TISSUE EXAM BY PATHOLOGIST: CPT | Mod: TC | Performed by: INTERNAL MEDICINE

## 2024-02-22 PROCEDURE — 45382 COLONOSCOPY W/CONTROL BLEED: CPT | Mod: XU | Performed by: INTERNAL MEDICINE

## 2024-02-22 PROCEDURE — 250N000011 HC RX IP 250 OP 636: Performed by: INTERNAL MEDICINE

## 2024-02-22 PROCEDURE — 99153 MOD SED SAME PHYS/QHP EA: CPT | Performed by: INTERNAL MEDICINE

## 2024-02-22 PROCEDURE — 88305 TISSUE EXAM BY PATHOLOGIST: CPT | Mod: 26 | Performed by: PATHOLOGY

## 2024-02-22 PROCEDURE — 45385 COLONOSCOPY W/LESION REMOVAL: CPT | Performed by: INTERNAL MEDICINE

## 2024-02-22 PROCEDURE — G0500 MOD SEDAT ENDO SERVICE >5YRS: HCPCS | Performed by: INTERNAL MEDICINE

## 2024-02-22 PROCEDURE — 45381 COLONOSCOPY SUBMUCOUS NJX: CPT | Performed by: INTERNAL MEDICINE

## 2024-02-22 RX ORDER — SIMETHICONE 40MG/0.6ML
133 SUSPENSION, DROPS(FINAL DOSAGE FORM)(ML) ORAL
Status: DISCONTINUED | OUTPATIENT
Start: 2024-02-22 | End: 2024-02-22 | Stop reason: HOSPADM

## 2024-02-22 RX ORDER — FENTANYL CITRATE 50 UG/ML
50-100 INJECTION, SOLUTION INTRAMUSCULAR; INTRAVENOUS EVERY 5 MIN PRN
Status: DISCONTINUED | OUTPATIENT
Start: 2024-02-22 | End: 2024-02-22 | Stop reason: HOSPADM

## 2024-02-22 RX ORDER — FLUMAZENIL 0.1 MG/ML
0.2 INJECTION, SOLUTION INTRAVENOUS
Status: DISCONTINUED | OUTPATIENT
Start: 2024-02-22 | End: 2024-02-22 | Stop reason: HOSPADM

## 2024-02-22 RX ORDER — DIPHENHYDRAMINE HYDROCHLORIDE 50 MG/ML
25-50 INJECTION INTRAMUSCULAR; INTRAVENOUS
Status: DISCONTINUED | OUTPATIENT
Start: 2024-02-22 | End: 2024-02-22 | Stop reason: HOSPADM

## 2024-02-22 RX ORDER — NALOXONE HYDROCHLORIDE 0.4 MG/ML
0.2 INJECTION, SOLUTION INTRAMUSCULAR; INTRAVENOUS; SUBCUTANEOUS
Status: DISCONTINUED | OUTPATIENT
Start: 2024-02-22 | End: 2024-02-22 | Stop reason: HOSPADM

## 2024-02-22 RX ORDER — ONDANSETRON 4 MG/1
4 TABLET, ORALLY DISINTEGRATING ORAL EVERY 6 HOURS PRN
Status: DISCONTINUED | OUTPATIENT
Start: 2024-02-22 | End: 2024-02-22 | Stop reason: HOSPADM

## 2024-02-22 RX ORDER — EPINEPHRINE 1 MG/ML
0.1 INJECTION, SOLUTION INTRAMUSCULAR; SUBCUTANEOUS
Status: DISCONTINUED | OUTPATIENT
Start: 2024-02-22 | End: 2024-02-22 | Stop reason: HOSPADM

## 2024-02-22 RX ORDER — ATROPINE SULFATE 0.1 MG/ML
1 INJECTION INTRAVENOUS
Status: DISCONTINUED | OUTPATIENT
Start: 2024-02-22 | End: 2024-02-22 | Stop reason: HOSPADM

## 2024-02-22 RX ORDER — NALOXONE HYDROCHLORIDE 0.4 MG/ML
0.4 INJECTION, SOLUTION INTRAMUSCULAR; INTRAVENOUS; SUBCUTANEOUS
Status: DISCONTINUED | OUTPATIENT
Start: 2024-02-22 | End: 2024-02-22 | Stop reason: HOSPADM

## 2024-02-22 RX ORDER — PROCHLORPERAZINE MALEATE 5 MG
5 TABLET ORAL EVERY 6 HOURS PRN
Status: DISCONTINUED | OUTPATIENT
Start: 2024-02-22 | End: 2024-02-22 | Stop reason: HOSPADM

## 2024-02-22 RX ORDER — LIDOCAINE 40 MG/G
CREAM TOPICAL
Status: DISCONTINUED | OUTPATIENT
Start: 2024-02-22 | End: 2024-02-22 | Stop reason: HOSPADM

## 2024-02-22 RX ORDER — ONDANSETRON 2 MG/ML
4 INJECTION INTRAMUSCULAR; INTRAVENOUS EVERY 6 HOURS PRN
Status: DISCONTINUED | OUTPATIENT
Start: 2024-02-22 | End: 2024-02-22 | Stop reason: HOSPADM

## 2024-02-22 RX ORDER — ONDANSETRON 2 MG/ML
4 INJECTION INTRAMUSCULAR; INTRAVENOUS
Status: DISCONTINUED | OUTPATIENT
Start: 2024-02-22 | End: 2024-02-22 | Stop reason: HOSPADM

## 2024-02-22 RX ADMIN — MIDAZOLAM 1 MG: 1 INJECTION INTRAMUSCULAR; INTRAVENOUS at 10:29

## 2024-02-22 RX ADMIN — FENTANYL CITRATE 50 MCG: 50 INJECTION, SOLUTION INTRAMUSCULAR; INTRAVENOUS at 09:58

## 2024-02-22 RX ADMIN — FENTANYL CITRATE 50 MCG: 50 INJECTION, SOLUTION INTRAMUSCULAR; INTRAVENOUS at 10:29

## 2024-02-22 RX ADMIN — MIDAZOLAM 1 MG: 1 INJECTION INTRAMUSCULAR; INTRAVENOUS at 09:58

## 2024-02-22 RX ADMIN — MIDAZOLAM 1 MG: 1 INJECTION INTRAMUSCULAR; INTRAVENOUS at 10:19

## 2024-02-22 RX ADMIN — FENTANYL CITRATE 50 MCG: 50 INJECTION, SOLUTION INTRAMUSCULAR; INTRAVENOUS at 10:18

## 2024-02-22 NOTE — H&P
Pre-Endoscopy History and Physical     Marcus Hodges MRN# [id number]   YOB: 1955 Age: 68 year old     Date of Procedure: 2/22/2024  Primary care provider: Don Aviles  Type of Endoscopy: Colonoscopy with possible biopsy, possible polypectomy  Reason for Procedure: screen  Type of Anesthesia Anticipated: Conscious Sedation    HPI:    Marcus is a 68 year old male who will be undergoing the above procedure.      A history and physical has been performed. The patient's medications and allergies have been reviewed. The risks and benefits of the procedure and the sedation options and risks were discussed with the patient.  All questions were answered and informed consent was obtained.      He denies a personal or family history of anesthesia complications or bleeding disorders.     Patient Active Problem List   Diagnosis    * * * SBE PROPHYLAXIS * * *    Health Care Home    Vitamin D deficiency    Hyperlipidemia LDL goal <100    Long-term (current) use of anticoagulants    Urinary incontinence    Neurogenic bladder    Chronic atrial fibrillation (H)    Tricuspid regurgitation    Mitral regurgitation    Atrial enlargement, bilateral    Major depressive disorder, recurrent episode, moderate (H)    Traumatic brain injury with loss of consciousness, sequela (H24)    Neurogenic bowel    Left renal stone    Dysphagia    Left hemiparesis (H)    S/P craniotomy    Visual field defect    Normocytic anemia    Benign prostatic hyperplasia with urinary retention    History of CVA (cerebrovascular accident)    Anemia, iron deficiency        Past Medical History:   Diagnosis Date    * * * SBE PROPHYLAXIS * * *     Antiplatelet or antithrombotic long-term use     on Xarelto    Arrhythmia     A fib    Atrial enlargement, bilateral     Atrial flutter 2004    radiofrequency ablation 2004, resolved    ATRIAL SEPTAL DEFECT     repaired 1977    Chronic atrial fibrillation (H)     on apixaban    CVA (cerebral vascular  accident) (H) 04/2012    R MCA, complicated by left sided weakness, walks with a cane, and seizures    Depression     Dysphagia 04/22/2012    [facility], following CVA    Hernia, abdominal     History of thrombophlebitis     hyperlipidemia     Mitral regurgitation     mitral valve damage related to ASD repair    Mumps     Neurogenic bladder     Neurogenic bowel     Palpitations     Respiratory failure (H) 04/22/2012    [facility], following CVA, prolonged requiring tracheostomy, [place] re[facility]     Tricuspid regurgitation     Unspecified glaucoma(365.9)     right    Urinary incontinence         Past Surgical History:   Procedure Laterality Date    CATHETER, ABLATION  2004    COMBINED CYSTOSCOPY, INSERT CATHETER URETER  9/28/2020    Procedure: cystoscopy and left ureteral catheter placement, left ureteral stent placement;  Surgeon: Nehemiah Bloom MD;  Location:  OR    Craniotomy [facility]  2012    [facility], repair of hemicraniectomy defect    CYSTOSCOPY      DAVINCI PYELOPLASTY Left 9/28/2020    Procedure: left robotic assisted laparoscopic pyelolithotomy;  Surgeon: Nehemiah Bloom MD;  Location:  OR    GASTROSTOMY TUBE  April 2012    [facility], following CVA    HERNIA REPAIR      IR CAROTID ANGIOGRAM  4/22/2012    IR CAROTID ANGIOGRAM  4/22/2012    IR MISCELLANEOUS PROCEDURE  4/22/2012    IR MISCELLANEOUS PROCEDURE  4/25/2012    LASER KTP GREEN LIGHT PHOTOSELECTIVE VAPORIZATION PROSTATE N/A 12/1/2020    Procedure: Cystoscopy and greenlight photovaporization of the prostate;  Surgeon: Nehemiah Bloom MD;  Location: [facility] OR    REPAIR ATRIAL SEPTAL DEFECT  1978    ASD Repair    Right hemicraniectomy  April 2012    [facility], following CVA, mgmt malignant cerebral edema    SURGICAL HISTORY OF -   2009    right eye enucleation    TRACHEOSTOMY  April 2012    [facility], following CVA    ZZC NONSPECIFIC PROCEDURE      tonsillectomy    Z NONSPECIFIC PROCEDURE      Ing. Hernia Repair       Social  History     Tobacco Use    Smoking status: Never    Smokeless tobacco: Never   Substance Use Topics    Alcohol use: No       Family History   Problem Relation Age of Onset    Hypertension Mother     Cerebrovascular Disease Father     Diabetes Maternal Grandmother     Cancer Paternal Grandmother     Congenital Anomalies Brother         several brothers and sisters born with congential heart defects, but all have been repaired    Congenital Anomalies Sister     Colon Cancer No family hx of        Prior to Admission medications    Medication Sig Start Date End Date Taking? Authorizing Provider   acetaminophen (TYLENOL) 500 MG tablet Take 2 tablets (1,000 mg) by mouth every 6 hours as needed for mild pain 12/1/20  Yes Nehemiah Bloom MD   alendronate (FOSAMAX) 70 MG tablet Take 1 tablet (70 mg) by mouth every 7 days 10/31/23  Yes Don Aviles MD   apixaban ANTICOAGULANT (ELIQUIS ANTICOAGULANT) 5 MG tablet Take 1 tablet (5 mg) by mouth 2 times daily 10/31/23  Yes Don Aviles MD   atorvastatin (LIPITOR) 20 MG tablet Take 1 tablet (20 mg) by mouth daily 10/31/23  Yes Don Aviles MD   baclofen (LIORESAL) 20 MG tablet Take 1 tablet (20 mg) by mouth 4 times daily 10/31/23  Yes Don Aviles MD   citalopram (CELEXA) 20 MG tablet Take 1 tablet (20 mg) by mouth daily 10/31/23  Yes Don Aviles MD   docusate sodium (COLACE) 100 MG tablet Take 200 mg by mouth nightly as needed    Yes Reported, Patient   levETIRAcetam (KEPPRA) 1000 MG tablet Take 1 tablet (1,000 mg) by mouth 2 times daily 10/31/23  Yes Don Aviles MD   metoprolol succinate ER (TOPROL XL) 25 MG 24 hr tablet Take 0.5 tablets (12.5 mg) by mouth daily 10/31/23  Yes Don Aviles MD   mirabegron (MYRBETRIQ) 50 MG 24 hr tablet Take 1 tablet (50 mg) by mouth daily 10/31/23  Yes Don Aviles MD   Multiple Vitamin (MULTI-VITAMIN) per tablet Take 1 tablet by mouth daily  10/9/12  Yes Don Aviles MD    polyethylene glycol (MIRALAX) 17 g packet Take 17 g by mouth every other day PRN   Yes Reported, Patient   senna-docusate (SENOKOT-S/PERICOLACE) 8.6-50 MG tablet Take 4 tablets by mouth daily as needed   Yes Unknown, Entered By History   tamsulosin (FLOMAX) 0.4 MG capsule Take 1 capsule (0.4 mg) by mouth daily 10/31/23  Yes Don Aviles MD   vitamin D3 (CHOLECALCIFEROL) 50 mcg (2000 units) tablet Take 1,000 Units by mouth daily 8/30/13  Yes Don Aviles MD   amoxicillin (AMOXIL) 500 MG capsule TAKE FOUR CAPSULES BY MOUTH ONE HOUR PRIOR TO APPOINTMENT  Patient not taking: Reported on 2/15/2024 9/30/22   Reported, Patient   bisacodyl (DULCOLAX) 5 MG EC tablet Two days prior to exam take two (2) tablets at 4pm. One day prior to exam take two (2) tablets at 4pm 2/6/24   Don Dixon MD   polyethylene glycol (GOLYTELY) 236 g suspension Take as directed. Two days before your exam fill the first container with water. Cover and shake until mixed well. At 5:00pm drink one 8oz glass every 10-15 minutes until half (1/2) of the first container is empty. Store the remainder in the refrigerator. One day before your exam at 5:00pm drink the second half of the first container until it is gone. Before you go to bed mix the second container with water and put in refrigerator. Six hours before your check in time drink one 8oz glass every 10-15 minutes until half of container is empty. Discard the remainder of solution. 2/6/24   Don Dixon MD       Allergies   Allergen Reactions    Blood Transfusion Related (Informational Only) Other (See Comments)     Patient has a history of a clinically significant antibody against RBC antigens.  A delay in compatible RBCs may occur.    Lisinopril         REVIEW OF SYSTEMS:   5 point ROS negative except as noted above in HPI, including Gen., Resp., CV, GI &  system review.    PHYSICAL EXAM:   Ht 1.829 m (6')   Wt 79.4 kg (175 lb)   BMI 23.73 kg/m   Estimated body  mass index is 23.73 kg/m  as calculated from the following:    Height as of this encounter: 1.829 m (6').    Weight as of this encounter: 79.4 kg (175 lb).   GENERAL APPEARANCE: alert, and oriented  MENTAL STATUS: alert  AIRWAY EXAM: Mallampatti Class I (visualization of the soft palate, fauces, uvula, anterior and posterior pillars)  RESP: lungs clear to auscultation - no rales, rhonchi or wheezes  CV: regular rates and rhythm  DIAGNOSTICS:    Not indicated    IMPRESSION   ASA Class 3 - Severe systemic disease, but not incapacitating    PLAN:   Plan for Colonoscopy with possible biopsy, possible polypectomy. We discussed the risks, benefits and alternatives and the patient wished to proceed.    The above has been forwarded to the consulting provider.      Signed Electronically by: Don Dixon MD  February 22, 2024

## 2024-02-23 ENCOUNTER — MYC MEDICAL ADVICE (OUTPATIENT)
Dept: PEDIATRICS | Facility: CLINIC | Age: 69
End: 2024-02-23
Payer: MEDICARE

## 2024-02-23 DIAGNOSIS — D50.9 IRON DEFICIENCY ANEMIA, UNSPECIFIED IRON DEFICIENCY ANEMIA TYPE: Primary | ICD-10-CM

## 2024-02-23 LAB
PATH REPORT.COMMENTS IMP SPEC: NORMAL
PATH REPORT.COMMENTS IMP SPEC: NORMAL
PATH REPORT.FINAL DX SPEC: NORMAL
PATH REPORT.GROSS SPEC: NORMAL
PATH REPORT.MICROSCOPIC SPEC OTHER STN: NORMAL
PATH REPORT.RELEVANT HX SPEC: NORMAL
PHOTO IMAGE: NORMAL

## 2024-02-24 RX ORDER — FERROUS SULFATE 325(65) MG
325 TABLET ORAL
Qty: 90 TABLET | Refills: 1 | Status: SHIPPED | OUTPATIENT
Start: 2024-02-24 | End: 2024-09-18

## 2024-02-25 ENCOUNTER — MYC MEDICAL ADVICE (OUTPATIENT)
Dept: PEDIATRICS | Facility: CLINIC | Age: 69
End: 2024-02-25
Payer: MEDICARE

## 2024-02-29 ENCOUNTER — THERAPY VISIT (OUTPATIENT)
Dept: PHYSICAL THERAPY | Facility: CLINIC | Age: 69
End: 2024-02-29
Attending: INTERNAL MEDICINE
Payer: MEDICARE

## 2024-02-29 DIAGNOSIS — Z98.890 S/P CRANIOTOMY: ICD-10-CM

## 2024-02-29 DIAGNOSIS — G81.94 LEFT HEMIPARESIS (H): Primary | ICD-10-CM

## 2024-02-29 DIAGNOSIS — S06.9X9S TRAUMATIC BRAIN INJURY WITH LOSS OF CONSCIOUSNESS, SEQUELA (H): ICD-10-CM

## 2024-02-29 PROCEDURE — 97116 GAIT TRAINING THERAPY: CPT | Mod: GP | Performed by: PHYSICAL THERAPIST

## 2024-02-29 PROCEDURE — 97112 NEUROMUSCULAR REEDUCATION: CPT | Mod: GP | Performed by: PHYSICAL THERAPIST

## 2024-02-29 PROCEDURE — 97110 THERAPEUTIC EXERCISES: CPT | Mod: GP | Performed by: PHYSICAL THERAPIST

## 2024-03-07 ENCOUNTER — THERAPY VISIT (OUTPATIENT)
Dept: PHYSICAL THERAPY | Facility: CLINIC | Age: 69
End: 2024-03-07
Attending: INTERNAL MEDICINE
Payer: MEDICARE

## 2024-03-07 ENCOUNTER — PATIENT OUTREACH (OUTPATIENT)
Dept: GASTROENTEROLOGY | Facility: CLINIC | Age: 69
End: 2024-03-07
Payer: MEDICARE

## 2024-03-07 DIAGNOSIS — G81.94 LEFT HEMIPARESIS (H): Primary | ICD-10-CM

## 2024-03-07 PROCEDURE — 97110 THERAPEUTIC EXERCISES: CPT | Mod: GP | Performed by: PHYSICAL THERAPIST

## 2024-03-07 PROCEDURE — 97112 NEUROMUSCULAR REEDUCATION: CPT | Mod: GP | Performed by: PHYSICAL THERAPIST

## 2024-03-07 PROCEDURE — 97116 GAIT TRAINING THERAPY: CPT | Mod: GP | Performed by: PHYSICAL THERAPIST

## 2024-03-21 ENCOUNTER — THERAPY VISIT (OUTPATIENT)
Dept: PHYSICAL THERAPY | Facility: CLINIC | Age: 69
End: 2024-03-21
Attending: INTERNAL MEDICINE
Payer: MEDICARE

## 2024-03-21 DIAGNOSIS — Z98.890 S/P CRANIOTOMY: ICD-10-CM

## 2024-03-21 DIAGNOSIS — G81.94 LEFT HEMIPARESIS (H): Primary | ICD-10-CM

## 2024-03-21 PROCEDURE — 97110 THERAPEUTIC EXERCISES: CPT | Mod: GP | Performed by: PHYSICAL THERAPIST

## 2024-03-21 PROCEDURE — 97530 THERAPEUTIC ACTIVITIES: CPT | Mod: GP | Performed by: PHYSICAL THERAPIST

## 2024-03-21 NOTE — PROGRESS NOTES
03/21/24 0500   Appointment Info   Medical Diagnosis L hemiparesis   PT Tx Diagnosis impaired gait, transfers, decline in function   Quick Adds Certification   Progress Note/Certification   Start of Care Date 07/21/22   Onset of illness/injury or Date of Surgery 06/09/22   Therapy Frequency 1 x a week every other week.   Predicted Duration 12 weeks   Certification date from 12/30/23   Certification date to 03/23/24   Progress Note Completed Date 01/04/24   PT Goal 1   Goal Identifier 1 HEP   Goal Description Lex to be independent in appropriate HEP with assist from Nidia as needed to promote life long health, ability to stay in home and improve function.   Rationale to maximize safety and independence with self cares;to maximize safety and independence with transportation;to maximize safety and independence within the community;to maximize safety and independence within the home;to maximize safety and independence with performance of ADLs and functional tasks   Goal Progress continues to do HEP with assist from wife. continues to go to the gym and walks the track there with using the railing.   Target Date 06/28/24   PT Goal 2   Goal Identifier 2 - 25 foot walk   Goal Description Lex to complete 25 foot walk consistently in 19 sec or less and 22 steps or less demonstrating improved gait pattern and efficiency.   Goal Progress 2/21/23 21.26 sec wotj trekking pole  1/4/23  20.2 sec 12/7/23 18.69 sec and 19. 7 sec after TM with trekking pole, L AFO  11/28/23 25 ft walk 18.9 sec, 20.4 sec.  after 250 feet gait.   8/22/23 -  20.68 sec  at start of the session  6/27/23  times 18.66 sec, 18.5 sec and then 19.8 sec.   5/16/23  20.23   sec  20 steps,  with light assist/cues at plevis for faster pace 18.23 sec both wtih trekking pole and AFO on the L. 3/21/23  19.7 sec 17 steps   2/28/23:  19.5 sec, 18.85, 18.95 sec with trekking poles B.  1/10/23 at start of the session 23.55, steps   end of session 20.58 sec 1712/20/22  21.9 sec m 26 steps, trekking pole, AFO start of session   after stretches 20.66 sec   12/6/22 19.8 sec and then 20.8 sec both with trekking pole , SBA  9/27/22  22.8 sec, 21.68 sec with NBQC (forgot his trekking pole today) baseline 21.63 sec, 26 steps with trekking ple and AFO 12/21/23 start of session 22.87 sec   3/6/24 22.66 with NBQC and 21.8 with trekking pole prior to TM after TM 20.8 with quad cane and 19.8 x 1 and 20.3 x 1 with trekking pole. (not his own trekking pole)   Target Date 03/23/24   PT Goal 3   Goal Identifier 3 - TUG   Goal Description Lex to complete TUG in 40 sec or less to demonstrate improved gait pattern, ability to transfer sit <> stand and overall increased function.   Goal Progress 3/21/24 45.4 sec, 45.48 sec.  with trekking pole, slower stand to sit with placing pole against wall and sitting. 1/4/24 34.10 sec turn to sit added approx 10 sec  8/22/23  38.52 sec    3/7/23:  not assessed d/t time constraints.  12/20/22 40.28 sec, 40.27 sec trekking pole and SBA first time stopped and needing directions before sitting, second time issues with trekking pole slipping forward when stnading up and also when placing along wall to sit.  after stretches 40.23 sec   12/6/22  39.19 sec then 36.19 sec with trekking pole.  9/27/22 38.72 sec.  with NBQC  baseline 50.83 sec with trekking pole   Target Date 03/23/24   PT Goal 4   Goal Identifier 4 - stairs   Goal Description Lex to complete stairs ascending with alternating stepping and one rail and min /SBA to demonstrate improved neuro motor function , balance and greater safety with completing stairs.   Goal Progress 8/22/23 up and down 4 six inch steps with one rail and alternating stepping, descending leans R side against the rail on R. Less heavy lean than baselien.   baseline single step up , alternating down with heavy lean on the rail.   Target Date 12/31/23   Date Met 08/22/23   PT Goal 5   Goal Identifier 5 - pain   Goal Description Lex to  "report decreased back pain and R knee pain to allow him to increase his ambulation distance/ther ex at the gym as well as overall better QOL.   Goal Progress 3/21/24  No real change, not any worse 12/4/23 R knee pain continues to be present and fluctuate in intensity.  8/22/23  did the 4 laps at the gym yesterday and did not have R knee pain.  5/16/23 has been about the same - R knee pain.  Still gets a little sore with prolonged walking. back pain - a little bit sometimes. 3/7/23 : doing 4 laps at gym.  Knee and back don't seem to be much worse but sometimes knee is more sore.  11/8/22 walking 3 laps , R knee \"doesn't bother me too much\"   He does continue to have knee pain off and on.  baseline - doing 2 laps at gym usually does 3-4  9/27/22  stil has about the same R knee, back \"not as stiff\" but doing 3-4 laps.   Target Date 06/28/24   PT Goal 6   Goal Identifier 6 - 6MWT   Goal Description Lex to increase his 6MWT distance by 150 feet or greater from when first assessed to demonstrate improved endurance, gait quality and function.   Goal Progress 3/21/24 358 ft with trekking pole on the R, AFO L 1/4/24  410 feet with trekking pole, AFO L. No R knee pain. 8/22/23  363 feet wtih trekking pole , AFO on the L. 5/16/23 400 feet with trekking pole and AFO.  (need to continue to work on faster pace) 4/18/23 450 feet with trekking pole, AFO on the L.  no knee pain   cistractions - more people , noise in testing area than prior ? if this affected speed and less distance than in the past.   3/7/23: 382 feet.   12/13/22 366 feet   Target Date 06/28/24   PT Goal 7   Goal Identifier 7- sit <> stand and COM forward past TAYLOR challenges.   Goal Description Lex to perform sit to stand from 22 inch mat without bracing self with LE against the surface to demonstrate improved ability to bring COM forward past TAYLOR as well as better mechanics with transfer for decreased/excess load on the R LE to decrease knee pain.   Goal " Progress 3/21/24 Continues to be inconsistent with this activity - able to perform with cues . Multiple impairments affecting function and ability to bring COM forward past TAYLOR, Continues to shift to the R. and hold COM posterior. 1/4/24  able to do with cues not to brace the R side against the mat but without cues braces - INCONSISTENT with meeting this goal. 8/22/23 sit to stand from 22 inch mat with R UE support, did not brace against the mat with his LE.  Performance is inconsistent. Will continue with goal for consistency. 5/16/23 continues to struggle with this . Wife subjectively reports transfers as an improvement above - offered without being specifically asked about transfers. 3/7/23:  trekking pole in front, braces LEs on mat.  with cues to scoot forward before rising, is able to push less against mat but still contacts mat. 12/13/22  from 22 inch height continues to brace leg on the bed but does better with trekking pole held in front of him rather than placed to the side as he moves towards trekking pole/safety/support.  Needing cues on alignment LE and sequencing 9/27/22  baseline, lean to the R and posterior lean against surface 24 inch height   Target Date 06/28/24   Subjective Report   Subjective Report States he has gone to the gym a couple times, doing 5 laps with walking around the track.  R knee pain without change.         [facility]                                                                                   OUTPATIENT PHYSICAL THERAPY    PLAN OF TREATMENT FOR OUTPATIENT REHABILITATION   Patient's Last Name, First Name, Marcus Mcgee YOB: 1955   Provider's Name   [facility]   Medical Record No.  [id number]     Onset Date: 06/09/22  Start of Care Date: 07/21/22     Medical Diagnosis:  L hemiparesis      PT Treatment Diagnosis:  impaired gait, transfers, decline in function Plan of  Treatment  Frequency/Duration: 1 x a week every other week./ 12 weeks    Certification date from 3/24/24 to 6/16/24         See note for plan of treatment details and functional goals     Ada Scott, PT                         I CERTIFY THE NEED FOR THESE SERVICES FURNISHED UNDER        THIS PLAN OF TREATMENT AND WHILE UNDER MY CARE     (Physician attestation of this document indicates review and certification of the therapy plan).              Referring Provider:  Don Aviles    Initial Assessment  See Epic Evaluation- Start of Care Date: 07/21/22    PLAN  Continue therapy per current plan of care.      Beginning/End Dates of Progress Note Reporting Period:  01/04/24 to 03/21/2024  Marcus's progress/function with gait speed, transfers and gait distance varies as noted above in goals. He continues to benefit from skilled PT intervention and is without questions.  Without skilled PT intervention and challenges to his neuro motor system in a safe environment Lex has been shown to decline in his function.     Referring Provider:  Don Aviles

## 2024-03-27 ENCOUNTER — LAB (OUTPATIENT)
Dept: LAB | Facility: CLINIC | Age: 69
End: 2024-03-27
Payer: MEDICARE

## 2024-03-27 ENCOUNTER — MYC MEDICAL ADVICE (OUTPATIENT)
Dept: PEDIATRICS | Facility: CLINIC | Age: 69
End: 2024-03-27

## 2024-03-27 DIAGNOSIS — D50.9 IRON DEFICIENCY ANEMIA, UNSPECIFIED IRON DEFICIENCY ANEMIA TYPE: ICD-10-CM

## 2024-03-27 LAB
BASOPHILS # BLD AUTO: 0 10E3/UL (ref 0–0.2)
BASOPHILS NFR BLD AUTO: 1 %
EOSINOPHIL # BLD AUTO: 0.1 10E3/UL (ref 0–0.7)
EOSINOPHIL NFR BLD AUTO: 3 %
ERYTHROCYTE [DISTWIDTH] IN BLOOD BY AUTOMATED COUNT: 21.4 % (ref 10–15)
FERRITIN SERPL-MCNC: 18 NG/ML (ref 31–409)
HCT VFR BLD AUTO: 36.1 % (ref 40–53)
HGB BLD-MCNC: 10.8 G/DL (ref 13.3–17.7)
IMM GRANULOCYTES # BLD: 0 10E3/UL
IMM GRANULOCYTES NFR BLD: 0 %
LYMPHOCYTES # BLD AUTO: 0.8 10E3/UL (ref 0.8–5.3)
LYMPHOCYTES NFR BLD AUTO: 21 %
MCH RBC QN AUTO: 25.2 PG (ref 26.5–33)
MCHC RBC AUTO-ENTMCNC: 29.9 G/DL (ref 31.5–36.5)
MCV RBC AUTO: 84 FL (ref 78–100)
MONOCYTES # BLD AUTO: 0.4 10E3/UL (ref 0–1.3)
MONOCYTES NFR BLD AUTO: 11 %
NEUTROPHILS # BLD AUTO: 2.3 10E3/UL (ref 1.6–8.3)
NEUTROPHILS NFR BLD AUTO: 64 %
PLATELET # BLD AUTO: 164 10E3/UL (ref 150–450)
RBC # BLD AUTO: 4.29 10E6/UL (ref 4.4–5.9)
WBC # BLD AUTO: 3.7 10E3/UL (ref 4–11)

## 2024-03-27 PROCEDURE — 82728 ASSAY OF FERRITIN: CPT

## 2024-03-27 PROCEDURE — 85025 COMPLETE CBC W/AUTO DIFF WBC: CPT

## 2024-03-27 PROCEDURE — 36415 COLL VENOUS BLD VENIPUNCTURE: CPT

## 2024-03-28 NOTE — TELEPHONE ENCOUNTER
Looked over lab results. Looks like iron and CBC have not improved much over the last 4-5 weeks. Looks like next step will be IV iron. Okay to wait for PCP.   Aleta So PA-C

## 2024-03-30 ENCOUNTER — TELEPHONE (OUTPATIENT)
Dept: PEDIATRICS | Facility: CLINIC | Age: 69
End: 2024-03-30
Payer: MEDICARE

## 2024-03-30 DIAGNOSIS — D50.0 IRON DEFICIENCY ANEMIA DUE TO CHRONIC BLOOD LOSS: ICD-10-CM

## 2024-03-30 DIAGNOSIS — D50.9 IRON DEFICIENCY ANEMIA, UNSPECIFIED IRON DEFICIENCY ANEMIA TYPE: Primary | ICD-10-CM

## 2024-03-30 NOTE — TELEPHONE ENCOUNTER
Please call Lex.  Recent labwork reviewed: persistent iron deficiency.  Recent colonoscopy reviewed.  Recommend outpatient iron infusions

## 2024-04-01 RX ORDER — HEPARIN SODIUM,PORCINE 10 UNIT/ML
5-20 VIAL (ML) INTRAVENOUS DAILY PRN
Status: CANCELLED | OUTPATIENT
Start: 2024-04-02

## 2024-04-01 RX ORDER — METHYLPREDNISOLONE SODIUM SUCCINATE 125 MG/2ML
125 INJECTION, POWDER, LYOPHILIZED, FOR SOLUTION INTRAMUSCULAR; INTRAVENOUS
Status: CANCELLED
Start: 2024-04-02

## 2024-04-01 RX ORDER — HEPARIN SODIUM (PORCINE) LOCK FLUSH IV SOLN 100 UNIT/ML 100 UNIT/ML
5 SOLUTION INTRAVENOUS
Status: CANCELLED | OUTPATIENT
Start: 2024-04-02

## 2024-04-01 RX ORDER — EPINEPHRINE 1 MG/ML
0.3 INJECTION, SOLUTION, CONCENTRATE INTRAVENOUS EVERY 5 MIN PRN
Status: CANCELLED | OUTPATIENT
Start: 2024-04-02

## 2024-04-01 RX ORDER — ALBUTEROL SULFATE 90 UG/1
1-2 AEROSOL, METERED RESPIRATORY (INHALATION)
Status: CANCELLED
Start: 2024-04-02

## 2024-04-01 RX ORDER — ALBUTEROL SULFATE 0.83 MG/ML
2.5 SOLUTION RESPIRATORY (INHALATION)
Status: CANCELLED | OUTPATIENT
Start: 2024-04-02

## 2024-04-01 RX ORDER — DIPHENHYDRAMINE HYDROCHLORIDE 50 MG/ML
50 INJECTION INTRAMUSCULAR; INTRAVENOUS
Status: CANCELLED
Start: 2024-04-02

## 2024-04-01 NOTE — TELEPHONE ENCOUNTER
Huddled with Dr. Traci Ward for Injectafer.   Entered order per verbal order.     Updated the patient.   He understands and agrees with the plan.   Gave him [facility] outpatient infusions number.     Patient states a Dr. Yip did order back in December 2023. Plan changed to waiting for the colonoscopy results.   This would be the first time getting iron infusions.

## 2024-04-02 ENCOUNTER — MYC MEDICAL ADVICE (OUTPATIENT)
Dept: PEDIATRICS | Facility: CLINIC | Age: 69
End: 2024-04-02
Payer: MEDICARE

## 2024-04-05 ENCOUNTER — THERAPY VISIT (OUTPATIENT)
Dept: PHYSICAL THERAPY | Facility: CLINIC | Age: 69
End: 2024-04-05
Attending: INTERNAL MEDICINE
Payer: MEDICARE

## 2024-04-05 DIAGNOSIS — G81.94 LEFT HEMIPARESIS (H): Primary | ICD-10-CM

## 2024-04-05 PROCEDURE — 97110 THERAPEUTIC EXERCISES: CPT | Mod: GP | Performed by: PHYSICAL THERAPIST

## 2024-04-05 PROCEDURE — 97112 NEUROMUSCULAR REEDUCATION: CPT | Mod: GP | Performed by: PHYSICAL THERAPIST

## 2024-04-09 ENCOUNTER — INFUSION THERAPY VISIT (OUTPATIENT)
Dept: INFUSION THERAPY | Facility: CLINIC | Age: 69
End: 2024-04-09
Attending: INTERNAL MEDICINE
Payer: MEDICARE

## 2024-04-09 VITALS
OXYGEN SATURATION: 98 % | HEART RATE: 54 BPM | SYSTOLIC BLOOD PRESSURE: 114 MMHG | RESPIRATION RATE: 16 BRPM | DIASTOLIC BLOOD PRESSURE: 73 MMHG | TEMPERATURE: 98 F

## 2024-04-09 DIAGNOSIS — D50.0 IRON DEFICIENCY ANEMIA DUE TO CHRONIC BLOOD LOSS: Primary | ICD-10-CM

## 2024-04-09 PROCEDURE — 250N000011 HC RX IP 250 OP 636: Mod: JZ | Performed by: INTERNAL MEDICINE

## 2024-04-09 PROCEDURE — 258N000003 HC RX IP 258 OP 636: Performed by: INTERNAL MEDICINE

## 2024-04-09 PROCEDURE — 96365 THER/PROPH/DIAG IV INF INIT: CPT

## 2024-04-09 RX ORDER — EPINEPHRINE 1 MG/ML
0.3 INJECTION, SOLUTION INTRAMUSCULAR; SUBCUTANEOUS EVERY 5 MIN PRN
Status: CANCELLED | OUTPATIENT
Start: 2024-04-16

## 2024-04-09 RX ORDER — ALBUTEROL SULFATE 90 UG/1
1-2 AEROSOL, METERED RESPIRATORY (INHALATION)
Status: CANCELLED
Start: 2024-04-16

## 2024-04-09 RX ORDER — METHYLPREDNISOLONE SODIUM SUCCINATE 125 MG/2ML
125 INJECTION, POWDER, LYOPHILIZED, FOR SOLUTION INTRAMUSCULAR; INTRAVENOUS
Status: CANCELLED
Start: 2024-04-16

## 2024-04-09 RX ORDER — ALBUTEROL SULFATE 0.83 MG/ML
2.5 SOLUTION RESPIRATORY (INHALATION)
Status: CANCELLED | OUTPATIENT
Start: 2024-04-16

## 2024-04-09 RX ORDER — HEPARIN SODIUM (PORCINE) LOCK FLUSH IV SOLN 100 UNIT/ML 100 UNIT/ML
5 SOLUTION INTRAVENOUS
Status: CANCELLED | OUTPATIENT
Start: 2024-04-16

## 2024-04-09 RX ORDER — DIPHENHYDRAMINE HYDROCHLORIDE 50 MG/ML
50 INJECTION INTRAMUSCULAR; INTRAVENOUS
Status: CANCELLED
Start: 2024-04-16

## 2024-04-09 RX ORDER — HEPARIN SODIUM,PORCINE 10 UNIT/ML
5-20 VIAL (ML) INTRAVENOUS DAILY PRN
Status: CANCELLED | OUTPATIENT
Start: 2024-04-16

## 2024-04-09 RX ADMIN — SODIUM CHLORIDE 250 ML: 9 INJECTION, SOLUTION INTRAVENOUS at 11:41

## 2024-04-09 RX ADMIN — FERRIC CARBOXYMALTOSE INJECTION 750 MG: 50 INJECTION, SOLUTION INTRAVENOUS at 11:36

## 2024-04-09 ASSESSMENT — PAIN SCALES - GENERAL: PAINLEVEL: NO PAIN (0)

## 2024-04-09 NOTE — PROGRESS NOTES
Infusion Nursing Note:  Marcus Hodges presents today for Injectafer #1 of 2.    Patient seen by provider today: No   present during visit today: Not Applicable.    Note: medication and side effects reviewed.      Intravenous Access:  Peripheral IV placed.    Treatment Conditions:  Iron studies reviewed.      Post Infusion Assessment:  Patient tolerated infusion without incident.  Patient observed for 30 minutes post injectafer per protocol.  Blood return noted pre and post infusion.  Site patent and intact, free from redness, edema or discomfort.  No evidence of extravasations.  Access discontinued per protocol.       Discharge Plan:   Discharge instructions reviewed with: Patient and Family.  Patient and/or family verbalized understanding of discharge instructions and all questions answered.  AVS to patient via [employer].  Patient will return 4/16 for next appointment.   Patient discharged in stable condition accompanied by: self and wife.  Departure Mode: Ambulatory.      Dawna Dent RN

## 2024-04-12 ENCOUNTER — OFFICE VISIT (OUTPATIENT)
Dept: PODIATRY | Facility: CLINIC | Age: 69
End: 2024-04-12
Payer: MEDICARE

## 2024-04-12 VITALS — SYSTOLIC BLOOD PRESSURE: 118 MMHG | WEIGHT: 175 LBS | BODY MASS INDEX: 23.73 KG/M2 | DIASTOLIC BLOOD PRESSURE: 72 MMHG

## 2024-04-12 DIAGNOSIS — L60.0 INGROWING NAIL: ICD-10-CM

## 2024-04-12 DIAGNOSIS — L30.9 DERMATITIS: Primary | ICD-10-CM

## 2024-04-12 PROCEDURE — 99203 OFFICE O/P NEW LOW 30 MIN: CPT | Mod: 25 | Performed by: PODIATRIST

## 2024-04-12 PROCEDURE — 11719 TRIM NAIL(S) ANY NUMBER: CPT | Performed by: PODIATRIST

## 2024-04-12 RX ORDER — HYDROCORTISONE VALERATE 2 MG/G
OINTMENT TOPICAL 2 TIMES DAILY
Qty: 60 G | Refills: 0 | Status: SHIPPED | OUTPATIENT
Start: 2024-04-12 | End: 2024-04-19

## 2024-04-12 NOTE — PATIENT INSTRUCTIONS
Thank you for choosing [facility] / Foot [facility]le Surgery!    DR. REYES'S CLINIC LOCATIONS:     [facility] (Friday) TRIAGE LINE: [phone] [address]  APPOINTMENTS: [phone]   [place] [address] RADIOLOGY: [phone]    PHYSICAL THERAPY: [phone]    SET UP SURGERY: [phone]   [place] (Mon-Tues AM-Thurs) BILLING QUESTIONS: [phone] [id number] FAX: [phone]   [place] [address] [place] [facility]: [phone]     You were seen today for 2 issues on the right foot:    1.  Dermatitis between the right first and second toes.  You were given a prescription for a topical steroid cream.  This will hopefully help to alleviate the skin irritation.  If so, no further follow-up or intervention will be needed.  Use a spacer between the toes if need be.      2.  Ingrown nail medial right great toe.  A slant back was performed, the leading edge of the nail was  and removed.  Continue with routine footcare.  Schedule a 30-minute appointment in the near future if the procedure today fails to provide sufficient relief and we will consider a more aggressive avulsion

## 2024-04-12 NOTE — LETTER
"    4/12/2024         RE: Marcus Hodges  [address]  [place] [address]        Dear Colleague,    Thank you for referring your patient, Marcus Hodges, to the [facility] [place]. Please see a copy of my visit note below.    Foot & Ankle Surgery  April 12, 2024    CC: \"Right large toe and toe next to it with rash\"    I was asked to see Marcus Hodges regarding the chief complaint by: Self    HPI:  Pt is a 68 year old male who presents with above complaint.  2 to 3-week history of a rash involving the right first and second toes dorsally.  The patient previously had a stroke and uses the right foot predominantly.  The right big toe sticks up and rubs on the second toe.  He developed a rash between the toes but states this does not itch.  Dermatologist suspects this may be secondary to rubbing between the toes.  The patient got wider shoes for the right foot which has helped decrease the redness between the first and second toes.    ROS:   Pos for CC.  The patient denies current nausea, vomiting, chills, fevers, belly pain, calf pain, chest pain or SOB.  Complete remainder of ROS is otherwise neg.    VITALS:    Vitals:    04/12/24 0913   BP: 118/72   Weight: 79.4 kg (175 lb)       PMH:    Past Medical History:   Diagnosis Date     * * * SBE PROPHYLAXIS * * *      Antiplatelet or antithrombotic long-term use     on Xarelto     Arrhythmia     A fib     Atrial enlargement, bilateral      Atrial flutter 2004    radiofrequency ablation 2004, resolved     ATRIAL SEPTAL DEFECT     repaired 1977     Chronic atrial fibrillation (H)     on apixaban     CVA (cerebral vascular accident) (H) 04/2012    R MCA, complicated by left sided weakness, walks with a cane, and seizures     Depression      Dysphagia 04/22/2012    [facility], following CVA     Hernia, abdominal      History of thrombophlebitis      hyperlipidemia      Mitral regurgitation     mitral valve damage related to ASD repair     Mumps      " Neurogenic bladder      Neurogenic bowel      Palpitations      Respiratory failure (H) 04/22/2012    [facility], following CVA, prolonged requiring tracheostomy, [facility]      Tricuspid regurgitation      Unspecified glaucoma(365.9)     right     Urinary incontinence        SXHX:    Past Surgical History:   Procedure Laterality Date     CATHETER, ABLATION  2004     COLONOSCOPY N/A 2/22/2024    Procedure: Colonoscopy with polypectomies using cold exacto snare, hemorrhage control using one hemoclip and 3 cc tattoo using scleroneedle, retrieval of polyp using mena net;  Surgeon: Don Dixon MD;  Location:  GI     COMBINED CYSTOSCOPY, INSERT CATHETER URETER  9/28/2020    Procedure: cystoscopy and left ureteral catheter placement, left ureteral stent placement;  Surgeon: Nehemiah Bloom MD;  Location:  OR     Craniotomy [address], repair of hemicraniectomy defect     CYSTOSCOPY       DAVINCI PYELOPLASTY Left 9/28/2020    Procedure: left robotic assisted laparoscopic pyelolithotomy;  Surgeon: Nehemiah Bloom MD;  Location:  OR     GASTROSTOMY TUBE  April 2012    [facility], following CVA     HERNIA REPAIR       IR CAROTID ANGIOGRAM  4/22/2012     IR CAROTID ANGIOGRAM  4/22/2012     IR MISCELLANEOUS PROCEDURE  4/22/2012     IR MISCELLANEOUS PROCEDURE  4/25/2012     LASER KTP GREEN LIGHT PHOTOSELECTIVE VAPORIZATION PROSTATE N/A 12/1/2020    Procedure: Cystoscopy and greenlight photovaporization of the prostate;  Surgeon: Nehemiah Bloom MD;  Location: [facility] OR     REPAIR ATRIAL SEPTAL DEFECT  1978    ASD Repair     Right hemicraniectomy  April 2012    [facility], following CVA, mgmt malignant cerebral edema     SURGICAL HISTORY OF -   2009    right eye enucleation     TRACHEOSTOMY  April 2012    [facility], following CVA     ZZ NONSPECIFIC PROCEDURE      tonsillectomy     Z NONSPECIFIC PROCEDURE      Ing. Hernia Repair        MEDS:    Current Outpatient Medications    Medication Sig Dispense Refill     acetaminophen (TYLENOL) 500 MG tablet Take 2 tablets (1,000 mg) by mouth every 6 hours as needed for mild pain 100 tablet 0     alendronate (FOSAMAX) 70 MG tablet Take 1 tablet (70 mg) by mouth every 7 days 12 tablet 3     apixaban ANTICOAGULANT (ELIQUIS ANTICOAGULANT) 5 MG tablet Take 1 tablet (5 mg) by mouth 2 times daily 180 tablet 11     atorvastatin (LIPITOR) 20 MG tablet Take 1 tablet (20 mg) by mouth daily 90 tablet 11     baclofen (LIORESAL) 20 MG tablet Take 1 tablet (20 mg) by mouth 4 times daily 360 tablet 11     bisacodyl (DULCOLAX) 5 MG EC tablet Two days prior to exam take two (2) tablets at 4pm. One day prior to exam take two (2) tablets at 4pm 4 tablet 0     citalopram (CELEXA) 20 MG tablet Take 1 tablet (20 mg) by mouth daily 90 tablet 11     docusate sodium (COLACE) 100 MG tablet Take 200 mg by mouth nightly as needed        ferrous sulfate (FEROSUL) 325 (65 Fe) MG tablet Take 1 tablet (325 mg) by mouth daily (with breakfast) 90 tablet 1     levETIRAcetam (KEPPRA) 1000 MG tablet Take 1 tablet (1,000 mg) by mouth 2 times daily 180 tablet 11     metoprolol succinate ER (TOPROL XL) 25 MG 24 hr tablet Take 0.5 tablets (12.5 mg) by mouth daily 45 tablet 11     mirabegron (MYRBETRIQ) 50 MG 24 hr tablet Take 1 tablet (50 mg) by mouth daily 90 tablet 11     Multiple Vitamin (MULTI-VITAMIN) per tablet Take 1 tablet by mouth daily  100 tablet 3     polyethylene glycol (GOLYTELY) 236 g suspension Take as directed. Two days before your exam fill the first container with water. Cover and shake until mixed well. At 5:00pm drink one 8oz glass every 10-15 minutes until half (1/2) of the first container is empty. Store the remainder in the refrigerator. One day before your exam at 5:00pm drink the second half of the first container until it is gone. Before you go to bed mix the second container with water and put in refrigerator. Six hours before your check in time drink one 8oz  glass every 10-15 minutes until half of container is empty. Discard the remainder of solution. 8000 mL 0     polyethylene glycol (MIRALAX) 17 g packet Take 17 g by mouth every other day PRN       senna-docusate (SENOKOT-S/PERICOLACE) 8.6-50 MG tablet Take 4 tablets by mouth daily as needed       tamsulosin (FLOMAX) 0.4 MG capsule Take 1 capsule (0.4 mg) by mouth daily 90 capsule 11     vitamin D3 (CHOLECALCIFEROL) 50 mcg (2000 units) tablet Take 1,000 Units by mouth daily 90 tablet 3     amoxicillin (AMOXIL) 500 MG capsule TAKE FOUR CAPSULES BY MOUTH ONE HOUR PRIOR TO APPOINTMENT (Patient not taking: Reported on 2/15/2024)       No current facility-administered medications for this visit.       ALL:     Allergies   Allergen Reactions     Blood Transfusion Related (Informational Only) Other (See Comments)     Patient has a history of a clinically significant antibody against RBC antigens.  A delay in compatible RBCs may occur.     Lisinopril        FMH:    Family History   Problem Relation Age of Onset     Hypertension Mother      Cerebrovascular Disease Father      Diabetes Maternal Grandmother      Cancer Paternal Grandmother      Congenital Anomalies Brother         several brothers and sisters born with congential heart defects, but all have been repaired     Congenital Anomalies Sister      Colon Cancer No family hx of        SocHx:    Social History     Socioeconomic History     Marital status:      Spouse name: haim     Number of children: 0     Years of education: Not on file     Highest education level: Not on file   Occupational History     Employer: [employer]   Tobacco Use     Smoking status: Never     Smokeless tobacco: Never   Substance and Sexual Activity     Alcohol use: No     Drug use: No     Sexual activity: Yes     Partners: Female   Other Topics Concern      Service Not Asked     Blood Transfusions Not Asked     Caffeine Concern Yes     Comment: couple cups of tea a  day     Occupational Exposure Not Asked     Hobby Hazards Not Asked     Sleep Concern Not Asked     Stress Concern Not Asked     Weight Concern Not Asked     Special Diet Yes     Comment: vegan; occ eggs/fish      Back Care Not Asked     Exercise Yes     Comment: 2 x weekly/gym , pysical therapy     Bike Helmet Not Asked     Seat Belt Yes     Self-Exams Not Asked     Parent/sibling w/ CABG, MI or angioplasty before 65F 55M? Not Asked   Social History Narrative     Not on file     Social Determinants of Health     Financial Resource Strain: Low Risk  (10/28/2023)    Financial Resource Strain      Within the past 12 months, have you or your family members you live with been unable to get utilities (heat, electricity) when it was really needed?: No   Food Insecurity: Low Risk  (10/28/2023)    Food Insecurity      Within the past 12 months, did you worry that your food would run out before you got money to buy more?: No      Within the past 12 months, did the food you bought just not last and you didn t have money to get more?: No   Transportation Needs: Low Risk  (10/28/2023)    Transportation Needs      Within the past 12 months, has lack of transportation kept you from medical appointments, getting your medicines, non-medical meetings or appointments, work, or from getting things that you need?: No   Physical Activity: Insufficiently Active (10/25/2022)    Exercise Vital Sign      Days of Exercise per Week: 1 day      Minutes of Exercise per Session: 10 min   Stress: No Stress Concern Present (10/25/2022)    [place] [employer] - Occupational Stress Questionnaire      Feeling of Stress : Only a little   Social Connections: Socially Integrated (10/25/2022)    Social Connection and Isolation Panel [NHANES]      Frequency of Communication with Friends and Family: Three times a week      Frequency of Social Gatherings with Friends and Family: Once a week      Attends [employer] Services: More than 4  times per year      Active Member of Clubs or Organizations: Yes      Attends Club or Organization Meetings: Not on file      Marital Status:    Interpersonal Safety: Low Risk  (10/31/2023)    Interpersonal Safety      Do you feel physically and emotionally safe where you currently live?: Yes      Within the past 12 months, have you been hit, slapped, kicked or otherwise physically hurt by someone?: Not on file      Within the past 12 months, have you been humiliated or emotionally abused in other ways by your partner or ex-partner?: No   Housing Stability: Low Risk  (10/28/2023)    Housing Stability      Do you have housing? : Yes      Are you worried about losing your housing?: No           EXAMINATION:  Gen:   No apparent distress  Neuro:   A&Ox3, no deficits  Psych:    Answering questions appropriately for age and situation with normal affect  Head:    NCAT  Eye:    Visual scanning without deficit  Ear:    Response to auditory stimuli wnl  Lung:    Non-labored breathing on RA noted  Abd:    NTND per patient report  Lymph:    Neg for pitting/non-pitting edema BLE  Vasc:    Pulses palpable, CFT minimally delayed  Neuro:    Light touch sensation intact to all sensory nerve distributions without paresthesias  Derm:    Scaling/peeling skin at the dorsal first anterior digital space between the right first and second toes.  Mild onychocryptosis at the medial right hallux nail.  MSK:    Right hallux toe is oftentimes noted to be dorsiflexed but this is fully reducible  Calf:    Neg for redness, swelling or tenderness    Assessment:  68 year old male with dermatitis right first and second toes with likely rubbing/skin irritation component; ingrown medial right hallux      Medical Decision Making/Plan:  Discussed etiologies, anatomy and options  1.  Dermatitis right first and second toes with likely rubbing/skin irritation component  -I personally reviewed and interpreted the patient's lower extremity history  pertinent to today's visit, including imaging/labs, in preparation for initiating a treatment program.  -Can certainly continue with accommodative shoes and padding between the toes  -To address the dermatitis, the patient was given a prescription cream, Westcort 0.2%.  Consider stronger cream versus dermatology referral versus biopsy    2.  Ingrown nail medial right hallux  -A slant back was performed to address the symptoms, the leading edge was  and removed with a nail clipper  -The patient does routine footcare.  They have concerns as to whether routine footcare is reasonable for this  -We discussed the option for partial permanent removal.  They will follow-up in 1 to 2 weeks for 30-minute appointment if they would like to proceed with this      Follow up: 1 to 2-week or sooner with acute issues      Patient's medical history was reviewed today      Saulo Mariee DPM FACSt. Vincent[facility] Blount FACFAOM  Podiatric Foot & Ankle Surgeon  [facility]  [phone]    Disclaimer: This note consists of symbols derived from keyboarding, dictation and/or voice recognition software. As a result, there may be errors in the script that have gone undetected. Please consider this when interpreting information found in this chart.          Again, thank you for allowing me to participate in the care of your patient.        Sincerely,        Saulo Mariee DPM, IOGR

## 2024-04-12 NOTE — PROGRESS NOTES
"Foot & Ankle Surgery  April 12, 2024    CC: \"Right large toe and toe next to it with rash\"    I was asked to see Marcus Hodges regarding the chief complaint by: Self    HPI:  Pt is a 68 year old male who presents with above complaint.  2 to 3-week history of a rash involving the right first and second toes dorsally.  The patient previously had a stroke and uses the right foot predominantly.  The right big toe sticks up and rubs on the second toe.  He developed a rash between the toes but states this does not itch.  Dermatologist suspects this may be secondary to rubbing between the toes.  The patient got wider shoes for the right foot which has helped decrease the redness between the first and second toes.    ROS:   Pos for CC.  The patient denies current nausea, vomiting, chills, fevers, belly pain, calf pain, chest pain or SOB.  Complete remainder of ROS is otherwise neg.    VITALS:    Vitals:    04/12/24 0913   BP: 118/72   Weight: 79.4 kg (175 lb)       PMH:    Past Medical History:   Diagnosis Date    * * * SBE PROPHYLAXIS * * *     Antiplatelet or antithrombotic long-term use     on Xarelto    Arrhythmia     A fib    Atrial enlargement, bilateral     Atrial flutter 2004    radiofrequency ablation 2004, resolved    ATRIAL SEPTAL DEFECT     repaired 1977    Chronic atrial fibrillation (H)     on apixaban    CVA (cerebral vascular accident) (H) 04/2012    R MCA, complicated by left sided weakness, walks with a cane, and seizures    Depression     Dysphagia 04/22/2012    [facility], following CVA    Hernia, abdominal     History of thrombophlebitis     hyperlipidemia     Mitral regurgitation     mitral valve damage related to ASD repair    Mumps     Neurogenic bladder     Neurogenic bowel     Palpitations     Respiratory failure (H) 04/22/2012    [facility], following CVA, prolonged requiring tracheostomy, [facility]     Tricuspid regurgitation     Unspecified glaucoma(365.9)     right    Urinary " incontinence        SXHX:    Past Surgical History:   Procedure Laterality Date    CATHETER, ABLATION  2004    COLONOSCOPY N/A 2/22/2024    Procedure: Colonoscopy with polypectomies using cold exacto snare, hemorrhage control using one hemoclip and 3 cc tattoo using scleroneedle, retrieval of polyp using mena net;  Surgeon: Don Dixon MD;  Location:  GI    COMBINED CYSTOSCOPY, INSERT CATHETER URETER  9/28/2020    Procedure: cystoscopy and left ureteral catheter placement, left ureteral stent placement;  Surgeon: Nehemiah Bloom MD;  Location:  OR    Craniotomy reconstruction  2012    [facility], repair of hemicraniectomy defect    CYSTOSCOPY      DAVINCI PYELOPLASTY Left 9/28/2020    Procedure: left robotic assisted laparoscopic pyelolithotomy;  Surgeon: Nehemiah Bloom MD;  Location:  OR    GASTROSTOMY TUBE  April 2012    [facility], following CVA    HERNIA REPAIR      IR CAROTID ANGIOGRAM  4/22/2012    IR CAROTID ANGIOGRAM  4/22/2012    IR MISCELLANEOUS PROCEDURE  4/22/2012    IR MISCELLANEOUS PROCEDURE  4/25/2012    LASER KTP GREEN LIGHT PHOTOSELECTIVE VAPORIZATION PROSTATE N/A 12/1/2020    Procedure: Cystoscopy and greenlight photovaporization of the prostate;  Surgeon: Nehemiah Bloom MD;  Location:  OR    REPAIR ATRIAL SEPTAL DEFECT  1978    ASD Repair    Right hemicraniectomy  April 2012    [facility], following CVA, mgmt malignant cerebral edema    SURGICAL HISTORY OF -   2009    right eye enucleation    TRACHEOSTOMY  April 2012    [facility], following CVA    ZZC NONSPECIFIC PROCEDURE      tonsillectomy    ZZC NONSPECIFIC PROCEDURE      Ing. Hernia Repair        MEDS:    Current Outpatient Medications   Medication Sig Dispense Refill    acetaminophen (TYLENOL) 500 MG tablet Take 2 tablets (1,000 mg) by mouth every 6 hours as needed for mild pain 100 tablet 0    alendronate (FOSAMAX) 70 MG tablet Take 1 tablet (70 mg) by mouth every 7 days 12 tablet 3    apixaban ANTICOAGULANT  (ELIQUIS ANTICOAGULANT) 5 MG tablet Take 1 tablet (5 mg) by mouth 2 times daily 180 tablet 11    atorvastatin (LIPITOR) 20 MG tablet Take 1 tablet (20 mg) by mouth daily 90 tablet 11    baclofen (LIORESAL) 20 MG tablet Take 1 tablet (20 mg) by mouth 4 times daily 360 tablet 11    bisacodyl (DULCOLAX) 5 MG EC tablet Two days prior to exam take two (2) tablets at 4pm. One day prior to exam take two (2) tablets at 4pm 4 tablet 0    citalopram (CELEXA) 20 MG tablet Take 1 tablet (20 mg) by mouth daily 90 tablet 11    docusate sodium (COLACE) 100 MG tablet Take 200 mg by mouth nightly as needed       ferrous sulfate (FEROSUL) 325 (65 Fe) MG tablet Take 1 tablet (325 mg) by mouth daily (with breakfast) 90 tablet 1    levETIRAcetam (KEPPRA) 1000 MG tablet Take 1 tablet (1,000 mg) by mouth 2 times daily 180 tablet 11    metoprolol succinate ER (TOPROL XL) 25 MG 24 hr tablet Take 0.5 tablets (12.5 mg) by mouth daily 45 tablet 11    mirabegron (MYRBETRIQ) 50 MG 24 hr tablet Take 1 tablet (50 mg) by mouth daily 90 tablet 11    Multiple Vitamin (MULTI-VITAMIN) per tablet Take 1 tablet by mouth daily  100 tablet 3    polyethylene glycol (GOLYTELY) 236 g suspension Take as directed. Two days before your exam fill the first container with water. Cover and shake until mixed well. At 5:00pm drink one 8oz glass every 10-15 minutes until half (1/2) of the first container is empty. Store the remainder in the refrigerator. One day before your exam at 5:00pm drink the second half of the first container until it is gone. Before you go to bed mix the second container with water and put in refrigerator. Six hours before your check in time drink one 8oz glass every 10-15 minutes until half of container is empty. Discard the remainder of solution. 8000 mL 0    polyethylene glycol (MIRALAX) 17 g packet Take 17 g by mouth every other day PRN      senna-docusate (SENOKOT-S/PERICOLACE) 8.6-50 MG tablet Take 4 tablets by mouth daily as needed       tamsulosin (FLOMAX) 0.4 MG capsule Take 1 capsule (0.4 mg) by mouth daily 90 capsule 11    vitamin D3 (CHOLECALCIFEROL) 50 mcg (2000 units) tablet Take 1,000 Units by mouth daily 90 tablet 3    amoxicillin (AMOXIL) 500 MG capsule TAKE FOUR CAPSULES BY MOUTH ONE HOUR PRIOR TO APPOINTMENT (Patient not taking: Reported on 2/15/2024)       No current facility-administered medications for this visit.       ALL:     Allergies   Allergen Reactions    Blood Transfusion Related (Informational Only) Other (See Comments)     Patient has a history of a clinically significant antibody against RBC antigens.  A delay in compatible RBCs may occur.    Lisinopril        FMH:    Family History   Problem Relation Age of Onset    Hypertension Mother     Cerebrovascular Disease Father     Diabetes Maternal Grandmother     Cancer Paternal Grandmother     Congenital Anomalies Brother         several brothers and sisters born with congential heart defects, but all have been repaired    Congenital Anomalies Sister     Colon Cancer No family hx of        SocHx:    Social History     Socioeconomic History    Marital status:      Spouse name: haim    Number of children: 0    Years of education: Not on file    Highest education level: Not on file   Occupational History     Employer: [employer]   Tobacco Use    Smoking status: Never    Smokeless tobacco: Never   Substance and Sexual Activity    Alcohol use: No    Drug use: No    Sexual activity: Yes     Partners: Female   Other Topics Concern     Service Not Asked    Blood Transfusions Not Asked    Caffeine Concern Yes     Comment: couple cups of tea a day    Occupational Exposure Not Asked    Hobby Hazards Not Asked    Sleep Concern Not Asked    Stress Concern Not Asked    Weight Concern Not Asked    Special Diet Yes     Comment: vegan; occ eggs/fish     Back Care Not Asked    Exercise Yes     Comment: 2 x weekly/gym , pysical therapy    Bike Helmet Not Asked     Seat Belt Yes    Self-Exams Not Asked    Parent/sibling w/ CABG, MI or angioplasty before 65F 55M? Not Asked   Social History Narrative    Not on file     Social Determinants of Health     Financial Resource Strain: Low Risk  (10/28/2023)    Financial Resource Strain     Within the past 12 months, have you or your family members you live with been unable to get utilities (heat, electricity) when it was really needed?: No   Food Insecurity: Low Risk  (10/28/2023)    Food Insecurity     Within the past 12 months, did you worry that your food would run out before you got money to buy more?: No     Within the past 12 months, did the food you bought just not last and you didn t have money to get more?: No   Transportation Needs: Low Risk  (10/28/2023)    Transportation Needs     Within the past 12 months, has lack of transportation kept you from medical appointments, getting your medicines, non-medical meetings or appointments, work, or from getting things that you need?: No   Physical Activity: Insufficiently Active (10/25/2022)    Exercise Vital Sign     Days of Exercise per Week: 1 day     Minutes of Exercise per Session: 10 min   Stress: No Stress Concern Present (10/25/2022)    [employer] [place] [place] [employer] - Occupational Stress Questionnaire     Feeling of Stress : Only a little   Social Connections: Socially Integrated (10/25/2022)    Social Connection and Isolation Panel [NHANES]     Frequency of Communication with Friends and Family: Three times a week     Frequency of Social Gatherings with Friends and Family: Once a week     Attends Bahai Services: More than 4 times per year     Active Member of Clubs or Organizations: Yes     Attends Club or Organization Meetings: Not on file     Marital Status:    Interpersonal Safety: Low Risk  (10/31/2023)    Interpersonal Safety     Do you feel physically and emotionally safe where you currently live?: Yes     Within the past 12 months, have you been  hit, slapped, kicked or otherwise physically hurt by someone?: Not on file     Within the past 12 months, have you been humiliated or emotionally abused in other ways by your partner or ex-partner?: No   Housing Stability: Low Risk  (10/28/2023)    Housing Stability     Do you have housing? : Yes     Are you worried about losing your housing?: No           EXAMINATION:  Gen:   No apparent distress  Neuro:   A&Ox3, no deficits  Psych:    Answering questions appropriately for age and situation with normal affect  Head:    NCAT  Eye:    Visual scanning without deficit  Ear:    Response to auditory stimuli wnl  Lung:    Non-labored breathing on RA noted  Abd:    NTND per patient report  Lymph:    Neg for pitting/non-pitting edema BLE  Vasc:    Pulses palpable, CFT minimally delayed  Neuro:    Light touch sensation intact to all sensory nerve distributions without paresthesias  Derm:    Scaling/peeling skin at the dorsal first anterior digital space between the right first and second toes.  Mild onychocryptosis at the medial right hallux nail.  MSK:    Right hallux toe is oftentimes noted to be dorsiflexed but this is fully reducible  Calf:    Neg for redness, swelling or tenderness    Assessment:  68 year old male with dermatitis right first and second toes with likely rubbing/skin irritation component; ingrown medial right hallux      Medical Decision Making/Plan:  Discussed etiologies, anatomy and options  1.  Dermatitis right first and second toes with likely rubbing/skin irritation component  -I personally reviewed and interpreted the patient's lower extremity history pertinent to today's visit, including imaging/labs, in preparation for initiating a treatment program.  -Can certainly continue with accommodative shoes and padding between the toes  -To address the dermatitis, the patient was given a prescription cream, Westcort 0.2%.  Consider stronger cream versus dermatology referral versus biopsy    2.  Ingrown  nail medial right hallux  -A slant back was performed to address the symptoms, the leading edge was  and removed with a nail clipper  -The patient does routine footcare.  They have concerns as to whether routine footcare is reasonable for this  -We discussed the option for partial permanent removal.  They will follow-up in 1 to 2 weeks for 30-minute appointment if they would like to proceed with this      Follow up: 1 to 2-week or sooner with acute issues      Patient's medical history was reviewed today      Saulo Mariee DPM FAC[facility] FACFAOM  Podiatric Foot & Ankle Surgeon  [facility]  [phone]    Disclaimer: This note consists of symbols derived from keyboarding, dictation and/or voice recognition software. As a result, there may be errors in the script that have gone undetected. Please consider this when interpreting information found in this chart.

## 2024-04-16 ENCOUNTER — INFUSION THERAPY VISIT (OUTPATIENT)
Dept: INFUSION THERAPY | Facility: CLINIC | Age: 69
End: 2024-04-16
Attending: INTERNAL MEDICINE
Payer: MEDICARE

## 2024-04-16 ENCOUNTER — MYC MEDICAL ADVICE (OUTPATIENT)
Dept: PEDIATRICS | Facility: CLINIC | Age: 69
End: 2024-04-16

## 2024-04-16 VITALS — HEART RATE: 50 BPM | DIASTOLIC BLOOD PRESSURE: 70 MMHG | SYSTOLIC BLOOD PRESSURE: 113 MMHG | OXYGEN SATURATION: 98 %

## 2024-04-16 DIAGNOSIS — D50.0 IRON DEFICIENCY ANEMIA DUE TO CHRONIC BLOOD LOSS: Primary | ICD-10-CM

## 2024-04-16 DIAGNOSIS — D50.9 IRON DEFICIENCY ANEMIA, UNSPECIFIED IRON DEFICIENCY ANEMIA TYPE: Primary | ICD-10-CM

## 2024-04-16 PROCEDURE — 258N000003 HC RX IP 258 OP 636: Performed by: INTERNAL MEDICINE

## 2024-04-16 PROCEDURE — 250N000011 HC RX IP 250 OP 636: Mod: JZ | Performed by: INTERNAL MEDICINE

## 2024-04-16 PROCEDURE — 96374 THER/PROPH/DIAG INJ IV PUSH: CPT

## 2024-04-16 RX ORDER — EPINEPHRINE 1 MG/ML
0.3 INJECTION, SOLUTION INTRAMUSCULAR; SUBCUTANEOUS EVERY 5 MIN PRN
OUTPATIENT
Start: 2024-04-16

## 2024-04-16 RX ORDER — ALBUTEROL SULFATE 0.83 MG/ML
2.5 SOLUTION RESPIRATORY (INHALATION)
OUTPATIENT
Start: 2024-04-16

## 2024-04-16 RX ORDER — HEPARIN SODIUM (PORCINE) LOCK FLUSH IV SOLN 100 UNIT/ML 100 UNIT/ML
5 SOLUTION INTRAVENOUS
OUTPATIENT
Start: 2024-04-16

## 2024-04-16 RX ORDER — ALBUTEROL SULFATE 90 UG/1
1-2 AEROSOL, METERED RESPIRATORY (INHALATION)
Start: 2024-04-16

## 2024-04-16 RX ORDER — METHYLPREDNISOLONE SODIUM SUCCINATE 125 MG/2ML
125 INJECTION, POWDER, LYOPHILIZED, FOR SOLUTION INTRAMUSCULAR; INTRAVENOUS
Start: 2024-04-16

## 2024-04-16 RX ORDER — DIPHENHYDRAMINE HYDROCHLORIDE 50 MG/ML
50 INJECTION INTRAMUSCULAR; INTRAVENOUS
Start: 2024-04-16

## 2024-04-16 RX ORDER — HEPARIN SODIUM,PORCINE 10 UNIT/ML
5-20 VIAL (ML) INTRAVENOUS DAILY PRN
OUTPATIENT
Start: 2024-04-16

## 2024-04-16 RX ADMIN — SODIUM CHLORIDE 250 ML: 9 INJECTION, SOLUTION INTRAVENOUS at 11:11

## 2024-04-16 RX ADMIN — FERRIC CARBOXYMALTOSE INJECTION 750 MG: 50 INJECTION, SOLUTION INTRAVENOUS at 11:14

## 2024-04-16 NOTE — PROGRESS NOTES
Infusion Nursing Note:  Marcus Hodges presents today for Injectafer #2/2.    Patient seen by provider today: No   present during visit today: Not Applicable.    Note: N/A.      Intravenous Access:  Peripheral IV placed.    Treatment Conditions:  Not Applicable.      Post Infusion Assessment:  Patient tolerated infusion without incident.  Patient observed for 30 minutes post Injectafer per protocol.  Blood return noted pre and post infusion.  Site patent and intact, free from redness, edema or discomfort.  No evidence of extravasations.  Access discontinued per protocol.       Discharge Plan:   Patient discharged in stable condition accompanied by: wife.  Departure Mode: Ambulatory with cane and gait belt.      Gissel Ron RN

## 2024-04-16 NOTE — TELEPHONE ENCOUNTER
PCP: Please review mychart message. Patient completed last #2/2 iron infusions today. Patient inquiring about follow-up labs.    Thanks!  Lani TOMLINSON RN, BSN

## 2024-04-18 ENCOUNTER — THERAPY VISIT (OUTPATIENT)
Dept: PHYSICAL THERAPY | Facility: CLINIC | Age: 69
End: 2024-04-18
Attending: INTERNAL MEDICINE
Payer: MEDICARE

## 2024-04-18 DIAGNOSIS — Z98.890 S/P CRANIOTOMY: ICD-10-CM

## 2024-04-18 DIAGNOSIS — G81.94 LEFT HEMIPARESIS (H): Primary | ICD-10-CM

## 2024-04-18 PROCEDURE — 97110 THERAPEUTIC EXERCISES: CPT | Mod: GP | Performed by: PHYSICAL THERAPIST

## 2024-04-18 PROCEDURE — 97112 NEUROMUSCULAR REEDUCATION: CPT | Mod: GP | Performed by: PHYSICAL THERAPIST

## 2024-04-18 PROCEDURE — 97116 GAIT TRAINING THERAPY: CPT | Mod: GP | Performed by: PHYSICAL THERAPIST

## 2024-05-02 ENCOUNTER — LAB (OUTPATIENT)
Dept: LAB | Facility: CLINIC | Age: 69
End: 2024-05-02
Payer: MEDICARE

## 2024-05-02 DIAGNOSIS — D50.9 IRON DEFICIENCY ANEMIA, UNSPECIFIED IRON DEFICIENCY ANEMIA TYPE: ICD-10-CM

## 2024-05-02 LAB
BASOPHILS # BLD AUTO: 0 10E3/UL (ref 0–0.2)
BASOPHILS NFR BLD AUTO: 0 %
EOSINOPHIL # BLD AUTO: 0.1 10E3/UL (ref 0–0.7)
EOSINOPHIL NFR BLD AUTO: 3 %
ERYTHROCYTE [DISTWIDTH] IN BLOOD BY AUTOMATED COUNT: 21.7 % (ref 10–15)
HCT VFR BLD AUTO: 40.9 % (ref 40–53)
HGB BLD-MCNC: 12.9 G/DL (ref 13.3–17.7)
IMM GRANULOCYTES # BLD: 0 10E3/UL
IMM GRANULOCYTES NFR BLD: 0 %
LYMPHOCYTES # BLD AUTO: 0.7 10E3/UL (ref 0.8–5.3)
LYMPHOCYTES NFR BLD AUTO: 17 %
MCH RBC QN AUTO: 28.2 PG (ref 26.5–33)
MCHC RBC AUTO-ENTMCNC: 31.5 G/DL (ref 31.5–36.5)
MCV RBC AUTO: 90 FL (ref 78–100)
MONOCYTES # BLD AUTO: 0.4 10E3/UL (ref 0–1.3)
MONOCYTES NFR BLD AUTO: 10 %
NEUTROPHILS # BLD AUTO: 3 10E3/UL (ref 1.6–8.3)
NEUTROPHILS NFR BLD AUTO: 70 %
PLATELET # BLD AUTO: 140 10E3/UL (ref 150–450)
RBC # BLD AUTO: 4.57 10E6/UL (ref 4.4–5.9)
WBC # BLD AUTO: 4.3 10E3/UL (ref 4–11)

## 2024-05-02 PROCEDURE — 85025 COMPLETE CBC W/AUTO DIFF WBC: CPT

## 2024-05-02 PROCEDURE — 82728 ASSAY OF FERRITIN: CPT

## 2024-05-02 PROCEDURE — 36415 COLL VENOUS BLD VENIPUNCTURE: CPT

## 2024-05-03 LAB — FERRITIN SERPL-MCNC: 512 NG/ML (ref 31–409)

## 2024-05-08 ENCOUNTER — TELEPHONE (OUTPATIENT)
Dept: UROLOGY | Facility: CLINIC | Age: 69
End: 2024-05-08

## 2024-05-08 ENCOUNTER — OFFICE VISIT (OUTPATIENT)
Dept: UROLOGY | Facility: CLINIC | Age: 69
End: 2024-05-08
Payer: MEDICARE

## 2024-05-08 VITALS
OXYGEN SATURATION: 97 % | DIASTOLIC BLOOD PRESSURE: 81 MMHG | WEIGHT: 175 LBS | BODY MASS INDEX: 23.7 KG/M2 | SYSTOLIC BLOOD PRESSURE: 123 MMHG | HEART RATE: 65 BPM | HEIGHT: 72 IN

## 2024-05-08 DIAGNOSIS — R33.9 URINARY RETENTION: Primary | ICD-10-CM

## 2024-05-08 DIAGNOSIS — N31.9 NEUROGENIC BLADDER: ICD-10-CM

## 2024-05-08 LAB — RESIDUAL VOLUME (RV) (EXTERNAL): 311

## 2024-05-08 PROCEDURE — 51798 US URINE CAPACITY MEASURE: CPT | Performed by: STUDENT IN AN ORGANIZED HEALTH CARE EDUCATION/TRAINING PROGRAM

## 2024-05-08 PROCEDURE — 99214 OFFICE O/P EST MOD 30 MIN: CPT | Mod: 25 | Performed by: STUDENT IN AN ORGANIZED HEALTH CARE EDUCATION/TRAINING PROGRAM

## 2024-05-08 RX ORDER — TAMSULOSIN HYDROCHLORIDE 0.4 MG/1
0.8 CAPSULE ORAL DAILY
Qty: 180 CAPSULE | Refills: 3 | Status: SHIPPED | OUTPATIENT
Start: 2024-05-08

## 2024-05-08 RX ORDER — LIDOCAINE HYDROCHLORIDE 20 MG/ML
JELLY TOPICAL ONCE
Status: COMPLETED | OUTPATIENT
Start: 2024-05-08 | End: 2024-05-08

## 2024-05-08 RX ORDER — CIPROFLOXACIN 500 MG/1
500 TABLET, FILM COATED ORAL ONCE
Qty: 1 TABLET | Refills: 0 | Status: SHIPPED | OUTPATIENT
Start: 2024-05-08 | End: 2024-05-08

## 2024-05-08 RX ORDER — KETOCONAZOLE 20 MG/G
CREAM TOPICAL DAILY
COMMUNITY
Start: 2024-02-09

## 2024-05-08 RX ADMIN — LIDOCAINE HYDROCHLORIDE 5 ML: 20 JELLY TOPICAL at 11:10

## 2024-05-08 ASSESSMENT — PAIN SCALES - GENERAL: PAINLEVEL: MODERATE PAIN (4)

## 2024-05-08 NOTE — TELEPHONE ENCOUNTER
[facility] Call Center    Phone Message    May a detailed message be left on voicemail: yes     Reason for Call: Patient reports having the following symptoms: Very little urine output (has condom catheter), stomach and headache.    Patient is concerned for it being a possible kidney stone.    Please call patient back to discuss.    Action Taken: Other: [place] - Urology    Travel Screening: Not Applicable

## 2024-05-08 NOTE — PROGRESS NOTES
CHIEF COMPLAINT   Marcus Hodges who is a 68 year old male h/o neurogenic bladder due to stroke 2012, ASD s/p remote repair, h/o a. 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 Seeing less urine output overnight    PHYSICAL EXAM  Patient is a 68 year old  male   Vitals: There were no vitals taken for this visit.  There is no height or weight on file to calculate BMI.  General Appearance Adult:   Alert, no acute distress, oriented  HENT: throat/mouth:normal, good dentition  Lungs: no respiratory distress, or pursed lip breathing  Heart: No obvious jugular venous distension present  Abdomen: nondistended  Musculoskeltal: extremities normal, no peripheral edema  Skin: no suspicious lesions or rashes  Neuro: Alert, oriented, speech and mentation normal  Psych: affect and mood normal  Gait: Normal  : condom cath in place    PVR >300 ml    ASSESSMENT and PLAN  68 year old male h/o neurogenic bladder due to stroke 2012, ASD s/p remote repair, h/o a. 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 Chronic illness with progression. Recommend increase dose of tamsulosin to 0.8 mg daily, watch for lightheadedness. Offered indwelling Reyna catheter temporarily. He cannot cath himself, he has left hemiparesis after stroke    - place Reyna catheter, trial of void early next week  - increase tamsulosin to 0.8 mg daily (rx drug management)  - return for cystoscopy to reevaaluate bladder outlet      Nehemiah Bloom MD   [facility] Urology  [facility] Phone: [phone]

## 2024-05-08 NOTE — NURSING NOTE
Chief Complaint   Patient presents with    Urinary Retention     post void residual-311ml    Carly Saxena LPN

## 2024-05-08 NOTE — NURSING NOTE
Using sterile field technique a sterile, well lubricated 16 [place] Reyna Coude-tip  catheter was gently inserted with ease x 1 attempt.  The balloon was filled with 8cc ml sterile water.  No discomfort was voiced by the patient.  Clear-yellow urine return was noted with 250 ml drained from the catheter.  Sterile tubing and drainage bag were attached to the catheter and secured to the right upper thigh.  No specimen was ordered to be collected or sent to the lab for examination.  Patient was instructed on proper hygiene and catheter care.  Patient was instructed to take on antibiotic to prevent infection that they will pick-up from pharmacy.     Prior to Catheter insertion patient's urethra was cleansed with iodine and draped in sterile fashion. Lidocaine Gel was inserted into urethra. Patient tolerated well.   2% Lidocaine Hydrochloride Jelly Lot#[id number]    Exp:11/25      Carly Saxena LPN

## 2024-05-08 NOTE — TELEPHONE ENCOUNTER
Spoke with patient and informed him that we could work him into clinic today for a PVR. Patient came into clinic and was seen by MD and had a faulkner catheter placed per MD.     Carly Saxena LPN

## 2024-05-14 ENCOUNTER — OFFICE VISIT (OUTPATIENT)
Dept: UROLOGY | Facility: CLINIC | Age: 69
End: 2024-05-14
Payer: MEDICARE

## 2024-05-14 VITALS
DIASTOLIC BLOOD PRESSURE: 72 MMHG | SYSTOLIC BLOOD PRESSURE: 124 MMHG | WEIGHT: 175 LBS | BODY MASS INDEX: 23.7 KG/M2 | HEIGHT: 72 IN

## 2024-05-14 DIAGNOSIS — N20.0 CALCULUS OF KIDNEY: ICD-10-CM

## 2024-05-14 DIAGNOSIS — N13.8 BPH WITH URINARY OBSTRUCTION: ICD-10-CM

## 2024-05-14 DIAGNOSIS — N40.1 BPH WITH URINARY OBSTRUCTION: ICD-10-CM

## 2024-05-14 DIAGNOSIS — R33.9 URINARY RETENTION: Primary | ICD-10-CM

## 2024-05-14 PROCEDURE — 99214 OFFICE O/P EST MOD 30 MIN: CPT | Mod: 25 | Performed by: STUDENT IN AN ORGANIZED HEALTH CARE EDUCATION/TRAINING PROGRAM

## 2024-05-14 PROCEDURE — 52000 CYSTOURETHROSCOPY: CPT | Performed by: STUDENT IN AN ORGANIZED HEALTH CARE EDUCATION/TRAINING PROGRAM

## 2024-05-14 RX ORDER — CIPROFLOXACIN 500 MG/1
500 TABLET, FILM COATED ORAL ONCE
Qty: 1 TABLET | Refills: 0 | Status: SHIPPED | OUTPATIENT
Start: 2024-05-14 | End: 2024-05-14

## 2024-05-14 RX ORDER — FINASTERIDE 5 MG/1
5 TABLET, FILM COATED ORAL DAILY
Qty: 90 TABLET | Refills: 3 | Status: SHIPPED | OUTPATIENT
Start: 2024-05-14

## 2024-05-14 RX ORDER — LIDOCAINE HYDROCHLORIDE 20 MG/ML
JELLY TOPICAL ONCE
Status: COMPLETED | OUTPATIENT
Start: 2024-05-14 | End: 2024-05-14

## 2024-05-14 RX ADMIN — LIDOCAINE HYDROCHLORIDE 5 ML: 20 JELLY TOPICAL at 08:45

## 2024-05-14 ASSESSMENT — PAIN SCALES - GENERAL: PAINLEVEL: NO PAIN (0)

## 2024-05-14 NOTE — LETTER
5/14/2024       RE: Marcus Hodges  [address] [place] [address]     Dear Colleague,    Thank you for referring your patient, Marcus Hodges, to the [facility] [place] [facility]. Please see a copy of my visit note below.    CHIEF COMPLAINT   Marcus Hodges who is a 68 year old male h/o neurogenic bladder due to stroke 2012, ASD s/p remote repair, h/o a. 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 Tamsulosin was increased to double dose    PHYSICAL EXAM  Patient is a 68 year old  male   Vitals: Blood pressure 124/72, height 1.829 m (6'), weight 79.4 kg (175 lb).  Body mass index is 23.73 kg/m .  General Appearance Adult:   Alert, no acute distress, oriented  HENT: throat/mouth:normal, good dentition  Lungs: no respiratory distress, or pursed lip breathing  Heart: No obvious jugular venous distension present  Abdomen: nondistended  Musculoskeltal: extremities normal, no peripheral edema  Skin: no suspicious lesions or rashes  Neuro: Alert, oriented, speech and mentation normal  Psych: affect and mood normal  Gait: Normal  : circ phallus    PRE-PROCEDURE DIAGNOSIS: BPH with incomplete  bladder emptying    POST-PROCEDURE DIAGNOSIS: BPH with incomplete bladder emptying    PROCEDURE: Cystoscopy    DESCRIPTION OF PROCEDURE: After informed consent was obtained, the patient was brought to the procedure room where he was placed in the supine position with all pressure points well padded.  The penis was prepped and draped in sterile fashion. A flexible cystoscope was introduced through a well-lubricated urethra    Anterior urethra normal. Prostatic urethra about 3 cm with bilobar obstructive regrowth. Bladder neck widely patent. Bladder moderate  trabeculation with several cellules. Overall erythema of the bladder related to catheter irritation  .  The flexible cystoscope was removed and the findings were described to the patient.       Patient was then able to void spontaneously with an improved PVR of 103 ml      ASSESSMENT and PLAN  68 year old male h/o neurogenic bladder due to stroke 2012, ASD s/p remote repair, h/o a. 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 On cysto has obstructive regrowth of the prostate. His tamsulosin was increased to max dose and he was able to void today. Discussed adding 5ARI for max medical therapy. Patient is no longer sexually active after stroke. Hopefully this will delay another outlet procedure; discussed options include repeat Greenlight or Rezum procedure    Re: nephrolithiasis, due to follow up with nephrology in a few months. Will plan to see him about 6 months from now with ct prior    - continue tamsulosin 0.8 mg daily  - add finasteride 5 mg daily        Nehemiah Bloom MD   [facility] Urology  [facility] Phone:  [phone]

## 2024-05-14 NOTE — PATIENT INSTRUCTIONS

## 2024-05-14 NOTE — PROGRESS NOTES
CHIEF COMPLAINT   Marcus Hodges who is a 68 year old male h/o neurogenic bladder due to stroke 2012, ASD s/p remote repair, h/o a. 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 Tamsulosin was increased to double dose    PHYSICAL EXAM  Patient is a 68 year old  male   Vitals: Blood pressure 124/72, height 1.829 m (6'), weight 79.4 kg (175 lb).  Body mass index is 23.73 kg/m .  General Appearance Adult:   Alert, no acute distress, oriented  HENT: throat/mouth:normal, good dentition  Lungs: no respiratory distress, or pursed lip breathing  Heart: No obvious jugular venous distension present  Abdomen: nondistended  Musculoskeltal: extremities normal, no peripheral edema  Skin: no suspicious lesions or rashes  Neuro: Alert, oriented, speech and mentation normal  Psych: affect and mood normal  Gait: Normal  : circ phallus    PRE-PROCEDURE DIAGNOSIS: BPH with incomplete bladder emptying    POST-PROCEDURE DIAGNOSIS: BPH with incomplete bladder emptying    PROCEDURE: Cystoscopy    DESCRIPTION OF PROCEDURE: After informed consent was obtained, the patient was brought to the procedure room where he was placed in the supine position with all pressure points well padded.  The penis was  prepped and draped in sterile fashion. A flexible cystoscope was introduced through a well-lubricated urethra    Anterior urethra normal. Prostatic urethra about 3 cm with bilobar obstructive regrowth. Bladder neck widely patent. Bladder moderate  trabeculation with several cellules. Overall erythema of the bladder related to catheter irritation  .  The flexible cystoscope was removed and the findings were described to the patient.       Patient was then able to void spontaneously with an improved PVR of 103 ml      ASSESSMENT and PLAN  68 year old male h/o neurogenic bladder due to stroke 2012, ASD s/p remote repair, h/o a. 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 On cysto has obstructive regrowth of the prostate. His tamsulosin was increased to max dose and he was able to void today. Discussed adding 5ARI for max medical therapy. Patient is no longer sexually active after stroke. Hopefully this will delay another outlet procedure; discussed options include repeat Greenlight or Rezum procedure    Re: nephrolithiasis, due to follow up with nephrology in a few months. Will plan to see him about 6 months from now with ct prior    - continue tamsulosin 0.8 mg daily  - add finasteride 5 mg daily        Nehemiah Bloom MD   [facility]  [facility] Phone: [phone]

## 2024-05-14 NOTE — NURSING NOTE
Chief Complaint   Patient presents with    Urinary Retention     Pt here for in office cystoscopy      Prior to the start of the procedure and with procedural staff participation, I verbally confirmed the patient s identity using two indicators, relevant allergies, that the procedure was appropriate and matched the consent or emergent situation, and that the correct equipment/implants were available. Immediately prior to starting the procedure I conducted the Time Out with the procedural staff and re-confirmed the patient s name, procedure, and site/side. I have wiped the patient off with the povidone-Iodine solution, draped them,  used Lidocaine hydrochloride jelly, and instilled sterile water into the bladder. (The Joint Commission universal protocol was followed.)  Yes    Sedation (Moderate or Deep): None     5mL 2% lidocaine hydrochloride Urojet instilled into urethra.    NDC# [address]-3  Lot #: [id number]  Expiration Date:  04/25    Clarissa Norton CMA

## 2024-05-16 ENCOUNTER — THERAPY VISIT (OUTPATIENT)
Dept: PHYSICAL THERAPY | Facility: CLINIC | Age: 69
End: 2024-05-16
Attending: INTERNAL MEDICINE
Payer: MEDICARE

## 2024-05-16 DIAGNOSIS — G81.94 LEFT HEMIPARESIS (H): Primary | ICD-10-CM

## 2024-05-16 DIAGNOSIS — S06.9X9S TRAUMATIC BRAIN INJURY WITH LOSS OF CONSCIOUSNESS, SEQUELA (H): ICD-10-CM

## 2024-05-16 PROCEDURE — 97116 GAIT TRAINING THERAPY: CPT | Mod: GP | Performed by: PHYSICAL THERAPIST

## 2024-05-16 PROCEDURE — 97110 THERAPEUTIC EXERCISES: CPT | Mod: GP | Performed by: PHYSICAL THERAPIST

## 2024-05-16 PROCEDURE — 97112 NEUROMUSCULAR REEDUCATION: CPT | Mod: GP | Performed by: PHYSICAL THERAPIST

## 2024-05-30 ENCOUNTER — THERAPY VISIT (OUTPATIENT)
Dept: PHYSICAL THERAPY | Facility: CLINIC | Age: 69
End: 2024-05-30
Attending: INTERNAL MEDICINE
Payer: MEDICARE

## 2024-05-30 DIAGNOSIS — G81.94 LEFT HEMIPARESIS (H): Primary | ICD-10-CM

## 2024-05-30 DIAGNOSIS — S06.9X9S TRAUMATIC BRAIN INJURY WITH LOSS OF CONSCIOUSNESS, SEQUELA (H): ICD-10-CM

## 2024-05-30 PROCEDURE — 97112 NEUROMUSCULAR REEDUCATION: CPT | Mod: GP | Performed by: PHYSICAL THERAPIST

## 2024-05-30 PROCEDURE — 97110 THERAPEUTIC EXERCISES: CPT | Mod: GP | Performed by: PHYSICAL THERAPIST

## 2024-05-31 NOTE — PROGRESS NOTES
05/30/24 0500   Appointment Info   Signing clinician's name / credentials Michelle Scott, PT   Total/Authorized Visits 10/10   Medical Diagnosis L hemiparesis   PT Tx Diagnosis impaired gait, transfers, decline in function   Quick Adds Certification   Progress Note/Certification   Start of Care Date 07/21/22   Onset of illness/injury or Date of Surgery 06/09/22   Therapy Frequency 1 x a week every other week.   Predicted Duration 12 weeks   Certification date from 03/24/24   Certification date to 06/16/24   Progress Note Completed Date 03/21/24   PT Goal 1   Goal Identifier 1 HEP   Goal Description Lex to be independent in appropriate HEP with assist from Nidia as needed to promote life long health, ability to stay in home and improve function.   Rationale to maximize safety and independence with self cares;to maximize safety and independence with transportation;to maximize safety and independence within the community;to maximize safety and independence within the home;to maximize safety and independence with performance of ADLs and functional tasks   Goal Progress continues to do HEP with assist from wife. continues to go to the gym and walks the track there with using the railing.   Target Date 06/28/24   PT Goal 2   Goal Identifier 2 - 25 foot walk   Goal Description Lex to complete 25 foot walk consistently in 19 sec or less and 22 steps or less demonstrating improved gait pattern and efficiency.   Goal Progress 5/30/24 24.65 sec at start of session 22.8 at end of session with min assist with pelvic alignmetn in transverse plane and for continuation of fwd COM sepped 18.9 sec all with trekking pole and L AFO.5/16/24 - with trekking pole 25 sec with other people in the hallway , 20.8 sec without people in hallway/competing attention  2/21/23 21.26 sec wotj trekking pole  1/4/23  20.2 sec 12/7/23 18.69 sec and 19. 7 sec after TM with trekking pole, L AFO  11/28/23 25 ft walk 18.9 sec, 20.4 sec.  after 250 feet  gait.   8/22/23 -  20.68 sec  at start of the session  6/27/23  times 18.66 sec, 18.5 sec and then 19.8 sec.   5/16/23  20.23   sec  20 steps,  with light assist/cues at plevis for faster pace 18.23 sec both wtih trekking pole and AFO on the L. 3/21/23  19.7 sec 17 steps   2/28/23:  19.5 sec, 18.85, 18.95 sec with trekking poles B.  1/10/23 at start of the session 23.55, steps   end of session 20.58 sec 1712/20/22 21.9 sec m 26 steps, trekking pole, AFO start of session   after stretches 20.66 sec   12/6/22 19.8 sec and then 20.8 sec both with trekking pole , SBA  9/27/22  22.8 sec, 21.68 sec with NBQC (forgot his trekking pole today) baseline 21.63 sec, 26 steps with trekking ple and AFO 12/21/23 start of session 22.87 sec   3/6/24 22.66 with NBQC and 21.8 with trekking pole prior to TM after TM 20.8 with quad cane and 19.8 x 1 and 20.3 x 1 with trekking pole. (not his own trekking pole)   Target Date 06/28/24   PT Goal 3   Goal Identifier 3 - TUG   Goal Description Lex to complete TUG in 40 sec or less to demonstrate improved gait pattern, ability to transfer sit <> stand and overall increased function.   Goal Progress 5/30/24 38.6 sec with trekking pole, L AFO, less time taken to place trekking pole against the wall prior to sitting.  21/24 45.4 sec, 45.48 sec.  with trekking pole, slower stand to sit with placing pole against wall and sitting. 1/4/24 34.10 sec turn to sit added approx 10 sec  8/22/23  38.52 sec    3/7/23:  not assessed d/t time constraints.  12/20/22 40.28 sec, 40.27 sec trekking pole and SBA first time stopped and needing directions before sitting, second time issues with trekking pole slipping forward when stnading up and also when placing along wall to sit.  after stretches 40.23 sec   12/6/22  39.19 sec then 36.19 sec with trekking pole.  9/27/22 38.72 sec.  with NBQC  baseline 50.83 sec with trekking pole   Target Date 06/28/24   PT Goal 4   Goal Identifier 4 - stairs   Goal Description  "Lex to complete stairs ascending with alternating stepping and one rail and min /SBA to demonstrate improved neuro motor function , balance and greater safety with completing stairs.   Goal Progress 8/22/23 up and down 4 six inch steps with one rail and alternating stepping, descending leans R side against the rail on R. Less heavy lean than baselien.   baseline single step up , alternating down with heavy lean on the rail.   Target Date 12/31/23   Date Met 08/22/23   PT Goal 5   Goal Identifier 5 - pain   Goal Description Lex to report decreased back pain and R knee pain to allow him to increase his ambulation distance/ther ex at the gym as well as overall better QOL.   Goal Progress 5/16/24 R knee pain is less but hasn't been active due to bladder issues. 3/21/24  No real change, not any worse 12/4/23 R knee pain continues to be present and fluctuate in intensity.  8/22/23  did the 4 laps at the gym yesterday and did not have R knee pain.  5/16/23 has been about the same - R knee pain.  Still gets a little sore with prolonged walking. back pain - a little bit sometimes. 3/7/23 : doing 4 laps at gym.  Knee and back don't seem to be much worse but sometimes knee is more sore.  11/8/22 walking 3 laps , R knee \"doesn't bother me too much\"   He does continue to have knee pain off and on.  baseline - doing 2 laps at gym usually does 3-4  9/27/22  dav has about the same R knee, back \"not as stiff\" but doing 3-4 laps.   Target Date 06/28/24   PT Goal 6   Goal Identifier 6 - 6MWT   Goal Description Lex to increase his 6MWT distance by 150 feet or greater from when first assessed to demonstrate improved endurance, gait quality and function.   Goal Progress 3/21/24 358 ft with trekking pole on the R, AFO L 1/4/24  410 feet with trekking pole, AFO L. No R knee pain. 8/22/23  363 feet wtih trekking pole , AFO on the L. 5/16/23 400 feet with trekking pole and AFO.  (need to continue to work on faster pace) 4/18/23 450 feet " with trekking pole, AFO on the L.  no knee pain   cistractions - more people , noise in testing area than prior ? if this affected speed and less distance than in the past.  5/30/24 345 feet  3/7/23: 382 feet.   12/13/22 366 feet   Target Date 06/28/24   PT Goal 7   Goal Identifier 7- sit <> stand and COM forward past TAYLOR challenges.   Goal Description Lex to perform sit to stand from 22 inch mat without bracing self with LE against the surface to demonstrate improved ability to bring COM forward past TAYLOR as well as better mechanics with transfer for decreased/excess load on the R LE to decrease knee pain.   Goal Progress 5/30/24 - minimal changes continues to resist fwd COM past TAYLOR and excess push in quads/LE sending COM posterior. 3/21/24 Continues to be inconsistent with this activity - able to perform with cues . Multiple impairments affecting function and ability to bring COM forward past TAYLRO, Continues to shift to the R. and hold COM posterior. 1/4/24  able to do with cues not to brace the R side against the mat but without cues braces - INCONSISTENT with meeting this goal. 8/22/23 sit to stand from 22 inch mat with R UE support, did not brace against the mat with his LE.  Performance is inconsistent. Will continue with goal for consistency. 5/16/23 continues to struggle with this . Wife subjectively reports transfers as an improvement above - offered without being specifically asked about transfers. 3/7/23:  trekking pole in front, braces LEs on mat.  with cues to scoot forward before rising, is able to push less against mat but still contacts mat. 12/13/22  from 22 inch height continues to brace leg on the bed but does better with trekking pole held in front of him rather than placed to the side as he moves towards trekking pole/safety/support.  Needing cues on alignment LE and sequencing 9/27/22  baseline, lean to the R and posterior lean against surface 24 inch height   Target Date 06/28/24   Subjective  Report   Subjective Report has gone walking at the gym a couple of times limited by house projects/needing to be at house while workers there. R knee pain about he same.   Therapeutic Procedure/Exercise   Therapeutic Procedures: strength, endurance, ROM, flexibility minutes (29800) 30   Ther Proc 1 6MWT completed as above. Stretches in supine B LE and L UE - hamstrings , hip IR/ER, adductors, SKTC, LTR, GH extension, elbow wrist , hand/finger extension. Ed for pt to perform self MCP , finger stretches cues needed.   Skilled Intervention assessment, passive stretches providing stretch hpt is not able to achieve on his own and combating secondary impairments due to mm tightness/tissue tightness.   Patient Response/Progress tolerated stretches well, slower pace gait with 6MWT   Therapeutic Activity   Therapeutic Activities: dynamic activities to improve functional performance minutes (05917) 2   Skilled Intervention TUG completed with times above   Neuromuscular Re-education   Neuromuscular re-ed of mvmt, balance, coord, kinesthetic sense, posture, proprioception minutes (83223) 9   Skilled Intervention exercises performed and reinforced for HEP - activaiton gluteals and improved fwd COM past TAYLOR pt standing against the wall cues for gluteal activaiton and fring hips away from the wall using closed chain hip extnesion . Chair in front of him for safety and to allow him to feel more safe and perform the activity correctly.  8 reps and demo to wife at end of session.  Ed to complete the stagger stance at the stairs and fwd lunge as demo prior.   Gait Training   Gait Training Minutes, includes stair climbing (19760) 6   Treatment Detail 25 foot walk assessed. ed in gait pattern pelvis in transverse plane, gluteal activaiton/hip extensor activation mid to end stance.   Education   Learner/Method Patient;Family;Demonstration;Listening  (written instruction)   Education Comments HEP, slower gait and change in gait pattern  reverting back to rotation in transvers plane with R pelvis forward.  HEP and purpose of the exercises   Plan   Home program focus on the back to wall and bring COM fwd exercise and the stance at stairs with fwd COM /weight shift and do daily   Plan for next session neuro motor re-ed, gait, stretches prn, possibly TM   Comments   Comments PN completed see PN   Total Session Time   Timed Code Treatment Minutes 47   Total Treatment Time (sum of timed and untimed services) 47     PROGRESS - since last PN Marcus has shown some decline in his gait pattern and gait speed. He was not as active and was not seen for PT for approx a month due to bladder issues.  With return to PT he demonstrated return to increased pelvic rotation in transverse plane with gait walking semi sideways with R leading.  Slower pace of gait and decreased ability to bring COM forward past TAYLOR with transfers.  As in the past without PT intervention he has been shown to decline in his function, especially his alignment with affects his function and contributes to secondary impairments such as his R knee pain and decline in balance/gait/safety in home.  He is appropriate to continue with skilled PT intervention and with return to going the gym more regularly to ambulate on the track and performing HEP he will continue to progress.  He has not gone to the gym as frequently or done HEP due to the bladder issues as well as having home renovations limiting assist from wife.  Barriers include significant impairments after CVA, secondary tissue restrictions and neuro motor impairments since then. He continues to be motivated.    PLAN  Continue therapy per current plan of care - 1 x a week every other week    Beginning/End Dates of Progress Note Reporting Period:  03/21/24 to 05/30/2024    Referring Provider:  Don Aviles

## 2024-06-11 ENCOUNTER — TELEPHONE (OUTPATIENT)
Dept: CARDIOLOGY | Facility: CLINIC | Age: 69
End: 2024-06-11
Payer: MEDICARE

## 2024-06-11 DIAGNOSIS — D50.9 ANEMIA, IRON DEFICIENCY: ICD-10-CM

## 2024-06-11 DIAGNOSIS — I05.9 MITRAL VALVE DISEASE: ICD-10-CM

## 2024-06-11 DIAGNOSIS — I48.20 CHRONIC ATRIAL FIBRILLATION (H): Primary | ICD-10-CM

## 2024-06-11 DIAGNOSIS — E78.5 HYPERLIPIDEMIA LDL GOAL <100: ICD-10-CM

## 2024-06-11 NOTE — TELEPHONE ENCOUNTER
[facility] Call Center    Phone Message    May a detailed message be left on voicemail: yes     Reason for Call: Other: Pt will be seeing his PCP in early November prior to appointment with Dr. Yip and is requesting any labs needed be ordered so he can have them completed at that time. Please return call to advise if any would be recommended.      Action Taken: Other: Cardiology    Travel Screening: Not Applicable     Date of Service:                        Thank you!  [facility]

## 2024-06-13 ENCOUNTER — THERAPY VISIT (OUTPATIENT)
Dept: PHYSICAL THERAPY | Facility: CLINIC | Age: 69
End: 2024-06-13
Attending: INTERNAL MEDICINE
Payer: MEDICARE

## 2024-06-13 DIAGNOSIS — S06.9X9S TRAUMATIC BRAIN INJURY WITH LOSS OF CONSCIOUSNESS, SEQUELA (H): ICD-10-CM

## 2024-06-13 DIAGNOSIS — G81.94 LEFT HEMIPARESIS (H): Primary | ICD-10-CM

## 2024-06-13 PROCEDURE — 97112 NEUROMUSCULAR REEDUCATION: CPT | Mod: GP | Performed by: PHYSICAL THERAPIST

## 2024-06-13 PROCEDURE — 97116 GAIT TRAINING THERAPY: CPT | Mod: GP | Performed by: PHYSICAL THERAPIST

## 2024-06-13 PROCEDURE — 97110 THERAPEUTIC EXERCISES: CPT | Mod: GP | Performed by: PHYSICAL THERAPIST

## 2024-06-15 NOTE — PROGRESS NOTES
06/13/24 0500   Appointment Info   Medical Diagnosis L hemiparesis   PT Tx Diagnosis impaired gait, transfers, decline in function   Quick Adds Certification   Progress Note/Certification   Start of Care Date 07/21/22   Onset of illness/injury or Date of Surgery 06/09/22   Therapy Frequency 1 x a week every other week.   Predicted Duration 12 weeks   Certification date from 06/17/24   Certification date to 09/09/24   Progress Note Completed Date 05/30/24   PT Goal 1   Goal Identifier 1 HEP   Goal Description Lex to be independent in appropriate HEP with assist from Nidia as needed to promote life long health, ability to stay in home and improve function.   Rationale to maximize safety and independence with self cares;to maximize safety and independence with transportation;to maximize safety and independence within the community;to maximize safety and independence within the home;to maximize safety and independence with performance of ADLs and functional tasks   Goal Progress continues to do HEP with assist from wife. continues to go to the gym and walks the track there with using the railing.   Target Date 06/28/24   PT Goal 2   Goal Identifier 2 - 25 foot walk   Goal Description Lex to complete 25 foot walk consistently in 19 sec or less and 22 steps or less demonstrating improved gait pattern and efficiency.   Goal Progress 5/30/24 24.65 sec at start of session 22.8 at end of session with min assist with pelvic alignmetn in transverse plane and for continuation of fwd COM sepped 18.9 sec all with trekking pole and L AFO.5/16/24 - with trekking pole 25 sec with other people in the hallway , 20.8 sec without people in hallway/competing attention  2/21/23 21.26 sec wotj trekking pole  1/4/23  20.2 sec 12/7/23 18.69 sec and 19. 7 sec after TM with trekking pole, L AFO  11/28/23 25 ft walk 18.9 sec, 20.4 sec.  after 250 feet gait.   8/22/23 -  20.68 sec  at start of the session  6/27/23  times 18.66 sec, 18.5 sec  and then 19.8 sec.   5/16/23  20.23   sec  20 steps,  with light assist/cues at plevis for faster pace 18.23 sec both wtih trekking pole and AFO on the L. 3/21/23  19.7 sec 17 steps   2/28/23:  19.5 sec, 18.85, 18.95 sec with trekking poles B.  1/10/23 at start of the session 23.55, steps   end of session 20.58 sec 1712/20/22 21.9 sec m 26 steps, trekking pole, AFO start of session   after stretches 20.66 sec   12/6/22 19.8 sec and then 20.8 sec both with trekking pole , SBA  9/27/22  22.8 sec, 21.68 sec with NBQC (forgot his trekking pole today) baseline 21.63 sec, 26 steps with trekking ple and AFO 12/21/23 start of session 22.87 sec   3/6/24 22.66 with NBQC and 21.8 with trekking pole prior to TM after TM 20.8 with quad cane and 19.8 x 1 and 20.3 x 1 with trekking pole. (not his own trekking pole)   Target Date 06/28/24   PT Goal 3   Goal Identifier 3 - TUG   Goal Description Lex to complete TUG in 40 sec or less to demonstrate improved gait pattern, ability to transfer sit <> stand and overall increased function.   Goal Progress 5/30/24 38.6 sec with trekking pole, L AFO, less time taken to place trekking pole against the wall prior to sitting.  21/24 45.4 sec, 45.48 sec.  with trekking pole, slower stand to sit with placing pole against wall and sitting. 1/4/24 34.10 sec turn to sit added approx 10 sec  8/22/23  38.52 sec    3/7/23:  not assessed d/t time constraints.  12/20/22 40.28 sec, 40.27 sec trekking pole and SBA first time stopped and needing directions before sitting, second time issues with trekking pole slipping forward when stnading up and also when placing along wall to sit.  after stretches 40.23 sec   12/6/22  39.19 sec then 36.19 sec with trekking pole.  9/27/22 38.72 sec.  with NBQC  baseline 50.83 sec with trekking pole   Target Date 06/28/24   PT Goal 4   Goal Identifier 4 - stairs   Goal Description Lex to complete stairs ascending with alternating stepping and one rail and min /SBA to  "demonstrate improved neuro motor function , balance and greater safety with completing stairs.   Goal Progress 8/22/23 up and down 4 six inch steps with one rail and alternating stepping, descending leans R side against the rail on R. Less heavy lean than baselien.   baseline single step up , alternating down with heavy lean on the rail.   Target Date 12/31/23   Date Met 08/22/23   PT Goal 5   Goal Identifier 5 - pain   Goal Description Lex to report decreased back pain and R knee pain to allow him to increase his ambulation distance/ther ex at the gym as well as overall better QOL.   Goal Progress 5/16/24 R knee pain is less but hasn't been active due to bladder issues. 3/21/24  No real change, not any worse 12/4/23 R knee pain continues to be present and fluctuate in intensity.  8/22/23  did the 4 laps at the gym yesterday and did not have R knee pain.  5/16/23 has been about the same - R knee pain.  Still gets a little sore with prolonged walking. back pain - a little bit sometimes. 3/7/23 : doing 4 laps at gym.  Knee and back don't seem to be much worse but sometimes knee is more sore.  11/8/22 walking 3 laps , R knee \"doesn't bother me too much\"   He does continue to have knee pain off and on.  baseline - doing 2 laps at gym usually does 3-4  9/27/22  stil has about the same R knee, back \"not as stiff\" but doing 3-4 laps.   Target Date 06/28/24   PT Goal 6   Goal Identifier 6 - 6MWT   Goal Description Lex to increase his 6MWT distance by 150 feet or greater from when first assessed to demonstrate improved endurance, gait quality and function.   Goal Progress 3/21/24 358 ft with trekking pole on the R, AFO L 1/4/24  410 feet with trekking pole, AFO L. No R knee pain. 8/22/23  363 feet wtih trekking pole , AFO on the L. 5/16/23 400 feet with trekking pole and AFO.  (need to continue to work on faster pace) 4/18/23 450 feet with trekking pole, AFO on the L.  no knee pain   cistractions - more people , noise in " testing area than prior ? if this affected speed and less distance than in the past.  5/30/24 345 feet  3/7/23: 382 feet.   12/13/22 366 feet   Target Date 06/28/24   PT Goal 7   Goal Identifier 7- sit <> stand and COM forward past TAYLOR challenges.   Goal Description Lex to perform sit to stand from 22 inch mat without bracing self with LE against the surface to demonstrate improved ability to bring COM forward past TAYLOR as well as better mechanics with transfer for decreased/excess load on the R LE to decrease knee pain.   Goal Progress 5/30/24 - minimal changes continues to resist fwd COM past TAYLOR and excess push in quads/LE sending COM posterior. 3/21/24 Continues to be inconsistent with this activity - able to perform with cues . Multiple impairments affecting function and ability to bring COM forward past TAYLOR, Continues to shift to the R. and hold COM posterior. 1/4/24  able to do with cues not to brace the R side against the mat but without cues braces - INCONSISTENT with meeting this goal. 8/22/23 sit to stand from 22 inch mat with R UE support, did not brace against the mat with his LE.  Performance is inconsistent. Will continue with goal for consistency. 5/16/23 continues to struggle with this . Wife subjectively reports transfers as an improvement above - offered without being specifically asked about transfers. 3/7/23:  trekking pole in front, braces LEs on mat.  with cues to scoot forward before rising, is able to push less against mat but still contacts mat. 12/13/22  from 22 inch height continues to brace leg on the bed but does better with trekking pole held in front of him rather than placed to the side as he moves towards trekking pole/safety/support.  Needing cues on alignment LE and sequencing 9/27/22  baseline, lean to the R and posterior lean against surface 24 inch height   Target Date 06/28/24     Goals remain appropriate. Target date will be advanced to end of cert date.  Pt will need  continued PT intervention to progress function and also prevent decline to allow him to stay in his own home with wife assist.  He has shown in the past that without continued PT his function declines and R knee pain increases. Barriers include significant impairments with CVA, blind R eye, secondary tissue shortening and tightness throughout body.     [facility]                                                                                   OUTPATIENT PHYSICAL THERAPY    PLAN OF TREATMENT FOR OUTPATIENT REHABILITATION   Patient's Last Name, First Name, Marcus Mcgee YOB: 1955   Provider's Name   [facility]   Medical Record No.  [id number]     Onset Date: 06/09/22  Start of Care Date: 07/21/22     Medical Diagnosis:  L hemiparesis      PT Treatment Diagnosis:  impaired gait, transfers, decline in function Plan of Treatment  Frequency/Duration: 1 x a week every other week./ 12 weeks    Certification date from (P) 06/17/24 to (P) 09/09/24         See note for plan of treatment details and functional goals . See most recent PN dated 5/30/24    Ada Scott, PT                         I CERTIFY THE NEED FOR THESE SERVICES FURNISHED UNDER        THIS PLAN OF TREATMENT AND WHILE UNDER MY CARE     (Physician attestation of this document indicates review and certification of the therapy plan).              Referring Provider:  Don Aviles    Initial Assessment  See Epic Evaluation- Start of Care Date: 07/21/22

## 2024-06-24 ENCOUNTER — OFFICE VISIT (OUTPATIENT)
Dept: PEDIATRICS | Facility: CLINIC | Age: 69
End: 2024-06-24
Payer: MEDICARE

## 2024-06-24 ENCOUNTER — ANCILLARY PROCEDURE (OUTPATIENT)
Dept: GENERAL RADIOLOGY | Facility: CLINIC | Age: 69
End: 2024-06-24
Attending: INTERNAL MEDICINE
Payer: MEDICARE

## 2024-06-24 VITALS
WEIGHT: 169.5 LBS | OXYGEN SATURATION: 97 % | RESPIRATION RATE: 16 BRPM | BODY MASS INDEX: 22.96 KG/M2 | HEIGHT: 72 IN | DIASTOLIC BLOOD PRESSURE: 58 MMHG | SYSTOLIC BLOOD PRESSURE: 114 MMHG | HEART RATE: 49 BPM | TEMPERATURE: 97.5 F

## 2024-06-24 DIAGNOSIS — I48.20 CHRONIC ATRIAL FIBRILLATION (H): ICD-10-CM

## 2024-06-24 DIAGNOSIS — F33.1 MAJOR DEPRESSIVE DISORDER, RECURRENT EPISODE, MODERATE (H): ICD-10-CM

## 2024-06-24 DIAGNOSIS — D50.9 IRON DEFICIENCY ANEMIA, UNSPECIFIED IRON DEFICIENCY ANEMIA TYPE: ICD-10-CM

## 2024-06-24 DIAGNOSIS — G81.94 LEFT HEMIPARESIS (H): ICD-10-CM

## 2024-06-24 DIAGNOSIS — M79.89 SWELLING OF LEFT FOOT: ICD-10-CM

## 2024-06-24 DIAGNOSIS — M79.89 SWELLING OF LEFT FOOT: Primary | ICD-10-CM

## 2024-06-24 DIAGNOSIS — S06.9X9S TRAUMATIC BRAIN INJURY WITH LOSS OF CONSCIOUSNESS, SEQUELA (H): ICD-10-CM

## 2024-06-24 DIAGNOSIS — Z86.73 HISTORY OF CVA (CEREBROVASCULAR ACCIDENT): ICD-10-CM

## 2024-06-24 PROCEDURE — 73630 X-RAY EXAM OF FOOT: CPT | Mod: TC | Performed by: RADIOLOGY

## 2024-06-24 PROCEDURE — 99214 OFFICE O/P EST MOD 30 MIN: CPT | Performed by: INTERNAL MEDICINE

## 2024-06-24 RX ORDER — RESPIRATORY SYNCYTIAL VIRUS VACCINE 120MCG/0.5
0.5 KIT INTRAMUSCULAR ONCE
Qty: 1 EACH | Refills: 0 | Status: CANCELLED | OUTPATIENT
Start: 2024-06-24 | End: 2024-06-24

## 2024-06-24 ASSESSMENT — PATIENT HEALTH QUESTIONNAIRE - PHQ9
10. IF YOU CHECKED OFF ANY PROBLEMS, HOW DIFFICULT HAVE THESE PROBLEMS MADE IT FOR YOU TO DO YOUR WORK, TAKE CARE OF THINGS AT HOME, OR GET ALONG WITH OTHER PEOPLE: NOT DIFFICULT AT ALL
SUM OF ALL RESPONSES TO PHQ QUESTIONS 1-9: 1
SUM OF ALL RESPONSES TO PHQ QUESTIONS 1-9: 1

## 2024-06-24 ASSESSMENT — PAIN SCALES - GENERAL: PAINLEVEL: MILD PAIN (2)

## 2024-06-24 NOTE — PROGRESS NOTES
Assessment & Plan     (M79.89) Swelling of left foot  (primary encounter diagnosis)  Comment: has been walking 1/2 mile around indoor track with his wife a few times per week, notes swelling dorsum left foot afterward, hx of left foot drop persistent since prior stroke  Plain films foot show degenerative changes without acute fracture.  Gait impaired due to hemiparesis; walks with AFO brace (brace is 12yrs old).    Marcus requires a custom left AFO brace for management of left foot drop and to assist with walking.    Marcus requires to custom AFO brace for long term use, so a prefabricated brace will not be adequate for his long term needs.  Plan: XR Foot Left G/E 3 Views          (S06.9X9S) Traumatic brain injury with loss of consciousness, sequela (H24)  (Z86.73) History of CVA (cerebrovascular accident)  (G81.94) Left hemiparesis (H)  Plan: Orthotics, Mastectomy and Custom Compression         Orders     As above          (F33.1) Major depressive disorder, recurrent episode, moderate (H)  Comment:   Plan: celexa dose reduced from 30 to 20mg /recommended by pharmD .  Mood generally stable /anxiety slightly worse.  Discussed adding adjunct such as buspar if anxiety worsens--will discuss at annual visit later this year    (D50.9) Iron deficiency anemia, unspecified iron deficiency anemia type  Comment:   Plan: significant improvement after iron infusion.  Oral iron supplement stopped  Lab Results   Component Value Date    HGB 12.9 05/02/2024    HGB 10.8 03/27/2024     (I48.20) Chronic atrial fibrillation (H)  Comment:   Plan: stable, chronic AC                Subjective   Lex is a 68 year old, presenting for the following health issues:  NEUROPATHY        6/24/2024    11:12 AM   Additional Questions   Roomed by Virginia   Accompanied by self         6/24/2024    11:12 AM   Patient Reported Additional Medications   Patient reports taking the following new medications no     History of Present Illness       Reason for  visit:  Neuropathy andskinlesion  Symptom onset:  3-7 days ago  Symptoms include:  Dwelling and numbness in left foot  Symptom intensity:  Moderate  Symptom progression:  Staying the same  Had these symptoms before:  No  What makes it worse:  Walking  What makes it better:  Staying off it    He eats 4 or more servings of fruits and vegetables daily.He consumes 0 sweetened beverage(s) daily.He exercises with enough effort to increase his heart rate 9 or less minutes per day.  He exercises with enough effort to increase his heart rate 3 or less days per week.   He is taking medications regularly.        Swelling of left foot-noted after walks at the gym    Traumatic brain injury with loss of consciousness, sequela (H24)  History of CVA (cerebrovascular accident)  Left hemiparesis (H)    Major depressive disorder, recurrent episode, moderate (H)      Celexa dose change, anxiety a bit worse    Iron deficiency anemia, unspecified iron deficiency anemia type     Recent iron infusion    Chronic atrial fibrillation (H)    Patient Active Problem List   Diagnosis    * * * SBE PROPHYLAXIS * * *    Vitamin D deficiency    Hyperlipidemia LDL goal <100    Long-term (current) use of anticoagulants    Urinary incontinence    Neurogenic bladder    Chronic atrial fibrillation (H)    Tricuspid regurgitation    Mitral regurgitation    Atrial enlargement, bilateral    Major depressive disorder, recurrent episode, moderate (H)    Traumatic brain injury with loss of consciousness, sequela (H24)    Neurogenic bowel    Left renal stone    Dysphagia    Left hemiparesis (H)    S/P craniotomy    Visual field defect    Normocytic anemia    Benign prostatic hyperplasia with urinary retention    History of CVA (cerebrovascular accident)    Anemia, iron deficiency     Current Outpatient Medications   Medication Sig Dispense Refill    acetaminophen (TYLENOL) 500 MG tablet Take 2 tablets (1,000 mg) by mouth every 6 hours as needed for mild pain 100  tablet 0    alendronate (FOSAMAX) 70 MG tablet Take 1 tablet (70 mg) by mouth every 7 days 12 tablet 3    amoxicillin (AMOXIL) 500 MG capsule       apixaban ANTICOAGULANT (ELIQUIS ANTICOAGULANT) 5 MG tablet Take 1 tablet (5 mg) by mouth 2 times daily 180 tablet 11    atorvastatin (LIPITOR) 20 MG tablet Take 1 tablet (20 mg) by mouth daily 90 tablet 11    baclofen (LIORESAL) 20 MG tablet Take 1 tablet (20 mg) by mouth 4 times daily 360 tablet 11    bisacodyl (DULCOLAX) 5 MG EC tablet Two days prior to exam take two (2) tablets at 4pm. One day prior to exam take two (2) tablets at 4pm 4 tablet 0    citalopram (CELEXA) 20 MG tablet Take 1 tablet (20 mg) by mouth daily 90 tablet 11    docusate sodium (COLACE) 100 MG tablet Take 200 mg by mouth nightly as needed      finasteride (PROSCAR) 5 MG tablet Take 1 tablet (5 mg) by mouth daily 90 tablet 3    ketoconazole (NIZORAL) 2 % external cream Apply topically daily      levETIRAcetam (KEPPRA) 1000 MG tablet Take 1 tablet (1,000 mg) by mouth 2 times daily 180 tablet 11    metoprolol succinate ER (TOPROL XL) 25 MG 24 hr tablet Take 0.5 tablets (12.5 mg) by mouth daily 45 tablet 11    mirabegron (MYRBETRIQ) 50 MG 24 hr tablet Take 1 tablet (50 mg) by mouth daily 90 tablet 11    Multiple Vitamin (MULTI-VITAMIN) per tablet Take 1 tablet by mouth daily  100 tablet 3    polyethylene glycol (MIRALAX) 17 g packet Take 17 g by mouth every other day PRN      senna-docusate (SENOKOT-S/PERICOLACE) 8.6-50 MG tablet Take 4 tablets by mouth daily as needed      tamsulosin (FLOMAX) 0.4 MG capsule Take 2 capsules (0.8 mg) by mouth daily 180 capsule 3    vitamin D3 (CHOLECALCIFEROL) 50 mcg (2000 units) tablet Take 1,000 Units by mouth daily 90 tablet 3    ferrous sulfate (FEROSUL) 325 (65 Fe) MG tablet Take 1 tablet (325 mg) by mouth daily (with breakfast) (Patient not taking: Reported on 6/24/2024) 90 tablet 1    polyethylene glycol (GOLYTELY) 236 g suspension Take as directed. Two days  before your exam fill the first container with water. Cover and shake until mixed well. At 5:00pm drink one 8oz glass every 10-15 minutes until half (1/2) of the first container is empty. Store the remainder in the refrigerator. One day before your exam at 5:00pm drink the second half of the first container until it is gone. Before you go to bed mix the second container with water and put in refrigerator. Six hours before your check in time drink one 8oz glass every 10-15 minutes until half of container is empty. Discard the remainder of solution. (Patient not taking: Reported on 6/24/2024) 8000 mL 0                    Objective    /58 (BP Location: Right arm, Patient Position: Sitting, Cuff Size: Adult Regular)   Pulse (!) 49   Temp 97.5  F (36.4  C) (Tympanic)   Resp 16   Ht 1.829 m (6')   Wt 76.9 kg (169 lb 8 oz)   SpO2 97%   BMI 22.99 kg/m    Body mass index is 22.99 kg/m .  Physical Exam   GENERAL: alert and no distress  MS: LLE AFO brace  SKIN:  LLE without erythema or open wounds  NEURO: left hemiparesis with LLE neuromuscular atrophy, left foot drop  PSYCH: mentation appears normal, affect normal/bright            Signed Electronically by: Don Aviles MD

## 2024-06-27 ENCOUNTER — THERAPY VISIT (OUTPATIENT)
Dept: PHYSICAL THERAPY | Facility: CLINIC | Age: 69
End: 2024-06-27
Attending: INTERNAL MEDICINE
Payer: MEDICARE

## 2024-06-27 DIAGNOSIS — G81.94 LEFT HEMIPARESIS (H): Primary | ICD-10-CM

## 2024-06-27 PROCEDURE — 97116 GAIT TRAINING THERAPY: CPT | Mod: GP | Performed by: PHYSICAL THERAPIST

## 2024-06-27 PROCEDURE — 97110 THERAPEUTIC EXERCISES: CPT | Mod: GP | Performed by: PHYSICAL THERAPIST

## 2024-06-27 PROCEDURE — 97112 NEUROMUSCULAR REEDUCATION: CPT | Mod: GP | Performed by: PHYSICAL THERAPIST

## 2024-07-02 ENCOUNTER — TRANSCRIBE ORDERS (OUTPATIENT)
Dept: OTHER | Age: 69
End: 2024-07-02

## 2024-07-02 DIAGNOSIS — I69.352 SPASTIC HEMIPARESIS OF LEFT DOMINANT SIDE AS LATE EFFECT OF CEREBRAL INFARCTION (H): Primary | ICD-10-CM

## 2024-07-02 DIAGNOSIS — M75.02 ADHESIVE CAPSULITIS OF LEFT SHOULDER: ICD-10-CM

## 2024-07-02 DIAGNOSIS — M62.422 CONTRACTURE OF MUSCLE OF LEFT UPPER ARM: ICD-10-CM

## 2024-07-03 ENCOUNTER — TELEPHONE (OUTPATIENT)
Dept: PEDIATRICS | Facility: CLINIC | Age: 69
End: 2024-07-03
Payer: MEDICARE

## 2024-07-03 NOTE — TELEPHONE ENCOUNTER
Forms/Letter Request    Type of form/letter: DME- left ankle foot orthosis    Type of DME requested: detailed written order    Do we have the form/letter: Yes:     [facility]    Where did/will the form come from? form was faxed in    How would you like the form/letter returned: Fax : [phone]    Paulette WATKINS, - [facility]  Primary Care- ADS, [facility] [place] [facility]

## 2024-07-03 NOTE — TELEPHONE ENCOUNTER
Provider signed, faxed and placed in providers completed file.      Paulette WATKINS, - [employer]  Primary Care- ADS, [facility] [place]  [facility]

## 2024-07-03 NOTE — TELEPHONE ENCOUNTER
Orders in pcp in-basket.  Sign and fax back to [phone]      Paulette WATKINS, - [facility]  Primary Care- ADS, [facility] [place] [facility]\

## 2024-07-11 ENCOUNTER — THERAPY VISIT (OUTPATIENT)
Dept: PHYSICAL THERAPY | Facility: CLINIC | Age: 69
End: 2024-07-11
Attending: INTERNAL MEDICINE
Payer: MEDICARE

## 2024-07-11 ENCOUNTER — THERAPY VISIT (OUTPATIENT)
Dept: OCCUPATIONAL THERAPY | Facility: CLINIC | Age: 69
End: 2024-07-11
Attending: PHYSICAL MEDICINE & REHABILITATION
Payer: MEDICARE

## 2024-07-11 DIAGNOSIS — G81.94 LEFT HEMIPARESIS (H): Primary | ICD-10-CM

## 2024-07-11 DIAGNOSIS — I69.352 SPASTIC HEMIPARESIS OF LEFT DOMINANT SIDE AS LATE EFFECT OF CEREBRAL INFARCTION (H): ICD-10-CM

## 2024-07-11 PROCEDURE — 97140 MANUAL THERAPY 1/> REGIONS: CPT | Mod: GP | Performed by: PHYSICAL THERAPIST

## 2024-07-11 PROCEDURE — 97110 THERAPEUTIC EXERCISES: CPT | Mod: GO | Performed by: OCCUPATIONAL THERAPIST

## 2024-07-11 PROCEDURE — 97110 THERAPEUTIC EXERCISES: CPT | Mod: GP | Performed by: PHYSICAL THERAPIST

## 2024-07-11 PROCEDURE — 97166 OT EVAL MOD COMPLEX 45 MIN: CPT | Mod: GO | Performed by: OCCUPATIONAL THERAPIST

## 2024-07-11 NOTE — PROGRESS NOTES
OCCUPATIONAL THERAPY EVALUATION  Type of Visit: Evaluation              Subjective      Lex Hodges is a 68 y.o. male with referral for OP occupational therapy from Dr. Haddad.   Pt presents with history of Left spastic hemiparesis non dominant side 2/2 R MCA CVA . Most recently with Botox 24 with Dr. Haddad. Currently sees PT one time e/o week. Pt with extensive OT history with 28 OP visits between 22-23. Pt arrives on time, alone. Pt wants to keep the left UE joints from getting worse, decrease pain. Evaluation completed as ordered.     Presenting condition or subjective complaint:    Date of onset: 24    Relevant medical history:     Dates & types of surgery:      Prior diagnostic imaging/testing results:       Prior therapy history for the same diagnosis, illness or injury:        Prior Level of Function  Transfers: Independent  Ambulation: Independent, Assistive equipment, quad cane  ADL: Assistive equipment and person  IADL: Housekeeping, Laundry, Meal preparation, Medication management    Living Environment  Social support:     Type of home:     Stairs to enter the home:         Ramp:     Stairs inside the home:         Help at home:    Equipment owned:       Employment:      Hobbies/Interests:      Patient goals for therapy:      Pain assessment: Pain present  Location: left shoulder/Ratin/10     Objective   Cognitive Status Examination  Orientation: Oriented to person, place and time   Level of Consciousness: Alert  Follows Commands and Answers Questions: 100% of the time  Personal Safety and Judgement: Intact  Memory: Intact  Attention: No deficits identified  Organization/Problem Solving: No deficits identified  Executive Function: No deficits identified    VISUAL SKILLS  Visual Acuity: Wears glasses  Visual Field: Appears normal  Visual Attention: Appears normal  Oculomotor: intact    SENSATION: UE Sensation WNL    POSTURE: Standing Posture: Rounded shoulders, Forward  head  RANGE OF MOTION:   (Degrees) Left AROM Left PROM Right AROM  Right PROM   Shoulder Flexion 35 90     Shoulder Extension       Shoulder Abduction 60 105     Shoulder Adduction       Shoulder Internal Rotation  full     Shoulder External Rotation  15     Shoulder Horizontal Abduction       Shoulder Horizontal Adduction       Shoulder Flexion ER       Shoulder Flexion IR       Elbow Extension       Elbow Flexion 105      Pain:   End feel: ,   (Degrees) Left AROM Left PROM Right AROM Right PROM   Elbow Flexion  40     Elbow Extension  168     Elbow Hyperextension  -     Wrist Flexion  107     Wrist Extension  120     Supination       Pronation       Radial Deviation  full     Ulnar Deviation  full                                 Pain:   End Feel:   STRENGTH:   Pain: - none + mild ++ moderate +++ severe  Strength Scale: 0-5/5 Left Right   Shoulder Flexion 2- 5   Shoulder Extension  5   Shoulder Abduction 2- 5   Shoulder Adduction  5   Shoulder Internal Rotation 2- 5   Shoulder External Rotation 2- 5   Shoulder Horizontal Abduction 2- 5   Shoulder Horizontal Adduction 2- 5   Elbow Flexion 2- 5   Elbow Extension 0 5   Mid Trap 0 5   Lower Trap 0 5   Rhomboid 0 5   Serratus Anterior 0 5   ,   Pain: - none + mild ++ moderate +++ severe  Strength Scale: 0-5/5 Left Right   Wrist Flexion 0 5   Wrist Extension 0 5   Supination 0 5   Pronation 0 5   Ulnar Deviation 0 5   Radial Deviation 0 5    Strength (lbs) unable NT   Lateral Pinch (lbs) unable NT   3 Point Pinch (lbs) unable NT        Hand Dominance:  unknown  MUSCLE TONE: Spasticity, LUE tone abnormal  COORDINATION:  RUE WFL, LUE impaired  BALANCE: WFL  See PT for further information    FUNCTIONAL MOBILITY  Assistive Device(s): Cane (quad), walking stick  Ambulation: independent  Wheelchair: n/a    BED MOBILITY: Independent    TRANSFERS: Independent    BATHING: Min Assist  Equipment: Shower chair/Tub bench    UPPER BODY DRESSING: Min Assist    LOWER BODY DRESSING:  Independent  Equipment: Long-handled shoe horn    TOILETING: Independent  Equipment: Raised toilet seat    GROOMING: Independent    EATING/SELF FEEDING: Independent     ACTIVITY TOLERANCE: Pt with no complaints.     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  The following significant findings have been identified: Impaired motor control, Impaired ROM, Impaired strength, and Pain.  These identified deficits interfere with their ability to perform self care tasks, recreational activities, household chores, and meal planning and preparation as compared to previous level of function.     Clinical Decision Making (Complexity):  Assessment of Occupational Performance: 3-5 Performance Deficits  Occupational Performance Limitations: bathing/showering, dressing, and leisure activities  Clinical Decision Making (Complexity): Moderate complexity    PLAN OF CARE  Treatment Interventions:  Modalities: E-stim  Interventions: Self-Care/Home Management, Therapeutic Activity,  Therapeutic Exercise, Manual Therapy, Neuromuscular Re-education    Long Term Goals   OT Goal 1  Goal Identifier: LUE HEP  Goal Description: Pt will demonstrate understanding of BUE HEP in order to increase safety and independence with ADL tasks.  Target Date: 10/09/24  OT Goal 2  Goal Identifier: pain  Goal Description: Pt to self report decreased left shoulder pain rating to less than 0/10 on scale throughout week, in order to increase safety and independence with ADL/IADL tasks.  Target Date: 10/09/24  OT Goal 3  Goal Identifier: L shoulder ROM  Goal Description: Pt to demonstrate 10 degree increase with left shoulder PROM including shoulder flexion, shoulder abduction, in order to increase independence with UB dressing tasks.  Target Date: 10/09/24  OT Goal 4  Goal Identifier: AD/AE  Goal Description: Patient to verbalize understanding of and demonstrate use of 3-5 new pieces of adaptive equipment and/or adapted techniques to increase independence and safety with ADLs and IADLs.  Target Date: 10/09/24      Frequency of Treatment: one time a week to every other week  Duration of Treatment: up to 90 days     Recommended Referrals to Other Professionals:  none  Education Assessment: Learner/Method: Patient;Listening     Risks and benefits of evaluation/treatment have been explained.   Patient/Family/caregiver agrees with Plan of Care.     Evaluation Time:    OT Eval, Moderate Complexity Minutes (31301): 30    Signing Clinician: TERRY Crowley [facility]                                                                                   OUTPATIENT OCCUPATIONAL THERAPY      PLAN OF TREATMENT FOR OUTPATIENT REHABILITATION   Patient's Last Name, First Name, Marcus Mcgee YOB: 1955   Provider's Name   [facility]   Medical Record No.  [id number]     Onset Date: 07/02/24 Start of Care Date: 07/11/24     Medical Diagnosis:   Spastic hemiparesis of left dominant side as late effect of cerebral infarction (H) (I69.352)  Adhesive capsulitis of left shoulder (M75.02)  Contracture of muscle of left upper arm (M62.422)      OT Treatment Diagnosis:  Spastic hemiparesis of left dominant side as late effect of cerebral infarction (H) (I69.352) Adhesive capsulitis of left shoulder (M75.02) Contracture of muscle of left upper arm (M62.422) Plan of Treatment  Frequency/Duration:one time a week to every other week/up to 90 days    Certification date from 07/11/24   To 10/09/24        See note for plan of treatment details and functional goals     Cuco Richards OT                            Referring Provider:  Perla Haddad    Initial Assessment  See Epic Evaluation- 07/11/24        I CERTIFY THE NEED FOR THESE SERVICES FURNISHED UNDER        THIS PLAN OF TREATMENT AND WHILE UNDER MY CARE .             Physician Signature               Date    X_____________________________________________________

## 2024-07-17 ENCOUNTER — THERAPY VISIT (OUTPATIENT)
Dept: PHYSICAL THERAPY | Facility: CLINIC | Age: 69
End: 2024-07-17
Attending: INTERNAL MEDICINE
Payer: MEDICARE

## 2024-07-17 ENCOUNTER — THERAPY VISIT (OUTPATIENT)
Dept: OCCUPATIONAL THERAPY | Facility: CLINIC | Age: 69
End: 2024-07-17
Attending: PHYSICAL MEDICINE & REHABILITATION
Payer: MEDICARE

## 2024-07-17 DIAGNOSIS — M62.422 CONTRACTURE OF MUSCLE OF LEFT UPPER ARM: ICD-10-CM

## 2024-07-17 DIAGNOSIS — M75.02 ADHESIVE CAPSULITIS OF LEFT SHOULDER: ICD-10-CM

## 2024-07-17 DIAGNOSIS — G81.94 LEFT HEMIPARESIS (H): Primary | ICD-10-CM

## 2024-07-17 DIAGNOSIS — I69.352 SPASTIC HEMIPARESIS OF LEFT DOMINANT SIDE AS LATE EFFECT OF CEREBRAL INFARCTION (H): Primary | ICD-10-CM

## 2024-07-17 PROCEDURE — 97110 THERAPEUTIC EXERCISES: CPT | Mod: GP | Performed by: PHYSICAL THERAPIST

## 2024-07-17 PROCEDURE — 97140 MANUAL THERAPY 1/> REGIONS: CPT | Mod: GP | Performed by: PHYSICAL THERAPIST

## 2024-07-17 PROCEDURE — 97110 THERAPEUTIC EXERCISES: CPT | Mod: GO

## 2024-07-17 PROCEDURE — 97112 NEUROMUSCULAR REEDUCATION: CPT | Mod: GP | Performed by: PHYSICAL THERAPIST

## 2024-07-18 ENCOUNTER — TELEPHONE (OUTPATIENT)
Dept: PEDIATRICS | Facility: CLINIC | Age: 69
End: 2024-07-18
Payer: MEDICARE

## 2024-07-19 NOTE — TELEPHONE ENCOUNTER
Forms/Letter Request    Type of form/letter: OTHER: Addend office visit note from 6/24/2024    Do we have the form/letter: Yes: Dr. Aviles's inbox    Who is the form from? Master (if other please explain)    Where did/will the form come from? form was faxed in    When is form/letter needed by: Urgent    How would you like the form/letter returned: Fax : [phone]    Patient Notified form requests are processed in 5-7 business days:No    Could we send this information to you in [employer] or would you prefer to receive a phone call?:   NA

## 2024-07-22 ENCOUNTER — THERAPY VISIT (OUTPATIENT)
Dept: OCCUPATIONAL THERAPY | Facility: CLINIC | Age: 69
End: 2024-07-22
Attending: PHYSICAL MEDICINE & REHABILITATION
Payer: MEDICARE

## 2024-07-22 DIAGNOSIS — M75.02 ADHESIVE CAPSULITIS OF LEFT SHOULDER: ICD-10-CM

## 2024-07-22 DIAGNOSIS — M62.422 CONTRACTURE OF MUSCLE OF LEFT UPPER ARM: ICD-10-CM

## 2024-07-22 DIAGNOSIS — I69.352 SPASTIC HEMIPARESIS OF LEFT DOMINANT SIDE AS LATE EFFECT OF CEREBRAL INFARCTION (H): Primary | ICD-10-CM

## 2024-07-22 PROCEDURE — 97110 THERAPEUTIC EXERCISES: CPT | Mod: GO

## 2024-07-25 ENCOUNTER — TELEPHONE (OUTPATIENT)
Dept: PEDIATRICS | Facility: CLINIC | Age: 69
End: 2024-07-25
Payer: MEDICARE

## 2024-07-25 NOTE — TELEPHONE ENCOUNTER
Routing to provider- [employer] calls back need addendum to OV notes from 6/24.  Forms are in basket for review.    Please advise.      Paulette WATKINS, - [facility]  Primary Care- [facility]ia Quiros [place]  [facility]

## 2024-07-25 NOTE — TELEPHONE ENCOUNTER
General Call    Contacts       Contact Date/Time Type Contact Phone/Fax    07/25/2024 01:12 PM CDT Phone (Incoming) Lex Hodges (Self) [phone] (M)          Reason for Call:  Lory from [facility] has called regarding a message she sent on 07/10 and 07/18 for the provider to update some things na dthey have not heard back     What are your questions or concerns:  N/A -- just needs to send the notes, otherwise a callback     Date of last appointment with provider: 06/26/24    Could we send this information to you in [employer] or would you prefer to receive a phone call?:   Patient would prefer a phone call   Okay to leave a detailed message?: Yes at Other phone number:  [phone], Lory JOE

## 2024-07-25 NOTE — TELEPHONE ENCOUNTER
Received duplicate encounter stating:      Opal Galindo Non-Clinical Signed1:13 PM           General Call     Contacts         Contact Date/Time Type Contact Phone/Fax     07/25/2024 01:12 PM CDT Phone (Incoming) Lex Hodges (Self) [phone] (M)             Reason for Call:  Lory from [facility] has called regarding a message she sent on 07/10 and 07/18 for the provider to update some things na dthey have not heard back      What are your questions or concerns:  N/A -- just needs to send the notes, otherwise a callback      Date of last appointment with provider: 06/26/24     Could we send this information to you in [facility] or would you prefer to receive a phone call?:   Patient would prefer a phone call   Okay to leave a detailed message?: Yes at Other phone number:  [phone], Lory JOE

## 2024-08-08 ENCOUNTER — THERAPY VISIT (OUTPATIENT)
Dept: PHYSICAL THERAPY | Facility: CLINIC | Age: 69
End: 2024-08-08
Attending: INTERNAL MEDICINE
Payer: MEDICARE

## 2024-08-08 ENCOUNTER — THERAPY VISIT (OUTPATIENT)
Dept: OCCUPATIONAL THERAPY | Facility: CLINIC | Age: 69
End: 2024-08-08
Attending: PHYSICAL MEDICINE & REHABILITATION
Payer: MEDICARE

## 2024-08-08 DIAGNOSIS — M62.422 CONTRACTURE OF MUSCLE OF LEFT UPPER ARM: ICD-10-CM

## 2024-08-08 DIAGNOSIS — M75.02 ADHESIVE CAPSULITIS OF LEFT SHOULDER: ICD-10-CM

## 2024-08-08 DIAGNOSIS — G81.94 LEFT HEMIPARESIS (H): Primary | ICD-10-CM

## 2024-08-08 DIAGNOSIS — I69.352 SPASTIC HEMIPARESIS OF LEFT DOMINANT SIDE AS LATE EFFECT OF CEREBRAL INFARCTION (H): Primary | ICD-10-CM

## 2024-08-08 PROCEDURE — 97110 THERAPEUTIC EXERCISES: CPT | Mod: GP | Performed by: PHYSICAL THERAPIST

## 2024-08-08 PROCEDURE — 97110 THERAPEUTIC EXERCISES: CPT | Mod: GO | Performed by: OCCUPATIONAL THERAPIST

## 2024-08-08 PROCEDURE — 97116 GAIT TRAINING THERAPY: CPT | Mod: GP | Performed by: PHYSICAL THERAPIST

## 2024-08-22 ENCOUNTER — THERAPY VISIT (OUTPATIENT)
Dept: PHYSICAL THERAPY | Facility: CLINIC | Age: 69
End: 2024-08-22
Attending: INTERNAL MEDICINE
Payer: MEDICARE

## 2024-08-22 ENCOUNTER — THERAPY VISIT (OUTPATIENT)
Dept: OCCUPATIONAL THERAPY | Facility: CLINIC | Age: 69
End: 2024-08-22
Attending: PHYSICAL MEDICINE & REHABILITATION
Payer: MEDICARE

## 2024-08-22 DIAGNOSIS — M62.422 CONTRACTURE OF MUSCLE OF LEFT UPPER ARM: ICD-10-CM

## 2024-08-22 DIAGNOSIS — G81.94 LEFT HEMIPARESIS (H): Primary | ICD-10-CM

## 2024-08-22 DIAGNOSIS — I69.352 SPASTIC HEMIPARESIS OF LEFT DOMINANT SIDE AS LATE EFFECT OF CEREBRAL INFARCTION (H): Primary | ICD-10-CM

## 2024-08-22 DIAGNOSIS — M75.02 ADHESIVE CAPSULITIS OF LEFT SHOULDER: ICD-10-CM

## 2024-08-22 DIAGNOSIS — S06.9X9S TRAUMATIC BRAIN INJURY WITH LOSS OF CONSCIOUSNESS, SEQUELA (H): ICD-10-CM

## 2024-08-22 PROCEDURE — 97116 GAIT TRAINING THERAPY: CPT | Mod: GP | Performed by: PHYSICAL THERAPIST

## 2024-08-22 PROCEDURE — 97110 THERAPEUTIC EXERCISES: CPT | Mod: GO | Performed by: OCCUPATIONAL THERAPIST

## 2024-08-22 PROCEDURE — 97112 NEUROMUSCULAR REEDUCATION: CPT | Mod: GP | Performed by: PHYSICAL THERAPIST

## 2024-08-22 PROCEDURE — 97110 THERAPEUTIC EXERCISES: CPT | Mod: GP | Performed by: PHYSICAL THERAPIST

## 2024-09-05 ENCOUNTER — THERAPY VISIT (OUTPATIENT)
Dept: PHYSICAL THERAPY | Facility: CLINIC | Age: 69
End: 2024-09-05
Attending: INTERNAL MEDICINE
Payer: MEDICARE

## 2024-09-05 ENCOUNTER — THERAPY VISIT (OUTPATIENT)
Dept: OCCUPATIONAL THERAPY | Facility: CLINIC | Age: 69
End: 2024-09-05
Attending: PHYSICAL MEDICINE & REHABILITATION
Payer: MEDICARE

## 2024-09-05 DIAGNOSIS — M62.422 CONTRACTURE OF MUSCLE OF LEFT UPPER ARM: ICD-10-CM

## 2024-09-05 DIAGNOSIS — S06.9X9S TRAUMATIC BRAIN INJURY WITH LOSS OF CONSCIOUSNESS, SEQUELA (H): ICD-10-CM

## 2024-09-05 DIAGNOSIS — G81.94 LEFT HEMIPARESIS (H): Primary | ICD-10-CM

## 2024-09-05 DIAGNOSIS — M75.02 ADHESIVE CAPSULITIS OF LEFT SHOULDER: ICD-10-CM

## 2024-09-05 DIAGNOSIS — I69.352 SPASTIC HEMIPARESIS OF LEFT DOMINANT SIDE AS LATE EFFECT OF CEREBRAL INFARCTION (H): Primary | ICD-10-CM

## 2024-09-05 PROCEDURE — 97116 GAIT TRAINING THERAPY: CPT | Mod: GP | Performed by: PHYSICAL THERAPIST

## 2024-09-05 PROCEDURE — 97110 THERAPEUTIC EXERCISES: CPT | Mod: GO | Performed by: OCCUPATIONAL THERAPIST

## 2024-09-05 PROCEDURE — 97110 THERAPEUTIC EXERCISES: CPT | Mod: GP | Performed by: PHYSICAL THERAPIST

## 2024-09-05 NOTE — PROGRESS NOTES
09/05/24 0500   Progress Note/Certification   Start of Care Date 07/21/22   Onset of illness/injury or Date of Surgery 06/09/22   Therapy Frequency 1 x a week every other week.   Predicted Duration 12 weeks   Certification date from 09/10/24   Certification date to 11/26/24   Progress Note Completed Date 05/30/24   PT Goal 1   Goal Identifier 1 HEP   Goal Description Lex to be independent in appropriate HEP with assist from Nidia as needed to promote life long health, ability to stay in home and improve function.   Rationale to maximize safety and independence with self cares;to maximize safety and independence with transportation;to maximize safety and independence within the community;to maximize safety and independence within the home;to maximize safety and independence with performance of ADLs and functional tasks   Goal Progress continues to do HEP with assist from wife. continues to go to the gym and walks the track there with using the railing.   Target Date 11/26/24   PT Goal 2   Goal Identifier 2 - 25 foot walk   Goal Description Lex to complete 25 foot walk consistently in 19 sec or less and 22 steps or less demonstrating improved gait pattern and efficiency.   Goal Progress Times have been around 21-22 sec on average in the last few weeks.  He has had irritation from old AFO and just received new AFO. Times today 9/5/24 was 24 sec and 22.25 sec with trekking pole and cane. 12/7/23 18.69 sec and 19. 7 sec -- fastest times   Target Date 11/26/24   PT Goal 3   Goal Identifier 3 - TUG   Goal Description Lex to complete TUG in 40 sec or less to demonstrate improved gait pattern, ability to transfer sit <> stand and overall increased function.   Goal Progress 9/5/24 46.18 sec and 41.36 sec with trekking pole 5/30/24 38.6 sec with trekking pole, L AFO, less time taken to place trekking pole against the wall prior to sitting.    baseline 50.83 sec with trekking pole   Target Date 11/26/24   Date Met   (new  "AFO affecting confidence in gait)   PT Goal 4   Goal Identifier 4 - stairs   Goal Description Lex to complete stairs ascending with alternating stepping and one rail and min /SBA to demonstrate improved neuro motor function , balance and greater safety with completing stairs.   Goal Progress 8/22/23 up and down 4 six inch steps with one rail and alternating stepping, descending leans R side against the rail on R. Less heavy lean than baselien.   baseline single step up , alternating down with heavy lean on the rail.   Target Date 12/31/23   Date Met 08/22/23   PT Goal 5   Goal Identifier 5 - pain   Goal Description Lex to report decreased back pain and R knee pain to allow him to increase his ambulation distance/ther ex at the gym as well as overall better QOL.   Goal Progress 5/16/24 R knee pain is less but hasn't been active due to bladder issues. 3/21/24  No real change, not any worse 12/4/23 R knee pain continues to be present and fluctuate in intensity.  8/22/23  did the 4 laps at the gym yesterday and did not have R knee pain.  5/16/23 has been about the same - R knee pain.  Still gets a little sore with prolonged walking. back pain - a little bit sometimes. 3/7/23 : doing 4 laps at gym.  Knee and back don't seem to be much worse but sometimes knee is more sore.  11/8/22 walking 3 laps , R knee \"doesn't bother me too much\"   He does continue to have knee pain off and on.  baseline - doing 2 laps at gym usually does 3-4  9/27/22  dav has about the same R knee, back \"not as stiff\" but doing 3-4 laps.   Target Date 11/26/24   PT Goal 6   Goal Identifier 6 - 6MWT   Goal Description Lex to increase his 6MWT distance by 150 feet or greater from when first assessed to demonstrate improved endurance, gait quality and function.   Goal Progress 3/21/24 358 ft with trekking pole on the R, AFO L 1/4/24  410 feet with trekking pole, AFO L. No R knee pain. 8/22/23  363 feet wtih trekking pole , AFO on the L. 5/16/23 400 " feet with trekking pole and AFO.  (need to continue to work on faster pace) 4/18/23 450 feet with trekking pole, AFO on the L.  no knee pain   cistractions - more people , noise in testing area than prior ? if this affected speed and less distance than in the past.  5/30/24 345 feet  3/7/23: 382 feet.   12/13/22 366 feet   Target Date 11/26/24   Date Met   (9/5/24 not assessed on this date.  Will assess next visit.)   PT Goal 7   Goal Identifier 7- sit <> stand and COM forward past TAYLOR challenges.   Goal Description Lex to perform sit to stand from 22 inch mat without bracing self with LE against the surface to demonstrate improved ability to bring COM forward past TAYLOR as well as better mechanics with transfer for decreased/excess load on the R LE to decrease knee pain.   Goal Progress 9/5/24 - Marcus continues to have difficulty with fwd COM transition past TAYLOR, leans to the R to stand and this is enforced by having the trekking pole to his R.  Decreased ability to bring COM past TAYLOR safely and improper timing and sequencing of mm activation/inactivation affecting transfer. Excess extensor use/quads.   Target Date 11/26/24         [facility]                                                                                   OUTPATIENT PHYSICAL THERAPY    PLAN OF TREATMENT FOR OUTPATIENT REHABILITATION   Patient's Last Name, First Name, Marcus Mcgee YOB: 1955   Provider's Name   [facility]   Medical Record No.  [id number]     Onset Date: 06/09/22  Start of Care Date: 07/21/22     Medical Diagnosis:  L hemiparesis      PT Treatment Diagnosis:  impaired gait, transfers, decline in function Plan of Treatment  Frequency/Duration: 1 x a week every other week./ 12 weeks    Certification date from 09/10/24 to 11/26/24         See note for plan of treatment details and functional goals     Ada Scott, PT                         I  CERTIFY THE NEED FOR THESE SERVICES FURNISHED UNDER        THIS PLAN OF TREATMENT AND WHILE UNDER MY CARE     (Physician attestation of this document indicates review and certification of the therapy plan).              Referring Provider:  Don Aviles    Initial Assessment  See Epic Evaluation- Start of Care Date: 07/21/22           PLAN  Continue therapy per current plan of care.    Beginning/End Dates of Progress Note Reporting Period:  05/30/24 to 09/05/2024.   Valentina progress has been affected this last Progress note session due to discomfort from his AFO,  he recently received a new AFO and is adapting to this.  I have seen him ambulate in the AFO once and it appears to fit him better and he is walking better. He would benefit from continued PT to further improve his impairments especially in light of the new brace.     Referring Provider:  Don Aviles

## 2024-09-09 ENCOUNTER — DOCUMENTATION ONLY (OUTPATIENT)
Dept: LAB | Facility: CLINIC | Age: 69
End: 2024-09-09
Payer: MEDICARE

## 2024-09-09 DIAGNOSIS — N20.0 CALCULUS OF KIDNEY: Primary | ICD-10-CM

## 2024-09-09 DIAGNOSIS — E83.50 CALCIUM METABOLISM DISORDER: ICD-10-CM

## 2024-09-09 NOTE — PROGRESS NOTES
Marcus Hodges has an upcoming lab appointment:    Future Appointments   Date Time Provider Department Center   9/13/2024 10:15 AM EA LAB [facility]   9/16/2024  9:30 AM Regina Tolentino, OTR [facility]   9/16/2024 10:15 AM Ada Scott Ap, PT [facility]   9/18/2024  1:00 PM Dawna Frances MD [facility]   9/30/2024  9:30 AM Regina Tolentino, OTR [facility]   9/30/2024 10:15 AM Ada Scott Ap, PT [facility]   10/14/2024  9:30 AM Regina Tolentino, OTR [facility]   10/14/2024 10:15 AM Ada Scott Ap, PT [facility]   10/28/2024  9:30 AM Ada Scott Ap, PT [facility]   11/6/2024  7:30 AM Don Aviles MD [facility]   11/11/2024  8:45 AM Ada Scott, PT [facility]   11/11/2024 10:00 AM [id number] RHSCCT [facility]   11/15/2024  2:00 PM Nehemiah Bloom MD [facility]   11/22/2024 10:00 AM SHCVECHR2 SHCVCV CVIMG   11/25/2024  8:45 AM Hayes Yip MD [facility] PSA CLIN   11/25/2024 11:00 AM Ada Scott, PT [facility]     Patient is scheduled for the following lab(s):     There is no order available. Please review and place either future orders or HMPO (Review of Health Maintenance Protocol Orders), as appropriate.    Health Maintenance Due   Topic    ANNUAL REVIEW OF HM ORDERS      Guille Wiley

## 2024-09-13 ENCOUNTER — LAB (OUTPATIENT)
Dept: LAB | Facility: CLINIC | Age: 69
End: 2024-09-13
Payer: MEDICARE

## 2024-09-13 DIAGNOSIS — N20.0 CALCULUS OF KIDNEY: ICD-10-CM

## 2024-09-13 DIAGNOSIS — E83.50 CALCIUM METABOLISM DISORDER: ICD-10-CM

## 2024-09-13 LAB
ALBUMIN SERPL BCG-MCNC: 4.7 G/DL (ref 3.5–5.2)
ANION GAP SERPL CALCULATED.3IONS-SCNC: 8 MMOL/L (ref 7–15)
BUN SERPL-MCNC: 11.7 MG/DL (ref 8–23)
CALCIUM SERPL-MCNC: 9.6 MG/DL (ref 8.8–10.4)
CHLORIDE SERPL-SCNC: 105 MMOL/L (ref 98–107)
CREAT SERPL-MCNC: 0.75 MG/DL (ref 0.67–1.17)
EGFRCR SERPLBLD CKD-EPI 2021: >90 ML/MIN/1.73M2
GLUCOSE SERPL-MCNC: 94 MG/DL (ref 70–99)
HCO3 SERPL-SCNC: 29 MMOL/L (ref 22–29)
MAGNESIUM SERPL-MCNC: 2.1 MG/DL (ref 1.7–2.3)
PHOSPHATE SERPL-MCNC: 2.9 MG/DL (ref 2.5–4.5)
POTASSIUM SERPL-SCNC: 4.5 MMOL/L (ref 3.4–5.3)
SODIUM SERPL-SCNC: 142 MMOL/L (ref 135–145)

## 2024-09-13 PROCEDURE — 82306 VITAMIN D 25 HYDROXY: CPT

## 2024-09-13 PROCEDURE — 80069 RENAL FUNCTION PANEL: CPT

## 2024-09-13 PROCEDURE — 83735 ASSAY OF MAGNESIUM: CPT

## 2024-09-13 PROCEDURE — 36415 COLL VENOUS BLD VENIPUNCTURE: CPT

## 2024-09-16 ENCOUNTER — THERAPY VISIT (OUTPATIENT)
Dept: OCCUPATIONAL THERAPY | Facility: CLINIC | Age: 69
End: 2024-09-16
Attending: PHYSICAL MEDICINE & REHABILITATION
Payer: MEDICARE

## 2024-09-16 ENCOUNTER — THERAPY VISIT (OUTPATIENT)
Dept: PHYSICAL THERAPY | Facility: CLINIC | Age: 69
End: 2024-09-16
Attending: INTERNAL MEDICINE
Payer: MEDICARE

## 2024-09-16 DIAGNOSIS — S06.9X9S TRAUMATIC BRAIN INJURY WITH LOSS OF CONSCIOUSNESS, SEQUELA (H): ICD-10-CM

## 2024-09-16 DIAGNOSIS — I69.352 SPASTIC HEMIPARESIS OF LEFT DOMINANT SIDE AS LATE EFFECT OF CEREBRAL INFARCTION (H): Primary | ICD-10-CM

## 2024-09-16 DIAGNOSIS — M75.02 ADHESIVE CAPSULITIS OF LEFT SHOULDER: ICD-10-CM

## 2024-09-16 DIAGNOSIS — G81.94 LEFT HEMIPARESIS (H): Primary | ICD-10-CM

## 2024-09-16 DIAGNOSIS — M62.422 CONTRACTURE OF MUSCLE OF LEFT UPPER ARM: ICD-10-CM

## 2024-09-16 PROCEDURE — 97110 THERAPEUTIC EXERCISES: CPT | Mod: GO

## 2024-09-16 PROCEDURE — 97110 THERAPEUTIC EXERCISES: CPT | Mod: GP | Performed by: PHYSICAL THERAPIST

## 2024-09-16 PROCEDURE — 97112 NEUROMUSCULAR REEDUCATION: CPT | Mod: GP | Performed by: PHYSICAL THERAPIST

## 2024-09-16 PROCEDURE — 97116 GAIT TRAINING THERAPY: CPT | Mod: GP | Performed by: PHYSICAL THERAPIST

## 2024-09-17 LAB
DEPRECATED CALCIDIOL+CALCIFEROL SERPL-MC: <42 UG/L (ref 20–75)
VITAMIN D2 SERPL-MCNC: <5 UG/L
VITAMIN D3 SERPL-MCNC: 37 UG/L

## 2024-09-18 ENCOUNTER — VIRTUAL VISIT (OUTPATIENT)
Dept: NEPHROLOGY | Facility: CLINIC | Age: 69
End: 2024-09-18
Attending: INTERNAL MEDICINE
Payer: MEDICARE

## 2024-09-18 VITALS — WEIGHT: 178 LBS | HEIGHT: 72 IN | BODY MASS INDEX: 24.11 KG/M2

## 2024-09-18 DIAGNOSIS — R82.994 HYPERCALCIURIA: ICD-10-CM

## 2024-09-18 DIAGNOSIS — N20.0 RECURRENT KIDNEY STONES: Primary | ICD-10-CM

## 2024-09-18 PROCEDURE — 99214 OFFICE O/P EST MOD 30 MIN: CPT | Mod: 95 | Performed by: INTERNAL MEDICINE

## 2024-09-18 ASSESSMENT — PAIN SCALES - GENERAL: PAINLEVEL: NO PAIN (0)

## 2024-09-18 NOTE — PROGRESS NOTES
[facility]  09/18/2024     Marcus Hodges MRN:[id number] YOB: 1955  Primary care provider: Don Aviles  Requesting physician: Nehemiah Bloom     ASSESSMENT AND RECOMMENDATIONS:   Marcus Hodges is a 68 year old presenting for nephrolithiasis.  # Recurrent kidney stones: stone type primarily calcium oxalate 9/28/2020 and now 90% calcium phosphate 5/31/2022  OK for peanut butter on toast in morning  - creatinine and eGFR normal, no e/o kidney dysfunction    Hypercalciuria - urine calcium at goal   - Has low blood pressures so prefer to avoid adding thiazide    Other co-morbidities:  # osteoporosis - started alendronate January 2023  # hypovitaminosis D - historically had level that was above goal and dose reduced.  -cholecalciferol 1000 units daily  - vitamin D level 37 (9/13/2024) and at goal, no change    # CVA - 2012, embolic, complicated by malignant cerebral edema and required decompressive craniectomy  - spastic left hemiparesis, neglect, cognitive deficits (PMR note from [facility] reviewed)  # neurogenic bladder  # ASD  # mitral valve repair in the last 1970's  # atrial fibrillation on apixaban and metoprolol (Cardiology - Dr. Hayes Yip)  # seizure - on keppra  # iron deficiency anemia - iron sat 7 with ferritin 14 12/12/2023  - hemoglobin improved after IV iron  - colonoscopy Feb 2024, no source of blood loss noted    Known issue that I take into account for other medical decisions, no current exacerbations or new concerns.     Repeat 24 hour chemistries August  Repeat imaging CT scheduled 11/11/2024 per Dr. Bloom  Return in about 1 year (around 9/18/2025).     31 minutes spent on visit, meeting with Marcus Hodges and in chart review and documentation on date of encounter, 09/18/24       Dawna Frances MD  [facility]  Department of Medicine  Division of Renal Disease and Hypertension  [facility]  [email or web address]  Vocera Web  Console        REASON FOR VISIT: follow up nephrolithiasis    HISTORY OF PRESENT ILLNESS:  Marcus Hodges is a 68 year old with history of kidney stones, CVA with neurogenic bladder (2012), atrial fibrillation    Stone surgical history:  9/2020 left pyelolithotomy for 1.9 cm renal pelvis stone    Stone burden: stone free bilaterally per CT 9/14/2023    Stone prevention strategy  Fluid: 12 cups water per day, cut out tea  Calcium: coconut milk, yogurt  Low sodium, low sugar and low oxalate diet  Mostly vegetarian - lacto ovo pescatarian.      Last visit 9/6/2023 at which time I discussed OK to liberalize foods with moderate oxalate content such as adding a few walnuts with breakfast, eating pecan cookies (calculated to have less than 1 oz pecan per cookie), can add a few more types of beans back into diet  Also reviewed that preferred not to add thiazide for hypercalciuria given low BP    Since that visit, no kidney stones.    Searchable oxalate link         Had dx iron deficiency anemia Dec 2023    Had to increase flomax and added finasteride per Dr. Bloom.    During 24 hour urine collection added peanut butter - about a tablespoon with toast.    I reviewed 24 hour urine chemstries:                Family history is positive for kidney stones in older brother a couple of times    REVIEW OF SYSTEMS:  A 10 point review of systems was negative except as noted above.    Problem list  Patient Active Problem List   Diagnosis    * * * SBE PROPHYLAXIS * * *    Vitamin D deficiency    Hyperlipidemia LDL goal <100    Long-term (current) use of anticoagulants    Urinary incontinence    Neurogenic bladder    Chronic atrial fibrillation (H)    Tricuspid regurgitation    Mitral regurgitation    Atrial enlargement, bilateral    Major depressive disorder, recurrent episode, moderate (H)    Traumatic brain injury with loss of consciousness, sequela (H24)    Neurogenic bowel    Left renal stone    Dysphagia    Left hemiparesis (H)     S/P craniotomy    Visual field defect    Normocytic anemia    Benign prostatic hyperplasia with urinary retention    History of CVA (cerebrovascular accident)    Anemia, iron deficiency    Spastic hemiparesis of left dominant side as late effect of cerebral infarction (H)    Adhesive capsulitis of left shoulder    Contracture of muscle of left upper arm     PSH  Past Surgical History:   Procedure Laterality Date    CATHETER, ABLATION  2004    COLONOSCOPY N/A 2/22/2024    Procedure: Colonoscopy with polypectomies using cold exacto snare, hemorrhage control using one hemoclip and 3 cc tattoo using scleroneedle, retrieval of polyp using mena net;  Surgeon: Don Dixon MD;  Location:  GI    COMBINED CYSTOSCOPY, INSERT CATHETER URETER  9/28/2020    Procedure: cystoscopy and left ureteral catheter placement, left ureteral stent placement;  Surgeon: Nehemiah Bloom MD;  Location:  OR    Craniotomy [address], repair of hemicraniectomy defect    CYSTOSCOPY      DAVINCI PYELOPLASTY Left 9/28/2020    Procedure: left robotic assisted laparoscopic pyelolithotomy;  Surgeon: Nehemiah Bloom MD;  Location:  OR    GASTROSTOMY TUBE  April 2012    [facility], following CVA    HERNIA REPAIR      IR CAROTID ANGIOGRAM  4/22/2012    IR CAROTID ANGIOGRAM  4/22/2012    IR MISCELLANEOUS PROCEDURE  4/22/2012    IR MISCELLANEOUS PROCEDURE  4/25/2012    LASER KTP GREEN LIGHT PHOTOSELECTIVE VAPORIZATION PROSTATE N/A 12/1/2020    Procedure: Cystoscopy and greenlight photovaporization of the prostate;  Surgeon: Nehemiah Bloom MD;  Location:  OR    REPAIR ATRIAL SEPTAL DEFECT  1978    ASD Repair    Right hemicraniectomy  April 2012    [facility], following CVA, mgmt malignant cerebral edema    SURGICAL HISTORY OF -   2009    right eye enucleation    TRACHEOSTOMY  April 2012    [facility], following CVA    ZZC NONSPECIFIC PROCEDURE      tonsillectomy    ZZC NONSPECIFIC PROCEDURE      Ing. Hernia  Repair       Social    Social History     Socioeconomic History    Marital status:      Spouse name: haim    Number of children: 0    Years of education: Not on file    Highest education level: Not on file   Occupational History     Employer: [employer]   Tobacco Use    Smoking status: Never    Smokeless tobacco: Never   Vaping Use    Vaping status: Never Used   Substance and Sexual Activity    Alcohol use: No    Drug use: No    Sexual activity: Yes     Partners: Female   Other Topics Concern     Service Not Asked    Blood Transfusions Not Asked    Caffeine Concern Yes     Comment: couple cups of tea a day    Occupational Exposure Not Asked    Hobby Hazards Not Asked    Sleep Concern Not Asked    Stress Concern Not Asked    Weight Concern Not Asked    Special Diet Yes     Comment: vegan; occ eggs/fish     Back Care Not Asked    Exercise Yes     Comment: 2 x weekly/gym , pysical therapy    Bike Helmet Not Asked    Seat Belt Yes    Self-Exams Not Asked    Parent/sibling w/ CABG, MI or angioplasty before 65F 55M? Not Asked   Social History Narrative    Not on file     Social Determinants of Health     Financial Resource Strain: Low Risk  (10/28/2023)    Financial Resource Strain     Within the past 12 months, have you or your family members you live with been unable to get utilities (heat, electricity) when it was really needed?: No   Food Insecurity: Low Risk  (10/28/2023)    Food Insecurity     Within the past 12 months, did you worry that your food would run out before you got money to buy more?: No     Within the past 12 months, did the food you bought just not last and you didn t have money to get more?: No   Transportation Needs: Low Risk  (10/28/2023)    Transportation Needs     Within the past 12 months, has lack of transportation kept you from medical appointments, getting your medicines, non-medical meetings or appointments, work, or from getting things that you need?: No    Physical Activity: Insufficiently Active (10/25/2022)    Exercise Vital Sign     Days of Exercise per Week: 1 day     Minutes of Exercise per Session: 10 min   Stress: No Stress Concern Present (10/25/2022)    [employer] [place] [employer] - Occupational Stress Questionnaire     Feeling of Stress : Only a little   Social Connections: Socially Integrated (10/25/2022)    Social Connection and Isolation Panel [NHANES]     Frequency of Communication with Friends and Family: Three times a week     Frequency of Social Gatherings with Friends and Family: Once a week     Attends Druze Services: More than 4 times per year     Active Member of Clubs or Organizations: Yes     Attends Club or Organization Meetings: Not on file     Marital Status:    Interpersonal Safety: Low Risk  (10/31/2023)    Interpersonal Safety     Do you feel physically and emotionally safe where you currently live?: Yes     Within the past 12 months, have you been hit, slapped, kicked or otherwise physically hurt by someone?: Not on file     Within the past 12 months, have you been humiliated or emotionally abused in other ways by your partner or ex-partner?: No   Housing Stability: Low Risk  (10/28/2023)    Housing Stability     Do you have housing? : Yes     Are you worried about losing your housing?: No     Family history  Family History   Problem Relation Age of Onset    Hypertension Mother     Cerebrovascular Disease Father     Diabetes Maternal Grandmother     Cancer Paternal Grandmother     Congenital Anomalies Brother         several brothers and sisters born with congential heart defects, but all have been repaired    Congenital Anomalies Sister     Colon Cancer No family hx of           MEDICATIONS:  Current Outpatient Medications   Medication Sig Dispense Refill    acetaminophen (TYLENOL) 500 MG tablet Take 2 tablets (1,000 mg) by mouth every 6 hours as needed for mild pain 100 tablet 0    alendronate (FOSAMAX) 70 MG  tablet Take 1 tablet (70 mg) by mouth every 7 days 12 tablet 3    amoxicillin (AMOXIL) 500 MG capsule       apixaban ANTICOAGULANT (ELIQUIS ANTICOAGULANT) 5 MG tablet Take 1 tablet (5 mg) by mouth 2 times daily 180 tablet 11    atorvastatin (LIPITOR) 20 MG tablet Take 1 tablet (20 mg) by mouth daily 90 tablet 11    baclofen (LIORESAL) 20 MG tablet Take 1 tablet (20 mg) by mouth 4 times daily 360 tablet 11    citalopram (CELEXA) 20 MG tablet Take 1 tablet (20 mg) by mouth daily 90 tablet 11    docusate sodium (COLACE) 100 MG tablet Take 200 mg by mouth nightly as needed      finasteride (PROSCAR) 5 MG tablet Take 1 tablet (5 mg) by mouth daily 90 tablet 3    ketoconazole (NIZORAL) 2 % external cream Apply topically daily      levETIRAcetam (KEPPRA) 1000 MG tablet Take 1 tablet (1,000 mg) by mouth 2 times daily 180 tablet 11    metoprolol succinate ER (TOPROL XL) 25 MG 24 hr tablet Take 0.5 tablets (12.5 mg) by mouth daily 45 tablet 11    mirabegron (MYRBETRIQ) 50 MG 24 hr tablet Take 1 tablet (50 mg) by mouth daily 90 tablet 11    Multiple Vitamin (MULTI-VITAMIN) per tablet Take 1 tablet by mouth daily  100 tablet 3    polyethylene glycol (MIRALAX) 17 g packet Take 17 g by mouth every other day PRN      senna-docusate (SENOKOT-S/PERICOLACE) 8.6-50 MG tablet Take 4 tablets by mouth daily as needed      tamsulosin (FLOMAX) 0.4 MG capsule Take 2 capsules (0.8 mg) by mouth daily 180 capsule 3    vitamin D3 (CHOLECALCIFEROL) 50 mcg (2000 units) tablet Take 1,000 Units by mouth daily 90 tablet 3        ALLERGIES:    Allergies   Allergen Reactions    Blood Transfusion Related (Informational Only) Other (See Comments)     Patient has a history of a clinically significant antibody against RBC antigens.  A delay in compatible RBCs may occur.    Lisinopril          PHYSICAL EXAM:   *video visit  GENERAL APPEARANCE: alert and no distress  RESP: normal work of breathing, no conversational dyspnea  NEURO: mentation intact and  speech normal    LABS:   I reviewed:  Electrolytes/Renal -   Recent Labs   Lab Test 09/13/24  1018 10/31/23  0852 08/29/23  0948    139 142   POTASSIUM 4.5 4.6 4.2   CHLORIDE 105 103 105   CO2 29 27 27   BUN 11.7 12.2 15.6   CR 0.75 0.77 0.81   GLC 94 78 57*   STEPHANI 9.6 9.1 9.1   MAG 2.1  --  2.1   PHOS 2.9  --   --        CBC -   Recent Labs   Lab Test 05/02/24  1055 03/27/24  1028 12/12/23  0848   WBC 4.3 3.7* 3.1*   HGB 12.9* 10.8* 10.5*   * 164 189       LFTs -   Recent Labs   Lab Test 09/13/24  1018 10/31/23  0852 10/26/22  0822 10/19/21  0808   ALKPHOS  --  92 99 93   BILITOTAL  --  0.7 1.0 1.0   ALT  --  23 29 37   AST  --  30 33 27   PROTTOTAL  --  6.9 7.0 7.5   ALBUMIN 4.7 4.4 3.8 4.0       Coags -   Recent Labs   Lab Test 10/02/20  1928 12/04/17  0000 10/23/17  0000   INR 1.46* 2.4* 2.5*       Iron Panel -   Recent Labs   Lab Test 05/02/24  1055 03/27/24  1028 12/12/23  0848   IRON  --   --  25*   IRONSAT  --   --  7*   SUZANNE 512*   < > 14*    < > = values in this interval not displayed.       Endocrine -   Recent Labs   Lab Test 10/19/21  0808   TSH 2.82       IMAGING:  All imaging studies reviewed by me.       Virtual Visit Details    Type of service:  Video Visit   Joined the call at 9/18/2024, 1:06:15 pm.  Left the call at 9/18/2024, 1:26:49 pm.    Originating Location (pt. Location): Home  Distant Location (provider location):  Off-site  Platform used for Video Visit: Blanquita Frances MD

## 2024-09-18 NOTE — PATIENT INSTRUCTIONS
No changes  OK to add a bit of peanut butter    24 hour urine litholink August 2025  Return in about 1 year (around 9/18/2025). With labs

## 2024-09-18 NOTE — LETTER
9/18/2024       RE: Marcus Hodges  [address]  [place] [address]     Dear Colleague,    Thank you for referring your patient, Marcus Hodges, to the [facility]PHROLOG[facility] [place] at [facility]. Please see a copy of my visit note below.    [facility]phrology [facility]  09/18/2024     Marcus Hodges MRN:[id number] YOB: 1955  Primary care provider: Don Aviles  Requesting physician: Nehemiah Bloom     ASSESSMENT AND RECOMMENDATIONS:   Marcus Hodges is a 68 year old presenting for nephrolithiasis.  # Recurrent kidney stones: stone type primarily calcium oxalate 9/28/2020 and now 90% calcium phosphate 5/31/2022  OK for peanut butter on toast in morning  - creatinine and eGFR normal, no e/o kidney dysfunction    Hypercalciuria - urine calcium at goal   - Has low blood pressures so prefer to avoid adding thiazide    Other co-morbidities:  # osteoporosis - started alendronate January 2023  # hypovitaminosis D - historically had level that was above goal and dose reduced.  -cholecalciferol 1000 units daily  - vitamin D level 37 (9/13/2024) and at goal, no change    # CVA - 2012, embolic, complicated by malignant cerebral edema and required decompressive craniectomy  - spastic left hemiparesis, neglect, cognitive deficits (PMR note from [facility] reviewed)  # neurogenic bladder  # ASD  # mitral valve repair in the last 1970's  # atrial fibrillation on apixaban and metoprolol (Cardiology - Dr. Hayes Yip)  # seizure - on keppra  # iron deficiency anemia - iron sat 7 with ferritin 14 12/12/2023  - hemoglobin improved after IV iron  - colonoscopy Feb 2024, no source of blood loss noted    Known issue that I take into account for other medical decisions, no current exacerbations or new concerns.     Repeat 24 hour chemistries August  Repeat imaging CT scheduled 11/11/2024 per Dr. Bloom  Return in about 1  year (around 9/18/2025).     31 minutes spent on visit, meeting with Marcus Hodges and in chart review and documentation on date of encounter, 09/18/24       Dawna Frances MD  [facility]  Department of Medicine  Division of Renal Disease and Hypertension  [facility]  latasha  Vocera Web Console        REASON FOR VISIT: follow up nephrolithiasis    HISTORY OF PRESENT ILLNESS:  Marcus Hodges is a 68 year old with history of kidney stones, CVA with neurogenic bladder (2012), atrial fibrillation    Stone surgical history:  9/2020 left pyelolithotomy for 1.9 cm renal pelvis stone    Stone burden: stone free bilaterally per CT 9/14/2023    Stone prevention strategy  Fluid: 12 cups water per day, cut out tea  Calcium: coconut milk, yogurt  Low sodium, low sugar and low oxalate diet  Mostly vegetarian - lacto ovo pescatarian.      Last visit 9/6/2023 at which time I discussed OK to liberalize foods with moderate oxalate content such as adding a few walnuts with breakfast, eating pecan cookies (calculated to have less than 1 oz pecan per cookie), can add a few more types of beans back into diet  Also reviewed that preferred not to add thiazide for hypercalciuria given low BP    Since that visit, no kidney stones.    Searchable oxalate link         Had dx iron deficiency anemia Dec 2023    Had to increase flomax and added finasteride per Dr. Bloom.    During 24 hour urine collection added peanut butter - about a tablespoon with toast.    I reviewed 24 hour urine chemstries:                Family history is positive for kidney stones in older brother a couple of times    REVIEW OF SYSTEMS:  A 10 point review of systems was negative except as noted above.    Problem list  Patient Active Problem List   Diagnosis     * * * SBE PROPHYLAXIS * * *     Vitamin D deficiency     Hyperlipidemia LDL goal <100     Long-term (current) use of anticoagulants     Urinary incontinence     Neurogenic bladder      Chronic atrial fibrillation (H)     Tricuspid regurgitation     Mitral regurgitation     Atrial enlargement, bilateral     Major depressive disorder, recurrent episode, moderate (H)     Traumatic brain injury with loss of consciousness, sequela (H24)     Neurogenic bowel     Left renal stone     Dysphagia     Left hemiparesis (H)     S/P craniotomy     Visual field defect     Normocytic anemia     Benign prostatic hyperplasia with urinary retention     History of CVA (cerebrovascular accident)     Anemia, iron deficiency     Spastic hemiparesis of left dominant side as late effect of cerebral infarction (H)     Adhesive capsulitis of left shoulder     Contracture of muscle of left upper arm     PSH  Past Surgical History:   Procedure Laterality Date     CATHETER, ABLATION  2004     COLONOSCOPY N/A 2/22/2024    Procedure: Colonoscopy with polypectomies using cold exacto snare, hemorrhage control using one hemoclip and 3 cc tattoo using scleroneedle, retrieval of polyp using mena net;  Surgeon: Don Dixon MD;  Location:  GI     COMBINED CYSTOSCOPY, INSERT CATHETER URETER  9/28/2020    Procedure: cystoscopy and left ureteral catheter placement, left ureteral stent placement;  Surgeon: Nehemiah Bloom MD;  Location:  OR     Craniotomy reconstruction  2012    [facility], repair of hemicraniectomy defect     CYSTOSCOPY       DAVINCI PYELOPLASTY Left 9/28/2020    Procedure: left robotic assisted laparoscopic pyelolithotomy;  Surgeon: Nehemiah Bloom MD;  Location:  OR     GASTROSTOMY TUBE  April 2012    [facility], following CVA     HERNIA REPAIR       IR CAROTID ANGIOGRAM  4/22/2012     IR CAROTID ANGIOGRAM  4/22/2012     IR MISCELLANEOUS PROCEDURE  4/22/2012     IR MISCELLANEOUS PROCEDURE  4/25/2012     LASER KTP GREEN LIGHT PHOTOSELECTIVE VAPORIZATION PROSTATE N/A 12/1/2020    Procedure: Cystoscopy and greenlight photovaporization of the prostate;  Surgeon: Nehemiah Bloom MD;  Location:  OR      REPAIR ATRIAL SEPTAL DEFECT  1978    ASD Repair     Right hemicraniectomy  April 2012    [facility], following CVA, mgmt malignant cerebral edema     SURGICAL HISTORY OF -   2009    right eye enucleation     TRACHEOSTOMY  April 2012    [facility], following CVA     ZZC NONSPECIFIC PROCEDURE      tonsillectomy     ZZC NONSPECIFIC PROCEDURE      Ing. Hernia Repair       Social    Social History     Socioeconomic History     Marital status:      Spouse name: haim     Number of children: 0     Years of education: Not on file     Highest education level: Not on file   Occupational History     Employer: [employer]   Tobacco Use     Smoking status: Never     Smokeless tobacco: Never   Vaping Use     Vaping status: Never Used   Substance and Sexual Activity     Alcohol use: No     Drug use: No     Sexual activity: Yes     Partners: Female   Other Topics Concern      Service Not Asked     Blood Transfusions Not Asked     Caffeine Concern Yes     Comment: couple cups of tea a day     Occupational Exposure Not Asked     Hobby Hazards Not Asked     Sleep Concern Not Asked     Stress Concern Not Asked     Weight Concern Not Asked     Special Diet Yes     Comment: vegan; occ eggs/fish      Back Care Not Asked     Exercise Yes     Comment: 2 x weekly/gym , pysical therapy     Bike Helmet Not Asked     Seat Belt Yes     Self-Exams Not Asked     Parent/sibling w/ CABG, MI or angioplasty before 65F 55M? Not Asked   Social History Narrative     Not on file     Social Determinants of Health     Financial Resource Strain: Low Risk  (10/28/2023)    Financial Resource Strain      Within the past 12 months, have you or your family members you live with been unable to get utilities (heat, electricity) when it was really needed?: No   Food Insecurity: Low Risk  (10/28/2023)    Food Insecurity      Within the past 12 months, did you worry that your food would run out before you got money to buy more?: No       Within the past 12 months, did the food you bought just not last and you didn t have money to get more?: No   Transportation Needs: Low Risk  (10/28/2023)    Transportation Needs      Within the past 12 months, has lack of transportation kept you from medical appointments, getting your medicines, non-medical meetings or appointments, work, or from getting things that you need?: No   Physical Activity: Insufficiently Active (10/25/2022)    Exercise Vital Sign      Days of Exercise per Week: 1 day      Minutes of Exercise per Session: 10 min   Stress: No Stress Concern Present (10/25/2022)    [employer] [place] [employer] - Occupational Stress Questionnaire      Feeling of Stress : Only a little   Social Connections: Socially Integrated (10/25/2022)    Social Connection and Isolation Panel [NHANES]      Frequency of Communication with Friends and Family: Three times a week      Frequency of Social Gatherings with Friends and Family: Once a week      Attends [employer] Services: More than 4 times per year      Active Member of Clubs or Organizations: Yes      Attends Club or Organization Meetings: Not on file      Marital Status:    Interpersonal Safety: Low Risk  (10/31/2023)    Interpersonal Safety      Do you feel physically and emotionally safe where you currently live?: Yes      Within the past 12 months, have you been hit, slapped, kicked or otherwise physically hurt by someone?: Not on file      Within the past 12 months, have you been humiliated or emotionally abused in other ways by your partner or ex-partner?: No   Housing Stability: Low Risk  (10/28/2023)    Housing Stability      Do you have housing? : Yes      Are you worried about losing your housing?: No     Family history  Family History   Problem Relation Age of Onset     Hypertension Mother      Cerebrovascular Disease Father      Diabetes Maternal Grandmother      Cancer Paternal Grandmother      Congenital Anomalies Brother          several brothers and sisters born with congential heart defects, but all have been repaired     Congenital Anomalies Sister      Colon Cancer No family hx of           MEDICATIONS:  Current Outpatient Medications   Medication Sig Dispense Refill     acetaminophen (TYLENOL) 500 MG tablet Take 2 tablets (1,000 mg) by mouth every 6 hours as needed for mild pain 100 tablet 0     alendronate (FOSAMAX) 70 MG tablet Take 1 tablet (70 mg) by mouth every 7 days 12 tablet 3     amoxicillin (AMOXIL) 500 MG capsule        apixaban ANTICOAGULANT (ELIQUIS ANTICOAGULANT) 5 MG tablet Take 1 tablet (5 mg) by mouth 2 times daily 180 tablet 11     atorvastatin (LIPITOR) 20 MG tablet Take 1 tablet (20 mg) by mouth daily 90 tablet 11     baclofen (LIORESAL) 20 MG tablet Take 1 tablet (20 mg) by mouth 4 times daily 360 tablet 11     citalopram (CELEXA) 20 MG tablet Take 1 tablet (20 mg) by mouth daily 90 tablet 11     docusate sodium (COLACE) 100 MG tablet Take 200 mg by mouth nightly as needed       finasteride (PROSCAR) 5 MG tablet Take 1 tablet (5 mg) by mouth daily 90 tablet 3     ketoconazole (NIZORAL) 2 % external cream Apply topically daily       levETIRAcetam (KEPPRA) 1000 MG tablet Take 1 tablet (1,000 mg) by mouth 2 times daily 180 tablet 11     metoprolol succinate ER (TOPROL XL) 25 MG 24 hr tablet Take 0.5 tablets (12.5 mg) by mouth daily 45 tablet 11     mirabegron (MYRBETRIQ) 50 MG 24 hr tablet Take 1 tablet (50 mg) by mouth daily 90 tablet 11     Multiple Vitamin (MULTI-VITAMIN) per tablet Take 1 tablet by mouth daily  100 tablet 3     polyethylene glycol (MIRALAX) 17 g packet Take 17 g by mouth every other day PRN       senna-docusate (SENOKOT-S/PERICOLACE) 8.6-50 MG tablet Take 4 tablets by mouth daily as needed       tamsulosin (FLOMAX) 0.4 MG capsule Take 2 capsules (0.8 mg) by mouth daily 180 capsule 3     vitamin D3 (CHOLECALCIFEROL) 50 mcg (2000 units) tablet Take 1,000 Units by mouth daily 90 tablet 3         ALLERGIES:    Allergies   Allergen Reactions     Blood Transfusion Related (Informational Only) Other (See Comments)     Patient has a history of a clinically significant antibody against RBC antigens.  A delay in compatible RBCs may occur.     Lisinopril          PHYSICAL EXAM:   *video visit  GENERAL APPEARANCE: alert and no distress  RESP: normal work of breathing, no conversational dyspnea  NEURO: mentation intact and speech normal    LABS:   I reviewed:  Electrolytes/Renal -   Recent Labs   Lab Test 09/13/24  1018 10/31/23  0852 08/29/23  0948    139 142   POTASSIUM 4.5 4.6 4.2   CHLORIDE 105 103 105   CO2 29 27 27   BUN 11.7 12.2 15.6   CR 0.75 0.77 0.81   GLC 94 78 57*   STEPHANI 9.6 9.1 9.1   MAG 2.1  --  2.1   PHOS 2.9  --   --        CBC -   Recent Labs   Lab Test 05/02/24  1055 03/27/24  1028 12/12/23  0848   WBC 4.3 3.7* 3.1*   HGB 12.9* 10.8* 10.5*   * 164 189       LFTs -   Recent Labs   Lab Test 09/13/24  1018 10/31/23  0852 10/26/22  0822 10/19/21  0808   ALKPHOS  --  92 99 93   BILITOTAL  --  0.7 1.0 1.0   ALT  --  23 29 37   AST  --  30 33 27   PROTTOTAL  --  6.9 7.0 7.5   ALBUMIN 4.7 4.4 3.8 4.0       Coags -   Recent Labs   Lab Test 10/02/20  1928 12/04/17  0000 10/23/17  0000   INR 1.46* 2.4* 2.5*       Iron Panel -   Recent Labs   Lab Test 05/02/24  1055 03/27/24  1028 12/12/23  0848   IRON  --   --  25*   IRONSAT  --   --  7*   SUZANNE 512*   < > 14*    < > = values in this interval not displayed.       Endocrine -   Recent Labs   Lab Test 10/19/21  0808   TSH 2.82       IMAGING:  All imaging studies reviewed by me.       Virtual Visit Details    Type of service:  Video Visit   Joined the call at 9/18/2024, 1:06:15 pm.  Left the call at 9/18/2024, 1:26:49 pm.    Originating Location (pt. Location): Home  Distant Location (provider location):  Off-site  Platform used for Video Visit: Blanquita Frances MD       Again, thank you for allowing me to participate in the care of  your patient.      Sincerely,    Dawna Frances MD

## 2024-09-18 NOTE — NURSING NOTE
Current patient location: [address] [place] [address] [address]  [place] [address]    Is the patient currently in the state of [place]? YES    Visit mode:VIDEO    If the visit is dropped, the patient can be reconnected by: VIDEO VISIT: Text to cell phone:   Telephone Information:   Mobile [phone]       Will anyone else be joining the visit? NO  (If patient encounters technical issues they should call [phone]    How would you like to obtain your AVS? MyChart    Are changes needed to the allergy or medication list? No    Are refills needed on medications prescribed by this physician? NO    Rooming Documentation:  Questionnaire(s) not pre-assigned      Reason for visit: Video Visit (Recheck)    Jasmyn KHAN

## 2024-09-20 ENCOUNTER — DOCUMENTATION ONLY (OUTPATIENT)
Dept: NEPHROLOGY | Facility: CLINIC | Age: 69
End: 2024-09-20
Payer: MEDICARE

## 2024-09-20 NOTE — PROGRESS NOTES
Collection Date: 8/1/2025 Collection Time: 10:51 AM EREQ. Link Requisition #: [id number]    Litholink kit ordered per provider request.     Eugenia Gavin RN, BSN   Nephrology RN Care Coordinator   [facility]

## 2024-09-27 ENCOUNTER — TELEPHONE (OUTPATIENT)
Dept: NEPHROLOGY | Facility: CLINIC | Age: 69
End: 2024-09-27
Payer: MEDICARE

## 2024-09-27 NOTE — TELEPHONE ENCOUNTER
NIDIA and sent [facility] to schedule follow up around 9.18.25 with Dr. Frances// 9.27.24 KET

## 2024-09-30 ENCOUNTER — THERAPY VISIT (OUTPATIENT)
Dept: PHYSICAL THERAPY | Facility: CLINIC | Age: 69
End: 2024-09-30
Attending: PHYSICAL MEDICINE & REHABILITATION
Payer: MEDICARE

## 2024-09-30 ENCOUNTER — THERAPY VISIT (OUTPATIENT)
Dept: OCCUPATIONAL THERAPY | Facility: CLINIC | Age: 69
End: 2024-09-30
Attending: PHYSICAL MEDICINE & REHABILITATION
Payer: MEDICARE

## 2024-09-30 DIAGNOSIS — M75.02 ADHESIVE CAPSULITIS OF LEFT SHOULDER: ICD-10-CM

## 2024-09-30 DIAGNOSIS — I69.352 SPASTIC HEMIPARESIS OF LEFT DOMINANT SIDE AS LATE EFFECT OF CEREBRAL INFARCTION (H): Primary | ICD-10-CM

## 2024-09-30 DIAGNOSIS — M62.422 CONTRACTURE OF MUSCLE OF LEFT UPPER ARM: ICD-10-CM

## 2024-09-30 DIAGNOSIS — G81.94 LEFT HEMIPARESIS (H): Primary | ICD-10-CM

## 2024-09-30 PROCEDURE — 97110 THERAPEUTIC EXERCISES: CPT | Mod: GO

## 2024-09-30 PROCEDURE — 97116 GAIT TRAINING THERAPY: CPT | Mod: GP | Performed by: PHYSICAL THERAPIST

## 2024-09-30 PROCEDURE — 97112 NEUROMUSCULAR REEDUCATION: CPT | Mod: GP | Performed by: PHYSICAL THERAPIST

## 2024-09-30 PROCEDURE — 97110 THERAPEUTIC EXERCISES: CPT | Mod: GP | Performed by: PHYSICAL THERAPIST

## 2024-09-30 NOTE — PROGRESS NOTES
[facility]                                                                                   OUTPATIENT OCCUPATIONAL THERAPY    PLAN OF TREATMENT FOR OUTPATIENT REHABILITATION   Patient's Last Name, First Name, Marcus Mcgee YOB: 1955   Provider's Name   [facility]   Medical Record No.  [id number]     Onset Date: 07/02/24 Start of Care Date: 07/11/24     Medical Diagnosis:  Spastic hemiparesis of left dominant side as late effect of cerebral infarction (H) (I69.352)  Adhesive capsulitis of left shoulder (M75.02)  Contracture of muscle of left upper arm (M62.422)      OT Treatment Diagnosis:  Spastic hemiparesis of left dominant side as late effect of cerebral infarction (H) (I69.352) Adhesive capsulitis of left shoulder (M75.02) Contracture of muscle of left upper arm (M62.422) Plan of Treatment  Frequency/Duration:1x/every other week/90 days    Certification date from 09/30/24   To 12/29/24        See note for plan of treatment details and functional goals     LOUIS Leonard                         I CERTIFY THE NEED FOR THESE SERVICES FURNISHED UNDER        THIS PLAN OF TREATMENT AND WHILE UNDER MY CARE .             Physician Signature               Date    X_____________________________________________________                  Referring Provider:  Perla Haddad    Initial Assessment  See Epic Evaluation- 07/11/24          PLAN  Continue therapy per current plan of care.    Beginning/End Dates of Progress Note Reporting Period:  07/11/24 to 09/30/2024    Referring Provider:  Perla Haddad        09/30/24 0500   Appointment Info   Treating Provider Regina Tolentino OTR/L   Total/Authorized Visits Medicare   Visits Used Visit 8, 8 of 10   Medical Diagnosis Spastic hemiparesis of left dominant side as late effect of cerebral infarction (H) (I69.352)  Adhesive capsulitis of left shoulder (M75.02)  Contracture of  muscle of left upper arm (M62.422)   OT Tx Diagnosis Spastic hemiparesis of left dominant side as late effect of cerebral infarction (H) (I69.352) Adhesive capsulitis of left shoulder (M75.02) Contracture of muscle of left upper arm (M62.422)   Precautions/Limitations left spastic hemiparesis   Progress Note/Certification   Start Of Care Date 07/11/24   Onset of Illness/Injury or Date of Surgery 07/02/24   Therapy Frequency 1x/every other week   Predicted Duration 90 days   Certification date from 09/30/24   Certification date to 12/29/24   KX Modifier Statement I certify the need for these services furnished under this plan of treatment and while under my care.  (Physician co-signature of this document indicates review and certification of the therapy plan)   Progress Note Due Date 12/29/24   Progress Note Completed Date 09/30/24   Goals   OT Goals 1;2;3;4   OT Goal 1   Goal Identifier LUE HEP   Goal Description Pt will demonstrate understanding of BUE HEP in order to increase safety and independence with ADL tasks.   Goal Progress Goal progressing. Initiated training in PROM, SROM/AAROM, and weight-bearing activities to promote LUE ROM and tone management for improved engagement in daily activities and pain management. Pt reports consistent adherence to caregiver-supported PROM exercises with intermittent adherence to other activities. Will continue to progress and promote accountability with HEP as able.   Target Date 12/29/24   OT Goal 2   Goal Identifier pain   Goal Description Pt to self report decreased left shoulder pain rating to less than 0/10 on scale throughout week, in order to increase safety and independence with ADL/IADL tasks.   Goal Progress Goal progressing. Pt reports min improvement in shoulder pain/discomfort with increased consistency with ROM activities, as well as most recent round of Botox just completed. Will continue to educate and train in pain management techniques in future sessions.    Target Date 12/29/24   OT Goal 3   Goal Identifier L shoulder ROM   Goal Description Pt to demonstrate 10 degree increase with left shoulder PROM including shoulder flexion, shoulder abduction, in order to increase independence with UB dressing tasks.   Goal Progress Goal progressing. Initiated training in PROM and SROM/AAROM HEP to further promote tone management and improve L shoulder ROM. Pt with best adherence to PROM activities with assist from spouse between sessions. Pt reports that subjectively he feels little difference from evaluation to today. PROM of shoulder flexion approximately 90 degrees to abduction approximately  degrees this date (no significant change). Will continue to monitor progress and consider discharge with updated HEP in future pending pt overall progress.   Target Date 12/29/24   OT Goal 4   Goal Identifier AD/AE   Goal Description Patient to verbalize understanding of and demonstrate use of 3-5 new pieces of adaptive equipment and/or adapted techniques to increase independence and safety with ADLs and IADLs.   Goal Progress Goal progressing. Briefly reviewed education in both splint and NMES use for tone management. Will monitor needs and further progress education in future sessions as appropriate.   Target Date 12/29/24   Subjective Report   Subjective Report Lex reports he is doing well overall. He endorses that he had Botox completed since his last session and feels it went well.   Objective Measures   Objective Measures Objective Measure 1;Objective Measure 2   Objective Measure 1   Objective Measure Pain   Objective Measure 2   Objective Measure L shoulder PROM   Details As of 9/30/24 - PROM of shoulder flexion approximately 90 degrees to abduction approximately  degrees   Therapeutic Procedure/Exercise   Therapeutic Procedure: strength, endurance, ROM, flexibillity minutes (81090) 45   Ther Proc 1 LUE ROM   Ther Proc 1 - Details To promote tone management and ROM  "for joint integrity and functional use of LUE, facilitated first trunk and scapular mobility with manual assist to promote upper trunk extension and scapular depression for approximation of glenohumeral joint. To further progress PROM exercises using [facility] approved handout \"Passive Arm Exercises,\" administered about 20 sec holds x2-3 repetitions of each of the following in supine: 1) shoulder flexion, 2) shoulder extension, 3) shoulder abduction, 4) shoulder horizontal abduction/adduction, 5) shoulder internal/external rotation, 6) elbow flexion/extension, 7) supination/pronation, 8) wrist flexion/extension, 9) thumb circumduction, 10) thumb and digit flexion/extension. Further, progressed weight-bearing into L hand on mat table with max A from OTR for assisting with correct position and placing weight into hand, first providing joint mobilization to wrist to promote extension. Min pressure placed on hand to promote tone management with precaution of patient's contractures in hand to prevent any injury this date.   Skilled Intervention Skilled education and training in ROM exercises to increase left UE ROM and decrease pain in order to increase safety and IND with ADL tasks.   Patient Response/Progress Lex denies pain with exercises, reports weight-bearing in the hand feels good. Goals 1-3 progressing   Education   Learner/Method Patient;Listening;Reading;Demonstration;Pictures/Video   Education Comments see tx details above   Plan   Home program PROM exercises. SROM/AAROM table slides. NMES. Weight-bearing.   Plan for next session Cont. LUE PROM with added NMES for further spasticity management. Review/progress table slides. More weight-bearing with postural shifts as able. Scapular mobility. AE education prn.   Total Session Time   Timed Code Treatment Minutes 45   Total Treatment Time (sum of timed and untimed services) 45       "

## 2024-10-14 ENCOUNTER — THERAPY VISIT (OUTPATIENT)
Dept: OCCUPATIONAL THERAPY | Facility: CLINIC | Age: 69
End: 2024-10-14
Attending: PHYSICAL MEDICINE & REHABILITATION
Payer: MEDICARE

## 2024-10-14 ENCOUNTER — THERAPY VISIT (OUTPATIENT)
Dept: PHYSICAL THERAPY | Facility: CLINIC | Age: 69
End: 2024-10-14
Attending: PHYSICAL MEDICINE & REHABILITATION
Payer: MEDICARE

## 2024-10-14 DIAGNOSIS — M75.02 ADHESIVE CAPSULITIS OF LEFT SHOULDER: ICD-10-CM

## 2024-10-14 DIAGNOSIS — G81.94 LEFT HEMIPARESIS (H): Primary | ICD-10-CM

## 2024-10-14 DIAGNOSIS — I69.352 SPASTIC HEMIPARESIS OF LEFT DOMINANT SIDE AS LATE EFFECT OF CEREBRAL INFARCTION (H): Primary | ICD-10-CM

## 2024-10-14 DIAGNOSIS — M62.422 CONTRACTURE OF MUSCLE OF LEFT UPPER ARM: ICD-10-CM

## 2024-10-14 PROCEDURE — 97110 THERAPEUTIC EXERCISES: CPT | Mod: GP | Performed by: PHYSICAL THERAPIST

## 2024-10-14 PROCEDURE — 97110 THERAPEUTIC EXERCISES: CPT | Mod: GO

## 2024-10-14 PROCEDURE — 97116 GAIT TRAINING THERAPY: CPT | Mod: GP | Performed by: PHYSICAL THERAPIST

## 2024-10-18 ENCOUNTER — MYC MEDICAL ADVICE (OUTPATIENT)
Dept: PEDIATRICS | Facility: CLINIC | Age: 69
End: 2024-10-18
Payer: MEDICARE

## 2024-10-18 ENCOUNTER — MYC REFILL (OUTPATIENT)
Dept: PEDIATRICS | Facility: CLINIC | Age: 69
End: 2024-10-18
Payer: MEDICARE

## 2024-10-18 DIAGNOSIS — M81.0 OSTEOPOROSIS, UNSPECIFIED OSTEOPOROSIS TYPE, UNSPECIFIED PATHOLOGICAL FRACTURE PRESENCE: ICD-10-CM

## 2024-10-18 RX ORDER — ALENDRONATE SODIUM 70 MG/1
70 TABLET ORAL
Qty: 12 TABLET | Refills: 0 | Status: SHIPPED | OUTPATIENT
Start: 2024-10-18 | End: 2024-11-05

## 2024-10-28 ENCOUNTER — THERAPY VISIT (OUTPATIENT)
Dept: OCCUPATIONAL THERAPY | Facility: CLINIC | Age: 69
End: 2024-10-28
Attending: PHYSICAL MEDICINE & REHABILITATION
Payer: MEDICARE

## 2024-10-28 ENCOUNTER — THERAPY VISIT (OUTPATIENT)
Dept: PHYSICAL THERAPY | Facility: CLINIC | Age: 69
End: 2024-10-28
Attending: PHYSICAL MEDICINE & REHABILITATION
Payer: MEDICARE

## 2024-10-28 DIAGNOSIS — M75.02 ADHESIVE CAPSULITIS OF LEFT SHOULDER: ICD-10-CM

## 2024-10-28 DIAGNOSIS — I69.352 SPASTIC HEMIPARESIS OF LEFT DOMINANT SIDE AS LATE EFFECT OF CEREBRAL INFARCTION (H): Primary | ICD-10-CM

## 2024-10-28 DIAGNOSIS — M62.422 CONTRACTURE OF MUSCLE OF LEFT UPPER ARM: ICD-10-CM

## 2024-10-28 DIAGNOSIS — G81.94 LEFT HEMIPARESIS (H): Primary | ICD-10-CM

## 2024-10-28 PROCEDURE — 97116 GAIT TRAINING THERAPY: CPT | Mod: GP | Performed by: PHYSICAL THERAPIST

## 2024-10-28 PROCEDURE — 97110 THERAPEUTIC EXERCISES: CPT | Mod: GP | Performed by: PHYSICAL THERAPIST

## 2024-10-28 PROCEDURE — 97110 THERAPEUTIC EXERCISES: CPT | Mod: GO

## 2024-10-31 SDOH — HEALTH STABILITY: PHYSICAL HEALTH: ON AVERAGE, HOW MANY MINUTES DO YOU ENGAGE IN EXERCISE AT THIS LEVEL?: 20 MIN

## 2024-10-31 SDOH — HEALTH STABILITY: PHYSICAL HEALTH: ON AVERAGE, HOW MANY DAYS PER WEEK DO YOU ENGAGE IN MODERATE TO STRENUOUS EXERCISE (LIKE A BRISK WALK)?: 3 DAYS

## 2024-10-31 ASSESSMENT — SOCIAL DETERMINANTS OF HEALTH (SDOH): HOW OFTEN DO YOU GET TOGETHER WITH FRIENDS OR RELATIVES?: TWICE A WEEK

## 2024-11-05 ENCOUNTER — OFFICE VISIT (OUTPATIENT)
Dept: PEDIATRICS | Facility: CLINIC | Age: 69
End: 2024-11-05
Attending: INTERNAL MEDICINE
Payer: MEDICARE

## 2024-11-05 VITALS
RESPIRATION RATE: 16 BRPM | HEIGHT: 72 IN | SYSTOLIC BLOOD PRESSURE: 122 MMHG | WEIGHT: 173.3 LBS | DIASTOLIC BLOOD PRESSURE: 70 MMHG | TEMPERATURE: 96.8 F | BODY MASS INDEX: 23.47 KG/M2 | OXYGEN SATURATION: 99 % | HEART RATE: 60 BPM

## 2024-11-05 DIAGNOSIS — F33.1 MAJOR DEPRESSIVE DISORDER, RECURRENT EPISODE, MODERATE (H): ICD-10-CM

## 2024-11-05 DIAGNOSIS — M81.0 OSTEOPOROSIS, UNSPECIFIED OSTEOPOROSIS TYPE, UNSPECIFIED PATHOLOGICAL FRACTURE PRESENCE: ICD-10-CM

## 2024-11-05 DIAGNOSIS — I48.20 CHRONIC ATRIAL FIBRILLATION (H): ICD-10-CM

## 2024-11-05 DIAGNOSIS — H53.40 VISUAL FIELD DEFECT: ICD-10-CM

## 2024-11-05 DIAGNOSIS — K59.2 NEUROGENIC BOWEL: ICD-10-CM

## 2024-11-05 DIAGNOSIS — E78.5 HYPERLIPIDEMIA LDL GOAL <100: ICD-10-CM

## 2024-11-05 DIAGNOSIS — N40.1 BPH WITH URINARY OBSTRUCTION: ICD-10-CM

## 2024-11-05 DIAGNOSIS — S06.9X9S TRAUMATIC BRAIN INJURY WITH LOSS OF CONSCIOUSNESS, SEQUELA (H): ICD-10-CM

## 2024-11-05 DIAGNOSIS — N31.9 NEUROGENIC BLADDER: ICD-10-CM

## 2024-11-05 DIAGNOSIS — D64.9 NORMOCYTIC ANEMIA: ICD-10-CM

## 2024-11-05 DIAGNOSIS — N13.8 BPH WITH URINARY OBSTRUCTION: ICD-10-CM

## 2024-11-05 DIAGNOSIS — I69.352 SPASTIC HEMIPARESIS OF LEFT DOMINANT SIDE AS LATE EFFECT OF CEREBRAL INFARCTION (H): ICD-10-CM

## 2024-11-05 DIAGNOSIS — E53.8 VITAMIN B 12 DEFICIENCY: ICD-10-CM

## 2024-11-05 DIAGNOSIS — Z86.73 HISTORY OF CVA (CEREBROVASCULAR ACCIDENT): ICD-10-CM

## 2024-11-05 DIAGNOSIS — Z00.00 ENCOUNTER FOR MEDICARE ANNUAL WELLNESS EXAM: Primary | ICD-10-CM

## 2024-11-05 PROBLEM — Q21.10 ATRIAL SEPTAL DEFECT: Status: ACTIVE | Noted: 2024-11-05

## 2024-11-05 LAB
ALBUMIN SERPL BCG-MCNC: 4.4 G/DL (ref 3.5–5.2)
ALP SERPL-CCNC: 108 U/L (ref 40–150)
ALT SERPL W P-5'-P-CCNC: 24 U/L (ref 0–70)
ANION GAP SERPL CALCULATED.3IONS-SCNC: 10 MMOL/L (ref 7–15)
AST SERPL W P-5'-P-CCNC: 29 U/L (ref 0–45)
BASOPHILS # BLD AUTO: 0 10E3/UL (ref 0–0.2)
BASOPHILS NFR BLD AUTO: 1 %
BILIRUB SERPL-MCNC: 1 MG/DL
BUN SERPL-MCNC: 12.5 MG/DL (ref 8–23)
CALCIUM SERPL-MCNC: 9.5 MG/DL (ref 8.8–10.4)
CHLORIDE SERPL-SCNC: 106 MMOL/L (ref 98–107)
CHOLEST SERPL-MCNC: 126 MG/DL
CREAT SERPL-MCNC: 0.73 MG/DL (ref 0.67–1.17)
EGFRCR SERPLBLD CKD-EPI 2021: >90 ML/MIN/1.73M2
EOSINOPHIL # BLD AUTO: 0.1 10E3/UL (ref 0–0.7)
EOSINOPHIL NFR BLD AUTO: 3 %
ERYTHROCYTE [DISTWIDTH] IN BLOOD BY AUTOMATED COUNT: 12.9 % (ref 10–15)
FASTING STATUS PATIENT QL REPORTED: YES
FASTING STATUS PATIENT QL REPORTED: YES
FERRITIN SERPL-MCNC: 176 NG/ML (ref 31–409)
GLUCOSE SERPL-MCNC: 89 MG/DL (ref 70–99)
HCO3 SERPL-SCNC: 27 MMOL/L (ref 22–29)
HCT VFR BLD AUTO: 42.7 % (ref 40–53)
HDLC SERPL-MCNC: 70 MG/DL
HGB BLD-MCNC: 13.5 G/DL (ref 13.3–17.7)
IMM GRANULOCYTES # BLD: 0 10E3/UL
IMM GRANULOCYTES NFR BLD: 0 %
LDLC SERPL CALC-MCNC: 47 MG/DL
LYMPHOCYTES # BLD AUTO: 0.6 10E3/UL (ref 0.8–5.3)
LYMPHOCYTES NFR BLD AUTO: 19 %
MCH RBC QN AUTO: 30 PG (ref 26.5–33)
MCHC RBC AUTO-ENTMCNC: 31.6 G/DL (ref 31.5–36.5)
MCV RBC AUTO: 95 FL (ref 78–100)
MONOCYTES # BLD AUTO: 0.3 10E3/UL (ref 0–1.3)
MONOCYTES NFR BLD AUTO: 10 %
NEUTROPHILS # BLD AUTO: 2.1 10E3/UL (ref 1.6–8.3)
NEUTROPHILS NFR BLD AUTO: 67 %
NONHDLC SERPL-MCNC: 56 MG/DL
PLATELET # BLD AUTO: 147 10E3/UL (ref 150–450)
POTASSIUM SERPL-SCNC: 4.8 MMOL/L (ref 3.4–5.3)
PROT SERPL-MCNC: 6.9 G/DL (ref 6.4–8.3)
RBC # BLD AUTO: 4.5 10E6/UL (ref 4.4–5.9)
SODIUM SERPL-SCNC: 143 MMOL/L (ref 135–145)
TRIGL SERPL-MCNC: 45 MG/DL
VIT B12 SERPL-MCNC: 690 PG/ML (ref 232–1245)
WBC # BLD AUTO: 3.2 10E3/UL (ref 4–11)

## 2024-11-05 PROCEDURE — 90662 IIV NO PRSV INCREASED AG IM: CPT | Performed by: INTERNAL MEDICINE

## 2024-11-05 PROCEDURE — 85025 COMPLETE CBC W/AUTO DIFF WBC: CPT | Performed by: INTERNAL MEDICINE

## 2024-11-05 PROCEDURE — 82607 VITAMIN B-12: CPT | Performed by: INTERNAL MEDICINE

## 2024-11-05 PROCEDURE — 36415 COLL VENOUS BLD VENIPUNCTURE: CPT | Performed by: INTERNAL MEDICINE

## 2024-11-05 PROCEDURE — 80053 COMPREHEN METABOLIC PANEL: CPT | Performed by: INTERNAL MEDICINE

## 2024-11-05 PROCEDURE — 99214 OFFICE O/P EST MOD 30 MIN: CPT | Mod: 25 | Performed by: INTERNAL MEDICINE

## 2024-11-05 PROCEDURE — 80061 LIPID PANEL: CPT | Performed by: INTERNAL MEDICINE

## 2024-11-05 PROCEDURE — 82728 ASSAY OF FERRITIN: CPT | Performed by: INTERNAL MEDICINE

## 2024-11-05 PROCEDURE — G0439 PPPS, SUBSEQ VISIT: HCPCS | Performed by: INTERNAL MEDICINE

## 2024-11-05 PROCEDURE — G0008 ADMIN INFLUENZA VIRUS VAC: HCPCS | Performed by: INTERNAL MEDICINE

## 2024-11-05 RX ORDER — ALENDRONATE SODIUM 70 MG/1
70 TABLET ORAL
Qty: 12 TABLET | Refills: 0 | OUTPATIENT
Start: 2024-11-05

## 2024-11-05 RX ORDER — CITALOPRAM HYDROBROMIDE 20 MG/1
20 TABLET ORAL DAILY
Qty: 90 TABLET | Refills: 11 | Status: SHIPPED | OUTPATIENT
Start: 2024-11-05

## 2024-11-05 RX ORDER — METOPROLOL SUCCINATE 25 MG/1
12.5 TABLET, EXTENDED RELEASE ORAL DAILY
Qty: 45 TABLET | Refills: 11 | Status: SHIPPED | OUTPATIENT
Start: 2024-11-05

## 2024-11-05 RX ORDER — LEVETIRACETAM 1000 MG/1
1000 TABLET ORAL 2 TIMES DAILY
Qty: 180 TABLET | Refills: 11 | Status: SHIPPED | OUTPATIENT
Start: 2024-11-05

## 2024-11-05 RX ORDER — BACLOFEN 20 MG/1
20 TABLET ORAL 4 TIMES DAILY
Qty: 360 TABLET | Refills: 11 | Status: SHIPPED | OUTPATIENT
Start: 2024-11-05

## 2024-11-05 RX ORDER — TAMSULOSIN HYDROCHLORIDE 0.4 MG/1
0.8 CAPSULE ORAL DAILY
Qty: 180 CAPSULE | Refills: 11 | Status: SHIPPED | OUTPATIENT
Start: 2024-11-05

## 2024-11-05 RX ORDER — FINASTERIDE 5 MG/1
5 TABLET, FILM COATED ORAL DAILY
Qty: 90 TABLET | Refills: 3 | Status: SHIPPED | OUTPATIENT
Start: 2024-11-05

## 2024-11-05 RX ORDER — ATORVASTATIN CALCIUM 20 MG/1
20 TABLET, FILM COATED ORAL DAILY
Qty: 90 TABLET | Refills: 11 | Status: SHIPPED | OUTPATIENT
Start: 2024-11-05

## 2024-11-05 RX ORDER — MIRABEGRON 50 MG/1
50 TABLET, FILM COATED, EXTENDED RELEASE ORAL DAILY
Qty: 90 TABLET | Refills: 11 | Status: SHIPPED | OUTPATIENT
Start: 2024-11-05

## 2024-11-05 RX ORDER — ALENDRONATE SODIUM 70 MG/1
70 TABLET ORAL
Qty: 12 TABLET | Refills: 3 | Status: SHIPPED | OUTPATIENT
Start: 2024-11-05

## 2024-11-05 ASSESSMENT — PATIENT HEALTH QUESTIONNAIRE - PHQ9
SUM OF ALL RESPONSES TO PHQ QUESTIONS 1-9: 1
SUM OF ALL RESPONSES TO PHQ QUESTIONS 1-9: 1
10. IF YOU CHECKED OFF ANY PROBLEMS, HOW DIFFICULT HAVE THESE PROBLEMS MADE IT FOR YOU TO DO YOUR WORK, TAKE CARE OF THINGS AT HOME, OR GET ALONG WITH OTHER PEOPLE: NOT DIFFICULT AT ALL

## 2024-11-05 ASSESSMENT — PAIN SCALES - GENERAL: PAINLEVEL_OUTOF10: NO PAIN (1)

## 2024-11-05 NOTE — PATIENT INSTRUCTIONS
Patient Education   Preventive Care Advice   This is general advice given by our system to help you stay healthy. However, your care team may have specific advice just for you. Please talk to your care team about your preventive care needs.  Nutrition  Eat 5 or more servings of fruits and vegetables each day.  Try wheat bread, brown rice and whole grain pasta (instead of white bread, rice, and pasta).  Get enough calcium and vitamin D. Check the label on foods and aim for 100% of the RDA (recommended daily allowance).  Lifestyle  Exercise at least 150 minutes each week  (30 minutes a day, 5 days a week).  Do muscle strengthening activities 2 days a week. These help control your weight and prevent disease.  No smoking.  Wear sunscreen to prevent skin cancer.  Have a dental exam and cleaning every 6 months.  Yearly exams  See your health care team every year to talk about:  Any changes in your health.  Any medicines your care team has prescribed.  Preventive care, family planning, and ways to prevent chronic diseases.  Shots (vaccines)   HPV shots (up to age 26), if you've never had them before.  Hepatitis B shots (up to age 59), if you've never had them before.  COVID-19 shot: Get this shot when it's due.  Flu shot: Get a flu shot every year.  Tetanus shot: Get a tetanus shot every 10 years.  Pneumococcal, hepatitis A, and RSV shots: Ask your care team if you need these based on your risk.  Shingles shot (for age 50 and up)  General health tests  Diabetes screening:  Starting at age 35, Get screened for diabetes at least every 3 years.  If you are younger than age 35, ask your care team if you should be screened for diabetes.  Cholesterol test: At age 39, start having a cholesterol test every 5 years, or more often if advised.  Bone density scan (DEXA): At age 50, ask your care team if you should have this scan for osteoporosis (brittle bones).  Hepatitis C: Get tested at least once in your life.  STIs (sexually  transmitted infections)  Before age 24: Ask your care team if you should be screened for STIs.  After age 24: Get screened for STIs if you're at risk. You are at risk for STIs (including HIV) if:  You are sexually active with more than one person.  You don't use condoms every time.  You or a partner was diagnosed with a sexually transmitted infection.  If you are at risk for HIV, ask about PrEP medicine to prevent HIV.  Get tested for HIV at least once in your life, whether you are at risk for HIV or not.  Cancer screening tests  Cervical cancer screening: If you have a cervix, begin getting regular cervical cancer screening tests starting at age 21.  Breast cancer scan (mammogram): If you've ever had breasts, begin having regular mammograms starting at age 40. This is a scan to check for breast cancer.  Colon cancer screening: It is important to start screening for colon cancer at age 45.  Have a colonoscopy test every 10 years (or more often if you're at risk) Or, ask your provider about stool tests like a FIT test every year or Cologuard test every 3 years.  To learn more about your testing options, visit:   .  For help making a decision, visit:   https://bit.ly/vw74448.  Prostate cancer screening test: If you have a prostate, ask your care team if a prostate cancer screening test (PSA) at age 55 is right for you.  Lung cancer screening: If you are a current or former smoker ages 50 to 80, ask your care team if ongoing lung cancer screenings are right for you.  For informational purposes only. Not to replace the advice of your health care provider. Copyright   2023 [facility]. All rights reserved. Clinically reviewed by the [facility]. [employer] [id number] - REV 01/24.  Learning About Activities of Daily Living  What are activities of daily living?     Activities of daily living (ADLs) are the basic self-care tasks you do every day. These include eating, bathing, dressing,  and moving around.  As you age, and if you have health problems, you may find that it's harder to do some of these tasks. If so, your doctor can suggest ideas that may help.  To measure what kind of help you may need, your doctor will ask how well you are able to do ADLs. Let your doctor know if there are any tasks that you are having trouble doing. This is an important first step to getting help. And when you have the help you need, you can stay as independent as possible.  How will a doctor assess your ADLs?  Asking about ADLs is part of a routine health checkup your doctor will likely do as you age. Your health check might be done in a doctor's office, in your home, or at a hospital. The goal is to find out if you are having any problems that could make it hard to care for yourself or that make it unsafe for you to be on your own.  To measure your ADLs, your doctor will ask how hard it is for you to do routine tasks. Your doctor may also want to know if you have changed the way you do a task because of a health problem. Your doctor may watch how you:  Walk back and forth.  Keep your balance while you stand or walk.  Move from sitting to standing or from a bed to a chair.  Button or unbutton a shirt or sweater.  Remove and put on your shoes.  It's common to feel a little worried or anxious if you find you can't do all the things you used to be able to do. Talking with your doctor about ADLs is a way to make sure you're as safe as possible and able to care for yourself as well as you can. You may want to bring a caregiver, friend, or family member to your checkup. They can help you talk to your doctor.  Follow-up care is a key part of your treatment and safety. Be sure to make and go to all appointments, and call your doctor if you are having problems. It's also a good idea to know your test results and keep a list of the medicines you take.  Current as of: October 24, 2023  Content Version: 14.2 2024 [facility]  [employer].   Care instructions adapted under license by your healthcare professional. If you have questions about a medical condition or this instruction, always ask your healthcare professional. [employer] disclaims any warranty or liability for your use of this information.

## 2024-11-05 NOTE — PROGRESS NOTES
Preventive Care Visit  [facility] MALIHA Aviles MD, Internal Medicine - Pediatrics  Nov 5, 2024      Assessment & Plan     (Z00.00) Encounter for Medicare annual wellness exam  (primary encounter diagnosis)    (Z86.73) History of CVA (cerebrovascular accident)  (S06.9X9S) Traumatic brain injury with loss of consciousness, sequela (H)  (H53.40) Visual field defect  Comment:   Plan: levETIRAcetam (KEPPRA) 1000 MG tablet        Persistent left sided hemiparesis with impaired gait, neurogenic bladder and bowel following prior stroke.  Patient's wife is primary caretaker.  Continues physical and Occupational Therapy which has helped with associated spasticity/continue.    (I69.352) Spastic hemiparesis of left dominant side as late effect of cerebral infarction (H)  Comment: Continue baclofen.  Continues Botox injections through [facility]  Plan: baclofen (LIORESAL) 20 MG tablet          (I48.20) Chronic atrial fibrillation (H)  Comment:   Plan: apixaban ANTICOAGULANT (ELIQUIS ANTICOAGULANT)         5 MG tablet, metoprolol succinate ER (TOPROL         XL) 25 MG 24 hr tablet  Continue chronic anticoagulation.  Rate controlled-metoprolol.          (E78.5) Hyperlipidemia LDL goal <100  Comment:   Lab Results   Component Value Date    LDL 47 11/05/2024    LDL 42 06/15/2020    Plan: atorvastatin (LIPITOR) 20 MG tablet,         Comprehensive metabolic panel (BMP + Alb, Alk         Phos, ALT, AST, Total. Bili, TP), Lipid panel         reflex to direct LDL Fasting        Well-controlled, continue atorvastatin.    (K59.2) Neurogenic bowel  Comment:   Plan: Continue home bowel regimen    (N31.9) Neurogenic bladder  Comment: (N40.1,  N13.8) BPH with urinary obstruction  Plan: mirabegron (MYRBETRIQ) 50 MG 24 hr tablet,         tamsulosin (FLOMAX) 0.4 MG capsule        History of neurogenic bladder and BPH.  Continue Myrbetriq/tamsulosin/finasteride.    (D64.9) Normocytic anemia  Comment:   Lab Results    Component Value Date    HGB 13.5 11/05/2024    HGB 12.9 05/02/2024   Plan: CBC with platelets and differential, Ferritin      Chronic low-grade anemia.  Recent colonoscopy February 2024-2 polyps removed.  Hemoglobin stable.           (F33.1) Major depressive disorder, recurrent episode, moderate (H)  Comment: Mood stable continue citalopram.  Continues to maintain good relationships with his family online and friends at [employer] which has helped  Plan: citalopram (CELEXA) 20 MG tablet          (M81.0) Osteoporosis, unspecified osteoporosis type, unspecified pathological fracture presence  Comment: Continue Fosamax.  Repeat DEXA  Plan: DX Bone Density, alendronate (FOSAMAX) 70 MG         tablet          (E53.8) Vitamin B 12 deficiency  Comment:   Plan: Vitamin B12            Counseling  Appropriate preventive services were addressed with this patient via screening, questionnaire, or discussion as appropriate for fall prevention, nutrition, physical activity, Tobacco-use cessation, social engagement, weight loss and cognition.  Checklist reviewing preventive services available has been given to the patient.          Bob Burnett is a 69 year old, presenting for the following:  Physical        11/5/2024     8:48 AM   Additional Questions   Roomed by jase   Accompanied by wife robbin         11/5/2024     8:48 AM   Patient Reported Additional Medications   Patient reports taking the following new medications na           HPI      Health Care Directive  Patient has a Health Care Directive on file  Advance care planning document is on file and is current.      10/31/2024   General Health   How would you rate your overall physical health? Good   Feel stress (tense, anxious, or unable to sleep) To some extent            10/31/2024   Nutrition   Diet: Vegetarian/vegan            10/31/2024   Exercise   Days per week of moderate/strenous exercise 3 days   Average minutes spent exercising at this level 20 min             10/31/2024   Social Factors   Frequency of gathering with friends or relatives Twice a week   Worry food won't last until get money to buy more No   Food not last or not have enough money for food? Patient declined   Do you have housing? (Housing is defined as stable permanent housing and does not include staying ouside in a car, in a tent, in an abandoned building, in an overnight shelter, or couch-surfing.) Yes   Are you worried about losing your housing? No   Lack of transportation? No   Unable to get utilities (heat,electricity)? No            10/31/2024   Fall Risk   Fallen 2 or more times in the past year? No     No    Trouble with walking or balance? No     No        Patient-reported    Multiple values from one day are sorted in reverse-chronological order          10/31/2024   Activities of Daily Living- Home Safety   Needs help with the following daily activites Transportation    Shopping    Preparing meals    Housework    Bathing    Laundry    Medication administration    Dressing   Safety concerns in the home None of the above       Multiple values from one day are sorted in reverse-chronological order         10/31/2024   Dental   Dentist two times every year? Yes            10/31/2024   Hearing Screening   Hearing concerns? None of the above            10/31/2024   Driving Risk Screening   Patient/family members have concerns about driving No            10/31/2024   General Alertness/Fatigue Screening   Have you been more tired than usual lately? No            10/31/2024   Urinary Incontinence Screening   Bothered by leaking urine in past 6 months No            10/31/2024   TB Screening   Were you born outside of the [place]? No          Today's PHQ-9 Score:       11/5/2024     8:50 AM   PHQ-9 SCORE   PHQ-9 Total Score MyChart 1 (Minimal depression)   PHQ-9 Total Score 1        Patient-reported         10/31/2024   Substance Use   Alcohol more than 3/day or more than 7/wk Not Applicable   Do you have a  current opioid prescription? No   How severe/bad is pain from 1 to 10? 1/10   Do you use any other substances recreationally? No        Social History     Tobacco Use    Smoking status: Never    Smokeless tobacco: Never   Vaping Use    Vaping status: Never Used   Substance Use Topics    Alcohol use: No    Drug use: Never           10/31/2024   AAA Screening   Family history of Abdominal Aortic Aneurysm (AAA)? No      Last PSA:   PSA   Date Value Ref Range Status   06/24/2021 1.85 0 - 4 ug/L Final     Comment:     Assay Method:  Chemiluminescence using Siemens Vista analyzer     Prostate Specific Antigen Screen   Date Value Ref Range Status   10/31/2023 1.29 0.00 - 4.50 ng/mL Final     PSA Tumor Marker   Date Value Ref Range Status   08/11/2023 1.69 0.00 - 4.50 ng/mL Final   08/02/2022 1.84 0.00 - 4.00 ug/L Final     ASCVD Risk   The ASCVD Risk score (Ulises KEMP, et al., 2019) failed to calculate for the following reasons:    Risk score cannot be calculated because patient has a medical history suggesting prior/existing ASCVD            Reviewed and updated as needed this visit by Provider                    Patient Active Problem List   Diagnosis    * * * SBE PROPHYLAXIS * * *    Vitamin D deficiency    Hyperlipidemia LDL goal <100    Long-term (current) use of anticoagulants    Urinary incontinence    Neurogenic bladder    Chronic atrial fibrillation (H)    Tricuspid regurgitation    Mitral regurgitation    Major depressive disorder, recurrent episode, moderate (H)    Traumatic brain injury with loss of consciousness, sequela (H)    Neurogenic bowel    Left renal stone    Visual field defect    Normocytic anemia    Benign prostatic hyperplasia with urinary retention    History of CVA (cerebrovascular accident)    Anemia, iron deficiency    Spastic hemiparesis of left dominant side as late effect of cerebral infarction (H)    Adhesive capsulitis of left shoulder    Contracture of muscle of left upper arm     Atrial septal defect     Past Surgical History:   Procedure Laterality Date    CATHETER, ABLATION  2004    COLONOSCOPY N/A 02/22/2024    Procedure: Colonoscopy with polypectomies using cold exacto snare, hemorrhage control using one hemoclip and 3 cc tattoo using scleroneedle, retrieval of polyp using mena net;  Surgeon: Don Dixon MD;  Location:  GI    COMBINED CYSTOSCOPY, INSERT CATHETER URETER  09/28/2020    Procedure: cystoscopy and left ureteral catheter placement, left ureteral stent placement;  Surgeon: Nehemiah Bloom MD;  Location:  OR    Craniotomy reconstruction  2012    [facility], repair of hemicraniectomy defect    CYSTOSCOPY      DAVINCI PYELOPLASTY Left 09/28/2020    Procedure: left robotic assisted laparoscopic pyelolithotomy;  Surgeon: Nehemiah Bloom MD;  Location:  OR    EYE SURGERY      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    GASTROSTOMY TUBE  04/2012 [facility], following CVA    HERNIA REPAIR      IR CAROTID ANGIOGRAM  04/22/2012    IR CAROTID ANGIOGRAM  04/22/2012    IR MISCELLANEOUS PROCEDURE  04/22/2012    IR MISCELLANEOUS PROCEDURE  04/25/2012    LASER KTP GREEN LIGHT PHOTOSELECTIVE VAPORIZATION PROSTATE N/A 12/01/2020    Procedure: Cystoscopy and greenlight photovaporization of the prostate;  Surgeon: Nehemiah Bloom MD;  Location:  OR    REPAIR ATRIAL SEPTAL DEFECT  1978    ASD Repair    Right hemicraniectomy  04/2012 [facility], following CVA, mgmt malignant cerebral edema    SURGICAL HISTORY OF -   2009    right eye enucleation    TRACHEOSTOMY  04/2012 [facility], following CVA    ZZC NONSPECIFIC PROCEDURE      tonsillectomy    ZZC NONSPECIFIC PROCEDURE      Ing. Hernia Repair       Social History     Tobacco Use    Smoking status: Never    Smokeless tobacco: Never   Substance Use Topics    Alcohol use: No     Family History   Problem Relation Age of Onset    Hypertension Mother     Diabetes Mother         Developed type 2  diabetes in her eighties    Cerebrovascular Disease Father     Diabetes Maternal Grandmother     Cancer Paternal Grandmother     Congenital Anomalies Brother         several brothers and sisters born with congential heart defects, but all have been repaired    Congenital Anomalies Sister     Diabetes Maternal Grandfather     Colon Cancer No family hx of          Current Outpatient Medications   Medication Sig Dispense Refill    acetaminophen (TYLENOL) 500 MG tablet Take 2 tablets (1,000 mg) by mouth every 6 hours as needed for mild pain 100 tablet 0    alendronate (FOSAMAX) 70 MG tablet Take 1 tablet (70 mg) by mouth every 7 days. 12 tablet 3    amoxicillin (AMOXIL) 500 MG capsule       apixaban ANTICOAGULANT (ELIQUIS ANTICOAGULANT) 5 MG tablet Take 1 tablet (5 mg) by mouth 2 times daily. 180 tablet 11    atorvastatin (LIPITOR) 20 MG tablet Take 1 tablet (20 mg) by mouth daily. 90 tablet 11    baclofen (LIORESAL) 20 MG tablet Take 1 tablet (20 mg) by mouth 4 times daily. 360 tablet 11    citalopram (CELEXA) 20 MG tablet Take 1 tablet (20 mg) by mouth daily. 90 tablet 11    docusate sodium (COLACE) 100 MG tablet Take 200 mg by mouth nightly as needed      finasteride (PROSCAR) 5 MG tablet Take 1 tablet (5 mg) by mouth daily. 90 tablet 3    ketoconazole (NIZORAL) 2 % external cream Apply topically daily      levETIRAcetam (KEPPRA) 1000 MG tablet Take 1 tablet (1,000 mg) by mouth 2 times daily. 180 tablet 11    metoprolol succinate ER (TOPROL XL) 25 MG 24 hr tablet Take 0.5 tablets (12.5 mg) by mouth daily. 45 tablet 11    mirabegron (MYRBETRIQ) 50 MG 24 hr tablet Take 1 tablet (50 mg) by mouth daily. 90 tablet 11    Multiple Vitamin (MULTI-VITAMIN) per tablet Take 1 tablet by mouth daily  100 tablet 3    polyethylene glycol (MIRALAX) 17 g packet Take 17 g by mouth every other day PRN      senna-docusate (SENOKOT-S/PERICOLACE) 8.6-50 MG tablet Take 4 tablets by mouth daily as needed      tamsulosin (FLOMAX) 0.4 MG  capsule Take 2 capsules (0.8 mg) by mouth daily. 180 capsule 11    vitamin D3 (CHOLECALCIFEROL) 50 mcg (2000 units) tablet Take 1,000 Units by mouth daily 90 tablet 3     Current providers sharing in care for this patient include:  Patient Care Team:  Don Aviles MD as PCP - General  Don Aviles MD as Assigned PCP  Hayes Yip MD as Assigned Heart and Vascular Provider  Nehemiah Bloom MD as Assigned Surgical Provider  Dawna Frances MD as Assigned Nephrology Provider  Saulo Mariee DPM as Assigned Musculoskeletal Provider    The following health maintenance items are reviewed in Epic and correct as of today:  Health Maintenance   Topic Date Due    RSV VACCINE (1 - Risk 60-74 years 1-dose series) Never done    ANNUAL REVIEW OF HM ORDERS  10/19/2022    PHQ-9  05/05/2025    MEDICARE ANNUAL WELLNESS VISIT  11/05/2025    LIPID  11/05/2025    FALL RISK ASSESSMENT  11/05/2025    COLORECTAL CANCER SCREENING  02/22/2027    GLUCOSE  11/05/2027    ADVANCE CARE PLANNING  11/05/2029    DTAP/TDAP/TD IMMUNIZATION (3 - Td or Tdap) 08/24/2030    HEPATITIS C SCREENING  Completed    DEPRESSION ACTION PLAN  Completed    INFLUENZA VACCINE  Completed    Pneumococcal Vaccine: 65+ Years  Completed    ZOSTER IMMUNIZATION  Completed    COVID-19 Vaccine  Completed    HPV IMMUNIZATION  Aged Out    MENINGITIS IMMUNIZATION  Aged Out    RSV MONOCLONAL ANTIBODY  Aged Out         Review of Systems  Constitutional, neuro, ENT, endocrine, pulmonary, cardiac, gastrointestinal, genitourinary, musculoskeletal, integument and psychiatric systems are negative, except as otherwise noted.     Objective    Exam  /70   Pulse 60   Temp 96.8  F (36  C) (Temporal)   Resp 16   Ht 1.829 m (6')   Wt 78.6 kg (173 lb 4.8 oz)   SpO2 99%   BMI 23.50 kg/m     Estimated body mass index is 23.5 kg/m  as calculated from the following:    Height as of this encounter: 1.829 m (6').    Weight as of this encounter: 78.6 kg  (173 lb 4.8 oz).    Physical Exam  GENERAL: alert and no distress, sitting in wheelchair  EYES: Right eye prosthetic  HENT: ear canals and TM's normal, nose and mouth without ulcers or lesions  NECK: no adenopathy, no asymmetry, masses, or scars  RESP: lungs clear to auscultation - no rales, rhonchi or wheezes  CV: regular rate and rhythm, normal S1 S2, no S3 or S4  ABDOMEN: soft, nontender, no hepatosplenomegaly  MS: Left-sided neuromuscular atrophy, left lower extremity AFO brace  SKIN: no suspicious lesions or rashes  NEURO: Stable exam-left-sided hemiparesis with impaired gait  PSYCH: mentation appears normal, affect normal/bright        11/5/2024   Mini Cog   Clock Draw Score 2 Normal   3 Item Recall 3 objects recalled   Mini Cog Total Score 5                 Signed Electronically by: Don Aviles MD

## 2024-11-11 ENCOUNTER — HOSPITAL ENCOUNTER (OUTPATIENT)
Dept: CT IMAGING | Facility: CLINIC | Age: 69
Discharge: HOME | End: 2024-11-11
Attending: STUDENT IN AN ORGANIZED HEALTH CARE EDUCATION/TRAINING PROGRAM | Admitting: STUDENT IN AN ORGANIZED HEALTH CARE EDUCATION/TRAINING PROGRAM
Payer: MEDICARE

## 2024-11-11 DIAGNOSIS — N20.0 CALCULUS OF KIDNEY: ICD-10-CM

## 2024-11-11 PROCEDURE — 74176 CT ABD & PELVIS W/O CONTRAST: CPT | Mod: MG

## 2024-11-14 ENCOUNTER — THERAPY VISIT (OUTPATIENT)
Dept: PHYSICAL THERAPY | Facility: CLINIC | Age: 69
End: 2024-11-14
Attending: PHYSICAL MEDICINE & REHABILITATION
Payer: MEDICARE

## 2024-11-14 ENCOUNTER — THERAPY VISIT (OUTPATIENT)
Dept: OCCUPATIONAL THERAPY | Facility: CLINIC | Age: 69
End: 2024-11-14
Attending: PHYSICAL MEDICINE & REHABILITATION
Payer: MEDICARE

## 2024-11-14 DIAGNOSIS — S06.9X9S TRAUMATIC BRAIN INJURY WITH LOSS OF CONSCIOUSNESS, SEQUELA (H): ICD-10-CM

## 2024-11-14 DIAGNOSIS — M62.422 CONTRACTURE OF MUSCLE OF LEFT UPPER ARM: ICD-10-CM

## 2024-11-14 DIAGNOSIS — I69.352 SPASTIC HEMIPARESIS OF LEFT DOMINANT SIDE AS LATE EFFECT OF CEREBRAL INFARCTION (H): Primary | ICD-10-CM

## 2024-11-14 DIAGNOSIS — M75.02 ADHESIVE CAPSULITIS OF LEFT SHOULDER: ICD-10-CM

## 2024-11-14 DIAGNOSIS — G81.94 LEFT HEMIPARESIS (H): Primary | ICD-10-CM

## 2024-11-14 PROCEDURE — 97110 THERAPEUTIC EXERCISES: CPT | Mod: GP | Performed by: PHYSICAL THERAPIST

## 2024-11-14 PROCEDURE — 97110 THERAPEUTIC EXERCISES: CPT | Mod: GO

## 2024-11-15 ENCOUNTER — OFFICE VISIT (OUTPATIENT)
Dept: UROLOGY | Facility: CLINIC | Age: 69
End: 2024-11-15
Payer: MEDICARE

## 2024-11-15 VITALS
WEIGHT: 173 LBS | SYSTOLIC BLOOD PRESSURE: 120 MMHG | BODY MASS INDEX: 23.43 KG/M2 | HEIGHT: 72 IN | DIASTOLIC BLOOD PRESSURE: 58 MMHG

## 2024-11-15 DIAGNOSIS — K40.90 LEFT INGUINAL HERNIA: ICD-10-CM

## 2024-11-15 DIAGNOSIS — N13.8 BPH WITH URINARY OBSTRUCTION: Primary | ICD-10-CM

## 2024-11-15 DIAGNOSIS — N40.1 BPH WITH URINARY OBSTRUCTION: Primary | ICD-10-CM

## 2024-11-15 DIAGNOSIS — N20.0 CALCULUS OF KIDNEY: ICD-10-CM

## 2024-11-15 LAB — RESIDUAL VOLUME (RV) (EXTERNAL): 42

## 2024-11-15 ASSESSMENT — PAIN SCALES - GENERAL: PAINLEVEL_OUTOF10: NO PAIN (0)

## 2024-11-15 NOTE — LETTER
11/15/2024       RE: Marcus Hodges  [address] [place] [address]     Dear Colleague,    Thank you for referring your patient, Marcus Hodges, to the [facility] [place] at [facility]. Please see a copy of my visit note below.    CHIEF COMPLAINT   Marcus Hodges who is a 69 year old male returns today for follow-up of h/o neurogenic bladder due to stroke 2012, ASD s/p remote repair, h/o a. 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      HPI   Marcus Hodges is a 69 year old male returns today for follow-up of h/o neurogenic bladder due to stroke 2012, ASD s/p remote repair, h/o a. 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 He wanted to go to max meical therapy for obstructive regrowth of prostate. Urination is about the same, no worse. Has not needed another catheter    PHYSICAL EXAM  Patient is a 69 year old  male   Vitals: Blood pressure 120/58, height 1.829 m (6'), weight 78.5 kg (173 lb).  Body mass index is 23.46 kg/m .  General Appearance Adult:   Alert, no acute distress, oriented  HENT: throat/mouth:normal, good dentition  Lungs: no respiratory distress, or pursed lip breathing  Heart: No obvious jugular venous distension present  Abdomen: nondistended  Musculoskeltal: extremities normal, no peripheral edema  Skin: no suspicious lesions or rashes  Neuro: Alert, oriented, speech and mentation  normal  Psych: affect and mood normal  Gait: Normal  : deferred    All pertinent imaging reviewed:    Ct /p 11/11/2024  IMPRESSION:   1.  No urolithiasis.        Left inguinal hernia containing fat    PVR 42 ml    ASSESSMENT and PLAN  69 year old male returns today for follow-up of h/o neurogenic bladder due to stroke 2012, ASD s/p remote repair, h/o a. 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 Has not had stone on  ct for many years but he wants to continue annual imaging to surveil    Patient is concerned about his small fat containing left inguinal hernia. He has never been symptomatic from this. He wants to have discussion with surgery about potential elective repair    Re: neurogenic bladder and incomplete bladder emptying he will continue on max medical therapy for now with alpha blocker and 5ari. PVR 42 ml, emptying well    -return 1 year with CT a/p, UA, PVR, AUA symptom score    Nehemiah Bloom MD   [facility]  [facility] Phone: [phone]      Again, thank you for allowing me to participate in the care of your patient.      Sincerely,    Nehemiah Bloom MD

## 2024-11-15 NOTE — PROGRESS NOTES
CHIEF COMPLAINT   Marcus Hodges who is a 69 year old male returns today for follow-up of h/o neurogenic bladder due to stroke 2012, ASD s/p remote repair, h/o a. 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      HPI   Marcus Hodges is a 69 year old male returns today for follow-up of h/o neurogenic bladder due to stroke 2012, ASD s/p remote repair, h/o a. 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 [facility]. Urination is about the same, no worse. Has not needed another catheter    PHYSICAL EXAM  Patient is a 69 year old  male   Vitals: Blood pressure 120/58, height 1.829 m (6'), weight 78.5 kg (173 lb).  Body mass index is 23.46 kg/m .  General Appearance Adult:   Alert, no acute distress, oriented  HENT: throat/mouth:normal, good dentition  Lungs: no respiratory distress, or pursed lip breathing  Heart: No obvious jugular venous distension present  Abdomen: nondistended  Musculoskeltal: extremities normal, no peripheral edema  Skin: no suspicious lesions or rashes  Neuro: Alert, oriented, speech and mentation normal  Psych: affect and mood normal  Gait: Normal  : deferred    All pertinent imaging reviewed:    Ct /p 11/11/2024  IMPRESSION:   1.  No urolithiasis.        Left inguinal hernia containing fat    PVR 42 ml    ASSESSMENT and PLAN  69 year old male returns today for follow-up of h/o neurogenic bladder due to  stroke 2012, ASD s/p remote repair, h/o a. 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 Has not had stone on  ct for many years but he wants to continue annual imaging to surveil    Patient is concerned about his small fat containing left inguinal hernia. He has never been symptomatic from this. He wants to have discussion with surgery about potential elective repair    Re: neurogenic bladder and incomplete bladder emptying he will continue on max medical therapy for now with alpha blocker and 5ari. PVR 42 ml, emptying well    -return 1 year with CT a/p, UA, PVR, AUA symptom score    Nehemiah Bloom MD   [facility] Urology  [facility] Phone: [phone]

## 2024-11-15 NOTE — NURSING NOTE
Chief Complaint   Patient presents with    Benign Prostatic Hypertrophy    neurogenic bladder    Kidney Stone Related     Pt has no concerns with urination, condom catheter is going well.  Pt denies gross hematuria or dysuria.    PVR:   42 mL by bladder scan    Christine Crawford, Clinic Assistant

## 2024-11-22 ENCOUNTER — HOSPITAL ENCOUNTER (OUTPATIENT)
Dept: CARDIOLOGY | Facility: CLINIC | Age: 69
Discharge: HOME | End: 2024-11-22
Attending: INTERNAL MEDICINE | Admitting: INTERNAL MEDICINE
Payer: MEDICARE

## 2024-11-22 DIAGNOSIS — I05.9 MITRAL VALVE DISEASE: ICD-10-CM

## 2024-11-22 LAB — LVEF ECHO: NORMAL

## 2024-11-22 PROCEDURE — 255N000002 HC RX 255 OP 636: Performed by: INTERNAL MEDICINE

## 2024-11-22 PROCEDURE — 999N000208 ECHOCARDIOGRAM COMPLETE

## 2024-11-22 RX ADMIN — HUMAN ALBUMIN MICROSPHERES AND PERFLUTREN 9 ML: 10; .22 INJECTION, SOLUTION INTRAVENOUS at 10:55

## 2024-11-24 ENCOUNTER — MYC MEDICAL ADVICE (OUTPATIENT)
Dept: CARDIOLOGY | Facility: CLINIC | Age: 69
End: 2024-11-24
Payer: MEDICARE

## 2024-11-25 ENCOUNTER — VIRTUAL VISIT (OUTPATIENT)
Dept: CARDIOLOGY | Facility: CLINIC | Age: 69
End: 2024-11-25
Payer: MEDICARE

## 2024-11-25 DIAGNOSIS — I05.9 MITRAL VALVE DISEASE: Primary | ICD-10-CM

## 2024-11-25 DIAGNOSIS — Q21.11 OSTIUM SECUNDUM TYPE ATRIAL SEPTAL DEFECT: ICD-10-CM

## 2024-11-25 DIAGNOSIS — Z86.73 HISTORY OF CVA (CEREBROVASCULAR ACCIDENT): ICD-10-CM

## 2024-11-25 DIAGNOSIS — I48.20 CHRONIC ATRIAL FIBRILLATION (H): ICD-10-CM

## 2024-11-25 DIAGNOSIS — E78.5 HYPERLIPIDEMIA LDL GOAL <100: ICD-10-CM

## 2024-11-25 NOTE — PROGRESS NOTES
Lex is a 69 year old who is being evaluated via a billable video visit.    How would you like to obtain your AVS? MyChart  If the video visit is dropped, the invitation should be resent by: Send to e-mail at: [email or web address]  Will anyone else be joining your video visit? No      Review Of Systems  Skin: negative  Eyes: positive for glasses  Ears/Nose/Throat: negative  Respiratory: No shortness of breath, dyspnea on exertion, cough, or hemoptysis  Cardiovascular: negative  Gastrointestinal: negative  Genitourinary: negative  Musculoskeletal: negative  Neurologic: positive for stroke  Psychiatric: negative  Hematologic/Lymphatic/Immunologic: negative  Endocrine: negative    Vitals - Patient Reported  Systolic (Patient Reported): 110  Diastolic (Patient Reported): 68  Weight (Patient Reported): 78.5 kg (173 lb)  Height (Patient Reported): 182.9 cm (6')  BMI (Based on Pt Reported Ht/Wt): 23.46  Pulse (Patient Reported): 67    Orin B 11/25/24    Video-Visit Details    Type of service:  Video Visit   Video End Time:9:30 AM  Originating Location (pt. Location): Home    Distant Location (provider location):  On-site  Platform used for Video Visit: [facility] Progress Note  Marcus Hodges MRN# [id number]   YOB: 1955 Age: 69 year old       Reason for visit: Mitral valve disease, history of ASD, chronic atrial fibrillation    History of presenting illness:    I had the opportunity to see Marcus Hodges at [facility] today for a virtual video visit for reevaluation of chronic atrial fibrillation treated with rate control and anticoagulation, mitral valve disease requiring mitral valve repair and ASD repair. Mr. Hodges is a 69-year-old gentleman who underwent repair of an atrial septal defect originally in 1978 and then went back for a second surgery 9 months later for repair of the mitral valve and second repair of the atrial septal defect.  He seemed to do well  until 2004 when he had atrial flutter, treated successfully with ablation.  He was eventually taken off of anticoagulation.  In 2012, he suffered a large right-sided stroke, which was thought to be due to newly discovered atrial fibrillation.  He continues to have dense left-sided hemiparesis.  He is doing physical therapy and limited exercise.  He is able to walk slowly with a walker and not have any significant symptoms of shortness of breath or chest discomfort.  He has been walking at the [facility] where there is an elevated track with a handrail.  He denies chest pain symptoms.  He has no palpitations, lightheadedness, or syncope.    I reviewed his echocardiogram with him this year.  It was a technically difficult study but showed normal left ventricular size and function with normally functioning mitral valve repair.  He may have had up to mild mitral regurgitation.  There is no evidence of interatrial shunting.    I also reviewed his labs with him.  He has a normal basic metabolic panel and excellent cholesterol numbers.  He remains on Eliquis with no bleeding issues.    His blood pressure at home today was 110/68 with heart rate of 67.  He appears to be doing well and breathing comfortably.            Assessment and Plan:     ASSESSMENT:    Mr. Marcus Hodges is a 68-year-old gentleman with a history of ASD repair, mitral valve repair and chronic atrial fibrillation.  He has had a stroke which has left him with left-sided hemiparesis but he is still walking regularly for exercise with no concerning cardiac symptoms.  He denies shortness of breath and chest discomfort.  His cholesterol numbers and other laboratory studies are excellent, and his blood pressure and heart rate control are excellent as well.  I will not make any changes in his medications.    I will plan to have him follow-up again in 1 year for reevaluation.  He requested to space the echo out for 2 years and I think that is reasonable.   We have seen no changes.    Hayes Yip MD           Orders this Visit:  No orders of the defined types were placed in this encounter.    No orders of the defined types were placed in this encounter.    There are no discontinued medications.    Today's clinic visit entailed:    30 minutes spent by me on the date of the encounter doing chart review, history and exam, documentation and further activities per the note  Provider  Link to [facility] Help Grid     The level of medical decision making during this visit was of moderate complexity.           Review of Systems:     Review of Systems:  Skin:        Eyes:       ENT:       Respiratory:       Cardiovascular:       Gastroenterology:      Genitourinary:       Musculoskeletal:       Neurologic:       Psychiatric:       Heme/Lymph/Imm:       Endocrine:                 Physical Exam:     Vitals: There were no vitals taken for this visit.  Constitutional: Well nourished and in no apparent distress.  Eyes: Pupils equal, round. Sclerae anicteric.   HEENT: Normocephalic, atraumatic.     Skin: Warm, dry. No rashes, cyanosis, or xanthelasma.  Extremities: No edema.  Neurologic: No gross motor deficits. Alert, awake, and oriented to person, place and time.  Psychiatric: Affect appropriate.             Medications:     Current Outpatient Medications   Medication Sig Dispense Refill    acetaminophen (TYLENOL) 500 MG tablet Take 2 tablets (1,000 mg) by mouth every 6 hours as needed for mild pain 100 tablet 0    alendronate (FOSAMAX) 70 MG tablet Take 1 tablet (70 mg) by mouth every 7 days. 12 tablet 3    amoxicillin (AMOXIL) 500 MG capsule       apixaban ANTICOAGULANT (ELIQUIS ANTICOAGULANT) 5 MG tablet Take 1 tablet (5 mg) by mouth 2 times daily. 180 tablet 11    atorvastatin (LIPITOR) 20 MG tablet Take 1 tablet (20 mg) by mouth daily. 90 tablet 11    baclofen (LIORESAL) 20 MG tablet Take 1 tablet (20 mg) by mouth 4 times daily. 360 tablet 11    citalopram (CELEXA) 20 MG tablet Take  1 tablet (20 mg) by mouth daily. 90 tablet 11    docusate sodium (COLACE) 100 MG tablet Take 200 mg by mouth nightly as needed      finasteride (PROSCAR) 5 MG tablet Take 1 tablet (5 mg) by mouth daily. 90 tablet 3    ketoconazole (NIZORAL) 2 % external cream Apply topically daily      levETIRAcetam (KEPPRA) 1000 MG tablet Take 1 tablet (1,000 mg) by mouth 2 times daily. 180 tablet 11    metoprolol succinate ER (TOPROL XL) 25 MG 24 hr tablet Take 0.5 tablets (12.5 mg) by mouth daily. 45 tablet 11    mirabegron (MYRBETRIQ) 50 MG 24 hr tablet Take 1 tablet (50 mg) by mouth daily. 90 tablet 11    Multiple Vitamin (MULTI-VITAMIN) per tablet Take 1 tablet by mouth daily  100 tablet 3    polyethylene glycol (MIRALAX) 17 g packet Take 17 g by mouth every other day PRN      senna-docusate (SENOKOT-S/PERICOLACE) 8.6-50 MG tablet Take 4 tablets by mouth daily as needed      tamsulosin (FLOMAX) 0.4 MG capsule Take 2 capsules (0.8 mg) by mouth daily. 180 capsule 11    vitamin D3 (CHOLECALCIFEROL) 50 mcg (2000 units) tablet Take 1,000 Units by mouth daily 90 tablet 3       Family History   Problem Relation Age of Onset    Hypertension Mother     Diabetes Mother         Developed type 2 diabetes in her eighties    Cerebrovascular Disease Father     Diabetes Maternal Grandmother     Cancer Paternal Grandmother     Congenital Anomalies Brother         several brothers and sisters born with congential heart defects, but all have been repaired    Congenital Anomalies Sister     Diabetes Maternal Grandfather     Colon Cancer No family hx of        Social History     Socioeconomic History    Marital status:      Spouse name: haim    Number of children: 0    Years of education: Not on file    Highest education level: Not on file   Occupational History     Employer: [employer]   Tobacco Use    Smoking status: Never    Smokeless tobacco: Never   Vaping Use    Vaping status: Never Used   Substance and Sexual Activity     Alcohol use: No    Drug use: Never    Sexual activity: Yes     Partners: Female   Other Topics Concern     Service Not Asked    Blood Transfusions Not Asked    Caffeine Concern Yes     Comment: couple cups of tea a day    Occupational Exposure Not Asked    Hobby Hazards Not Asked    Sleep Concern Not Asked    Stress Concern Not Asked    Weight Concern Not Asked    Special Diet Yes     Comment: vegan; occ eggs/fish     Back Care Not Asked    Exercise Yes     Comment: 2 x weekly/gym , pysical therapy    Bike Helmet Not Asked    Seat Belt Yes    Self-Exams Not Asked    Parent/sibling w/ CABG, MI or angioplasty before 65F 55M? Not Asked   Social History Narrative    Not on file     Social Drivers of Health     Financial Resource Strain: Low Risk  (10/31/2024)    Financial Resource Strain     Within the past 12 months, have you or your family members you live with been unable to get utilities (heat, electricity) when it was really needed?: No   Food Insecurity: Low Risk  (10/31/2024)    Food Insecurity     Within the past 12 months, did you worry that your food would run out before you got money to buy more?: Patient declined     Within the past 12 months, did the food you bought just not last and you didn t have money to get more?: No   Transportation Needs: Low Risk  (10/31/2024)    Transportation Needs     Within the past 12 months, has lack of transportation kept you from medical appointments, getting your medicines, non-medical meetings or appointments, work, or from getting things that you need?: No   Physical Activity: Insufficiently Active (10/31/2024)    Exercise Vital Sign     Days of Exercise per Week: 3 days     Minutes of Exercise per Session: 20 min   Stress: Stress Concern Present (10/31/2024)    [place] [employer] - Occupational Stress Questionnaire     Feeling of Stress : To some extent   Social Connections: Unknown (10/31/2024)    Social Connection and Isolation Panel  [NHANES]     Frequency of Communication with Friends and Family: Not on file     Frequency of Social Gatherings with Friends and Family: Twice a week     Attends [employer] Services: Not on file     Active Member of Clubs or Organizations: Not on file     Attends Club or Organization Meetings: Not on file     Marital Status: Not on file   Interpersonal Safety: Low Risk  (11/5/2024)    Interpersonal Safety     Do you feel physically and emotionally safe where you currently live?: Yes     Within the past 12 months, have you been hit, slapped, kicked or otherwise physically hurt by someone?: No     Within the past 12 months, have you been humiliated or emotionally abused in other ways by your partner or ex-partner?: No   Housing Stability: Low Risk  (10/31/2024)    Housing Stability     Do you have housing? : Yes     Are you worried about losing your housing?: No            Past Medical History:     Past Medical History:   Diagnosis Date    * * * SBE PROPHYLAXIS * * *     Antiplatelet or antithrombotic long-term use     on Xarelto    Arrhythmia     A fib    Atrial enlargement, bilateral     Atrial flutter 2004    radiofrequency ablation 2004, resolved    ATRIAL SEPTAL DEFECT     repaired 1977    Chronic atrial fibrillation (H)     on apixaban    CVA (cerebral vascular accident) (H) 04/2012    R MCA, complicated by left sided weakness, walks with a cane, and seizures    Depression     Depressive disorder     Dysphagia 04/22/2012    [facility], following CVA    Hernia, abdominal     History of thrombophlebitis     hyperlipidemia     Mitral regurgitation     mitral valve damage related to ASD repair    Mumps     Neurogenic bladder     Neurogenic bowel     Palpitations     Respiratory failure (H) 04/22/2012    [facility], following CVA, prolonged requiring tracheostomy, [facility]     Tricuspid regurgitation     Unspecified glaucoma(365.9)     right    Urinary incontinence               Past Surgical History:     Past  Surgical History:   Procedure Laterality Date    CATHETER, ABLATION  2004    COLONOSCOPY N/A 02/22/2024    Procedure: Colonoscopy with polypectomies using cold exacto snare, hemorrhage control using one hemoclip and 3 cc tattoo using scleroneedle, retrieval of polyp using mena net;  Surgeon: Don Dixon MD;  Location:  GI    COMBINED CYSTOSCOPY, INSERT CATHETER URETER  09/28/2020    Procedure: cystoscopy and left ureteral catheter placement, left ureteral stent placement;  Surgeon: Nehemiah Bloom MD;  Location:  OR    Craniotomy reconstruction  2012    [facility], repair of hemicraniectomy defect    CYSTOSCOPY      DAVINCI PYELOPLASTY Left 09/28/2020    Procedure: left robotic assisted laparoscopic pyelolithotomy;  Surgeon: Nehemiah Bloom MD;  Location:  OR    EYE SURGERY      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    GASTROSTOMY TUBE  04/2012 [facility], following CVA    HERNIA REPAIR      IR CAROTID ANGIOGRAM  04/22/2012    IR CAROTID ANGIOGRAM  04/22/2012    IR MISCELLANEOUS PROCEDURE  04/22/2012    IR MISCELLANEOUS PROCEDURE  04/25/2012    LASER KTP GREEN LIGHT PHOTOSELECTIVE VAPORIZATION PROSTATE N/A 12/01/2020    Procedure: Cystoscopy and greenlight photovaporization of the prostate;  Surgeon: Nehemiah Bloom MD;  Location:  OR    REPAIR ATRIAL SEPTAL DEFECT  1978    ASD Repair    Right hemicraniectomy  04/2012 [facility], following CVA, mgmt malignant cerebral edema    SURGICAL HISTORY OF -   2009    right eye enucleation    TRACHEOSTOMY  04/2012 [facility], following CVA    ZZC NONSPECIFIC PROCEDURE      tonsillectomy    ZZC NONSPECIFIC PROCEDURE      Ing. Hernia Repair              Allergies:   Blood transfusion related (informational only) and Lisinopril       Data:   All laboratory data reviewed:    Recent Labs   Lab Test 11/05/24  0937 03/27/24  1028 12/12/23  0848 10/31/23  0852 10/26/22  0822 10/19/21  0808   LDL 47  --   --  44 39 44   HDL 70   "--   --  65 69 62   NHDL 56  --   --  53 47 53   CHOL 126  --   --  118 116 115   TRIG 45  --   --  44 42 43   TSH  --   --   --   --   --  2.82   IRON  --   --  25*  --   --   --    FEB  --   --  381  --   --   --    IRONSAT  --   --  7*  --   --   --    SUZANNE 176   < > 14*  --   --   --     < > = values in this interval not displayed.       Lab Results   Component Value Date    WBC 3.2 (L) 11/05/2024    WBC 6.8 10/03/2020    RBC 4.50 11/05/2024    RBC 3.90 (L) 10/03/2020    HGB 13.5 11/05/2024    HGB 11.3 (L) 10/03/2020    HCT 42.7 11/05/2024    HCT 35.8 (L) 10/03/2020    MCV 95 11/05/2024    MCV 92 10/03/2020    MCH 30.0 11/05/2024    MCH 29.0 10/03/2020    MCHC 31.6 11/05/2024    MCHC 31.6 10/03/2020    RDW 12.9 11/05/2024    RDW 13.8 10/03/2020     (L) 11/05/2024     10/03/2020       Lab Results   Component Value Date     11/05/2024     10/14/2020    POTASSIUM 4.8 11/05/2024    POTASSIUM 4.6 10/26/2022    POTASSIUM 4.6 10/14/2020    CHLORIDE 106 11/05/2024    CHLORIDE 109 10/26/2022    CHLORIDE 108 10/14/2020    CO2 27 11/05/2024    CO2 25 10/26/2022    CO2 28 10/14/2020    ANIONGAP 10 11/05/2024    ANIONGAP 6 10/26/2022    ANIONGAP 3 10/14/2020    GLC 89 11/05/2024    GLC 78 10/26/2022    GLC 84 10/14/2020    BUN 12.5 11/05/2024    BUN 13 10/26/2022    BUN 13 10/14/2020    CR 0.73 11/05/2024    CR 0.73 10/14/2020    GFRESTIMATED >90 11/05/2024    GFRESTIMATED >90 10/14/2020    GFRESTBLACK >90 10/14/2020    STEPHANI 9.5 11/05/2024    STEPHANI 8.7 10/14/2020      Lab Results   Component Value Date    AST 29 11/05/2024    AST 41 10/02/2020    ALT 24 11/05/2024    ALT 26 10/02/2020       No results found for: \"A1C\"    Lab Results   Component Value Date    INR 1.46 (H) 10/02/2020    INR 2.4 (A) 12/04/2017    INR 2.5 (A) 10/23/2017    INR 1.68 (H) 11/06/2015         AZEB VILLARREAL MD  [facility]  "

## 2024-11-25 NOTE — PATIENT INSTRUCTIONS
It was a pleasure seeing you today and thank you for allowing me to be a part of your health care team.  Should you have any questions regarding your visit or future needs please feel free to reach out to my care team for assistance.      Thank you, Dr. Hayes Yip        **Nursing: [phone]       **Scheduling: [phone]

## 2024-12-04 ENCOUNTER — ANCILLARY PROCEDURE (OUTPATIENT)
Dept: BONE DENSITY | Facility: CLINIC | Age: 69
End: 2024-12-04
Attending: INTERNAL MEDICINE
Payer: MEDICARE

## 2024-12-04 DIAGNOSIS — M81.0 OSTEOPOROSIS, UNSPECIFIED OSTEOPOROSIS TYPE, UNSPECIFIED PATHOLOGICAL FRACTURE PRESENCE: ICD-10-CM

## 2024-12-04 PROCEDURE — 77080 DXA BONE DENSITY AXIAL: CPT | Mod: TC

## 2024-12-09 ENCOUNTER — THERAPY VISIT (OUTPATIENT)
Dept: PHYSICAL THERAPY | Facility: CLINIC | Age: 69
End: 2024-12-09
Attending: PHYSICAL MEDICINE & REHABILITATION
Payer: MEDICARE

## 2024-12-09 ENCOUNTER — THERAPY VISIT (OUTPATIENT)
Dept: OCCUPATIONAL THERAPY | Facility: CLINIC | Age: 69
End: 2024-12-09
Attending: INTERNAL MEDICINE
Payer: MEDICARE

## 2024-12-09 DIAGNOSIS — M62.422 CONTRACTURE OF MUSCLE OF LEFT UPPER ARM: ICD-10-CM

## 2024-12-09 DIAGNOSIS — M24.542 CONTRACTURE OF HAND JOINT, LEFT: ICD-10-CM

## 2024-12-09 DIAGNOSIS — I69.352 SPASTIC HEMIPARESIS OF LEFT DOMINANT SIDE AS LATE EFFECT OF CEREBRAL INFARCTION (H): Primary | ICD-10-CM

## 2024-12-09 DIAGNOSIS — M75.02 ADHESIVE CAPSULITIS OF LEFT SHOULDER: ICD-10-CM

## 2024-12-09 DIAGNOSIS — G81.94 LEFT HEMIPARESIS (H): Primary | ICD-10-CM

## 2024-12-09 DIAGNOSIS — S06.9X9S TRAUMATIC BRAIN INJURY WITH LOSS OF CONSCIOUSNESS, SEQUELA (H): ICD-10-CM

## 2024-12-09 PROCEDURE — 97110 THERAPEUTIC EXERCISES: CPT | Mod: GP | Performed by: PHYSICAL THERAPIST

## 2024-12-09 PROCEDURE — 97110 THERAPEUTIC EXERCISES: CPT | Mod: GO

## 2024-12-09 NOTE — PROGRESS NOTES
"    DISCHARGE  Reason for Discharge: No further expectation of progress.  Marcus is discharge to HEP and assist from wife.  He has not made much progress the last several months.  He has had c/o L leg pain \"feeling like it is asleep\" which he had a new AFO made.  He continues to have this and rec he speak to MD regarding possible spinal involvement/dysfunction.  I anticipate he will need to return in the future for further PT intervention due to chronic diagnosis/impairments.     Equipment Issued: HEP    Discharge Plan: Patient to continue home program.    Referring Provider:  Perla Haddad      12/09/24 0500   Appointment Info   Total/Authorized Visits 6/10   Visits Used 31   Medical Diagnosis L hemiparesis   PT Tx Diagnosis impaired gait, transfers, decline in function   Progress Note/Certification   Start of Care Date 07/21/22   Onset of illness/injury or Date of Surgery 06/09/22   Therapy Frequency 1 x a week every other week.   Predicted Duration 12 weeks   Certification date from 11/27/24   Certification date to 12/25/24   Progress Note Completed Date 05/30/24   PT Goal 1   Goal Identifier 1 HEP   Goal Description Lex to be independent in appropriate HEP with assist from Nidia as needed to promote life long health, ability to stay in home and improve function.   Rationale to maximize safety and independence with self cares;to maximize safety and independence with transportation;to maximize safety and independence within the community;to maximize safety and independence within the home;to maximize safety and independence with performance of ADLs and functional tasks   Goal Progress continues to do HEP with assist from wife. continues to go to the gym and walks the track there with using the railing.   Target Date 12/25/24   Date Met 12/09/24   PT Goal 2   Goal Identifier 2 - 25 foot walk   Goal Description Lex to complete 25 foot walk consistently in 19 sec or less and 22 steps or less demonstrating improved " gait pattern and efficiency.   Goal Progress 12/9/24 22.93 sec and 21.8 sec with trekking pole and AFO on the L. 10/28/24 start of session 22.04 sec and 24.28 sec, after stretches 20.19 and 20.25 sec , at end of session 22.5 sec all with AFO L, trekking pole R9/30/24 with light cuing/assist for pelvic alignment L in transverse plane time of 18.9 sec without 20.8 sec both with trekking poleand AFO. 9/16/24 first time 26 sec, after neuro motor re-ed 19.5 sec and 19.8 sec . Times today 9/5/24 was 24 sec and 22.25 sec with trekking pole and cane. 12/7/23 18.69 sec and 19. 7 sec -- fastest times   Target Date 12/25/24   Date Met   (not met, time flucuated as noted.)   PT Goal 3   Goal Identifier 3 - TUG   Goal Description Lex to complete TUG in 40 sec or less to demonstrate improved gait pattern, ability to transfer sit <> stand and overall increased function.   Goal Progress 12/9/24 met x 1 first rep 38.19 sec second 41.03 sec.   9/5/24 46.18 sec and 41.36 sec with trekking pole 5/30/24 38.6 sec with trekking pole, L AFO, less time taken to place trekking pole against the wall prior to sitting.    baseline 50.83 sec with trekking pole   Target Date 12/25/24   Date Met 12/09/24   PT Goal 4   Goal Identifier 4 - stairs   Goal Description Lex to complete stairs ascending with alternating stepping and one rail and min /SBA to demonstrate improved neuro motor function , balance and greater safety with completing stairs.   Goal Progress 10/28/24 up and down 4 six inch stairs with rail on the R able to clear step better L foot with stepping up, descending steps leans onto the rail R side of body/rotates in transverse plane partly lack of ROM L ankle/knee /hip and partly as this is how he does at home to avoid excess stress on the rail. 8/22/23 up and down 4 six inch steps with one rail and alternating stepping, descending leans R side against the rail on R. Less heavy lean than baselien.   baseline single step up ,  "alternating down with heavy lean on the rail.   Target Date 12/31/23   Date Met 08/22/23   PT Goal 5   Goal Identifier 5 - pain   Goal Description Lex to report decreased back pain and R knee pain to allow him to increase his ambulation distance/ther ex at the gym as well as overall better QOL.   Goal Progress 19/9/24  More troublesome lately, notices it sometimes when walking, \"Noticed it before got on the bed this morning\"  5/16/24 R knee pain is less but hasn't been active due to bladder issues. 3/21/24  No real change, not any worse 12/4/23 R knee pain continues to be present and fluctuate in intensity.  8/22/23  did the 4 laps at the gym yesterday and did not have R knee pain.  5/16/23 has been about the same - R knee pain.  Still gets a little sore with prolonged walking. back pain - a little bit sometimes. 3/7/23 : doing 4 laps at gym.  Knee and back don't seem to be much worse but sometimes knee is more sore.  11/8/22 walking 3 laps , R knee \"doesn't bother me too much\"   He does continue to have knee pain off and on.  baseline - doing 2 laps at gym usually does 3-4  9/27/22  dav has about the same R knee, back \"not as stiff\" but doing 3-4 laps.   Target Date 12/26/24   Date Met   (not fully met, fluctuates.  Ed has been completed in regards to alignment with transfers and gait and increased force demands on the knee.)   PT Goal 6   Goal Identifier 6 - 6MWT   Goal Description Lex to increase his 6MWT distance by 150 feet or greater from when first assessed to demonstrate improved endurance, gait quality and function.   Goal Progress 12/9/24 345 ft with trekking pole, AFO on the L. 3/21/24 358 ft with trekking pole on the R, AFO L 1/4/24  410 feet with trekking pole, AFO L. No R knee pain. 8/22/23  363 feet wtih trekking pole , AFO on the L. 5/16/23 400 feet with trekking pole and AFO.  (need to continue to work on faster pace) 4/18/23 450 feet with trekking pole, AFO on the L.  no knee pain   cistractions " - more people , noise in testing area than prior ? if this affected speed and less distance than in the past.  5/30/24 345 feet  3/7/23: 382 feet.   12/13/22 366 feet   Target Date 12/25/24   Date Met   (not met)   PT Goal 7   Goal Identifier 7- sit <> stand and COM forward past TAYLOR challenges.   Goal Description Lex to perform sit to stand from 22 inch mat without bracing self with LE against the surface to demonstrate improved ability to bring COM forward past TAYLOR as well as better mechanics with transfer for decreased/excess load on the R LE to decrease knee pain.   Goal Progress 9/5/24 - Marcus continues to have difficulty with fwd COM transition past TAYLOR, leans to the R to stand and this is enforced by having the trekking pole to his R.  Decreased ability to bring COM past TAYLOR safely and improper timing and sequencing of mm activation/inactivation affecting transfer. Excess extensor use/quads.   Target Date 12/25/24   Date Met   (not met)   Subjective Report   Subjective Report States his L foot continues to bother him after walking and sometimes when going to bed.  He has an appointment coming up with orthotist as well as his primary.  (Ed given regarding skin assessment to see if red marks from tiff AFO, impaired sensory system affecting assessment.  Possible spinal dysfunction and to speak to MD regarding this)

## 2024-12-09 NOTE — PROGRESS NOTES
DISCHARGE  Reason for Discharge: Patient has met all goals. No further expectation of progress.    Equipment Issued: n/a    Discharge Plan: Patient to continue home program.    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  (Physician co-signature of this document indicates review and certification of the therapy plan)   Progress Note Due Date 12/29/24   Progress Note Completed Date 09/30/24   Goals   OT Goals 1;2;3;4   OT Goal 1   Goal Identifier LUE HEP   Goal Description Pt will demonstrate understanding of BUE HEP in order to increase safety and independence with ADL tasks.   Goal Progress Goal met. Trained in PROM, SROM/AAROM, NMES, and weight-bearing activities to promote LUE ROM and tone management for improved engagement in daily activities and pain management. Pt reports consistent adherence to caregiver-supported PROM exercises and at least 1x/week or more of NMES, with intermittent adherence to other activities. Pt to continue  "with HEP following discharge to further progress skill areas.   Target Date 12/29/24   Date Met 12/09/24   OT Goal 2   Goal Identifier pain   Goal Description Pt to self report decreased left shoulder pain rating to less than 0/10 on scale throughout week, in order to increase safety and independence with ADL/IADL tasks.   Goal Progress Goal met. Pt reports continued regular adherence to ROM and NMES HEP to promote tone management and joint integrity and has been educated on positional strategies. Reports he only occasionally gets pain in his LUE and notes it's more of a \"discomfort;\" he seldom has to take a pain reliever for this discomfort. Overall, he finds the discomfort from ROM/stretching to be tolerable at this time and has no further questions/concerns.   Target Date 12/29/24   Date Met 12/09/24   OT Goal 3   Goal Identifier L shoulder ROM   Goal Description Pt to demonstrate 10 degree increase with left shoulder PROM including shoulder flexion, shoulder abduction, in order to increase independence with UB dressing tasks.   Goal Progress Goal met. Trained in PROM and SROM/AAROM HEP, as well as weight-bearing and NMES HEP, to further promote tone management and improve L shoulder ROM. Pt with best adherence to PROM activities with assist from spouse between sessions. Pt reports that subjectively he feels little difference from evaluation to today. PROM of shoulder flexion approximately 35 degrees at eval and 60 degrees shoulder abduction at eval in seated. With supine today, pt approximately 85-90 PROM in shoulder flexoin and 75-80 PROM shoulder abduction. Pt to continue with HEP to further progressing ROM for joint integrity and tone/pain management beyond time of discharge.   Target Date 12/29/24   Date Met 12/09/24   OT Goal 4   Goal Identifier AD/AE   Goal Description Patient to verbalize understanding of and demonstrate use of 3-5 new pieces of adaptive equipment and/or adapted techniques to increase " "independence and safety with ADLs and IADLs.   Goal Progress Goal met. Briefly reviewed education in both splint and NMES use for tone management, as well as positioning for LUE for joint integrity. Pt otherwise declines other ADL/IADL needs as he feels him and his spouse \"have a good routine.\"   Target Date 12/29/24   Date Met 12/09/24   Subjective Report   Subjective Report Lex reports he overall feels about the \"same.\" He notes that he has a new AFO for his L leg, and he is having trouble getting accustomed to it. Reports he feels it is more difficult to feel his foot when he walks, reports it feels almost like his foot is asleep while trying to walk. Denies changes in sensation with AFO donned and seated/laying down, only when walking. Regarding his day-to-day activities, he feels he and his spouse have a good routine down and he has no further questions/concerns at this time.   Objective Measures   Objective Measures Objective Measure 1;Objective Measure 2   Objective Measure 1   Objective Measure Pain   Objective Measure 2   Objective Measure L shoulder PROM   Details As of 12/9/24 - In supine: shoulder flexion PROM about 85-90 degrees, shoulder abduction PROM about 75-80 degrees.   Therapeutic Procedure/Exercise   Therapeutic Procedure: strength, endurance, ROM, flexibillity minutes (25584) 40   Ther Proc 1 LUE ROM   Ther Proc 1 - Details Facilitated reassessment of goal areas and needs - see subjective and objective measures and progress noted in goal areas. Educated pt on overall min progress noted during plan of care and importance of continuation of HEP activities to maintain and further progress as able. Provided education in HEP for completion beyond discharge, including: PROM, SROM/AAROM and scapular mobility exercises, including use of washcloth on table or dowel/cane to promote improved IND with completion. Additionally, educated in weight-bearing into L hand and elbow, NMES programs, and functional " "use of LUE (as able). Issued custom handout for carryover to home. To further progress PROM exercises using [facility] approved handout \"Passive Arm Exercises,\" administered about 20 sec holds x3-4 repetitions of each of the following in supine: 1) shoulder flexion, 2) shoulder extension, 3) shoulder abduction, 4) shoulder horizontal abduction/adduction, 5) shoulder internal/external rotation, 6) elbow flexion/extension, 7) supination/pronation, 8) wrist flexion/extension, 9) thumb circumduction, 10) thumb and digit flexion/extension, and 11) weight-bearing into L hand to further promote digit extension and tone management. Educated in option for new resting hand splint through hand therapist per pt's report of his breaking and pt in agreement with plan to initiate referral today. Educated in follow up and referral process for return to OP OT in the future, as needed.   Skilled Intervention Skilled education and training in ROM exercises to increase left UE ROM and decrease pain in order to increase safety and IND with ADL tasks.   Patient Response/Progress Lex reports understanding of progress and HEP. He has no further questions or concerns and is in agreement with plan for discharge today. Goals met.   Education   Learner/Method Patient;Listening;Reading;Demonstration;Pictures/Video   Education Comments see tx details above   Plan   Home program PROM exercises. SROM/AAROM table slides. NMES. Weight-bearing.   Updates to plan of care discharged   Comments   Comments Hand therapy referral for custom brace initiated today.   Total Session Time   Timed Code Treatment Minutes 40   Total Treatment Time (sum of timed and untimed services) 40     Thank you for the referral of this patient.  If you have any questions regarding the information in this report, please feel free to contact me per the information provided below.      Regina Tolentino MA, OTR/L  Occupational Therapist  [facility] - " [address] [address]  Clinic Fax:  [phone]  Clinic Phone: [phone]

## 2024-12-13 ENCOUNTER — MYC MEDICAL ADVICE (OUTPATIENT)
Dept: PEDIATRICS | Facility: CLINIC | Age: 69
End: 2024-12-13
Payer: MEDICARE

## 2024-12-13 NOTE — TELEPHONE ENCOUNTER
Dr. Aviles- pt reporting he got a new ankle foot orthotic but that is hasn't resolved the problem. Reporting the discomfort and numbness in left foot and ankle while walking was discussed at last OV.   PT suggested it may be a back issue and is asking for x-ray or MRI.     Please advise.  Mya Bates RN

## 2024-12-28 ENCOUNTER — MYC MEDICAL ADVICE (OUTPATIENT)
Dept: PEDIATRICS | Facility: CLINIC | Age: 69
End: 2024-12-28
Payer: MEDICARE

## 2024-12-31 NOTE — TELEPHONE ENCOUNTER
"RN recommended e-visit for letter of medical necessity for lift chair. Last OV 11/5/24.     Per [employer] medicare documentation requirements:    \"-Does the patient require the assistance of another person in order to transfer  between wheelchair, bed, commode, or another surface in the home?  Without the use of the above listed patient lift, would the patient be bed confined?  Is the recipient unable to be transferred without a lift due to the patient medical  condition or caregiver limitations?  Will the lift fit into all necessary areas in the patient s home?  If requesting a Get U Up (sit to stand style) Lift: Can the patient bear at least  80% of their weight?    For insurance consideration of the patient lift you have requested for this patient, the following must be  documented in their medical records:  1. Documentation that the beneficiary requires transfer between bed and a chair, wheelchair, or commode  is required and, without the use of a lift, the beneficiary would be bed confined; AND  2. Documentation that the beneficiary requires help from another person to transfer between a wheelchair,  bed, commode or other surfaces in the home; AND  3. Documentation that the beneficiary cannot be safely transferred without a lift due to the recipient s  medical condition or the caregiver s limitations; AND  4. Documentation that the lift is documented as fitting in all necessary parts of the recipient s home.\"    Lulu Carballo RN      "

## 2025-01-03 PROBLEM — M24.542 CONTRACTURE OF HAND JOINT, LEFT: Status: ACTIVE | Noted: 2025-01-03

## 2025-03-10 PROBLEM — M24.542 CONTRACTURE OF HAND JOINT, LEFT: Status: RESOLVED | Noted: 2025-01-03 | Resolved: 2025-03-10

## 2025-03-17 ENCOUNTER — TELEPHONE (OUTPATIENT)
Dept: PEDIATRICS | Facility: CLINIC | Age: 70
End: 2025-03-17
Payer: MEDICARE

## 2025-03-17 NOTE — TELEPHONE ENCOUNTER
Forms/Letter Request    Type of form/letter: OTHER: Left ankle foot orthosis    Do we have the form/letter: Yes: Dr. Aviles's inbox    Who is the form from? [employer] (if other please explain)    Where did/will the form come from? form was faxed in    When is form/letter needed by: next available    How would you like the form/letter returned: Fax : [phone]    Patient Notified form requests are processed in 5-7 business days:N/A    Could we send this information to you in [employer] or would you prefer to receive a phone call?:   NA

## 2025-03-25 ENCOUNTER — E-VISIT (OUTPATIENT)
Dept: PEDIATRICS | Facility: CLINIC | Age: 70
End: 2025-03-25
Payer: MEDICARE

## 2025-03-25 DIAGNOSIS — F33.1 MAJOR DEPRESSIVE DISORDER, RECURRENT EPISODE, MODERATE (H): Primary | ICD-10-CM

## 2025-03-25 RX ORDER — BUPROPION HYDROCHLORIDE 100 MG/1
100 TABLET ORAL DAILY
Qty: 30 TABLET | Refills: 1 | Status: SHIPPED | OUTPATIENT
Start: 2025-03-25

## 2025-03-25 ASSESSMENT — PATIENT HEALTH QUESTIONNAIRE - PHQ9
SUM OF ALL RESPONSES TO PHQ QUESTIONS 1-9: 7
10. IF YOU CHECKED OFF ANY PROBLEMS, HOW DIFFICULT HAVE THESE PROBLEMS MADE IT FOR YOU TO DO YOUR WORK, TAKE CARE OF THINGS AT HOME, OR GET ALONG WITH OTHER PEOPLE: SOMEWHAT DIFFICULT
SUM OF ALL RESPONSES TO PHQ QUESTIONS 1-9: 7

## 2025-03-25 ASSESSMENT — ANXIETY QUESTIONNAIRES
GAD7 TOTAL SCORE: 6
6. BECOMING EASILY ANNOYED OR IRRITABLE: NOT AT ALL
IF YOU CHECKED OFF ANY PROBLEMS ON THIS QUESTIONNAIRE, HOW DIFFICULT HAVE THESE PROBLEMS MADE IT FOR YOU TO DO YOUR WORK, TAKE CARE OF THINGS AT HOME, OR GET ALONG WITH OTHER PEOPLE: SOMEWHAT DIFFICULT
7. FEELING AFRAID AS IF SOMETHING AWFUL MIGHT HAPPEN: SEVERAL DAYS
GAD7 TOTAL SCORE: 6
3. WORRYING TOO MUCH ABOUT DIFFERENT THINGS: SEVERAL DAYS
GAD7 TOTAL SCORE: 6
7. FEELING AFRAID AS IF SOMETHING AWFUL MIGHT HAPPEN: SEVERAL DAYS
8. IF YOU CHECKED OFF ANY PROBLEMS, HOW DIFFICULT HAVE THESE MADE IT FOR YOU TO DO YOUR WORK, TAKE CARE OF THINGS AT HOME, OR GET ALONG WITH OTHER PEOPLE?: SOMEWHAT DIFFICULT
4. TROUBLE RELAXING: SEVERAL DAYS
5. BEING SO RESTLESS THAT IT IS HARD TO SIT STILL: SEVERAL DAYS
2. NOT BEING ABLE TO STOP OR CONTROL WORRYING: SEVERAL DAYS
1. FEELING NERVOUS, ANXIOUS, OR ON EDGE: SEVERAL DAYS

## 2025-03-26 ASSESSMENT — PATIENT HEALTH QUESTIONNAIRE - PHQ9: SUM OF ALL RESPONSES TO PHQ QUESTIONS 1-9: 7

## 2025-05-02 ASSESSMENT — ANXIETY QUESTIONNAIRES
5. BEING SO RESTLESS THAT IT IS HARD TO SIT STILL: NOT AT ALL
6. BECOMING EASILY ANNOYED OR IRRITABLE: NOT AT ALL
7. FEELING AFRAID AS IF SOMETHING AWFUL MIGHT HAPPEN: SEVERAL DAYS
7. FEELING AFRAID AS IF SOMETHING AWFUL MIGHT HAPPEN: SEVERAL DAYS
3. WORRYING TOO MUCH ABOUT DIFFERENT THINGS: SEVERAL DAYS
GAD7 TOTAL SCORE: 5
4. TROUBLE RELAXING: SEVERAL DAYS
GAD7 TOTAL SCORE: 5
GAD7 TOTAL SCORE: 5
1. FEELING NERVOUS, ANXIOUS, OR ON EDGE: SEVERAL DAYS
2. NOT BEING ABLE TO STOP OR CONTROL WORRYING: SEVERAL DAYS
IF YOU CHECKED OFF ANY PROBLEMS ON THIS QUESTIONNAIRE, HOW DIFFICULT HAVE THESE PROBLEMS MADE IT FOR YOU TO DO YOUR WORK, TAKE CARE OF THINGS AT HOME, OR GET ALONG WITH OTHER PEOPLE: NOT DIFFICULT AT ALL
8. IF YOU CHECKED OFF ANY PROBLEMS, HOW DIFFICULT HAVE THESE MADE IT FOR YOU TO DO YOUR WORK, TAKE CARE OF THINGS AT HOME, OR GET ALONG WITH OTHER PEOPLE?: NOT DIFFICULT AT ALL

## 2025-05-05 ENCOUNTER — VIRTUAL VISIT (OUTPATIENT)
Dept: PEDIATRICS | Facility: CLINIC | Age: 70
End: 2025-05-05
Payer: MEDICARE

## 2025-05-05 DIAGNOSIS — F33.1 MAJOR DEPRESSIVE DISORDER, RECURRENT EPISODE, MODERATE (H): ICD-10-CM

## 2025-05-05 PROCEDURE — 98005 SYNCH AUDIO-VIDEO EST LOW 20: CPT | Performed by: INTERNAL MEDICINE

## 2025-05-05 RX ORDER — BUPROPION HYDROCHLORIDE 100 MG/1
100 TABLET ORAL DAILY
Qty: 90 TABLET | Refills: 11 | Status: SHIPPED | OUTPATIENT
Start: 2025-05-05

## 2025-05-05 RX ORDER — CITALOPRAM HYDROBROMIDE 20 MG/1
20 TABLET ORAL DAILY
Qty: 90 TABLET | Refills: 11 | Status: SHIPPED | OUTPATIENT
Start: 2025-05-05

## 2025-05-05 NOTE — PROGRESS NOTES
Lex is a 69 year old who is being evaluated via a billable video visit.          Assessment & Plan     (F33.1) Major depressive disorder, recurrent episode, moderate (H)  Comment:   Plan: citalopram (CELEXA) 20 MG tablet, buPROPion         (WELLBUTRIN) 100 MG tablet            Improved after adding bupropion.  Continue current medications           Subjective   Lex is a 69 year old, presenting for the following health issues:  No chief complaint on file.      HPI      Depression   How are you doing with your depression since your last visit? improving  Are you having other symptoms that might be associated with depression? No  Have you had a significant life event?  Prior stroke   Are you feeling anxious or having panic attacks?   No  Do you have any concerns with your use of alcohol or other drugs? No    Social History     Tobacco Use    Smoking status: Never    Smokeless tobacco: Never   Vaping Use    Vaping status: Never Used   Substance Use Topics    Alcohol use: No    Drug use: Never         6/24/2024    11:03 AM 11/5/2024     8:50 AM 3/25/2025     8:23 AM   PHQ   PHQ-9 Total Score 1 1  7    Q9: Thoughts of better off dead/self-harm past 2 weeks Not at all  Not at all  Not at all       Patient-reported    Proxy-reported         3/25/2025     8:23 AM 5/2/2025     5:05 PM   SANCHO-7 SCORE   Total Score 6 (mild anxiety) 5 (mild anxiety)   Total Score 6  5        Patient-reported               Objective           Vitals:  No vitals were obtained today due to virtual visit.    Physical Exam   GENERAL: alert and no distress  EYES: Eyes grossly normal to inspection.  No discharge or erythema, or obvious scleral/conjunctival abnormalities.  RESP: No audible wheeze, cough, or visible cyanosis.    SKIN: Visible skin clear. No significant rash, abnormal pigmentation or lesions.  NEURO: Cranial nerves grossly intact.  Mentation and speech appropriate for age.  PSYCH: Appropriate affect, tone, and pace of words           Video-Visit Details    Type of service:  Video Visit   Video start: 4:22pm  Video end: 4:44pm  Originating Location (pt. Location): Home    Distant Location (provider location):  On-site  Platform used for Video Visit: [employer]  Signed Electronically by: Don Aviles MD

## 2025-08-26 ENCOUNTER — DOCUMENTATION ONLY (OUTPATIENT)
Dept: LAB | Facility: CLINIC | Age: 70
End: 2025-08-26
Payer: MEDICARE

## 2025-08-26 DIAGNOSIS — N20.0 CALCULUS OF KIDNEY: Primary | ICD-10-CM

## 2025-08-26 DIAGNOSIS — E83.50 CALCIUM METABOLISM DISORDER: ICD-10-CM

## (undated) DEVICE — PACK CYSTOSCOPY SBA15CYFSI

## (undated) DEVICE — TUBING SET IRRIGATION 4 LEAD 90" DYND19124

## (undated) DEVICE — BAG CLEAR TRASH 1.3M 39X33" P4040C

## (undated) DEVICE — SU VICRYL 2-0 UR-6 27" J602H

## (undated) DEVICE — GLOVE PROTEXIS BLUE W/NEU-THERA 8.0  2D73EB80

## (undated) DEVICE — MOXY LIQUID COOLED 180W LASER FIBER

## (undated) DEVICE — DAVINCI XI DRAPE ARM 470015

## (undated) DEVICE — ENDO MARKER SPOT 5ML

## (undated) DEVICE — GLOVE PROTEXIS BLUE W/NEU-THERA 7.0  2D73EB70

## (undated) DEVICE — SOL NACL 0.9% INJ 1000ML BAG 2B1324X

## (undated) DEVICE — BAG URINARY DRAIN 4000ML LF 153509

## (undated) DEVICE — LINEN HALF SHEET 5512

## (undated) DEVICE — SU VICRYL 4-0 RB-1 27" J304

## (undated) DEVICE — SOL NACL 0.9% IRRIG 3000ML BAG 2B7477

## (undated) DEVICE — ENDO POUCH UNIV RETRIEVAL SYSTEM INZII 10MM CD001

## (undated) DEVICE — NDL INSUFFLATION 13GA 120MM C2201

## (undated) DEVICE — BASIN SET MINOR DISP

## (undated) DEVICE — LINEN FULL SHEET 5511

## (undated) DEVICE — SU VICRYL 0 UR-6 27" J603H

## (undated) DEVICE — DAVINCI XI DRAPE COLUMN 470341

## (undated) DEVICE — CLIP ENDO HEMO-LOC PURPLE LG 544240

## (undated) DEVICE — DEVICE RETRIEVAL ROTH NET PLATINUM UNIV 2.5MMX230CM 00715050

## (undated) DEVICE — DRAIN JACKSON PRATT CHANNEL 19FR ROUND HUBLESS SIL JP-2230

## (undated) DEVICE — CATH HOLDER STRAP 36600

## (undated) DEVICE — PACK DAVINCI UROLOGY SBA15UDFSG

## (undated) DEVICE — BAG CYSTO TABLE DRAIN

## (undated) DEVICE — CLIP HEMOSTASIS ASSURANCE W16 MM BX00711884

## (undated) DEVICE — CATH FOLEY 3WAY 22FR 30ML LATEX 0167SI22

## (undated) DEVICE — ENDO TRAP POLYP QUICK CATCH 710201

## (undated) DEVICE — GUIDEWIRE ZIPWIRE STD STR .035"X150CM M006630205B0

## (undated) DEVICE — ENDO SNARE POLYPECTOMY OVAL 15MM LOOP SD-240U-15

## (undated) DEVICE — BLADE CLIPPER 4406

## (undated) DEVICE — SOL WATER IRRIG 1000ML BOTTLE 2F7114

## (undated) DEVICE — KIT ENDO TURNOVER/PROCEDURE W/CLEAN A SCOPE LINERS 103888

## (undated) DEVICE — SYR 70ML TOOMEY 041170

## (undated) DEVICE — PACK CYSTO CUSTOM RIDGES

## (undated) DEVICE — CATH TRAY FOLEY COUDE SURESTEP 16FR W/URNE MTR STLK A304716A

## (undated) DEVICE — ESU GROUND PAD ADULT W/CORD E7507

## (undated) DEVICE — DAVINCI XI SEAL UNIVERSAL 5-8MM 470361

## (undated) DEVICE — PAD FOAM EGGCRATE 31163457

## (undated) DEVICE — SU SILK 2-0 FSL 18" 677G

## (undated) DEVICE — ESU GROUND PAD UNIVERSAL W/O CORD

## (undated) DEVICE — LINEN TOWEL PACK X5 5464

## (undated) DEVICE — DRAIN JACKSON PRATT RESERVOIR 100ML SU130-1305

## (undated) DEVICE — GLOVE PROTEXIS MICRO 7.0  2D73PM70

## (undated) DEVICE — TUBING IRRIG CYSTO/BLADDER SET 81" LF 2C4040

## (undated) DEVICE — SUCTION IRR STRYKERFLOW II W/TIP 250-070-520

## (undated) DEVICE — TUBING CONMED AIRSEAL SMOKE EVAC INSUFFLATION ASM-EVAC

## (undated) DEVICE — DAVINCI HOT SHEARS TIP COVER  400180

## (undated) DEVICE — SOL WATER IRRIG 3000ML BAG 2B7117

## (undated) DEVICE — ENDO SNARE EXACTO COLD 9MM LOOP 2.4MMX230CM 00711115

## (undated) DEVICE — COVER FOOTSWITCH W/CINCH 20X24" 923267

## (undated) DEVICE — TAPE DURAPORE 3" SILK 1538-3

## (undated) DEVICE — PAD CHUX UNDERPAD 23X24" 7136

## (undated) DEVICE — CONNECTOR URETERAL CATH 14FR GOLDBERG 050049

## (undated) DEVICE — CATH TRAY FOLEY COUDE 16FR SILVER W/URINE METER 350ML 304716

## (undated) DEVICE — TUBING IV 69" STERILE 1C8160S

## (undated) DEVICE — GLOVE PROTEXIS W/NEU-THERA 7.5  2D73TE75

## (undated) DEVICE — SOL NACL 0.9% IRRIG 1000ML BOTTLE 2F7124

## (undated) DEVICE — NDL SCLEROTHERAPY 25GA CARR-LOCK  00711811

## (undated) DEVICE — SUCTION CANISTER MEDIVAC LINER 3000ML W/LID 65651-530

## (undated) DEVICE — SU MONOCRYL 4-0 PS-2 18" UND Y496G

## (undated) RX ORDER — ONDANSETRON 2 MG/ML
INJECTION INTRAMUSCULAR; INTRAVENOUS
Status: DISPENSED
Start: 2020-12-01

## (undated) RX ORDER — GLYCOPYRROLATE 0.2 MG/ML
INJECTION, SOLUTION INTRAMUSCULAR; INTRAVENOUS
Status: DISPENSED
Start: 2020-09-28

## (undated) RX ORDER — FENTANYL CITRATE 50 UG/ML
INJECTION, SOLUTION INTRAMUSCULAR; INTRAVENOUS
Status: DISPENSED
Start: 2020-09-28

## (undated) RX ORDER — EPHEDRINE SULFATE 50 MG/ML
INJECTION, SOLUTION INTRAMUSCULAR; INTRAVENOUS; SUBCUTANEOUS
Status: DISPENSED
Start: 2020-12-01

## (undated) RX ORDER — KETOROLAC TROMETHAMINE 30 MG/ML
INJECTION, SOLUTION INTRAMUSCULAR; INTRAVENOUS
Status: DISPENSED
Start: 2020-12-01

## (undated) RX ORDER — FENTANYL CITRATE 50 UG/ML
INJECTION, SOLUTION INTRAMUSCULAR; INTRAVENOUS
Status: DISPENSED
Start: 2020-12-01

## (undated) RX ORDER — FENTANYL CITRATE 50 UG/ML
INJECTION, SOLUTION INTRAMUSCULAR; INTRAVENOUS
Status: DISPENSED
Start: 2024-02-22

## (undated) RX ORDER — NEOSTIGMINE METHYLSULFATE 1 MG/ML
VIAL (ML) INJECTION
Status: DISPENSED
Start: 2020-09-28

## (undated) RX ORDER — LIDOCAINE HYDROCHLORIDE 20 MG/ML
INJECTION, SOLUTION EPIDURAL; INFILTRATION; INTRACAUDAL; PERINEURAL
Status: DISPENSED
Start: 2020-09-28

## (undated) RX ORDER — DEXAMETHASONE SODIUM PHOSPHATE 4 MG/ML
INJECTION, SOLUTION INTRA-ARTICULAR; INTRALESIONAL; INTRAMUSCULAR; INTRAVENOUS; SOFT TISSUE
Status: DISPENSED
Start: 2020-12-01

## (undated) RX ORDER — PROPOFOL 10 MG/ML
INJECTION, EMULSION INTRAVENOUS
Status: DISPENSED
Start: 2020-12-01

## (undated) RX ORDER — HYDROMORPHONE HYDROCHLORIDE 1 MG/ML
INJECTION, SOLUTION INTRAMUSCULAR; INTRAVENOUS; SUBCUTANEOUS
Status: DISPENSED
Start: 2020-09-28

## (undated) RX ORDER — ATROPA BELLADONNA AND OPIUM 16.2; 3 MG/1; MG/1
SUPPOSITORY RECTAL
Status: DISPENSED
Start: 2020-12-01

## (undated) RX ORDER — OXYCODONE HYDROCHLORIDE 5 MG/1
TABLET ORAL
Status: DISPENSED
Start: 2020-12-01

## (undated) RX ORDER — DEXAMETHASONE SODIUM PHOSPHATE 4 MG/ML
INJECTION, SOLUTION INTRA-ARTICULAR; INTRALESIONAL; INTRAMUSCULAR; INTRAVENOUS; SOFT TISSUE
Status: DISPENSED
Start: 2020-09-28

## (undated) RX ORDER — PROPOFOL 10 MG/ML
INJECTION, EMULSION INTRAVENOUS
Status: DISPENSED
Start: 2020-09-28

## (undated) RX ORDER — ONDANSETRON 2 MG/ML
INJECTION INTRAMUSCULAR; INTRAVENOUS
Status: DISPENSED
Start: 2020-09-28

## (undated) RX ORDER — CEFAZOLIN SODIUM 2 G/100ML
INJECTION, SOLUTION INTRAVENOUS
Status: DISPENSED
Start: 2020-12-01

## (undated) RX ORDER — VECURONIUM BROMIDE 1 MG/ML
INJECTION, POWDER, LYOPHILIZED, FOR SOLUTION INTRAVENOUS
Status: DISPENSED
Start: 2020-09-28

## (undated) RX ORDER — FENTANYL CITRATE-0.9 % NACL/PF 10 MCG/ML
PLASTIC BAG, INJECTION (ML) INTRAVENOUS
Status: DISPENSED
Start: 2020-12-01

## (undated) RX ORDER — CEFAZOLIN SODIUM 2 G/100ML
INJECTION, SOLUTION INTRAVENOUS
Status: DISPENSED
Start: 2020-09-28

## (undated) RX ORDER — CEFAZOLIN SODIUM 1 G/3ML
INJECTION, POWDER, FOR SOLUTION INTRAMUSCULAR; INTRAVENOUS
Status: DISPENSED
Start: 2020-09-28

## (undated) RX ORDER — BUPIVACAINE HYDROCHLORIDE 2.5 MG/ML
INJECTION, SOLUTION EPIDURAL; INFILTRATION; INTRACAUDAL
Status: DISPENSED
Start: 2020-09-28